# Patient Record
Sex: FEMALE | Race: ASIAN | NOT HISPANIC OR LATINO | URBAN - METROPOLITAN AREA
[De-identification: names, ages, dates, MRNs, and addresses within clinical notes are randomized per-mention and may not be internally consistent; named-entity substitution may affect disease eponyms.]

---

## 2017-07-03 ENCOUNTER — INPATIENT (INPATIENT)
Facility: HOSPITAL | Age: 62
LOS: 7 days | Discharge: HOME | DRG: 556 | End: 2017-07-11
Attending: INTERNAL MEDICINE | Admitting: INTERNAL MEDICINE
Payer: COMMERCIAL

## 2017-07-03 VITALS
DIASTOLIC BLOOD PRESSURE: 68 MMHG | OXYGEN SATURATION: 97 % | SYSTOLIC BLOOD PRESSURE: 136 MMHG | RESPIRATION RATE: 18 BRPM | TEMPERATURE: 99 F | HEART RATE: 83 BPM

## 2017-07-03 PROCEDURE — 99285 EMERGENCY DEPT VISIT HI MDM: CPT | Mod: 25

## 2017-07-03 NOTE — ED ADULT NURSE NOTE - OBJECTIVE STATEMENT
Patient presented to ED ambulatory but wheeled to ED, c/o right groin pain. Sons lifted patient up to help patient from home to the car, patient was unable to put weight on right foot. Patient stated on Saturday she started having severe right groin pain in the evening, early that day patient was sitting on the floor doing her bills. Patient denies injury. Pain did not improved, got worse after 2 days.

## 2017-07-04 DIAGNOSIS — I10 ESSENTIAL (PRIMARY) HYPERTENSION: ICD-10-CM

## 2017-07-04 DIAGNOSIS — E10.9 TYPE 1 DIABETES MELLITUS WITHOUT COMPLICATIONS: ICD-10-CM

## 2017-07-04 DIAGNOSIS — E03.9 HYPOTHYROIDISM, UNSPECIFIED: ICD-10-CM

## 2017-07-04 DIAGNOSIS — M25.551 PAIN IN RIGHT HIP: ICD-10-CM

## 2017-07-04 DIAGNOSIS — E78.00 PURE HYPERCHOLESTEROLEMIA, UNSPECIFIED: ICD-10-CM

## 2017-07-04 LAB
ALBUMIN SERPL ELPH-MCNC: 3.9 G/DL — SIGNIFICANT CHANGE UP (ref 3.3–5)
ALP SERPL-CCNC: 159 U/L — HIGH (ref 40–120)
ALT FLD-CCNC: 127 U/L RC — HIGH (ref 10–45)
ANION GAP SERPL CALC-SCNC: 16 MMOL/L — SIGNIFICANT CHANGE UP (ref 5–17)
APPEARANCE UR: CLEAR — SIGNIFICANT CHANGE UP
APTT BLD: 35.1 SEC — SIGNIFICANT CHANGE UP (ref 27.5–37.4)
AST SERPL-CCNC: 90 U/L — HIGH (ref 10–40)
BASOPHILS # BLD AUTO: 0 K/UL — SIGNIFICANT CHANGE UP (ref 0–0.2)
BASOPHILS NFR BLD AUTO: 0.2 % — SIGNIFICANT CHANGE UP (ref 0–2)
BILIRUB SERPL-MCNC: 0.5 MG/DL — SIGNIFICANT CHANGE UP (ref 0.2–1.2)
BILIRUB UR-MCNC: NEGATIVE — SIGNIFICANT CHANGE UP
BUN SERPL-MCNC: 18 MG/DL — SIGNIFICANT CHANGE UP (ref 7–23)
CALCIUM SERPL-MCNC: 9.7 MG/DL — SIGNIFICANT CHANGE UP (ref 8.4–10.5)
CHLORIDE SERPL-SCNC: 102 MMOL/L — SIGNIFICANT CHANGE UP (ref 96–108)
CO2 SERPL-SCNC: 22 MMOL/L — SIGNIFICANT CHANGE UP (ref 22–31)
COLOR SPEC: YELLOW — SIGNIFICANT CHANGE UP
CREAT SERPL-MCNC: 0.78 MG/DL — SIGNIFICANT CHANGE UP (ref 0.5–1.3)
CRP SERPL-MCNC: 12.3 MG/DL — HIGH (ref 0–0.4)
DIFF PNL FLD: NEGATIVE — SIGNIFICANT CHANGE UP
EOSINOPHIL # BLD AUTO: 0.1 K/UL — SIGNIFICANT CHANGE UP (ref 0–0.5)
EOSINOPHIL NFR BLD AUTO: 0.7 % — SIGNIFICANT CHANGE UP (ref 0–6)
GLUCOSE SERPL-MCNC: 78 MG/DL — SIGNIFICANT CHANGE UP (ref 70–99)
GLUCOSE UR QL: NEGATIVE — SIGNIFICANT CHANGE UP
HCT VFR BLD CALC: 32.1 % — LOW (ref 34.5–45)
HGB BLD-MCNC: 11.3 G/DL — LOW (ref 11.5–15.5)
INR BLD: 1.22 RATIO — HIGH (ref 0.88–1.16)
KETONES UR-MCNC: NEGATIVE — SIGNIFICANT CHANGE UP
LEUKOCYTE ESTERASE UR-ACNC: NEGATIVE — SIGNIFICANT CHANGE UP
LYMPHOCYTES # BLD AUTO: 13.6 % — SIGNIFICANT CHANGE UP (ref 13–44)
LYMPHOCYTES # BLD AUTO: 2.1 K/UL — SIGNIFICANT CHANGE UP (ref 1–3.3)
MCHC RBC-ENTMCNC: 28.4 PG — SIGNIFICANT CHANGE UP (ref 27–34)
MCHC RBC-ENTMCNC: 35.1 GM/DL — SIGNIFICANT CHANGE UP (ref 32–36)
MCV RBC AUTO: 81.1 FL — SIGNIFICANT CHANGE UP (ref 80–100)
MONOCYTES # BLD AUTO: 1 K/UL — HIGH (ref 0–0.9)
MONOCYTES NFR BLD AUTO: 6.3 % — SIGNIFICANT CHANGE UP (ref 2–14)
NEUTROPHILS # BLD AUTO: 12.2 K/UL — HIGH (ref 1.8–7.4)
NEUTROPHILS NFR BLD AUTO: 79.2 % — HIGH (ref 43–77)
NITRITE UR-MCNC: NEGATIVE — SIGNIFICANT CHANGE UP
PH UR: 6 — SIGNIFICANT CHANGE UP (ref 5–8)
PLATELET # BLD AUTO: 286 K/UL — SIGNIFICANT CHANGE UP (ref 150–400)
POTASSIUM SERPL-MCNC: 4.2 MMOL/L — SIGNIFICANT CHANGE UP (ref 3.5–5.3)
POTASSIUM SERPL-SCNC: 4.2 MMOL/L — SIGNIFICANT CHANGE UP (ref 3.5–5.3)
PROT SERPL-MCNC: 8.2 G/DL — SIGNIFICANT CHANGE UP (ref 6–8.3)
PROT UR-MCNC: 30 MG/DL
PROTHROM AB SERPL-ACNC: 13.2 SEC — HIGH (ref 9.8–12.7)
RBC # BLD: 3.96 M/UL — SIGNIFICANT CHANGE UP (ref 3.8–5.2)
RBC # FLD: 12.1 % — SIGNIFICANT CHANGE UP (ref 10.3–14.5)
SODIUM SERPL-SCNC: 140 MMOL/L — SIGNIFICANT CHANGE UP (ref 135–145)
SP GR SPEC: 1.02 — SIGNIFICANT CHANGE UP (ref 1.01–1.02)
UROBILINOGEN FLD QL: NEGATIVE — SIGNIFICANT CHANGE UP
WBC # BLD: 15.4 K/UL — HIGH (ref 3.8–10.5)
WBC # FLD AUTO: 15.4 K/UL — HIGH (ref 3.8–10.5)

## 2017-07-04 PROCEDURE — 72197 MRI PELVIS W/O & W/DYE: CPT | Mod: 26

## 2017-07-04 PROCEDURE — 73502 X-RAY EXAM HIP UNI 2-3 VIEWS: CPT | Mod: 26,RT

## 2017-07-04 PROCEDURE — 74176 CT ABD & PELVIS W/O CONTRAST: CPT | Mod: 26

## 2017-07-04 PROCEDURE — 76377 3D RENDER W/INTRP POSTPROCES: CPT | Mod: 26

## 2017-07-04 RX ORDER — VANCOMYCIN HCL 1 G
VIAL (EA) INTRAVENOUS
Qty: 0 | Refills: 0 | Status: DISCONTINUED | OUTPATIENT
Start: 2017-07-04 | End: 2017-07-04

## 2017-07-04 RX ORDER — IBUPROFEN 200 MG
400 TABLET ORAL EVERY 6 HOURS
Qty: 0 | Refills: 0 | Status: DISCONTINUED | OUTPATIENT
Start: 2017-07-04 | End: 2017-07-11

## 2017-07-04 RX ORDER — INSULIN LISPRO 100/ML
VIAL (ML) SUBCUTANEOUS AT BEDTIME
Qty: 0 | Refills: 0 | Status: DISCONTINUED | OUTPATIENT
Start: 2017-07-04 | End: 2017-07-11

## 2017-07-04 RX ORDER — GLUCAGON INJECTION, SOLUTION 0.5 MG/.1ML
1 INJECTION, SOLUTION SUBCUTANEOUS ONCE
Qty: 0 | Refills: 0 | Status: DISCONTINUED | OUTPATIENT
Start: 2017-07-04 | End: 2017-07-11

## 2017-07-04 RX ORDER — LISINOPRIL 2.5 MG/1
10 TABLET ORAL DAILY
Qty: 0 | Refills: 0 | Status: DISCONTINUED | OUTPATIENT
Start: 2017-07-04 | End: 2017-07-11

## 2017-07-04 RX ORDER — VANCOMYCIN HCL 1 G
1000 VIAL (EA) INTRAVENOUS ONCE
Qty: 0 | Refills: 0 | Status: COMPLETED | OUTPATIENT
Start: 2017-07-04 | End: 2017-07-04

## 2017-07-04 RX ORDER — RAMIPRIL 5 MG
0 CAPSULE ORAL
Qty: 0 | Refills: 0 | COMMUNITY

## 2017-07-04 RX ORDER — DEXTROSE 50 % IN WATER 50 %
12.5 SYRINGE (ML) INTRAVENOUS ONCE
Qty: 0 | Refills: 0 | Status: DISCONTINUED | OUTPATIENT
Start: 2017-07-04 | End: 2017-07-11

## 2017-07-04 RX ORDER — DEXTROSE 50 % IN WATER 50 %
1 SYRINGE (ML) INTRAVENOUS ONCE
Qty: 0 | Refills: 0 | Status: DISCONTINUED | OUTPATIENT
Start: 2017-07-04 | End: 2017-07-11

## 2017-07-04 RX ORDER — IBUPROFEN 200 MG
0 TABLET ORAL
Qty: 0 | Refills: 0 | COMMUNITY

## 2017-07-04 RX ORDER — ATORVASTATIN CALCIUM 80 MG/1
0 TABLET, FILM COATED ORAL
Qty: 0 | Refills: 0 | COMMUNITY

## 2017-07-04 RX ORDER — DEXTROSE 50 % IN WATER 50 %
25 SYRINGE (ML) INTRAVENOUS ONCE
Qty: 0 | Refills: 0 | Status: DISCONTINUED | OUTPATIENT
Start: 2017-07-04 | End: 2017-07-11

## 2017-07-04 RX ORDER — LEVOTHYROXINE SODIUM 125 MCG
0 TABLET ORAL
Qty: 0 | Refills: 0 | COMMUNITY

## 2017-07-04 RX ORDER — TRAMADOL HYDROCHLORIDE 50 MG/1
0 TABLET ORAL
Qty: 0 | Refills: 0 | COMMUNITY

## 2017-07-04 RX ORDER — INSULIN LISPRO 100/ML
VIAL (ML) SUBCUTANEOUS
Qty: 0 | Refills: 0 | Status: DISCONTINUED | OUTPATIENT
Start: 2017-07-04 | End: 2017-07-11

## 2017-07-04 RX ORDER — SODIUM CHLORIDE 9 MG/ML
1000 INJECTION, SOLUTION INTRAVENOUS ONCE
Qty: 0 | Refills: 0 | Status: COMPLETED | OUTPATIENT
Start: 2017-07-04 | End: 2017-07-04

## 2017-07-04 RX ORDER — MORPHINE SULFATE 50 MG/1
2 CAPSULE, EXTENDED RELEASE ORAL ONCE
Qty: 0 | Refills: 0 | Status: DISCONTINUED | OUTPATIENT
Start: 2017-07-04 | End: 2017-07-04

## 2017-07-04 RX ORDER — METHOCARBAMOL 500 MG/1
0 TABLET, FILM COATED ORAL
Qty: 0 | Refills: 0 | COMMUNITY

## 2017-07-04 RX ORDER — HEPARIN SODIUM 5000 [USP'U]/ML
5000 INJECTION INTRAVENOUS; SUBCUTANEOUS EVERY 12 HOURS
Qty: 0 | Refills: 0 | Status: DISCONTINUED | OUTPATIENT
Start: 2017-07-04 | End: 2017-07-11

## 2017-07-04 RX ORDER — INSULIN GLARGINE 100 [IU]/ML
36 INJECTION, SOLUTION SUBCUTANEOUS AT BEDTIME
Qty: 0 | Refills: 0 | Status: DISCONTINUED | OUTPATIENT
Start: 2017-07-04 | End: 2017-07-11

## 2017-07-04 RX ORDER — ACETAMINOPHEN 500 MG
1000 TABLET ORAL ONCE
Qty: 0 | Refills: 0 | Status: COMPLETED | OUTPATIENT
Start: 2017-07-04 | End: 2017-07-04

## 2017-07-04 RX ORDER — TRAMADOL HYDROCHLORIDE 50 MG/1
1 TABLET ORAL
Qty: 0 | Refills: 0 | COMMUNITY

## 2017-07-04 RX ORDER — MORPHINE SULFATE 50 MG/1
2 CAPSULE, EXTENDED RELEASE ORAL EVERY 6 HOURS
Qty: 0 | Refills: 0 | Status: DISCONTINUED | OUTPATIENT
Start: 2017-07-04 | End: 2017-07-06

## 2017-07-04 RX ORDER — KETOROLAC TROMETHAMINE 30 MG/ML
15 SYRINGE (ML) INJECTION ONCE
Qty: 0 | Refills: 0 | Status: DISCONTINUED | OUTPATIENT
Start: 2017-07-04 | End: 2017-07-04

## 2017-07-04 RX ORDER — METHOCARBAMOL 500 MG/1
1 TABLET, FILM COATED ORAL
Qty: 0 | Refills: 0 | COMMUNITY

## 2017-07-04 RX ORDER — LEVOTHYROXINE SODIUM 125 MCG
75 TABLET ORAL DAILY
Qty: 0 | Refills: 0 | Status: DISCONTINUED | OUTPATIENT
Start: 2017-07-04 | End: 2017-07-11

## 2017-07-04 RX ORDER — ATORVASTATIN CALCIUM 80 MG/1
10 TABLET, FILM COATED ORAL AT BEDTIME
Qty: 0 | Refills: 0 | Status: DISCONTINUED | OUTPATIENT
Start: 2017-07-04 | End: 2017-07-11

## 2017-07-04 RX ORDER — SODIUM CHLORIDE 9 MG/ML
1000 INJECTION, SOLUTION INTRAVENOUS
Qty: 0 | Refills: 0 | Status: DISCONTINUED | OUTPATIENT
Start: 2017-07-04 | End: 2017-07-11

## 2017-07-04 RX ORDER — ASPIRIN/CALCIUM CARB/MAGNESIUM 324 MG
81 TABLET ORAL DAILY
Qty: 0 | Refills: 0 | Status: DISCONTINUED | OUTPATIENT
Start: 2017-07-04 | End: 2017-07-11

## 2017-07-04 RX ADMIN — Medication 400 MILLIGRAM(S): at 22:15

## 2017-07-04 RX ADMIN — MORPHINE SULFATE 2 MILLIGRAM(S): 50 CAPSULE, EXTENDED RELEASE ORAL at 03:38

## 2017-07-04 RX ADMIN — ATORVASTATIN CALCIUM 10 MILLIGRAM(S): 80 TABLET, FILM COATED ORAL at 21:43

## 2017-07-04 RX ADMIN — MORPHINE SULFATE 2 MILLIGRAM(S): 50 CAPSULE, EXTENDED RELEASE ORAL at 11:38

## 2017-07-04 RX ADMIN — Medication 400 MILLIGRAM(S): at 02:37

## 2017-07-04 RX ADMIN — MORPHINE SULFATE 2 MILLIGRAM(S): 50 CAPSULE, EXTENDED RELEASE ORAL at 18:20

## 2017-07-04 RX ADMIN — INSULIN GLARGINE 36 UNIT(S): 100 INJECTION, SOLUTION SUBCUTANEOUS at 21:43

## 2017-07-04 RX ADMIN — Medication 400 MILLIGRAM(S): at 21:43

## 2017-07-04 RX ADMIN — MORPHINE SULFATE 2 MILLIGRAM(S): 50 CAPSULE, EXTENDED RELEASE ORAL at 04:39

## 2017-07-04 RX ADMIN — MORPHINE SULFATE 2 MILLIGRAM(S): 50 CAPSULE, EXTENDED RELEASE ORAL at 02:37

## 2017-07-04 RX ADMIN — Medication 15 MILLIGRAM(S): at 05:54

## 2017-07-04 RX ADMIN — MORPHINE SULFATE 2 MILLIGRAM(S): 50 CAPSULE, EXTENDED RELEASE ORAL at 18:01

## 2017-07-04 RX ADMIN — Medication 1000 MILLIGRAM(S): at 03:37

## 2017-07-04 RX ADMIN — MORPHINE SULFATE 2 MILLIGRAM(S): 50 CAPSULE, EXTENDED RELEASE ORAL at 05:50

## 2017-07-04 RX ADMIN — SODIUM CHLORIDE 4000 MILLILITER(S): 9 INJECTION, SOLUTION INTRAVENOUS at 08:35

## 2017-07-04 RX ADMIN — HEPARIN SODIUM 5000 UNIT(S): 5000 INJECTION INTRAVENOUS; SUBCUTANEOUS at 18:02

## 2017-07-04 RX ADMIN — Medication 250 MILLIGRAM(S): at 21:38

## 2017-07-04 NOTE — CHART NOTE - NSCHARTNOTEFT_GEN_A_CORE
Orthopedics    MRI pelvis done.  Moderate joint effusion seen.  Given effusion elevated ESR/CRP, elevated white blood cell count would consult IR for Hip aspiration to rule out septic joint.    Femi ROCHA 1941

## 2017-07-04 NOTE — ED PROVIDER NOTE - OBJECTIVE STATEMENT
Keren Arely, DO: 62F with hx of HTN, DM, HLD, Hypothyroidism here for R hip pain pain x 2 days. Pain started when patient was sitting on floor doing work. No trauma. Relieved by pressing on inguinal ligament, but now not relieving symptoms prompting visit. Pt has been on Augmentin x 10 days for reported salivary gland infection - + diarrhea now resolved 2/2 abx. +fevers/chills that proceeded hip pain.     PMD: Dr. Julio George    ROS: denies HA, weakness, dizziness, nausea/vomiting, chest pain, SOB, diaphoresis, abdominal pain, diarrhea, joint pain, neuro deficits, dysuria/hematuria, rash, back pain, bowel/bladder dysfunction.

## 2017-07-04 NOTE — H&P ADULT - HISTORY OF PRESENT ILLNESS
62F with hx of HTN, DM, HLD, Hypothyroidism here for R hip pain pain x 2 days. Pain started when patient was sitting on floor doing work. No trauma. Relieved by pressing on inguinal ligament, but now not relieving symptoms prompting visit. Pt has been on Augmentin x 10 days for reported salivary gland infection - + diarrhea now resolved 2/2 abx. +fevers/chills that proceeded hip pain.

## 2017-07-04 NOTE — ED PROVIDER NOTE - PROGRESS NOTE DETAILS
Keren Mcgee DO: Called pt's PMD. Attending MD Watson: CT and XR without fracture or obvious explanation for pain. +exquisite pain with PROM of right hip, in setting of fevers, cannot rule out hip osteo or septic hip. will consult ortho, patient will need MRI hip Keren Mcgee, DO: s/p POCUS with R hip effusion. Ortho consulted as cannot rule out septic hip. Will see patient. Keren Mcgee DO: Called pt's PMD. Pending call back. Keren Mcgee, DO: D/w Dr. George - pt treated for parotitis with Augmentin. No documented fevers in office but patient was c/o subjective fevers at that time. Can be admitted to unattached medicine as Dr. George does not have admitting preference. Attd:  Received sign out on patient pending orthopedics consultation.  Per orthopedics agrees with plan of MRI of hip, no plan for aspiration/operating room at this time, recommending admission to medicine, hold antibiotics pending MRI, possible IR aspiration of hip as inpatient if indicated, will continue to follow.  Will admit to medicine.  Zachariah Baez M.D.

## 2017-07-04 NOTE — CONSULT NOTE ADULT - SUBJECTIVE AND OBJECTIVE BOX
HPI:   This is a 62yFemale with  who presents today with chief complaint of R hip pain. Atraumatic, never had this pain before. Points to R groin as site of pain. Limited ability to ambulate. No constitutional symptoms. No contributory PMH.    Review of systems: Denies fever, chills, nausea, vomiting, recent infection, previous fractures.      T(C): 36.3 (07-04-17 @ 06:58), Max: 37.2 (07-04-17 @ 02:30)  HR: 80 (07-04-17 @ 06:58) (80 - 88)  BP: 112/56 (07-04-17 @ 06:58) (107/65 - 136/68)  RR: 18 (07-04-17 @ 06:58) (17 - 18)  SpO2: 99% (07-04-17 @ 06:58) (97% - 99%)  Wt(kg): --                          11.3   15.4  )-----------( 286      ( 04 Jul 2017 02:44 )             32.1     07-04    140  |  102  |  18  ----------------------------<  78  4.2   |  22  |  0.78    Ca    9.7      04 Jul 2017 02:44    TPro  8.2  /  Alb  3.9  /  TBili  0.5  /  DBili  x   /  AST  90<H>  /  ALT  127<H>  /  AlkPhos  159<H>  07-04    XR R hip: no acute findings  CT pelvis: no acute findings    EXAM:  Awake, Alert and in no acute distress  Pleasant and cooperative.  - RLE: TTP about hip and upper thigh; warmht, but no eythema; ROM at hip 0-45 degrees of flexion before pain, 0-15 IR/ER; SILT M/L/FDWS foot; TA/EHL/gs intact    A/P: This is a 62y Female who presents today with R hip pain    - ESR/CRP  - Pain control  - Urgent MRI of R hip  - Will follow   - D/w Dr Wells

## 2017-07-04 NOTE — ED PROVIDER NOTE - MEDICAL DECISION MAKING DETAILS
Keren Mcgee, DO: 62F with symptoms and exam concerning for hip fx. Hip on exam does not appear septic - will consider in differential for further eval if imaging negative for fracture, given fevers. No evidence of inguinal hernia on exam. Low suspicion for  pathology given pt is post-menopausal. Keren Mcgee, DO: 62F with symptoms and exam concerning for hip fx. Hip on exam does not appear septic - will consider vs. osteo in differential for further eval if imaging negative for fracture, given fevers. No evidence of inguinal hernia on exam. Low suspicion for  pathology given pt is post-menopausal.

## 2017-07-04 NOTE — PATIENT PROFILE ADULT. - VISION (WITH CORRECTIVE LENSES IF THE PATIENT USUALLY WEARS THEM):
marium distance and reading glssses/Partially impaired: cannot see medication labels or newsprint, but can see obstacles in path, and the surrounding layout; can count fingers at arm's length

## 2017-07-04 NOTE — ED PROVIDER NOTE - ATTENDING CONTRIBUTION TO CARE
Attending MD Watson:  I personally have seen and examined this patient.  Resident note reviewed and agree on plan of care and except where noted.  See HPI, PE, and MDM for details.    Attending MD Watson: A & O x 3, NAD, HEENT WNL and no facial asymmetry; lungs CTAB, heart with reg rhythm without murmur; abdomen soft NTND; extremities with no edema; +pain with logroll of right hip, +diffuse ttp right hip, no RLE deformity or foreshortening, neuro exam non focal with no motor or sensory deficits.       62F with atraumatic right hip pain and fevers, +exquisite ttp of right hip with PROM, ddx includes hip fx, hip osteomyelitis, septic hip, intra abd abscess, hip strain, plan for XR hip, labs, re-eval

## 2017-07-04 NOTE — H&P ADULT - PROBLEM SELECTOR PLAN 1
R/o septic arthritis. MRI hip performed R/o septic arthritis. MRI hip performed  Needs IR in AM to evaluate for needle aspiration R/o septic arthritis. MRI hip performed  Needs IR in AM to evaluate for needle aspiration  DW with ID, hold off antibiotics until aspiration of joint unless patient appears toxic

## 2017-07-04 NOTE — ED ADULT NURSE REASSESSMENT NOTE - NS ED NURSE REASSESS COMMENT FT1
Report received from REUBEN Prather. Pt. A+OX3, laying in stretcher comfortably. Pt. assisted with bedpan, voided without difficulty. Breathing unlabored on RA. Reports improvement in pain. Comfort and safety maintained. Pending dispo.

## 2017-07-04 NOTE — ED PROVIDER NOTE - PHYSICAL EXAMINATION
Keren Mcgee, DO:  Gen: Well appearing, NAD  Head: NCAT  HEENT: PERRL, MMM, normal conjunctiva, anicteric, neck supple  Lung: CTAB, no adventitious sounds  CV: RRR, no murmurs, rubs or gallops  Abd: soft, NTND, no rebound or guarding  MSK: No edema, no visible deformities. Pain with ROM of R hip with internal rotation. No pain with axial loading.   Neuro: No focal neurologic deficits. CN II-XII grossly intact. 5/5 strength and normal sensation in all extremities.   Skin: Warm and dry, no evidence of rash, no erythema of hip.   Psych: normal mood and affect  Heme: Femoral pulses intact b/l.

## 2017-07-05 LAB
ANION GAP SERPL CALC-SCNC: 11 MMOL/L — SIGNIFICANT CHANGE UP (ref 5–17)
BUN SERPL-MCNC: 12 MG/DL — SIGNIFICANT CHANGE UP (ref 7–23)
C3 SERPL-MCNC: 136 MG/DL — SIGNIFICANT CHANGE UP (ref 80–180)
C4 SERPL-MCNC: 32 MG/DL — SIGNIFICANT CHANGE UP (ref 10–45)
CALCIUM SERPL-MCNC: 8.8 MG/DL — SIGNIFICANT CHANGE UP (ref 8.4–10.5)
CHLORIDE SERPL-SCNC: 105 MMOL/L — SIGNIFICANT CHANGE UP (ref 96–108)
CK SERPL-CCNC: 63 U/L — SIGNIFICANT CHANGE UP (ref 25–170)
CO2 SERPL-SCNC: 22 MMOL/L — SIGNIFICANT CHANGE UP (ref 22–31)
CREAT SERPL-MCNC: 0.79 MG/DL — SIGNIFICANT CHANGE UP (ref 0.5–1.3)
GLUCOSE SERPL-MCNC: 102 MG/DL — HIGH (ref 70–99)
HBA1C BLD-MCNC: 9.7 % — HIGH (ref 4–5.6)
HCT VFR BLD CALC: 29.6 % — LOW (ref 34.5–45)
HGB BLD-MCNC: 9.9 G/DL — LOW (ref 11.5–15.5)
MCHC RBC-ENTMCNC: 26.1 PG — LOW (ref 27–34)
MCHC RBC-ENTMCNC: 33.4 GM/DL — SIGNIFICANT CHANGE UP (ref 32–36)
MCV RBC AUTO: 78.1 FL — LOW (ref 80–100)
PLATELET # BLD AUTO: 356 K/UL — SIGNIFICANT CHANGE UP (ref 150–400)
POTASSIUM SERPL-MCNC: 3.7 MMOL/L — SIGNIFICANT CHANGE UP (ref 3.5–5.3)
POTASSIUM SERPL-SCNC: 3.7 MMOL/L — SIGNIFICANT CHANGE UP (ref 3.5–5.3)
RAPID RVP RESULT: SIGNIFICANT CHANGE UP
RBC # BLD: 3.79 M/UL — LOW (ref 3.8–5.2)
RBC # FLD: 13.2 % — SIGNIFICANT CHANGE UP (ref 10.3–14.5)
SODIUM SERPL-SCNC: 138 MMOL/L — SIGNIFICANT CHANGE UP (ref 135–145)
TSH SERPL-MCNC: 2.84 UIU/ML — SIGNIFICANT CHANGE UP (ref 0.27–4.2)
URATE SERPL-MCNC: 4.5 MG/DL — SIGNIFICANT CHANGE UP (ref 2.5–7)
WBC # BLD: 12.56 K/UL — HIGH (ref 3.8–10.5)
WBC # FLD AUTO: 12.56 K/UL — HIGH (ref 3.8–10.5)

## 2017-07-05 PROCEDURE — 77002 NEEDLE LOCALIZATION BY XRAY: CPT | Mod: 26

## 2017-07-05 PROCEDURE — 99254 IP/OBS CNSLTJ NEW/EST MOD 60: CPT

## 2017-07-05 PROCEDURE — 20610 DRAIN/INJ JOINT/BURSA W/O US: CPT | Mod: RT

## 2017-07-05 RX ADMIN — MORPHINE SULFATE 2 MILLIGRAM(S): 50 CAPSULE, EXTENDED RELEASE ORAL at 20:44

## 2017-07-05 RX ADMIN — Medication 400 MILLIGRAM(S): at 22:13

## 2017-07-05 RX ADMIN — Medication 400 MILLIGRAM(S): at 15:03

## 2017-07-05 RX ADMIN — Medication 3: at 12:17

## 2017-07-05 RX ADMIN — INSULIN GLARGINE 36 UNIT(S): 100 INJECTION, SOLUTION SUBCUTANEOUS at 21:42

## 2017-07-05 RX ADMIN — MORPHINE SULFATE 2 MILLIGRAM(S): 50 CAPSULE, EXTENDED RELEASE ORAL at 20:29

## 2017-07-05 RX ADMIN — Medication 81 MILLIGRAM(S): at 12:15

## 2017-07-05 RX ADMIN — Medication 2: at 17:59

## 2017-07-05 RX ADMIN — HEPARIN SODIUM 5000 UNIT(S): 5000 INJECTION INTRAVENOUS; SUBCUTANEOUS at 17:59

## 2017-07-05 RX ADMIN — Medication 400 MILLIGRAM(S): at 08:00

## 2017-07-05 RX ADMIN — Medication 400 MILLIGRAM(S): at 15:24

## 2017-07-05 RX ADMIN — LISINOPRIL 10 MILLIGRAM(S): 2.5 TABLET ORAL at 06:23

## 2017-07-05 RX ADMIN — Medication 400 MILLIGRAM(S): at 21:43

## 2017-07-05 RX ADMIN — HEPARIN SODIUM 5000 UNIT(S): 5000 INJECTION INTRAVENOUS; SUBCUTANEOUS at 06:23

## 2017-07-05 RX ADMIN — Medication 400 MILLIGRAM(S): at 08:03

## 2017-07-05 RX ADMIN — ATORVASTATIN CALCIUM 10 MILLIGRAM(S): 80 TABLET, FILM COATED ORAL at 21:43

## 2017-07-05 RX ADMIN — Medication 75 MICROGRAM(S): at 06:23

## 2017-07-05 NOTE — CONSULT NOTE ADULT - PROBLEM SELECTOR RECOMMENDATION 9
Hip aspirate by IR  Ortho input noted  Check blood Cx  Start empiric vanco after hip aspirate done(to maximize yield)  Check ASO,TROY complements RVP and hepatitis panel, CPK  Tailor plan per results,course  Pain per primary  Ortho follow up

## 2017-07-05 NOTE — CONSULT NOTE ADULT - ASSESSMENT
62F with hx of HTN, DM, HLD, Hypothyroidism here for R hip pain pain x 2 days  No prior joint issues,no trauma  fevers  Elevated markers of inflammation  MRI with joint effusion ?muscular edema  Prior h/o ?URi v parotitis no s/s now  ?Septic arthritis, ?post infectious,?Viral ? Crystal disaeses,?myositis,?other non infectious etiology

## 2017-07-05 NOTE — PROGRESS NOTE ADULT - SUBJECTIVE AND OBJECTIVE BOX
63 y/o female with right hip joint effusion, elevated ESR, CRP and leukocytosis.  Concern for septic arthritis as seen on recent MRI.  Hx of HTN, DM, HLD, Hypothyroidism here for R hip pain pain x 2 days. Pain started when patient was sitting on floor doing work. No trauma. Relieved by pressing on inguinal ligament, but now not relieving symptoms prompting visit. Pt has been on Augmentin x 10 days for reported salivary gland infection - + diarrhea now resolved 2/2 abx. +fevers/chills that proceeded hip pain. (04 Jul 2017 18:50)    PAST MEDICAL & SURGICAL HISTORY:  Hypothyroid  High cholesterol  Diabetes  HTN (hypertension)      Social History:     FAMILY HISTORY:  No pertinent family history in first degree relatives      Allergies: No Known Allergies      Current Medications: morphine  - Injectable 2 milliGRAM(s) IV Push every 6 hours PRN  insulin glargine Injectable (LANTUS) 36 Unit(s) SubCutaneous at bedtime  insulin lispro (HumaLOG) corrective regimen sliding scale   SubCutaneous three times a day before meals  insulin lispro (HumaLOG) corrective regimen sliding scale   SubCutaneous at bedtime  dextrose 5%. 1000 milliLiter(s) IV Continuous <Continuous>  dextrose Gel 1 Dose(s) Oral once PRN  dextrose 50% Injectable 12.5 Gram(s) IV Push once  dextrose 50% Injectable 25 Gram(s) IV Push once  dextrose 50% Injectable 25 Gram(s) IV Push once  glucagon  Injectable 1 milliGRAM(s) IntraMuscular once PRN  heparin  Injectable 5000 Unit(s) SubCutaneous every 12 hours  levothyroxine 75 MICROGram(s) Oral daily  aspirin enteric coated 81 milliGRAM(s) Oral daily  ibuprofen  Tablet 400 milliGRAM(s) Oral every 6 hours PRN  lisinopril 10 milliGRAM(s) Oral daily  atorvastatin 10 milliGRAM(s) Oral at bedtime      Labs:                         9.9    12.56 )-----------( 356      ( 05 Jul 2017 08:42 )             29.6       07-05    138  |  105  |  12  ----------------------------<  102<H>  3.7   |  22  |  0.79    Ca    8.8      05 Jul 2017 08:52    TPro  8.2  /  Alb  3.9  /  TBili  0.5  /  DBili  x   /  AST  90<H>  /  ALT  127<H>  /  AlkPhos  159<H>  07-04      Assessment/Plan:   63 y/o female with right hip joint effusion and concern for septic arthritis.  Referred for right hip joint aspiration to r/o septic arthritis.      Procedure/ risks/ benefits/ goals/ alternatives were explained. All questions answered. Informed content obtained from patient. Consent placed in chart. 63 y/o female with right hip joint effusion, elevated ESR, CRP and leukocytosis.  Concern for septic arthritis as seen on recent MRI.  Hx of HTN, DM, HLD, Hypothyroidism here for R hip pain pain x 2 days. Pain started when patient was sitting on floor doing work. No trauma. Relieved by pressing on inguinal ligament, but now not relieving symptoms prompting visit. Pt has been on Augmentin x 10 days for reported salivary gland infection - + diarrhea now resolved 2/2 abx. +fevers/chills that proceeded hip pain. (04 Jul 2017 18:50)    PAST MEDICAL & SURGICAL HISTORY:  Hypothyroid  High cholesterol  Diabetes  HTN (hypertension)      Social History:     FAMILY HISTORY:  No pertinent family history in first degree relatives      Allergies: No Known Allergies      Current Medications: morphine  - Injectable 2 milliGRAM(s) IV Push every 6 hours PRN  insulin glargine Injectable (LANTUS) 36 Unit(s) SubCutaneous at bedtime  insulin lispro (HumaLOG) corrective regimen sliding scale   SubCutaneous three times a day before meals  insulin lispro (HumaLOG) corrective regimen sliding scale   SubCutaneous at bedtime  dextrose 5%. 1000 milliLiter(s) IV Continuous <Continuous>  dextrose Gel 1 Dose(s) Oral once PRN  dextrose 50% Injectable 12.5 Gram(s) IV Push once  dextrose 50% Injectable 25 Gram(s) IV Push once  dextrose 50% Injectable 25 Gram(s) IV Push once  glucagon  Injectable 1 milliGRAM(s) IntraMuscular once PRN  heparin  Injectable 5000 Unit(s) SubCutaneous every 12 hours  levothyroxine 75 MICROGram(s) Oral daily  aspirin enteric coated 81 milliGRAM(s) Oral daily  ibuprofen  Tablet 400 milliGRAM(s) Oral every 6 hours PRN  lisinopril 10 milliGRAM(s) Oral daily  atorvastatin 10 milliGRAM(s) Oral at bedtime      Labs:                         9.9    12.56 )-----------( 356      ( 05 Jul 2017 08:42 )             29.6       07-05    138  |  105  |  12  ----------------------------<  102<H>  3.7   |  22  |  0.79    Ca    8.8      05 Jul 2017 08:52    TPro  8.2  /  Alb  3.9  /  TBili  0.5  /  DBili  x   /  AST  90<H>  /  ALT  127<H>  /  AlkPhos  159<H>  07-04      Assessment/Plan:   63 y/o female with right hip joint effusion and concern for septic arthritis.  Referred for right hip joint aspiration to r/o septic arthritis.      Procedure/ risks/ benefits/ goals/ alternatives were explained. All questions answered. Informed content obtained from patient. Consent placed in chart.     AS above, will attempt right hip aspiration.  COnsent obtained and reinforced.

## 2017-07-05 NOTE — CONSULT NOTE ADULT - SUBJECTIVE AND OBJECTIVE BOX
Patient is a 62y old  Female who presents with a chief complaint of Pain in hip (04 Jul 2017 18:50)    HPI:  62F with hx of HTN, DM, HLD, Hypothyroidism here for R hip pain pain x 2 days. Pain started when patient was sitting on floor doing work. No trauma. Relieved by pressing on inguinal ligament, but now not relieving symptoms prompting visit. Pt has been on Augmentin x 10 days for reported salivary gland infection - + diarrhea now resolved 2/2 abx. +fevers/chills that proceeded hip pain. (04 Jul 2017 18:50)  Patient had stopped abx about a week ago  No travel  No tick exposure  No prior history of joint issues    Seen by ortho  MRI obtained s/o some fluid  For IR aspiration  says pain slightly better though has not walked    ID consulted      PAST MEDICAL & SURGICAL HISTORY:  Hypothyroid  High cholesterol  Diabetes  HTN (hypertension)      Social history: No    Smoking,   ETOH,  IVDU     Not sexually active X 7 years  From [facility] X 40 years  No recent tarvel.      FAMILY HISTORY:  No pertinent family history in first degree relatives  - Reviewed,Non contributory     REVIEW OF SYSTEMS  General:	Denies any malaise fatigue or chills. Fevers last few days though not measured    Skin:No rash  	  Ophthalmologic:Denies any visual complaints,discharge redness or photophobia  	  ENMT:No nasal discharge,headache,sinus congestion or throat pain.No dental complaints  says facial pain resolved    Respiratory and Thorax:No cough,sputum or chest pain.Denies shortness of breath  	  Cardiovascular:	No chest pain,palpitaions or dizziness    Gastrointestinal:	NO nausea,abdominal pain or diarrhea.    Genitourinary:	No dysuria,frequency. No flank pain    Musculoskeletal:	No other  joint swelling or pain.No weakness    Neurological:No confusion,diziness.No extremity weakness.No bladder or bowel incontinence	    Psychiatric:No delusions or hallucinations	    Hematology/Lymphatics:	No LN swelling.No gum bleeding     Endocrine:	No recent weight gain or loss.No abnormal heat/cold intolerance      Allergies    No Known Allergies    Intolerances        Antimicrobials:  Given vanco X 1 yesterday but then stopped          Vital Signs Last 24 Hrs  T(C): 37.1 (05 Jul 2017 04:20), Max: 37.9 (04 Jul 2017 18:06)  T(F): 98.7 (05 Jul 2017 04:20), Max: 100.3 (04 Jul 2017 18:06)  HR: 72 (05 Jul 2017 04:20) (72 - 91)  BP: 119/61 (05 Jul 2017 04:20) (109/64 - 141/68)  BP(mean): --  RR: 18 (05 Jul 2017 04:20) (17 - 19)  SpO2: 93% (05 Jul 2017 04:20) (92% - 98%)    PHYSICAL EXAM:Pleasant patient in no acute distress.      Constitutional:Comfortable.Awake and alert  No cachexia     Eyes:PERRL EOMI.NO discharge or conjunctival injection    ENMT:No sinus tenderness.No thrush.No pharyngeal exudate or erythema. dentures  Neck:Supple,No LN,no JVD      Respiratory:Good air entry bilaterally,CTA    Cardiovascular:S1 S2 wnl, No murmurs,rub or gallops    Gastrointestinal:Soft BS(+) no tenderness no masses ,No rebound or guarding    Genitourinary:No CVA tendereness         Extremities:No cyanosis,clubbing or edema.    Vascular:peripheral pulses felt    Neurological:AAO X 3,No grossly focal deficits    Skin:No rash     Lymph Nodes:No palpable LNs    Musculoskeletal:No joint swelling .Painful motion on Right hip to abduction,adduction     Psychiatric:Affect normal.          Labs:                            11.3   15.4  )-----------( 286      ( 04 Jul 2017 02:44 )             32.1         07-04    140  |  102  |  18  ----------------------------<  78  4.2   |  22  |  0.78    Ca    9.7      04 Jul 2017 02:44    TPro  8.2  /  Alb  3.9  /  TBili  0.5  /  DBili  x   /  AST  90<H>  /  ALT  127<H>  /  AlkPhos  159<H>  07-04        MICROBIOLOGY:    Blood Cx testing      < from: MRI Pelvis Bony Only w/wo Cont (07.04.17 @ 17:56) >    IMPRESSION:   1.  Moderate right hip joint effusion of uncertain etiology. There is no   capsular thickening or early synovial enhancement to suggest infection on   current MRI.  2.  Mild muscular edema in the gluteus minimus, gluteus medius, and   adductor muscles about the right hip of uncertain etiology.  3.  Mild tendinosis of the right gluteus medius and gluteus minimus   tendons.  4.  No MR evidence of osteomyelitis.      < end of copied text >      < from: CT Abdomen and Pelvis No Cont (07.04.17 @ 04:37) >  IMPRESSION:     The etiology of this patient's right groin pain is not elucidated on this   study.    Bibasilar interstitial edema and/or fibrosis associated with   bronchiectasis.                < end of copied text > Patient is a 62y old  Female who presents with a chief complaint of Pain in hip (04 Jul 2017 18:50)    HPI:  62F with hx of HTN, DM, HLD, Hypothyroidism here for R hip pain pain x 2 days. Pain started when patient was sitting on floor doing work. No trauma. Relieved by pressing on inguinal ligament, but now not relieving symptoms prompting visit. Pt has been on Augmentin x 10 days for reported salivary gland infection - + diarrhea now resolved 2/2 abx. +fevers/chills that proceeded hip pain. (04 Jul 2017 18:50)  Patient had stopped abx about a week ago  No travel  No tick exposure  No prior history of joint issues    Seen by ortho  MRI obtained s/o some fluid  For IR aspiration  says pain slightly better though has not walked    ID consulted      PAST MEDICAL & SURGICAL HISTORY:  Hypothyroid  High cholesterol  Diabetes  HTN (hypertension)      Social history: No    Smoking,   ETOH,  IVDU     Not sexually active X 7 years  From [facility] X 40 years  No recent tarvel.      FAMILY HISTORY:  No pertinent family history in first degree relatives  - Reviewed,Non contributory     REVIEW OF SYSTEMS  General:	Denies any malaise fatigue or chills. Fevers last few days though not measured    Skin:No rash  	  Ophthalmologic:Denies any visual complaints,discharge redness or photophobia  	  ENMT:No nasal discharge,headache,sinus congestion or throat pain.No dental complaints  says facial pain resolved    Respiratory and Thorax:No cough,sputum or chest pain.Denies shortness of breath  	  Cardiovascular:	No chest pain,palpitaions or dizziness    Gastrointestinal:	NO nausea,abdominal pain or diarrhea.    Genitourinary:	No dysuria,frequency. No flank pain    Musculoskeletal:	No other  joint swelling or pain.No weakness    Neurological:No confusion,diziness.No extremity weakness.No bladder or bowel incontinence	    Psychiatric:No delusions or hallucinations	    Hematology/Lymphatics:	No LN swelling.No gum bleeding     Endocrine:	No recent weight gain or loss.No abnormal heat/cold intolerance      Allergies    No Known Allergies    Intolerances        Antimicrobials:  Given vanco X 1 yesterday but then stopped          Vital Signs Last 24 Hrs  T(C): 37.1 (05 Jul 2017 04:20), Max: 37.9 (04 Jul 2017 18:06)  T(F): 98.7 (05 Jul 2017 04:20), Max: 100.3 (04 Jul 2017 18:06)  HR: 72 (05 Jul 2017 04:20) (72 - 91)  BP: 119/61 (05 Jul 2017 04:20) (109/64 - 141/68)  BP(mean): --  RR: 18 (05 Jul 2017 04:20) (17 - 19)  SpO2: 93% (05 Jul 2017 04:20) (92% - 98%)    PHYSICAL EXAM:Pleasant patient in no acute distress.      Constitutional:Comfortable.Awake and alert  No cachexia     Eyes:PERRL EOMI.NO discharge or conjunctival injection    ENMT:No sinus tenderness.No thrush.No pharyngeal exudate or erythema. dentures  Neck:Supple,No LN,no JVD      Respiratory:Good air entry bilaterally,CTA    Cardiovascular:S1 S2 wnl, No murmurs,rub or gallops    Gastrointestinal:Soft BS(+) no tenderness no masses ,No rebound or guarding    Genitourinary:No CVA tendereness         Extremities:No cyanosis,clubbing or edema.    Vascular:peripheral pulses felt    Neurological:AAO X 3,No grossly focal deficits    Skin:No rash     Lymph Nodes:No palpable LNs    Musculoskeletal:No joint swelling .Painful motion on Right hip to abduction,adduction     Psychiatric:Affect normal.          Labs:                            11.3   15.4  )-----------( 286      ( 04 Jul 2017 02:44 )             32.1         07-04    140  |  102  |  18  ----------------------------<  78  4.2   |  22  |  0.78    Ca    9.7      04 Jul 2017 02:44    TPro  8.2  /  Alb  3.9  /  TBili  0.5  /  DBili  x   /  AST  90<H>  /  ALT  127<H>  /  AlkPhos  159<H>  07-04    Sedimentation Rate, Erythrocyte (07.04.17 @ 02:44)    Sedimentation Rate, Erythrocyte: 106 mm/hr    C-Reactive Protein, Serum (07.04.17 @ 10:44)    C-Reactive Protein, Serum: 12.30 mg/dL            MICROBIOLOGY:    Blood Cx testing      < from: MRI Pelvis Bony Only w/wo Cont (07.04.17 @ 17:56) >    IMPRESSION:   1.  Moderate right hip joint effusion of uncertain etiology. There is no   capsular thickening or early synovial enhancement to suggest infection on   current MRI.  2.  Mild muscular edema in the gluteus minimus, gluteus medius, and   adductor muscles about the right hip of uncertain etiology.  3.  Mild tendinosis of the right gluteus medius and gluteus minimus   tendons.  4.  No MR evidence of osteomyelitis.      < end of copied text >      < from: CT Abdomen and Pelvis No Cont (07.04.17 @ 04:37) >  IMPRESSION:     The etiology of this patient's right groin pain is not elucidated on this   study.    Bibasilar interstitial edema and/or fibrosis associated with   bronchiectasis.                < end of copied text >

## 2017-07-06 LAB
ALBUMIN SERPL ELPH-MCNC: 3.3 G/DL — SIGNIFICANT CHANGE UP (ref 3.3–5)
ALP SERPL-CCNC: 317 U/L — HIGH (ref 40–120)
ALT FLD-CCNC: 232 U/L — HIGH (ref 10–45)
ANION GAP SERPL CALC-SCNC: 13 MMOL/L — SIGNIFICANT CHANGE UP (ref 5–17)
ASO AB SER QL: 28 IU/ML — SIGNIFICANT CHANGE UP (ref 0–408)
AST SERPL-CCNC: 115 U/L — HIGH (ref 10–40)
B PERT IGG+IGM PNL SER: ABNORMAL
BILIRUB DIRECT SERPL-MCNC: 0.1 MG/DL — SIGNIFICANT CHANGE UP (ref 0–0.2)
BILIRUB INDIRECT FLD-MCNC: 0.3 MG/DL — SIGNIFICANT CHANGE UP (ref 0.2–1)
BILIRUB SERPL-MCNC: 0.4 MG/DL — SIGNIFICANT CHANGE UP (ref 0.2–1.2)
BUN SERPL-MCNC: 14 MG/DL — SIGNIFICANT CHANGE UP (ref 7–23)
CALCIUM SERPL-MCNC: 8.5 MG/DL — SIGNIFICANT CHANGE UP (ref 8.4–10.5)
CHLORIDE SERPL-SCNC: 106 MMOL/L — SIGNIFICANT CHANGE UP (ref 96–108)
CO2 SERPL-SCNC: 23 MMOL/L — SIGNIFICANT CHANGE UP (ref 22–31)
COLOR FLD: SIGNIFICANT CHANGE UP
CREAT SERPL-MCNC: 0.96 MG/DL — SIGNIFICANT CHANGE UP (ref 0.5–1.3)
FLUID INTAKE SUBSTANCE CLASS: SIGNIFICANT CHANGE UP
FLUID SEGMENTED GRANULOCYTES: 97 % — SIGNIFICANT CHANGE UP
GLUCOSE SERPL-MCNC: 76 MG/DL — SIGNIFICANT CHANGE UP (ref 70–99)
GRAM STN FLD: SIGNIFICANT CHANGE UP
HCT VFR BLD CALC: 25.6 % — LOW (ref 34.5–45)
HGB BLD-MCNC: 8.4 G/DL — LOW (ref 11.5–15.5)
LYMPHOCYTES # FLD: 1 % — SIGNIFICANT CHANGE UP
MCHC RBC-ENTMCNC: 25.5 PG — LOW (ref 27–34)
MCHC RBC-ENTMCNC: 32.8 GM/DL — SIGNIFICANT CHANGE UP (ref 32–36)
MCV RBC AUTO: 77.8 FL — LOW (ref 80–100)
MONOS+MACROS # FLD: 2 % — SIGNIFICANT CHANGE UP
PLATELET # BLD AUTO: 406 K/UL — HIGH (ref 150–400)
POTASSIUM SERPL-MCNC: 3.7 MMOL/L — SIGNIFICANT CHANGE UP (ref 3.5–5.3)
POTASSIUM SERPL-SCNC: 3.7 MMOL/L — SIGNIFICANT CHANGE UP (ref 3.5–5.3)
PROT SERPL-MCNC: 8.2 G/DL — SIGNIFICANT CHANGE UP (ref 6–8.3)
RBC # BLD: 3.29 M/UL — LOW (ref 3.8–5.2)
RBC # FLD: 13 % — SIGNIFICANT CHANGE UP (ref 10.3–14.5)
RCV VOL RI: 1000 /UL — HIGH (ref 0–5)
SODIUM SERPL-SCNC: 142 MMOL/L — SIGNIFICANT CHANGE UP (ref 135–145)
SPECIMEN SOURCE: SIGNIFICANT CHANGE UP
TOTAL NUCLEATED CELL COUNT, BODY FLUID: 4759 /UL — HIGH (ref 0–5)
TUBE TYPE: SIGNIFICANT CHANGE UP
WBC # BLD: 11.25 K/UL — HIGH (ref 3.8–10.5)
WBC # FLD AUTO: 11.25 K/UL — HIGH (ref 3.8–10.5)

## 2017-07-06 PROCEDURE — 99232 SBSQ HOSP IP/OBS MODERATE 35: CPT

## 2017-07-06 RX ORDER — VANCOMYCIN HCL 1 G
VIAL (EA) INTRAVENOUS
Qty: 0 | Refills: 0 | Status: DISCONTINUED | OUTPATIENT
Start: 2017-07-06 | End: 2017-07-10

## 2017-07-06 RX ORDER — DEXTROSE 50 % IN WATER 50 %
25 SYRINGE (ML) INTRAVENOUS ONCE
Qty: 0 | Refills: 0 | Status: COMPLETED | OUTPATIENT
Start: 2017-07-06 | End: 2017-07-06

## 2017-07-06 RX ORDER — VANCOMYCIN HCL 1 G
1000 VIAL (EA) INTRAVENOUS ONCE
Qty: 0 | Refills: 0 | Status: COMPLETED | OUTPATIENT
Start: 2017-07-06 | End: 2017-07-06

## 2017-07-06 RX ORDER — VANCOMYCIN HCL 1 G
1000 VIAL (EA) INTRAVENOUS EVERY 12 HOURS
Qty: 0 | Refills: 0 | Status: DISCONTINUED | OUTPATIENT
Start: 2017-07-06 | End: 2017-07-10

## 2017-07-06 RX ADMIN — Medication 25 GRAM(S): at 07:29

## 2017-07-06 RX ADMIN — Medication 81 MILLIGRAM(S): at 11:39

## 2017-07-06 RX ADMIN — Medication 2: at 18:08

## 2017-07-06 RX ADMIN — Medication 250 MILLIGRAM(S): at 18:12

## 2017-07-06 RX ADMIN — Medication 250 MILLIGRAM(S): at 01:05

## 2017-07-06 RX ADMIN — Medication 75 MICROGRAM(S): at 05:24

## 2017-07-06 RX ADMIN — MORPHINE SULFATE 2 MILLIGRAM(S): 50 CAPSULE, EXTENDED RELEASE ORAL at 15:30

## 2017-07-06 RX ADMIN — LISINOPRIL 10 MILLIGRAM(S): 2.5 TABLET ORAL at 05:24

## 2017-07-06 RX ADMIN — HEPARIN SODIUM 5000 UNIT(S): 5000 INJECTION INTRAVENOUS; SUBCUTANEOUS at 05:24

## 2017-07-06 RX ADMIN — MORPHINE SULFATE 2 MILLIGRAM(S): 50 CAPSULE, EXTENDED RELEASE ORAL at 15:45

## 2017-07-06 RX ADMIN — HEPARIN SODIUM 5000 UNIT(S): 5000 INJECTION INTRAVENOUS; SUBCUTANEOUS at 18:09

## 2017-07-06 NOTE — PROGRESS NOTE ADULT - ASSESSMENT
62F with hx of HTN, DM, HLD, Hypothyroidism here for R hip pain pain x 2 days  No prior joint issues,no trauma  fevers  Elevated markers of inflammation  MRI with joint effusion ?muscular edema  Prior h/o ?URi v parotitis no s/s now  ?Septic arthritis, ?post infectious,?Viral ? Crystal disease,?myositis,?other non infectious etiology

## 2017-07-06 NOTE — PROVIDER CONTACT NOTE (OTHER) - ASSESSMENT
Pt became NPO @ 0530, Tomy PERRY contacted with no knowledge of NPO order & further plans for pt @ this time. States to endorse to oncoming RN & follow up with day team. Pt responsive in bed, states "I feel hungry."

## 2017-07-06 NOTE — PROGRESS NOTE ADULT - SUBJECTIVE AND OBJECTIVE BOX
4k nucleated cells in hip aspirate. Low suspicion of septic arthritis. Would await final cxs from aspirate.

## 2017-07-06 NOTE — PROGRESS NOTE ADULT - SUBJECTIVE AND OBJECTIVE BOX
Patient is a 62y old  Female who presents with a chief complaint of Pain in hip (04 Jul 2017 18:50)      SUBJECTIVE / OVERNIGHT EVENTS:  S/p right hip aspiration. Pain improving slowly. May need surgiacal exploration by orthopedics  Review of Systems:   CONSTITUTIONAL: No fever, weight loss, or fatigue  EYES: No eye pain, visual disturbances, or discharge  ENMT:  No difficulty hearing, tinnitus, vertigo; No sinus or throat pain  NECK: No pain or stiffness  BREASTS: No pain, masses, or nipple discharge  RESPIRATORY: No cough, wheezing, chills or hemoptysis; No shortness of breath  CARDIOVASCULAR: No chest pain, palpitations, dizziness, or leg swelling  GASTROINTESTINAL: No abdominal or epigastric pain. No nausea, vomiting, or hematemesis; No diarrhea or constipation. No melena or hematochezia.  GENITOURINARY: No dysuria, frequency, hematuria, or incontinence  NEUROLOGICAL: No headaches, memory loss, loss of strength, numbness, or tremors  SKIN: No itching, burning, rashes, or lesions   LYMPH NODES: No enlarged glands  ENDOCRINE: No heat or cold intolerance; No hair loss  MUSCULOSKELETAL: Right groin pain reported  PSYCHIATRIC: No depression, anxiety, mood swings, or difficulty sleeping  HEME/LYMPH: No easy bruising, or bleeding gums  ALLERY AND IMMUNOLOGIC: No hives or eczema    MEDICATIONS  (STANDING):  insulin glargine Injectable (LANTUS) 36 Unit(s) SubCutaneous at bedtime  insulin lispro (HumaLOG) corrective regimen sliding scale   SubCutaneous three times a day before meals  insulin lispro (HumaLOG) corrective regimen sliding scale   SubCutaneous at bedtime  dextrose 5%. 1000 milliLiter(s) (50 mL/Hr) IV Continuous <Continuous>  dextrose 50% Injectable 12.5 Gram(s) IV Push once  dextrose 50% Injectable 25 Gram(s) IV Push once  dextrose 50% Injectable 25 Gram(s) IV Push once  heparin  Injectable 5000 Unit(s) SubCutaneous every 12 hours  levothyroxine 75 MICROGram(s) Oral daily  aspirin enteric coated 81 milliGRAM(s) Oral daily  lisinopril 10 milliGRAM(s) Oral daily  atorvastatin 10 milliGRAM(s) Oral at bedtime  vancomycin  IVPB   IV Intermittent   vancomycin  IVPB 1000 milliGRAM(s) IV Intermittent every 12 hours    MEDICATIONS  (PRN):  morphine  - Injectable 2 milliGRAM(s) IV Push every 6 hours PRN Moderate Pain (4 - 6)  dextrose Gel 1 Dose(s) Oral once PRN Blood Glucose LESS THAN 70 milliGRAM(s)/deciliter  glucagon  Injectable 1 milliGRAM(s) IntraMuscular once PRN Glucose LESS THAN 70 milligrams/deciliter  ibuprofen  Tablet 400 milliGRAM(s) Oral every 6 hours PRN Moderate pain      PHYSICAL EXAM:  Vital Signs Last 24 Hrs  T(C): 37.3 (07-06-17 @ 20:40), Max: 37.3 (07-06-17 @ 20:40)  HR: 83 (07-06-17 @ 20:40) (77 - 86)  BP: 137/73 (07-06-17 @ 20:40) (111/71 - 137/73)  RR: 18 (07-06-17 @ 20:40) (18 - 18)  SpO2: 94% (07-06-17 @ 20:40) (94% - 98%)  I&O's Summary    05 Jul 2017 07:01  -  06 Jul 2017 07:00  --------------------------------------------------------  IN: 1840 mL / OUT: 2400 mL / NET: -560 mL    06 Jul 2017 07:01  -  06 Jul 2017 20:41  --------------------------------------------------------  IN: 350 mL / OUT: 500 mL / NET: -150 mL      GENERAL: NAD, well-developed  HEAD:  Atraumatic, Normocephalic  EYES: EOMI, PERRLA, conjunctiva and sclera clear  NECK: Supple, No JVD  CHEST/LUNG: Clear to auscultation bilaterally; No wheeze  HEART: Regular rate and rhythm; No murmurs, rubs, or gallops  ABDOMEN: Soft, Nontender, Nondistended; Bowel sounds present  EXTREMITIES:  Right hip reduced ROM 2+ Peripheral Pulses, No clubbing, cyanosis, or edema  PSYCH: AAOx3  NEUROLOGY: non-focal  SKIN: No rashes or lesions    LABS:  CAPILLARY BLOOD GLUCOSE  227 (06 Jul 2017 17:42)  230 (06 Jul 2017 07:45)  73 (06 Jul 2017 07:00)  69 (06 Jul 2017 06:59)  225 (05 Jul 2017 21:45)                              8.4    11.25 )-----------( 406      ( 06 Jul 2017 07:43 )             25.6     07-06    142  |  106  |  14  ----------------------------<  76  3.7   |  23  |  0.96    Ca    8.5      06 Jul 2017 07:25    TPro  8.2  /  Alb  3.3  /  TBili  0.4  /  DBili  0.1  /  AST  115<H>  /  ALT  232<H>  /  AlkPhos  317<H>  07-06      CARDIAC MARKERS ( 05 Jul 2017 09:45 )  x     / x     / 63 U/L / x     / x              RADIOLOGY & ADDITIONAL TESTS:    Imaging Personally Reviewed:    Consultant(s) Notes Reviewed:      Care Discussed with Consultants/Other Providers:

## 2017-07-06 NOTE — PROGRESS NOTE ADULT - SUBJECTIVE AND OBJECTIVE BOX
Interventional Radiology Procedure Note    Procedure: Right hip aspiration    Indication: Hip pain    Operators: Trip Trejo    Anesthesia (type): Lidocaine 2%    EBL: None    Findings/Follow up Plan of Care:  Right hip aspiration via 21 gauge needle using fluoroscopic guidance yielded approximately 8cc of cloudy yellow fluid. Specimen sent for C and S.    Specimens Removed: Synovial fluid    Complications: None    Condition/Disposition: Recovery on floors.    Please call Interventional Radiology x [phone] with any questions, concerns, or issues.

## 2017-07-06 NOTE — PROGRESS NOTE ADULT - SUBJECTIVE AND OBJECTIVE BOX
Patient is a 62y old  Female who presents with a chief complaint of Pain in hip (04 Jul 2017 18:50)    Being followed by ID for right hip pain r/o septic arthritis    Interval history:  Still leg pain  s/p aspirate  No acute events      ROS:  No cough,SOB,CP  No N/V/D./abd pain  No other complaints      Antimicrobials:      vancomycin  IVPB 1000 milliGRAM(s) IV Intermittent every 12 hours      Vital Signs Last 24 Hrs  T(C): 36.7 (07-06-17 @ 09:00), Max: 37.5 (07-05-17 @ 17:27)  T(F): 98.1 (07-06-17 @ 09:00), Max: 99.5 (07-05-17 @ 17:27)  HR: 78 (07-06-17 @ 09:00) (73 - 80)  BP: 131/69 (07-06-17 @ 09:00) (107/54 - 168/77)  BP(mean): --  RR: 18 (07-06-17 @ 09:00) (18 - 18)  SpO2: 95% (07-06-17 @ 09:00) (95% - 98%)    Physical Exam:    Constitutional well preserved,comfortable,pleasant    HEENT PERRLA EOMI,No pallor or icterus    No oral exudate or erythema    Neck supple no JVD or LN    Chest Good AE,CTA    CVS RRR S1 S2 WNl No murmur or rub or gallop    Abd soft BS normal No tenderness no masses    right leg painful ROM-no erythema or tenderness    IV site no erythema tenderness or discharge    Joints no swelling or LOM    CNS AAO X 3 no focal    Lab Data:                          8.4    11.25 )-----------( 406      ( 06 Jul 2017 07:43 )             25.6       07-06    142  |  106  |  14  ----------------------------<  76  3.7   |  23  |  0.96    Ca    8.5      06 Jul 2017 07:25    TPro  8.2  /  Alb  3.3  /  TBili  0.4  /  DBili  0.1  /  AST  115<H>  /  ALT  232<H>  /  AlkPhos  317<H>  07-06    Cell Count, Body Fluid (07.06.17 @ 05:23)    BF Lymphocytes: 1 %    Body Fluid Appearance: Cloudy    Fluid Segmented Granulocytes: 97 %    Fluid Type: Other    Monocyte/Macrophage Count - Body Fluid: 2 %    Tube Type: Sterile    Color - Body Fluid: Straw    Total Nucleated Cell Count, Body Fluid: 47o  59: Includes WBC and other nucleated cells if present. /uL    Total RBC Count: 1000 /uL    Rapid Respiratory Viral Panel (07.05.17 @ 12:19)    Rapid RVP Result: NotDetec:     Culture - Body Fluid with Gram Stain (07.06.17 @ 00:09)    Gram Stain:   Numerous polymorphonuclear leukocytes seen per low power field  No organisms seen    Specimen Source: .Body Fluid Synovial Fluid    Culture - Blood (07.04.17 @ 09:15)    Specimen Source: .Blood Blood    Culture Results:   No growth to date.    Culture - Blood (07.04.17 @ 09:15)    Specimen Source: .Blood Blood    Culture Results:   No growth to date.  Culture - Blood (07.04.17 @ 09:15)    Specimen Source: .Blood Blood    Culture Results:   No growth to date.    C4 Complement, Serum (07.05.17 @ 15:20)    C4 Complement, Serum: 32 mg/dL    C3 Complement, Serum (07.05.17 @ 15:20)    C3 Complement, Serum: 136 mg/dL

## 2017-07-07 LAB
ANA TITR SER: NEGATIVE — SIGNIFICANT CHANGE UP
ANION GAP SERPL CALC-SCNC: 17 MMOL/L — SIGNIFICANT CHANGE UP (ref 5–17)
BUN SERPL-MCNC: 12 MG/DL — SIGNIFICANT CHANGE UP (ref 7–23)
CALCIUM SERPL-MCNC: 8.5 MG/DL — SIGNIFICANT CHANGE UP (ref 8.4–10.5)
CHLORIDE SERPL-SCNC: 104 MMOL/L — SIGNIFICANT CHANGE UP (ref 96–108)
CO2 SERPL-SCNC: 22 MMOL/L — SIGNIFICANT CHANGE UP (ref 22–31)
CREAT SERPL-MCNC: 0.69 MG/DL — SIGNIFICANT CHANGE UP (ref 0.5–1.3)
GLUCOSE SERPL-MCNC: 88 MG/DL — SIGNIFICANT CHANGE UP (ref 70–99)
HCT VFR BLD CALC: 29.3 % — LOW (ref 34.5–45)
HGB BLD-MCNC: 9.7 G/DL — LOW (ref 11.5–15.5)
MCHC RBC-ENTMCNC: 25.6 PG — LOW (ref 27–34)
MCHC RBC-ENTMCNC: 33.1 GM/DL — SIGNIFICANT CHANGE UP (ref 32–36)
MCV RBC AUTO: 77.3 FL — LOW (ref 80–100)
PLATELET # BLD AUTO: 385 K/UL — SIGNIFICANT CHANGE UP (ref 150–400)
POTASSIUM SERPL-MCNC: 3.8 MMOL/L — SIGNIFICANT CHANGE UP (ref 3.5–5.3)
POTASSIUM SERPL-SCNC: 3.8 MMOL/L — SIGNIFICANT CHANGE UP (ref 3.5–5.3)
RBC # BLD: 3.79 M/UL — LOW (ref 3.8–5.2)
RBC # FLD: 13 % — SIGNIFICANT CHANGE UP (ref 10.3–14.5)
SODIUM SERPL-SCNC: 143 MMOL/L — SIGNIFICANT CHANGE UP (ref 135–145)
VANCOMYCIN TROUGH SERPL-MCNC: 9.3 UG/ML — LOW (ref 10–20)
WBC # BLD: 9.76 K/UL — SIGNIFICANT CHANGE UP (ref 3.8–10.5)
WBC # FLD AUTO: 9.76 K/UL — SIGNIFICANT CHANGE UP (ref 3.8–10.5)

## 2017-07-07 PROCEDURE — 99232 SBSQ HOSP IP/OBS MODERATE 35: CPT

## 2017-07-07 RX ADMIN — INSULIN GLARGINE 36 UNIT(S): 100 INJECTION, SOLUTION SUBCUTANEOUS at 01:19

## 2017-07-07 RX ADMIN — Medication 250 MILLIGRAM(S): at 18:17

## 2017-07-07 RX ADMIN — INSULIN GLARGINE 36 UNIT(S): 100 INJECTION, SOLUTION SUBCUTANEOUS at 22:51

## 2017-07-07 RX ADMIN — LISINOPRIL 10 MILLIGRAM(S): 2.5 TABLET ORAL at 06:11

## 2017-07-07 RX ADMIN — Medication 75 MICROGRAM(S): at 06:11

## 2017-07-07 RX ADMIN — HEPARIN SODIUM 5000 UNIT(S): 5000 INJECTION INTRAVENOUS; SUBCUTANEOUS at 18:17

## 2017-07-07 RX ADMIN — HEPARIN SODIUM 5000 UNIT(S): 5000 INJECTION INTRAVENOUS; SUBCUTANEOUS at 06:11

## 2017-07-07 RX ADMIN — Medication 2: at 18:17

## 2017-07-07 RX ADMIN — Medication 81 MILLIGRAM(S): at 12:11

## 2017-07-07 RX ADMIN — ATORVASTATIN CALCIUM 10 MILLIGRAM(S): 80 TABLET, FILM COATED ORAL at 01:19

## 2017-07-07 RX ADMIN — Medication 250 MILLIGRAM(S): at 06:11

## 2017-07-07 RX ADMIN — Medication 3: at 14:12

## 2017-07-07 RX ADMIN — Medication 1: at 22:51

## 2017-07-07 RX ADMIN — ATORVASTATIN CALCIUM 10 MILLIGRAM(S): 80 TABLET, FILM COATED ORAL at 22:52

## 2017-07-07 NOTE — PROGRESS NOTE ADULT - ASSESSMENT
62 year old female rule out Right septic Hip    -Gram stain and cell count are negative for septic hip. Will follow culture.  -weight bearing as tolerated  -physical therapy  -analgesia  -dvt ppx  -care per primary team

## 2017-07-07 NOTE — PROGRESS NOTE ADULT - SUBJECTIVE AND OBJECTIVE BOX
Patient seen and examined at bedside, no acute events overnight, pain controlled    Vital Signs Last 24 Hrs  T(C): 37.1 (07 Jul 2017 04:18), Max: 38.1 (07 Jul 2017 00:58)  T(F): 98.8 (07 Jul 2017 04:18), Max: 100.6 (07 Jul 2017 00:58)  HR: 80 (07 Jul 2017 04:18) (78 - 86)  BP: 122/68 (07 Jul 2017 04:18) (122/68 - 137/73)  BP(mean): --  RR: 18 (07 Jul 2017 04:18) (18 - 18)  SpO2: 97% (07 Jul 2017 04:18) (94% - 98%)                          8.4    11.25 )-----------( 406      ( 06 Jul 2017 07:43 )             25.6       Physical Exam:  General: Not in acute distress, resting comfortably  Right Lower Extremity: Skin intact. Sensation intact to light touch in distribution of L2-S1. Passive Range of motion: Hip flexion to 130, internal/external rotation to 30 without pain. Extensor Hallucis Longus, Flexor Hallucis Longus, Tibialis Anterior, and Gastrocnemius/Soleus complex intact. Dorsalis Pedis and Posterior tibial artery 2+. All compartments are soft and compressible. No tenderness to palpation of the calves bilaterally.

## 2017-07-07 NOTE — PROGRESS NOTE ADULT - SUBJECTIVE AND OBJECTIVE BOX
Patient is a 62y old  Female who presents with a chief complaint of Pain in hip (04 Jul 2017 18:50)      SUBJECTIVE / OVERNIGHT EVENTS:  Afebrile  Pain in right groin improved  Review of Systems:   CONSTITUTIONAL: No fever, weight loss, or fatigue  EYES: No eye pain, visual disturbances, or discharge  ENMT:  No difficulty hearing, tinnitus, vertigo; No sinus or throat pain  NECK: No pain or stiffness  BREASTS: No pain, masses, or nipple discharge  RESPIRATORY: No cough, wheezing, chills or hemoptysis; No shortness of breath  CARDIOVASCULAR: No chest pain, palpitations, dizziness, or leg swelling  GASTROINTESTINAL: No abdominal or epigastric pain. No nausea, vomiting, or hematemesis; No diarrhea or constipation. No melena or hematochezia.  GENITOURINARY: No dysuria, frequency, hematuria, or incontinence  NEUROLOGICAL: No headaches, memory loss, loss of strength, numbness, or tremors  SKIN: No itching, burning, rashes, or lesions   LYMPH NODES: No enlarged glands  ENDOCRINE: No heat or cold intolerance; No hair loss  MUSCULOSKELETAL: No joint pain or swelling; No muscle, back, or extremity pain  PSYCHIATRIC: No depression, anxiety, mood swings, or difficulty sleeping  HEME/LYMPH: No easy bruising, or bleeding gums  ALLERY AND IMMUNOLOGIC: No hives or eczema    MEDICATIONS  (STANDING):  insulin glargine Injectable (LANTUS) 36 Unit(s) SubCutaneous at bedtime  insulin lispro (HumaLOG) corrective regimen sliding scale   SubCutaneous three times a day before meals  insulin lispro (HumaLOG) corrective regimen sliding scale   SubCutaneous at bedtime  dextrose 5%. 1000 milliLiter(s) (50 mL/Hr) IV Continuous <Continuous>  dextrose 50% Injectable 12.5 Gram(s) IV Push once  dextrose 50% Injectable 25 Gram(s) IV Push once  dextrose 50% Injectable 25 Gram(s) IV Push once  heparin  Injectable 5000 Unit(s) SubCutaneous every 12 hours  levothyroxine 75 MICROGram(s) Oral daily  aspirin enteric coated 81 milliGRAM(s) Oral daily  lisinopril 10 milliGRAM(s) Oral daily  atorvastatin 10 milliGRAM(s) Oral at bedtime  vancomycin  IVPB   IV Intermittent   vancomycin  IVPB 1000 milliGRAM(s) IV Intermittent every 12 hours    MEDICATIONS  (PRN):  morphine  - Injectable 2 milliGRAM(s) IV Push every 6 hours PRN Moderate Pain (4 - 6)  dextrose Gel 1 Dose(s) Oral once PRN Blood Glucose LESS THAN 70 milliGRAM(s)/deciliter  glucagon  Injectable 1 milliGRAM(s) IntraMuscular once PRN Glucose LESS THAN 70 milligrams/deciliter  ibuprofen  Tablet 400 milliGRAM(s) Oral every 6 hours PRN Moderate pain      PHYSICAL EXAM:  Vital Signs Last 24 Hrs  T(C): 36.7 (07-07-17 @ 15:22), Max: 38.1 (07-07-17 @ 00:58)  HR: 79 (07-07-17 @ 15:22) (71 - 84)  BP: 144/74 (07-07-17 @ 15:22) (122/68 - 145/81)  RR: 18 (07-07-17 @ 15:22) (18 - 18)  SpO2: 97% (07-07-17 @ 15:22) (94% - 99%)  I&O's Summary    06 Jul 2017 07:01  -  07 Jul 2017 07:00  --------------------------------------------------------  IN: 350 mL / OUT: 1500 mL / NET: -1150 mL    07 Jul 2017 07:01  -  07 Jul 2017 19:14  --------------------------------------------------------  IN: 760 mL / OUT: 300 mL / NET: 460 mL      GENERAL: NAD, well-developed  HEAD:  Atraumatic, Normocephalic  EYES: EOMI, PERRLA, conjunctiva and sclera clear  NECK: Supple, No JVD  CHEST/LUNG: Clear to auscultation bilaterally; No wheeze  HEART: Regular rate and rhythm; No murmurs, rubs, or gallops  ABDOMEN: Soft, Nontender, Nondistended; Bowel sounds present  EXTREMITIES:  2+ Peripheral Pulses, No clubbing, cyanosis, or edema  PSYCH: AAOx3  NEUROLOGY: non-focal  SKIN: No rashes or lesions    LABS:  CAPILLARY BLOOD GLUCOSE  212 (07 Jul 2017 17:49)  272 (07 Jul 2017 14:10)  113 (07 Jul 2017 08:01)  199 (06 Jul 2017 22:20)                              9.7    9.76  )-----------( 385      ( 07 Jul 2017 07:53 )             29.3     07-07    143  |  104  |  12  ----------------------------<  88  3.8   |  22  |  0.69    Ca    8.5      07 Jul 2017 07:37    TPro  8.2  /  Alb  3.3  /  TBili  0.4  /  DBili  0.1  /  AST  115<H>  /  ALT  232<H>  /  AlkPhos  317<H>  07-06              RADIOLOGY & ADDITIONAL TESTS:    Imaging Personally Reviewed:    Consultant(s) Notes Reviewed:      Care Discussed with Consultants/Other Providers:

## 2017-07-07 NOTE — PROGRESS NOTE ADULT - SUBJECTIVE AND OBJECTIVE BOX
infectious diseases progress note:    Patient is a 62y old  Female who presents with a chief complaint of Pain in hip (04 Jul 2017 18:50)        Pain of right hip better  not ambulating yet    less pain used ms last night         ROS:  CONSTITUTIONAL:  Negative fever or chills, feels well, good appetite  EYES:  Negative  blurry vision or double vision  CARDIOVASCULAR:  Negative for chest pain or palpitations  RESPIRATORY:  Negative for cough, wheezing, or SOB   GASTROINTESTINAL:  Negative for nausea, vomiting, diarrhea, constipation, or abdominal pain  GENITOURINARY:  Negative frequency, urgency or dysuria  NEUROLOGIC:  No headache, confusion, dizziness, lightheadedness    Allergies    No Known Allergies    Intolerances        ANTIBIOTICS/RELEVANT:  antimicrobials  vancomycin  IVPB   IV Intermittent   vancomycin  IVPB 1000 milliGRAM(s) IV Intermittent every 12 hours    immunologic:    OTHER:  morphine  - Injectable 2 milliGRAM(s) IV Push every 6 hours PRN  insulin glargine Injectable (LANTUS) 36 Unit(s) SubCutaneous at bedtime  insulin lispro (HumaLOG) corrective regimen sliding scale   SubCutaneous three times a day before meals  insulin lispro (HumaLOG) corrective regimen sliding scale   SubCutaneous at bedtime  dextrose 5%. 1000 milliLiter(s) IV Continuous <Continuous>  dextrose Gel 1 Dose(s) Oral once PRN  dextrose 50% Injectable 12.5 Gram(s) IV Push once  dextrose 50% Injectable 25 Gram(s) IV Push once  dextrose 50% Injectable 25 Gram(s) IV Push once  glucagon  Injectable 1 milliGRAM(s) IntraMuscular once PRN  heparin  Injectable 5000 Unit(s) SubCutaneous every 12 hours  levothyroxine 75 MICROGram(s) Oral daily  aspirin enteric coated 81 milliGRAM(s) Oral daily  ibuprofen  Tablet 400 milliGRAM(s) Oral every 6 hours PRN  lisinopril 10 milliGRAM(s) Oral daily  atorvastatin 10 milliGRAM(s) Oral at bedtime      Objective:  Vital Signs Last 24 Hrs  T(C): 36.6 (07 Jul 2017 09:16), Max: 38.1 (07 Jul 2017 00:58)  T(F): 97.8 (07 Jul 2017 09:16), Max: 100.6 (07 Jul 2017 00:58)  HR: 76 (07 Jul 2017 09:16) (76 - 86)  BP: 137/80 (07 Jul 2017 09:16) (122/68 - 137/80)  BP(mean): --  RR: 18 (07 Jul 2017 09:16) (18 - 18)  SpO2: 99% (07 Jul 2017 09:16) (94% - 99%)    PHYSICAL EXAM:  Constitutional:Well-developed, well nourished--no acute distress  Eyes:JAMES, EOMI  Ear/Nose/Throat: no oral lesion, no sinus tenderness on percussion	  Neck:no JVD, no lymphadenopathy, supple  Respiratory: CTA lucinda  Cardiovascular: S1S2 RRR, no murmurs  Gastrointestinal:soft, (+) BS, no HSM  Extremities: negative pain with MANNY test good rom        LABS:                        9.7    9.76  )-----------( 385      ( 07 Jul 2017 07:53 )             29.3     07-07    143  |  104  |  12  ----------------------------<  88  3.8   |  22  |  0.69    Ca    8.5      07 Jul 2017 07:37    TPro  8.2  /  Alb  3.3  /  TBili  0.4  /  DBili  0.1  /  AST  115<H>  /  ALT  232<H>  /  AlkPhos  317<H>  07-06            MICROBIOLOGY:    RECENT CULTURES:        RESPIRATORY CULTURES:              RADIOLOGY & ADDITIONAL STUDIES:        Pager [phone]  After 5 pm/weekends or if no response :[phone]

## 2017-07-07 NOTE — PROGRESS NOTE ADULT - ASSESSMENT
62F with hx of HTN, DM, HLD, Hypothyroidism here for R hip pain pain x 2 days  No prior joint issues,no trauma  fevers  Elevated markers of inflammation  MRI with joint effusion ?muscular edema  cultures from aspir negative to date but better on ab and no diagnosis with ongoing fevers  would continue IV vanco. we may need to treat for three weeks empirically even with negative cultures  ID to see over weekend  check vanco t level .  Prior h/o ?URi v parotitis no s/s now  ?Septic arthritis, ?post infectious,?Viral ? Crystal disease,?myositis,?other non infectious etiology

## 2017-07-08 RX ADMIN — HEPARIN SODIUM 5000 UNIT(S): 5000 INJECTION INTRAVENOUS; SUBCUTANEOUS at 17:13

## 2017-07-08 RX ADMIN — HEPARIN SODIUM 5000 UNIT(S): 5000 INJECTION INTRAVENOUS; SUBCUTANEOUS at 06:42

## 2017-07-08 RX ADMIN — Medication 250 MILLIGRAM(S): at 18:05

## 2017-07-08 RX ADMIN — ATORVASTATIN CALCIUM 10 MILLIGRAM(S): 80 TABLET, FILM COATED ORAL at 22:35

## 2017-07-08 RX ADMIN — Medication 75 MICROGRAM(S): at 06:42

## 2017-07-08 RX ADMIN — Medication 1: at 10:20

## 2017-07-08 RX ADMIN — Medication 81 MILLIGRAM(S): at 12:09

## 2017-07-08 RX ADMIN — Medication 4: at 13:37

## 2017-07-08 RX ADMIN — INSULIN GLARGINE 36 UNIT(S): 100 INJECTION, SOLUTION SUBCUTANEOUS at 22:35

## 2017-07-08 RX ADMIN — Medication 1: at 22:36

## 2017-07-08 RX ADMIN — Medication 1: at 18:11

## 2017-07-08 RX ADMIN — Medication 250 MILLIGRAM(S): at 06:42

## 2017-07-08 RX ADMIN — LISINOPRIL 10 MILLIGRAM(S): 2.5 TABLET ORAL at 06:42

## 2017-07-08 NOTE — PHYSICAL THERAPY INITIAL EVALUATION ADULT - ADDITIONAL COMMENTS
62 year old female who PTA was living in a private house with approximatley. 16 steps to negotaite (5-6 to enter + hand rail, and 1 flight up to bedroom + hand rail). Pt states she could stay on first floor if necessary. PTA pt was indpenedent in all functional mobilty and all ADLS. no AD,. pt lives alone, however son is home from school right now.

## 2017-07-08 NOTE — PROGRESS NOTE ADULT - SUBJECTIVE AND OBJECTIVE BOX
Patient is a 62y old  Female who presents with a chief complaint of Pain in hip (04 Jul 2017 18:50)      SUBJECTIVE / OVERNIGHT EVENTS:  Afebtrile  Pain in groin improving  Review of Systems:   CONSTITUTIONAL: No fever, weight loss, or fatigue  EYES: No eye pain, visual disturbances, or discharge  ENMT:  No difficulty hearing, tinnitus, vertigo; No sinus or throat pain  NECK: No pain or stiffness  BREASTS: No pain, masses, or nipple discharge  RESPIRATORY: No cough, wheezing, chills or hemoptysis; No shortness of breath  CARDIOVASCULAR: No chest pain, palpitations, dizziness, or leg swelling  GASTROINTESTINAL: No abdominal or epigastric pain. No nausea, vomiting, or hematemesis; No diarrhea or constipation. No melena or hematochezia.  GENITOURINARY: No dysuria, frequency, hematuria, or incontinence  NEUROLOGICAL: No headaches, memory loss, loss of strength, numbness, or tremors  SKIN: No itching, burning, rashes, or lesions   LYMPH NODES: No enlarged glands  ENDOCRINE: No heat or cold intolerance; No hair loss  MUSCULOSKELETAL: No joint pain or swelling; No muscle, back, or extremity pain  PSYCHIATRIC: No depression, anxiety, mood swings, or difficulty sleeping  HEME/LYMPH: No easy bruising, or bleeding gums  ALLERY AND IMMUNOLOGIC: No hives or eczema    MEDICATIONS  (STANDING):  insulin glargine Injectable (LANTUS) 36 Unit(s) SubCutaneous at bedtime  insulin lispro (HumaLOG) corrective regimen sliding scale   SubCutaneous three times a day before meals  insulin lispro (HumaLOG) corrective regimen sliding scale   SubCutaneous at bedtime  dextrose 5%. 1000 milliLiter(s) (50 mL/Hr) IV Continuous <Continuous>  dextrose 50% Injectable 12.5 Gram(s) IV Push once  dextrose 50% Injectable 25 Gram(s) IV Push once  dextrose 50% Injectable 25 Gram(s) IV Push once  heparin  Injectable 5000 Unit(s) SubCutaneous every 12 hours  levothyroxine 75 MICROGram(s) Oral daily  aspirin enteric coated 81 milliGRAM(s) Oral daily  lisinopril 10 milliGRAM(s) Oral daily  atorvastatin 10 milliGRAM(s) Oral at bedtime  vancomycin  IVPB   IV Intermittent   vancomycin  IVPB 1000 milliGRAM(s) IV Intermittent every 12 hours    MEDICATIONS  (PRN):  morphine  - Injectable 2 milliGRAM(s) IV Push every 6 hours PRN Moderate Pain (4 - 6)  dextrose Gel 1 Dose(s) Oral once PRN Blood Glucose LESS THAN 70 milliGRAM(s)/deciliter  glucagon  Injectable 1 milliGRAM(s) IntraMuscular once PRN Glucose LESS THAN 70 milligrams/deciliter  ibuprofen  Tablet 400 milliGRAM(s) Oral every 6 hours PRN Moderate pain      PHYSICAL EXAM:  Vital Signs Last 24 Hrs  T(C): 36.7 (07-08-17 @ 17:12), Max: 36.9 (07-08-17 @ 05:24)  HR: 78 (07-08-17 @ 17:12) (75 - 84)  BP: 102/66 (07-08-17 @ 17:12) (102/66 - 131/68)  RR: 18 (07-08-17 @ 17:12) (18 - 18)  SpO2: 94% (07-08-17 @ 17:12) (94% - 98%)  I&O's Summary    07 Jul 2017 07:01  -  08 Jul 2017 07:00  --------------------------------------------------------  IN: 760 mL / OUT: 300 mL / NET: 460 mL    08 Jul 2017 07:01  -  08 Jul 2017 18:21  --------------------------------------------------------  IN: 600 mL / OUT: 300 mL / NET: 300 mL      GENERAL: NAD, well-developed  HEAD:  Atraumatic, Normocephalic  EYES: EOMI, PERRLA, conjunctiva and sclera clear  NECK: Supple, No JVD  CHEST/LUNG: Clear to auscultation bilaterally; No wheeze  HEART: Regular rate and rhythm; No murmurs, rubs, or gallops  ABDOMEN: Soft, Nontender, Nondistended; Bowel sounds present  EXTREMITIES:  2+ Peripheral Pulses, No clubbing, cyanosis, or edema  PSYCH: AAOx3  NEUROLOGY: non-focal  SKIN: No rashes or lesions    LABS:  CAPILLARY BLOOD GLUCOSE  183 (08 Jul 2017 17:12)  322 (08 Jul 2017 12:00)  162 (08 Jul 2017 07:30)  289 (07 Jul 2017 21:33)                              9.7    9.76  )-----------( 385      ( 07 Jul 2017 07:53 )             29.3     07-07    143  |  104  |  12  ----------------------------<  88  3.8   |  22  |  0.69    Ca    8.5      07 Jul 2017 07:37                RADIOLOGY & ADDITIONAL TESTS:    Imaging Personally Reviewed:    Consultant(s) Notes Reviewed:      Care Discussed with Consultants/Other Providers:

## 2017-07-08 NOTE — PHYSICAL THERAPY INITIAL EVALUATION ADULT - CRITERIA FOR SKILLED THERAPEUTIC INTERVENTIONS
predicted duration of therapy intervention/anticipated equipment needs at discharge/risk reduction/prevention/anticipated discharge recommendation/therapy frequency/rehab potential/impairments found/functional limitations in following categories

## 2017-07-09 DIAGNOSIS — M00.9 PYOGENIC ARTHRITIS, UNSPECIFIED: ICD-10-CM

## 2017-07-09 LAB
ANION GAP SERPL CALC-SCNC: 16 MMOL/L — SIGNIFICANT CHANGE UP (ref 5–17)
BASOPHILS # BLD AUTO: 0.02 K/UL — SIGNIFICANT CHANGE UP (ref 0–0.2)
BASOPHILS NFR BLD AUTO: 0.2 % — SIGNIFICANT CHANGE UP (ref 0–2)
BUN SERPL-MCNC: 17 MG/DL — SIGNIFICANT CHANGE UP (ref 7–23)
CALCIUM SERPL-MCNC: 9.9 MG/DL — SIGNIFICANT CHANGE UP (ref 8.4–10.5)
CHLORIDE SERPL-SCNC: 102 MMOL/L — SIGNIFICANT CHANGE UP (ref 96–108)
CO2 SERPL-SCNC: 21 MMOL/L — LOW (ref 22–31)
CREAT SERPL-MCNC: 0.83 MG/DL — SIGNIFICANT CHANGE UP (ref 0.5–1.3)
CULTURE RESULTS: SIGNIFICANT CHANGE UP
CULTURE RESULTS: SIGNIFICANT CHANGE UP
EOSINOPHIL # BLD AUTO: 0.47 K/UL — SIGNIFICANT CHANGE UP (ref 0–0.5)
EOSINOPHIL NFR BLD AUTO: 4.7 % — SIGNIFICANT CHANGE UP (ref 0–6)
GLUCOSE SERPL-MCNC: 96 MG/DL — SIGNIFICANT CHANGE UP (ref 70–99)
HCT VFR BLD CALC: 32.2 % — LOW (ref 34.5–45)
HGB BLD-MCNC: 10.6 G/DL — LOW (ref 11.5–15.5)
IMM GRANULOCYTES NFR BLD AUTO: 0.4 % — SIGNIFICANT CHANGE UP (ref 0–1.5)
LYMPHOCYTES # BLD AUTO: 2.8 K/UL — SIGNIFICANT CHANGE UP (ref 1–3.3)
LYMPHOCYTES # BLD AUTO: 28.3 % — SIGNIFICANT CHANGE UP (ref 13–44)
MCHC RBC-ENTMCNC: 25.7 PG — LOW (ref 27–34)
MCHC RBC-ENTMCNC: 32.9 GM/DL — SIGNIFICANT CHANGE UP (ref 32–36)
MCV RBC AUTO: 78.2 FL — LOW (ref 80–100)
MONOCYTES # BLD AUTO: 0.75 K/UL — SIGNIFICANT CHANGE UP (ref 0–0.9)
MONOCYTES NFR BLD AUTO: 7.6 % — SIGNIFICANT CHANGE UP (ref 2–14)
NEUTROPHILS # BLD AUTO: 5.83 K/UL — SIGNIFICANT CHANGE UP (ref 1.8–7.4)
NEUTROPHILS NFR BLD AUTO: 58.8 % — SIGNIFICANT CHANGE UP (ref 43–77)
PLATELET # BLD AUTO: 428 K/UL — HIGH (ref 150–400)
POTASSIUM SERPL-MCNC: 4.1 MMOL/L — SIGNIFICANT CHANGE UP (ref 3.5–5.3)
POTASSIUM SERPL-SCNC: 4.1 MMOL/L — SIGNIFICANT CHANGE UP (ref 3.5–5.3)
RBC # BLD: 4.12 M/UL — SIGNIFICANT CHANGE UP (ref 3.8–5.2)
RBC # FLD: 13.4 % — SIGNIFICANT CHANGE UP (ref 10.3–14.5)
SODIUM SERPL-SCNC: 139 MMOL/L — SIGNIFICANT CHANGE UP (ref 135–145)
SPECIMEN SOURCE: SIGNIFICANT CHANGE UP
SPECIMEN SOURCE: SIGNIFICANT CHANGE UP
VANCOMYCIN TROUGH SERPL-MCNC: 12.9 UG/ML — SIGNIFICANT CHANGE UP (ref 10–20)
WBC # BLD: 9.91 K/UL — SIGNIFICANT CHANGE UP (ref 3.8–10.5)
WBC # FLD AUTO: 9.91 K/UL — SIGNIFICANT CHANGE UP (ref 3.8–10.5)

## 2017-07-09 PROCEDURE — 99232 SBSQ HOSP IP/OBS MODERATE 35: CPT

## 2017-07-09 RX ADMIN — Medication 75 MICROGRAM(S): at 05:26

## 2017-07-09 RX ADMIN — Medication 2: at 17:47

## 2017-07-09 RX ADMIN — Medication 2: at 13:26

## 2017-07-09 RX ADMIN — HEPARIN SODIUM 5000 UNIT(S): 5000 INJECTION INTRAVENOUS; SUBCUTANEOUS at 05:26

## 2017-07-09 RX ADMIN — INSULIN GLARGINE 36 UNIT(S): 100 INJECTION, SOLUTION SUBCUTANEOUS at 21:08

## 2017-07-09 RX ADMIN — Medication 1: at 09:12

## 2017-07-09 RX ADMIN — Medication 250 MILLIGRAM(S): at 18:29

## 2017-07-09 RX ADMIN — ATORVASTATIN CALCIUM 10 MILLIGRAM(S): 80 TABLET, FILM COATED ORAL at 21:08

## 2017-07-09 RX ADMIN — Medication 2: at 21:07

## 2017-07-09 RX ADMIN — Medication 250 MILLIGRAM(S): at 07:55

## 2017-07-09 RX ADMIN — HEPARIN SODIUM 5000 UNIT(S): 5000 INJECTION INTRAVENOUS; SUBCUTANEOUS at 17:46

## 2017-07-09 RX ADMIN — LISINOPRIL 10 MILLIGRAM(S): 2.5 TABLET ORAL at 05:26

## 2017-07-09 RX ADMIN — Medication 81 MILLIGRAM(S): at 11:29

## 2017-07-09 NOTE — PROGRESS NOTE ADULT - SUBJECTIVE AND OBJECTIVE BOX
Patient is a 62y old  Female who presents with a chief complaint of Pain in hip (04 Jul 2017 18:50)      SUBJECTIVE / OVERNIGHT EVENTS:  No new symptoms  Review of Systems:   CONSTITUTIONAL: No fever, weight loss, or fatigue  EYES: No eye pain, visual disturbances, or discharge  ENMT:  No difficulty hearing, tinnitus, vertigo; No sinus or throat pain  NECK: No pain or stiffness  BREASTS: No pain, masses, or nipple discharge  RESPIRATORY: No cough, wheezing, chills or hemoptysis; No shortness of breath  CARDIOVASCULAR: No chest pain, palpitations, dizziness, or leg swelling  GASTROINTESTINAL: No abdominal or epigastric pain. No nausea, vomiting, or hematemesis; No diarrhea or constipation. No melena or hematochezia.  GENITOURINARY: No dysuria, frequency, hematuria, or incontinence  NEUROLOGICAL: No headaches, memory loss, loss of strength, numbness, or tremors  SKIN: No itching, burning, rashes, or lesions   LYMPH NODES: No enlarged glands  ENDOCRINE: No heat or cold intolerance; No hair loss  MUSCULOSKELETAL: No joint pain or swelling; No muscle, back, or extremity pain  PSYCHIATRIC: No depression, anxiety, mood swings, or difficulty sleeping  HEME/LYMPH: No easy bruising, or bleeding gums  ALLERY AND IMMUNOLOGIC: No hives or eczema    MEDICATIONS  (STANDING):  insulin glargine Injectable (LANTUS) 36 Unit(s) SubCutaneous at bedtime  insulin lispro (HumaLOG) corrective regimen sliding scale   SubCutaneous three times a day before meals  insulin lispro (HumaLOG) corrective regimen sliding scale   SubCutaneous at bedtime  dextrose 5%. 1000 milliLiter(s) (50 mL/Hr) IV Continuous <Continuous>  dextrose 50% Injectable 12.5 Gram(s) IV Push once  dextrose 50% Injectable 25 Gram(s) IV Push once  dextrose 50% Injectable 25 Gram(s) IV Push once  heparin  Injectable 5000 Unit(s) SubCutaneous every 12 hours  levothyroxine 75 MICROGram(s) Oral daily  aspirin enteric coated 81 milliGRAM(s) Oral daily  lisinopril 10 milliGRAM(s) Oral daily  atorvastatin 10 milliGRAM(s) Oral at bedtime  vancomycin  IVPB   IV Intermittent   vancomycin  IVPB 1000 milliGRAM(s) IV Intermittent every 12 hours    MEDICATIONS  (PRN):  morphine  - Injectable 2 milliGRAM(s) IV Push every 6 hours PRN Moderate Pain (4 - 6)  dextrose Gel 1 Dose(s) Oral once PRN Blood Glucose LESS THAN 70 milliGRAM(s)/deciliter  glucagon  Injectable 1 milliGRAM(s) IntraMuscular once PRN Glucose LESS THAN 70 milligrams/deciliter  ibuprofen  Tablet 400 milliGRAM(s) Oral every 6 hours PRN Moderate pain      PHYSICAL EXAM:  Vital Signs Last 24 Hrs  T(C): 36.8 (07-09-17 @ 08:00), Max: 37.3 (07-09-17 @ 00:13)  HR: 77 (07-09-17 @ 08:00) (71 - 80)  BP: 112/70 (07-09-17 @ 08:00) (102/66 - 124/74)  RR: 18 (07-09-17 @ 08:00) (18 - 18)  SpO2: 95% (07-09-17 @ 08:00) (94% - 98%)  I&O's Summary    08 Jul 2017 07:01  -  09 Jul 2017 07:00  --------------------------------------------------------  IN: 1500 mL / OUT: 300 mL / NET: 1200 mL    09 Jul 2017 07:01  -  09 Jul 2017 13:17  --------------------------------------------------------  IN: 720 mL / OUT: 500 mL / NET: 220 mL      GENERAL: NAD, well-developed  HEAD:  Atraumatic, Normocephalic  EYES: EOMI, PERRLA, conjunctiva and sclera clear  NECK: Supple, No JVD  CHEST/LUNG: Clear to auscultation bilaterally; No wheeze  HEART: Regular rate and rhythm; No murmurs, rubs, or gallops  ABDOMEN: Soft, Nontender, Nondistended; Bowel sounds present  EXTREMITIES:  2+ Peripheral Pulses, No clubbing, cyanosis, or edema  PSYCH: AAOx3  NEUROLOGY: non-focal  SKIN: No rashes or lesions    LABS:  CAPILLARY BLOOD GLUCOSE  216 (09 Jul 2017 11:45)  154 (09 Jul 2017 08:00)  298 (08 Jul 2017 22:05)  183 (08 Jul 2017 17:12)                              10.6   9.91  )-----------( 428      ( 09 Jul 2017 08:28 )             32.2     07-09    139  |  102  |  17  ----------------------------<  96  4.1   |  21<L>  |  0.83    Ca    9.9      09 Jul 2017 08:55                RADIOLOGY & ADDITIONAL TESTS:    Imaging Personally Reviewed:    Consultant(s) Notes Reviewed:      Care Discussed with Consultants/Other Providers:

## 2017-07-09 NOTE — PROGRESS NOTE ADULT - SUBJECTIVE AND OBJECTIVE BOX
infectious diseases progress note:    Patient is a 62y old  Female admitted with a chief complaint of Pain in hip (04 Jul 2017 18:50)        Pain of right hip better  not ambulating yet    less pain used ms last night         ROS:  CONSTITUTIONAL:  Negative fever or chills, feels well, good appetite  EYES:  Negative  blurry vision or double vision  CARDIOVASCULAR:  Negative for chest pain or palpitations  RESPIRATORY:  Negative for cough, wheezing, or SOB   GASTROINTESTINAL:  Negative for nausea, vomiting, diarrhea, constipation, or abdominal pain  GENITOURINARY:  Negative frequency, urgency or dysuria  NEUROLOGIC:  No headache, confusion, dizziness, lightheadedness    Allergies    No Known Allergies    Intolerances        ANTIBIOTICS/RELEVANT:  antimicrobials  vancomycin  IVPB   IV Intermittent   vancomycin  IVPB 1000 milliGRAM(s) IV Intermittent every 12 hours    immunologic:    OTHER:  morphine  - Injectable 2 milliGRAM(s) IV Push every 6 hours PRN  insulin glargine Injectable (LANTUS) 36 Unit(s) SubCutaneous at bedtime  insulin lispro (HumaLOG) corrective regimen sliding scale   SubCutaneous three times a day before meals  insulin lispro (HumaLOG) corrective regimen sliding scale   SubCutaneous at bedtime  dextrose 5%. 1000 milliLiter(s) IV Continuous <Continuous>  dextrose Gel 1 Dose(s) Oral once PRN  dextrose 50% Injectable 12.5 Gram(s) IV Push once  dextrose 50% Injectable 25 Gram(s) IV Push once  dextrose 50% Injectable 25 Gram(s) IV Push once  glucagon  Injectable 1 milliGRAM(s) IntraMuscular once PRN  heparin  Injectable 5000 Unit(s) SubCutaneous every 12 hours  levothyroxine 75 MICROGram(s) Oral daily  aspirin enteric coated 81 milliGRAM(s) Oral daily  ibuprofen  Tablet 400 milliGRAM(s) Oral every 6 hours PRN  lisinopril 10 milliGRAM(s) Oral daily  atorvastatin 10 milliGRAM(s) Oral at bedtime      Objective:  Vital Signs Last 24 Hrs  T(F): 98.8 (07-09-17 @ 04:46), Max: 99.2 (07-09-17 @ 00:13)  HR: 76 (07-09-17 @ 04:46)  BP: 111/68 (07-09-17 @ 04:46)  RR: 18 (07-09-17 @ 04:46)  SpO2: 96% (07-09-17 @ 04:46) (94% - 98%)  Wt(kg): --    PHYSICAL EXAM:  Constitutional:Well-developed, well nourished--no acute distress  Eyes:JAMES, EOMI  Ear/Nose/Throat: no oral lesion, no sinus tenderness on percussion	  Neck:no JVD, no lymphadenopathy, supple  Respiratory: CTA lucinda  Cardiovascular: S1S2 RRR, no murmurs  Gastrointestinal:soft, (+) BS, no HSM  Extremities: negative pain with MANNY test good rom        LABS:                   No labs since July 7        MICROBIOLOGY:    RECENT CULTURES:    Culture - Body Fluid with Gram Stain (07.06.17 @ 00:09)    Gram Stain:   Numerous polymorphonuclear leukocytes seen per low power field  No organisms seen    Specimen Source: .Body Fluid Synovial Fluid    Culture Results:   No growth to date.    Rapid Respiratory Viral Panel (07.05.17 @ 12:19)    Rapid RVP Result: NotDetec: The [employer] RVP Rapid uses polymerase chain reaction (PCR) and melt  curve analysis to screen for adenovirus; coronavirus HKU1, NL63, 229E,  OC43; human metapneumovirus (hMPV); human enterovirus/rhinovirus  (Entero/RV); influenza A; influenza A/H1;NotDetec: influenza A/H3; influenza  A/H1-2009; influenza B; parainfluenza viruses 1, 2, 3, 4; respiratory  syncytial virus; Bordetella pertussis; Mycoplasma pneumoniae; and  Chlamydophila pneumoniae.        RESPIRATORY CULTURES:              RADIOLOGY & ADDITIONAL STUDIES:  rogcher Note:   · Provider Specialty	Intervent Radiology	      · Subjective and Objective: 	  Interventional Radiology Procedure Note    Procedure: Right hip aspiration    Indication: Hip pain    Operators: Trip Trejo    Anesthesia (type): Lidocaine 2%    EBL: None    Findings/Follow up Plan of Care:  Right hip aspiration via 21 gauge needle using fluoroscopic guidance yielded approximately 8cc of cloudy yellow fluid. Specimen sent for C and S.    Specimens Removed: Synovial fluid    Complications: None    Condition/Disposition: Recovery on floors.    Please call Interventional Radiology x [phone] with any questions, concerns, or issues.      Electronic Signatures:  Zachariah Trejo)  (Signed 06-Jul-2017 09:24)  	Authored: Progress Note, Subjective and Objective        After 5 pm/weekends or if no response :[phone]

## 2017-07-09 NOTE — PROGRESS NOTE ADULT - ASSESSMENT
62F with hx of HTN, DM, HLD, Hypothyroidism here for R hip pain pain x 2 days  No prior joint issues,no trauma  fevers  Elevated markers of inflammation (ESR)  MRI with joint effusion ?muscular edema  cultures from aspir negative to date but better on ab and no diagnosis with ongoing fevers  would continue IV vanco. we may need to treat for three weeks empirically even with negative cultures   Vanco trough level therapeutic.  Prior h/o ?URI v parotitis no s/s now  ?Septic arthritis, ?post infectious,?Viral ? Crystal disease,?myositis,?other non infectious etiology,  Continue vancomycin  Fortunato Meza MD  pager [phone]  office [phone]

## 2017-07-10 LAB
ANION GAP SERPL CALC-SCNC: 14 MMOL/L — SIGNIFICANT CHANGE UP (ref 5–17)
BASOPHILS # BLD AUTO: 0.02 K/UL — SIGNIFICANT CHANGE UP (ref 0–0.2)
BASOPHILS NFR BLD AUTO: 0.2 % — SIGNIFICANT CHANGE UP (ref 0–2)
BUN SERPL-MCNC: 22 MG/DL — SIGNIFICANT CHANGE UP (ref 7–23)
CALCIUM SERPL-MCNC: 9.6 MG/DL — SIGNIFICANT CHANGE UP (ref 8.4–10.5)
CHLORIDE SERPL-SCNC: 99 MMOL/L — SIGNIFICANT CHANGE UP (ref 96–108)
CO2 SERPL-SCNC: 22 MMOL/L — SIGNIFICANT CHANGE UP (ref 22–31)
CREAT SERPL-MCNC: 0.98 MG/DL — SIGNIFICANT CHANGE UP (ref 0.5–1.3)
CRP SERPL-MCNC: 1.3 MG/DL — HIGH (ref 0–0.4)
CULTURE RESULTS: SIGNIFICANT CHANGE UP
EOSINOPHIL # BLD AUTO: 0.58 K/UL — HIGH (ref 0–0.5)
EOSINOPHIL NFR BLD AUTO: 6 % — SIGNIFICANT CHANGE UP (ref 0–6)
ERYTHROCYTE [SEDIMENTATION RATE] IN BLOOD: 92 MM/HR — HIGH (ref 0–20)
GLUCOSE SERPL-MCNC: 294 MG/DL — HIGH (ref 70–99)
HCT VFR BLD CALC: 30.7 % — LOW (ref 34.5–45)
HGB BLD-MCNC: 10.1 G/DL — LOW (ref 11.5–15.5)
IMM GRANULOCYTES NFR BLD AUTO: 0.6 % — SIGNIFICANT CHANGE UP (ref 0–1.5)
LYMPHOCYTES # BLD AUTO: 2.82 K/UL — SIGNIFICANT CHANGE UP (ref 1–3.3)
LYMPHOCYTES # BLD AUTO: 29.3 % — SIGNIFICANT CHANGE UP (ref 13–44)
MCHC RBC-ENTMCNC: 25.4 PG — LOW (ref 27–34)
MCHC RBC-ENTMCNC: 32.9 GM/DL — SIGNIFICANT CHANGE UP (ref 32–36)
MCV RBC AUTO: 77.1 FL — LOW (ref 80–100)
MONOCYTES # BLD AUTO: 0.72 K/UL — SIGNIFICANT CHANGE UP (ref 0–0.9)
MONOCYTES NFR BLD AUTO: 7.5 % — SIGNIFICANT CHANGE UP (ref 2–14)
NEUTROPHILS # BLD AUTO: 5.42 K/UL — SIGNIFICANT CHANGE UP (ref 1.8–7.4)
NEUTROPHILS NFR BLD AUTO: 56.4 % — SIGNIFICANT CHANGE UP (ref 43–77)
PLATELET # BLD AUTO: 404 K/UL — HIGH (ref 150–400)
POTASSIUM SERPL-MCNC: 4.4 MMOL/L — SIGNIFICANT CHANGE UP (ref 3.5–5.3)
POTASSIUM SERPL-SCNC: 4.4 MMOL/L — SIGNIFICANT CHANGE UP (ref 3.5–5.3)
RBC # BLD: 3.98 M/UL — SIGNIFICANT CHANGE UP (ref 3.8–5.2)
RBC # FLD: 13.2 % — SIGNIFICANT CHANGE UP (ref 10.3–14.5)
SODIUM SERPL-SCNC: 135 MMOL/L — SIGNIFICANT CHANGE UP (ref 135–145)
SPECIMEN SOURCE: SIGNIFICANT CHANGE UP
WBC # BLD: 9.62 K/UL — SIGNIFICANT CHANGE UP (ref 3.8–10.5)
WBC # FLD AUTO: 9.62 K/UL — SIGNIFICANT CHANGE UP (ref 3.8–10.5)

## 2017-07-10 PROCEDURE — 99232 SBSQ HOSP IP/OBS MODERATE 35: CPT

## 2017-07-10 RX ORDER — CEPHALEXIN 500 MG
500 CAPSULE ORAL
Qty: 0 | Refills: 0 | Status: DISCONTINUED | OUTPATIENT
Start: 2017-07-10 | End: 2017-07-11

## 2017-07-10 RX ADMIN — Medication 500 MILLIGRAM(S): at 18:02

## 2017-07-10 RX ADMIN — HEPARIN SODIUM 5000 UNIT(S): 5000 INJECTION INTRAVENOUS; SUBCUTANEOUS at 06:46

## 2017-07-10 RX ADMIN — Medication 400 MILLIGRAM(S): at 22:00

## 2017-07-10 RX ADMIN — Medication 400 MILLIGRAM(S): at 09:18

## 2017-07-10 RX ADMIN — Medication 100 MILLIGRAM(S): at 18:02

## 2017-07-10 RX ADMIN — INSULIN GLARGINE 36 UNIT(S): 100 INJECTION, SOLUTION SUBCUTANEOUS at 22:29

## 2017-07-10 RX ADMIN — Medication 81 MILLIGRAM(S): at 12:23

## 2017-07-10 RX ADMIN — ATORVASTATIN CALCIUM 10 MILLIGRAM(S): 80 TABLET, FILM COATED ORAL at 21:25

## 2017-07-10 RX ADMIN — Medication 500 MILLIGRAM(S): at 12:23

## 2017-07-10 RX ADMIN — Medication 400 MILLIGRAM(S): at 21:24

## 2017-07-10 RX ADMIN — Medication 4: at 08:29

## 2017-07-10 RX ADMIN — Medication 250 MILLIGRAM(S): at 06:46

## 2017-07-10 RX ADMIN — LISINOPRIL 10 MILLIGRAM(S): 2.5 TABLET ORAL at 06:46

## 2017-07-10 RX ADMIN — Medication 1: at 18:00

## 2017-07-10 RX ADMIN — Medication 5: at 12:25

## 2017-07-10 RX ADMIN — Medication 400 MILLIGRAM(S): at 10:00

## 2017-07-10 RX ADMIN — Medication 75 MICROGRAM(S): at 06:46

## 2017-07-10 RX ADMIN — HEPARIN SODIUM 5000 UNIT(S): 5000 INJECTION INTRAVENOUS; SUBCUTANEOUS at 18:02

## 2017-07-10 NOTE — DIETITIAN INITIAL EVALUATION ADULT. - ADHERENCE
Pt reports for the last couple of month she has been cutting back on her portion sizes of rice, potatoes and other carbs. Pt reports avoiding juice and sweets. Pt reports because she works at night she eats before going into work and then usually does not eat until she gets out of work, pt works an 8 hour shift. Typical intake:  bowl of cereal with milk when she gets out of work and then runs errands or goes to bed and then when she wakes up she has rice with vegetables and fish or meat and then before she goes into work she has mendy bread with protein source and vegetable. Pt reports taking Glipizide, Metformin, and Lantus. Pt reports she usually checks her BG in the morning and at home it is usually 125-140mg/dL and rarely up to 185mg/dL. pt report her Hba1c has improved as it was 11.5% and currently is 9.7% and pt expressed interest in continuing to bring it down and have her BG controlled.

## 2017-07-10 NOTE — DIETITIAN INITIAL EVALUATION ADULT. - NS AS NUTRI INTERV MEALS SNACK
Continue current diet. Monitor po intake, GI tolerance, weight and lab values. RD to remain available for further nutritional interventions as indicated.

## 2017-07-10 NOTE — PROGRESS NOTE ADULT - SUBJECTIVE AND OBJECTIVE BOX
Patient is a 62y old  Female who presents with a chief complaint of Pain in hip (04 Jul 2017 18:50)    Being followed by ID for hip pain    Interval history:  feels much better-was able to walk yesterday   No acute events      ROS:  No cough,SOB,CP  No N/V/D./abd pain  No other complaints      Antimicrobials:  Vanco DCed today  doxycycline hyclate Capsule 100 milliGRAM(s) Oral every 12 hours  cephalexin 500 milliGRAM(s) Oral four times a day      Vital Signs Last 24 Hrs  T(C): 36.8 (07-10-17 @ 14:32), Max: 37 (07-10-17 @ 04:54)  T(F): 98.2 (07-10-17 @ 14:32), Max: 98.6 (07-10-17 @ 04:54)  HR: 78 (07-10-17 @ 14:32) (76 - 81)  BP: 112/69 (07-10-17 @ 14:32) (101/60 - 126/78)  BP(mean): --  RR: 18 (07-10-17 @ 14:32) (18 - 18)  SpO2: 96% (07-10-17 @ 14:32) (95% - 100%)    Physical Exam:    Constitutional well preserved,comfortable,pleasant    HEENT PERRLA EOMI,No pallor or icterus    No oral exudate or erythema    Neck supple no JVD or LN    Chest Good AE,CTA    CVS RRR S1 S2 WNl No murmur or rub or gallop    Abd soft BS normal No tenderness no masses    Ext No cyanosis clubbing or edema    IV site no erythema tenderness or discharge    Joints no swelling or LOM,pain at hip decreased    CNS AAO X 3 no focal    Lab Data:                          10.1   9.62  )-----------( 404      ( 10 Jul 2017 08:21 )             30.7       07-10    135  |  99  |  22  ----------------------------<  294<H>  4.4   |  22  |  0.98    Ca    9.6      10 Jul 2017 08:40                  Vancomycin Level, Trough: 12.9 ug/mL (07-09-17 @ 06:54)    Culture - Body Fluid with Gram Stain (07.06.17 @ 00:09)    Gram Stain:   Numerous polymorphonuclear leukocytes seen per low power field  No organisms seen    Specimen Source: .Body Fluid Synovial Fluid    Culture Results:   No growth to date.              Culture - Blood (07.04.17 @ 09:15)    Specimen Source: .Blood Blood    Culture Results:   No growth at 5 days.      Culture - Blood (07.04.17 @ 09:15)    Specimen Source: .Blood Blood    Culture Results:   No growth at 5 days.

## 2017-07-10 NOTE — PROGRESS NOTE ADULT - ASSESSMENT
62F with hx of HTN, DM, HLD, Hypothyroidism here for R hip pain pain x 2 days  No prior joint issues,no trauma  fevers  Elevated markers of inflammation  MRI with joint effusion ?muscular edema  cultures from aspirate  negative to date but better on abx .  No alternate diagnosis  Options with risk/benefits including no abx,oral abx and IV antimicrobials discussed.  Will rec Check ESR CRP  If trending down de-escalation to oral abx(pt does not want IV opr PICC line) with follow up in 2-3 weeks  Side effects discussed.  other plan per primary    Will Follow.  Beeper [id number]/afterhours/No response-[id number]

## 2017-07-10 NOTE — DIETITIAN INITIAL EVALUATION ADULT. - ENERGY NEEDS
Ht: 60“, Wt: 134 lbs, BMI: 26.2 kg/m2, IBW: 100 lbs (+/-10%), %IBW: 134%  Pertinent Information: Pt presented with right hip pain. Per MD, pt with pyogenic arthritis of right hip, due to unspecified organism. On antibiotics, no need for surgical intervention, ID to plan for out patient antibiotic regimen and DC planning.  no edema or pressure injury

## 2017-07-10 NOTE — DIETITIAN INITIAL EVALUATION ADULT. - NS AS NUTRI INTERV ED CONTENT
Provided in-depth nutrition education for Type 2 Diabetes. Discussed keeping carbohydrate intake consistent, maintaining consistent meal patterns and not skipping meals, controlling portion sizes, consuming protein with carbohydrates, avoiding concentrated sweets and monitoring blood glucose. Also discussed limiting saturated fats and cholesterol, avoiding high sodium foods and choosing whole grains. Provided written materials.

## 2017-07-10 NOTE — DIETITIAN INITIAL EVALUATION ADULT. - OTHER INFO
Nutrition assessment for length of stay. Pt found to have Hba1c 9.7%, amenable to nutrition education with written materials. Pt reports good po intake. No GI distress at this time. Last bowel movement was today. No chewing or swallowing difficulties at this time. No known food allergies.

## 2017-07-11 VITALS
HEART RATE: 70 BPM | RESPIRATION RATE: 18 BRPM | OXYGEN SATURATION: 95 % | SYSTOLIC BLOOD PRESSURE: 110 MMHG | DIASTOLIC BLOOD PRESSURE: 60 MMHG | TEMPERATURE: 98 F

## 2017-07-11 LAB
ANION GAP SERPL CALC-SCNC: 15 MMOL/L — SIGNIFICANT CHANGE UP (ref 5–17)
BUN SERPL-MCNC: 21 MG/DL — SIGNIFICANT CHANGE UP (ref 7–23)
CALCIUM SERPL-MCNC: 9.8 MG/DL — SIGNIFICANT CHANGE UP (ref 8.4–10.5)
CHLORIDE SERPL-SCNC: 102 MMOL/L — SIGNIFICANT CHANGE UP (ref 96–108)
CO2 SERPL-SCNC: 22 MMOL/L — SIGNIFICANT CHANGE UP (ref 22–31)
CREAT SERPL-MCNC: 0.94 MG/DL — SIGNIFICANT CHANGE UP (ref 0.5–1.3)
GLUCOSE SERPL-MCNC: 171 MG/DL — HIGH (ref 70–99)
HCT VFR BLD CALC: 31.3 % — LOW (ref 34.5–45)
HGB BLD-MCNC: 10.1 G/DL — LOW (ref 11.5–15.5)
MCHC RBC-ENTMCNC: 25.2 PG — LOW (ref 27–34)
MCHC RBC-ENTMCNC: 32.3 GM/DL — SIGNIFICANT CHANGE UP (ref 32–36)
MCV RBC AUTO: 78.1 FL — LOW (ref 80–100)
PLATELET # BLD AUTO: 420 K/UL — HIGH (ref 150–400)
POTASSIUM SERPL-MCNC: 4.4 MMOL/L — SIGNIFICANT CHANGE UP (ref 3.5–5.3)
POTASSIUM SERPL-SCNC: 4.4 MMOL/L — SIGNIFICANT CHANGE UP (ref 3.5–5.3)
RBC # BLD: 4.01 M/UL — SIGNIFICANT CHANGE UP (ref 3.8–5.2)
RBC # FLD: 13.2 % — SIGNIFICANT CHANGE UP (ref 10.3–14.5)
SODIUM SERPL-SCNC: 139 MMOL/L — SIGNIFICANT CHANGE UP (ref 135–145)
WBC # BLD: 9.43 K/UL — SIGNIFICANT CHANGE UP (ref 3.8–10.5)
WBC # FLD AUTO: 9.43 K/UL — SIGNIFICANT CHANGE UP (ref 3.8–10.5)

## 2017-07-11 PROCEDURE — 87486 CHLMYD PNEUM DNA AMP PROBE: CPT

## 2017-07-11 PROCEDURE — 97161 PT EVAL LOW COMPLEX 20 MIN: CPT

## 2017-07-11 PROCEDURE — 87075 CULTR BACTERIA EXCEPT BLOOD: CPT

## 2017-07-11 PROCEDURE — 96375 TX/PRO/DX INJ NEW DRUG ADDON: CPT

## 2017-07-11 PROCEDURE — 86060 ANTISTREPTOLYSIN O TITER: CPT

## 2017-07-11 PROCEDURE — 80053 COMPREHEN METABOLIC PANEL: CPT

## 2017-07-11 PROCEDURE — 99285 EMERGENCY DEPT VISIT HI MDM: CPT | Mod: 25

## 2017-07-11 PROCEDURE — A9585: CPT

## 2017-07-11 PROCEDURE — 87798 DETECT AGENT NOS DNA AMP: CPT

## 2017-07-11 PROCEDURE — 73502 X-RAY EXAM HIP UNI 2-3 VIEWS: CPT

## 2017-07-11 PROCEDURE — 80202 ASSAY OF VANCOMYCIN: CPT

## 2017-07-11 PROCEDURE — 85027 COMPLETE CBC AUTOMATED: CPT

## 2017-07-11 PROCEDURE — 82550 ASSAY OF CK (CPK): CPT

## 2017-07-11 PROCEDURE — 87070 CULTURE OTHR SPECIMN AEROBIC: CPT

## 2017-07-11 PROCEDURE — 96374 THER/PROPH/DIAG INJ IV PUSH: CPT

## 2017-07-11 PROCEDURE — 20610 DRAIN/INJ JOINT/BURSA W/O US: CPT

## 2017-07-11 PROCEDURE — 84550 ASSAY OF BLOOD/URIC ACID: CPT

## 2017-07-11 PROCEDURE — 80076 HEPATIC FUNCTION PANEL: CPT

## 2017-07-11 PROCEDURE — 77002 NEEDLE LOCALIZATION BY XRAY: CPT

## 2017-07-11 PROCEDURE — 83036 HEMOGLOBIN GLYCOSYLATED A1C: CPT

## 2017-07-11 PROCEDURE — 87205 SMEAR GRAM STAIN: CPT

## 2017-07-11 PROCEDURE — 87633 RESP VIRUS 12-25 TARGETS: CPT

## 2017-07-11 PROCEDURE — 85610 PROTHROMBIN TIME: CPT

## 2017-07-11 PROCEDURE — 80048 BASIC METABOLIC PNL TOTAL CA: CPT

## 2017-07-11 PROCEDURE — 87040 BLOOD CULTURE FOR BACTERIA: CPT

## 2017-07-11 PROCEDURE — 86140 C-REACTIVE PROTEIN: CPT

## 2017-07-11 PROCEDURE — 87581 M.PNEUMON DNA AMP PROBE: CPT

## 2017-07-11 PROCEDURE — 74176 CT ABD & PELVIS W/O CONTRAST: CPT

## 2017-07-11 PROCEDURE — 76377 3D RENDER W/INTRP POSTPROCES: CPT

## 2017-07-11 PROCEDURE — 86038 ANTINUCLEAR ANTIBODIES: CPT

## 2017-07-11 PROCEDURE — 96376 TX/PRO/DX INJ SAME DRUG ADON: CPT

## 2017-07-11 PROCEDURE — 89051 BODY FLUID CELL COUNT: CPT

## 2017-07-11 PROCEDURE — 81001 URINALYSIS AUTO W/SCOPE: CPT

## 2017-07-11 PROCEDURE — 85652 RBC SED RATE AUTOMATED: CPT

## 2017-07-11 PROCEDURE — 72197 MRI PELVIS W/O & W/DYE: CPT

## 2017-07-11 PROCEDURE — 99232 SBSQ HOSP IP/OBS MODERATE 35: CPT

## 2017-07-11 PROCEDURE — 85730 THROMBOPLASTIN TIME PARTIAL: CPT

## 2017-07-11 PROCEDURE — 86160 COMPLEMENT ANTIGEN: CPT

## 2017-07-11 PROCEDURE — 97530 THERAPEUTIC ACTIVITIES: CPT

## 2017-07-11 PROCEDURE — 97116 GAIT TRAINING THERAPY: CPT

## 2017-07-11 PROCEDURE — 84443 ASSAY THYROID STIM HORMONE: CPT

## 2017-07-11 RX ORDER — IBUPROFEN 200 MG
1 TABLET ORAL
Qty: 0 | Refills: 0 | COMMUNITY
Start: 2017-07-11

## 2017-07-11 RX ORDER — CEPHALEXIN 500 MG
1 CAPSULE ORAL
Qty: 56 | Refills: 0 | OUTPATIENT
Start: 2017-07-11 | End: 2017-07-25

## 2017-07-11 RX ORDER — ASPIRIN/CALCIUM CARB/MAGNESIUM 324 MG
1 TABLET ORAL
Qty: 0 | Refills: 0 | COMMUNITY

## 2017-07-11 RX ORDER — ATORVASTATIN CALCIUM 80 MG/1
1 TABLET, FILM COATED ORAL
Qty: 0 | Refills: 0 | COMMUNITY

## 2017-07-11 RX ORDER — LEVOTHYROXINE SODIUM 125 MCG
1 TABLET ORAL
Qty: 0 | Refills: 0 | COMMUNITY

## 2017-07-11 RX ORDER — ASPIRIN/CALCIUM CARB/MAGNESIUM 324 MG
1 TABLET ORAL
Qty: 0 | Refills: 0 | COMMUNITY
Start: 2017-07-11

## 2017-07-11 RX ORDER — ATORVASTATIN CALCIUM 80 MG/1
1 TABLET, FILM COATED ORAL
Qty: 0 | Refills: 0 | COMMUNITY
Start: 2017-07-11

## 2017-07-11 RX ORDER — LEVOTHYROXINE SODIUM 125 MCG
1 TABLET ORAL
Qty: 0 | Refills: 0 | COMMUNITY
Start: 2017-07-11

## 2017-07-11 RX ORDER — IBUPROFEN 200 MG
1 TABLET ORAL
Qty: 0 | Refills: 0 | COMMUNITY

## 2017-07-11 RX ADMIN — Medication 2: at 12:33

## 2017-07-11 RX ADMIN — Medication 75 MICROGRAM(S): at 06:38

## 2017-07-11 RX ADMIN — Medication 500 MILLIGRAM(S): at 12:35

## 2017-07-11 RX ADMIN — Medication 500 MILLIGRAM(S): at 06:38

## 2017-07-11 RX ADMIN — Medication 1: at 09:35

## 2017-07-11 RX ADMIN — Medication 100 MILLIGRAM(S): at 06:38

## 2017-07-11 RX ADMIN — Medication 81 MILLIGRAM(S): at 12:34

## 2017-07-11 RX ADMIN — HEPARIN SODIUM 5000 UNIT(S): 5000 INJECTION INTRAVENOUS; SUBCUTANEOUS at 06:38

## 2017-07-11 NOTE — DISCHARGE NOTE ADULT - VISION (WITH CORRECTIVE LENSES IF THE PATIENT USUALLY WEARS THEM):
Partially impaired: cannot see medication labels or newsprint, but can see obstacles in path, and the surrounding layout; can count fingers at arm's length/weras distance and reading glssses wears distance and reading glssses/Partially impaired: cannot see medication labels or newsprint, but can see obstacles in path, and the surrounding layout; can count fingers at arm's length Partially impaired: cannot see medication labels or newsprint, but can see obstacles in path, and the surrounding layout; can count fingers at arm's length/wears distance and reading glasses

## 2017-07-11 NOTE — DISCHARGE NOTE ADULT - CARE PROVIDER_API CALL
Morgan Cartagena), Infectious Disease; Internal Medicine  [address] [place] [address]  Phone: [phone]  Fax: [phone] Morgan Cartagena), Infectious Disease; Internal Medicine  [address] [place] [address]  Phone: [phone]  Fax: [phone]    Tana Metz (RABIA), Internal Medicine  [address] [place] [address]  Phone: [phone]  Fax: [phone]

## 2017-07-11 NOTE — DISCHARGE NOTE ADULT - MEDICATION SUMMARY - MEDICATIONS TO TAKE
I will START or STAY ON the medications listed below when I get home from the hospital:    ibuprofen 400 mg oral tablet  -- 1 tab(s) by mouth every 6 hours, As needed, Moderate pain  -- Indication: For Pain of right hip    aspirin 81 mg oral delayed release tablet  -- 1 tab(s) by mouth once a day  -- Indication: For cardiac protection and stroke prevention    ramipril 2.5 mg oral capsule  -- 1 cap(s) by mouth once a day  -- Indication: For Blood pressure    Lantus 100 units/mL subcutaneous solution  -- 45 unit(s) subcutaneous once a day (at bedtime)  -- Indication: For Diabetes    atorvastatin 10 mg oral tablet  -- 1 tab(s) by mouth once a day (at bedtime)  -- Indication: For High cholesterol    doxycycline monohydrate 100 mg oral capsule  -- 1 cap(s) by mouth every 12 hours  -- Indication: For Pain of right hip    cephalexin 500 mg oral capsule  -- 1 cap(s) by mouth 4 times a day  -- Indication: For Pain of right hip    levothyroxine 75 mcg (0.075 mg) oral tablet  -- 1 tab(s) by mouth once a day  -- Indication: For Hypothyroid

## 2017-07-11 NOTE — DISCHARGE NOTE ADULT - INSTRUCTIONS
DASH/TLC ( low fat, low salt, consistent carbohydrates) low fat, low salt, no concentrated sweets diabetic diet F/U with MD orders

## 2017-07-11 NOTE — DISCHARGE NOTE ADULT - PATIENT PORTAL LINK FT
“You can access the Follow[employer] Patient Portal, offered by [facility], by registering with the following website: [email or web address]”

## 2017-07-11 NOTE — DISCHARGE NOTE ADULT - SECONDARY DIAGNOSIS.
Type 1 diabetes mellitus without complication Essential hypertension Hypothyroidism, unspecified type

## 2017-07-11 NOTE — PROGRESS NOTE ADULT - PROBLEM SELECTOR PROBLEM 1
Hip pain, acute, right
Hip pain, acute, right
Pyogenic arthritis of right hip, due to unspecified organism
Hip pain, acute, right
Pyogenic arthritis of right hip, due to unspecified organism
Hip pain, acute, right

## 2017-07-11 NOTE — DISCHARGE NOTE ADULT - CARE PROVIDERS DIRECT ADDRESSES
,[email or web address],[email or web address]

## 2017-07-11 NOTE — PROGRESS NOTE ADULT - SUBJECTIVE AND OBJECTIVE BOX
Patient is a 62y old  Female who presents with a chief complaint of Pain in hip (11 Jul 2017 08:05)    Being followed by ID for ?Hip infection v myositis    Interval history:  feels much better  Able to walk  No acute events      ROS:  No cough,SOB,CP  No N/V/D./abd pain  No other complaints      Antimicrobials:    doxycycline hyclate Capsule 100 milliGRAM(s) Oral every 12 hours  cephalexin 500 milliGRAM(s) Oral four times a day      Vital Signs Last 24 Hrs  T(C): 36.8 (07-11-17 @ 08:50), Max: 36.8 (07-10-17 @ 14:32)  T(F): 98.2 (07-11-17 @ 08:50), Max: 98.2 (07-10-17 @ 14:32)  HR: 70 (07-11-17 @ 08:50) (68 - 78)  BP: 110/60 (07-11-17 @ 08:50) (106/65 - 113/66)  BP(mean): --  RR: 18 (07-11-17 @ 08:50) (18 - 18)  SpO2: 95% (07-11-17 @ 08:50) (95% - 99%)    Physical Exam:    Constitutional well preserved,comfortable,pleasant    HEENT PERRLA EOMI,No pallor or icterus    No oral exudate or erythema    Neck supple no JVD or LN    Chest Good AE,CTA    CVS RRR S1 S2 WNl No murmur or rub or gallop    Abd soft BS normal No tenderness no masses    Ext No cyanosis clubbing or edema    IV site no erythema tenderness or discharge    Joints no swelling or LOM,Hip ROM much better    CNS AAO X 3 no focal    Lab Data:                          10.1   9.43  )-----------( 420      ( 11 Jul 2017 08:39 )             31.3       07-11    139  |  102  |  21  ----------------------------<  171<H>  4.4   |  22  |  0.94    Ca    9.8      11 Jul 2017 08:33    Sedimentation Rate, Erythrocyte (07.10.17 @ 14:28)    Sedimentation Rate, Erythrocyte: 92 mm/hr    Sedimentation Rate, Erythrocyte (07.04.17 @ 02:44)    Sedimentation Rate, Erythrocyte: 106 mm/hr    C-Reactive Protein, Serum (07.10.17 @ 18:26)    C-Reactive Protein, Serum: 1.30 mg/dL    C-Reactive Protein, Serum (07.04.17 @ 10:44)    C-Reactive Protein, Serum: 12.30 mg/dL                Culture - Body Fluid with Gram Stain (07.06.17 @ 00:09)    Gram Stain:   Numerous polymorphonuclear leukocytes seen per low power field  No organisms seen    Specimen Source: .Body Fluid Synovial Fluid    Culture Results:   No growth at 5 days

## 2017-07-11 NOTE — PROGRESS NOTE ADULT - SUBJECTIVE AND OBJECTIVE BOX
Patient is a 62y old  Female who presents with a chief complaint of Pain in hip (11 Jul 2017 08:05)      SUBJECTIVE / OVERNIGHT EVENTS:  Afebrile, no new pain in groin  Review of Systems:   CONSTITUTIONAL: No fever, weight loss, or fatigue  EYES: No eye pain, visual disturbances, or discharge  ENMT:  No difficulty hearing, tinnitus, vertigo; No sinus or throat pain  NECK: No pain or stiffness  BREASTS: No pain, masses, or nipple discharge  RESPIRATORY: No cough, wheezing, chills or hemoptysis; No shortness of breath  CARDIOVASCULAR: No chest pain, palpitations, dizziness, or leg swelling  GASTROINTESTINAL: No abdominal or epigastric pain. No nausea, vomiting, or hematemesis; No diarrhea or constipation. No melena or hematochezia.  GENITOURINARY: No dysuria, frequency, hematuria, or incontinence  NEUROLOGICAL: No headaches, memory loss, loss of strength, numbness, or tremors  SKIN: No itching, burning, rashes, or lesions   LYMPH NODES: No enlarged glands  ENDOCRINE: No heat or cold intolerance; No hair loss  MUSCULOSKELETAL: No joint pain or swelling; No muscle, back, or extremity pain  PSYCHIATRIC: No depression, anxiety, mood swings, or difficulty sleeping  HEME/LYMPH: No easy bruising, or bleeding gums  ALLERY AND IMMUNOLOGIC: No hives or eczema    MEDICATIONS  (STANDING):  insulin glargine Injectable (LANTUS) 36 Unit(s) SubCutaneous at bedtime  insulin lispro (HumaLOG) corrective regimen sliding scale   SubCutaneous three times a day before meals  insulin lispro (HumaLOG) corrective regimen sliding scale   SubCutaneous at bedtime  dextrose 5%. 1000 milliLiter(s) (50 mL/Hr) IV Continuous <Continuous>  dextrose 50% Injectable 12.5 Gram(s) IV Push once  dextrose 50% Injectable 25 Gram(s) IV Push once  dextrose 50% Injectable 25 Gram(s) IV Push once  heparin  Injectable 5000 Unit(s) SubCutaneous every 12 hours  levothyroxine 75 MICROGram(s) Oral daily  aspirin enteric coated 81 milliGRAM(s) Oral daily  lisinopril 10 milliGRAM(s) Oral daily  atorvastatin 10 milliGRAM(s) Oral at bedtime  doxycycline hyclate Capsule 100 milliGRAM(s) Oral every 12 hours  cephalexin 500 milliGRAM(s) Oral four times a day    MEDICATIONS  (PRN):  morphine  - Injectable 2 milliGRAM(s) IV Push every 6 hours PRN Moderate Pain (4 - 6)  dextrose Gel 1 Dose(s) Oral once PRN Blood Glucose LESS THAN 70 milliGRAM(s)/deciliter  glucagon  Injectable 1 milliGRAM(s) IntraMuscular once PRN Glucose LESS THAN 70 milligrams/deciliter  ibuprofen  Tablet 400 milliGRAM(s) Oral every 6 hours PRN Moderate pain      PHYSICAL EXAM:  Vital Signs Last 24 Hrs  T(C): 36.8 (07-11-17 @ 08:50), Max: 36.8 (07-10-17 @ 14:32)  HR: 70 (07-11-17 @ 08:50) (68 - 78)  BP: 110/60 (07-11-17 @ 08:50) (106/65 - 113/66)  RR: 18 (07-11-17 @ 08:50) (18 - 18)  SpO2: 95% (07-11-17 @ 08:50) (95% - 99%)  I&O's Summary    10 Jul 2017 07:01  -  11 Jul 2017 07:00  --------------------------------------------------------  IN: 960 mL / OUT: 650 mL / NET: 310 mL    11 Jul 2017 07:01  -  11 Jul 2017 11:36  --------------------------------------------------------  IN: 240 mL / OUT: 0 mL / NET: 240 mL      GENERAL: NAD, well-developed  HEAD:  Atraumatic, Normocephalic  EYES: EOMI, PERRLA, conjunctiva and sclera clear  NECK: Supple, No JVD  CHEST/LUNG: Clear to auscultation bilaterally; No wheeze  HEART: Regular rate and rhythm; No murmurs, rubs, or gallops  ABDOMEN: Soft, Nontender, Nondistended; Bowel sounds present  EXTREMITIES:  2+ Peripheral Pulses, No clubbing, cyanosis, or edema  PSYCH: AAOx3  NEUROLOGY: non-focal  SKIN: No rashes or lesions    LABS:  CAPILLARY BLOOD GLUCOSE  161 (11 Jul 2017 08:05)  226 (10 Jul 2017 21:08)  181 (10 Jul 2017 17:15)  360 (10 Jul 2017 12:19)                              10.1   9.43  )-----------( 420      ( 11 Jul 2017 08:39 )             31.3     07-11    139  |  102  |  21  ----------------------------<  171<H>  4.4   |  22  |  0.94    Ca    9.8      11 Jul 2017 08:33                RADIOLOGY & ADDITIONAL TESTS:    Imaging Personally Reviewed:    Consultant(s) Notes Reviewed:      Care Discussed with Consultants/Other Providers:

## 2017-07-11 NOTE — DISCHARGE NOTE ADULT - MEDICATION SUMMARY - MEDICATIONS TO STOP TAKING
I will STOP taking the medications listed below when I get home from the hospital:    glipiZIDE 10 mg oral tablet, extended release  -- 1 tab(s) by mouth 2 times a day    methocarbamol 500 mg oral tablet  -- 1 tab(s) by mouth 3 times a day, As Needed    traMADol 50 mg oral tablet  -- 1 tab(s) by mouth 4 times a day, As Needed

## 2017-07-11 NOTE — PROGRESS NOTE ADULT - PROBLEM SELECTOR PROBLEM 2
Type 1 diabetes mellitus without complication

## 2017-07-11 NOTE — DISCHARGE NOTE ADULT - ADDITIONAL INSTRUCTIONS
Follow up with Dr Cartagena (Infectious disease) in 3 weeks Follow up with Dr Cartagena (Infectious disease) in 3 weeks  Follow up with PMD - Dr. Metz in 1 week - call for appointment - The Office is closed now - please call as soon as you reach home to make an appointment within a week

## 2017-07-11 NOTE — DISCHARGE NOTE ADULT - PLAN OF CARE
Complete resolution Weight bearing as tolerated  Physical therapy as recommended  Complete antibiotics as ordered  Follow up with Dr. Metz in 1 week - call for appointment  Follow up with Dr. Cartagena - Infectious diseases in 3 weeks - call for appointment Follow up with PMD - Dr. Metz in 1 week - call for appointment - The Office is closed now - please call as soon as you reach home to make an appointment within a week  HgA1C this admission - 9.7  Make sure you get your HgA1c checked every three months.  If you take insulin, check your blood glucose before meals and at bedtime.  It's important not to skip any meals.  Keep a log of your blood glucose results and always take it with you to your doctor appointments.  Keep a list of your current medications including injectables and over the counter medications and bring this medication list with you to all your doctor appointments.  If you have not seen your ophthalmologist this year call for appointment.  Check your feet daily for redness, sores, or openings. Do not self treat. If no improvement in two days call your primary care physician for an appointment.  Low blood sugar (hypoglycemia) is a blood sugar below 70mg/dl. Check your blood sugar if you feel signs/symptoms of hypoglycemia. If your blood sugar is below 70 take 15 grams of carbohydrates (ex 4 oz of apple juice, 3-4 glucose tablets, or 4-6 oz of regular soda) wait 15 minutes and repeat blood sugar to make sure it comes up above 70.  If your blood sugar is above 70 and you are due for a meal, have a meal.  If you are not due for a meal have a snack.  This snack helps keeps your blood sugar at a safe range. Take medications for your blood pressure as recommended.  Eat a heart healthy diet that is low in saturated fats and salt, and includes whole grains, fruits, vegetables and lean protein   Exercise regularly (consult with your physician or cardiologist first); maintain a heart healthy weight.   If you smoke - quit (A resource to help you stop smoking is the [facility] – phone number 715-874-5622.). Continue to follow with your primary physician or cardiologist.   Seek medical help for dizziness, Lightheadedness, Blurry vision, Headache, Chest pain, Shortness of breath  Follow up with your medical doctor to establish long term blood pressure treatment goals. you do not make enough thyroid hormone  signs & symptoms of low levels of thyroid hormone - tired, getting cold easily, coarse or thin hair, constipation, shortness of breath, swelling, irregular periods  your doctor will do thyroid hormone blood tests at least once a year to monitor if medication dose is adequate  take your thyroid medicine as directed by your doctor & on empty stomach

## 2017-07-11 NOTE — DISCHARGE NOTE ADULT - CARE PLAN
Principal Discharge DX:	Pyogenic arthritis of right hip, due to unspecified organism  Goal:	Complete resolution  Instructions for follow-up, activity and diet:	Weight bearing as tolerated  Physical therapy as recommended  Complete antibiotics as ordered  Follow up with Dr. Metz in 1 week - call for appointment  Follow up with Dr. Cartagena - Infectious diseases in 3 weeks - call for appointment  Secondary Diagnosis:	Type 1 diabetes mellitus without complication  Instructions for follow-up, activity and diet:	Follow up with PMD - Dr. Metz in 1 week - call for appointment - The Office is closed now - please call as soon as you reach home to make an appointment within a week  HgA1C this admission - 9.7  Make sure you get your HgA1c checked every three months.  If you take insulin, check your blood glucose before meals and at bedtime.  It's important not to skip any meals.  Keep a log of your blood glucose results and always take it with you to your doctor appointments.  Keep a list of your current medications including injectables and over the counter medications and bring this medication list with you to all your doctor appointments.  If you have not seen your ophthalmologist this year call for appointment.  Check your feet daily for redness, sores, or openings. Do not self treat. If no improvement in two days call your primary care physician for an appointment.  Low blood sugar (hypoglycemia) is a blood sugar below 70mg/dl. Check your blood sugar if you feel signs/symptoms of hypoglycemia. If your blood sugar is below 70 take 15 grams of carbohydrates (ex 4 oz of apple juice, 3-4 glucose tablets, or 4-6 oz of regular soda) wait 15 minutes and repeat blood sugar to make sure it comes up above 70.  If your blood sugar is above 70 and you are due for a meal, have a meal.  If you are not due for a meal have a snack.  This snack helps keeps your blood sugar at a safe range.  Secondary Diagnosis:	Essential hypertension  Instructions for follow-up, activity and diet:	Take medications for your blood pressure as recommended.  Eat a heart healthy diet that is low in saturated fats and salt, and includes whole grains, fruits, vegetables and lean protein   Exercise regularly (consult with your physician or cardiologist first); maintain a heart healthy weight.   If you smoke - quit (A resource to help you stop smoking is the [facility] – phone number 020-499-0533.). Continue to follow with your primary physician or cardiologist.   Seek medical help for dizziness, Lightheadedness, Blurry vision, Headache, Chest pain, Shortness of breath  Follow up with your medical doctor to establish long term blood pressure treatment goals.  Secondary Diagnosis:	Hypothyroidism, unspecified type  Instructions for follow-up, activity and diet:	you do not make enough thyroid hormone  signs & symptoms of low levels of thyroid hormone - tired, getting cold easily, coarse or thin hair, constipation, shortness of breath, swelling, irregular periods  your doctor will do thyroid hormone blood tests at least once a year to monitor if medication dose is adequate  take your thyroid medicine as directed by your doctor & on empty stomach Principal Discharge DX:	Pyogenic arthritis of right hip, due to unspecified organism  Goal:	Complete resolution  Instructions for follow-up, activity and diet:	Weight bearing as tolerated  Physical therapy as recommended  Complete antibiotics as ordered  Follow up with Dr. Metz in 1 week - call for appointment  Follow up with Dr. Cartagena - Infectious diseases in 3 weeks - call for appointment  Secondary Diagnosis:	Type 1 diabetes mellitus without complication  Instructions for follow-up, activity and diet:	Follow up with PMD - Dr. Metz in 1 week - call for appointment - The Office is closed now - please call as soon as you reach home to make an appointment within a week  HgA1C this admission - 9.7  Make sure you get your HgA1c checked every three months.  If you take insulin, check your blood glucose before meals and at bedtime.  It's important not to skip any meals.  Keep a log of your blood glucose results and always take it with you to your doctor appointments.  Keep a list of your current medications including injectables and over the counter medications and bring this medication list with you to all your doctor appointments.  If you have not seen your ophthalmologist this year call for appointment.  Check your feet daily for redness, sores, or openings. Do not self treat. If no improvement in two days call your primary care physician for an appointment.  Low blood sugar (hypoglycemia) is a blood sugar below 70mg/dl. Check your blood sugar if you feel signs/symptoms of hypoglycemia. If your blood sugar is below 70 take 15 grams of carbohydrates (ex 4 oz of apple juice, 3-4 glucose tablets, or 4-6 oz of regular soda) wait 15 minutes and repeat blood sugar to make sure it comes up above 70.  If your blood sugar is above 70 and you are due for a meal, have a meal.  If you are not due for a meal have a snack.  This snack helps keeps your blood sugar at a safe range.  Secondary Diagnosis:	Essential hypertension  Instructions for follow-up, activity and diet:	Take medications for your blood pressure as recommended.  Eat a heart healthy diet that is low in saturated fats and salt, and includes whole grains, fruits, vegetables and lean protein   Exercise regularly (consult with your physician or cardiologist first); maintain a heart healthy weight.   If you smoke - quit (A resource to help you stop smoking is the [facility] – phone number 725-258-3196.). Continue to follow with your primary physician or cardiologist.   Seek medical help for dizziness, Lightheadedness, Blurry vision, Headache, Chest pain, Shortness of breath  Follow up with your medical doctor to establish long term blood pressure treatment goals.  Secondary Diagnosis:	Hypothyroidism, unspecified type  Instructions for follow-up, activity and diet:	you do not make enough thyroid hormone  signs & symptoms of low levels of thyroid hormone - tired, getting cold easily, coarse or thin hair, constipation, shortness of breath, swelling, irregular periods  your doctor will do thyroid hormone blood tests at least once a year to monitor if medication dose is adequate  take your thyroid medicine as directed by your doctor & on empty stomach

## 2017-07-11 NOTE — CHART NOTE - NSCHARTNOTEFT_GEN_A_CORE
Request from Dr. Metz to facilitate patient discharge. Medication reconciliation reviewed, revised, and resolved with Dr. Metz who had medically cleared patient for discharge with follow-up as advised. Please refer to discharge note for detailed hospital course. Patient is currently stable for discharge to Home with home PT at this time.    Contact # [phone]

## 2017-07-11 NOTE — PROGRESS NOTE ADULT - PROBLEM SELECTOR PLAN 1
On antibiotics, no need for surgical intervention  PO antibiotics recommended, Ceftin and doxycycline  DC planning
R/o septic arthritis.   ID FU noted. cont Vanco  Cultures still negative  Abx duration and type as per ID
R/o septic arthritis. FU aspiration findings  ID FU noted. cont Vanco  Cultures still negative
On antibiotics, no need for surgical intervention  ID to plan for out patient antibiotic regimen and DC planning
R/o septic arthritis. FU aspiration findings  ID FU noted
s/p aspirate  Only 4000+ cells  CPK normal  Cx testing  Blood Cx negative  Continue vanco for now   tailor plan per results,course

## 2017-07-11 NOTE — PROGRESS NOTE ADULT - ASSESSMENT
62F with hx of HTN, DM, HLD, Hypothyroidism here for R hip pain pain x 2 days  No prior joint issues,no trauma  fevers  Elevated markers of inflammation  MRI with joint effusion ?muscular edema  cultures from aspirate  negative to date but better on abx .  No alternate diagnosis  Options with risk/benefits including no abx,oral abx and IV antimicrobials discussed yesterday.    ESR lower,CRP markedly lower(ESR tends to lag behind CRP),given above and improvement think most reasonable course may be to continue oral  abx(pt does not want IV opr PICC line)   Side effects discussed.  other plan per primary    If DCed to follow in ID clinic 3-4 weeks  Case D/w med NP

## 2017-07-13 PROBLEM — E78.00 PURE HYPERCHOLESTEROLEMIA, UNSPECIFIED: Chronic | Status: ACTIVE | Noted: 2017-07-03

## 2017-07-13 PROBLEM — E03.9 HYPOTHYROIDISM, UNSPECIFIED: Chronic | Status: ACTIVE | Noted: 2017-07-03

## 2017-08-10 ENCOUNTER — FORM ENCOUNTER (OUTPATIENT)
Age: 62
End: 2017-08-10

## 2017-08-10 ENCOUNTER — APPOINTMENT (OUTPATIENT)
Dept: INFECTIOUS DISEASE | Facility: CLINIC | Age: 62
End: 2017-08-10
Payer: COMMERCIAL

## 2017-08-10 VITALS
WEIGHT: 134 LBS | OXYGEN SATURATION: 100 % | BODY MASS INDEX: 26.31 KG/M2 | HEART RATE: 72 BPM | TEMPERATURE: 97.8 F | DIASTOLIC BLOOD PRESSURE: 73 MMHG | HEIGHT: 60 IN | SYSTOLIC BLOOD PRESSURE: 120 MMHG

## 2017-08-10 DIAGNOSIS — R59.1 GENERALIZED ENLARGED LYMPH NODES: ICD-10-CM

## 2017-08-10 DIAGNOSIS — M25.551 PAIN IN RIGHT HIP: ICD-10-CM

## 2017-08-10 PROCEDURE — 99214 OFFICE O/P EST MOD 30 MIN: CPT

## 2017-08-11 ENCOUNTER — OUTPATIENT (OUTPATIENT)
Dept: OUTPATIENT SERVICES | Facility: HOSPITAL | Age: 62
LOS: 1 days | End: 2017-08-11
Payer: COMMERCIAL

## 2017-08-11 ENCOUNTER — APPOINTMENT (OUTPATIENT)
Dept: ULTRASOUND IMAGING | Facility: CLINIC | Age: 62
End: 2017-08-11
Payer: COMMERCIAL

## 2017-08-11 DIAGNOSIS — Z00.8 ENCOUNTER FOR OTHER GENERAL EXAMINATION: ICD-10-CM

## 2017-08-11 PROCEDURE — 76830 TRANSVAGINAL US NON-OB: CPT | Mod: 26

## 2017-08-11 PROCEDURE — 76830 TRANSVAGINAL US NON-OB: CPT

## 2017-08-14 LAB
ALBUMIN SERPL ELPH-MCNC: 3.9 G/DL
ALP BLD-CCNC: 95 U/L
ALT SERPL-CCNC: 34 U/L
ANION GAP SERPL CALC-SCNC: 14 MMOL/L
AST SERPL-CCNC: 23 U/L
BASOPHILS # BLD AUTO: 0.01 K/UL
BASOPHILS NFR BLD AUTO: 0.2 %
BILIRUB SERPL-MCNC: 0.3 MG/DL
BUN SERPL-MCNC: 15 MG/DL
CALCIUM SERPL-MCNC: 9 MG/DL
CHLORIDE SERPL-SCNC: 102 MMOL/L
CO2 SERPL-SCNC: 24 MMOL/L
CREAT SERPL-MCNC: 0.71 MG/DL
CRP SERPL-MCNC: 0.5 MG/DL
EOSINOPHIL # BLD AUTO: 0.28 K/UL
EOSINOPHIL NFR BLD AUTO: 4.3 %
ERYTHROCYTE [SEDIMENTATION RATE] IN BLOOD BY WESTERGREN METHOD: 37 MM/HR
GLUCOSE SERPL-MCNC: 241 MG/DL
HCT VFR BLD CALC: 33.6 %
HGB BLD-MCNC: 10.8 G/DL
IMM GRANULOCYTES NFR BLD AUTO: 0.2 %
LYMPHOCYTES # BLD AUTO: 2.25 K/UL
LYMPHOCYTES NFR BLD AUTO: 34.2 %
MAN DIFF?: NORMAL
MCHC RBC-ENTMCNC: 25.5 PG
MCHC RBC-ENTMCNC: 32.1 GM/DL
MCV RBC AUTO: 79.2 FL
MONOCYTES # BLD AUTO: 0.34 K/UL
MONOCYTES NFR BLD AUTO: 5.2 %
NEUTROPHILS # BLD AUTO: 3.68 K/UL
NEUTROPHILS NFR BLD AUTO: 55.9 %
PLATELET # BLD AUTO: 300 K/UL
POTASSIUM SERPL-SCNC: 4.4 MMOL/L
PROT SERPL-MCNC: 8 G/DL
RBC # BLD: 4.24 M/UL
RBC # FLD: 14.3 %
SODIUM SERPL-SCNC: 140 MMOL/L
WBC # FLD AUTO: 6.57 K/UL

## 2017-08-16 LAB
BACTERIA BLD CULT: NORMAL
BACTERIA BLD CULT: NORMAL

## 2017-08-28 ENCOUNTER — INPATIENT (INPATIENT)
Facility: HOSPITAL | Age: 62
LOS: 2 days | Discharge: HOME HEALTH | End: 2017-08-31
Attending: HOSPITALIST | Admitting: HOSPITALIST
Payer: COMMERCIAL

## 2017-08-28 VITALS
OXYGEN SATURATION: 100 % | SYSTOLIC BLOOD PRESSURE: 142 MMHG | TEMPERATURE: 98 F | DIASTOLIC BLOOD PRESSURE: 85 MMHG | RESPIRATION RATE: 20 BRPM | HEART RATE: 98 BPM

## 2017-08-28 DIAGNOSIS — M25.559 PAIN IN UNSPECIFIED HIP: ICD-10-CM

## 2017-08-28 LAB
ALBUMIN SERPL ELPH-MCNC: 4.1 G/DL — SIGNIFICANT CHANGE UP (ref 3.3–5)
ALP SERPL-CCNC: 121 U/L — HIGH (ref 40–120)
ALT FLD-CCNC: 76 U/L — HIGH (ref 4–33)
APPEARANCE UR: CLEAR — SIGNIFICANT CHANGE UP
AST SERPL-CCNC: 48 U/L — HIGH (ref 4–32)
BASE EXCESS BLDV CALC-SCNC: 0.7 MMOL/L — SIGNIFICANT CHANGE UP
BASOPHILS # BLD AUTO: 0.03 K/UL — SIGNIFICANT CHANGE UP (ref 0–0.2)
BASOPHILS NFR BLD AUTO: 0.2 % — SIGNIFICANT CHANGE UP (ref 0–2)
BILIRUB SERPL-MCNC: 0.4 MG/DL — SIGNIFICANT CHANGE UP (ref 0.2–1.2)
BILIRUB UR-MCNC: NEGATIVE — SIGNIFICANT CHANGE UP
BLOOD GAS VENOUS - CREATININE: 0.69 MG/DL — SIGNIFICANT CHANGE UP (ref 0.5–1.3)
BLOOD UR QL VISUAL: NEGATIVE — SIGNIFICANT CHANGE UP
BUN SERPL-MCNC: 15 MG/DL — SIGNIFICANT CHANGE UP (ref 7–23)
CALCIUM SERPL-MCNC: 9.4 MG/DL — SIGNIFICANT CHANGE UP (ref 8.4–10.5)
CHLORIDE BLDV-SCNC: 101 MMOL/L — SIGNIFICANT CHANGE UP (ref 96–108)
CHLORIDE SERPL-SCNC: 98 MMOL/L — SIGNIFICANT CHANGE UP (ref 98–107)
CO2 SERPL-SCNC: 21 MMOL/L — LOW (ref 22–31)
COLOR SPEC: YELLOW — SIGNIFICANT CHANGE UP
CREAT SERPL-MCNC: 0.81 MG/DL — SIGNIFICANT CHANGE UP (ref 0.5–1.3)
CRP SERPL-MCNC: 32.6 MG/L — SIGNIFICANT CHANGE UP
EOSINOPHIL # BLD AUTO: 0.13 K/UL — SIGNIFICANT CHANGE UP (ref 0–0.5)
EOSINOPHIL NFR BLD AUTO: 1 % — SIGNIFICANT CHANGE UP (ref 0–6)
ERYTHROCYTE [SEDIMENTATION RATE] IN BLOOD: 50 MM/HR — HIGH (ref 4–25)
GAS PNL BLDV: 134 MMOL/L — LOW (ref 136–146)
GLUCOSE BLDV-MCNC: 262 — HIGH (ref 70–99)
GLUCOSE SERPL-MCNC: 251 MG/DL — HIGH (ref 70–99)
GLUCOSE UR-MCNC: 500 — SIGNIFICANT CHANGE UP
HCO3 BLDV-SCNC: 24 MMOL/L — SIGNIFICANT CHANGE UP (ref 20–27)
HCT VFR BLD CALC: 36.3 % — SIGNIFICANT CHANGE UP (ref 34.5–45)
HCT VFR BLDV CALC: 37.4 % — SIGNIFICANT CHANGE UP (ref 34.5–45)
HGB BLD-MCNC: 11.8 G/DL — SIGNIFICANT CHANGE UP (ref 11.5–15.5)
HGB BLDV-MCNC: 12.2 G/DL — SIGNIFICANT CHANGE UP (ref 11.5–15.5)
HYALINE CASTS # UR AUTO: SIGNIFICANT CHANGE UP (ref 0–?)
IMM GRANULOCYTES # BLD AUTO: 0.05 # — SIGNIFICANT CHANGE UP
IMM GRANULOCYTES NFR BLD AUTO: 0.4 % — SIGNIFICANT CHANGE UP (ref 0–1.5)
KETONES UR-MCNC: NEGATIVE — SIGNIFICANT CHANGE UP
LACTATE BLDV-MCNC: 1.3 MMOL/L — SIGNIFICANT CHANGE UP (ref 0.5–2)
LEUKOCYTE ESTERASE UR-ACNC: NEGATIVE — SIGNIFICANT CHANGE UP
LYMPHOCYTES # BLD AUTO: 2.57 K/UL — SIGNIFICANT CHANGE UP (ref 1–3.3)
LYMPHOCYTES # BLD AUTO: 20.7 % — SIGNIFICANT CHANGE UP (ref 13–44)
MCHC RBC-ENTMCNC: 25.6 PG — LOW (ref 27–34)
MCHC RBC-ENTMCNC: 32.5 % — SIGNIFICANT CHANGE UP (ref 32–36)
MCV RBC AUTO: 78.7 FL — LOW (ref 80–100)
MONOCYTES # BLD AUTO: 0.79 K/UL — SIGNIFICANT CHANGE UP (ref 0–0.9)
MONOCYTES NFR BLD AUTO: 6.4 % — SIGNIFICANT CHANGE UP (ref 2–14)
MUCOUS THREADS # UR AUTO: SIGNIFICANT CHANGE UP
NEUTROPHILS # BLD AUTO: 8.84 K/UL — HIGH (ref 1.8–7.4)
NEUTROPHILS NFR BLD AUTO: 71.3 % — SIGNIFICANT CHANGE UP (ref 43–77)
NITRITE UR-MCNC: NEGATIVE — SIGNIFICANT CHANGE UP
NON-SQ EPI CELLS # UR AUTO: <1 — SIGNIFICANT CHANGE UP
NRBC # FLD: 0 — SIGNIFICANT CHANGE UP
PCO2 BLDV: 43 MMHG — SIGNIFICANT CHANGE UP (ref 41–51)
PH BLDV: 7.39 PH — SIGNIFICANT CHANGE UP (ref 7.32–7.43)
PH UR: 5.5 — SIGNIFICANT CHANGE UP (ref 4.6–8)
PLATELET # BLD AUTO: 278 K/UL — SIGNIFICANT CHANGE UP (ref 150–400)
PMV BLD: 9.8 FL — SIGNIFICANT CHANGE UP (ref 7–13)
PO2 BLDV: 27 MMHG — LOW (ref 35–40)
POTASSIUM BLDV-SCNC: 4.2 MMOL/L — SIGNIFICANT CHANGE UP (ref 3.4–4.5)
POTASSIUM SERPL-MCNC: 4.6 MMOL/L — SIGNIFICANT CHANGE UP (ref 3.5–5.3)
POTASSIUM SERPL-SCNC: 4.6 MMOL/L — SIGNIFICANT CHANGE UP (ref 3.5–5.3)
PROT SERPL-MCNC: 8.4 G/DL — HIGH (ref 6–8.3)
PROT UR-MCNC: 100 — HIGH
RBC # BLD: 4.61 M/UL — SIGNIFICANT CHANGE UP (ref 3.8–5.2)
RBC # FLD: 13.8 % — SIGNIFICANT CHANGE UP (ref 10.3–14.5)
RBC CASTS # UR COMP ASSIST: SIGNIFICANT CHANGE UP (ref 0–?)
SAO2 % BLDV: 47.3 % — LOW (ref 60–85)
SODIUM SERPL-SCNC: 133 MMOL/L — LOW (ref 135–145)
SP GR SPEC: 1.02 — SIGNIFICANT CHANGE UP (ref 1–1.03)
SQUAMOUS # UR AUTO: SIGNIFICANT CHANGE UP
UROBILINOGEN FLD QL: NORMAL E.U. — SIGNIFICANT CHANGE UP (ref 0.1–0.2)
WBC # BLD: 12.41 K/UL — HIGH (ref 3.8–10.5)
WBC # FLD AUTO: 12.41 K/UL — HIGH (ref 3.8–10.5)
WBC UR QL: SIGNIFICANT CHANGE UP (ref 0–?)

## 2017-08-28 PROCEDURE — 71020: CPT | Mod: 26

## 2017-08-28 PROCEDURE — 73503 X-RAY EXAM HIP UNI 4/> VIEWS: CPT | Mod: 26,LT

## 2017-08-28 PROCEDURE — 99223 1ST HOSP IP/OBS HIGH 75: CPT | Mod: GC

## 2017-08-28 RX ORDER — VANCOMYCIN HCL 1 G
1000 VIAL (EA) INTRAVENOUS ONCE
Qty: 0 | Refills: 0 | Status: DISCONTINUED | OUTPATIENT
Start: 2017-08-28 | End: 2017-08-28

## 2017-08-28 RX ORDER — MORPHINE SULFATE 50 MG/1
4 CAPSULE, EXTENDED RELEASE ORAL ONCE
Qty: 0 | Refills: 0 | Status: DISCONTINUED | OUTPATIENT
Start: 2017-08-28 | End: 2017-08-28

## 2017-08-28 RX ORDER — INSULIN LISPRO 100/ML
VIAL (ML) SUBCUTANEOUS
Qty: 0 | Refills: 0 | Status: DISCONTINUED | OUTPATIENT
Start: 2017-08-28 | End: 2017-08-31

## 2017-08-28 RX ORDER — DEXTROSE 50 % IN WATER 50 %
12.5 SYRINGE (ML) INTRAVENOUS ONCE
Qty: 0 | Refills: 0 | Status: DISCONTINUED | OUTPATIENT
Start: 2017-08-28 | End: 2017-08-31

## 2017-08-28 RX ORDER — INSULIN GLARGINE 100 [IU]/ML
30 INJECTION, SOLUTION SUBCUTANEOUS AT BEDTIME
Qty: 0 | Refills: 0 | Status: DISCONTINUED | OUTPATIENT
Start: 2017-08-28 | End: 2017-08-31

## 2017-08-28 RX ORDER — INSULIN LISPRO 100/ML
VIAL (ML) SUBCUTANEOUS AT BEDTIME
Qty: 0 | Refills: 0 | Status: DISCONTINUED | OUTPATIENT
Start: 2017-08-28 | End: 2017-08-31

## 2017-08-28 RX ORDER — GLUCAGON INJECTION, SOLUTION 0.5 MG/.1ML
1 INJECTION, SOLUTION SUBCUTANEOUS ONCE
Qty: 0 | Refills: 0 | Status: DISCONTINUED | OUTPATIENT
Start: 2017-08-28 | End: 2017-08-31

## 2017-08-28 RX ORDER — ACETAMINOPHEN 500 MG
975 TABLET ORAL ONCE
Qty: 0 | Refills: 0 | Status: COMPLETED | OUTPATIENT
Start: 2017-08-28 | End: 2017-08-28

## 2017-08-28 RX ORDER — CEFTRIAXONE 500 MG/1
1 INJECTION, POWDER, FOR SOLUTION INTRAMUSCULAR; INTRAVENOUS EVERY 24 HOURS
Qty: 0 | Refills: 0 | Status: DISCONTINUED | OUTPATIENT
Start: 2017-08-28 | End: 2017-08-28

## 2017-08-28 RX ORDER — SODIUM CHLORIDE 9 MG/ML
1000 INJECTION, SOLUTION INTRAVENOUS
Qty: 0 | Refills: 0 | Status: DISCONTINUED | OUTPATIENT
Start: 2017-08-28 | End: 2017-08-31

## 2017-08-28 RX ORDER — DEXTROSE 50 % IN WATER 50 %
1 SYRINGE (ML) INTRAVENOUS ONCE
Qty: 0 | Refills: 0 | Status: DISCONTINUED | OUTPATIENT
Start: 2017-08-28 | End: 2017-08-31

## 2017-08-28 RX ORDER — DEXTROSE 50 % IN WATER 50 %
25 SYRINGE (ML) INTRAVENOUS ONCE
Qty: 0 | Refills: 0 | Status: DISCONTINUED | OUTPATIENT
Start: 2017-08-28 | End: 2017-08-31

## 2017-08-28 RX ADMIN — MORPHINE SULFATE 4 MILLIGRAM(S): 50 CAPSULE, EXTENDED RELEASE ORAL at 18:12

## 2017-08-28 RX ADMIN — CEFTRIAXONE 100 GRAM(S): 500 INJECTION, POWDER, FOR SOLUTION INTRAMUSCULAR; INTRAVENOUS at 18:32

## 2017-08-28 RX ADMIN — Medication 975 MILLIGRAM(S): at 20:59

## 2017-08-28 RX ADMIN — MORPHINE SULFATE 4 MILLIGRAM(S): 50 CAPSULE, EXTENDED RELEASE ORAL at 19:33

## 2017-08-28 NOTE — ED PROVIDER NOTE - MUSCULOSKELETAL, MLM
Patient laying down in stretcher with external rotation of left hip. Reduced ROM left hip. Increased pain with internal rotation and flexion of left hip. 4/5 strength L hip 2/2 pain

## 2017-08-28 NOTE — ED PROVIDER NOTE - PROGRESS NOTE DETAILS
Change of shift: saw pt. HD stable, comfortable. 8/10 pain --> 4/10 after morphine. Ortho saw and believed lymphadenitis vs. septic joint. Hold abx at this time. Paged hospitalist  Cassandra Martini, PGY-1 EM Pt accepted by medicine, VILMA JO MD, paged TRAM Martini, PGY-1 EM

## 2017-08-28 NOTE — ED ADULT TRIAGE NOTE - CHIEF COMPLAINT QUOTE
pt sent in by PMD for r/o Left hip infection. pt was recently d/c'd from [facility] for treatment of Right hip pyogenic arthritis. c/o of pain and inability to walk.

## 2017-08-28 NOTE — CONSULT NOTE ADULT - SUBJECTIVE AND OBJECTIVE BOX
62F with one week of worsening L hip pain and pain with ambulation. Pt was admitted to [facility] with R hip pain in July and had a work up done to r/o a septic hip including a hip aspiration. Aspiration results were negative for infection, but patient had received antibiotics prior to the aspiration. She improved clinically and was sent home on 2 weeks of abx per ID recs. She says that her R hip pain improved and now does not bother her, but her left hip pain has been worsening. She denies injury. Denies recent illness. She is able to ambulate and walked today prior to coming to ER with a cane.    Of note, she reports that she was sent in the a hematologist/oncologist today. She was referred to him because of multiple painful lumps throughout her body believed to be inflamed lymph nodes.   Vital Signs Last 24 Hrs  T(C): 37.4 (28 Aug 2017 20:59), Max: 38.3 (28 Aug 2017 18:19)  T(F): 99.4 (28 Aug 2017 20:59), Max: 101 (28 Aug 2017 18:19)  HR: 91 (28 Aug 2017 20:59) (91 - 98)  BP: 127/72 (28 Aug 2017 20:59) (127/72 - 142/85)  BP(mean): --  RR: 17 (28 Aug 2017 20:59) (17 - 20)  SpO2: 100% (28 Aug 2017 20:59) (100% - 100%)    NAD, AOx3  LLE: skin intact. she is point tender over the left groin at a palpable lump. When palpating this, she reports that it causes the pain that she has been feeling. negative log roll. negative heel strike. minimal pain with full flexion to 90 and IR/ER of hip. She is able to ambulate with assistance.                           11.8   12.41 )-----------( 278      ( 28 Aug 2017 16:50 )             36.3   08-28    133<L>  |  98  |  15  ----------------------------<  251<H>  4.6   |  21<L>  |  0.81    Ca    9.4      28 Aug 2017 16:50    TPro  8.4<H>  /  Alb  4.1  /  TBili  0.4  /  DBili  x   /  AST  48<H>  /  ALT  76<H>  /  AlkPhos  121<H>  08-28    ESR 50/CRP 31    XR: negative for fx. no significant OA noted    A/P: 62F with L groin pain  -low suspicion for septic L hip given benign physical exam. However, given elevated inflammatory markers and fever with pain in L groin area, would get an MRI to rule out a large hip effusion.  -if hip effusion present, would consider IR aspiration to get a gram stain, culture and cell count  -would consider workup and examination for alternative source of her pain including lymphadenitis given the finding on exam of a point tender palpable lymph node and reports by the patient of multiple other tender areas throughout body.   -WBAT  -if pt remains stable and is not toxic, would recommend holding any antibiotics in case a joint aspiration is warranted. The yield of a culture prior to receiving antibiotics is much greater.   -discussed with Dr. Ron

## 2017-08-28 NOTE — ED PROCEDURE NOTE - PROCEDURE ADDITIONAL DETAILS
[id number], ultrasound, musculoskeletal, limited    Focused ED Ultrasound of  L hip eval for effusion.    Findings:  L hip joint scanned in saggital plane just lateral to femoral vessels.  Femoral head identified and capsule measured just distal to femoral head meeting the neck of femur.  L hip capsule measures 5mm.  (Upper limit of normal 7mm).  Comparison view of R hip also obtained, it measured 5mm also.    Impression::  No effusion seen (by def > 7mm), no assymetrically large effusion involving L hip.    Procedure note and images placed in chart

## 2017-08-28 NOTE — ED PROVIDER NOTE - MEDICAL DECISION MAKING DETAILS
62 year old female with pmh T2DM, htn, hld, recently tx'd for R septic hip presents with left hip pain. Possible septic joint, fracture, soft tissue injury, avascular necrosis, transient synovitis. Will xray and u/s hip to assess for joint effusion. ESR/CRP/CBC/CMP, Blood cultures

## 2017-08-28 NOTE — ED PROVIDER NOTE - CHIEF COMPLAINT
The patient is a 62y Female complaining of The patient is a 62y Female complaining of left hip pain.

## 2017-08-28 NOTE — ED PROVIDER NOTE - OBJECTIVE STATEMENT
62 year old female with pmh of T2DM, HTN, HLD recently treated for R Septic Hip presents with left hip pain. Patient recently finished course of doxycyline for R septic hip. Now presenting with 2-3 days of gradually worsening left hip pain that feels similar to her right hip. Patient is unable to walk and bear weight on the left hip. She feels feverish and chills but no temp at home. Also complaining of night sweats over the past few days. Denies trauma, cp, sob, abd pain, nausea 62 year old female with pmh of T2DM, HTN, HLD recently treated for R Septic Hip presents with left hip pain. Patient recently finished course of doxycyline for R septic hip. Now presenting with 2-3 days of gradually worsening left hip pain that feels similar to her right hip. Patient has pain in her left groin that radiates down her inner thigh. Patient is unable to walk and bear weight on the left hip. She feels feverish and chills but no temp at home. Also complaining of night sweats over the past few days. Denies trauma, cp, sob, abd pain, nausea, urinary symptoms. 62 year old female with pmh of T2DM, HTN, HLD recently treated for R Septic Hip presents with left hip pain. Patient recently finished course of doxycyline for R septic hip. Now presenting with 2-3 days of gradually worsening left hip pain that feels similar to her right hip. Patient has pain in her left groin that radiates down her inner thigh. 8/10 pain now, did not take analgesics at home. Patient is unable to walk and bear weight on the left side. She feels feverish and chills but no temp at home. Also complaining of night sweats over the past few days. Denies trauma, cp, sob, abd pain, nausea, urinary symptoms.

## 2017-08-28 NOTE — ED PROVIDER NOTE - ATTENDING CONTRIBUTION TO CARE
63 yo F Hx DM, HTN, HLD, Hypothyroidism recently admitted for R pyogenic hip arthritis. Pt competed abx course since dc' followed by ID Dr. Waters. Pt sent in by Dr. Bravo [phone] for eval of L hip pain, fevers, difficulty ambulating and abnormal labs - concern for similar process in L hip. On exam pt with to area of L ischial tuberosity, L posteromedial thigh. Distal NVI. No overlying skin changes, increased warmth or induration. Pain with internal rotation of L hip. Concern for infectious arthritis to L hip. Plan pain control, labs, imaging. Ortho cs. Admit. 63 yo F Hx DM, HTN, HLD, Hypothyroidism recently admitted for R pyogenic hip arthritis. Pt competed abx course since dc' followed by ID Dr. Waters. Pt sent in by Dr. Bravo [phone] for eval of L hip pain, fevers, difficulty ambulating and abnormal labs (leukocytosis, anemia) - concern for similar process in L hip. On exam pt with to area of L ischial tuberosity, L posteromedial thigh. Distal NVI. No overlying skin changes, increased warmth or induration. Pain with internal rotation of L hip. Concern for infectious arthritis to L hip. Plan pain control, labs, imaging. Ortho cs. Admit.

## 2017-08-28 NOTE — ED ADULT NURSE NOTE - CHIEF COMPLAINT
The patient is a 62y Female complaining of L hip pain x 3 days, worsened since last night, making her unable to walk today.  History of septic arthritis. The patient is a 62y Female complaining of L hip pain x 3 days, worsened since last night, making her unable to walk today.  History of septic arthritis, treated with doxycycline.

## 2017-08-28 NOTE — ED ADULT NURSE NOTE - CHIEF COMPLAINT QUOTE
pt sent in by PMD for r/o Left hip infection. pt was recently d/c'd from [facility] for treatment of Right hip pyogenic arthritis. c/o of pain and inability to walk.

## 2017-08-28 NOTE — ED ADULT NURSE NOTE - OBJECTIVE STATEMENT
Alert, awake, oriented x3.  Rectal  T 100.1F.  Seen by MD Martini. IV placed.  Labs sent.  Morphin given for the pain.  Admitted to Alert, awake, oriented x3.  Rectal  T 101.F.  Seen by MD Martini. IV placed.  Labs sent.  Morphin given for the pain.   Abx on hold as per MD Haro.  Admitted to medicine.

## 2017-08-28 NOTE — ED PROVIDER NOTE - NS ED ROS FT
Complains of left hip pain and reduced range of motion, chills and subjective fevers, night sweats  Denies cp, sob, trauma, fevers, rash/skin changes, urinary symptoms, vaginal discharge, masses

## 2017-08-29 DIAGNOSIS — K75.9 INFLAMMATORY LIVER DISEASE, UNSPECIFIED: ICD-10-CM

## 2017-08-29 DIAGNOSIS — I10 ESSENTIAL (PRIMARY) HYPERTENSION: ICD-10-CM

## 2017-08-29 DIAGNOSIS — R65.10 SYSTEMIC INFLAMMATORY RESPONSE SYNDROME (SIRS) OF NON-INFECTIOUS ORIGIN WITHOUT ACUTE ORGAN DYSFUNCTION: ICD-10-CM

## 2017-08-29 DIAGNOSIS — E03.9 HYPOTHYROIDISM, UNSPECIFIED: ICD-10-CM

## 2017-08-29 DIAGNOSIS — M12.9 ARTHROPATHY, UNSPECIFIED: ICD-10-CM

## 2017-08-29 DIAGNOSIS — E11.9 TYPE 2 DIABETES MELLITUS WITHOUT COMPLICATIONS: ICD-10-CM

## 2017-08-29 DIAGNOSIS — E78.5 HYPERLIPIDEMIA, UNSPECIFIED: ICD-10-CM

## 2017-08-29 DIAGNOSIS — M79.605 PAIN IN LEFT LEG: ICD-10-CM

## 2017-08-29 DIAGNOSIS — Z00.00 ENCOUNTER FOR GENERAL ADULT MEDICAL EXAMINATION WITHOUT ABNORMAL FINDINGS: ICD-10-CM

## 2017-08-29 LAB
BASOPHILS # BLD AUTO: 0.02 K/UL — SIGNIFICANT CHANGE UP (ref 0–0.2)
BASOPHILS NFR BLD AUTO: 0.2 % — SIGNIFICANT CHANGE UP (ref 0–2)
BUN SERPL-MCNC: 20 MG/DL — SIGNIFICANT CHANGE UP (ref 7–23)
CALCIUM SERPL-MCNC: 8.5 MG/DL — SIGNIFICANT CHANGE UP (ref 8.4–10.5)
CHLORIDE SERPL-SCNC: 98 MMOL/L — SIGNIFICANT CHANGE UP (ref 98–107)
CK MB BLD-MCNC: 1.27 NG/ML — SIGNIFICANT CHANGE UP (ref 1–4.7)
CK MB BLD-MCNC: SIGNIFICANT CHANGE UP (ref 0–2.5)
CK SERPL-CCNC: 40 U/L — SIGNIFICANT CHANGE UP (ref 25–170)
CO2 SERPL-SCNC: 23 MMOL/L — SIGNIFICANT CHANGE UP (ref 22–31)
CREAT SERPL-MCNC: 0.98 MG/DL — SIGNIFICANT CHANGE UP (ref 0.5–1.3)
EOSINOPHIL # BLD AUTO: 0.2 K/UL — SIGNIFICANT CHANGE UP (ref 0–0.5)
EOSINOPHIL NFR BLD AUTO: 2.3 % — SIGNIFICANT CHANGE UP (ref 0–6)
FERRITIN SERPL-MCNC: 247.1 NG/ML — HIGH (ref 15–150)
GLUCOSE SERPL-MCNC: 320 MG/DL — HIGH (ref 70–99)
HCT VFR BLD CALC: 32.9 % — LOW (ref 34.5–45)
HGB BLD-MCNC: 10.8 G/DL — LOW (ref 11.5–15.5)
HIV1 AG SER QL: SIGNIFICANT CHANGE UP
HIV1+2 AB SPEC QL: SIGNIFICANT CHANGE UP
IMM GRANULOCYTES # BLD AUTO: 0.03 # — SIGNIFICANT CHANGE UP
IMM GRANULOCYTES NFR BLD AUTO: 0.3 % — SIGNIFICANT CHANGE UP (ref 0–1.5)
IRON SATN MFR SERPL: 228 UG/DL — SIGNIFICANT CHANGE UP (ref 140–530)
IRON SATN MFR SERPL: 44 UG/DL — SIGNIFICANT CHANGE UP (ref 30–160)
LYMPHOCYTES # BLD AUTO: 2.91 K/UL — SIGNIFICANT CHANGE UP (ref 1–3.3)
LYMPHOCYTES # BLD AUTO: 33.7 % — SIGNIFICANT CHANGE UP (ref 13–44)
MCHC RBC-ENTMCNC: 26.1 PG — LOW (ref 27–34)
MCHC RBC-ENTMCNC: 32.8 % — SIGNIFICANT CHANGE UP (ref 32–36)
MCV RBC AUTO: 79.5 FL — LOW (ref 80–100)
MONOCYTES # BLD AUTO: 0.74 K/UL — SIGNIFICANT CHANGE UP (ref 0–0.9)
MONOCYTES NFR BLD AUTO: 8.6 % — SIGNIFICANT CHANGE UP (ref 2–14)
NEUTROPHILS # BLD AUTO: 4.74 K/UL — SIGNIFICANT CHANGE UP (ref 1.8–7.4)
NEUTROPHILS NFR BLD AUTO: 54.9 % — SIGNIFICANT CHANGE UP (ref 43–77)
NRBC # FLD: 0 — SIGNIFICANT CHANGE UP
PLATELET # BLD AUTO: 251 K/UL — SIGNIFICANT CHANGE UP (ref 150–400)
PMV BLD: 10.7 FL — SIGNIFICANT CHANGE UP (ref 7–13)
POTASSIUM SERPL-MCNC: 4.4 MMOL/L — SIGNIFICANT CHANGE UP (ref 3.5–5.3)
POTASSIUM SERPL-SCNC: 4.4 MMOL/L — SIGNIFICANT CHANGE UP (ref 3.5–5.3)
RBC # BLD: 4.14 M/UL — SIGNIFICANT CHANGE UP (ref 3.8–5.2)
RBC # FLD: 13.7 % — SIGNIFICANT CHANGE UP (ref 10.3–14.5)
SODIUM SERPL-SCNC: 135 MMOL/L — SIGNIFICANT CHANGE UP (ref 135–145)
SPECIMEN SOURCE: SIGNIFICANT CHANGE UP
SPECIMEN SOURCE: SIGNIFICANT CHANGE UP
UIBC SERPL-MCNC: 184 UG/DL — SIGNIFICANT CHANGE UP (ref 110–370)
WBC # BLD: 8.64 K/UL — SIGNIFICANT CHANGE UP (ref 3.8–10.5)
WBC # FLD AUTO: 8.64 K/UL — SIGNIFICANT CHANGE UP (ref 3.8–10.5)

## 2017-08-29 PROCEDURE — 72148 MRI LUMBAR SPINE W/O DYE: CPT | Mod: 26

## 2017-08-29 PROCEDURE — 99233 SBSQ HOSP IP/OBS HIGH 50: CPT | Mod: GC

## 2017-08-29 PROCEDURE — 73723 MRI JOINT LWR EXTR W/O&W/DYE: CPT | Mod: 26,LT

## 2017-08-29 PROCEDURE — 99254 IP/OBS CNSLTJ NEW/EST MOD 60: CPT | Mod: GC

## 2017-08-29 RX ORDER — ATORVASTATIN CALCIUM 80 MG/1
10 TABLET, FILM COATED ORAL AT BEDTIME
Qty: 0 | Refills: 0 | Status: DISCONTINUED | OUTPATIENT
Start: 2017-08-29 | End: 2017-08-31

## 2017-08-29 RX ORDER — ASPIRIN/CALCIUM CARB/MAGNESIUM 324 MG
81 TABLET ORAL DAILY
Qty: 0 | Refills: 0 | Status: DISCONTINUED | OUTPATIENT
Start: 2017-08-29 | End: 2017-08-30

## 2017-08-29 RX ORDER — INSULIN LISPRO 100/ML
10 VIAL (ML) SUBCUTANEOUS
Qty: 0 | Refills: 0 | Status: DISCONTINUED | OUTPATIENT
Start: 2017-08-29 | End: 2017-08-30

## 2017-08-29 RX ORDER — ENOXAPARIN SODIUM 100 MG/ML
40 INJECTION SUBCUTANEOUS EVERY 24 HOURS
Qty: 0 | Refills: 0 | Status: DISCONTINUED | OUTPATIENT
Start: 2017-08-29 | End: 2017-08-30

## 2017-08-29 RX ORDER — ACETAMINOPHEN 500 MG
650 TABLET ORAL ONCE
Qty: 0 | Refills: 0 | Status: COMPLETED | OUTPATIENT
Start: 2017-08-29 | End: 2017-08-29

## 2017-08-29 RX ORDER — LISINOPRIL 2.5 MG/1
10 TABLET ORAL DAILY
Qty: 0 | Refills: 0 | Status: DISCONTINUED | OUTPATIENT
Start: 2017-08-29 | End: 2017-08-31

## 2017-08-29 RX ORDER — LEVOTHYROXINE SODIUM 125 MCG
75 TABLET ORAL DAILY
Qty: 0 | Refills: 0 | Status: DISCONTINUED | OUTPATIENT
Start: 2017-08-29 | End: 2017-08-31

## 2017-08-29 RX ADMIN — Medication 1: at 18:29

## 2017-08-29 RX ADMIN — Medication 5: at 12:28

## 2017-08-29 RX ADMIN — INSULIN GLARGINE 30 UNIT(S): 100 INJECTION, SOLUTION SUBCUTANEOUS at 22:23

## 2017-08-29 RX ADMIN — Medication 75 MICROGRAM(S): at 06:24

## 2017-08-29 RX ADMIN — INSULIN GLARGINE 30 UNIT(S): 100 INJECTION, SOLUTION SUBCUTANEOUS at 00:50

## 2017-08-29 RX ADMIN — Medication 650 MILLIGRAM(S): at 23:13

## 2017-08-29 RX ADMIN — Medication 10 UNIT(S): at 18:29

## 2017-08-29 RX ADMIN — ATORVASTATIN CALCIUM 10 MILLIGRAM(S): 80 TABLET, FILM COATED ORAL at 22:30

## 2017-08-29 RX ADMIN — LISINOPRIL 10 MILLIGRAM(S): 2.5 TABLET ORAL at 06:25

## 2017-08-29 RX ADMIN — ENOXAPARIN SODIUM 40 MILLIGRAM(S): 100 INJECTION SUBCUTANEOUS at 06:24

## 2017-08-29 RX ADMIN — Medication 81 MILLIGRAM(S): at 11:33

## 2017-08-29 RX ADMIN — Medication 4: at 09:16

## 2017-08-29 RX ADMIN — Medication 10 UNIT(S): at 12:28

## 2017-08-29 NOTE — H&P ADULT - PROBLEM SELECTOR PLAN 3
- Pt febrile, tachycardic, leukocytotic  - Hemodynamically stable  - Recently completed doxycycline course treating for septic joint  - Will observe off Abx at this time, will f/u BCx

## 2017-08-29 NOTE — PROGRESS NOTE ADULT - PROBLEM SELECTOR PLAN 1
- Pt with severe L hip pain, and recent admission for right hip pain where joint aspiration was performed  - Also R ankle edema  - Treated for septic joint, however aspiration culture negative  - Possibly negative 2/2 preceding antibiotics, however multiple joint involvement unusual presentation for bacterial infection  - DDx also includes auto-immune, reactive arthritis 2/2 viral syndrome, toxic exposure, malignancy   - Will f/u MRI of L hip to r/o effusion, however will work up other causes of reactive arthropathy  - Given RUQ pain, transaminitis, will check hepatitis panel  - Will also check EBV, CMV, HIV  - In setting of known DMII, will check iron studies to assess for possibility of hematochromatosis  - Will f/u further ortho recs, ID c/s  - Will observe off antibiotics at this time as pt hemodynamically stable - Possibly negative 2/2 preceding antibiotics, however multiple joint involvement unusual presentation for bacterial infection  - Will f/u MRI of L hip to r/o effusion, however will work up other causes of reactive arthropathy  - f/u Hepatitis Panel as pt. had mild transaminitis  - f/u EBV, CMV, HIV studies  - Iron panel was WNL, TIBC and iron levels WNL, ferritin mildly elevated. Hemochromatosis less likely, but considered due to known DMII  - Patient currently afebrile, no leukocytosis, no signs of infection. Will monitor off abx. Had treatment for septic joint during previous admission in July, however cultures were negative. - Possibly negative 2/2 preceding antibiotics, however multiple joint involvement unusual presentation for bacterial infection  - Will f/u MRI of L hip to r/o effusion, however will work up other causes of reactive arthropathy  - f/u EBV, CMV, HIV studies  - Iron panel was WNL, TIBC and iron levels WNL, ferritin mildly elevated. Hemochromatosis less likely, but considered due to known DMII  - Patient currently afebrile, no leukocytosis, no signs of infection. Will monitor off abx. Had treatment for septic joint during previous admission in July, however cultures were negative.

## 2017-08-29 NOTE — PROGRESS NOTE ADULT - ASSESSMENT
Pt is a 62F  PMH DMII (on insulin at home A1C 9/.% 7/2017), HTN, HLD, hypothyroid p/w severe L hip and inner thigh pain extending to calf, with recent admission for similar right leg pain last month.

## 2017-08-29 NOTE — H&P ADULT - PROBLEM SELECTOR PLAN 1
- Pt with severe L hip pain, and recent admission for right hip pain where joint aspiration was performed  - Also R ankle edema  - Treated for septic joint, however aspiration culture negative  - Possibly negative 2/2 preceding antibiotics, however multiple joint involvement unusual presentation for bacterial infection  - DDx also includes auto-immune, reactive arthritis 2/2 viral syndrome, toxic exposure, malignancy   - Will f/u MRI of L hip to r/o effusion, however will work up other causes of reactive arthropathy  - Given RUQ pain, transaminitis, will check hepatitis panel  - Will also check EBV, CMV, HIV  - In setting of known DMII, will check iron studies to assess for possibility of hematochromatosis  - Will f/u further ortho recs, ID c/s  - Will observe off antibiotics at this time as pt hemodynamically stable

## 2017-08-29 NOTE — CONSULT NOTE ADULT - SUBJECTIVE AND OBJECTIVE BOX
MORIAH BRANDON  [id number]    HISTORY OF PRESENT ILLNESS:  Pt is a 62yoF hx of T2DM, HTN, HLD, p/w L hip pain x 2 weeks. Rheum consulted for evaluation.    Pt states that she has had L hip pain for 2 weeks now, acutely worsening over the last 2 days, limiting her ability to walk. Says that previously she had pain but was able to ambulate. Pt had similar episode last month when she was admitted from - for R hip pain found to have moderate R hip effusion w/ muscular edema s/p drainage and tx for septic arthritis ( R hip cultures negative). Pt has been following with ID after discharge.  Says that L hip pain is associated with fevers/chills. Denies rash/other joint pains. Has had LBP. Denies sick contacts/recent travel. Denies any recent insect bites besides some mosquito bites. No trauma  Pt was febrile in ED to 101 and had hip xray performed showing no acute pathology.  She has been evaluated by ID who has recommended monitoring off Abx at this time.     PAST MEDICAL & SURGICAL HISTORY:  Hypothyroid  High cholesterol  Diabetes  HTN (hypertension)  No significant past surgical history      Review of Systems:  Gen:  +fevers/chills  HEENT: No blurry vision  CVS: No chest pain/palpitations  Resp: No SOB/wheezing  GI: No N/V/C/D/abdominal pain  MSK: as per HPI +L hip pain, back pain  Skin: No new rashes  Neuro: No headaches    MEDICATIONS  (STANDING):  insulin glargine Injectable (LANTUS) 30 Unit(s) SubCutaneous at bedtime  insulin lispro (HumaLOG) corrective regimen sliding scale   SubCutaneous three times a day before meals  insulin lispro (HumaLOG) corrective regimen sliding scale   SubCutaneous at bedtime  dextrose 5%. 1000 milliLiter(s) (50 mL/Hr) IV Continuous <Continuous>  dextrose 50% Injectable 12.5 Gram(s) IV Push once  dextrose 50% Injectable 25 Gram(s) IV Push once  dextrose 50% Injectable 25 Gram(s) IV Push once  enoxaparin Injectable 40 milliGRAM(s) SubCutaneous every 24 hours  aspirin enteric coated 81 milliGRAM(s) Oral daily  lisinopril 10 milliGRAM(s) Oral daily  atorvastatin 10 milliGRAM(s) Oral at bedtime  levothyroxine 75 MICROGram(s) Oral daily  insulin lispro Injectable (HumaLOG) 10 Unit(s) SubCutaneous three times a day before meals    MEDICATIONS  (PRN):  dextrose Gel 1 Dose(s) Oral once PRN Blood Glucose LESS THAN 70 milliGRAM(s)/deciliter  glucagon  Injectable 1 milliGRAM(s) IntraMuscular once PRN Glucose LESS THAN 70 milligrams/deciliter      Allergies    No Known Allergies    Intolerances        PERTINENT MEDICATION HISTORY:    SOCIAL HISTORY: Pt originally from [facility], currently lives alone. No toxic habits. No recent travel or sick contacts  OCCUPATION: On Disability  TRAVEL HISTORY: No recent travel    FAMILY HISTORY:  Family history of coronary artery disease (Father)  Family history of diabetes mellitus (Father, Mother)      Vital Signs Last 24 Hrs  T(C): 36.8 (29 Aug 2017 12:22), Max: 38.3 (28 Aug 2017 18:19)  T(F): 98.3 (29 Aug 2017 12:22), Max: 101 (28 Aug 2017 18:19)  HR: 74 (29 Aug 2017 12:22) (73 - 98)  BP: 102/59 (29 Aug 2017 12:22) (102/59 - 142/85)  BP(mean): --  RR: 18 (29 Aug 2017 12:22) (16 - 20)  SpO2: 100% (29 Aug 2017 12:22) (98% - 100%)    Daily Height in cm: 152.4 (29 Aug 2017 02:41)    Daily     Physical Exam:  General: No apparent distress  HEENT: EOMI, MMM  CVS: +S1/S2, RRR, no murmurs/rubs/gallops  Resp: CTA b/l. No crackles/wheezing  GI: Soft, NT/ND +BS  MSK:  Shoulders: wnl  Elbows: wnl  Wrists: wnl  MCPs: wnl. Chronic pain on L hand first digit from previous injury   PIPs: wnl   DIPs: wnl   Hips: Pt with full ROM R hip. She has some pain on internal rotation of L hip on lateral aspect of L thigh. Also has some pain on palpation on medial aspect of L thigh but denies muscle tenderness  Knees: wnl   Ankle: wnl  Neuro: AAOx3  Skin: no visible rashes    LABS:                        10.8   8.64  )-----------( 251      ( 29 Aug 2017 05:45 )             32.9     08-29    135  |  98  |  20  ----------------------------<  320<H>  4.4   |  23  |  0.98    Ca    8.5      29 Aug 2017 05:45    TPro  8.4<H>  /  Alb  4.1  /  TBili  0.4  /  DBili  x   /  AST  48<H>  /  ALT  76<H>  /  AlkPhos  121<H>  08    Sedimentation Rate, Erythrocyte (17 @ 16:50)    Sedimentation Rate, Erythrocyte: 50 mm/hr    C-Reactive Protein, Serum (17 @ 16:50)    C-Reactive Protein, Serum: 32.6 mg/L          Urinalysis Basic - ( 28 Aug 2017 20:15 )    Color: YELLOW / Appearance: CLEAR / S.018 / pH: 5.5  Gluc: 500 / Ketone: NEGATIVE  / Bili: NEGATIVE / Urobili: NORMAL E.U.   Blood: NEGATIVE / Protein: 100 / Nitrite: NEGATIVE   Leuk Esterase: NEGATIVE / RBC: 0-2 / WBC 0-2   Sq Epi: OCC / Non Sq Epi: x / Bacteria: x        RADIOLOGY & ADDITIONAL STUDIES:  < from: MRI Pelvis Bony Only w/wo Cont (17 @ 17:56) >  IMPRESSION:   1.  Moderate right hip joint effusion of uncertain etiology. There is no   capsular thickening or early synovial enhancement to suggest infection on   current MRI.  2.  Mild muscular edema in the gluteus minimus, gluteus medius, and   adductor muscles about the right hip of uncertain etiology.  3.  Mild tendinosis of the right gluteus medius and gluteus minimus   tendons.  4.  No MR evidence of osteomyelitis.    < end of copied text >

## 2017-08-29 NOTE — H&P ADULT - HISTORY OF PRESENT ILLNESS
Pt is a 62F  PMH DMII (on insulin at home A1C 9/.% 7/2017), HTN, HLD p/w severe L hip pain. Pt was recently admitted from 7/4-7/11 for right hip pain, was found to have R joint effusion, which was tapped and was treated for a septic joint. Pt states that her symptoms initially improved but then over the past 2 weeks, has begun to have worsening L hip pain, similar to what she had previously experienced. Pain is so severe that pt is unable to ambulate, and has not resolved with NSAID/Tylenol Pt also has had fevers during this time. She has noticed some tender bumps in her left groin. Pt also reports intermittent R ankle swelling and tenderness. Denies other symptoms. Denies sick contacts, recent travel (last left [place] over 1.5 years ago to go to [place]). Pt has been following with ID s/p d/c as well as her gynecologist, who performed a pelvic U/S to r/o malignancy, which was reportedly normal. Pt states that in the past year, she had an infection of the skin on the right side of her face, which was treated with antibiotics without complication. 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 Pt reports stress 2/2 this event. Denies HI/SI. Pt continues to enjoy working in her garden, where she spends a significant amount of time.    ED Course  Vitals: Temp 101    HR  98         /85       RR 20    SPO2 100%  Pt received ceftriaxone 1mg IV, vancomycin 1g IV, had CXR, hip X-ray and ortho c/s placed

## 2017-08-29 NOTE — PROGRESS NOTE ADULT - ATTENDING COMMENTS
Patient seen and examined, chart/lab reviewed.   61 y/o female with h/o HTN, DM-2,  hypothyroid, p/w sharp LLE pain from hip to left thigh, difficulty ambulating due to pain. She had right hip pain last month, was treated for presumed septic joint but hip aspiration negative for growth.    Pt reports pains in her left leg, denies tick bite. She has intermittent fever.  PE: lungs clear, heart regular, abdomen soft; extrem: no leg edema, unable to life up left leg due to pain    Assessment/plan:  # LLE pain, h/o right hip joint effusion: r/o spinal stenosis vs. pelvic source of pain, pending MRI pelvis, check MRI lumbar spine, pain control, PT/PMR consult (pt can only go to acute rehab per her insurance), as per ID/ortho, low suspicion for septic joint, monitor off abx  F/u serologies. Rheum consult. Her inflammatory markers are elevated (ESR 50, previous 92, CRP 32.6).  # Transaminitis: r/o viral hepatitis (HBV neg), avoid nephrotoxins, check US  # Uncontrolled DM-2:  A1c 9.7% in July, c/w lantus 30 u hs, add humalog 10 u ac, monitor FS  # Anemia of chronic disease: ferritin 247, monitor CBC, no indication for transfusionA Patient seen and examined, chart/lab reviewed.   61 y/o female with h/o HTN, DM-2,  hypothyroid, p/w sharp LLE pain from hip to left thigh, difficulty ambulating due to pain. She had right hip pain last month, was treated for presumed septic joint but hip aspiration negative for growth.    Pt reports pains in her left leg, denies tick bite. She has intermittent fever.  PE: lungs clear, heart regular, abdomen soft; extrem: no leg edema, unable to life up left leg due to pain    Assessment/plan:  # LLE pain, h/o right hip joint effusion: r/o spinal stenosis vs. pelvic source of pain, pending MRI pelvis, check MRI lumbar spine, pain control, PT/PMR consult (pt can only go to acute rehab per her insurance), as per ID/ortho, low suspicion for septic joint, monitor off abx  F/u serologies. Rheum consult. Her inflammatory markers are elevated (ESR 50, previous 92, CRP 32.6).  # Transaminitis: r/o viral hepatitis (HBV neg), avoid nephrotoxins, recent CT showed normal liver/GB  # Uncontrolled DM-2:  A1c 9.7% in July, c/w lantus 30 u hs, add humalog 10 u ac, monitor FS  # Anemia of chronic disease: ferritin 247, monitor CBC, no indication for transfusion

## 2017-08-29 NOTE — H&P ADULT - NSHPREVIEWOFSYSTEMS_GEN_ALL_CORE
CONSTITUTIONAL: +fever, no weight loss, or fatigue  EYES: No eye pain, visual disturbances, or discharge  ENMT:  No difficulty hearing, tinnitus, vertigo; No sinus or throat pain  RESPIRATORY: No cough, wheezing, chills or hemoptysis; No shortness of breath  CARDIOVASCULAR: No chest pain, palpitations, dizziness, or leg swelling  GASTROINTESTINAL: No abdominal or epigastric pain. No nausea, vomiting, or hematemesis; No diarrhea or constipation. No melena or hematochezia.  GENITOURINARY: No dysuria, frequency, hematuria, or incontinence  NEUROLOGICAL: No headaches, loss of strength, numbness, or tremors  SKIN: No itching, burning, rashes, or lesions   LYMPH NODES: +inguinal lymphadenopathy  ENDOCRINE: No heat or cold intolerance; No polydipsia or polyuria  MUSCULOSKELETAL: +L hip pain. +R ankle swelling  PSYCHIATRIC: Denies depression, anxiety  HEME/LYMPH: No easy bruising, or bleeding gums  ALLERGY AND IMMUNOLOGIC: No hives or eczema

## 2017-08-29 NOTE — CONSULT NOTE ADULT - PROBLEM SELECTOR RECOMMENDATION 9
- patient admitted for L hip pain also involving L thigh, with no erythema appreciated  - multi joint involvement   - f/u MRI of left hip- if effusion present would consider aspirating joint for analysis of fluid  - would not recommend abx at this time as patient is no febrile, hemodynamically stable, and in case L hip aspiration is indicated

## 2017-08-29 NOTE — CONSULT NOTE ADULT - SUBJECTIVE AND OBJECTIVE BOX
HPI:  Pt is a 62F  PMH DMII (on insulin at home A1C 9/.% 2017), HTN, HLD p/w severe L hip pain. Pt was recently admitted from - for right hip pain, was found to have R joint effusion, which was tapped and was treated for a septic joint. Pt states that her symptoms initially improved but then over the past 2 weeks, has begun to have worsening L hip pain, similar to what she had previously experienced. Pain is so severe that pt is unable to ambulate, and has not resolved with NSAID/Tylenol Pt also has had fevers during this time. She has noticed some tender bumps in her left groin. Pt also reports intermittent R ankle swelling and tenderness. Denies other symptoms. Denies sick contacts, recent travel (last left [place] over 1.5 years ago to go to [place]). Pt has been following with ID s/p d/c as well as her gynecologist, who performed a pelvic U/S to r/o malignancy, which was reportedly normal. Pt states that in the past year, she had an infection of the skin on the right side of her face, which was treated with antibiotics without complication. 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 Pt reports stress 2/2 this event. Denies HI/SI. Pt continues to enjoy working in her garden, where she spends a significant amount of time.    ED Course  Vitals: Temp 101    HR  98         /85       RR 20    SPO2 100%  Pt received ceftriaxone 1mg IV, vancomycin 1g IV, had CXR, hip X-ray and ortho c/s placed (29 Aug 2017 02:13)      PAST MEDICAL & SURGICAL HISTORY:  Hypothyroid  High cholesterol  Diabetes  HTN (hypertension)  No significant past surgical history      Allergies  No Known Allergies        ANTIMICROBIALS:      OTHER MEDS: MEDICATIONS  (STANDING):  insulin glargine Injectable (LANTUS) 30 at bedtime  insulin lispro (HumaLOG) corrective regimen sliding scale  three times a day before meals  insulin lispro (HumaLOG) corrective regimen sliding scale  at bedtime  dextrose Gel 1 once PRN  dextrose 50% Injectable 12.5 once  dextrose 50% Injectable 25 once  dextrose 50% Injectable 25 once  glucagon  Injectable 1 once PRN  enoxaparin Injectable 40 every 24 hours  aspirin enteric coated 81 daily  lisinopril 10 daily  atorvastatin 10 at bedtime  levothyroxine 75 daily  insulin lispro Injectable (HumaLOG) 10 three times a day before meals      SOCIAL HISTORY:  [ ] etoh [ ] tobacco [ ] former smoker [ ] IVDU  Patient [place] no travel outside [place] since immigrating from [facility] in late .     FAMILY HISTORY:  Family history of coronary artery disease (Father)  Family history of diabetes mellitus (Father, Mother)      REVIEW OF SYSTEMS  [  ] ROS unobtainable because:    [x  ] All other systems negative except as noted below:	    Constitutional:  [x ] fever [ ] weight loss  Skin:  [ ] rash [ ] phlebitis	  Eyes: [ ] icterus [ ] inflammation	  ENMT: [ ] discharge [ ] thrush [ ] ulcers [ ] exudates  Respiratory: [ ] dyspnea [ ] hemoptysis [ ] cough [ ] sputum	  Cardiovascular:  [ ] chest pain [ ] palpitations [ ] edema	  Gastrointestinal:  [ ] nausea [ ] vomiting [ ] diarrhea [ ] constipation [x ] pain	  Genitourinary:  [ ] dysuria [ ] frequency [ ] hematuria [ ] discharge [ ] flank pain  Musculoskeletal:  [ ] myalgias [x ] arthralgias [ ] arthritis	  Neurological:  [ ] headache [ ] seizures	  Psychiatric:  [ ] anxiety [ ] depression	  Hematology/Lymphatics:  [x ] lymphadenopathy  Endocrine:  [ ] adrenal [ x] thyroid  Allergic/Immunologic:	 [ ] transplant [ ] seasonal    Vital Signs Last 24 Hrs  T(F): 98.4 (17 @ 05:34), Max: 101 (17 @ 18:19)    Vital Signs Last 24 Hrs  HR: 77 (17 @ 05:34) (73 - 98)  BP: 127/67 (17 @ 05:34) (103/55 - 142/85)  RR: 18 (17 @ 05:34)  SpO2: 99% (17 @ 05:34) (98% - 100%)  Wt(kg): --    PHYSICAL EXAM:  General: non-toxic  HEAD/EYES: anicteric, PERRL  ENT:  supple  Cardiovascular:   S1, S2  Respiratory:  clear bilaterally  GI:  soft, mild tenderness in LQ bilaterally  :  no CVA tenderness   Musculoskeletal:  range of motion intact for L hip, but patient is in noticeable pain when moving hip, no erythema appreciated  Neurologic:  grossly non-focal  Skin:  no rash  Lymph: no lymphadenopathy  Psychiatric:  appropriate affect                                  10.8   8.64  )-----------( 251      ( 29 Aug 2017 05:45 )             32.9           135  |  98  |  20  ----------------------------<  320<H>  4.4   |  23  |  0.98    Ca    8.5      29 Aug 2017 05:45    TPro  8.4<H>  /  Alb  4.1  /  TBili  0.4  /  DBili  x   /  AST  48<H>  /  ALT  76<H>  /  AlkPhos  121<H>        Urinalysis Basic - ( 28 Aug 2017 20:15 )    Color: YELLOW / Appearance: CLEAR / S.018 / pH: 5.5  Gluc: 500 / Ketone: NEGATIVE  / Bili: NEGATIVE / Urobili: NORMAL E.U.   Blood: NEGATIVE / Protein: 100 / Nitrite: NEGATIVE   Leuk Esterase: NEGATIVE / RBC: 0-2 / WBC 0-2   Sq Epi: OCC / Non Sq Epi: x / Bacteria: x        MICROBIOLOGY: HPI:  Pt is a 62F  PMH DMII (on insulin at home A1C 9/.% 2017), HTN, HLD p/w severe L hip pain. Pt was recently admitted from - for right hip pain, was found to have R joint effusion, which was tapped and was treated for a septic joint. Pt states that her symptoms initially improved but then over the past 2 weeks, has begun to have worsening L hip pain, similar to what she had previously experienced. Pain is so severe that pt is unable to ambulate, and has not resolved with NSAID/Tylenol Pt also has had fevers during this time. She has noticed some tender bumps in her left groin. Pt also reports intermittent R ankle swelling and tenderness. Denies other symptoms. Denies sick contacts, recent travel (last left [place] over 1.5 years ago to go to [place]). Pt has been following with ID s/p d/c as well as her gynecologist, who performed a pelvic U/S to r/o malignancy, which was reportedly normal. Pt states that in the past year, she had an infection of the skin on the right side of her face, which was treated with antibiotics without complication. 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 Pt reports stress 2/2 this event. Denies HI/SI. Pt continues to enjoy working in her garden, where she spends a significant amount of time.    ED Course  Vitals: Temp 101    HR  98         /85       RR 20    SPO2 100%  Pt received ceftriaxone 1mg IV, vancomycin 1g IV, had CXR, hip X-ray and ortho c/s placed (29 Aug 2017 02:13)      PAST MEDICAL & SURGICAL HISTORY:  Hypothyroid  High cholesterol  Diabetes  HTN (hypertension)  No significant past surgical history      Allergies  No Known Allergies        ANTIMICROBIALS:      OTHER MEDS: MEDICATIONS  (STANDING):  insulin glargine Injectable (LANTUS) 30 at bedtime  insulin lispro (HumaLOG) corrective regimen sliding scale  three times a day before meals  insulin lispro (HumaLOG) corrective regimen sliding scale  at bedtime  dextrose Gel 1 once PRN  dextrose 50% Injectable 12.5 once  dextrose 50% Injectable 25 once  dextrose 50% Injectable 25 once  glucagon  Injectable 1 once PRN  enoxaparin Injectable 40 every 24 hours  aspirin enteric coated 81 daily  lisinopril 10 daily  atorvastatin 10 at bedtime  levothyroxine 75 daily  insulin lispro Injectable (HumaLOG) 10 three times a day before meals      SOCIAL HISTORY:  [ ] etoh [ ] tobacco [ ] former smoker [ ] IVDU  Patient [place] no travel outside [place] since immigrating from [facility] in late .     FAMILY HISTORY:  Family history of coronary artery disease (Father)  Family history of diabetes mellitus (Father, Mother)      REVIEW OF SYSTEMS  [  ] ROS unobtainable because:    [x  ] All other systems negative except as noted below:	    Constitutional:  [x ] fever [ ] weight loss  Skin:  [ ] rash [ ] phlebitis	  Eyes: [ ] icterus [ ] inflammation	  ENMT: [ ] discharge [ ] thrush [ ] ulcers [ ] exudates  Respiratory: [ ] dyspnea [ ] hemoptysis [ ] cough [ ] sputum	  Cardiovascular:  [ ] chest pain [ ] palpitations [ ] edema	  Gastrointestinal:  [ ] nausea [ ] vomiting [ ] diarrhea [ ] constipation [x ] pain	  Genitourinary:  [ ] dysuria [ ] frequency [ ] hematuria [ ] discharge [ ] flank pain  Musculoskeletal:  [ ] myalgias [x ] arthralgias [ ] arthritis	  Neurological:  [ ] headache [ ] seizures	  Psychiatric:  [ ] anxiety [ ] depression	  Hematology/Lymphatics:  [x ] lymphadenopathy  Endocrine:  [ ] adrenal [ x] thyroid  Allergic/Immunologic:	 [ ] transplant [ ] seasonal    Vital Signs Last 24 Hrs  T(F): 98.4 (17 @ 05:34), Max: 101 (17 @ 18:19)    Vital Signs Last 24 Hrs  HR: 77 (17 @ 05:34) (73 - 98)  BP: 127/67 (17 @ 05:34) (103/55 - 142/85)  RR: 18 (17 @ 05:34)  SpO2: 99% (17 @ 05:34) (98% - 100%)  Wt(kg): --    PHYSICAL EXAM:  General: non-toxic  HEAD/EYES: anicteric, PERRL  ENT:  supple  Cardiovascular:   S1, S2  Respiratory:  clear bilaterally  GI:  soft, mild tenderness in LQ bilaterally  :  no CVA tenderness   Musculoskeletal:  range of motion intact for L hip, but patient is in noticeable pain when moving hip, no erythema appreciated  Neurologic:  grossly non-focal  Skin:  no rash  Lymph:  no lymphadenopathy   Psychiatric:  appropriate affect                                  10.8   8.64  )-----------( 251      ( 29 Aug 2017 05:45 )             32.9           135  |  98  |  20  ----------------------------<  320<H>  4.4   |  23  |  0.98    Ca    8.5      29 Aug 2017 05:45    TPro  8.4<H>  /  Alb  4.1  /  TBili  0.4  /  DBili  x   /  AST  48<H>  /  ALT  76<H>  /  AlkPhos  121<H>        Urinalysis Basic - ( 28 Aug 2017 20:15 )    Color: YELLOW / Appearance: CLEAR / S.018 / pH: 5.5  Gluc: 500 / Ketone: NEGATIVE  / Bili: NEGATIVE / Urobili: NORMAL E.U.   Blood: NEGATIVE / Protein: 100 / Nitrite: NEGATIVE   Leuk Esterase: NEGATIVE / RBC: 0-2 / WBC 0-2   Sq Epi: OCC / Non Sq Epi: x / Bacteria: x HPI:  62F  PMH DMII (on insulin at home A1C 9/.% 2017), HTN, HLD p/w severe L hip pain. Pt was recently admitted from - for right hip pain, was found to have R joint effusion, which was tapped and was treated for a culture negative septic joint (vancomycin was given 2 days prior to cultures).  She was discharged on 17 with a 2 week course of doxy/keflex (completed ).  Pt states that her symptoms initially improved but then over the past 2 weeks, has begun to have worsening L hip pain, similar to what she had previously experienced. Pain is so severe that pt is unable to ambulate, and has not resolved with NSAID/Tylenol.  Pt also has had fevers during this time (none documented this admission). She has noticed some tender bumps in her left groin. Pt also reports intermittent R ankle swelling and tenderness. Denies other symptoms. Denies sick contacts, recent travel (last left [place] over 1[place]5 years ago to go to [place]).  Saw Dr. Cartagena during the [facility] hospitalization and in follow up in the office.  F/u with gynecologist - pelvic sono was reportedly normal.  She also notes that 2017, she was treated for what sounds like R parotitis.  S/p Augmentin/22/17 x10 days. [also, augmentin 4/10 x10 days and  [id number]].  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 Pt reports stress 2/2 this event. Denies HI/SI. Pt continues to enjoy working in her garden, where she spends a significant amount of time.    ED Course  Vitals: Temp 101    HR  98         /85       RR 20    SPO2 100%  Pt received ceftriaxone 1mg IV, vancomycin 1g IV, had CXR, hip X-ray and ortho c/s placed (29 Aug 2017 02:13)    PAST MEDICAL & SURGICAL HISTORY:  Hypothyroid  High cholesterol  Diabetes  HTN (hypertension)  No significant past surgical history    Allergies  No Known Allergies    ANTIMICROBIALS:    vanc/ceftriaxone x1    OTHER MEDS: MEDICATIONS  (STANDING):  insulin glargine Injectable (LANTUS) 30 at bedtime  insulin lispro (HumaLOG) corrective regimen sliding scale  three times a day before meals  insulin lispro (HumaLOG) corrective regimen sliding scale  at bedtime  enoxaparin Injectable 40 every 24 hours  aspirin enteric coated 81 daily  lisinopril 10 daily  atorvastatin 10 at bedtime  levothyroxine 75 daily  insulin lispro Injectable (HumaLOG) 10 three times a day before meals    SOCIAL HISTORY:  no IVDU; born in [facility] (to  )    FAMILY HISTORY:  Family history of coronary artery disease (Father)  Family history of diabetes mellitus (Father, Mother)    REVIEW OF SYSTEMS  [  ] ROS unobtainable because:    [x  ] All other systems negative except as noted below:	    Constitutional:  [x ] fever [ ] weight loss  Skin:  [ ] rash [ ] phlebitis	  Eyes: [ ] icterus [ ] inflammation	  ENMT: [ ] discharge [ ] thrush [ ] ulcers [ ] exudates  Respiratory: [ ] dyspnea [ ] hemoptysis [ ] cough [ ] sputum	  Cardiovascular:  [ ] chest pain [ ] palpitations [ ] edema	  Gastrointestinal:  [ ] nausea [ ] vomiting [ ] diarrhea [ ] constipation [x ] pain	  Genitourinary:  [ ] dysuria [ ] frequency [ ] hematuria [ ] discharge [ ] flank pain  Musculoskeletal:  [ ] myalgias [x ] arthralgias [ ] arthritis	  Neurological:  [ ] headache [ ] seizures	  Psychiatric:  [ ] anxiety [ ] depression	  Hematology/Lymphatics:  [x ] lymphadenopathy  Endocrine:  [ ] adrenal [ x] thyroid  Allergic/Immunologic:	 [ ] transplant [ ] seasonal    Vital Signs Last 24 Hrs  T(F): 98.4 (17 @ 05:34), Max: 101 (17 @ 18:19)  HR: 77 (17 @ 05:34) (73 - 98)  BP: 127/67 (17 @ 05:34) (103/55 - 142/85)  RR: 18 (17 @ 05:34)  SpO2: 99% (17 @ 05:34) (98% - 100%)  Wt(kg): --    PHYSICAL EXAM:  General: non-toxic  HEAD/EYES: anicteric, PERRL  ENT:  supple  Cardiovascular:   S1, S2  Respiratory:  clear bilaterally  GI:  soft, mild tenderness in LQ bilaterally  :  no CVA tenderness   Musculoskeletal:  range of motion intact for L hip, but patient is in noticeable pain when moving hip, no erythema appreciated; all other joints with normal ROM  Neurologic:  grossly non-focal  Skin:  no rash  Lymph:  left inguinal palpableno lymphadenopathy   Psychiatric:  appropriate affect                        10.8   8.64  )-----------( 251      ( 29 Aug 2017 05:45 )             32.9     135  |  98  |  20  ----------------------------<  320<H>  4.4   |  23  |  0.98    Ca    8.5      29 Aug 2017 05:45    TPro  8.4<H>  /  Alb  4.1  /  TBili  0.4  /  DBili  x   /  AST  48<H>  /  ALT  76<H>  /  AlkPhos  121<H>      Urinalysis Basic - ( 28 Aug 2017 20:15 )  Color: YELLOW / Appearance: CLEAR / S.018 / pH: 5.5  Gluc: 500 / Ketone: NEGATIVE  / Bili: NEGATIVE / Urobili: NORMAL E.U.   Blood: NEGATIVE / Protein: 100 / Nitrite: NEGATIVE   Leuk Esterase: NEGATIVE / RBC: 0-2 / WBC 0-2   Sq Epi: OCC / Non Sq Epi: x / Bacteria: x    MICROBIOLOGY:  Culture - Body Fluid with Gram Stain (17 @ 00:09)    Gram Stain:   Numerous polymorphonuclear leukocytes seen per low power field  No organisms seen    Specimen Source: .Body Fluid Synovial Fluid    Culture Results:   No growth at 5 days    Culture Results:   No growth at 5 days. (17 @ 09:15)    Culture Results:   No growth at 5 days. (17 @ 09:15)

## 2017-08-29 NOTE — H&P ADULT - NSHPSOCIALHISTORY_GEN_ALL_CORE
Lives alone  Works as nursing assistant  Denies tobacco use, EtOH use, recreational drug use  Has not been sexually active for over 7 years

## 2017-08-29 NOTE — CONSULT NOTE ADULT - ASSESSMENT
62yoF hx of T2DM, HTN, HLD, recent admission for R hip pain s/p arthrocentesis and tx for septic arthritis p/w L hip pain x 2 weeks.    1) Monoarticular joint pain- Pt with mild leukocytosis and fever on admission to ED. Currently afebrile with no leukocytosis. She has decent L hip ROM. Low suspicion for septic arthritis at this time.   Pt has no other symptoms for systemic inflammatory process including RA/SLE  - f/u MRI Hips. She has had previous MRI in July showing hip effusion and edema in the gluteus minimus/medius and adductors which should be further followed up.  - will add CK level to evaluate for possible myositis  - f/u RF/CCP/TROY    2) Sepsis- initially with fevers and leukocytosis on admission, now resolved  - f/u blood cultures  - agree with ID in holding Abx at this time    *final recs pending d/w attending

## 2017-08-29 NOTE — CONSULT NOTE ADULT - PROBLEM SELECTOR RECOMMENDATION 2
- patient has reported history stiffness+ pain in multiple joints(bilateral hips, bilateral hands, and R ankle), with recent admission in 7/17 for similar symptoms in R hip  - On previous admission patient was treated for septic joint, despite negative cultures,   TROY ,C3,C4 were WNL  - patient has elevated ESR and CRP suggesting ongoing inflammation.   HIV ab(-), will follow up Hepatitis panel, CMW, EBV+,TROY,RF, and BC

## 2017-08-29 NOTE — H&P ADULT - NSHPPHYSICALEXAM_GEN_ALL_CORE
Vital Signs Last 24 Hrs  T(C): 36.7 (29 Aug 2017 01:45), Max: 38.3 (28 Aug 2017 18:19)  T(F): 98.1 (29 Aug 2017 01:45), Max: 101 (28 Aug 2017 18:19)  HR: 75 (29 Aug 2017 01:45) (75 - 98)  BP: 103/55 (29 Aug 2017 01:45) (103/55 - 142/85)  BP(mean): --  RR: 16 (29 Aug 2017 01:45) (16 - 20)  SpO2: 99% (29 Aug 2017 01:45) (99% - 100%)    PHYSICAL EXAM:  GENERAL: NAD, well-groomed, well-developed  HEAD:  Atraumatic, Normocephalic  EYES: EOMI, PERRLA, conjunctiva and sclera clear  ENMT: No tonsillar erythema, exudates, or enlargement; Moist mucous membranes, Good dentition. Superior dentures  NECK: Supple, No JVD  NERVOUS SYSTEM: AOX3, motor and sensation grossly intact in b/l UE and b/l LE  PSYCHIATRIC: Appropriate affect and mood  CHEST/LUNG: Clear to auscultation bilaterally; No rales, rhonchi, wheezing, or rubs  HEART: Regular rate and rhythm; No murmurs, rubs, or gallops. No LE edema  ABDOMEN: RUQ tenderness LLQ point tenderness, without palpable mass. BS+ no guarding, no rebound  MSK: Decreased ROM in R and L hip, without palpable abnormality. R ankle edema greater than L  EXTREMITIES:  2+ Peripheral Pulses, No clubbing, cyanosis  SKIN: No rashes or lesions

## 2017-08-29 NOTE — H&P ADULT - NSHPLABSRESULTS_GEN_ALL_CORE
LABS:                        11.8   12.41 )-----------( 278      ( 28 Aug 2017 16:50 )             36.3     28 Aug 2017 16:50    133    |  98     |  15     ----------------------------<  251    4.6     |  21     |  0.81     Ca    9.4        28 Aug 2017 16:50    TPro  8.4    /  Alb  4.1    /  TBili  0.4    /  DBili  x      /  AST  48     /  ALT  76     /  AlkPhos  121    28 Aug 2017 16:50      CAPILLARY BLOOD GLUCOSE  288 (28 Aug 2017 23:30)        BLOOD CULTURE    RADIOLOGY & ADDITIONAL TESTS:    Imaging Personally Reviewed:  [X] YES   hip X-ray without evidence of effusion  CXR without acute findings, LLL haziness likely 2/2 overlying heart border

## 2017-08-29 NOTE — PROGRESS NOTE ADULT - PROBLEM SELECTOR PLAN 4
- c/ENEDELIA [place] - Pt febrile, tachycardic, leukocytotic  - Hemodynamically stable  - Recently completed doxycycline course treating for septic joint  - Will observe off Abx at this time, will f/u BCx - Resolved. Had fevers and leukocytosis on admission.   - Hemodynamically stable  - f/u blood and urine Cx. UA negative.   - monitor off Abx

## 2017-08-29 NOTE — PROGRESS NOTE ADULT - PROBLEM SELECTOR PROBLEM 5
HTN (hypertension) Type 2 diabetes mellitus without complication, with long-term current use of insulin

## 2017-08-29 NOTE — PROGRESS NOTE ADULT - SUBJECTIVE AND OBJECTIVE BOX
Patient is a 62y old  Female who presents with a chief complaint of L hip pain (29 Aug 2017 02:13)     MEDICINE TEAM 3 ACCEPT NOTE    INTERVAL HPI/OVERNIGHT EVENTS: Pt. complaining of left medial leg pain extending through the knee into the mid-calf. No right leg pain, although she has persistent right ankle stiffness and tenderness since July, during her last admission. No subjective fevers this morning, but she presented with fevers. No nausea or vomiting. No known rashes or mouth sores. Only bug bites she recalls is mosquito bites.     MEDICATIONS  (STANDING):  insulin glargine Injectable (LANTUS) 30 Unit(s) SubCutaneous at bedtime  insulin lispro (HumaLOG) corrective regimen sliding scale   SubCutaneous three times a day before meals  insulin lispro (HumaLOG) corrective regimen sliding scale   SubCutaneous at bedtime  dextrose 5%. 1000 milliLiter(s) (50 mL/Hr) IV Continuous <Continuous>  dextrose 50% Injectable 12.5 Gram(s) IV Push once  dextrose 50% Injectable 25 Gram(s) IV Push once  dextrose 50% Injectable 25 Gram(s) IV Push once  enoxaparin Injectable 40 milliGRAM(s) SubCutaneous every 24 hours  aspirin enteric coated 81 milliGRAM(s) Oral daily  lisinopril 10 milliGRAM(s) Oral daily  atorvastatin 10 milliGRAM(s) Oral at bedtime  levothyroxine 75 MICROGram(s) Oral daily    MEDICATIONS  (PRN):  dextrose Gel 1 Dose(s) Oral once PRN Blood Glucose LESS THAN 70 milliGRAM(s)/deciliter  glucagon  Injectable 1 milliGRAM(s) IntraMuscular once PRN Glucose LESS THAN 70 milligrams/deciliter      Allergies    No Known Allergies    Intolerances        REVIEW OF SYSTEMS:  CONSTITUTIONAL: No fever or fatigue   EYES: No eye pain or visual disturbances  RESPIRATORY: No cough.  No shortness of breath  CARDIOVASCULAR: No chest pain, palpitations, dizziness, or leg swelling  GASTROINTESTINAL: No abdominal or epigastric pain. No nausea, vomiting, or hematemesis; No diarrhea or constipation. No melena or hematochezia.  NEUROLOGICAL: No numbness. Weakness in left leg.   SKIN: No itching, known rashes, lesions or mouth sores   ENDOCRINE: + hypothyroid  MUSCULOSKELETAL: Joint stiffness in bilateral wrists and fingers, and in right ankle. Joint pain in right ankle and left knee. Pain in left inner leg and calf.   PSYCHIATRIC: Denies depression, anxiety  HEME/LYMPH: No easy bruising, or bleeding gums  ALLERGY AND IMMUNOLOGIC: No hives or eczema    Vital Signs Last 24 Hrs  T(C): 36.9 (29 Aug 2017 05:34), Max: 38.3 (28 Aug 2017 18:19)  T(F): 98.4 (29 Aug 2017 05:34), Max: 101 (28 Aug 2017 18:19)  HR: 77 (29 Aug 2017 05:34) (73 - 98)  BP: 127/67 (29 Aug 2017 05:34) (103/55 - 142/85)  BP(mean): --  RR: 18 (29 Aug 2017 05:34) (16 - 20)  SpO2: 99% (29 Aug 2017 05:34) (98% - 100%)    PHYSICAL EXAM:  GENERAL: NAD, well-groomed, well-developed  HEAD:  Atraumatic, Normocephalic  EYES: EOMI, PERRLA, conjunctiva and sclera clear  ENMT: No tonsillar erythema, exudates, or enlargement; Moist mucous membranes,  NERVOUS SYSTEM: AOX3, motor 3/5 in left leg, 5/5 in right  PSYCHIATRIC: Appropriate affect and mood  CHEST/LUNG: Clear to auscultation bilaterally; No rales, rhonchi, wheezing, or rubs  HEART: Regular rate and rhythm; No murmurs, rubs, or gallops. No LE edema  ABDOMEN: Soft, Nontender, Nondistended; Bowel sounds present  EXTREMITIES:  2+ Peripheral Pulses, intact ROM of left leg, but patient wincing in pain  SKIN: No rashes or lesions    LABS:                        10.8   8.64  )-----------( 251      ( 29 Aug 2017 05:45 )             32.9     29 Aug 2017 05:45    135    |  98     |  20     ----------------------------<  320    4.4     |  23     |  0.98     Ca    8.5        29 Aug 2017 05:45    TPro  8.4    /  Alb  4.1    /  TBili  0.4    /  DBili  x      /  AST  48     /  ALT  76     /  AlkPhos  121    28 Aug 2017 16:50      CAPILLARY BLOOD GLUCOSE  288 (28 Aug 2017 23:30)        BLOOD CULTURE    RADIOLOGY & ADDITIONAL TESTS:    Imaging Personally Reviewed:  [ ] YES     Consultant(s) Notes Reviewed:      Care Discussed with Consultants/Other Providers:

## 2017-08-29 NOTE — PROGRESS NOTE ADULT - PROBLEM SELECTOR PLAN 3
- Pt febrile, tachycardic, leukocytotic  - Hemodynamically stable  - Recently completed doxycycline course treating for septic joint  - Will observe off Abx at this time, will f/u BCx - Pt has had persistent elevated hepatic enzymes, present in admission in 7/2017  - CTAP at that time unremarkable, however was w/o IV contrast, only revealing bibasilar bronchiectasis   - Tenderness to RUQ on palpation  - Will w/u for viral causes as discussed above f/u hepatitis panel as patient has mild transaminitis this admission and last admission in 7/2017

## 2017-08-29 NOTE — PROGRESS NOTE ADULT - PROBLEM SELECTOR PLAN 5
- c/w ace-i - c/ENEDELIA [place] - c/w Berto, HISS.  - FS in 300's this morning, will need to increase Lantus dose from 30 units.

## 2017-08-29 NOTE — PROGRESS NOTE ADULT - PROBLEM SELECTOR PROBLEM 4
Type 2 diabetes mellitus without complication, with long-term current use of insulin SIRS (systemic inflammatory response syndrome)

## 2017-08-29 NOTE — PROGRESS NOTE ADULT - PROBLEM SELECTOR PLAN 2
- Pt has had persistent elevated hepatic enzymes, present in admission in 7/2017  - CTAP at that time unremarkable, however was w/o IV contrast, only revealing bibasilar bronchiectasis   - Tenderness to RUQ on palpation  - Will w/u for viral causes as discussed above Ongoing for past couple of months, persistent Rt. ankle pain.  Due to joint stiffness (in wrists, fingers, and rt. ankle), and elevated ESR, CRP, suggestive of ongoing inflammation, will follow up rheumatologic markers (TROY)

## 2017-08-29 NOTE — CONSULT NOTE ADULT - ASSESSMENT
Pt is a 62F  PMH DMII (on insulin at home A1C 9/.% 7/2017), HTN, HLD, hypothyroid p/w severe L hip and inner thigh pain extending to calf, which has been present over past few weeks, but significantly worsened over past few days, with recent admission for similar R hip pain in 7/17. Assessment  Pt is a 62F  PMH DMII (on insulin at home A1C 9/.% 7/2017), HTN, HLD, hypothyroid p/w severe L hip and inner thigh pain extending to calf, which has been present over past few weeks, but significantly worsened over past few days, with recent admission for similar R hip pain in 7/17. Infectious cause, particularly septic joint, is less likely given polyarticular involvement.      Plan   - f/u MRI of left hip- if effusion present would consider aspirating joint for analysis of fluid  - would not recommend abx at this time as patient is not febrile, and in case L hip aspiration is indicated  - patient has elevated ESR and CRP suggesting ongoing inflammation.   - would consider rheumatology consult    - HIV ab(-), will follow up Hepatitis panel, CMW, EBV+, TROY, RF, and BC Pt is a 62F PMH DMII (on insulin at home A1C 9/.% 7/2017), HTN, HLD, hypothyroid p/w severe L hip and inner thigh pain extending to calf, which has been present over past few weeks, but significantly worsened over past few days, with recent admission for similar R hip pain in 7/17. Infectious cause, particularly septic joint, is less likely given polyarticular involvement.  Also, DDx: serum sickness from antibiotics? reactive arthritis? but no recent GI illness. r/o primary rheumatologic etiology?    Plan   - f/u MRI of left hip- if effusion present would consider aspirating joint for analysis of fluid  - would not recommend abx at this time as patient is not febrile, and in case L hip aspiration is indicated  - patient has elevated ESR and CRP suggesting ongoing inflammation.   - would consider rheumatology consult    - f/u hepatitis panel, CMV PCR, EBV+, TROY, RF, and BC Pt is a 62F PMH DMII (on insulin at home A1C 9/.% 7/2017), HTN, HLD, hypothyroid p/w severe L hip and inner thigh pain extending to calf, which has been present over past few weeks, but significantly worsened over past few days, with recent admission for similar R hip pain in 7/17. Polyarticular arthralgias versus arthritis seems unlikely to be Infectious in cause.  Also, DDx: serum sickness from antibiotics? reactive arthritis? but no recent GI illness. r/o primary rheumatologic etiology? Doubt RHD with normal ASO and no other manifestations    Plan   - f/u MRI of left hip- if effusion present would consider aspirating joint for analysis of fluid  - would not recommend abx at this time as patient is not febrile, and in case L hip aspiration is indicated  - patient has elevated ESR and CRP suggesting ongoing inflammation.   - would consider rheumatology consult    - f/u hepatitis panel, CMV PCR, EBV+, TROY, RF, and BC

## 2017-08-29 NOTE — H&P ADULT - PROBLEM SELECTOR PLAN 2
- Pt has had persistent elevated hepatic enzymes, present in admission in 7/2017  - CTAP at that time unremarkable, however was w/o IV contrast, only revealing bibasilar bronchiectasis   - Tenderness to RUQ on palpation  - Will w/u for viral causes as discussed above

## 2017-08-30 LAB
ALBUMIN SERPL ELPH-MCNC: 3.5 G/DL — SIGNIFICANT CHANGE UP (ref 3.3–5)
ALP SERPL-CCNC: 130 U/L — HIGH (ref 40–120)
ALT FLD-CCNC: 108 U/L — HIGH (ref 4–33)
ANA TITR SER: NEGATIVE — SIGNIFICANT CHANGE UP
AST SERPL-CCNC: 58 U/L — HIGH (ref 4–32)
BACTERIA UR CULT: SIGNIFICANT CHANGE UP
BASOPHILS # BLD AUTO: 0.02 K/UL — SIGNIFICANT CHANGE UP (ref 0–0.2)
BASOPHILS NFR BLD AUTO: 0.2 % — SIGNIFICANT CHANGE UP (ref 0–2)
BILIRUB SERPL-MCNC: 0.3 MG/DL — SIGNIFICANT CHANGE UP (ref 0.2–1.2)
BUN SERPL-MCNC: 25 MG/DL — HIGH (ref 7–23)
CALCIUM SERPL-MCNC: 9.5 MG/DL — SIGNIFICANT CHANGE UP (ref 8.4–10.5)
CHLORIDE SERPL-SCNC: 102 MMOL/L — SIGNIFICANT CHANGE UP (ref 98–107)
CK SERPL-CCNC: 41 U/L — SIGNIFICANT CHANGE UP (ref 25–170)
CMV IGG FLD QL: 9.7 U/ML — HIGH
CMV IGG SERPL-IMP: POSITIVE — SIGNIFICANT CHANGE UP
CO2 SERPL-SCNC: 23 MMOL/L — SIGNIFICANT CHANGE UP (ref 22–31)
CREAT SERPL-MCNC: 0.92 MG/DL — SIGNIFICANT CHANGE UP (ref 0.5–1.3)
EBV EA AB TITR SER IF: POSITIVE — SIGNIFICANT CHANGE UP
EBV EA IGG SER-ACNC: NEGATIVE — SIGNIFICANT CHANGE UP
EBV PATRN SPEC IB-IMP: SIGNIFICANT CHANGE UP
EBV VCA IGG AVIDITY SER QL IA: POSITIVE — SIGNIFICANT CHANGE UP
EBV VCA IGM TITR FLD: NEGATIVE — SIGNIFICANT CHANGE UP
EOSINOPHIL # BLD AUTO: 0.35 K/UL — SIGNIFICANT CHANGE UP (ref 0–0.5)
EOSINOPHIL NFR BLD AUTO: 3.8 % — SIGNIFICANT CHANGE UP (ref 0–6)
GLUCOSE SERPL-MCNC: 165 MG/DL — HIGH (ref 70–99)
HAV IGM SER-ACNC: NONREACTIVE — SIGNIFICANT CHANGE UP
HBV CORE IGM SER-ACNC: NONREACTIVE — SIGNIFICANT CHANGE UP
HBV SURFACE AG SER-ACNC: NONREACTIVE — SIGNIFICANT CHANGE UP
HCT VFR BLD CALC: 33.4 % — LOW (ref 34.5–45)
HCV AB S/CO SERPL IA: 0.11 S/CO — SIGNIFICANT CHANGE UP
HCV AB SERPL-IMP: SIGNIFICANT CHANGE UP
HGB BLD-MCNC: 10.9 G/DL — LOW (ref 11.5–15.5)
IMM GRANULOCYTES # BLD AUTO: 0.04 # — SIGNIFICANT CHANGE UP
IMM GRANULOCYTES NFR BLD AUTO: 0.4 % — SIGNIFICANT CHANGE UP (ref 0–1.5)
LYMPHOCYTES # BLD AUTO: 3.76 K/UL — HIGH (ref 1–3.3)
LYMPHOCYTES # BLD AUTO: 41.2 % — SIGNIFICANT CHANGE UP (ref 13–44)
MAGNESIUM SERPL-MCNC: 1.9 MG/DL — SIGNIFICANT CHANGE UP (ref 1.6–2.6)
MCHC RBC-ENTMCNC: 25.6 PG — LOW (ref 27–34)
MCHC RBC-ENTMCNC: 32.6 % — SIGNIFICANT CHANGE UP (ref 32–36)
MCV RBC AUTO: 78.6 FL — LOW (ref 80–100)
MONOCYTES # BLD AUTO: 0.67 K/UL — SIGNIFICANT CHANGE UP (ref 0–0.9)
MONOCYTES NFR BLD AUTO: 7.3 % — SIGNIFICANT CHANGE UP (ref 2–14)
NEUTROPHILS # BLD AUTO: 4.28 K/UL — SIGNIFICANT CHANGE UP (ref 1.8–7.4)
NEUTROPHILS NFR BLD AUTO: 47.1 % — SIGNIFICANT CHANGE UP (ref 43–77)
NRBC # FLD: 0 — SIGNIFICANT CHANGE UP
PHOSPHATE SERPL-MCNC: 4 MG/DL — SIGNIFICANT CHANGE UP (ref 2.5–4.5)
PLATELET # BLD AUTO: 275 K/UL — SIGNIFICANT CHANGE UP (ref 150–400)
PMV BLD: 10.4 FL — SIGNIFICANT CHANGE UP (ref 7–13)
POTASSIUM SERPL-MCNC: 4.2 MMOL/L — SIGNIFICANT CHANGE UP (ref 3.5–5.3)
POTASSIUM SERPL-SCNC: 4.2 MMOL/L — SIGNIFICANT CHANGE UP (ref 3.5–5.3)
PROT SERPL-MCNC: 7.7 G/DL — SIGNIFICANT CHANGE UP (ref 6–8.3)
RBC # BLD: 4.25 M/UL — SIGNIFICANT CHANGE UP (ref 3.8–5.2)
RBC # FLD: 13.5 % — SIGNIFICANT CHANGE UP (ref 10.3–14.5)
RHEUMATOID FACT SERPL-ACNC: 20.8 IU/ML — SIGNIFICANT CHANGE UP
SODIUM SERPL-SCNC: 139 MMOL/L — SIGNIFICANT CHANGE UP (ref 135–145)
SPECIMEN SOURCE: SIGNIFICANT CHANGE UP
TRANSFERRIN SERPL-MCNC: 187 MG/DL — LOW (ref 200–360)
WBC # BLD: 9.12 K/UL — SIGNIFICANT CHANGE UP (ref 3.8–10.5)
WBC # FLD AUTO: 9.12 K/UL — SIGNIFICANT CHANGE UP (ref 3.8–10.5)

## 2017-08-30 PROCEDURE — 99233 SBSQ HOSP IP/OBS HIGH 50: CPT | Mod: GC

## 2017-08-30 PROCEDURE — 99231 SBSQ HOSP IP/OBS SF/LOW 25: CPT | Mod: GC

## 2017-08-30 PROCEDURE — 99255 IP/OBS CONSLTJ NEW/EST HI 80: CPT | Mod: GC

## 2017-08-30 RX ORDER — FAMOTIDINE 10 MG/ML
20 INJECTION INTRAVENOUS DAILY
Qty: 0 | Refills: 0 | Status: DISCONTINUED | OUTPATIENT
Start: 2017-08-30 | End: 2017-08-31

## 2017-08-30 RX ORDER — ACETAMINOPHEN 500 MG
650 TABLET ORAL EVERY 6 HOURS
Qty: 0 | Refills: 0 | Status: DISCONTINUED | OUTPATIENT
Start: 2017-08-30 | End: 2017-08-31

## 2017-08-30 RX ORDER — INSULIN LISPRO 100/ML
13 VIAL (ML) SUBCUTANEOUS
Qty: 0 | Refills: 0 | Status: DISCONTINUED | OUTPATIENT
Start: 2017-08-30 | End: 2017-08-31

## 2017-08-30 RX ADMIN — Medication 650 MILLIGRAM(S): at 13:07

## 2017-08-30 RX ADMIN — Medication 20 MILLIGRAM(S): at 17:49

## 2017-08-30 RX ADMIN — LISINOPRIL 10 MILLIGRAM(S): 2.5 TABLET ORAL at 06:12

## 2017-08-30 RX ADMIN — Medication 650 MILLIGRAM(S): at 22:10

## 2017-08-30 RX ADMIN — Medication 75 MICROGRAM(S): at 06:12

## 2017-08-30 RX ADMIN — Medication 650 MILLIGRAM(S): at 00:05

## 2017-08-30 RX ADMIN — Medication 650 MILLIGRAM(S): at 21:32

## 2017-08-30 RX ADMIN — Medication 81 MILLIGRAM(S): at 12:08

## 2017-08-30 RX ADMIN — Medication 1: at 08:38

## 2017-08-30 RX ADMIN — ENOXAPARIN SODIUM 40 MILLIGRAM(S): 100 INJECTION SUBCUTANEOUS at 06:12

## 2017-08-30 RX ADMIN — FAMOTIDINE 20 MILLIGRAM(S): 10 INJECTION INTRAVENOUS at 18:50

## 2017-08-30 RX ADMIN — INSULIN GLARGINE 30 UNIT(S): 100 INJECTION, SOLUTION SUBCUTANEOUS at 21:33

## 2017-08-30 RX ADMIN — ATORVASTATIN CALCIUM 10 MILLIGRAM(S): 80 TABLET, FILM COATED ORAL at 21:33

## 2017-08-30 RX ADMIN — Medication 13 UNIT(S): at 12:07

## 2017-08-30 RX ADMIN — Medication 3: at 12:07

## 2017-08-30 RX ADMIN — Medication 650 MILLIGRAM(S): at 12:07

## 2017-08-30 RX ADMIN — Medication 13 UNIT(S): at 17:46

## 2017-08-30 NOTE — CONSULT NOTE ADULT - SUBJECTIVE AND OBJECTIVE BOX
Pt is a 62F  PMH DMII (on insulin at home A1C 9/.% 7/2017), HTN, HLD p/w severe L hip pain. Pt was recently admitted from 7/4-7/11 for right hip pain, was found to have R joint effusion, which was tapped and was treated for a septic joint. Pt states that her symptoms initially improved but then over the past 2 weeks, has begun to have worsening L hip pain, similar to what she had previously experienced. Pain is so severe that pt is unable to ambulate, and has not resolved with NSAID/Tylenol Pt also has had fevers during this time. She has noticed some tender bumps in her left groin. Pt also reports intermittent R ankle swelling and tenderness. Denies other symptoms. Denies sick contacts, recent travel (last left [place] over 1.5 years ago to go to [place]). Pt has been following with ID s/p d/c as well as her gynecologist, who performed a pelvic U/S to r/o malignancy, which was reportedly normal. Pt states that in the past year, she had an infection of the skin on the right side of her face, which was treated with antibiotics without complication. 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 Pt reports stress 2/2 this event. Denies HI/SI. Pt continues to enjoy working in her garden, where she spends a significant amount of time.    ED Course  Vitals: Temp 101    HR  98         /85       RR 20    SPO2 100%  Pt received ceftriaxone 1mg IV, vancomycin 1g IV, had CXR, hip X-ray and ortho c/s placed    Pt is better today but still pain and inability to bend the left hip. has been undergoing work up.        PAST MEDICAL & SURGICAL HISTORY:  Hypothyroid  High cholesterol  Diabetes  HTN (hypertension)  No significant past surgical history    Meds:  insulin glargine Injectable (LANTUS) 30 Unit(s) SubCutaneous at bedtime  insulin lispro (HumaLOG) corrective regimen sliding scale   SubCutaneous three times a day before meals  insulin lispro (HumaLOG) corrective regimen sliding scale   SubCutaneous at bedtime  dextrose 5%. 1000 milliLiter(s) IV Continuous <Continuous>  dextrose Gel 1 Dose(s) Oral once PRN  dextrose 50% Injectable 12.5 Gram(s) IV Push once  dextrose 50% Injectable 25 Gram(s) IV Push once  dextrose 50% Injectable 25 Gram(s) IV Push once  glucagon  Injectable 1 milliGRAM(s) IntraMuscular once PRN  lisinopril 10 milliGRAM(s) Oral daily  atorvastatin 10 milliGRAM(s) Oral at bedtime  levothyroxine 75 MICROGram(s) Oral daily  insulin lispro Injectable (HumaLOG) 13 Unit(s) SubCutaneous three times a day before meals  acetaminophen   Tablet. 650 milliGRAM(s) Oral every 6 hours PRN  famotidine    Tablet 20 milliGRAM(s) Oral daily  predniSONE   Tablet 20 milliGRAM(s) Oral daily      No Known Allergies      FAMILY HISTORY:  Family history of coronary artery disease (Father)  Family history of diabetes mellitus (Father, Mother)      Vital Signs Last 24 Hrs  T(C): 36.9 (30 Aug 2017 11:50), Max: 37.3 (29 Aug 2017 22:12)  T(F): 98.4 (30 Aug 2017 11:50), Max: 99.1 (29 Aug 2017 22:12)  HR: 76 (30 Aug 2017 11:50) (69 - 81)  BP: 104/66 (30 Aug 2017 11:50) (104/66 - 115/61)  BP(mean): --  RR: 18 (30 Aug 2017 11:50) (16 - 18)  SpO2: 100% (30 Aug 2017 11:50) (100% - 100%)                          10.9   9.12  )-----------( 275      ( 30 Aug 2017 06:30 )             33.4       08-30    139  |  102  |  25<H>  ----------------------------<  165<H>  4.2   |  23  |  0.92    Ca    9.5      30 Aug 2017 06:30  Phos  4.0     08-30  Mg     1.9     08-30    TPro  7.7  /  Alb  3.5  /  TBili  0.3  /  DBili  x   /  AST  58<H>  /  ALT  108<H>  /  AlkPhos  130<H>  08-30        MRI left hip: IMPRESSION:  No MR evidence for osteomyelitis or septic arthritis in either hip.    No discrete drainable abscess collections.    Mild distal right gluteal insertional tendinosis. Moderate proximal left   hamstring insertional tendinosis with partial thickness insertional   tearing and reactive peritendinous edema/inflammation. Mild bilateral   greater trochanteric bursitis.

## 2017-08-30 NOTE — PROGRESS NOTE ADULT - PROBLEM SELECTOR PLAN 2
Ongoing for past couple of months, persistent Rt. ankle pain.  Due to joint stiffness (in wrists, fingers, and rt. ankle), and elevated ESR, CRP, suggestive of ongoing inflammation, will follow up rheumatologic markers (TROY) Ongoing for past couple of months, persistent Rt. ankle pain.  Due to joint stiffness (in wrists, fingers, and rt. ankle), and elevated ESR, CRP, suggestive of ongoing inflammation, will follow up rheumatologic markers (TROY, RF)

## 2017-08-30 NOTE — PROGRESS NOTE ADULT - SUBJECTIVE AND OBJECTIVE BOX
Follow up: MRI of B hips and lumbar spine showed degenerative changes, tendinitis with no evidence of septic arthritis or osteomyelitis. UC was negative and BC have been negative so far.      Interval History/ROS: Patient still has pain in L hip which has improved sice yesterday. Denies SP, SOB, and abdominal pain.     Allergies  No Known Allergies        ANTIMICROBIALS:      OTHER MEDS:  MEDICATIONS  (STANDING):  insulin glargine Injectable (LANTUS) 30 at bedtime  insulin lispro (HumaLOG) corrective regimen sliding scale  three times a day before meals  insulin lispro (HumaLOG) corrective regimen sliding scale  at bedtime  dextrose Gel 1 once PRN  dextrose 50% Injectable 12.5 once  dextrose 50% Injectable 25 once  dextrose 50% Injectable 25 once  glucagon  Injectable 1 once PRN  enoxaparin Injectable 40 every 24 hours  aspirin enteric coated 81 daily  lisinopril 10 daily  atorvastatin 10 at bedtime  levothyroxine 75 daily  insulin lispro Injectable (HumaLOG) 13 three times a day before meals      Vital Signs Last 24 Hrs  T(C): 37.1 (30 Aug 2017 05:13), Max: 37.3 (29 Aug 2017 22:12)  T(F): 98.7 (30 Aug 2017 05:13), Max: 99.1 (29 Aug 2017 22:12)  HR: 69 (30 Aug 2017 05:13) (69 - 81)  BP: 109/65 (30 Aug 2017 05:13) (102/59 - 115/61)  BP(mean): --  RR: 16 (30 Aug 2017 05:13) (16 - 18)  SpO2: 100% (30 Aug 2017 05:13) (100% - 100%)    PHYSICAL EXAM:  General: non-toxic  HEAD/EYES: anicteric, PERRL  ENT:  supple  Cardiovascular:   RRR, S1, S2  Respiratory:  clear bilaterally  GI:  soft, non-tender   Musculoskeletal:  pain in L hip,   Neurologic:  grossly non-focal, pain in L hip, intact ROM, no erythema apprecaited  Skin:  no rash  Lymph: left inguinal palpable  lymphadenopathy  Psychiatric:  appropriate affect                                  10.9   9.12  )-----------( 275      ( 30 Aug 2017 06:30 )             33.4       08-30    139  |  102  |  25<H>  ----------------------------<  165<H>  4.2   |  23  |  0.92    Ca    9.5      30 Aug 2017 06:30  Phos  4.0       Mg     1.9         TPro  7.7  /  Alb  3.5  /  TBili  0.3  /  DBili  x   /  AST  58<H>  /  ALT  108<H>  /  AlkPhos  130<H>        Urinalysis Basic - ( 28 Aug 2017 20:15 )    Color: YELLOW / Appearance: CLEAR / S.018 / pH: 5.5  Gluc: 500 / Ketone: NEGATIVE  / Bili: NEGATIVE / Urobili: NORMAL E.U.   Blood: NEGATIVE / Protein: 100 / Nitrite: NEGATIVE   Leuk Esterase: NEGATIVE / RBC: 0-2 / WBC 0-2   Sq Epi: OCC / Non Sq Epi: x / Bacteria: x        MICROBIOLOGY:  v            RADIOLOGY: Follow up: MRI of B hips and lumbar spine showed degenerative changes, tendinitis with no evidence of septic arthritis or osteomyelitis. UC was negative and BC have been negative so far.      Interval History/ROS: Patient still has pain in L hip which has improved sice yesterday. Denies SP, SOB, and abdominal pain.     Allergies  No Known Allergies    ANTIMICROBIALS:  none    OTHER MEDS:  MEDICATIONS  (STANDING):  insulin glargine Injectable (LANTUS) 30 at bedtime  insulin lispro (HumaLOG) corrective regimen sliding scale  three times a day before meals  insulin lispro (HumaLOG) corrective regimen sliding scale  at bedtime  enoxaparin Injectable 40 every 24 hours  aspirin enteric coated 81 daily  lisinopril 10 daily  atorvastatin 10 at bedtime  levothyroxine 75 daily  insulin lispro Injectable (HumaLOG) 13 three times a day before meals    Vital Signs Last 24 Hrs  T(C): 37.1 (30 Aug 2017 05:13), Max: 37.3 (29 Aug 2017 22:12)  T(F): 98.7 (30 Aug 2017 05:13), Max: 99.1 (29 Aug 2017 22:12)  HR: 69 (30 Aug 2017 05:13) (69 - 81)  BP: 109/65 (30 Aug 2017 05:13) (102/59 - 115/61)  BP(mean): --  RR: 16 (30 Aug 2017 05:13) (16 - 18)  SpO2: 100% (30 Aug 2017 05:13) (100% - 100%)    PHYSICAL EXAM:  General: non-toxic  HEAD/EYES: anicteric, PERRL  ENT:  supple  Cardiovascular:   RRR, S1, S2  Respiratory:  clear bilaterally  GI:  soft, non-tender   Musculoskeletal:  pain in L hip,   Neurologic:  grossly non-focal, pain in L hip, intact ROM, no erythema apprecaited  Skin:  no rash  Lymph: left inguinal palpable  lymphadenopathy  Psychiatric:  appropriate affect                     10.9   9.12  )-----------( 275      ( 30 Aug 2017 06:30 )             33.4     139  |  102  |  25<H>  ----------------------------<  165<H>  4.2   |  23  |  0.92    Ca    9.5      30 Aug 2017 06:30  Phos  4.0     08-30  Mg     1.9     08-30    TPro  7.7  /  Alb  3.5  /  TBili  0.3  /  DBili  x   /  AST  58<H>  /  ALT  108<H>  /  AlkPhos  130<H>  08-30    Sedimentation Rate, Erythrocyte: 50 mm/hr (08.28.17 @ 16:50)    Anti-Nuclear Antibody in AM (07.06.17 @ 07:25)    Anti Nuclear Factor Titer: Negative    MICROBIOLOGY:  Culture - Blood:   NO ORGANISMS ISOLATED  NO ORGANISMS ISOLATED AT 24 HOURS (08.28.17 @ 23:27)    RADIOLOGY:  MRI Hip w/wo Cont, Left (08.29.17 @ 18:14)   IMPRESSION:  No MR evidence for osteomyelitis or septic arthritis in either hip.  No discrete drainable abscess collections.  Mild distal right gluteal insertional tendinosis. Moderate proximal left hamstring insertional tendinosis with partial thickness insertional tearing and reactive peritendinous edema/inflammation. Mild bilateral greater trochanteric bursitis.

## 2017-08-30 NOTE — PROGRESS NOTE ADULT - SUBJECTIVE AND OBJECTIVE BOX
MORIAH BRANDON  [id number]    INTERVAL HPI/OVERNIGHT EVENTS  No acute events overnight. Pt says she is still having some hip pain.  On review of MRI, no evidence of infection of hip effusion.   Pt again denies trauma or vigorous physical activity. Pt is currently also not sexually active after  passed away this year.     MEDICATIONS  (STANDING):  insulin glargine Injectable (LANTUS) 30 Unit(s) SubCutaneous at bedtime  insulin lispro (HumaLOG) corrective regimen sliding scale   SubCutaneous three times a day before meals  insulin lispro (HumaLOG) corrective regimen sliding scale   SubCutaneous at bedtime  dextrose 5%. 1000 milliLiter(s) (50 mL/Hr) IV Continuous <Continuous>  dextrose 50% Injectable 12.5 Gram(s) IV Push once  dextrose 50% Injectable 25 Gram(s) IV Push once  dextrose 50% Injectable 25 Gram(s) IV Push once  enoxaparin Injectable 40 milliGRAM(s) SubCutaneous every 24 hours  aspirin enteric coated 81 milliGRAM(s) Oral daily  lisinopril 10 milliGRAM(s) Oral daily  atorvastatin 10 milliGRAM(s) Oral at bedtime  levothyroxine 75 MICROGram(s) Oral daily  insulin lispro Injectable (HumaLOG) 13 Unit(s) SubCutaneous three times a day before meals    MEDICATIONS  (PRN):  dextrose Gel 1 Dose(s) Oral once PRN Blood Glucose LESS THAN 70 milliGRAM(s)/deciliter  glucagon  Injectable 1 milliGRAM(s) IntraMuscular once PRN Glucose LESS THAN 70 milligrams/deciliter  acetaminophen   Tablet. 650 milliGRAM(s) Oral every 6 hours PRN Moderate Pain (4 - 6)      Allergies    No Known Allergies    Intolerances        Review of Systems:   General: No fevers/chills, no fatigue  HEENT: No blurry vision  CVS: No CP/palpitations  Resp: No SOB/wheezing  GI: No N/V/C/D/abdominal pain  MSK: +L hip and leg pains  Skin: No new rashes  Neuro: No headaches      Vital Signs Last 24 Hrs  T(C): 36.9 (30 Aug 2017 11:50), Max: 37.3 (29 Aug 2017 22:12)  T(F): 98.4 (30 Aug 2017 11:50), Max: 99.1 (29 Aug 2017 22:12)  HR: 76 (30 Aug 2017 11:50) (69 - 81)  BP: 104/66 (30 Aug 2017 11:50) (104/66 - 115/61)  BP(mean): --  RR: 18 (30 Aug 2017 11:50) (16 - 18)  SpO2: 100% (30 Aug 2017 11:50) (100% - 100%)    Physical Exam:  General: NAD  HEENT: EOMI, MMM  Cardio: +S1/S2, RRR  Resp: CTA b/l  GI: +BS, soft, NT/ND  MSK: Pt has muscle tightness on lateral aspect of thighs b/l along with pain on palpation of lateral L thigh. Pain on abduction/adduction of hip occurs in lateral L thigh.   Neuro: AAOx3  Psych: wnl    LABS:                        10.9   9.12  )-----------( 275      ( 30 Aug 2017 06:30 )             33.4     08-30    139  |  102  |  25<H>  ----------------------------<  165<H>  4.2   |  23  |  0.92    Ca    9.5      30 Aug 2017 06:30  Phos  4.0     08-30  Mg     1.9     08-30    TPro  7.7  /  Alb  3.5  /  TBili  0.3  /  DBili  x   /  AST  58<H>  /  ALT  108<H>  /  AlkPhos  130<H>  08-30      Urinalysis Basic - ( 28 Aug 2017 20:15 )    Color: YELLOW / Appearance: CLEAR / S.018 / pH: 5.5  Gluc: 500 / Ketone: NEGATIVE  / Bili: NEGATIVE / Urobili: NORMAL E.U.   Blood: NEGATIVE / Protein: 100 / Nitrite: NEGATIVE   Leuk Esterase: NEGATIVE / RBC: 0-2 / WBC 0-2   Sq Epi: OCC / Non Sq Epi: x / Bacteria: x          RADIOLOGY & ADDITIONAL TESTS:    < from: MRI Hip w/wo Cont, Left (17 @ 18:14) >  IMPRESSION:  No MR evidence for osteomyelitis or septic arthritis in either hip.    No discrete drainable abscess collections.    Mild distal right gluteal insertional tendinosis. Moderate proximal left   hamstring insertional tendinosis with partial thickness insertional   tearing and reactive peritendinous edema/inflammation. Mild bilateral   greater trochanteric bursitis.    < end of copied text >

## 2017-08-30 NOTE — PROGRESS NOTE ADULT - SUBJECTIVE AND OBJECTIVE BOX
Orthopedic Attending note - Dr. Ron:  Patient seen.  Complaining of left hip pain.  MRI obtained.  Report as follows:  IMPRESSION:  No MR evidence for osteomyelitis or septic arthritis in either hip.    No discrete drainable abscess collections.    Mild distal right gluteal insertional tendinosis. Moderate proximal left  hamstring insertional tendinosis with partial thickness insertional tearing  and reactive peritendinous edema/inflammation. Mild bilateral greater  trochanteric bursitis.    Exam notable for mild tenderness over greater trochanters, left ischial tuberosity.    Agree with rheumatology recommendations of NSAIDs and physical therapy.  No orthopedic surgery intervention indicated.  Orthopedic followup as needed.

## 2017-08-30 NOTE — CONSULT NOTE ADULT - ASSESSMENT
62F with h/o recent infection, now with above symptoms, culture negative, has tendinosis on MRI, has mild anemia with high ferritin, will recommend:  - contin ue RX as per medicine, ID and ortho  - DVT prophylaxis  - will obtain the work up sent from the office a couple of days ago  - no intervention for anemia at this time as hgb level is acceptable.

## 2017-08-30 NOTE — PROGRESS NOTE ADULT - PROBLEM SELECTOR PLAN 1
- differential includes trochanteric bursitis and tendinosis as shown on MRI hip from  8/29, vs. autoimmune process.   - MRI showed no signs of septic joint or osteomyelitis.   - f/u EBV, CMV, HIV studies  - Iron panel was WNL, TIBC and iron levels WNL, ferritin mildly elevated. Hemochromatosis less likely, but considered due to known DMII

## 2017-08-30 NOTE — PROGRESS NOTE ADULT - PROBLEM SELECTOR PLAN 5
- c/w Berto HISS.  - Morning , moderately controlled, however daytime -377, will need to add pre-meal insulin. - Resolved. Had fevers and leukocytosis on admission.   - Hemodynamically stable  - blood cx NGTD (obtained 8/28/17).   - monitor off Abx

## 2017-08-30 NOTE — PROGRESS NOTE ADULT - SUBJECTIVE AND OBJECTIVE BOX
Overnight left hip pain moderately controlled. No CP, no SOB.     ICU Vital Signs Last 24 Hrs  T(C): 37.1 (30 Aug 2017 05:13), Max: 37.3 (29 Aug 2017 22:12)  T(F): 98.7 (30 Aug 2017 05:13), Max: 99.1 (29 Aug 2017 22:12)  HR: 69 (30 Aug 2017 05:13) (69 - 81)  BP: 109/65 (30 Aug 2017 05:13) (102/59 - 115/61)  BP(mean): --  ABP: --  ABP(mean): --  RR: 16 (30 Aug 2017 05:13) (16 - 18)  SpO2: 100% (30 Aug 2017 05:13) (100% - 100%)    Vitals: Afebrile, VSS  Gen: No acute distress  LLE:  Left Hip: skin intact, no erythema, no palpable fluid collection, tender to palpation between left iliac crest and greater trochanter   FROM passively with internal/external  which elicits pain anteriorly  Sensation intact lateral femoral cutaneous and posterior cutaneous   Motor intact TA/GCS/EHL/FHL   SILT DP/SP/Tib  DP, PT, AT pulses palpable  calf soft, non-tender    Radiology:    MRI 8/29: Degenerative changes lumbar spine    Labs:  No organisms isolated for 24 hours from 8/28 blood cultures    A/P: 62F admitted to r/u L septic hip    - PE still low suspicion for septic L hip. Patient has remained afebrile, MRI suggest degenerative changes , no effusion noted.  -Consider workup and examination for alternative source of her pain including lymphadenitis given the finding on exam of a point tender palpable lymph node and reports by the patient of multiple other tender areas throughout body.   -WBAT  - need to discuss with Dr. Ariadne Triana MD  Orthopedic Surgery, PGY-1   pager [phone]

## 2017-08-30 NOTE — PROGRESS NOTE ADULT - PROBLEM SELECTOR PLAN 4
- Resolved. Had fevers and leukocytosis on admission.   - Hemodynamically stable  - blood cx NGTD (obtained 8/28/17).   - monitor off Abx - c/w Berto HISS.  - Morning , moderately controlled, however daytime -377, will need to add pre-meal insulin.

## 2017-08-30 NOTE — PROGRESS NOTE ADULT - SUBJECTIVE AND OBJECTIVE BOX
Patient is a 62y old  Female who presents with a chief complaint of L hip pain (29 Aug 2017 02:13)       INTERVAL HPI/OVERNIGHT EVENTS: No acute events overnight. Pt. had MRI done, results pending. Pt. continues to have left hip and leg pain, unchanged from prior. Getting tylenol for pain.     MEDICATIONS  (STANDING):  insulin glargine Injectable (LANTUS) 30 Unit(s) SubCutaneous at bedtime  insulin lispro (HumaLOG) corrective regimen sliding scale   SubCutaneous three times a day before meals  insulin lispro (HumaLOG) corrective regimen sliding scale   SubCutaneous at bedtime  dextrose 5%. 1000 milliLiter(s) (50 mL/Hr) IV Continuous <Continuous>  dextrose 50% Injectable 12.5 Gram(s) IV Push once  dextrose 50% Injectable 25 Gram(s) IV Push once  dextrose 50% Injectable 25 Gram(s) IV Push once  enoxaparin Injectable 40 milliGRAM(s) SubCutaneous every 24 hours  aspirin enteric coated 81 milliGRAM(s) Oral daily  lisinopril 10 milliGRAM(s) Oral daily  atorvastatin 10 milliGRAM(s) Oral at bedtime  levothyroxine 75 MICROGram(s) Oral daily  insulin lispro Injectable (HumaLOG) 13 Unit(s) SubCutaneous three times a day before meals    MEDICATIONS  (PRN):  dextrose Gel 1 Dose(s) Oral once PRN Blood Glucose LESS THAN 70 milliGRAM(s)/deciliter  glucagon  Injectable 1 milliGRAM(s) IntraMuscular once PRN Glucose LESS THAN 70 milligrams/deciliter      Allergies    No Known Allergies    Intolerances        REVIEW OF SYSTEMS:  CONSTITUTIONAL: No fever  EYES: No eye pain, visual disturbances  ENMT:  No throat pain  RESPIRATORY: No shortness of breath  CARDIOVASCULAR: No chest pain or dizziness  GASTROINTESTINAL: No abdominal or epigastric pain. No nausea, vomiting, or hematemesis; No diarrhea or constipation. No melena or hematochezia.  GENITOURINARY: No dysuria, frequency, hematuria, or incontinence  NEUROLOGICAL: No headaches, loss of strength, numbness, or tremors  SKIN: No itching, burning, rashes, or lesions   LYMPH NODES: No enlarged glands  ENDOCRINE: No heat or cold intolerance; No polydipsia or polyuria  MUSCULOSKELETAL: No joint pain or swelling;   PSYCHIATRIC: Denies depression, anxiety  HEME/LYMPH: No easy bruising, or bleeding gums  ALLERGY AND IMMUNOLOGIC: No hives or eczema    Vital Signs Last 24 Hrs  T(C): 37.1 (30 Aug 2017 05:13), Max: 37.3 (29 Aug 2017 22:12)  T(F): 98.7 (30 Aug 2017 05:13), Max: 99.1 (29 Aug 2017 22:12)  HR: 69 (30 Aug 2017 05:13) (69 - 81)  BP: 109/65 (30 Aug 2017 05:13) (102/59 - 115/61)  BP(mean): --  RR: 16 (30 Aug 2017 05:13) (16 - 18)  SpO2: 100% (30 Aug 2017 05:13) (100% - 100%)    PHYSICAL EXAM:  GENERAL: NAD, well-groomed, well-developed  HEAD:  Atraumatic, Normocephalic  EYES: EOMI, PERRLA, conjunctiva and sclera clear  ENMT: No tonsillar erythema, exudates, or enlargement; Moist mucous membranes, Good dentition  NECK: Supple, No JVD  NERVOUS SYSTEM: AOX3, motor and sensation grossly intact in b/l UE and b/l LE  PSYCHIATRIC: Appropriate affect and mood  CHEST/LUNG: Clear to auscultation bilaterally; No rales, rhonchi, wheezing, or rubs  HEART: Regular rate and rhythm; No murmurs, rubs, or gallops. No LE edema  ABDOMEN: Soft, Nontender, Nondistended; Bowel sounds present  EXTREMITIES:  2+ Peripheral Pulses, No clubbing, cyanosis  SKIN: No rashes or lesions    LABS:                        10.9   9.12  )-----------( 275      ( 30 Aug 2017 06:30 )             33.4     30 Aug 2017 06:30    139    |  102    |  25     ----------------------------<  165    4.2     |  23     |  0.92     Ca    9.5        30 Aug 2017 06:30  Phos  4.0       30 Aug 2017 06:30  Mg     1.9       30 Aug 2017 06:30    TPro  7.7    /  Alb  3.5    /  TBili  0.3    /  DBili  x      /  AST  58     /  ALT  108    /  AlkPhos  130    30 Aug 2017 06:30      CAPILLARY BLOOD GLUCOSE  166 (30 Aug 2017 08:24)  192 (29 Aug 2017 22:12)  377 (29 Aug 2017 12:27)        BLOOD CULTURE    RADIOLOGY & ADDITIONAL TESTS:    Imaging Personally Reviewed:  [ ] YES     Consultant(s) Notes Reviewed:      Care Discussed with Consultants/Other Providers: Patient is a 62y old  Female who presents with a chief complaint of L hip pain (29 Aug 2017 02:13)       INTERVAL HPI/OVERNIGHT EVENTS: No acute events overnight. Pt. had MRI done, results pending. Pt. continues to have left hip and leg pain, unchanged from prior. Getting tylenol for pain.     MEDICATIONS  (STANDING):  insulin glargine Injectable (LANTUS) 30 Unit(s) SubCutaneous at bedtime  insulin lispro (HumaLOG) corrective regimen sliding scale   SubCutaneous three times a day before meals  insulin lispro (HumaLOG) corrective regimen sliding scale   SubCutaneous at bedtime  dextrose 5%. 1000 milliLiter(s) (50 mL/Hr) IV Continuous <Continuous>  dextrose 50% Injectable 12.5 Gram(s) IV Push once  dextrose 50% Injectable 25 Gram(s) IV Push once  dextrose 50% Injectable 25 Gram(s) IV Push once  enoxaparin Injectable 40 milliGRAM(s) SubCutaneous every 24 hours  aspirin enteric coated 81 milliGRAM(s) Oral daily  lisinopril 10 milliGRAM(s) Oral daily  atorvastatin 10 milliGRAM(s) Oral at bedtime  levothyroxine 75 MICROGram(s) Oral daily  insulin lispro Injectable (HumaLOG) 13 Unit(s) SubCutaneous three times a day before meals    MEDICATIONS  (PRN):  dextrose Gel 1 Dose(s) Oral once PRN Blood Glucose LESS THAN 70 milliGRAM(s)/deciliter  glucagon  Injectable 1 milliGRAM(s) IntraMuscular once PRN Glucose LESS THAN 70 milligrams/deciliter      Allergies    No Known Allergies    Intolerances        REVIEW OF SYSTEMS:  CONSTITUTIONAL: No fever  EYES: No eye pain, visual disturbances  ENMT:  No throat pain  RESPIRATORY: No shortness of breath  CARDIOVASCULAR: No chest pain or dizziness  GASTROINTESTINAL: No abdominal or epigastric pain. No nausea, vomiting, or hematemesis; No diarrhea or constipation. No melena or hematochezia.  GENITOURINARY: No dysuria, frequency, hematuria, or incontinence  NEUROLOGICAL: No headaches, loss of strength, numbness, or tremors  SKIN: No itching, burning, rashes, or lesions   LYMPH NODES: No enlarged glands  ENDOCRINE: No heat or cold intolerance; No polydipsia or polyuria  MUSCULOSKELETAL: No joint pain or swelling;   PSYCHIATRIC: Denies depression, anxiety  HEME/LYMPH: No easy bruising, or bleeding gums  ALLERGY AND IMMUNOLOGIC: No hives or eczema    Vital Signs Last 24 Hrs  T(C): 37.1 (30 Aug 2017 05:13), Max: 37.3 (29 Aug 2017 22:12)  T(F): 98.7 (30 Aug 2017 05:13), Max: 99.1 (29 Aug 2017 22:12)  HR: 69 (30 Aug 2017 05:13) (69 - 81)  BP: 109/65 (30 Aug 2017 05:13) (102/59 - 115/61)  BP(mean): --  RR: 16 (30 Aug 2017 05:13) (16 - 18)  SpO2: 100% (30 Aug 2017 05:13) (100% - 100%)    PHYSICAL EXAM:  GENERAL: NAD, well-groomed, well-developed  HEAD:  Atraumatic, Normocephalic  EYES: EOMI, PERRLA, conjunctiva and sclera clear  ENMT: No tonsillar erythema, exudates, or enlargement; Moist mucous membranes, Good dentition  NECK: Supple, No JVD  NERVOUS SYSTEM: AOX3, motor and sensation grossly intact in b/l UE and b/l LE  PSYCHIATRIC: Appropriate affect and mood  CHEST/LUNG: Clear to auscultation bilaterally; No rales, rhonchi, wheezing, or rubs  HEART: Regular rate and rhythm; No murmurs, rubs, or gallops. No LE edema  ABDOMEN: Soft, Nontender, Nondistended; Bowel sounds present  EXTREMITIES:  2+ Peripheral Pulses, No clubbing, cyanosis  SKIN: No rashes or lesions    LABS:                        10.9   9.12  )-----------( 275      ( 30 Aug 2017 06:30 )             33.4     30 Aug 2017 06:30    139    |  102    |  25     ----------------------------<  165    4.2     |  23     |  0.92     Ca    9.5        30 Aug 2017 06:30  Phos  4.0       30 Aug 2017 06:30  Mg     1.9       30 Aug 2017 06:30    TPro  7.7    /  Alb  3.5    /  TBili  0.3    /  DBili  x      /  AST  58     /  ALT  108    /  AlkPhos  130    30 Aug 2017 06:30      CAPILLARY BLOOD GLUCOSE  166 (30 Aug 2017 08:24)  192 (29 Aug 2017 22:12)  377 (29 Aug 2017 12:27)        BLOOD CULTURE    RADIOLOGY & ADDITIONAL TESTS:    MRI HIP 8/29/17:  IMPRESSION:  No MR evidence for osteomyelitis or septic arthritis in either hip.    No discrete drainable abscess collections.    Mild distal right gluteal insertional tendinosis. Moderate proximal left   hamstring insertional tendinosis with partial thickness insertional   tearing and reactive peritendinous edema/inflammation. Mild bilateral   greater trochanteric bursitis.    Imaging Personally Reviewed:  [ ] YES     Consultant(s) Notes Reviewed:      Care Discussed with Consultants/Other Providers: Patient is a 62y old  Female who presents with a chief complaint of L hip pain (29 Aug 2017 02:13)       INTERVAL HPI/OVERNIGHT EVENTS: No acute events overnight. Pt. had MRI done, results pending. Pt. continues to have left hip and leg pain, unchanged from prior. Getting tylenol for pain.     MEDICATIONS  (STANDING):  insulin glargine Injectable (LANTUS) 30 Unit(s) SubCutaneous at bedtime  insulin lispro (HumaLOG) corrective regimen sliding scale   SubCutaneous three times a day before meals  insulin lispro (HumaLOG) corrective regimen sliding scale   SubCutaneous at bedtime  dextrose 5%. 1000 milliLiter(s) (50 mL/Hr) IV Continuous <Continuous>  dextrose 50% Injectable 12.5 Gram(s) IV Push once  dextrose 50% Injectable 25 Gram(s) IV Push once  dextrose 50% Injectable 25 Gram(s) IV Push once  enoxaparin Injectable 40 milliGRAM(s) SubCutaneous every 24 hours  aspirin enteric coated 81 milliGRAM(s) Oral daily  lisinopril 10 milliGRAM(s) Oral daily  atorvastatin 10 milliGRAM(s) Oral at bedtime  levothyroxine 75 MICROGram(s) Oral daily  insulin lispro Injectable (HumaLOG) 13 Unit(s) SubCutaneous three times a day before meals    MEDICATIONS  (PRN):  dextrose Gel 1 Dose(s) Oral once PRN Blood Glucose LESS THAN 70 milliGRAM(s)/deciliter  glucagon  Injectable 1 milliGRAM(s) IntraMuscular once PRN Glucose LESS THAN 70 milligrams/deciliter      Allergies    No Known Allergies    Intolerances      REVIEW OF SYSTEMS:  CONSTITUTIONAL: No fever  EYES: No eye pain, visual disturbances  ENMT:  No throat pain  RESPIRATORY: No shortness of breath  CARDIOVASCULAR: No chest pain or dizziness  GASTROINTESTINAL: No N/V/D/C/ or abdominal pain  NEUROLOGICAL: No loss of strength   SKIN: No itching, rashes, or sores  MUSCULOSKELETAL: + joint pain and stiffness, particularly in left hip and knee, swelling/stiffness of rt. ankle     Vital Signs Last 24 Hrs  T(C): 37.1 (30 Aug 2017 05:13), Max: 37.3 (29 Aug 2017 22:12)  T(F): 98.7 (30 Aug 2017 05:13), Max: 99.1 (29 Aug 2017 22:12)  HR: 69 (30 Aug 2017 05:13) (69 - 81)  BP: 109/65 (30 Aug 2017 05:13) (102/59 - 115/61)  BP(mean): --  RR: 16 (30 Aug 2017 05:13) (16 - 18)  SpO2: 100% (30 Aug 2017 05:13) (100% - 100%)    PHYSICAL EXAM:  GENERAL: NAD  HEAD:  Atraumatic, Normocephalic  EYES: EOMI, PERRLA, conjunctiva and sclera clear  ENMT:  Moist mucous membranes  NERVOUS SYSTEM: AOX3, motor and sensation grossly intact in b/l UE and b/l LE  PSYCHIATRIC: Appropriate affect and mood  CHEST/LUNG: Clear to auscultation bilaterally;  HEART: Regular rate and rhythm; No murmurs, rubs, or gallops. No LE edema  ABDOMEN: Soft, Nontender, Nondistended; Bowel sounds present  EXTREMITIES:  2+ Peripheral Pulses  SKIN: No rashes or lesions    LABS:                        10.9   9.12  )-----------( 275      ( 30 Aug 2017 06:30 )             33.4     30 Aug 2017 06:30    139    |  102    |  25     ----------------------------<  165    4.2     |  23     |  0.92     Ca    9.5        30 Aug 2017 06:30  Phos  4.0       30 Aug 2017 06:30  Mg     1.9       30 Aug 2017 06:30    TPro  7.7    /  Alb  3.5    /  TBili  0.3    /  DBili  x      /  AST  58     /  ALT  108    /  AlkPhos  130    30 Aug 2017 06:30      CAPILLARY BLOOD GLUCOSE  166 (30 Aug 2017 08:24)  192 (29 Aug 2017 22:12)  377 (29 Aug 2017 12:27)        BLOOD CULTURE- NGTD ( 8/28/17)    RADIOLOGY & ADDITIONAL TESTS:    MRI HIP 8/29/17:  IMPRESSION:  No MR evidence for osteomyelitis or septic arthritis in either hip.    No discrete drainable abscess collections.    Mild distal right gluteal insertional tendinosis. Moderate proximal left   hamstring insertional tendinosis with partial thickness insertional   tearing and reactive peritendinous edema/inflammation. Mild bilateral   greater trochanteric bursitis.    Imaging Personally Reviewed:  [ ] YES     Consultant(s) Notes Reviewed:      Care Discussed with Consultants/Other Providers: Patient is a 62y old  Female who presents with a chief complaint of L hip pain (29 Aug 2017 02:13)       INTERVAL HPI/OVERNIGHT EVENTS: No acute events overnight. Pt. had MRI done, results pending. Pt. continues to have left hip and leg pain, unchanged from prior. Getting tylenol for pain.     MEDICATIONS  (STANDING):  insulin glargine Injectable (LANTUS) 30 Unit(s) SubCutaneous at bedtime  insulin lispro (HumaLOG) corrective regimen sliding scale   SubCutaneous three times a day before meals  insulin lispro (HumaLOG) corrective regimen sliding scale   SubCutaneous at bedtime  dextrose 5%. 1000 milliLiter(s) (50 mL/Hr) IV Continuous <Continuous>  dextrose 50% Injectable 12.5 Gram(s) IV Push once  dextrose 50% Injectable 25 Gram(s) IV Push once  dextrose 50% Injectable 25 Gram(s) IV Push once  enoxaparin Injectable 40 milliGRAM(s) SubCutaneous every 24 hours  aspirin enteric coated 81 milliGRAM(s) Oral daily  lisinopril 10 milliGRAM(s) Oral daily  atorvastatin 10 milliGRAM(s) Oral at bedtime  levothyroxine 75 MICROGram(s) Oral daily  insulin lispro Injectable (HumaLOG) 13 Unit(s) SubCutaneous three times a day before meals    MEDICATIONS  (PRN):  dextrose Gel 1 Dose(s) Oral once PRN Blood Glucose LESS THAN 70 milliGRAM(s)/deciliter  glucagon  Injectable 1 milliGRAM(s) IntraMuscular once PRN Glucose LESS THAN 70 milligrams/deciliter      Allergies    No Known Allergies    Intolerances      REVIEW OF SYSTEMS:  CONSTITUTIONAL: No fever  EYES: No eye pain, visual disturbances  ENMT:  No throat pain  RESPIRATORY: No shortness of breath  CARDIOVASCULAR: No chest pain or dizziness  GASTROINTESTINAL: No N/V/D/C/ or abdominal pain  NEUROLOGICAL: No loss of strength   SKIN: No itching, rashes, or sores  MUSCULOSKELETAL: + joint pain and stiffness, particularly in left hip and knee, swelling/stiffness of rt. ankle     Vital Signs Last 24 Hrs  T(C): 37.1 (30 Aug 2017 05:13), Max: 37.3 (29 Aug 2017 22:12)  T(F): 98.7 (30 Aug 2017 05:13), Max: 99.1 (29 Aug 2017 22:12)  HR: 69 (30 Aug 2017 05:13) (69 - 81)  BP: 109/65 (30 Aug 2017 05:13) (102/59 - 115/61)  BP(mean): --  RR: 16 (30 Aug 2017 05:13) (16 - 18)  SpO2: 100% (30 Aug 2017 05:13) (100% - 100%)    PHYSICAL EXAM:  GENERAL: NAD  HEAD:  Atraumatic, Normocephalic  EYES: EOMI, PERRLA, conjunctiva and sclera clear  ENMT:  Moist mucous membranes  NERVOUS SYSTEM: AOX3, motor and sensation grossly intact in b/l UE and b/l LE  PSYCHIATRIC: Appropriate affect and mood  CHEST/LUNG: Clear to auscultation bilaterally;  HEART: Regular rate and rhythm; No murmurs, rubs, or gallops. No LE edema  ABDOMEN: Soft, Nontender, Nondistended; Bowel sounds present  EXTREMITIES:  2+ Peripheral Pulses  SKIN: No rashes or lesions    LABS:                        10.9   9.12  )-----------( 275      ( 30 Aug 2017 06:30 )             33.4     30 Aug 2017 06:30    139    |  102    |  25     ----------------------------<  165    4.2     |  23     |  0.92     Ca    9.5        30 Aug 2017 06:30  Phos  4.0       30 Aug 2017 06:30  Mg     1.9       30 Aug 2017 06:30    TPro  7.7    /  Alb  3.5    /  TBili  0.3    /  DBili  x      /  AST  58     /  ALT  108    /  AlkPhos  130    30 Aug 2017 06:30      CAPILLARY BLOOD GLUCOSE  166 (30 Aug 2017 08:24)  192 (29 Aug 2017 22:12)  377 (29 Aug 2017 12:27)        BLOOD CULTURE- NGTD ( 8/28/17)    RADIOLOGY & ADDITIONAL TESTS:    MRI HIP 8/29/17:  IMPRESSION:  No MR evidence for osteomyelitis or septic arthritis in either hip.    No discrete drainable abscess collections.    Mild distal right gluteal insertional tendinosis. Moderate proximal left   hamstring insertional tendinosis with partial thickness insertional   tearing and reactive peritendinous edema/inflammation. Mild bilateral   greater trochanteric bursitis.    Imaging Personally Reviewed:  [X ] YES     Consultant(s) Notes Reviewed:  Orthopedic, 8/30    Care Discussed with Consultants/Other Providers: Patient is a 62y old  Female who presents with a chief complaint of L hip pain (29 Aug 2017 02:13)       INTERVAL HPI/OVERNIGHT EVENTS: No acute events overnight. Pt. had MRI done, results pending. Pt. continues to have left hip and leg pain, unchanged from prior. Getting tylenol for pain.     MEDICATIONS  (STANDING):  insulin glargine Injectable (LANTUS) 30 Unit(s) SubCutaneous at bedtime  insulin lispro (HumaLOG) corrective regimen sliding scale   SubCutaneous three times a day before meals  insulin lispro (HumaLOG) corrective regimen sliding scale   SubCutaneous at bedtime  dextrose 5%. 1000 milliLiter(s) (50 mL/Hr) IV Continuous <Continuous>  dextrose 50% Injectable 12.5 Gram(s) IV Push once  dextrose 50% Injectable 25 Gram(s) IV Push once  dextrose 50% Injectable 25 Gram(s) IV Push once  enoxaparin Injectable 40 milliGRAM(s) SubCutaneous every 24 hours  aspirin enteric coated 81 milliGRAM(s) Oral daily  lisinopril 10 milliGRAM(s) Oral daily  atorvastatin 10 milliGRAM(s) Oral at bedtime  levothyroxine 75 MICROGram(s) Oral daily  insulin lispro Injectable (HumaLOG) 13 Unit(s) SubCutaneous three times a day before meals    MEDICATIONS  (PRN):  dextrose Gel 1 Dose(s) Oral once PRN Blood Glucose LESS THAN 70 milliGRAM(s)/deciliter  glucagon  Injectable 1 milliGRAM(s) IntraMuscular once PRN Glucose LESS THAN 70 milligrams/deciliter      Allergies    No Known Allergies    Intolerances      REVIEW OF SYSTEMS:  CONSTITUTIONAL: No fever  EYES: No eye pain, visual disturbances  ENMT:  No throat pain  RESPIRATORY: No shortness of breath  CARDIOVASCULAR: No chest pain or dizziness  GASTROINTESTINAL: No N/V/D/C/ or abdominal pain  NEUROLOGICAL: No loss of strength   SKIN: No itching, rashes, or sores  MUSCULOSKELETAL: + joint pain and stiffness, particularly in left hip and knee, swelling/stiffness of rt. ankle     Vital Signs Last 24 Hrs  T(C): 37.1 (30 Aug 2017 05:13), Max: 37.3 (29 Aug 2017 22:12)  T(F): 98.7 (30 Aug 2017 05:13), Max: 99.1 (29 Aug 2017 22:12)  HR: 69 (30 Aug 2017 05:13) (69 - 81)  BP: 109/65 (30 Aug 2017 05:13) (102/59 - 115/61)  BP(mean): --  RR: 16 (30 Aug 2017 05:13) (16 - 18)  SpO2: 100% (30 Aug 2017 05:13) (100% - 100%)    PHYSICAL EXAM:  GENERAL: NAD  HEAD:  Atraumatic, Normocephalic  EYES: EOMI, PERRLA, conjunctiva and sclera clear  ENMT:  Moist mucous membranes  NERVOUS SYSTEM: AOX3, motor and sensation grossly intact in b/l UE and b/l LE  PSYCHIATRIC: Appropriate affect and mood  CHEST/LUNG: Clear to auscultation bilaterally;  HEART: Regular rate and rhythm; No murmurs, rubs, or gallops. No LE edema  ABDOMEN: Soft, Nontender, Nondistended; Bowel sounds present  EXTREMITIES:  2+ Peripheral Pulses  SKIN: No rashes or lesions    LABS:                        10.9   9.12  )-----------( 275      ( 30 Aug 2017 06:30 )             33.4     30 Aug 2017 06:30    139    |  102    |  25     ----------------------------<  165    4.2     |  23     |  0.92     Ca    9.5        30 Aug 2017 06:30  Phos  4.0       30 Aug 2017 06:30  Mg     1.9       30 Aug 2017 06:30    TPro  7.7    /  Alb  3.5    /  TBili  0.3    /  DBili  x      /  AST  58     /  ALT  108    /  AlkPhos  130    30 Aug 2017 06:30      CAPILLARY BLOOD GLUCOSE  166 (30 Aug 2017 08:24)  192 (29 Aug 2017 22:12)  377 (29 Aug 2017 12:27)        BLOOD CULTURE- NGTD ( 8/28/17)    RADIOLOGY & ADDITIONAL TESTS:  < from: MRI Lumbar Spine w/o Cont (08.29.17 @ 18:14) >  Loss of normal lumbar lordosis is seen. This could be due to positioning   or muscle spasm.    The vertebral body height alignment appear normal    Mild disc desiccation seen involving the L5-S1 level.    T12 L1: Normal.    L1-2: Normal.    L2-3: Bilateral hypertrophic facet joint changes are seen causing mild   narrowing of both neural foramen.    L3-4: Normal.    L4-5: Bilateral hypertrophic facet changes are seen with disc bulge.   Hypertrophic change is seen involving both ligament of flavum. Mild to   moderate narrowing of the spinal canal and left neural foramen. Mild   narrowing of the right neural foramen.    L5-S1: Disc bulge and bilateral hypertrophic facet joint changes are   seen.Mild narrowing of the spinal canal is seen. Mild narrowing of both   neural foramen is seen.     The conus ends at L2 and appears normal.    Impression: Degenerative changes as described above.    MRI HIP 8/29/17:  IMPRESSION:  No MR evidence for osteomyelitis or septic arthritis in either hip.    No discrete drainable abscess collections.    Mild distal right gluteal insertional tendinosis. Moderate proximal left   hamstring insertional tendinosis with partial thickness insertional   tearing and reactive peritendinous edema/inflammation. Mild bilateral   greater trochanteric bursitis.    Imaging Personally Reviewed:  [X ] YES     Consultant(s) Notes Reviewed:  Orthopedic, 8/30    Care Discussed with Consultants/Other Providers:

## 2017-08-30 NOTE — PHYSICAL THERAPY INITIAL EVALUATION ADULT - RANGE OF MOTION EXAMINATION, REHAB EVAL
bilateral upper extremity ROM was WFL (within functional limits)/bilateral lower extremity ROM was WFL (within functional limits)/L hip ROM elicits pain

## 2017-08-30 NOTE — PROGRESS NOTE ADULT - PROBLEM SELECTOR PROBLEM 4
SIRS (systemic inflammatory response syndrome) Type 2 diabetes mellitus without complication, with long-term current use of insulin

## 2017-08-30 NOTE — PROGRESS NOTE ADULT - ASSESSMENT
62yoF hx of T2DM, HTN, HLD, recent admission for R hip pain s/p arthrocentesis and tx for septic arthritis p/w L hip pain x 2 weeks with MRI Hip showing no evidence of septic arthritis/effusion but suggests extensive tendinosis.     1) Left hip pain- MRI hip shows R gluteal tendinosis w/ moderate prox L hamstring tendinosis and partial thickness insertional tearing and edema/inflammation w/ mild b/l trochanteric bursitis. Per discussion with ortho PA, this can be caused by overuse injury/falls/or can be idiopathic. Pt is denying any major trauma or falls or physical exertion. Possible idiopathic cause.   Pt with mild leukocytosis and fever on admission to ED but has been afebrile with no leukocytosis and MRI does not show signs of effusion or osteomyelitis.  CK levels are also wnl.   RF negative, CCP and TROY still pending  - Rec NSAIDs for mgmt of tendonitis  - Pt has good kidney function Consider Naproxen 500mg BID x 7-10 days with outpt f/u.  - Pt seen by PT- rec home w/ PT    2) Sepsis- resolved. initially with fevers and leukocytosis on admission, now resolved. Blood cultures negative  - agree with ID in holding Abx at this time 62yoF hx of T2DM, HTN, HLD, recent admission for R hip pain s/p arthrocentesis and tx for septic arthritis p/w L hip pain x 2 weeks with MRI Hip showing no evidence of septic arthritis/effusion but suggests extensive tendinosis.     1) Left hip pain- MRI hip shows R gluteal tendinosis w/ moderate prox L hamstring tendinosis and partial thickness insertional tearing and edema/inflammation w/ mild b/l trochanteric bursitis. Per discussion with ortho PA, this can be caused by overuse injury/falls/or can be idiopathic. Pt is denying any major trauma or falls or physical exertion. Possible idiopathic cause.   Pt with mild leukocytosis and fever on admission to ED but has been afebrile with no leukocytosis and MRI does not show signs of effusion or osteomyelitis.  CK levels are also wnl.   RF negative, CCP and TROY still pending  Films reviewed with radiology- pt may have developed tendonitis as response to compensation from R hip discomfort  - Rec Prednisone 20mg for antiinflammatory benefit  - Pt seen by PT- rec home w/ PT    2) Sepsis- resolved. initially with fevers and leukocytosis on admission, now resolved. Blood cultures negative  - agree with ID in holding Abx at this time

## 2017-08-30 NOTE — PROGRESS NOTE ADULT - ATTENDING COMMENTS
Patient seen and examined by me personally  Agree with assessment and plan as above     Patient and/or family is aware of our assessment and plans

## 2017-08-30 NOTE — PROGRESS NOTE ADULT - ATTENDING COMMENTS
Patient seen and examined, chart/lab reviewed.   61 y/o female with h/o HTN, DM-2,  hypothyroid, p/w sharp LLE pain from hip to left thigh, difficulty ambulating due to pain. She had right hip pain last month, was treated for presumed septic joint but hip aspiration negative for growth.    Pt reports pains in her left hip/thigh, no other specific complaints.   PE: lungs clear, heart regular, abdomen soft; extrem: no leg edema, unable to life up left leg due to pain    Assessment/plan:  # Severe Acute LLE pain: likely due to b/l greater trochanteric bursitis and mod prox left hamstring insertional tendinosis with partial thickness insertional tearing and reactive edema/inflammation as seen on MRI  Also mild spinal stenosis with disc bulges at L4-5, L5-S1 seen on MRI Lumbar spine  Seen by PT, suggest home with home PT  F/u ortho, if no acute surgical intervention, pt can be discharged with outpt f/u  PMR consult pending  No evidence of septic joint or osteomyelitis on MRI, monitor off abx  F/u serologies. RF elevated to 20.8, f/u rheum. TROY pending. CPK 41 normal.  Her inflammatory markers are elevated (ESR 50, previous 92, CRP 32.6).  # Transaminitis: hepatitis panel negative,  avoid nephrotoxins, recent CT showed normal liver/GB  # Uncontrolled DM-2:  A1c 9.7% in July, c/w lantus 30 u hs, increase humalog to 13 u ac, endocrine consult, monitor FS  # Anemia of chronic disease: ferritin 247, monitor CBC, no indication for transfusion

## 2017-08-30 NOTE — PROGRESS NOTE ADULT - ASSESSMENT
Assessment     Pt is a 62F PMH DMII (on insulin at home A1C 9/.% 7/2017), HTN, HLD, hypothyroid p/w severe L hip and inner thigh pain extending to calf, which has been present over past few weeks, but significantly worsened over past few days, with recent admission for similar R hip pain in 7/17. Polyarticular arthralgias versus arthritis seems unlikely to be Infectious in cause.  MRI of bilateral hips showed no evidence of septic arthritis or osteomyelitis.     Plan   Infection unlikely given clinical picture and MRI results  Plan to sign off  Reconsult if needed 62F PMH DMII (on insulin at home A1C 9/.% 7/2017), HTN, HLD, hypothyroid p/w severe L hip and inner thigh pain extending to calf, which has been present over past few weeks, but significantly worsened over past few days, with recent admission for similar R hip pain in 7/17. History and MRI findings do not favor infectious etiology.  Continue to observe off antibiotics.  Rheum/ortho following.    please call ID if change in status  [phone]  thank you!

## 2017-08-30 NOTE — PHYSICAL THERAPY INITIAL EVALUATION ADULT - PERTINENT HX OF CURRENT PROBLEM, REHAB EVAL
Pt is a 62F  PMH DMII (on insulin at home A1C 9/.% 7/2017), HTN, HLD p/w severe L hip pain. Pt is a 62F  PMH DMII (on insulin), HTN, HLD p/w  L hip pain.

## 2017-08-31 ENCOUNTER — TRANSCRIPTION ENCOUNTER (OUTPATIENT)
Age: 62
End: 2017-08-31

## 2017-08-31 VITALS
SYSTOLIC BLOOD PRESSURE: 107 MMHG | RESPIRATION RATE: 18 BRPM | OXYGEN SATURATION: 97 % | TEMPERATURE: 98 F | DIASTOLIC BLOOD PRESSURE: 60 MMHG | HEART RATE: 79 BPM

## 2017-08-31 DIAGNOSIS — R74.0 NONSPECIFIC ELEVATION OF LEVELS OF TRANSAMINASE AND LACTIC ACID DEHYDROGENASE [LDH]: ICD-10-CM

## 2017-08-31 DIAGNOSIS — A41.9 SEPSIS, UNSPECIFIED ORGANISM: ICD-10-CM

## 2017-08-31 LAB
ALBUMIN SERPL ELPH-MCNC: 3.5 G/DL — SIGNIFICANT CHANGE UP (ref 3.3–5)
ALP SERPL-CCNC: 136 U/L — HIGH (ref 40–120)
ALT FLD-CCNC: 100 U/L — HIGH (ref 4–33)
AST SERPL-CCNC: 47 U/L — HIGH (ref 4–32)
BASOPHILS # BLD AUTO: 0.01 K/UL — SIGNIFICANT CHANGE UP (ref 0–0.2)
BASOPHILS NFR BLD AUTO: 0.1 % — SIGNIFICANT CHANGE UP (ref 0–2)
BILIRUB SERPL-MCNC: 0.3 MG/DL — SIGNIFICANT CHANGE UP (ref 0.2–1.2)
BUN SERPL-MCNC: 23 MG/DL — SIGNIFICANT CHANGE UP (ref 7–23)
CALCIUM SERPL-MCNC: 9.4 MG/DL — SIGNIFICANT CHANGE UP (ref 8.4–10.5)
CHLORIDE SERPL-SCNC: 101 MMOL/L — SIGNIFICANT CHANGE UP (ref 98–107)
CMV IGM FLD-ACNC: <8 AU/ML — SIGNIFICANT CHANGE UP
CMV IGM SERPL QL: NEGATIVE — SIGNIFICANT CHANGE UP
CO2 SERPL-SCNC: 20 MMOL/L — LOW (ref 22–31)
CREAT SERPL-MCNC: 0.77 MG/DL — SIGNIFICANT CHANGE UP (ref 0.5–1.3)
EOSINOPHIL # BLD AUTO: 0.05 K/UL — SIGNIFICANT CHANGE UP (ref 0–0.5)
EOSINOPHIL NFR BLD AUTO: 0.5 % — SIGNIFICANT CHANGE UP (ref 0–6)
GLUCOSE SERPL-MCNC: 291 MG/DL — HIGH (ref 70–99)
HCT VFR BLD CALC: 32.6 % — LOW (ref 34.5–45)
HGB BLD-MCNC: 10.9 G/DL — LOW (ref 11.5–15.5)
IMM GRANULOCYTES # BLD AUTO: 0.04 # — SIGNIFICANT CHANGE UP
IMM GRANULOCYTES NFR BLD AUTO: 0.4 % — SIGNIFICANT CHANGE UP (ref 0–1.5)
LYMPHOCYTES # BLD AUTO: 2.2 K/UL — SIGNIFICANT CHANGE UP (ref 1–3.3)
LYMPHOCYTES # BLD AUTO: 21.6 % — SIGNIFICANT CHANGE UP (ref 13–44)
MAGNESIUM SERPL-MCNC: 1.8 MG/DL — SIGNIFICANT CHANGE UP (ref 1.6–2.6)
MCHC RBC-ENTMCNC: 26.3 PG — LOW (ref 27–34)
MCHC RBC-ENTMCNC: 33.4 % — SIGNIFICANT CHANGE UP (ref 32–36)
MCV RBC AUTO: 78.6 FL — LOW (ref 80–100)
MONOCYTES # BLD AUTO: 0.44 K/UL — SIGNIFICANT CHANGE UP (ref 0–0.9)
MONOCYTES NFR BLD AUTO: 4.3 % — SIGNIFICANT CHANGE UP (ref 2–14)
NEUTROPHILS # BLD AUTO: 7.45 K/UL — HIGH (ref 1.8–7.4)
NEUTROPHILS NFR BLD AUTO: 73.1 % — SIGNIFICANT CHANGE UP (ref 43–77)
NRBC # FLD: 0 — SIGNIFICANT CHANGE UP
PHOSPHATE SERPL-MCNC: 3.7 MG/DL — SIGNIFICANT CHANGE UP (ref 2.5–4.5)
PLATELET # BLD AUTO: 287 K/UL — SIGNIFICANT CHANGE UP (ref 150–400)
PMV BLD: 10.6 FL — SIGNIFICANT CHANGE UP (ref 7–13)
POTASSIUM SERPL-MCNC: 4.6 MMOL/L — SIGNIFICANT CHANGE UP (ref 3.5–5.3)
POTASSIUM SERPL-SCNC: 4.6 MMOL/L — SIGNIFICANT CHANGE UP (ref 3.5–5.3)
PROT SERPL-MCNC: 7.9 G/DL — SIGNIFICANT CHANGE UP (ref 6–8.3)
RBC # BLD: 4.15 M/UL — SIGNIFICANT CHANGE UP (ref 3.8–5.2)
RBC # FLD: 13.3 % — SIGNIFICANT CHANGE UP (ref 10.3–14.5)
SODIUM SERPL-SCNC: 138 MMOL/L — SIGNIFICANT CHANGE UP (ref 135–145)
WBC # BLD: 10.19 K/UL — SIGNIFICANT CHANGE UP (ref 3.8–10.5)
WBC # FLD AUTO: 10.19 K/UL — SIGNIFICANT CHANGE UP (ref 3.8–10.5)

## 2017-08-31 PROCEDURE — 99239 HOSP IP/OBS DSCHRG MGMT >30: CPT

## 2017-08-31 PROCEDURE — 99232 SBSQ HOSP IP/OBS MODERATE 35: CPT | Mod: GC

## 2017-08-31 RX ORDER — INSULIN GLARGINE 100 [IU]/ML
35 INJECTION, SOLUTION SUBCUTANEOUS
Qty: 0 | Refills: 0 | COMMUNITY

## 2017-08-31 RX ORDER — INSULIN GLARGINE 100 [IU]/ML
50 INJECTION, SOLUTION SUBCUTANEOUS
Qty: 0 | Refills: 0 | COMMUNITY
Start: 2017-08-31

## 2017-08-31 RX ORDER — INSULIN ASPART 100 [IU]/ML
15 INJECTION, SOLUTION SUBCUTANEOUS
Qty: 1 | Refills: 1 | OUTPATIENT
Start: 2017-08-31 | End: 2017-10-29

## 2017-08-31 RX ORDER — INSULIN GLARGINE 100 [IU]/ML
35 INJECTION, SOLUTION SUBCUTANEOUS AT BEDTIME
Qty: 0 | Refills: 0 | Status: DISCONTINUED | OUTPATIENT
Start: 2017-08-31 | End: 2017-08-31

## 2017-08-31 RX ORDER — INSULIN LISPRO 100/ML
15 VIAL (ML) SUBCUTANEOUS
Qty: 1 | Refills: 0 | OUTPATIENT
Start: 2017-08-31 | End: 2017-09-30

## 2017-08-31 RX ORDER — INSULIN LISPRO 100/ML
15 VIAL (ML) SUBCUTANEOUS
Qty: 0 | Refills: 0 | Status: DISCONTINUED | OUTPATIENT
Start: 2017-08-31 | End: 2017-08-31

## 2017-08-31 RX ORDER — INSULIN GLARGINE 100 [IU]/ML
45 INJECTION, SOLUTION SUBCUTANEOUS
Qty: 0 | Refills: 0 | COMMUNITY

## 2017-08-31 RX ORDER — INSULIN GLARGINE 100 [IU]/ML
35 INJECTION, SOLUTION SUBCUTANEOUS
Qty: 1 | Refills: 0 | OUTPATIENT
Start: 2017-08-31

## 2017-08-31 RX ADMIN — Medication 15 UNIT(S): at 12:38

## 2017-08-31 RX ADMIN — Medication 3: at 08:35

## 2017-08-31 RX ADMIN — Medication 15 UNIT(S): at 17:24

## 2017-08-31 RX ADMIN — Medication 3: at 12:37

## 2017-08-31 RX ADMIN — Medication 75 MICROGRAM(S): at 05:09

## 2017-08-31 RX ADMIN — Medication 2: at 17:23

## 2017-08-31 RX ADMIN — Medication 13 UNIT(S): at 08:36

## 2017-08-31 RX ADMIN — Medication 20 MILLIGRAM(S): at 05:09

## 2017-08-31 RX ADMIN — LISINOPRIL 10 MILLIGRAM(S): 2.5 TABLET ORAL at 05:09

## 2017-08-31 RX ADMIN — FAMOTIDINE 20 MILLIGRAM(S): 10 INJECTION INTRAVENOUS at 12:37

## 2017-08-31 NOTE — DISCHARGE NOTE ADULT - CARE PROVIDERS DIRECT ADDRESSES
,DirectAddress_Unknown ,DirectAddress_Unknown,[email or web address]

## 2017-08-31 NOTE — DISCHARGE NOTE ADULT - CARE PROVIDER_API CALL
Yoselin Martinez (DO), Internal Medicine  [address] [facility]  [address]  [place] [address]  Phone: [phone]  Fax: [phone] Yoselin Martinez (), Internal Medicine  [address] [facility] [address]  [place] [address]  Phone: [phone]  Fax: [phone]    Cecilia Valerio (MD), Internal Medicine; Rheumatology  [address] [place] [address] [address]  [place] [address]  Phone: [phone]  Fax: [phone]

## 2017-08-31 NOTE — PROGRESS NOTE ADULT - SUBJECTIVE AND OBJECTIVE BOX
Patient is a 62y old  Female who presents with a chief complaint of L hip pain (29 Aug 2017 02:13)       INTERVAL HPI/OVERNIGHT EVENTS: No acute events overnight. Patient started on prednisone 20 mg yesterday. Continues to complain of moderate lower back and left hip and leg pain. No fevers, chills, or other complaints.     MEDICATIONS  (STANDING):  insulin glargine Injectable (LANTUS) 30 Unit(s) SubCutaneous at bedtime  insulin lispro (HumaLOG) corrective regimen sliding scale   SubCutaneous three times a day before meals  insulin lispro (HumaLOG) corrective regimen sliding scale   SubCutaneous at bedtime  dextrose 5%. 1000 milliLiter(s) (50 mL/Hr) IV Continuous <Continuous>  dextrose 50% Injectable 12.5 Gram(s) IV Push once  dextrose 50% Injectable 25 Gram(s) IV Push once  dextrose 50% Injectable 25 Gram(s) IV Push once  lisinopril 10 milliGRAM(s) Oral daily  atorvastatin 10 milliGRAM(s) Oral at bedtime  levothyroxine 75 MICROGram(s) Oral daily  insulin lispro Injectable (HumaLOG) 13 Unit(s) SubCutaneous three times a day before meals  famotidine    Tablet 20 milliGRAM(s) Oral daily  predniSONE   Tablet 20 milliGRAM(s) Oral daily    MEDICATIONS  (PRN):  dextrose Gel 1 Dose(s) Oral once PRN Blood Glucose LESS THAN 70 milliGRAM(s)/deciliter  glucagon  Injectable 1 milliGRAM(s) IntraMuscular once PRN Glucose LESS THAN 70 milligrams/deciliter  acetaminophen   Tablet. 650 milliGRAM(s) Oral every 6 hours PRN Moderate Pain (4 - 6)      Allergies    No Known Allergies    Intolerances        REVIEW OF SYSTEMS:  CONSTITUTIONAL: No fever  RESPIRATORY: No cough, wheezing, No shortness of breath  CARDIOVASCULAR: No chest pain, palpitations, dizziness, or leg swelling  GASTROINTESTINAL: No nausea, vomiting, or abdominal pain   NEUROLOGICAL: No loss of strength   SKIN: No itching or rashes    MUSCULOSKELETAL: + left hip and right medial malleolus joint pain, no swelling;     Vital Signs Last 24 Hrs  T(C): 36.9 (31 Aug 2017 05:06), Max: 36.9 (30 Aug 2017 11:50)  T(F): 98.5 (31 Aug 2017 05:06), Max: 98.5 (31 Aug 2017 05:06)  HR: 80 (31 Aug 2017 05:06) (69 - 80)  BP: 100/57 (31 Aug 2017 05:06) (100/57 - 125/72)  BP(mean): --  RR: 16 (31 Aug 2017 05:06) (16 - 18)  SpO2: 100% (31 Aug 2017 05:06) (100% - 100%)    PHYSICAL EXAM:  GENERAL: NAD  HEAD:  Atraumatic, Normocephalic  EYES: EOMI, PERRLA, conjunctiva and sclera clear  ENMT:  Moist mucous membranes  NERVOUS SYSTEM: AOX3, motor and sensation grossly intact in b/l UE and b/l LE  PSYCHIATRIC: Appropriate affect and mood  CHEST/LUNG: Clear to auscultation bilaterally  HEART: Regular rate and rhythm; No murmurs, rubs, or gallops. No LE edema  ABDOMEN: Soft, Nontender, Nondistended; Bowel sounds present  EXTREMITIES:  2+ Peripheral Pulses  SKIN: No rashes or lesions    LABS:                        10.9   10.19 )-----------( 287      ( 31 Aug 2017 06:30 )             32.6       Ca    9.5        30 Aug 2017 06:30        CAPILLARY BLOOD GLUCOSE  267 (31 Aug 2017 08:22)  186 (30 Aug 2017 21:31)  130 (30 Aug 2017 17:30)  288 (30 Aug 2017 11:50)        BLOOD CULTURE    RADIOLOGY & ADDITIONAL TESTS:    Imaging Personally Reviewed:  [X ] YES     Consultant(s) Notes Reviewed:  Rheumatology, Heme Onc 8/30    Care Discussed with Consultants/Other Providers: Rheumatology

## 2017-08-31 NOTE — PROGRESS NOTE ADULT - PROBLEM SELECTOR PLAN 4
- c/w Lantus, Premeal insulin, and sliding scale  - Humalog changed to 13U premeal.  - morning FS in 280's, elevated, will need to add insulin.   - Persistent.  Unlikely hepatitis as Hepatitis panel was negative.

## 2017-08-31 NOTE — DISCHARGE NOTE ADULT - MEDICATION SUMMARY - MEDICATIONS TO STOP TAKING
I will STOP taking the medications listed below when I get home from the hospital:    ramipril 2.5 mg oral capsule  -- 1 cap(s) by mouth once a day    doxycycline monohydrate 100 mg oral capsule  -- 1 cap(s) by mouth every 12 hours    cephalexin 500 mg oral capsule  -- 1 cap(s) by mouth 4 times a day

## 2017-08-31 NOTE — PROGRESS NOTE ADULT - PROBLEM SELECTOR PLAN 9
DVT ppx - Lovenox  Diet - Consistent carb, DASH diet

## 2017-08-31 NOTE — DISCHARGE NOTE ADULT - HOME CARE AGENCY
[facility] [id number]. RN to visit tomorrow 9/21/17. RN to call you to arrange visit time. Physical therapy to follow [facility] [id number]. RN to visit tomorrow 9/1/17. RN to call you to arrange visit time. Physical therapy to follow

## 2017-08-31 NOTE — DISCHARGE NOTE ADULT - MEDICATION SUMMARY - MEDICATIONS TO CHANGE
I will SWITCH the dose or number of times a day I take the medications listed below when I get home from the hospital:    Lantus 100 units/mL subcutaneous solution  -- 45 unit(s) subcutaneous once a day (at bedtime)

## 2017-08-31 NOTE — PROGRESS NOTE ADULT - ATTENDING COMMENTS
Patient seen and examined, chart/lab reviewed.   61 y/o female with h/o HTN, DM-2,  hypothyroid, p/w sharp LLE pain from hip to left thigh, difficulty ambulating due to pain. She had right hip pain last month, was treated for presumed septic joint but hip aspiration negative for growth.    Pt reports pains in her left hip/thigh, no other specific complaints.   PE: lungs clear, heart regular, abdomen soft; extrem: no leg edema, unable to life up left leg due to pain    Assessment/plan:  # Severe Acute LLE pain likely due to left hamstring tendinitis with partial thickness tearing, and b/l greater trochanteric bursitis   Also mild spinal stenosis with disc bulges at L4-5, L5-S1 seen on MRI Lumbar spine  Seen by PT, d/c home today with home care/home PT, case d/w CM/SW  F/u ortho, if no acute surgical intervention, pt can be discharged with outpt f/u    No evidence of septic joint or osteomyelitis on MRI, monitor off abx  F/u serologies. RF mildly elevated to 20.8, TROY neg, CCP pending. CPK 41 normal.  Her inflammatory markers are elevated (ESR 50, previous 92, CRP 32.6). Doubt RA. Started on prednisone 20 mg qd as per rheum.  EBV/CMV prior infection.   F/u rheum as outpt  # Transaminitis: hepatitis panel negative,  avoid nephrotoxins, recent CT showed normal liver/GB  # Uncontrolled DM-2:  A1c 9.7% in July, increase lantus to 35 u hs and humalog 15 u ac, f/u endocrine clinic as outpt, monitor FS  # Anemia of chronic disease: ferritin 247, monitor CBC, no indication for transfusion Patient seen and examined, chart/lab reviewed.   63 y/o female with h/o HTN, DM-2,  hypothyroid, p/w sharp LLE pain from hip to left thigh, difficulty ambulating due to pain. She had right hip pain last month, was treated for presumed septic joint but hip aspiration negative for growth.    Pt feels better today, able to move her left leg.   PE: lungs clear, heart regular, abdomen soft; extrem: no leg edema, able to life up left leg     Assessment/plan:  # Severe Acute LLE pain likely due to left hamstring tendinitis with partial thickness tearing, and b/l greater trochanteric bursitis   Also mild spinal stenosis with disc bulges at L4-5, L5-S1 seen on MRI Lumbar spine  Seen by PT, d/c home today with home care/home PT, case d/w CM/SW  F/u ortho, if no acute surgical intervention, pt can be discharged with outpt f/u    No evidence of septic joint or osteomyelitis on MRI, monitor off abx  F/u serologies. RF mildly elevated to 20.8, TROY neg, CCP pending. CPK 41 normal.  Her inflammatory markers are elevated (ESR 50, previous 92, CRP 32.6). Doubt RA. Started on prednisone 20 mg qd as per rheum.  EBV/CMV prior infection.   F/u rheum as outpt  # Transaminitis: hepatitis panel negative,  avoid nephrotoxins, recent CT showed normal liver/GB  # Uncontrolled DM-2:  A1c 9.7% in July, increase lantus to 35 u hs and humalog 15 u ac, f/u endocrine clinic as outpt, monitor FS  # Anemia of chronic disease: ferritin 247, monitor CBC, no indication for transfusion

## 2017-08-31 NOTE — PROGRESS NOTE ADULT - ASSESSMENT
62yoF hx of T2DM, HTN, HLD, recent admission for R hip pain s/p arthrocentesis and tx for septic arthritis p/w L hip pain x 2 weeks with MRI Hip showing no evidence of septic arthritis/effusion but suggests extensive tendinosis.

## 2017-08-31 NOTE — PROGRESS NOTE ADULT - PROBLEM SELECTOR PLAN 3
f/u hepatitis panel as patient has mild transaminitis this admission and last admission in 7/2017 - c/w Lantus, Premeal insulin, and sliding scale  - Humalog changed to 13U premeal.  - morning FS in 280's, elevated, will need to add insulin.   - - c/w Lantus, Premeal insulin, and sliding scale  - Increased lantus to 35U, and premeal humalog 15U, due to persistent elevated morning FS.     - - c/w Lantus, Premeal insulin, and sliding scale  - Increased lantus to 35U, and premeal humalog 15U, due to persistent elevated morning FS.   - will d/c home oral metformin and glipizide on discharge  -

## 2017-08-31 NOTE — DISCHARGE NOTE ADULT - CARE PLAN
Principal Discharge DX:	Trochanteric bursitis  Goal:	Relieve left hip and leg pain  Instructions for follow-up, activity and diet:	You came to the hospital due to left hip and leg pain. Your blood cultures were negative. MRI of your hips showed no evidence of infection (no septic joint or osteomyelitis). MRI showed trochanteric bursitis (inflammation in both hips). It showed a partial thickness tendon tear where the hamstring muscle inserts into the hip bone.  Secondary Diagnosis:	Diabetes  Goal:	Control blood glucose  Instructions for follow-up, activity and diet:	You came to the hospital with a high HbA1c ( a marker of your 3-month blood glucose control). You will need better control of your blood glucose. You should stop taking your home medications of metformin and glipizide. You should only be taking Lantus 35 units at night, and Novolog (premeal insulin) 15U before each meal, 3 meals/day. Keep track of your blood glucose and follow up with your primary care doctor.  Secondary Diagnosis:	HTN (hypertension)  Secondary Diagnosis:	Hypothyroid Principal Discharge DX:	Trochanteric bursitis  Goal:	Relieve left hip and leg pain  Instructions for follow-up, activity and diet:	You came to the hospital due to left hip and leg pain. Your blood cultures were negative. MRI of your hips showed no evidence of infection (no septic joint or osteomyelitis). MRI showed trochanteric bursitis (inflammation in both hips). It showed a partial thickness tendon tear on the left side where the hamstring muscle inserts into the hip bone.  Secondary Diagnosis:	Diabetes  Goal:	Control blood glucose  Instructions for follow-up, activity and diet:	You came to the hospital with a high HbA1c of 9.7% in July ( a marker of your 3-month blood glucose control). You will need better control of your blood glucose. You should stop taking your home medications of metformin and glipizide. You should only be taking Lantus 35 units at night, and Novolog (premeal insulin) 15U before each meal, 3 meals/day. Keep track of your blood glucose and follow up with your primary care doctor.  Secondary Diagnosis:	HTN (hypertension)  Goal:	Manage blood pressure  Instructions for follow-up, activity and diet:	Continue lisinopril 10 mg daily.  Secondary Diagnosis:	Hypothyroid  Goal:	Maintenance treatment  Instructions for follow-up, activity and diet:	Continue to take your thyroid medication at home dose. Principal Discharge DX:	Tendinosis  Goal:	Relieve left hip and leg pain  Instructions for follow-up, activity and diet:	You came to the hospital due to left hip and leg pain. Your blood cultures were negative. MRI of your hips showed no evidence of infection (no septic joint or osteomyelitis). MRI showed trochanteric bursitis (inflammation in both hips). It showed a partial thickness tendon tear on the left side where the hamstring muscle inserts into the hip bone.    Continue to take prednisone 20 mg for 7 days, as directed by rheumatology. After that, take prednisone 15 mg for 7 days. Follow up with Rheumatology clinic in 2 weeks.  Secondary Diagnosis:	Diabetes  Goal:	Control blood glucose  Instructions for follow-up, activity and diet:	You came to the hospital with a high HbA1c of 9.7% in July ( a marker of your 3-month blood glucose control). You will need better control of your blood glucose. You should stop taking your home medications of metformin and glipizide. You should only be taking Lantus 35 units at night, and Novolog (premeal insulin) 15U before each meal, 3 meals/day. Keep track of your blood glucose and follow up with your primary care doctor.  Secondary Diagnosis:	HTN (hypertension)  Goal:	Manage blood pressure  Instructions for follow-up, activity and diet:	Continue lisinopril 10 mg daily.  Secondary Diagnosis:	Hypothyroid  Goal:	Maintenance treatment  Instructions for follow-up, activity and diet:	Continue to take your thyroid medication at home dose.

## 2017-08-31 NOTE — PROGRESS NOTE ADULT - SUBJECTIVE AND OBJECTIVE BOX
INTERVAL HPI/OVERNIGHT EVENTS: NAEON, patient afebrile and hemodynamically stable. Reports that her pain is improved this AM following two doses of prednisone. Patient reports she is likely to be discharged today.    MEDICATIONS  (STANDING):  insulin glargine Injectable (LANTUS) 30 Unit(s) SubCutaneous at bedtime  insulin lispro (HumaLOG) corrective regimen sliding scale   SubCutaneous three times a day before meals  insulin lispro (HumaLOG) corrective regimen sliding scale   SubCutaneous at bedtime  dextrose 5%. 1000 milliLiter(s) (50 mL/Hr) IV Continuous <Continuous>  dextrose 50% Injectable 12.5 Gram(s) IV Push once  dextrose 50% Injectable 25 Gram(s) IV Push once  dextrose 50% Injectable 25 Gram(s) IV Push once  lisinopril 10 milliGRAM(s) Oral daily  atorvastatin 10 milliGRAM(s) Oral at bedtime  levothyroxine 75 MICROGram(s) Oral daily  insulin lispro Injectable (HumaLOG) 13 Unit(s) SubCutaneous three times a day before meals  famotidine    Tablet 20 milliGRAM(s) Oral daily  predniSONE   Tablet 20 milliGRAM(s) Oral daily    MEDICATIONS  (PRN):  dextrose Gel 1 Dose(s) Oral once PRN Blood Glucose LESS THAN 70 milliGRAM(s)/deciliter  glucagon  Injectable 1 milliGRAM(s) IntraMuscular once PRN Glucose LESS THAN 70 milligrams/deciliter  acetaminophen   Tablet. 650 milliGRAM(s) Oral every 6 hours PRN Moderate Pain (4 - 6)      Allergies    No Known Allergies    Intolerances        REVIEW OF SYSTEMS  General: No fever/chills	  Skin: No rash  Ophthalmologic: No vision loss  ENMT: No pain/dryness	  Respiratory and Thorax: No cough  Cardiovascular:	No chestpain  Gastrointestinal:	 No N/V, diarrhea  Musculoskeletal: L lateral thigh and buttock pain, R medial thigh pain, R foot and ankle pain	  Neurological: No headache	  Psychiatric: No difficulty sleeping	    Vital Signs Last 24 Hrs  T(C): 36.9 (31 Aug 2017 05:06), Max: 36.9 (30 Aug 2017 11:50)  T(F): 98.5 (31 Aug 2017 05:06), Max: 98.5 (31 Aug 2017 05:06)  HR: 80 (31 Aug 2017 05:06) (69 - 80)  BP: 100/57 (31 Aug 2017 05:06) (100/57 - 125/72)  BP(mean): --  RR: 16 (31 Aug 2017 05:06) (16 - 18)  SpO2: 100% (31 Aug 2017 05:06) (100% - 100%)    PHYSICAL EXAM:  Constitutional: Well developed well nourished  Eyes: No discharge, erythema, icterus  ENMT: Moist mucous membranes  Respiratory: CTAB, no wheezing/rales  Cardiovascular: RRR, no m/r/g  Gastrointestinal: +BSx4, soft, non-tender  Neurological: A+Ox3, conversant, follows commands, moves all extremities although effort limited by pain in the lower extremity; sensation intact over distal extremity.  Skin: No rash; hyperpigmented linear area approximately 1 cm by 7 cm on shin consistent with healed wound  Musculoskeletal: Significant tightness of lateral thigh muscle/IT band bilaterally. Tender to palpation over lateral thigh, trochanter and anterior illiac crest on the left. Mildly tender over adductor compartment of medial thigh on right, mildly tender to palpation medial right ankle and sole of foot, no swelling or effusion appreciated.     LABS:                        10.9   10.19 )-----------( 287      ( 31 Aug 2017 06:30 )             32.6     08-31    138  |  101  |  23  ----------------------------<  291<H>  4.6   |  20<L>  |  0.77    Ca    9.4      31 Aug 2017 06:30  Phos  3.7     08-31  Mg     1.8     08-31    TPro  7.9  /  Alb  3.5  /  TBili  0.3  /  DBili  x   /  AST  47<H>  /  ALT  100<H>  /  AlkPhos  136<H>  08-31    Rheumatoid Factor Quant, Serum or Plasma: 20.8  Cytomegalovirus IgM Antibody, Serum (08.30.17 @ 10:23)    CMV IgM Antibody: <8.0 AU/mL    CMV IgM Interpretation: Negative:  Cytomegalovirus IgG Antibody, Serum (08.29.17 @ 12:00)    CMV IgG Antibody: 9.70 U/mL    CMV IgG Interpretation: Positive

## 2017-08-31 NOTE — DISCHARGE NOTE ADULT - MEDICATION SUMMARY - MEDICATIONS TO TAKE
I will START or STAY ON the medications listed below when I get home from the hospital:    predniSONE 20 mg oral tablet  -- 1 tab(s) by mouth once a day  -- Indication: For Tendinosis    predniSONE 5 mg oral tablet  -- 3 tab(s) by mouth once a day  -- It is very important that you take or use this exactly as directed.  Do not skip doses or discontinue unless directed by your doctor.  Obtain medical advice before taking any non-prescription drugs as some may affect the action of this medication.  Take with food or milk.    -- Indication: For Tendinosis    ibuprofen 400 mg oral tablet  -- 1 tab(s) by mouth every 6 hours, As needed, Moderate pain  -- Indication: For Left leg pain    aspirin 81 mg oral delayed release tablet  -- 1 tab(s) by mouth once a day  -- Indication: For Healthcare maintenance    ramipril 2.5 mg oral capsule  -- 1 cap(s) by mouth once a day  -- Indication: For HTN (hypertension)    Lantus 100 units/mL subcutaneous solution  -- 35 unit(s) subcutaneous once a day (at bedtime)  -- Indication: For Diabetes    NovoLOG FlexPen 100 units/mL subcutaneous solution  -- 15 unit(s) subcutaneous 3 times a day (before meals)  -- Do not drink alcoholic beverages when taking this medication.  Keep in refrigerator.  Do not freeze.  Obtain medical advice before taking any non-prescription drugs as some may affect the action of this medication.    -- Indication: For Diabetes    atorvastatin 10 mg oral tablet  -- 1 tab(s) by mouth once a day (at bedtime)  -- Indication: For HLD (hyperlipidemia)    levothyroxine 75 mcg (0.075 mg) oral tablet  -- 1 tab(s) by mouth once a day  -- Indication: For Hypothyroid

## 2017-08-31 NOTE — DISCHARGE NOTE ADULT - PLAN OF CARE
Relieve left hip and leg pain You came to the hospital due to left hip and leg pain. Your blood cultures were negative. MRI of your hips showed no evidence of infection (no septic joint or osteomyelitis). MRI showed trochanteric bursitis (inflammation in both hips). It showed a partial thickness tendon tear where the hamstring muscle inserts into the hip bone. Control blood glucose You came to the hospital with a high HbA1c ( a marker of your 3-month blood glucose control). You will need better control of your blood glucose. You should stop taking your home medications of metformin and glipizide. You should only be taking Lantus 35 units at night, and Novolog (premeal insulin) 15U before each meal, 3 meals/day. Keep track of your blood glucose and follow up with your primary care doctor. Continue lisinopril 10 mg daily. Manage blood pressure Maintenance treatment Continue to take your thyroid medication at home dose. You came to the hospital due to left hip and leg pain. Your blood cultures were negative. MRI of your hips showed no evidence of infection (no septic joint or osteomyelitis). MRI showed trochanteric bursitis (inflammation in both hips). It showed a partial thickness tendon tear on the left side where the hamstring muscle inserts into the hip bone. You came to the hospital with a high HbA1c of 9.7% in July ( a marker of your 3-month blood glucose control). You will need better control of your blood glucose. You should stop taking your home medications of metformin and glipizide. You should only be taking Lantus 35 units at night, and Novolog (premeal insulin) 15U before each meal, 3 meals/day. Keep track of your blood glucose and follow up with your primary care doctor. You came to the hospital due to left hip and leg pain. Your blood cultures were negative. MRI of your hips showed no evidence of infection (no septic joint or osteomyelitis). MRI showed trochanteric bursitis (inflammation in both hips). It showed a partial thickness tendon tear on the left side where the hamstring muscle inserts into the hip bone.    Continue to take prednisone 20 mg for 7 days, as directed by rheumatology. After that, take prednisone 15 mg for 7 days. Follow up with Rheumatology clinic in 2 weeks.

## 2017-08-31 NOTE — PROGRESS NOTE ADULT - PROBLEM SELECTOR PLAN 2
Ongoing for past couple of months, persistent Rt. ankle pain.  Due to joint stiffness (in wrists, fingers, and rt. ankle), and elevated ESR, CRP, suggestive of ongoing inflammation, with elevated RF levels.

## 2017-08-31 NOTE — PROGRESS NOTE ADULT - PROBLEM SELECTOR PLAN 5
- Resolved. Had fevers and leukocytosis on admission.   - Hemodynamically stable  - blood cx NGTD (obtained 8/28/17).   - monitor off Abx

## 2017-08-31 NOTE — DISCHARGE NOTE ADULT - HOSPITAL COURSE
Pt is a 62F  PMH DMII (on insulin at home A1C 9/.% 7/2017), HTN, HLD, hypothyroid p/w severe L hip and inner thigh pain extending to calf, with recent admission for similar right leg pain last month. Pt is a 62F  PMH DMII (on insulin at home A1C 9.7% 7/2017), HTN, HLD, hypothyroid p/w severe L hip and inner thigh pain extending to calf on 8/29, with recent admission for similar right leg pain at [facility] last month. Patient presented with fever of 101, however fever resolved while in hospital. Patient had no leukocytosis, and blood cultures negative. MRI of bilateral hips showed no evidence for septic joint or osteomyelitis. On MRI, there was mild distal right gluteal insertional tendinosis, moderate proximal left hamstring insertional tendinosis with partial thickness insertional tearing and reactive peritendinous edema/inflammation. Mild bilateral greater trochanteric bursitis was seen. Patient seen and evaluated by orthopedics and rheumatology. Per rheumatology reccomendations, she was started on prednisone 20 mg daily on 8/30 for management of her joint symptoms, as well as NSAIDS. Patient was advised to rest as she complained many of her symptoms were work related. During hospitalization her blood glucose was also uncontrolled, adjustments to insulin were made. On discharge, her insulin regimen changed to lantus 35U in evening, and premeal insulin 15U. Her home metformin and glipizide were discontinued. An appointment was made to follow up with Endocrinology clinic for her diabetes. Physical therapy assessed patient, stated pt. is stable to go home with home PT and rolling walker. Pt is a 62F  PMH DMII (on insulin at home A1C 9.7% 7/2017), HTN, HLD, hypothyroid p/w severe L hip and inner thigh pain extending to calf on 8/28, with recent admission for similar right leg pain at [facility] last month. Patient presented with fever of 101, however fever resolved while in hospital. Patient had leukocytosis on initial presentation, however WBC count was normal on all subsequent labs. Blood cultures remained negative. MRI of bilateral hips showed no evidence for septic joint or osteomyelitis. On MRI, there was mild distal right gluteal insertional tendinosis, moderate proximal left hamstring insertional tendinosis with partial thickness insertional tearing and reactive peritendinous edema/inflammation. Mild bilateral greater trochanteric bursitis was seen. Patient remained off antibiotics. Patient seen and evaluated by orthopedics and rheumatology. Per rheumatology recommendations, she was started on prednisone 20 mg daily on 8/30 for management of her joint symptoms, as well as NSAIDS. Patient was advised to rest as she complained many of her symptoms were work related. During hospitalization her blood glucose was also uncontrolled, adjustments to insulin were made. On discharge, her insulin regimen changed to lantus 35U in evening, and premeal insulin 15U. Her home metformin and glipizide were discontinued. An appointment was made to follow up with Endocrinology clinic for her diabetes. Physical therapy assessed patient, stated pt. is stable to go home with home PT and rolling walker.

## 2017-08-31 NOTE — DISCHARGE NOTE ADULT - ADDITIONAL INSTRUCTIONS
Follow up with Endocrinology on Tuesday, September 19th with Dr. Martinez at 9:45 am for your diabetes.  Follow up with Rheumatology. Follow up with Endocrinology on Tuesday, September 19th with Dr. Martinez at 9:45 am for your diabetes.  Follow up with Rheumatology in 2 weeks (number and address given below.)  Take prednisone 20 mg for 7 days, followed by 15 mg for 7 days.   Take NSAIDs (advil, motrin) for pain relief.   Make sure to get rest. Follow up with Endocrinology on Tuesday, September 19th with Dr. Martinez at 9:45 am for your diabetes.  Follow up with Rheumatology in 2 weeks (number and address given below, call to make appointment.)  Take prednisone 20 mg for 7 days, followed by 15 mg for 7 days.   Take NSAIDs (advil, motrin) for pain relief.   Make sure to get rest.

## 2017-08-31 NOTE — PROGRESS NOTE ADULT - PROBLEM SELECTOR PLAN 1
Films reviewed with radiology- pt may have developed tendonitis as response to compensation from R hip discomfort  - Remains tender with muscle spasm on exam but pain is decreasing, will need more time to assess response to steroids  - RF and TROY negative  - EBV, CMV serologies consistent with past exposure, no current infection Films reviewed with radiology- pt may have developed tendonitis as response to compensation from R hip discomfort  - Remains tender with muscle spasm on exam but pain is decreasing, will need more time to assess response to steroids  - RF very mildly elevated and TROY negative; CCP pending  - EBV, CMV serologies consistent with past exposure, no current infection Films reviewed with radiology- pt may have developed tendonitis as response to compensation from R hip discomfort  - Remains tender with muscle spasm on exam but pain is decreasing, will need more time to assess response to steroids  - Recommend discharge with Prednisone 20 for 7 days and tapering by 5 mg every seven days afterward until follow up with rheum clinic in 2-3 weeks.   - RF very mildly elevated and TROY negative; CCP pending  - EBV, CMV serologies consistent with past exposure, no current infection

## 2017-08-31 NOTE — PROGRESS NOTE ADULT - ATTENDING COMMENTS
pt seen and examined by me personally   agree with assessment and plan as above  f/uv with me in 2-3 weeks   pt has office information

## 2017-09-01 LAB — CCP AB SER-ACNC: >250 — SIGNIFICANT CHANGE UP

## 2017-09-01 RX ORDER — INSULIN LISPRO 100/ML
10 VIAL (ML) SUBCUTANEOUS
Qty: 0 | Refills: 0 | COMMUNITY
Start: 2017-09-01 | End: 2017-11-30

## 2017-09-01 RX ORDER — INSULIN LISPRO 100/ML
15 VIAL (ML) SUBCUTANEOUS
Qty: 6 | Refills: 0 | OUTPATIENT
Start: 2017-09-01 | End: 2017-11-30

## 2017-09-02 LAB
BACTERIA BLD CULT: SIGNIFICANT CHANGE UP
BACTERIA BLD CULT: SIGNIFICANT CHANGE UP

## 2017-09-18 ENCOUNTER — APPOINTMENT (OUTPATIENT)
Dept: RHEUMATOLOGY | Facility: CLINIC | Age: 62
End: 2017-09-18
Payer: COMMERCIAL

## 2017-09-18 ENCOUNTER — LABORATORY RESULT (OUTPATIENT)
Age: 62
End: 2017-09-18

## 2017-09-18 DIAGNOSIS — M25.552 PAIN IN LEFT HIP: ICD-10-CM

## 2017-09-18 DIAGNOSIS — M67.952 UNSPECIFIED DISORDER OF SYNOVIUM AND TENDON, LEFT THIGH: ICD-10-CM

## 2017-09-18 DIAGNOSIS — Z82.61 FAMILY HISTORY OF ARTHRITIS: ICD-10-CM

## 2017-09-18 DIAGNOSIS — M70.62 TROCHANTERIC BURSITIS, LEFT HIP: ICD-10-CM

## 2017-09-18 DIAGNOSIS — Z86.39 PERSONAL HISTORY OF OTHER ENDOCRINE, NUTRITIONAL AND METABOLIC DISEASE: ICD-10-CM

## 2017-09-18 PROCEDURE — 99215 OFFICE O/P EST HI 40 MIN: CPT

## 2017-09-18 RX ORDER — IBUPROFEN 200 MG/1
TABLET, COATED ORAL
Refills: 0 | Status: DISCONTINUED | COMMUNITY
End: 2017-09-18

## 2017-09-18 RX ORDER — CYCLOBENZAPRINE HYDROCHLORIDE 5 MG/1
5 TABLET, FILM COATED ORAL
Qty: 15 | Refills: 0 | Status: DISCONTINUED | COMMUNITY
Start: 2017-09-18 | End: 2017-09-18

## 2017-09-19 ENCOUNTER — APPOINTMENT (OUTPATIENT)
Dept: ENDOCRINOLOGY | Facility: CLINIC | Age: 62
End: 2017-09-19
Payer: COMMERCIAL

## 2017-09-19 VITALS
HEART RATE: 72 BPM | SYSTOLIC BLOOD PRESSURE: 100 MMHG | HEIGHT: 60 IN | DIASTOLIC BLOOD PRESSURE: 60 MMHG | OXYGEN SATURATION: 98 % | BODY MASS INDEX: 26.31 KG/M2 | WEIGHT: 134 LBS

## 2017-09-19 DIAGNOSIS — Z83.3 FAMILY HISTORY OF DIABETES MELLITUS: ICD-10-CM

## 2017-09-19 DIAGNOSIS — Z82.49 FAMILY HISTORY OF ISCHEMIC HEART DISEASE AND OTHER DISEASES OF THE CIRCULATORY SYSTEM: ICD-10-CM

## 2017-09-19 PROCEDURE — 36415 COLL VENOUS BLD VENIPUNCTURE: CPT

## 2017-09-19 PROCEDURE — 99204 OFFICE O/P NEW MOD 45 MIN: CPT | Mod: 25

## 2017-09-19 PROCEDURE — 82962 GLUCOSE BLOOD TEST: CPT

## 2017-09-19 PROCEDURE — 83036 HEMOGLOBIN GLYCOSYLATED A1C: CPT | Mod: QW

## 2017-09-20 PROBLEM — Z82.49 FAMILY HISTORY OF ACUTE MYOCARDIAL INFARCTION: Status: ACTIVE | Noted: 2017-09-20

## 2017-09-20 PROBLEM — Z82.49 FAMILY HISTORY OF PREMATURE CORONARY ARTERY DISEASE: Status: ACTIVE | Noted: 2017-09-20

## 2017-09-20 PROBLEM — Z83.3 FAMILY HISTORY OF DIABETES MELLITUS: Status: ACTIVE | Noted: 2017-09-20

## 2017-09-20 RX ORDER — MELOXICAM 7.5 MG/1
7.5 TABLET ORAL
Qty: 30 | Refills: 2 | Status: DISCONTINUED | COMMUNITY
Start: 2017-09-18 | End: 2017-09-20

## 2017-09-20 RX ORDER — METHOCARBAMOL 750 MG/1
750 TABLET, FILM COATED ORAL 3 TIMES DAILY
Qty: 90 | Refills: 0 | Status: DISCONTINUED | COMMUNITY
Start: 2017-09-18 | End: 2017-09-20

## 2017-09-20 RX ORDER — INSULIN LISPRO 100 [IU]/ML
INJECTION, SOLUTION SUBCUTANEOUS
Refills: 0 | Status: DISCONTINUED | COMMUNITY
End: 2017-09-20

## 2017-09-20 RX ORDER — INSULIN GLARGINE 100 [IU]/ML
INJECTION, SOLUTION SUBCUTANEOUS
Refills: 0 | Status: DISCONTINUED | COMMUNITY
End: 2017-09-20

## 2017-09-20 RX ORDER — ASPIRIN 325 MG/1
TABLET, FILM COATED ORAL
Refills: 0 | Status: DISCONTINUED | COMMUNITY
End: 2017-09-20

## 2017-09-20 RX ORDER — RAMIPRIL 2.5 MG/1
2.5 CAPSULE ORAL DAILY
Refills: 0 | Status: ACTIVE | COMMUNITY

## 2017-09-21 ENCOUNTER — RESULT REVIEW (OUTPATIENT)
Age: 62
End: 2017-09-21

## 2017-09-21 LAB
25(OH)D3 SERPL-MCNC: 27.2 NG/ML
ALBUMIN MFR SERPL ELPH: 52.4 %
ALBUMIN SERPL ELPH-MCNC: 4.1 G/DL
ALBUMIN SERPL-MCNC: 3.9 G/DL
ALBUMIN/GLOB SERPL: 1.1 RATIO
ALP BLD-CCNC: 95 U/L
ALPHA1 GLOB MFR SERPL ELPH: 3.8 %
ALPHA1 GLOB SERPL ELPH-MCNC: 0.3 G/DL
ALPHA2 GLOB MFR SERPL ELPH: 10.9 %
ALPHA2 GLOB SERPL ELPH-MCNC: 0.8 G/DL
ALT SERPL-CCNC: 37 U/L
ANA PAT FLD IF-IMP: ABNORMAL
ANA SER IF-ACNC: ABNORMAL
ANION GAP SERPL CALC-SCNC: 16 MMOL/L
APPEARANCE: CLEAR
APTT BLD: 35.5 SEC
AST SERPL-CCNC: 24 U/L
B-GLOBULIN MFR SERPL ELPH: 10.9 %
B-GLOBULIN SERPL ELPH-MCNC: 0.8 G/DL
B2 GLYCOPROT1 AB SER QL: NEGATIVE
B2 GLYCOPROT1 IGA SERPL IA-ACNC: <5 SAU
B2 GLYCOPROT1 IGG SER-ACNC: <5 SGU
B2 GLYCOPROT1 IGM SER-ACNC: <5 SMU
BASOPHILS # BLD AUTO: 0.01 K/UL
BASOPHILS NFR BLD AUTO: 0.1 %
BILIRUB SERPL-MCNC: 0.2 MG/DL
BILIRUBIN URINE: NEGATIVE
BLOOD URINE: NEGATIVE
BUN SERPL-MCNC: 20 MG/DL
CALCIUM SERPL-MCNC: 9.1 MG/DL
CARDIOLIPIN AB SER IA-ACNC: NEGATIVE
CARDIOLIPIN IGM SER-MCNC: 8.2 MPL
CARDIOLIPIN IGM SER-MCNC: <5 GPL
CCP AB SER IA-ACNC: >250 UNITS
CHLORIDE SERPL-SCNC: 103 MMOL/L
CHOLEST SERPL-MCNC: 203 MG/DL
CHOLEST/HDLC SERPL: 3.6 RATIO
CK SERPL-CCNC: 55 U/L
CO2 SERPL-SCNC: 23 MMOL/L
COLOR: YELLOW
CONFIRM: 29.2 SEC
CREAT SERPL-MCNC: 0.92 MG/DL
CREAT SPEC-SCNC: 124 MG/DL
CREAT SPEC-SCNC: 78 MG/DL
CREAT/PROT UR: 0.1 RATIO
CRP SERPL-MCNC: 0.3 MG/DL
DEPRECATED CARDIOLIPIN IGA SER: <5 APL
DEPRECATED KAPPA LC FREE/LAMBDA SER: 1.82 RATIO
DRVVT IMM 1:2 NP PPP: NORMAL
DRVVT SCREEN TO CONFIRM RATIO: 0.93 RATIO
DSDNA AB SER-ACNC: 13 IU/ML
ENA RNP AB SER IA-ACNC: <0.2 AL
ENA SM AB SER IA-ACNC: <0.2 AL
ENA SS-A AB SER IA-ACNC: <0.2 AL
ENA SS-B AB SER IA-ACNC: <0.2 AL
EOSINOPHIL # BLD AUTO: 0.34 K/UL
EOSINOPHIL NFR BLD AUTO: 4.8 %
ERYTHROCYTE [SEDIMENTATION RATE] IN BLOOD BY WESTERGREN METHOD: 24 MM/HR
FERRITIN SERPL-MCNC: 168 NG/ML
GAMMA GLOB FLD ELPH-MCNC: 1.6 G/DL
GAMMA GLOB MFR SERPL ELPH: 22 %
GLUCOSE BLDC GLUCOMTR-MCNC: 153
GLUCOSE QUALITATIVE U: 100 MG/DL
GLUCOSE SERPL-MCNC: 228 MG/DL
HBA1C MFR BLD HPLC: 8.7
HCT VFR BLD CALC: 32.9 %
HDLC SERPL-MCNC: 56 MG/DL
HGB BLD-MCNC: 10.5 G/DL
IGA 24H UR QL IFE: NORMAL
IGA SER QL IEP: 233 MG/DL
IGG SER QL IEP: 1650 MG/DL
IGM SER QL IEP: 121 MG/DL
IMM GRANULOCYTES NFR BLD AUTO: 0.3 %
INTERPRETATION SERPL IEP-IMP: NORMAL
KAPPA LC CSF-MCNC: 2.16 MG/DL
KAPPA LC SERPL-MCNC: 3.93 MG/DL
KETONES URINE: NEGATIVE
LDLC SERPL CALC-MCNC: 122 MG/DL
LEUKOCYTE ESTERASE URINE: NEGATIVE
LYMPHOCYTES # BLD AUTO: 2.52 K/UL
LYMPHOCYTES NFR BLD AUTO: 35.3 %
M PROTEIN SPEC IFE-MCNC: NORMAL
MAN DIFF?: NORMAL
MCHC RBC-ENTMCNC: 25.9 PG
MCHC RBC-ENTMCNC: 31.9 GM/DL
MCV RBC AUTO: 81.2 FL
MICROALBUMIN 24H UR DL<=1MG/L-MCNC: 6 MG/DL
MICROALBUMIN/CREAT 24H UR-RTO: 77 MG/G
MONOCYTES # BLD AUTO: 0.37 K/UL
MONOCYTES NFR BLD AUTO: 5.2 %
NEUTROPHILS # BLD AUTO: 3.88 K/UL
NEUTROPHILS NFR BLD AUTO: 54.3 %
NITRITE URINE: NEGATIVE
PH URINE: 5
PLATELET # BLD AUTO: 261 K/UL
POTASSIUM SERPL-SCNC: 4.4 MMOL/L
PROT SERPL-MCNC: 7.4 G/DL
PROT UR-MCNC: 18 MG/DL
PROTEIN URINE: NEGATIVE MG/DL
RBC # BLD: 4.05 M/UL
RBC # FLD: 14.6 %
RF+CCP IGG SER-IMP: ABNORMAL
RHEUMATOID FACT SER QL: 15 IU/ML
SCREEN DRVVT: 30.4 SEC
SODIUM SERPL-SCNC: 142 MMOL/L
SPECIFIC GRAVITY URINE: 1.02
T4 FREE SERPL-MCNC: 1.4 NG/DL
TRIGL SERPL-MCNC: 125 MG/DL
TSH SERPL-ACNC: 1.38 UIU/ML
UROBILINOGEN URINE: NORMAL MG/DL
WBC # FLD AUTO: 7.14 K/UL

## 2017-09-22 LAB
ADJUSTED MITOGEN: >10 IU/ML
ADJUSTED TB AG: 0 IU/ML
M TB IFN-G BLD-IMP: NEGATIVE
QUANTIFERON GOLD NIL: 0.19 IU/ML

## 2017-09-25 ENCOUNTER — RX RENEWAL (OUTPATIENT)
Age: 62
End: 2017-09-25

## 2017-09-27 ENCOUNTER — FORM ENCOUNTER (OUTPATIENT)
Age: 62
End: 2017-09-27

## 2017-09-28 ENCOUNTER — APPOINTMENT (OUTPATIENT)
Dept: ULTRASOUND IMAGING | Facility: IMAGING CENTER | Age: 62
End: 2017-09-28

## 2017-09-28 ENCOUNTER — OUTPATIENT (OUTPATIENT)
Dept: OUTPATIENT SERVICES | Facility: HOSPITAL | Age: 62
LOS: 1 days | End: 2017-09-28
Payer: COMMERCIAL

## 2017-09-28 ENCOUNTER — APPOINTMENT (OUTPATIENT)
Dept: ULTRASOUND IMAGING | Facility: CLINIC | Age: 62
End: 2017-09-28
Payer: COMMERCIAL

## 2017-09-28 DIAGNOSIS — M25.552 PAIN IN LEFT HIP: ICD-10-CM

## 2017-09-28 PROCEDURE — 20611 DRAIN/INJ JOINT/BURSA W/US: CPT | Mod: LT

## 2017-09-28 PROCEDURE — 20611 DRAIN/INJ JOINT/BURSA W/US: CPT

## 2017-10-17 ENCOUNTER — APPOINTMENT (OUTPATIENT)
Dept: RHEUMATOLOGY | Facility: CLINIC | Age: 62
End: 2017-10-17
Payer: COMMERCIAL

## 2017-10-17 VITALS
BODY MASS INDEX: 26.9 KG/M2 | HEIGHT: 60 IN | WEIGHT: 137 LBS | SYSTOLIC BLOOD PRESSURE: 113 MMHG | HEART RATE: 67 BPM | DIASTOLIC BLOOD PRESSURE: 71 MMHG | OXYGEN SATURATION: 99 % | TEMPERATURE: 97.7 F

## 2017-10-17 PROCEDURE — 99215 OFFICE O/P EST HI 40 MIN: CPT | Mod: GC

## 2017-10-25 ENCOUNTER — APPOINTMENT (OUTPATIENT)
Dept: ENDOCRINOLOGY | Facility: CLINIC | Age: 62
End: 2017-10-25

## 2017-10-25 ENCOUNTER — APPOINTMENT (OUTPATIENT)
Dept: CHRONIC DISEASE MANAGEMENT | Facility: CLINIC | Age: 62
End: 2017-10-25

## 2017-11-02 ENCOUNTER — RESULT REVIEW (OUTPATIENT)
Age: 62
End: 2017-11-02

## 2017-11-02 RX ORDER — INSULIN LISPRO 100 [IU]/ML
100 INJECTION, SOLUTION INTRAVENOUS; SUBCUTANEOUS
Qty: 3 | Refills: 0 | Status: ACTIVE | COMMUNITY
Start: 2017-09-20 | End: 1900-01-01

## 2017-11-02 RX ORDER — PEN NEEDLE, DIABETIC 29 G X1/2"
31G X 5 MM NEEDLE, DISPOSABLE MISCELLANEOUS
Qty: 6 | Refills: 0 | Status: ACTIVE | COMMUNITY
Start: 2017-11-02 | End: 1900-01-01

## 2017-11-02 RX ORDER — BLOOD SUGAR DIAGNOSTIC
STRIP MISCELLANEOUS
Qty: 6 | Refills: 0 | Status: ACTIVE | COMMUNITY
Start: 2017-11-02 | End: 1900-01-01

## 2017-11-06 ENCOUNTER — OTHER (OUTPATIENT)
Age: 62
End: 2017-11-06

## 2017-11-10 LAB
ALBUMIN SERPL ELPH-MCNC: 4.1 G/DL
ALP BLD-CCNC: 98 U/L
ALT SERPL-CCNC: 50 U/L
ANION GAP SERPL CALC-SCNC: 17 MMOL/L
AST SERPL-CCNC: 35 U/L
BASOPHILS # BLD AUTO: 0.01 K/UL
BASOPHILS NFR BLD AUTO: 0.1 %
BILIRUB SERPL-MCNC: 0.3 MG/DL
BUN SERPL-MCNC: 15 MG/DL
CALCIUM SERPL-MCNC: 9.3 MG/DL
CHLORIDE SERPL-SCNC: 104 MMOL/L
CO2 SERPL-SCNC: 22 MMOL/L
CREAT SERPL-MCNC: 0.83 MG/DL
EOSINOPHIL # BLD AUTO: 0.25 K/UL
EOSINOPHIL NFR BLD AUTO: 2.9 %
GLUCOSE SERPL-MCNC: 159 MG/DL
HCT VFR BLD CALC: 37.2 %
HGB BLD-MCNC: 12.3 G/DL
IMM GRANULOCYTES NFR BLD AUTO: 0.2 %
LYMPHOCYTES # BLD AUTO: 2.29 K/UL
LYMPHOCYTES NFR BLD AUTO: 26.8 %
MAN DIFF?: NORMAL
MCHC RBC-ENTMCNC: 27.1 PG
MCHC RBC-ENTMCNC: 33.1 GM/DL
MCV RBC AUTO: 81.9 FL
MONOCYTES # BLD AUTO: 0.64 K/UL
MONOCYTES NFR BLD AUTO: 7.5 %
NEUTROPHILS # BLD AUTO: 5.32 K/UL
NEUTROPHILS NFR BLD AUTO: 62.5 %
PLATELET # BLD AUTO: 284 K/UL
POTASSIUM SERPL-SCNC: 4.7 MMOL/L
PROT SERPL-MCNC: 7.8 G/DL
RBC # BLD: 4.54 M/UL
RBC # FLD: 14.1 %
SODIUM SERPL-SCNC: 143 MMOL/L
WBC # FLD AUTO: 8.53 K/UL

## 2017-11-21 LAB
ALBUMIN SERPL ELPH-MCNC: 4.1 G/DL
ALP BLD-CCNC: 102 U/L
ALT SERPL-CCNC: 46 U/L
ANION GAP SERPL CALC-SCNC: 12 MMOL/L
AST SERPL-CCNC: 31 U/L
BASOPHILS # BLD AUTO: 0.01 K/UL
BASOPHILS NFR BLD AUTO: 0.1 %
BILIRUB SERPL-MCNC: 0.4 MG/DL
BUN SERPL-MCNC: 14 MG/DL
CALCIUM SERPL-MCNC: 9.6 MG/DL
CHLORIDE SERPL-SCNC: 102 MMOL/L
CO2 SERPL-SCNC: 27 MMOL/L
CREAT SERPL-MCNC: 0.85 MG/DL
CRP SERPL-MCNC: 0.3 MG/DL
EOSINOPHIL # BLD AUTO: 0.21 K/UL
EOSINOPHIL NFR BLD AUTO: 3 %
ERYTHROCYTE [SEDIMENTATION RATE] IN BLOOD BY WESTERGREN METHOD: 37 MM/HR
GLUCOSE SERPL-MCNC: 132 MG/DL
HCT VFR BLD CALC: 36 %
HGB BLD-MCNC: 11.9 G/DL
IMM GRANULOCYTES NFR BLD AUTO: 0.1 %
LYMPHOCYTES # BLD AUTO: 2.67 K/UL
LYMPHOCYTES NFR BLD AUTO: 38.1 %
MAN DIFF?: NORMAL
MCHC RBC-ENTMCNC: 27 PG
MCHC RBC-ENTMCNC: 33.1 GM/DL
MCV RBC AUTO: 81.8 FL
MONOCYTES # BLD AUTO: 0.35 K/UL
MONOCYTES NFR BLD AUTO: 5 %
NEUTROPHILS # BLD AUTO: 3.76 K/UL
NEUTROPHILS NFR BLD AUTO: 53.7 %
PLATELET # BLD AUTO: 273 K/UL
POTASSIUM SERPL-SCNC: 4.8 MMOL/L
PROT SERPL-MCNC: 7.6 G/DL
RBC # BLD: 4.4 M/UL
RBC # FLD: 13.9 %
SODIUM SERPL-SCNC: 141 MMOL/L
WBC # FLD AUTO: 7.01 K/UL

## 2017-11-28 ENCOUNTER — LABORATORY RESULT (OUTPATIENT)
Age: 62
End: 2017-11-28

## 2017-11-28 ENCOUNTER — APPOINTMENT (OUTPATIENT)
Dept: RHEUMATOLOGY | Facility: CLINIC | Age: 62
End: 2017-11-28
Payer: COMMERCIAL

## 2017-11-28 VITALS
HEIGHT: 60 IN | WEIGHT: 137 LBS | TEMPERATURE: 97.8 F | BODY MASS INDEX: 26.9 KG/M2 | DIASTOLIC BLOOD PRESSURE: 70 MMHG | RESPIRATION RATE: 16 BRPM | OXYGEN SATURATION: 98 % | HEART RATE: 64 BPM | SYSTOLIC BLOOD PRESSURE: 140 MMHG

## 2017-11-28 PROCEDURE — 99215 OFFICE O/P EST HI 40 MIN: CPT

## 2017-11-28 RX ORDER — SIMVASTATIN 10 MG/1
10 TABLET, FILM COATED ORAL
Qty: 30 | Refills: 3 | Status: DISCONTINUED | COMMUNITY
End: 2017-11-28

## 2017-12-04 LAB
ALBUMIN SERPL ELPH-MCNC: 4.1 G/DL
ALP BLD-CCNC: 99 U/L
ALT SERPL-CCNC: 50 U/L
ANION GAP SERPL CALC-SCNC: 13 MMOL/L
APPEARANCE: CLEAR
AST SERPL-CCNC: 25 U/L
BASOPHILS # BLD AUTO: 0.01 K/UL
BASOPHILS NFR BLD AUTO: 0.1 %
BILIRUB SERPL-MCNC: 0.4 MG/DL
BILIRUBIN URINE: NEGATIVE
BLOOD URINE: NEGATIVE
BUN SERPL-MCNC: 19 MG/DL
CALCIUM SERPL-MCNC: 8.9 MG/DL
CHLORIDE SERPL-SCNC: 101 MMOL/L
CO2 SERPL-SCNC: 25 MMOL/L
COLOR: YELLOW
CREAT SERPL-MCNC: 0.7 MG/DL
CREAT SPEC-SCNC: 103 MG/DL
CREAT/PROT UR: 0.4 RATIO
CRP SERPL-MCNC: 0.6 MG/DL
EOSINOPHIL # BLD AUTO: 0.22 K/UL
EOSINOPHIL NFR BLD AUTO: 3.1
ERYTHROCYTE [SEDIMENTATION RATE] IN BLOOD BY WESTERGREN METHOD: 36 MM/HR
GLUCOSE QUALITATIVE U: 100 MG/DL
GLUCOSE SERPL-MCNC: 222 MG/DL
HCT VFR BLD CALC: 36 %
HGB BLD-MCNC: 11.8 G/DL
IMM GRANULOCYTES NFR BLD AUTO: 0.1 %
KETONES URINE: NEGATIVE
LEUKOCYTE ESTERASE URINE: NEGATIVE
LYMPHOCYTES # BLD AUTO: 2.54 K/UL
LYMPHOCYTES NFR BLD AUTO: 35.5 %
MAN DIFF?: NORMAL
MCHC RBC-ENTMCNC: 27.2 PG
MCHC RBC-ENTMCNC: 32.8 GM/DL
MCV RBC AUTO: 82.9 FL
MONOCYTES # BLD AUTO: 0.33 K/UL
MONOCYTES NFR BLD AUTO: 4.6 %
NEUTROPHILS # BLD AUTO: 4.04 K/UL
NEUTROPHILS NFR BLD AUTO: 56.6 %
NITRITE URINE: NEGATIVE
PH URINE: 5
PLATELET # BLD AUTO: 280 K/UL
POTASSIUM SERPL-SCNC: 4.9 MMOL/L
PROT SERPL-MCNC: 7.6 G/DL
PROT UR-MCNC: 41 MG/DL
PROTEIN URINE: 30 MG/DL
RBC # BLD: 4.34 M/UL
RBC # FLD: 13.9 %
SODIUM SERPL-SCNC: 139 MMOL/L
SPECIFIC GRAVITY URINE: 1.02
UROBILINOGEN URINE: NEGATIVE MG/DL
WBC # FLD AUTO: 7.15 K/UL

## 2017-12-12 ENCOUNTER — OUTPATIENT (OUTPATIENT)
Dept: OUTPATIENT SERVICES | Facility: HOSPITAL | Age: 62
LOS: 1 days | End: 2017-12-12
Payer: COMMERCIAL

## 2017-12-12 ENCOUNTER — APPOINTMENT (OUTPATIENT)
Dept: MAMMOGRAPHY | Facility: IMAGING CENTER | Age: 62
End: 2017-12-12
Payer: COMMERCIAL

## 2017-12-12 ENCOUNTER — APPOINTMENT (OUTPATIENT)
Dept: RHEUMATOLOGY | Facility: CLINIC | Age: 62
End: 2017-12-12

## 2017-12-12 DIAGNOSIS — Z01.419 ENCOUNTER FOR GYNECOLOGICAL EXAMINATION (GENERAL) (ROUTINE) WITHOUT ABNORMAL FINDINGS: ICD-10-CM

## 2017-12-12 PROCEDURE — 77067 SCR MAMMO BI INCL CAD: CPT

## 2017-12-12 PROCEDURE — G0202: CPT | Mod: 26

## 2017-12-12 PROCEDURE — 77063 BREAST TOMOSYNTHESIS BI: CPT | Mod: 26

## 2017-12-12 PROCEDURE — 77063 BREAST TOMOSYNTHESIS BI: CPT

## 2017-12-18 ENCOUNTER — OUTPATIENT (OUTPATIENT)
Dept: OUTPATIENT SERVICES | Facility: HOSPITAL | Age: 62
LOS: 1 days | End: 2017-12-18
Payer: COMMERCIAL

## 2017-12-18 ENCOUNTER — APPOINTMENT (OUTPATIENT)
Dept: ULTRASOUND IMAGING | Facility: IMAGING CENTER | Age: 62
End: 2017-12-18
Payer: COMMERCIAL

## 2017-12-18 ENCOUNTER — APPOINTMENT (OUTPATIENT)
Dept: MAMMOGRAPHY | Facility: IMAGING CENTER | Age: 62
End: 2017-12-18
Payer: COMMERCIAL

## 2017-12-18 ENCOUNTER — APPOINTMENT (OUTPATIENT)
Dept: ENDOCRINOLOGY | Facility: CLINIC | Age: 62
End: 2017-12-18

## 2017-12-18 DIAGNOSIS — Z00.8 ENCOUNTER FOR OTHER GENERAL EXAMINATION: ICD-10-CM

## 2017-12-18 PROCEDURE — G0206: CPT | Mod: 26,RT

## 2017-12-18 PROCEDURE — 77061 BREAST TOMOSYNTHESIS UNI: CPT | Mod: 26

## 2017-12-18 PROCEDURE — G0279: CPT | Mod: 26

## 2017-12-18 PROCEDURE — 77065 DX MAMMO INCL CAD UNI: CPT

## 2017-12-18 PROCEDURE — G0279: CPT

## 2017-12-20 ENCOUNTER — CHART COPY (OUTPATIENT)
Age: 62
End: 2017-12-20

## 2017-12-21 ENCOUNTER — MEDICATION RENEWAL (OUTPATIENT)
Age: 62
End: 2017-12-21

## 2017-12-28 ENCOUNTER — RESULT REVIEW (OUTPATIENT)
Age: 62
End: 2017-12-28

## 2017-12-28 RX ORDER — SIMVASTATIN 40 MG/1
40 TABLET, FILM COATED ORAL DAILY
Qty: 90 | Refills: 0 | Status: ACTIVE | COMMUNITY
Start: 2017-09-21 | End: 1900-01-01

## 2018-01-08 DIAGNOSIS — M06.9 RHEUMATOID ARTHRITIS, UNSPECIFIED: ICD-10-CM

## 2018-01-16 LAB
ALBUMIN SERPL ELPH-MCNC: 3.7 G/DL
ALP BLD-CCNC: 97 U/L
ALT SERPL-CCNC: 32 U/L
ANION GAP SERPL CALC-SCNC: 15 MMOL/L
APPEARANCE: CLEAR
AST SERPL-CCNC: 26 U/L
BACTERIA: NEGATIVE
BASOPHILS # BLD AUTO: 0.02 K/UL
BASOPHILS NFR BLD AUTO: 0.2 %
BILIRUB SERPL-MCNC: 0.4 MG/DL
BILIRUBIN URINE: NEGATIVE
BLOOD URINE: NEGATIVE
BUN SERPL-MCNC: 17 MG/DL
CALCIUM SERPL-MCNC: 9.1 MG/DL
CHLORIDE SERPL-SCNC: 102 MMOL/L
CO2 SERPL-SCNC: 24 MMOL/L
COLOR: ABNORMAL
CREAT SERPL-MCNC: 0.89 MG/DL
CREAT SPEC-SCNC: 236 MG/DL
CREAT/PROT UR: 0.3 RATIO
CRP SERPL-MCNC: 0.6 MG/DL
EOSINOPHIL # BLD AUTO: 0.33 K/UL
EOSINOPHIL NFR BLD AUTO: 4 %
ERYTHROCYTE [SEDIMENTATION RATE] IN BLOOD BY WESTERGREN METHOD: 30 MM/HR
GLUCOSE QUALITATIVE U: NEGATIVE MG/DL
GLUCOSE SERPL-MCNC: 151 MG/DL
HCT VFR BLD CALC: 38.2 %
HGB BLD-MCNC: 12.6 G/DL
HYALINE CASTS: 2 /LPF
IMM GRANULOCYTES NFR BLD AUTO: 0.1 %
KETONES URINE: NEGATIVE
LEUKOCYTE ESTERASE URINE: NEGATIVE
LYMPHOCYTES # BLD AUTO: 2.92 K/UL
LYMPHOCYTES NFR BLD AUTO: 35.4 %
MAN DIFF?: NORMAL
MCHC RBC-ENTMCNC: 27.3 PG
MCHC RBC-ENTMCNC: 33 GM/DL
MCV RBC AUTO: 82.7 FL
MICROSCOPIC-UA: NORMAL
MONOCYTES # BLD AUTO: 0.58 K/UL
MONOCYTES NFR BLD AUTO: 7 %
NEUTROPHILS # BLD AUTO: 4.38 K/UL
NEUTROPHILS NFR BLD AUTO: 53.3 %
NITRITE URINE: NEGATIVE
PH URINE: 5
PLATELET # BLD AUTO: 284 K/UL
POTASSIUM SERPL-SCNC: 4.6 MMOL/L
PROT SERPL-MCNC: 7.2 G/DL
PROT UR-MCNC: 74 MG/DL
PROTEIN URINE: 100 MG/DL
RBC # BLD: 4.62 M/UL
RBC # FLD: 13.4 %
RED BLOOD CELLS URINE: 2 /HPF
SODIUM SERPL-SCNC: 141 MMOL/L
SPECIFIC GRAVITY URINE: 1.03
SQUAMOUS EPITHELIAL CELLS: 2 /HPF
UROBILINOGEN URINE: NEGATIVE MG/DL
WBC # FLD AUTO: 8.24 K/UL
WHITE BLOOD CELLS URINE: 2 /HPF

## 2018-01-18 ENCOUNTER — APPOINTMENT (OUTPATIENT)
Dept: RHEUMATOLOGY | Facility: CLINIC | Age: 63
End: 2018-01-18
Payer: COMMERCIAL

## 2018-01-18 VITALS
BODY MASS INDEX: 27.09 KG/M2 | HEIGHT: 60 IN | HEART RATE: 78 BPM | TEMPERATURE: 98 F | SYSTOLIC BLOOD PRESSURE: 124 MMHG | WEIGHT: 138 LBS | OXYGEN SATURATION: 98 % | DIASTOLIC BLOOD PRESSURE: 72 MMHG

## 2018-01-18 PROCEDURE — 99214 OFFICE O/P EST MOD 30 MIN: CPT

## 2018-01-18 RX ORDER — METHOTREXATE 2.5 MG/1
2.5 TABLET ORAL
Qty: 36 | Refills: 0 | Status: DISCONTINUED | COMMUNITY
Start: 2017-10-17 | End: 2018-01-18

## 2018-01-18 RX ORDER — FOLIC ACID 1 MG/1
1 TABLET ORAL DAILY
Qty: 90 | Refills: 3 | Status: DISCONTINUED | COMMUNITY
Start: 2017-10-17 | End: 2018-01-18

## 2018-02-02 LAB
ALBUMIN SERPL ELPH-MCNC: 4 G/DL
ALP BLD-CCNC: 102 U/L
ALT SERPL-CCNC: 29 U/L
ANION GAP SERPL CALC-SCNC: 17 MMOL/L
AST SERPL-CCNC: 21 U/L
BASOPHILS # BLD AUTO: 0.03 K/UL
BASOPHILS NFR BLD AUTO: 0.4 %
BILIRUB SERPL-MCNC: 0.4 MG/DL
BUN SERPL-MCNC: 16 MG/DL
CALCIUM SERPL-MCNC: 9.3 MG/DL
CHLORIDE SERPL-SCNC: 105 MMOL/L
CK SERPL-CCNC: 76 U/L
CO2 SERPL-SCNC: 19 MMOL/L
CREAT SERPL-MCNC: 0.88 MG/DL
CRP SERPL-MCNC: 0.4 MG/DL
EOSINOPHIL # BLD AUTO: 0.23 K/UL
EOSINOPHIL NFR BLD AUTO: 2.9 %
ERYTHROCYTE [SEDIMENTATION RATE] IN BLOOD BY WESTERGREN METHOD: 31 MM/HR
GLUCOSE SERPL-MCNC: 204 MG/DL
HCT VFR BLD CALC: 38.5 %
HGB BLD-MCNC: 12.7 G/DL
IMM GRANULOCYTES NFR BLD AUTO: 0.1 %
LYMPHOCYTES # BLD AUTO: 2.99 K/UL
LYMPHOCYTES NFR BLD AUTO: 37.9 %
MAN DIFF?: NORMAL
MCHC RBC-ENTMCNC: 27.3 PG
MCHC RBC-ENTMCNC: 33 GM/DL
MCV RBC AUTO: 82.6 FL
MONOCYTES # BLD AUTO: 0.54 K/UL
MONOCYTES NFR BLD AUTO: 6.8 %
NEUTROPHILS # BLD AUTO: 4.09 K/UL
NEUTROPHILS NFR BLD AUTO: 51.9 %
PLATELET # BLD AUTO: 264 K/UL
POTASSIUM SERPL-SCNC: 4.1 MMOL/L
PROT SERPL-MCNC: 7.4 G/DL
RBC # BLD: 4.66 M/UL
RBC # FLD: 13.1 %
SODIUM SERPL-SCNC: 141 MMOL/L
WBC # FLD AUTO: 7.89 K/UL

## 2018-03-06 ENCOUNTER — APPOINTMENT (OUTPATIENT)
Dept: RHEUMATOLOGY | Facility: CLINIC | Age: 63
End: 2018-03-06
Payer: COMMERCIAL

## 2018-03-06 ENCOUNTER — LABORATORY RESULT (OUTPATIENT)
Age: 63
End: 2018-03-06

## 2018-03-06 VITALS
SYSTOLIC BLOOD PRESSURE: 130 MMHG | DIASTOLIC BLOOD PRESSURE: 78 MMHG | RESPIRATION RATE: 16 BRPM | WEIGHT: 135 LBS | HEART RATE: 80 BPM | OXYGEN SATURATION: 98 % | TEMPERATURE: 98 F | HEIGHT: 60 IN | BODY MASS INDEX: 26.5 KG/M2

## 2018-03-06 PROCEDURE — 99214 OFFICE O/P EST MOD 30 MIN: CPT

## 2018-03-06 RX ORDER — PREDNISONE 5 MG/1
5 TABLET ORAL
Qty: 90 | Refills: 1 | Status: DISCONTINUED | COMMUNITY
Start: 2017-11-28 | End: 2018-03-06

## 2018-03-07 LAB
25(OH)D3 SERPL-MCNC: 29.1 NG/ML
ALBUMIN SERPL ELPH-MCNC: 4.1 G/DL
ALP BLD-CCNC: 115 U/L
ALT SERPL-CCNC: 62 U/L
ANION GAP SERPL CALC-SCNC: 12 MMOL/L
APPEARANCE: CLEAR
AST SERPL-CCNC: 44 U/L
BASOPHILS # BLD AUTO: 0.02 K/UL
BASOPHILS NFR BLD AUTO: 0.3 %
BILIRUB SERPL-MCNC: 0.4 MG/DL
BILIRUBIN URINE: NEGATIVE
BLOOD URINE: NEGATIVE
BUN SERPL-MCNC: 12 MG/DL
CALCIUM SERPL-MCNC: 9.7 MG/DL
CHLORIDE SERPL-SCNC: 101 MMOL/L
CK SERPL-CCNC: 269 U/L
CO2 SERPL-SCNC: 25 MMOL/L
COLOR: YELLOW
CREAT SERPL-MCNC: 0.69 MG/DL
CREAT SPEC-SCNC: 135 MG/DL
CREAT/PROT UR: 0.4 RATIO
CRP SERPL-MCNC: 0.5 MG/DL
EOSINOPHIL # BLD AUTO: 0.31 K/UL
EOSINOPHIL NFR BLD AUTO: 4.3 %
ERYTHROCYTE [SEDIMENTATION RATE] IN BLOOD BY WESTERGREN METHOD: 24 MM/HR
GLUCOSE QUALITATIVE U: 250 MG/DL
GLUCOSE SERPL-MCNC: 167 MG/DL
HCT VFR BLD CALC: 38.6 %
HGB BLD-MCNC: 12.6 G/DL
IMM GRANULOCYTES NFR BLD AUTO: 0.1 %
KETONES URINE: NEGATIVE
LEUKOCYTE ESTERASE URINE: NEGATIVE
LYMPHOCYTES # BLD AUTO: 2.42 K/UL
LYMPHOCYTES NFR BLD AUTO: 33.8 %
MAN DIFF?: NORMAL
MCHC RBC-ENTMCNC: 27.2 PG
MCHC RBC-ENTMCNC: 32.6 GM/DL
MCV RBC AUTO: 83.2 FL
MONOCYTES # BLD AUTO: 0.52 K/UL
MONOCYTES NFR BLD AUTO: 7.3 %
NEUTROPHILS # BLD AUTO: 3.88 K/UL
NEUTROPHILS NFR BLD AUTO: 54.2 %
NITRITE URINE: NEGATIVE
PH URINE: 5
PLATELET # BLD AUTO: 288 K/UL
POTASSIUM SERPL-SCNC: 4.1 MMOL/L
PROT SERPL-MCNC: 7.5 G/DL
PROT UR-MCNC: 56 MG/DL
PROTEIN URINE: 30 MG/DL
RBC # BLD: 4.64 M/UL
RBC # FLD: 13.8 %
SODIUM SERPL-SCNC: 138 MMOL/L
SPECIFIC GRAVITY URINE: 1.02
UROBILINOGEN URINE: NEGATIVE MG/DL
WBC # FLD AUTO: 7.16 K/UL

## 2018-03-16 LAB — HBA1C MFR BLD HPLC: 10.4 %

## 2018-03-19 ENCOUNTER — OTHER (OUTPATIENT)
Age: 63
End: 2018-03-19

## 2018-03-22 ENCOUNTER — OUTPATIENT (OUTPATIENT)
Dept: OUTPATIENT SERVICES | Facility: HOSPITAL | Age: 63
LOS: 1 days | End: 2018-03-22
Payer: COMMERCIAL

## 2018-03-22 VITALS
OXYGEN SATURATION: 98 % | TEMPERATURE: 98 F | HEIGHT: 59 IN | RESPIRATION RATE: 16 BRPM | SYSTOLIC BLOOD PRESSURE: 134 MMHG | HEART RATE: 87 BPM | WEIGHT: 136.03 LBS | DIASTOLIC BLOOD PRESSURE: 78 MMHG

## 2018-03-22 DIAGNOSIS — G56.00 CARPAL TUNNEL SYNDROME, UNSPECIFIED UPPER LIMB: ICD-10-CM

## 2018-03-22 DIAGNOSIS — Z98.890 OTHER SPECIFIED POSTPROCEDURAL STATES: Chronic | ICD-10-CM

## 2018-03-22 DIAGNOSIS — E11.9 TYPE 2 DIABETES MELLITUS WITHOUT COMPLICATIONS: ICD-10-CM

## 2018-03-22 DIAGNOSIS — G56.02 CARPAL TUNNEL SYNDROME, LEFT UPPER LIMB: ICD-10-CM

## 2018-03-22 DIAGNOSIS — Z01.818 ENCOUNTER FOR OTHER PREPROCEDURAL EXAMINATION: ICD-10-CM

## 2018-03-22 DIAGNOSIS — Z90.49 ACQUIRED ABSENCE OF OTHER SPECIFIED PARTS OF DIGESTIVE TRACT: Chronic | ICD-10-CM

## 2018-03-22 LAB
ALBUMIN SERPL ELPH-MCNC: 4 G/DL
ALP BLD-CCNC: 98 U/L
ALT SERPL-CCNC: 36 U/L
AST SERPL-CCNC: 28 U/L
BILIRUB DIRECT SERPL-MCNC: 0.1 MG/DL
BILIRUB INDIRECT SERPL-MCNC: 0.4 MG/DL
BILIRUB SERPL-MCNC: 0.4 MG/DL
CK SERPL-CCNC: 83 U/L
PROT SERPL-MCNC: 6.9 G/DL

## 2018-03-22 PROCEDURE — G0463: CPT

## 2018-03-22 RX ORDER — LIDOCAINE HCL 20 MG/ML
0.2 VIAL (ML) INJECTION ONCE
Qty: 0 | Refills: 0 | Status: DISCONTINUED | OUTPATIENT
Start: 2018-03-27 | End: 2018-04-11

## 2018-03-22 RX ORDER — SODIUM CHLORIDE 9 MG/ML
3 INJECTION INTRAMUSCULAR; INTRAVENOUS; SUBCUTANEOUS EVERY 8 HOURS
Qty: 0 | Refills: 0 | Status: DISCONTINUED | OUTPATIENT
Start: 2018-03-27 | End: 2018-04-11

## 2018-03-22 RX ORDER — ACETAMINOPHEN 500 MG
1000 TABLET ORAL ONCE
Qty: 0 | Refills: 0 | Status: COMPLETED | OUTPATIENT
Start: 2018-03-27 | End: 2018-03-27

## 2018-03-22 RX ORDER — RAMIPRIL 5 MG
1 CAPSULE ORAL
Qty: 0 | Refills: 0 | COMMUNITY

## 2018-03-22 NOTE — H&P PST ADULT - PROBLEM SELECTOR PLAN 2
Continue monitoring. Fingerstick glucose preop day of surgery.  PLAN:  (3-6-18)* HgbA1c is 10.4). Result faxed to PMD Dr. Gordon: requested he review all recent labs and fax addendum Medical clearance note to [facility].  Called surgeons office: closed. Will inform surgeon of above on 3-23-18. Continue monitoring. Fingerstick glucose preop day of surgery.  PLAN:  (3-6-18)* HgbA1c is 10.4). Result faxed to PMD Dr. Gordon: requested he review all recent labs and fax addendum Medical clearance note to [facility].  (3-26-18): As per Mignon at Dr. Edwards:  surgeon aware of above. o.K. to proceed with surgery as per Dr. Edwards.  Called surgeons office: closed. Will inform surgeon of above on 3-23-18.

## 2018-03-22 NOTE — H&P PST ADULT - PMH
Carpal tunnel syndrome  Left  Gallstones  ' 87  High cholesterol    Hypothyroid    Rheumatoid arthritis  dx: 9/2017  Type 2 diabetes mellitus  dx;  age 38 Carpal tunnel syndrome  Left  DVT (deep venous thrombosis)  ' 87   post-op cholycystectomy.  Gallstones  ' 87  High cholesterol    Hypothyroid    Rheumatoid arthritis  dx: 9/2017  Type 2 diabetes mellitus  dx;  age 38

## 2018-03-22 NOTE — H&P PST ADULT - PSH
Normal spontaneous vaginal delivery  ' 83 and '86  Status post cholecystectomy  ' 87  Status post colonoscopy  ' 2009    Negative

## 2018-03-22 NOTE — H&P PST ADULT - HISTORY OF PRESENT ILLNESS
Pt is a 62F  PMH DMII (on insulin at home A1C 9/.% 7/2017), HTN, HLD p/w severe L hip pain. Pt was recently admitted from 7/4-7/11 for right hip pain, was found to have R joint effusion, which was tapped and was treated for a septic joint. Pt states that her symptoms initially improved but then over the past 2 weeks, has begun to have worsening L hip pain, similar to what she had previously experienced. Pain is so severe that pt is unable to ambulate, and has not resolved with NSAID/Tylenol Pt also has had fevers during this time. She has noticed some tender bumps in her left groin. Pt also reports intermittent R ankle swelling and tenderness. Denies other symptoms. Denies sick contacts, recent travel (last left [place] over 1.5 years ago to go to [place]). Pt has been following with ID s/p d/c as well as her gynecologist, who performed a pelvic U/S to r/o malignancy, which was reportedly normal. Pt states that in the past year, she had an infection of the skin on the right side of her face, which was treated with antibiotics without complication. 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 Pt reports stress 2/2 this event. Denies HI/SI. Pt continues to enjoy working in her garden, where she spends a significant amount of time.    ED Course  Vitals: Temp 101    HR  98         /85       RR 20    SPO2 100%  Pt received ceftriaxone 1mg IV, vancomycin 1g IV, had CXR, hip X-ray and ortho c/s placed This is a 64 y/o female with PMH:  Rheumatoid Arthritis, Type 2 Diabetes (* 3-6-18) Hgb A1c is 10.4), HLD.   Current dx: Left Carpal Tunnel Syndrome, "worsenig". Scheduled: Left carpal Tunnel Release.

## 2018-03-26 ENCOUNTER — TRANSCRIPTION ENCOUNTER (OUTPATIENT)
Age: 63
End: 2018-03-26

## 2018-03-27 ENCOUNTER — RESULT REVIEW (OUTPATIENT)
Age: 63
End: 2018-03-27

## 2018-03-27 ENCOUNTER — OUTPATIENT (OUTPATIENT)
Dept: OUTPATIENT SERVICES | Facility: HOSPITAL | Age: 63
LOS: 1 days | End: 2018-03-27
Payer: COMMERCIAL

## 2018-03-27 VITALS
HEART RATE: 75 BPM | TEMPERATURE: 98 F | HEIGHT: 59 IN | RESPIRATION RATE: 16 BRPM | WEIGHT: 136.03 LBS | OXYGEN SATURATION: 99 % | DIASTOLIC BLOOD PRESSURE: 95 MMHG | SYSTOLIC BLOOD PRESSURE: 150 MMHG

## 2018-03-27 VITALS
OXYGEN SATURATION: 100 % | HEART RATE: 80 BPM | SYSTOLIC BLOOD PRESSURE: 123 MMHG | DIASTOLIC BLOOD PRESSURE: 68 MMHG | RESPIRATION RATE: 16 BRPM

## 2018-03-27 DIAGNOSIS — Z01.818 ENCOUNTER FOR OTHER PREPROCEDURAL EXAMINATION: ICD-10-CM

## 2018-03-27 DIAGNOSIS — Z90.49 ACQUIRED ABSENCE OF OTHER SPECIFIED PARTS OF DIGESTIVE TRACT: Chronic | ICD-10-CM

## 2018-03-27 DIAGNOSIS — Z98.890 OTHER SPECIFIED POSTPROCEDURAL STATES: Chronic | ICD-10-CM

## 2018-03-27 DIAGNOSIS — G56.02 CARPAL TUNNEL SYNDROME, LEFT UPPER LIMB: ICD-10-CM

## 2018-03-27 PROCEDURE — 88304 TISSUE EXAM BY PATHOLOGIST: CPT

## 2018-03-27 PROCEDURE — 64727 INTERNAL NEUROLYSIS: CPT | Mod: LT

## 2018-03-27 PROCEDURE — 88304 TISSUE EXAM BY PATHOLOGIST: CPT | Mod: 26

## 2018-03-27 PROCEDURE — 64721 CARPAL TUNNEL SURGERY: CPT | Mod: LT

## 2018-03-27 PROCEDURE — 82962 GLUCOSE BLOOD TEST: CPT

## 2018-03-27 RX ORDER — SIMVASTATIN 20 MG/1
1 TABLET, FILM COATED ORAL
Qty: 0 | Refills: 0 | COMMUNITY

## 2018-03-27 RX ORDER — LEFLUNOMIDE 10 MG/1
1 TABLET ORAL
Qty: 0 | Refills: 0 | COMMUNITY

## 2018-03-27 RX ORDER — CELECOXIB 200 MG/1
200 CAPSULE ORAL ONCE
Qty: 0 | Refills: 0 | Status: COMPLETED | OUTPATIENT
Start: 2018-03-27 | End: 2018-03-27

## 2018-03-27 RX ORDER — RAMIPRIL 5 MG
1 CAPSULE ORAL
Qty: 0 | Refills: 0 | COMMUNITY

## 2018-03-27 RX ORDER — FAMOTIDINE 10 MG/ML
0 INJECTION INTRAVENOUS
Qty: 0 | Refills: 0 | COMMUNITY

## 2018-03-27 RX ADMIN — CELECOXIB 200 MILLIGRAM(S): 200 CAPSULE ORAL at 08:48

## 2018-03-27 RX ADMIN — Medication 1000 MILLIGRAM(S): at 08:48

## 2018-03-27 NOTE — ASU DISCHARGE PLAN (ADULT/PEDIATRIC). - NOTIFY
Numbness, tingling/Inability to Tolerate Liquids or Foods/Fever greater than 101/Unable to Urinate/Increased Irritability or Sluggishness/Excessive Diarrhea/Bleeding that does not stop/Pain not relieved by Medications/Swelling that continues/Persistent Nausea and Vomiting/Numbness, color, or temperature change to extremity

## 2018-03-27 NOTE — ASU PATIENT PROFILE, ADULT - PMH
Carpal tunnel syndrome  Left  DVT (deep venous thrombosis)  ' 87   post-op cholycystectomy.  Gallstones  ' 87  High cholesterol    Hypothyroid    Rheumatoid arthritis  dx: 9/2017  Type 2 diabetes mellitus  dx;  age 38

## 2018-04-07 LAB — SURGICAL PATHOLOGY STUDY: SIGNIFICANT CHANGE UP

## 2018-04-12 ENCOUNTER — FORM ENCOUNTER (OUTPATIENT)
Age: 63
End: 2018-04-12

## 2018-04-13 ENCOUNTER — APPOINTMENT (OUTPATIENT)
Dept: ULTRASOUND IMAGING | Facility: CLINIC | Age: 63
End: 2018-04-13
Payer: COMMERCIAL

## 2018-04-13 ENCOUNTER — OUTPATIENT (OUTPATIENT)
Dept: OUTPATIENT SERVICES | Facility: HOSPITAL | Age: 63
LOS: 1 days | End: 2018-04-13
Payer: COMMERCIAL

## 2018-04-13 DIAGNOSIS — Z00.8 ENCOUNTER FOR OTHER GENERAL EXAMINATION: ICD-10-CM

## 2018-04-13 DIAGNOSIS — Z90.49 ACQUIRED ABSENCE OF OTHER SPECIFIED PARTS OF DIGESTIVE TRACT: Chronic | ICD-10-CM

## 2018-04-13 DIAGNOSIS — Z98.890 OTHER SPECIFIED POSTPROCEDURAL STATES: Chronic | ICD-10-CM

## 2018-04-13 PROCEDURE — 20611 DRAIN/INJ JOINT/BURSA W/US: CPT

## 2018-04-13 PROCEDURE — 20611 DRAIN/INJ JOINT/BURSA W/US: CPT | Mod: LT

## 2018-05-15 ENCOUNTER — APPOINTMENT (OUTPATIENT)
Dept: RHEUMATOLOGY | Facility: CLINIC | Age: 63
End: 2018-05-15
Payer: COMMERCIAL

## 2018-05-15 ENCOUNTER — LABORATORY RESULT (OUTPATIENT)
Age: 63
End: 2018-05-15

## 2018-05-15 VITALS
BODY MASS INDEX: 25.91 KG/M2 | WEIGHT: 132 LBS | SYSTOLIC BLOOD PRESSURE: 140 MMHG | HEIGHT: 60 IN | RESPIRATION RATE: 16 BRPM | OXYGEN SATURATION: 98 % | TEMPERATURE: 98 F | HEART RATE: 78 BPM | DIASTOLIC BLOOD PRESSURE: 84 MMHG

## 2018-05-15 DIAGNOSIS — Z13.820 ENCOUNTER FOR SCREENING FOR OSTEOPOROSIS: ICD-10-CM

## 2018-05-15 DIAGNOSIS — Z78.9 OTHER SPECIFIED HEALTH STATUS: ICD-10-CM

## 2018-05-15 PROCEDURE — 99214 OFFICE O/P EST MOD 30 MIN: CPT

## 2018-05-17 LAB
25(OH)D3 SERPL-MCNC: 28.4 NG/ML
ALBUMIN SERPL ELPH-MCNC: 4 G/DL
ALP BLD-CCNC: 100 U/L
ALT SERPL-CCNC: 32 U/L
ANION GAP SERPL CALC-SCNC: 16 MMOL/L
APPEARANCE: CLEAR
AST SERPL-CCNC: 23 U/L
BASOPHILS # BLD AUTO: 0.01 K/UL
BASOPHILS NFR BLD AUTO: 0.1 %
BILIRUB SERPL-MCNC: 0.4 MG/DL
BILIRUBIN URINE: NEGATIVE
BLOOD URINE: NEGATIVE
BUN SERPL-MCNC: 18 MG/DL
CALCIUM SERPL-MCNC: 9.7 MG/DL
CHLORIDE SERPL-SCNC: 102 MMOL/L
CK SERPL-CCNC: 114 U/L
CO2 SERPL-SCNC: 22 MMOL/L
COLOR: YELLOW
CREAT SERPL-MCNC: 0.89 MG/DL
CREAT SPEC-SCNC: 76 MG/DL
CREAT/PROT UR: 0.6 RATIO
CRP SERPL-MCNC: 0.4 MG/DL
EOSINOPHIL # BLD AUTO: 0.42 K/UL
EOSINOPHIL NFR BLD AUTO: 5.4 %
ERYTHROCYTE [SEDIMENTATION RATE] IN BLOOD BY WESTERGREN METHOD: 27 MM/HR
GLUCOSE QUALITATIVE U: 1000 MG/DL
GLUCOSE SERPL-MCNC: 277 MG/DL
HBA1C MFR BLD HPLC: 9.1 %
HCT VFR BLD CALC: 38.8 %
HGB BLD-MCNC: 12.9 G/DL
IMM GRANULOCYTES NFR BLD AUTO: 0.1 %
KETONES URINE: NEGATIVE
LEUKOCYTE ESTERASE URINE: NEGATIVE
LYMPHOCYTES # BLD AUTO: 2.54 K/UL
LYMPHOCYTES NFR BLD AUTO: 32.7 %
MAN DIFF?: NORMAL
MCHC RBC-ENTMCNC: 26.4 PG
MCHC RBC-ENTMCNC: 33.2 GM/DL
MCV RBC AUTO: 79.5 FL
MONOCYTES # BLD AUTO: 0.54 K/UL
MONOCYTES NFR BLD AUTO: 6.9 %
NEUTROPHILS # BLD AUTO: 4.25 K/UL
NEUTROPHILS NFR BLD AUTO: 54.8 %
NITRITE URINE: NEGATIVE
PH URINE: 5.5
PLATELET # BLD AUTO: 299 K/UL
POTASSIUM SERPL-SCNC: 4.7 MMOL/L
PROT SERPL-MCNC: 7.2 G/DL
PROT UR-MCNC: 44 MG/DL
PROTEIN URINE: 30 MG/DL
RBC # BLD: 4.88 M/UL
RBC # FLD: 14.1 %
SODIUM SERPL-SCNC: 140 MMOL/L
SPECIFIC GRAVITY URINE: 1.02
TSH SERPL-ACNC: 1.32 UIU/ML
UROBILINOGEN URINE: NEGATIVE MG/DL
WBC # FLD AUTO: 7.77 K/UL

## 2018-08-21 ENCOUNTER — APPOINTMENT (OUTPATIENT)
Dept: RHEUMATOLOGY | Facility: CLINIC | Age: 63
End: 2018-08-21
Payer: COMMERCIAL

## 2018-08-21 VITALS
WEIGHT: 132 LBS | SYSTOLIC BLOOD PRESSURE: 155 MMHG | HEIGHT: 60 IN | HEART RATE: 75 BPM | DIASTOLIC BLOOD PRESSURE: 86 MMHG | RESPIRATION RATE: 16 BRPM | BODY MASS INDEX: 25.91 KG/M2 | OXYGEN SATURATION: 89 %

## 2018-08-21 DIAGNOSIS — M54.5 LOW BACK PAIN: ICD-10-CM

## 2018-08-21 DIAGNOSIS — G89.29 LOW BACK PAIN: ICD-10-CM

## 2018-08-21 PROBLEM — E11.9 TYPE 2 DIABETES MELLITUS WITHOUT COMPLICATIONS: Chronic | Status: ACTIVE | Noted: 2018-03-22

## 2018-08-21 PROBLEM — I82.409 ACUTE EMBOLISM AND THROMBOSIS OF UNSPECIFIED DEEP VEINS OF UNSPECIFIED LOWER EXTREMITY: Chronic | Status: ACTIVE | Noted: 2018-03-22

## 2018-08-21 PROBLEM — G56.00 CARPAL TUNNEL SYNDROME, UNSPECIFIED UPPER LIMB: Chronic | Status: ACTIVE | Noted: 2018-03-22

## 2018-08-21 PROBLEM — K80.20 CALCULUS OF GALLBLADDER WITHOUT CHOLECYSTITIS WITHOUT OBSTRUCTION: Chronic | Status: ACTIVE | Noted: 2018-03-22

## 2018-08-21 PROBLEM — M06.9 RHEUMATOID ARTHRITIS, UNSPECIFIED: Chronic | Status: ACTIVE | Noted: 2018-03-22

## 2018-08-21 PROCEDURE — 99214 OFFICE O/P EST MOD 30 MIN: CPT

## 2018-08-23 LAB
ALBUMIN SERPL ELPH-MCNC: 3.9 G/DL
ALP BLD-CCNC: 113 U/L
ALT SERPL-CCNC: 31 U/L
ANION GAP SERPL CALC-SCNC: 11 MMOL/L
AST SERPL-CCNC: 24 U/L
BASOPHILS # BLD AUTO: 0.02 K/UL
BASOPHILS NFR BLD AUTO: 0.2 %
BILIRUB SERPL-MCNC: 0.3 MG/DL
BUN SERPL-MCNC: 12 MG/DL
CALCIUM SERPL-MCNC: 9 MG/DL
CHLORIDE SERPL-SCNC: 104 MMOL/L
CHOLEST SERPL-MCNC: 149 MG/DL
CHOLEST/HDLC SERPL: 3.2 RATIO
CK SERPL-CCNC: 76 U/L
CO2 SERPL-SCNC: 26 MMOL/L
CREAT SERPL-MCNC: 0.64 MG/DL
CRP SERPL-MCNC: 1.07 MG/DL
EOSINOPHIL # BLD AUTO: 1.13 K/UL
EOSINOPHIL NFR BLD AUTO: 13.6 %
ERYTHROCYTE [SEDIMENTATION RATE] IN BLOOD BY WESTERGREN METHOD: 20 MM/HR
GLUCOSE SERPL-MCNC: 130 MG/DL
HBA1C MFR BLD HPLC: 10.1 %
HCT VFR BLD CALC: 37.5 %
HDLC SERPL-MCNC: 47 MG/DL
HGB BLD-MCNC: 11.9 G/DL
IMM GRANULOCYTES NFR BLD AUTO: 0.2 %
LDLC SERPL CALC-MCNC: 83 MG/DL
LYMPHOCYTES # BLD AUTO: 2.07 K/UL
LYMPHOCYTES NFR BLD AUTO: 24.9 %
MAN DIFF?: NORMAL
MCHC RBC-ENTMCNC: 25.6 PG
MCHC RBC-ENTMCNC: 31.7 GM/DL
MCV RBC AUTO: 80.8 FL
MONOCYTES # BLD AUTO: 0.62 K/UL
MONOCYTES NFR BLD AUTO: 7.5 %
NEUTROPHILS # BLD AUTO: 4.44 K/UL
NEUTROPHILS NFR BLD AUTO: 53.6 %
PLATELET # BLD AUTO: 293 K/UL
POTASSIUM SERPL-SCNC: 4.2 MMOL/L
PROT SERPL-MCNC: 7 G/DL
RBC # BLD: 4.64 M/UL
RBC # FLD: 13.8 %
SODIUM SERPL-SCNC: 141 MMOL/L
TRIGL SERPL-MCNC: 97 MG/DL
TSH SERPL-ACNC: 1.69 UIU/ML
WBC # FLD AUTO: 8.3 K/UL

## 2018-08-30 ENCOUNTER — FORM ENCOUNTER (OUTPATIENT)
Age: 63
End: 2018-08-30

## 2018-08-31 ENCOUNTER — APPOINTMENT (OUTPATIENT)
Dept: RADIOLOGY | Facility: IMAGING CENTER | Age: 63
End: 2018-08-31
Payer: COMMERCIAL

## 2018-08-31 ENCOUNTER — OUTPATIENT (OUTPATIENT)
Dept: OUTPATIENT SERVICES | Facility: HOSPITAL | Age: 63
LOS: 1 days | End: 2018-08-31
Payer: COMMERCIAL

## 2018-08-31 DIAGNOSIS — Z13.820 ENCOUNTER FOR SCREENING FOR OSTEOPOROSIS: ICD-10-CM

## 2018-08-31 DIAGNOSIS — Z90.49 ACQUIRED ABSENCE OF OTHER SPECIFIED PARTS OF DIGESTIVE TRACT: Chronic | ICD-10-CM

## 2018-08-31 DIAGNOSIS — Z98.890 OTHER SPECIFIED POSTPROCEDURAL STATES: Chronic | ICD-10-CM

## 2018-08-31 PROCEDURE — 77080 DXA BONE DENSITY AXIAL: CPT

## 2018-08-31 PROCEDURE — 77080 DXA BONE DENSITY AXIAL: CPT | Mod: 26

## 2018-10-16 ENCOUNTER — APPOINTMENT (OUTPATIENT)
Dept: RHEUMATOLOGY | Facility: CLINIC | Age: 63
End: 2018-10-16
Payer: COMMERCIAL

## 2018-10-16 VITALS
WEIGHT: 131 LBS | OXYGEN SATURATION: 98 % | BODY MASS INDEX: 24.11 KG/M2 | HEIGHT: 62 IN | HEART RATE: 77 BPM | TEMPERATURE: 97.7 F | DIASTOLIC BLOOD PRESSURE: 77 MMHG | SYSTOLIC BLOOD PRESSURE: 123 MMHG

## 2018-10-16 PROCEDURE — G0008: CPT

## 2018-10-16 PROCEDURE — 90686 IIV4 VACC NO PRSV 0.5 ML IM: CPT

## 2018-10-16 PROCEDURE — 99215 OFFICE O/P EST HI 40 MIN: CPT | Mod: 25

## 2018-10-16 RX ORDER — LEFLUNOMIDE 20 MG/1
20 TABLET, FILM COATED ORAL DAILY
Qty: 90 | Refills: 1 | Status: DISCONTINUED | COMMUNITY
Start: 2018-01-18 | End: 2018-10-16

## 2018-10-18 ENCOUNTER — MEDICATION RENEWAL (OUTPATIENT)
Age: 63
End: 2018-10-18

## 2018-10-18 LAB
25(OH)D3 SERPL-MCNC: 37.1 NG/ML
ALBUMIN SERPL ELPH-MCNC: 4.2 G/DL
ALP BLD-CCNC: 123 U/L
ALT SERPL-CCNC: 27 U/L
ANION GAP SERPL CALC-SCNC: 14 MMOL/L
APPEARANCE: CLEAR
AST SERPL-CCNC: 19 U/L
BASOPHILS # BLD AUTO: 0.01 K/UL
BASOPHILS NFR BLD AUTO: 0.1 %
BILIRUB SERPL-MCNC: 0.3 MG/DL
BILIRUBIN URINE: NEGATIVE
BLOOD URINE: NEGATIVE
BUN SERPL-MCNC: 12 MG/DL
CALCIUM SERPL-MCNC: 9.2 MG/DL
CHLORIDE SERPL-SCNC: 102 MMOL/L
CO2 SERPL-SCNC: 25 MMOL/L
COLOR: YELLOW
CREAT SERPL-MCNC: 0.72 MG/DL
CREAT SPEC-SCNC: 44 MG/DL
CREAT/PROT UR: 0.3 RATIO
CRP SERPL-MCNC: 1.8 MG/DL
EOSINOPHIL # BLD AUTO: 0.35 K/UL
EOSINOPHIL NFR BLD AUTO: 3.8 %
ERYTHROCYTE [SEDIMENTATION RATE] IN BLOOD BY WESTERGREN METHOD: 56 MM/HR
FERRITIN SERPL-MCNC: 332 NG/ML
GLUCOSE QUALITATIVE U: NEGATIVE MG/DL
GLUCOSE SERPL-MCNC: 75 MG/DL
HCT VFR BLD CALC: 37.7 %
HGB BLD-MCNC: 12 G/DL
IMM GRANULOCYTES NFR BLD AUTO: 0.2 %
IRON SATN MFR SERPL: 13 %
IRON SERPL-MCNC: 35 UG/DL
KETONES URINE: NEGATIVE
LEUKOCYTE ESTERASE URINE: NEGATIVE
LYMPHOCYTES # BLD AUTO: 2.86 K/UL
LYMPHOCYTES NFR BLD AUTO: 31.2 %
MAN DIFF?: NORMAL
MCHC RBC-ENTMCNC: 25.9 PG
MCHC RBC-ENTMCNC: 31.8 GM/DL
MCV RBC AUTO: 81.4 FL
MONOCYTES # BLD AUTO: 0.56 K/UL
MONOCYTES NFR BLD AUTO: 6.1 %
NEUTROPHILS # BLD AUTO: 5.36 K/UL
NEUTROPHILS NFR BLD AUTO: 58.6 %
NITRITE URINE: NEGATIVE
PH URINE: 5.5
PLATELET # BLD AUTO: 386 K/UL
POTASSIUM SERPL-SCNC: 3.9 MMOL/L
PROT SERPL-MCNC: 7.5 G/DL
PROT UR-MCNC: 14 MG/DL
PROTEIN URINE: NEGATIVE MG/DL
RBC # BLD: 4.63 M/UL
RBC # FLD: 13.9 %
SODIUM SERPL-SCNC: 141 MMOL/L
SPECIFIC GRAVITY URINE: 1.01
TIBC SERPL-MCNC: 269 UG/DL
UIBC SERPL-MCNC: 234 UG/DL
UROBILINOGEN URINE: NEGATIVE MG/DL
WBC # FLD AUTO: 9.16 K/UL

## 2018-11-01 ENCOUNTER — FORM ENCOUNTER (OUTPATIENT)
Age: 63
End: 2018-11-01

## 2018-11-02 ENCOUNTER — OUTPATIENT (OUTPATIENT)
Dept: OUTPATIENT SERVICES | Facility: HOSPITAL | Age: 63
LOS: 1 days | End: 2018-11-02
Payer: COMMERCIAL

## 2018-11-02 ENCOUNTER — APPOINTMENT (OUTPATIENT)
Dept: ULTRASOUND IMAGING | Facility: CLINIC | Age: 63
End: 2018-11-02
Payer: COMMERCIAL

## 2018-11-02 DIAGNOSIS — Z98.890 OTHER SPECIFIED POSTPROCEDURAL STATES: Chronic | ICD-10-CM

## 2018-11-02 DIAGNOSIS — Z90.49 ACQUIRED ABSENCE OF OTHER SPECIFIED PARTS OF DIGESTIVE TRACT: Chronic | ICD-10-CM

## 2018-11-02 DIAGNOSIS — M06.9 RHEUMATOID ARTHRITIS, UNSPECIFIED: ICD-10-CM

## 2018-11-02 PROCEDURE — 20606 DRAIN/INJ JOINT/BURSA W/US: CPT

## 2018-11-02 PROCEDURE — 76881 US COMPL JOINT R-T W/IMG: CPT

## 2018-11-02 PROCEDURE — 20606 DRAIN/INJ JOINT/BURSA W/US: CPT | Mod: RT

## 2018-11-13 LAB
ALBUMIN SERPL ELPH-MCNC: 4.1 G/DL
ALP BLD-CCNC: 116 U/L
ALT SERPL-CCNC: 29 U/L
ANION GAP SERPL CALC-SCNC: 12 MMOL/L
AST SERPL-CCNC: 24 U/L
BASOPHILS # BLD AUTO: 0.02 K/UL
BASOPHILS NFR BLD AUTO: 0.2 %
BILIRUB SERPL-MCNC: 0.2 MG/DL
BUN SERPL-MCNC: 15 MG/DL
CALCIUM SERPL-MCNC: 9.4 MG/DL
CHLORIDE SERPL-SCNC: 107 MMOL/L
CO2 SERPL-SCNC: 25 MMOL/L
CREAT SERPL-MCNC: 0.68 MG/DL
EOSINOPHIL # BLD AUTO: 0.14 K/UL
EOSINOPHIL NFR BLD AUTO: 1.5 %
GLUCOSE SERPL-MCNC: 106 MG/DL
HCT VFR BLD CALC: 36.4 %
HGB BLD-MCNC: 11.7 G/DL
IMM GRANULOCYTES NFR BLD AUTO: 0.3 %
LYMPHOCYTES # BLD AUTO: 2.5 K/UL
LYMPHOCYTES NFR BLD AUTO: 26.6 %
MAN DIFF?: NORMAL
MCHC RBC-ENTMCNC: 26.2 PG
MCHC RBC-ENTMCNC: 32.1 GM/DL
MCV RBC AUTO: 81.4 FL
MONOCYTES # BLD AUTO: 0.4 K/UL
MONOCYTES NFR BLD AUTO: 4.3 %
NEUTROPHILS # BLD AUTO: 6.32 K/UL
NEUTROPHILS NFR BLD AUTO: 67.1 %
PLATELET # BLD AUTO: 352 K/UL
POTASSIUM SERPL-SCNC: 4.5 MMOL/L
PROT SERPL-MCNC: 7.3 G/DL
RBC # BLD: 4.47 M/UL
RBC # FLD: 14.1 %
SODIUM SERPL-SCNC: 144 MMOL/L
WBC # FLD AUTO: 9.41 K/UL

## 2018-11-27 ENCOUNTER — APPOINTMENT (OUTPATIENT)
Dept: RHEUMATOLOGY | Facility: CLINIC | Age: 63
End: 2018-11-27

## 2018-12-04 ENCOUNTER — APPOINTMENT (OUTPATIENT)
Dept: RHEUMATOLOGY | Facility: CLINIC | Age: 63
End: 2018-12-04
Payer: COMMERCIAL

## 2018-12-04 VITALS
DIASTOLIC BLOOD PRESSURE: 72 MMHG | BODY MASS INDEX: 23.92 KG/M2 | OXYGEN SATURATION: 98 % | WEIGHT: 130 LBS | RESPIRATION RATE: 16 BRPM | SYSTOLIC BLOOD PRESSURE: 120 MMHG | HEART RATE: 70 BPM | TEMPERATURE: 97.8 F | HEIGHT: 62 IN

## 2018-12-04 PROCEDURE — 99213 OFFICE O/P EST LOW 20 MIN: CPT

## 2018-12-06 LAB
ALBUMIN SERPL ELPH-MCNC: 4.4 G/DL
ALP BLD-CCNC: 107 U/L
ALT SERPL-CCNC: 24 U/L
ANION GAP SERPL CALC-SCNC: 15 MMOL/L
AST SERPL-CCNC: 20 U/L
BASOPHILS # BLD AUTO: 0.02 K/UL
BASOPHILS NFR BLD AUTO: 0.2 %
BILIRUB SERPL-MCNC: 0.2 MG/DL
BUN SERPL-MCNC: 18 MG/DL
CALCIUM SERPL-MCNC: 9.4 MG/DL
CHLORIDE SERPL-SCNC: 103 MMOL/L
CO2 SERPL-SCNC: 23 MMOL/L
CREAT SERPL-MCNC: 0.65 MG/DL
CRP SERPL-MCNC: 0.23 MG/DL
EOSINOPHIL # BLD AUTO: 0.17 K/UL
EOSINOPHIL NFR BLD AUTO: 1.8 %
ERYTHROCYTE [SEDIMENTATION RATE] IN BLOOD BY WESTERGREN METHOD: 16 MM/HR
GLUCOSE SERPL-MCNC: 110 MG/DL
HAV IGM SER QL: NONREACTIVE
HBV CORE IGM SER QL: NONREACTIVE
HBV SURFACE AG SER QL: NONREACTIVE
HCT VFR BLD CALC: 38.6 %
HCV AB SER QL: NONREACTIVE
HCV S/CO RATIO: 0.09 S/CO
HGB BLD-MCNC: 12.8 G/DL
IMM GRANULOCYTES NFR BLD AUTO: 0.4 %
LYMPHOCYTES # BLD AUTO: 2.98 K/UL
LYMPHOCYTES NFR BLD AUTO: 31.7 %
M TB IFN-G BLD-IMP: NEGATIVE
MAN DIFF?: NORMAL
MCHC RBC-ENTMCNC: 26.9 PG
MCHC RBC-ENTMCNC: 33.2 GM/DL
MCV RBC AUTO: 81.1 FL
MONOCYTES # BLD AUTO: 0.48 K/UL
MONOCYTES NFR BLD AUTO: 5.1 %
NEUTROPHILS # BLD AUTO: 5.7 K/UL
NEUTROPHILS NFR BLD AUTO: 60.8 %
PLATELET # BLD AUTO: 280 K/UL
POTASSIUM SERPL-SCNC: 4.6 MMOL/L
PROT SERPL-MCNC: 7.7 G/DL
QUANTIFERON TB PLUS MITOGEN MINUS NIL: 4.7 IU/ML
QUANTIFERON TB PLUS NIL: 0.06 IU/ML
QUANTIFERON TB PLUS TB1 MINUS NIL: 0.03 IU/ML
QUANTIFERON TB PLUS TB2 MINUS NIL: 0.02 IU/ML
RBC # BLD: 4.76 M/UL
RBC # FLD: 14.3 %
SODIUM SERPL-SCNC: 141 MMOL/L
WBC # FLD AUTO: 9.39 K/UL

## 2018-12-18 ENCOUNTER — OTHER (OUTPATIENT)
Age: 63
End: 2018-12-18

## 2018-12-21 ENCOUNTER — MEDICATION RENEWAL (OUTPATIENT)
Age: 63
End: 2018-12-21

## 2019-02-07 NOTE — CONSULT NOTE ADULT - GASTROINTESTINAL DETAILS
nontender/no distention/soft Subsequent Stages Histo Method Verbiage: Using a similar technique to that described above, a thin layer of tissue was removed from all areas where tumor was visible on the previous stage.  The tissue was again oriented, mapped, dyed, and processed as above.

## 2019-02-19 ENCOUNTER — APPOINTMENT (OUTPATIENT)
Dept: RHEUMATOLOGY | Facility: CLINIC | Age: 64
End: 2019-02-19
Payer: COMMERCIAL

## 2019-02-19 ENCOUNTER — LABORATORY RESULT (OUTPATIENT)
Age: 64
End: 2019-02-19

## 2019-02-19 VITALS
WEIGHT: 133 LBS | DIASTOLIC BLOOD PRESSURE: 80 MMHG | SYSTOLIC BLOOD PRESSURE: 150 MMHG | HEART RATE: 74 BPM | RESPIRATION RATE: 16 BRPM | TEMPERATURE: 97.9 F | HEIGHT: 62 IN | OXYGEN SATURATION: 99 % | BODY MASS INDEX: 24.48 KG/M2

## 2019-02-19 DIAGNOSIS — E03.9 HYPOTHYROIDISM, UNSPECIFIED: ICD-10-CM

## 2019-02-19 PROCEDURE — 99214 OFFICE O/P EST MOD 30 MIN: CPT

## 2019-02-19 RX ORDER — LEVOTHYROXINE SODIUM 0.07 MG/1
75 TABLET ORAL DAILY
Qty: 90 | Refills: 0 | Status: ACTIVE | COMMUNITY
Start: 2017-09-20 | End: 1900-01-01

## 2019-02-22 ENCOUNTER — RX RENEWAL (OUTPATIENT)
Age: 64
End: 2019-02-22

## 2019-02-22 LAB
25(OH)D3 SERPL-MCNC: 27.6 NG/ML
ALBUMIN SERPL ELPH-MCNC: 4.1 G/DL
ALP BLD-CCNC: 95 U/L
ALT SERPL-CCNC: 27 U/L
ANION GAP SERPL CALC-SCNC: 12 MMOL/L
APPEARANCE: CLEAR
AST SERPL-CCNC: 23 U/L
BASOPHILS # BLD AUTO: 0.03 K/UL
BASOPHILS NFR BLD AUTO: 0.3 %
BILIRUB SERPL-MCNC: 0.2 MG/DL
BILIRUBIN URINE: NEGATIVE
BLOOD URINE: NEGATIVE
BUN SERPL-MCNC: 18 MG/DL
CALCIUM SERPL-MCNC: 10 MG/DL
CHLORIDE SERPL-SCNC: 102 MMOL/L
CHOLEST SERPL-MCNC: 256 MG/DL
CHOLEST/HDLC SERPL: 3.7 RATIO
CK SERPL-CCNC: 242 U/L
CO2 SERPL-SCNC: 26 MMOL/L
COLOR: YELLOW
CREAT SERPL-MCNC: 0.72 MG/DL
CREAT SPEC-SCNC: 69 MG/DL
CREAT/PROT UR: 0.8 RATIO
EOSINOPHIL # BLD AUTO: 0.2 K/UL
EOSINOPHIL NFR BLD AUTO: 2.1 %
GLUCOSE QUALITATIVE U: NEGATIVE
GLUCOSE SERPL-MCNC: 55 MG/DL
HBA1C MFR BLD HPLC: 10.5 %
HCT VFR BLD CALC: 39.8 %
HDLC SERPL-MCNC: 70 MG/DL
HGB BLD-MCNC: 12.4 G/DL
IMM GRANULOCYTES NFR BLD AUTO: 0.5 %
KETONES URINE: NEGATIVE
LDLC SERPL CALC-MCNC: 170 MG/DL
LEUKOCYTE ESTERASE URINE: NEGATIVE
LYMPHOCYTES # BLD AUTO: 4.07 K/UL
LYMPHOCYTES NFR BLD AUTO: 42.4 %
MAN DIFF?: NORMAL
MCHC RBC-ENTMCNC: 26.4 PG
MCHC RBC-ENTMCNC: 31.2 GM/DL
MCV RBC AUTO: 84.9 FL
MONOCYTES # BLD AUTO: 0.59 K/UL
MONOCYTES NFR BLD AUTO: 6.2 %
NEUTROPHILS # BLD AUTO: 4.65 K/UL
NEUTROPHILS NFR BLD AUTO: 48.5 %
NITRITE URINE: NEGATIVE
PH URINE: 6
PLATELET # BLD AUTO: 294 K/UL
POTASSIUM SERPL-SCNC: 4.6 MMOL/L
PROT SERPL-MCNC: 7.6 G/DL
PROT UR-MCNC: 54 MG/DL
PROTEIN URINE: ABNORMAL
RBC # BLD: 4.69 M/UL
RBC # FLD: 14.4 %
SODIUM SERPL-SCNC: 140 MMOL/L
SPECIFIC GRAVITY URINE: 1.02
TRIGL SERPL-MCNC: 82 MG/DL
UROBILINOGEN URINE: NORMAL
WBC # FLD AUTO: 9.59 K/UL

## 2019-03-18 ENCOUNTER — APPOINTMENT (OUTPATIENT)
Dept: RHEUMATOLOGY | Facility: CLINIC | Age: 64
End: 2019-03-18
Payer: COMMERCIAL

## 2019-03-18 VITALS
WEIGHT: 136 LBS | DIASTOLIC BLOOD PRESSURE: 79 MMHG | OXYGEN SATURATION: 98 % | BODY MASS INDEX: 26.7 KG/M2 | HEIGHT: 60 IN | SYSTOLIC BLOOD PRESSURE: 136 MMHG | TEMPERATURE: 98.9 F | HEART RATE: 77 BPM

## 2019-03-18 PROCEDURE — 99214 OFFICE O/P EST MOD 30 MIN: CPT

## 2019-03-18 NOTE — DATA REVIEWED
[FreeTextEntry1] : R ankle US (11/2018): No joint effusions or synovial hyperemia to suggest synovitis. No tendon tear. Soft tissues are unremarkable. After written informed consent was obtained from the patient, the patient's \par right ankle was prepped and draped using sterile technique. Using ultrasound guidance, after the administration of local anesthesia (1% lidocaine), a needle was inserted into right ankle with injection of 3 cc of bupivacaine and 40 mg Kenalog. Confirmation of correct needle placement was performed by ultrasound guidance. The patient tolerated the procedure well without post procedure complication. IMPRESSION: Successful right ankle joint steroid injection. \par \par BMD (08/2018): Femoral neck: 0.2, normal. Total hip: 0.0, normal. Spine: 0.2, normal. IMPRESSION: Normal. \par FRACTURE RISK: The risk of fracture is average. TREATMENT RECOMMENDATIONS: Based on NOF treatment guidelines medical therapy is not recommended at this time. All treatment decisions require clinical judgment and consideration of individual patient factors, including patient preferences, comorbidities, previous drug use, risk factors not captured in the FRAX model (e.g. frailty, falls, Vitamin D deficiency, increased bone turnover, interval significant decline in bone density) and possible under or over estimation of fracture risk by FRAX. In addition the NOF Guide recommends that FDA-approved medical therapies be considered in postmenopausal woman and men age >= 50 years with a: * hip or vertebral (clinical or morphometric) fracture. * T-score of <=-2.5 at the spine or hip. * 10 years fracture probability by FRAX of >= 3.0% for hip fracture or >= 20% for major osteoporotic fracture. FOLLOW-UP: A repeat examination is recommended in 3 to 5 years. \par \par L hip US (04/2018):  Informed consent was obtained and time out performed. The left hip was prepped and draped per standard sterile protocol. Lidocaine 1% and bicarbonate was used for local anesthesia. Under ultrasound guidance, a needle was advanced into the hip joint space. Next, Bupivacaine 0.5% 5 cc \par and Kenalog 40 mg was instilled into the hip joint. Ultrasound imaging demonstrates mild productive changes at the hip joint. No large joint effusion is seen. There is mild blunting of the visualized labrum. No additional abnormalities were identified. The patient tolerated the procedure well. No complications were noted. Written and verbal discharge instructions were provided and the patient's questions answered. \par IMPRESSION: 1. Mild arthrosis of the left hip. 2. Successful left hip ultrasound-guided steroid injection. \par

## 2019-03-18 NOTE — HISTORY OF PRESENT ILLNESS
[___ Month(s) Ago] : [unfilled] month(s) ago [Currently Experiencing] : currently [Fatigue] : fatigue [Arthralgias] : arthralgias [Joint Swelling] : joint swelling [Decreased ROM] : decreased range of motion [Anorexia] : no anorexia [Weight Loss] : no weight loss [Malaise] : no malaise [Fever] : no fever [Chills] : no chills [Malar Facial Rash] : no malar facial rash [Depression] : no depression [Skin Nodules] : no skin nodules [Skin Lesions] : no lesions [Oral Ulcers] : no oral ulcers [Dry Mouth] : no dry mouth [Cough] : no cough [Dysphagia] : no dysphagia [Dysphonia] : no dysphonia [Shortness of Breath] : no shortness of breath [Chest Pain] : no chest pain [Joint Warmth] : no joint warmth [Joint Deformity] : no joint deformity [Morning Stiffness] : no morning stiffness [Falls] : no falls [Difficulty Walking] : no difficulty walking [Difficulty Standing] : no difficulty standing [Dyspnea] : no dyspnea [Muscle Weakness] : no muscle weakness [Myalgias] : no myalgias [Muscle Spasms] : no muscle spasms [Visual Changes] : no visual changes [Muscle Cramping] : no muscle cramping [Eye Pain] : no eye pain [Eye Redness] : no eye redness [Dry Eyes] : no dry eyes [FreeTextEntry1] : C/o pain and swelling in the 5th MCP of the L hand. Also has pain in the L hip that appears to respond to Gabapentin but recurs 2 hours after taking her medication. Points to the trochanteric and ischial bursa as well as the hip joint for pain. last US was done in April 2018 which did not show any joint inflammation but patient did note improvement in symptoms with the steroid injection. \par \par Has had recent changes to her medications - Ramipril replaced with ARB? Also resumed Metformin and increased Lantus dose. Does not recall all her medications today.

## 2019-03-18 NOTE — ASSESSMENT
[FreeTextEntry1] : 64 year old Filipino female with long standing diabetes (poorly controlled) and dyslipidemia who was  hospitalized was severe hip pain with evidence of inflammation in the R hip synovial fluid analysis and MRI evidence of tendinopathy/bursitis. Eventually noted to have a high titer CCP consistent with RA. \par \par 1. RA: seropositive, non-erosive with predominantly large to medium joint involvement.Mild improvement in joint symptoms although still has arthralgias, morning stiffness and R hip pain. Will repeat labs today. Will continue Xeljanz XR 11 mg daily.  Will get MRI of the L hip and Knee to evaluate for any inflammatory changes. If present and  normal LFTs, will consider adding low dose MTX or switching to anti-TNF. Monitor off prednisone. Likely has a combination of OA + RA + FBM contributing to her joint symptoms.  Trial of Diclofenac 1% gel to be applied up to 4 times daily as needed to the L hip and buttock areas. \par \par 2. Proteinuria: Normal renal function. Mild proteinuria likely related to DM nephropathy. Monitor for now. \par \par 3. Carpal tunnel syndrome: s/p surgery in the L hand with improvement in symptoms. \par \par 4. Low back and LLE pain: continue current dose Gabapentin. \par \par 5. Bone health: Ca+D as needed. BMD normal in Aug 2018. No BPP needed at this time.  \par \par Follow up in 6 weeks

## 2019-03-18 NOTE — PHYSICAL EXAM
[General Appearance - Alert] : alert [General Appearance - In No Acute Distress] : in no acute distress [Sclera] : the sclera and conjunctiva were normal [PERRL With Normal Accommodation] : pupils were equal in size, round, and reactive to light [Extraocular Movements] : extraocular movements were intact [Outer Ear] : the ears and nose were normal in appearance [Oropharynx] : the oropharynx was normal [Neck Appearance] : the appearance of the neck was normal [Auscultation Breath Sounds / Voice Sounds] : lungs were clear to auscultation bilaterally [Heart Rate And Rhythm] : heart rate was normal and rhythm regular [Heart Sounds] : normal S1 and S2 [Heart Sounds Gallop] : no gallops [Murmurs] : no murmurs [Heart Sounds Pericardial Friction Rub] : no pericardial rub [Edema] : there was no peripheral edema [Abdomen Soft] : soft [Abdomen Tenderness] : non-tender [Cervical Lymph Nodes Enlarged Posterior Bilaterally] : posterior cervical [Cervical Lymph Nodes Enlarged Anterior Bilaterally] : anterior cervical [Supraclavicular Lymph Nodes Enlarged Bilaterally] : supraclavicular [Axillary Lymph Nodes Enlarged Bilaterally] : axillary [No CVA Tenderness] : no ~M costovertebral angle tenderness [No Spinal Tenderness] : no spinal tenderness [Skin Color & Pigmentation] : normal skin color and pigmentation [Skin Turgor] : normal skin turgor [] : no rash [Deep Tendon Reflexes (DTR)] : deep tendon reflexes were 2+ and symmetric [Sensation] : the sensory exam was normal to light touch and pinprick [No Focal Deficits] : no focal deficits [Oriented To Time, Place, And Person] : oriented to person, place, and time [Impaired Insight] : insight and judgment were intact [Affect] : the affect was normal [FreeTextEntry1] : TTP over L buttock along the L thigh as well as the L trochanteric and ischia lbursae and the lateral aspect of the L knee without any appreciable swelling. No fullness in the L popliteal fossa. Healed scar at incision site on the volar aspect of the wrist. TTP of the 4th and 5th MCPs on the L hand, R wrist, L knee and L hip. No synovitis

## 2019-03-22 LAB
ALBUMIN SERPL ELPH-MCNC: 4.1 G/DL
ALP BLD-CCNC: 92 U/L
ALT SERPL-CCNC: 26 U/L
ANION GAP SERPL CALC-SCNC: 11 MMOL/L
AST SERPL-CCNC: 21 U/L
BASOPHILS # BLD AUTO: 0.03 K/UL
BASOPHILS NFR BLD AUTO: 0.3 %
BILIRUB SERPL-MCNC: 0.2 MG/DL
BUN SERPL-MCNC: 15 MG/DL
CALCIUM SERPL-MCNC: 9.5 MG/DL
CHLORIDE SERPL-SCNC: 105 MMOL/L
CHOLEST SERPL-MCNC: 229 MG/DL
CHOLEST/HDLC SERPL: 3.5 RATIO
CK SERPL-CCNC: 179 U/L
CO2 SERPL-SCNC: 26 MMOL/L
CREAT SERPL-MCNC: 0.69 MG/DL
CRP SERPL-MCNC: 0.36 MG/DL
EOSINOPHIL # BLD AUTO: 0.2 K/UL
EOSINOPHIL NFR BLD AUTO: 2.1 %
ERYTHROCYTE [SEDIMENTATION RATE] IN BLOOD BY WESTERGREN METHOD: 48 MM/HR
GLUCOSE SERPL-MCNC: 112 MG/DL
HCT VFR BLD CALC: 38.5 %
HDLC SERPL-MCNC: 65 MG/DL
HGB BLD-MCNC: 12.4 G/DL
IMM GRANULOCYTES NFR BLD AUTO: 0.4 %
LDLC SERPL CALC-MCNC: 142 MG/DL
LYMPHOCYTES # BLD AUTO: 2.63 K/UL
LYMPHOCYTES NFR BLD AUTO: 27.5 %
MAN DIFF?: NORMAL
MCHC RBC-ENTMCNC: 26.8 PG
MCHC RBC-ENTMCNC: 32.2 GM/DL
MCV RBC AUTO: 83.3 FL
MONOCYTES # BLD AUTO: 0.51 K/UL
MONOCYTES NFR BLD AUTO: 5.3 %
NEUTROPHILS # BLD AUTO: 6.14 K/UL
NEUTROPHILS NFR BLD AUTO: 64.4 %
PLATELET # BLD AUTO: 263 K/UL
POTASSIUM SERPL-SCNC: 4.3 MMOL/L
PROT SERPL-MCNC: 7.3 G/DL
RBC # BLD: 4.62 M/UL
RBC # FLD: 13.9 %
SODIUM SERPL-SCNC: 142 MMOL/L
TRIGL SERPL-MCNC: 111 MG/DL
WBC # FLD AUTO: 9.55 K/UL

## 2019-04-07 ENCOUNTER — APPOINTMENT (OUTPATIENT)
Dept: MRI IMAGING | Facility: CLINIC | Age: 64
End: 2019-04-07

## 2019-04-11 ENCOUNTER — FORM ENCOUNTER (OUTPATIENT)
Age: 64
End: 2019-04-11

## 2019-04-12 ENCOUNTER — APPOINTMENT (OUTPATIENT)
Age: 64
End: 2019-04-12
Payer: COMMERCIAL

## 2019-04-12 ENCOUNTER — OUTPATIENT (OUTPATIENT)
Dept: OUTPATIENT SERVICES | Facility: HOSPITAL | Age: 64
LOS: 1 days | End: 2019-04-12
Payer: COMMERCIAL

## 2019-04-12 DIAGNOSIS — M06.9 RHEUMATOID ARTHRITIS, UNSPECIFIED: ICD-10-CM

## 2019-04-12 DIAGNOSIS — Z98.890 OTHER SPECIFIED POSTPROCEDURAL STATES: Chronic | ICD-10-CM

## 2019-04-12 DIAGNOSIS — Z90.49 ACQUIRED ABSENCE OF OTHER SPECIFIED PARTS OF DIGESTIVE TRACT: Chronic | ICD-10-CM

## 2019-04-12 PROCEDURE — 73721 MRI JNT OF LWR EXTRE W/O DYE: CPT | Mod: 26,LT

## 2019-04-12 PROCEDURE — 73721 MRI JNT OF LWR EXTRE W/O DYE: CPT

## 2019-04-12 PROCEDURE — 73721 MRI JNT OF LWR EXTRE W/O DYE: CPT | Mod: 26,LT,76

## 2019-04-29 ENCOUNTER — APPOINTMENT (OUTPATIENT)
Dept: RHEUMATOLOGY | Facility: CLINIC | Age: 64
End: 2019-04-29
Payer: COMMERCIAL

## 2019-04-29 VITALS
HEIGHT: 60 IN | DIASTOLIC BLOOD PRESSURE: 81 MMHG | BODY MASS INDEX: 26.9 KG/M2 | SYSTOLIC BLOOD PRESSURE: 131 MMHG | TEMPERATURE: 97.9 F | HEART RATE: 68 BPM | OXYGEN SATURATION: 99 % | WEIGHT: 137 LBS

## 2019-04-29 LAB
ALBUMIN SERPL ELPH-MCNC: 4 G/DL
ALP BLD-CCNC: 101 U/L
ALT SERPL-CCNC: 19 U/L
ANION GAP SERPL CALC-SCNC: 14 MMOL/L
APPEARANCE: CLEAR
AST SERPL-CCNC: 18 U/L
BASOPHILS # BLD AUTO: 0.02 K/UL
BASOPHILS NFR BLD AUTO: 0.2 %
BILIRUB SERPL-MCNC: 0.2 MG/DL
BILIRUBIN URINE: NEGATIVE
BLOOD URINE: NEGATIVE
BUN SERPL-MCNC: 15 MG/DL
CALCIUM SERPL-MCNC: 9.8 MG/DL
CHLORIDE SERPL-SCNC: 103 MMOL/L
CHOLEST SERPL-MCNC: 220 MG/DL
CHOLEST/HDLC SERPL: 4.2 RATIO
CK SERPL-CCNC: 127 U/L
CO2 SERPL-SCNC: 22 MMOL/L
COLOR: NORMAL
CREAT SERPL-MCNC: 0.73 MG/DL
CREAT SPEC-SCNC: 46 MG/DL
CREAT/PROT UR: 0.4 RATIO
CRP SERPL-MCNC: 1.02 MG/DL
EOSINOPHIL # BLD AUTO: 0.21 K/UL
EOSINOPHIL NFR BLD AUTO: 2.6 %
ERYTHROCYTE [SEDIMENTATION RATE] IN BLOOD BY WESTERGREN METHOD: 63 MM/HR
ESTIMATED AVERAGE GLUCOSE: 212 MG/DL
GLUCOSE QUALITATIVE U: NEGATIVE
GLUCOSE SERPL-MCNC: 85 MG/DL
HBA1C MFR BLD HPLC: 9 %
HCT VFR BLD CALC: 37 %
HDLC SERPL-MCNC: 52 MG/DL
HGB BLD-MCNC: 12 G/DL
IMM GRANULOCYTES NFR BLD AUTO: 0.2 %
KETONES URINE: NEGATIVE
LDLC SERPL CALC-MCNC: 140 MG/DL
LEUKOCYTE ESTERASE URINE: NEGATIVE
LYMPHOCYTES # BLD AUTO: 3.25 K/UL
LYMPHOCYTES NFR BLD AUTO: 40.2 %
MAN DIFF?: NORMAL
MCHC RBC-ENTMCNC: 27 PG
MCHC RBC-ENTMCNC: 32.4 GM/DL
MCV RBC AUTO: 83.3 FL
MONOCYTES # BLD AUTO: 0.34 K/UL
MONOCYTES NFR BLD AUTO: 4.2 %
NEUTROPHILS # BLD AUTO: 4.25 K/UL
NEUTROPHILS NFR BLD AUTO: 52.6 %
NITRITE URINE: NEGATIVE
PH URINE: 6
PLATELET # BLD AUTO: 278 K/UL
POTASSIUM SERPL-SCNC: 4.4 MMOL/L
PROT SERPL-MCNC: 7.3 G/DL
PROT UR-MCNC: 17 MG/DL
PROTEIN URINE: NORMAL
RBC # BLD: 4.44 M/UL
RBC # FLD: 13.2 %
SODIUM SERPL-SCNC: 139 MMOL/L
SPECIFIC GRAVITY URINE: 1.01
TRIGL SERPL-MCNC: 140 MG/DL
UROBILINOGEN URINE: NORMAL
WBC # FLD AUTO: 8.09 K/UL

## 2019-04-29 PROCEDURE — 99213 OFFICE O/P EST LOW 20 MIN: CPT

## 2019-04-29 RX ORDER — GABAPENTIN 300 MG/1
300 CAPSULE ORAL
Qty: 360 | Refills: 1 | Status: ACTIVE | COMMUNITY
Start: 2018-08-21 | End: 1900-01-01

## 2019-04-29 RX ORDER — TOFACITINIB 11 MG/1
11 TABLET, FILM COATED, EXTENDED RELEASE ORAL
Qty: 90 | Refills: 2 | Status: ACTIVE | COMMUNITY
Start: 2018-10-16 | End: 1900-01-01

## 2019-05-03 LAB — 25(OH)D3 SERPL-MCNC: 25.2 NG/ML

## 2019-05-28 ENCOUNTER — RX RENEWAL (OUTPATIENT)
Age: 64
End: 2019-05-28

## 2019-05-28 RX ORDER — DICLOFENAC SODIUM 10 MG/G
1 GEL TOPICAL
Qty: 300 | Refills: 1 | Status: ACTIVE | COMMUNITY
Start: 2019-03-18 | End: 1900-01-01

## 2019-07-30 ENCOUNTER — APPOINTMENT (OUTPATIENT)
Dept: RHEUMATOLOGY | Facility: CLINIC | Age: 64
End: 2019-07-30
Payer: COMMERCIAL

## 2019-07-30 ENCOUNTER — LABORATORY RESULT (OUTPATIENT)
Age: 64
End: 2019-07-30

## 2019-07-30 VITALS
DIASTOLIC BLOOD PRESSURE: 76 MMHG | HEART RATE: 81 BPM | HEIGHT: 60 IN | OXYGEN SATURATION: 99 % | BODY MASS INDEX: 25.72 KG/M2 | SYSTOLIC BLOOD PRESSURE: 128 MMHG | TEMPERATURE: 98.1 F | WEIGHT: 131 LBS

## 2019-07-30 PROCEDURE — 99214 OFFICE O/P EST MOD 30 MIN: CPT

## 2019-08-01 LAB
25(OH)D3 SERPL-MCNC: 32.2 NG/ML
ALBUMIN MFR SERPL ELPH: 47.3 %
ALBUMIN SERPL ELPH-MCNC: 3.9 G/DL
ALBUMIN SERPL-MCNC: 3.5 G/DL
ALBUMIN/GLOB SERPL: 0.9 RATIO
ALP BLD-CCNC: 121 U/L
ALPHA1 GLOB MFR SERPL ELPH: 4.9 %
ALPHA1 GLOB SERPL ELPH-MCNC: 0.4 G/DL
ALPHA2 GLOB MFR SERPL ELPH: 13.3 %
ALPHA2 GLOB SERPL ELPH-MCNC: 1 G/DL
ALT SERPL-CCNC: 22 U/L
ANION GAP SERPL CALC-SCNC: 12 MMOL/L
AST SERPL-CCNC: 17 U/L
B-GLOBULIN MFR SERPL ELPH: 11.4 %
B-GLOBULIN SERPL ELPH-MCNC: 0.9 G/DL
BASOPHILS # BLD AUTO: 0 K/UL
BASOPHILS NFR BLD AUTO: 0 %
BILIRUB SERPL-MCNC: 0.2 MG/DL
BUN SERPL-MCNC: 20 MG/DL
CALCIUM SERPL-MCNC: 8.9 MG/DL
CHLORIDE SERPL-SCNC: 105 MMOL/L
CK SERPL-CCNC: 85 U/L
CO2 SERPL-SCNC: 24 MMOL/L
CREAT SERPL-MCNC: 0.83 MG/DL
CRP SERPL-MCNC: 2.9 MG/DL
DEPRECATED KAPPA LC FREE/LAMBDA SER: 1.57 RATIO
EOSINOPHIL # BLD AUTO: 0.07 K/UL
EOSINOPHIL NFR BLD AUTO: 1.7 %
ERYTHROCYTE [SEDIMENTATION RATE] IN BLOOD BY WESTERGREN METHOD: 106 MM/HR
FERRITIN SERPL-MCNC: 461 NG/ML
GAMMA GLOB FLD ELPH-MCNC: 1.7 G/DL
GAMMA GLOB MFR SERPL ELPH: 23.1 %
GLUCOSE SERPL-MCNC: 168 MG/DL
HCT VFR BLD CALC: 29.6 %
HGB BLD-MCNC: 9.7 G/DL
IGA SER QL IEP: 247 MG/DL
IGG SER QL IEP: 1802 MG/DL
IGM SER QL IEP: 149 MG/DL
INTERPRETATION SERPL IEP-IMP: NORMAL
IRON SATN MFR SERPL: 49 %
IRON SERPL-MCNC: 109 UG/DL
KAPPA LC CSF-MCNC: 3.31 MG/DL
KAPPA LC SERPL-MCNC: 5.19 MG/DL
LYMPHOCYTES # BLD AUTO: 2.46 K/UL
LYMPHOCYTES NFR BLD AUTO: 56.5 %
M PROTEIN SPEC IFE-MCNC: NORMAL
MAN DIFF?: YES
MCHC RBC-ENTMCNC: 28.4 PG
MCHC RBC-ENTMCNC: 32.8 GM/DL
MCV RBC AUTO: 86.8 FL
MONOCYTES # BLD AUTO: 0.07 K/UL
MONOCYTES NFR BLD AUTO: 1.7 %
NEUTROPHILS # BLD AUTO: 1.56 K/UL
NEUTROPHILS NFR BLD AUTO: 32.2 %
PLATELET # BLD AUTO: 209 K/UL
POTASSIUM SERPL-SCNC: 4.7 MMOL/L
PROT SERPL-MCNC: 7.5 G/DL
RBC # BLD: 3.41 M/UL
RBC # BLD: 3.41 M/UL
RBC # FLD: 17.2 %
RETICS # AUTO: 2.9 %
RETICS AGGREG/RBC NFR: 98.9 K/UL
SODIUM SERPL-SCNC: 140 MMOL/L
TIBC SERPL-MCNC: 224 UG/DL
TSH SERPL-ACNC: 3.01 UIU/ML
UIBC SERPL-MCNC: 115 UG/DL
WBC # FLD AUTO: 4.36 K/UL

## 2019-08-02 ENCOUNTER — RESULT REVIEW (OUTPATIENT)
Age: 64
End: 2019-08-02

## 2019-08-02 ENCOUNTER — OUTPATIENT (OUTPATIENT)
Dept: OUTPATIENT SERVICES | Facility: HOSPITAL | Age: 64
LOS: 1 days | Discharge: HOME | End: 2019-08-02

## 2019-08-02 ENCOUNTER — APPOINTMENT (OUTPATIENT)
Dept: HEMATOLOGY ONCOLOGY | Facility: CLINIC | Age: 64
End: 2019-08-02
Payer: COMMERCIAL

## 2019-08-02 ENCOUNTER — OUTPATIENT (OUTPATIENT)
Dept: OUTPATIENT SERVICES | Facility: HOSPITAL | Age: 64
LOS: 1 days | End: 2019-08-02
Payer: COMMERCIAL

## 2019-08-02 VITALS
BODY MASS INDEX: 24.92 KG/M2 | HEART RATE: 73 BPM | OXYGEN SATURATION: 100 % | HEIGHT: 61 IN | DIASTOLIC BLOOD PRESSURE: 76 MMHG | RESPIRATION RATE: 16 BRPM | SYSTOLIC BLOOD PRESSURE: 133 MMHG | TEMPERATURE: 98.1 F | WEIGHT: 131.99 LBS

## 2019-08-02 DIAGNOSIS — Z78.9 OTHER SPECIFIED HEALTH STATUS: ICD-10-CM

## 2019-08-02 DIAGNOSIS — D64.9 ANEMIA, UNSPECIFIED: ICD-10-CM

## 2019-08-02 DIAGNOSIS — D64.89 OTHER SPECIFIED ANEMIAS: ICD-10-CM

## 2019-08-02 DIAGNOSIS — Z98.890 OTHER SPECIFIED POSTPROCEDURAL STATES: Chronic | ICD-10-CM

## 2019-08-02 DIAGNOSIS — D70.9 NEUTROPENIA, UNSPECIFIED: ICD-10-CM

## 2019-08-02 DIAGNOSIS — Z90.49 ACQUIRED ABSENCE OF OTHER SPECIFIED PARTS OF DIGESTIVE TRACT: Chronic | ICD-10-CM

## 2019-08-02 LAB
ANION GAP SERPL CALC-SCNC: 13 MMOL/L — SIGNIFICANT CHANGE UP (ref 5–17)
APTT BLD: 37.1 SEC — HIGH (ref 27.5–36.3)
BASOPHILS # BLD AUTO: 0 K/UL — SIGNIFICANT CHANGE UP (ref 0–0.2)
BASOPHILS NFR BLD AUTO: 1 % — SIGNIFICANT CHANGE UP (ref 0–2)
BLASTS # FLD: 3 % — HIGH (ref 0–0)
BUN SERPL-MCNC: 14 MG/DL — SIGNIFICANT CHANGE UP (ref 7–23)
CALCIUM SERPL-MCNC: 9 MG/DL — SIGNIFICANT CHANGE UP (ref 8.4–10.5)
CHLORIDE SERPL-SCNC: 104 MMOL/L — SIGNIFICANT CHANGE UP (ref 96–108)
CO2 SERPL-SCNC: 23 MMOL/L — SIGNIFICANT CHANGE UP (ref 22–31)
CREAT SERPL-MCNC: 0.64 MG/DL — SIGNIFICANT CHANGE UP (ref 0.5–1.3)
D DIMER BLD IA.RAPID-MCNC: 1168 NG/ML DDU — HIGH
EOSINOPHIL # BLD AUTO: 0.1 K/UL — SIGNIFICANT CHANGE UP (ref 0–0.5)
EOSINOPHIL NFR BLD AUTO: 2 % — SIGNIFICANT CHANGE UP (ref 0–6)
FIBRINOGEN PPP-MCNC: 692 MG/DL — HIGH (ref 320–500)
GLUCOSE SERPL-MCNC: 186 MG/DL — HIGH (ref 70–99)
HCT VFR BLD CALC: 28 % — LOW (ref 34.5–45)
HGB BLD-MCNC: 9.8 G/DL — LOW (ref 11.5–15.5)
INR BLD: 1.12 RATIO — SIGNIFICANT CHANGE UP (ref 0.88–1.16)
LDH SERPL L TO P-CCNC: 255 U/L — HIGH (ref 50–242)
LYMPHOCYTES # BLD AUTO: 2.5 K/UL — SIGNIFICANT CHANGE UP (ref 1–3.3)
LYMPHOCYTES # BLD AUTO: 62 % — HIGH (ref 13–44)
MAGNESIUM SERPL-MCNC: 1.8 MG/DL — SIGNIFICANT CHANGE UP (ref 1.6–2.6)
MCHC RBC-ENTMCNC: 30 PG — SIGNIFICANT CHANGE UP (ref 27–34)
MCHC RBC-ENTMCNC: 35 G/DL — SIGNIFICANT CHANGE UP (ref 32–36)
MCV RBC AUTO: 85.8 FL — SIGNIFICANT CHANGE UP (ref 80–100)
MONOCYTES # BLD AUTO: 0.2 K/UL — SIGNIFICANT CHANGE UP (ref 0–0.9)
MONOCYTES NFR BLD AUTO: 6 % — SIGNIFICANT CHANGE UP (ref 2–14)
NEUTROPHILS # BLD AUTO: 0.9 K/UL — LOW (ref 1.8–7.4)
NEUTROPHILS NFR BLD AUTO: 26 % — LOW (ref 43–77)
PHOSPHATE SERPL-MCNC: 3.3 MG/DL — SIGNIFICANT CHANGE UP (ref 2.5–4.5)
PLAT MORPH BLD: NORMAL — SIGNIFICANT CHANGE UP
PLATELET # BLD AUTO: 213 K/UL — SIGNIFICANT CHANGE UP (ref 150–400)
POTASSIUM SERPL-MCNC: 4.2 MMOL/L — SIGNIFICANT CHANGE UP (ref 3.5–5.3)
POTASSIUM SERPL-SCNC: 4.2 MMOL/L — SIGNIFICANT CHANGE UP (ref 3.5–5.3)
PROTHROM AB SERPL-ACNC: 12.9 SEC — SIGNIFICANT CHANGE UP (ref 10–13.1)
RBC # BLD: 3.27 M/UL — LOW (ref 3.8–5.2)
RBC # FLD: 15.9 % — HIGH (ref 10.3–14.5)
RBC BLD AUTO: NORMAL — SIGNIFICANT CHANGE UP
SODIUM SERPL-SCNC: 140 MMOL/L — SIGNIFICANT CHANGE UP (ref 135–145)
URATE SERPL-MCNC: 4.2 MG/DL — SIGNIFICANT CHANGE UP (ref 2.5–7)
WBC # BLD: 3.7 K/UL — LOW (ref 3.8–10.5)
WBC # FLD AUTO: 3.7 K/UL — LOW (ref 3.8–10.5)

## 2019-08-02 PROCEDURE — 88313 SPECIAL STAINS GROUP 2: CPT | Mod: 26

## 2019-08-02 PROCEDURE — 88305 TISSUE EXAM BY PATHOLOGIST: CPT | Mod: 26

## 2019-08-02 PROCEDURE — 88189 FLOWCYTOMETRY/READ 16 & >: CPT

## 2019-08-02 PROCEDURE — 88291 CYTO/MOLECULAR REPORT: CPT

## 2019-08-02 PROCEDURE — 38222 DX BONE MARROW BX & ASPIR: CPT | Mod: RT

## 2019-08-02 PROCEDURE — 85097 BONE MARROW INTERPRETATION: CPT

## 2019-08-02 PROCEDURE — 99205 OFFICE O/P NEW HI 60 MIN: CPT | Mod: 25

## 2019-08-02 RX ORDER — LOSARTAN POTASSIUM 100 MG/1
100 TABLET, FILM COATED ORAL
Qty: 90 | Refills: 1 | Status: ACTIVE | COMMUNITY
Start: 2019-08-02

## 2019-08-02 RX ORDER — INSULIN GLARGINE 100 [IU]/ML
100 INJECTION, SOLUTION SUBCUTANEOUS
Refills: 0 | Status: ACTIVE | COMMUNITY
Start: 2019-08-02

## 2019-08-02 RX ORDER — INSULIN GLARGINE 100 [IU]/ML
100 INJECTION, SOLUTION SUBCUTANEOUS
Qty: 3 | Refills: 0 | Status: DISCONTINUED | COMMUNITY
Start: 2017-09-20 | End: 2019-08-02

## 2019-08-02 RX ORDER — LEVOFLOXACIN 500 MG/1
500 TABLET, FILM COATED ORAL
Qty: 30 | Refills: 0 | Status: ACTIVE | COMMUNITY
Start: 2019-08-02 | End: 1900-01-01

## 2019-08-02 NOTE — PHYSICAL EXAM
[Ambulatory and capable of all self care but unable to carry out any work activities] : Status 2- Ambulatory and capable of all self care but unable to carry out any work activities. Up and about more than 50% of waking hours [Ulcers] : no ulcers [Mucositis] : no mucositis [Normal] : bilateral breasts without nipple retraction, skin dimpling or palpable masses; the bilateral axillae are without adenopathy [de-identified] : few crackles at bases b/l [de-identified] : fibrocystic breasts, no masses [de-identified] : ?L inguinal 1.5cm LN

## 2019-08-02 NOTE — REVIEW OF SYSTEMS
[Fever] : fever [Chills] : chills [Fatigue] : fatigue [Palpitations] : palpitations [Cough] : cough [SOB on Exertion] : shortness of breath during exertion [Dysuria] : dysuria [Joint Pain] : joint pain [Joint Stiffness] : joint stiffness [Difficulty Walking] : difficulty walking [Insomnia] : insomnia [Depression] : depression [Negative] : Heme/Lymph [Night Sweats] : no night sweats [Recent Change In Weight] : ~T no recent weight change [Vision Problems] : no vision problems [Dysphagia] : no dysphagia [Nosebleeds] : no nosebleeds [Hoarseness] : no hoarseness [Odynophagia] : no odynophagia [Mucosal Pain] : no mucosal pain [Chest Pain] : no chest pain [Leg Claudication] : no intermittent leg claudication [Lower Ext Edema] : no lower extremity edema [Shortness Of Breath] : no shortness of breath [Wheezing] : no wheezing [Abdominal Pain] : no abdominal pain [Vomiting] : no vomiting [Constipation] : no constipation [Diarrhea] : no diarrhea [Muscle Pain] : no muscle pain [Muscle Weakness] : no muscle weakness [Skin Rash] : no skin rash [Skin Wound] : no skin wound [Confused] : no confusion [Dizziness] : no dizziness [Fainting] : no fainting [Suicidal] : not suicidal [Easy Bruising] : no tendency for easy bruising [Swollen Glands] : no swollen glands [FreeTextEntry2] : subjective fever x 4 mo and chills x 1 yr. Poor appetite [FreeTextEntry3] : wears glasses [FreeTextEntry5] : palpitations for exertion [FreeTextEntry6] : for the past 3-4 months has had SOB and fatigue [FreeTextEntry7] : colonoscopy done at age 51 -normal. No black stools, no bleeding [FreeTextEntry8] : dysuria -treated with UTI starting on 7/28 [FreeTextEntry9] : L hip pain and lower back pain, L knee -for 3 yrs, dx'ed with RA [de-identified] : depressed after her 's death in 2016 [de-identified] : uses cane for walking [FreeTextEntry1] : seasonal allergies

## 2019-08-02 NOTE — CONSULT LETTER
[Dear  ___] : Dear  [unfilled], [Consult Letter:] : I had the pleasure of evaluating your patient, [unfilled]. [Please see my note below.] : Please see my note below. [Sincerely,] : Sincerely, [Consult Closing:] : Thank you very much for allowing me to participate in the care of this patient.  If you have any questions, please do not hesitate to contact me. [DrHope  ___] : Dr. BROWN

## 2019-08-02 NOTE — ASSESSMENT
[FreeTextEntry1] : 65 yo F with hx RA, here for evaluation of new anemia and peripheral immature cells (few blasts)\par \par -I discussed with patient and family (over phone conference call) the findings of the blood work and work up necessary. Given peripheral smear with blasts, most likely diagnoses are MDS (Myelodysplastic Syndrome) vs. AML (Acute Myeloid Leukemia) -patient needs BM bx for diagnosis. Pt agrees to BM bx today -also d/w pt phase III Pfeizer AML study with chemotherapy induction + Glasdegib vs. placebo, and pt signed consent for it in order to send samples -will determine eligibility once all information is obtained from the marrow. Will also send [facility] studies today\par \par -blood work today: CMP, CPK, LDH, uric acid, Mg, Phos. Will send coags -Pt, aPTT, fibrinogen, D-Dimer\par \par -will send baseline labs for Hep B, C and HIV today\par \par -BM bx done today -f/u results, spoke to pathology to review flow cytometry results STAT\par \par -given pt is neutropenic today ANC<1000, will continue give Levaquin 500mg po daily and instructed pt to go to ED if temp>=100.4\par \par -follow up in 1 week and will make plan depending on pt's diagnosis

## 2019-08-02 NOTE — RESULTS/DATA
[FreeTextEntry1] : Today's CBC (ON 8/2/19) wbc 3.7  ALC 2500 AMoC 200  Ba 0 Hb 9.8 plt 213\par \par The peripheral smear was reviewed. It showed 1 blast, no promyelocytes, a few dysplastic neutrophils. Red cells -few reticulocytes. Platelets normal in number, few large plt noted\par \par The previous medical records were reviewed\par On 7/30/19 wbc 4.4 Hb 9.7 plt 209\par On 4/29/19 wbc 8 Hb 12 plt 278\par On 3/18/19 wbc 9.6 Hb 12.4 plt 263

## 2019-08-02 NOTE — HISTORY OF PRESENT ILLNESS
[de-identified] : Ms. Emery was referred by her rheumatologist Dr Croft after she had a visit on 7/30/19 which showed a drop in her hemoglobin (Hb 12 in April 2019, 9.8g/dl on 7/30/19) and immature cells (physician was called by hematopathologist for finding blasts in peripheral smear). The patient reports she has been feeling more tired and has had RENTERIA for the past 2-3 months. She denied bleeding/bruising. She has hx rheumatoid arthritis dx'ed in 2016 (L hip pain) and has also had chronic depression after her 's death in 2016. \par She has had 'fever and chills' for months -subjective. She is finishing a course of Ciprofloxacin for UTI -started on 7/28/19, finishing on 8/3/19.

## 2019-08-02 NOTE — PROCEDURE
[Bone Marrow Biopsy] : bone marrow biopsy [Bone Marrow Aspiration] : bone marrow aspiration  [Patient] : the patient [Verbal Consent Obtained] : verbal consent was obtained prior to the procedure [Patient identification verified] : patient identification verified [Procedure verified and consent obtained] : procedure verified and consent obtained [Prone] : prone [Superior iliac spine was identified] : the superior iliac spine was identified. [Aspirate] : aspirate [Lidocaine was injected and into the periosteum overlying the site.] : Lidocaine was injected and into the periosteum overlying the site. [Cytogenetics] : cytogenetics [FISH] : FISH [Other ___] : [unfilled] [Biopsy] : biopsy [Flow Cytometry] : flow cytometry [] : The patient was instructed to remove the bandage the following AM. The patient may bathe. Acetaminophen may be taken for discomfort, as per package directions.If there are any other problems, the patient was instructed to call the office. The patient verbalized understanding, and is aware of the office contact numbers. [Correct positioning] : correct positioning [Right] : site: right [Laterality verified and correct site marked] : laterality verified and correct site marked [The right posterior iliac crest was prepped with betadine and draped, using sterile technique.] : The right posterior iliac crest was prepped with betadine and draped, using sterile technique. [FreeTextEntry2] : Slides not made -no spicules; hard to aspirate, hemodilute sample possible [FreeTextEntry1] : anemia and neutropenia

## 2019-08-05 ENCOUNTER — APPOINTMENT (OUTPATIENT)
Dept: HEMATOLOGY ONCOLOGY | Facility: CLINIC | Age: 64
End: 2019-08-05
Payer: COMMERCIAL

## 2019-08-05 ENCOUNTER — RESULT REVIEW (OUTPATIENT)
Age: 64
End: 2019-08-05

## 2019-08-05 VITALS
RESPIRATION RATE: 16 BRPM | BODY MASS INDEX: 24.75 KG/M2 | OXYGEN SATURATION: 99 % | WEIGHT: 131 LBS | SYSTOLIC BLOOD PRESSURE: 151 MMHG | TEMPERATURE: 98.9 F | DIASTOLIC BLOOD PRESSURE: 76 MMHG | HEART RATE: 93 BPM

## 2019-08-05 DIAGNOSIS — C92.00 ACUTE MYELOBLASTIC LEUKEMIA, NOT HAVING ACHIEVED REMISSION: ICD-10-CM

## 2019-08-05 LAB
BASOPHILS # BLD AUTO: 0.1 K/UL — SIGNIFICANT CHANGE UP (ref 0–0.2)
BASOPHILS NFR BLD AUTO: 2.6 % — HIGH (ref 0–2)
CHROM ANALY INTERPHASE BLD FISH-IMP: SIGNIFICANT CHANGE UP
CHROM ANALY INTERPHASE BLD FISH-IMP: SIGNIFICANT CHANGE UP
EOSINOPHIL # BLD AUTO: 0 K/UL — SIGNIFICANT CHANGE UP (ref 0–0.5)
EOSINOPHIL NFR BLD AUTO: 0.9 % — SIGNIFICANT CHANGE UP (ref 0–6)
HCT VFR BLD CALC: 26.9 % — LOW (ref 34.5–45)
HEMATOPATHOLOGY REPORT: SIGNIFICANT CHANGE UP
HGB BLD-MCNC: 9.1 G/DL — LOW (ref 11.5–15.5)
HLX FLT3 FINAL REPORT: SIGNIFICANT CHANGE UP
LYMPHOCYTES # BLD AUTO: 1.8 K/UL — SIGNIFICANT CHANGE UP (ref 1–3.3)
LYMPHOCYTES # BLD AUTO: 50.7 % — HIGH (ref 13–44)
MCHC RBC-ENTMCNC: 29.1 PG — SIGNIFICANT CHANGE UP (ref 27–34)
MCHC RBC-ENTMCNC: 33.7 G/DL — SIGNIFICANT CHANGE UP (ref 32–36)
MCV RBC AUTO: 86.5 FL — SIGNIFICANT CHANGE UP (ref 80–100)
MONOCYTES # BLD AUTO: 0.2 K/UL — SIGNIFICANT CHANGE UP (ref 0–0.9)
MONOCYTES NFR BLD AUTO: 5.9 % — SIGNIFICANT CHANGE UP (ref 2–14)
NEUTROPHILS # BLD AUTO: 1.4 K/UL — LOW (ref 1.8–7.4)
NEUTROPHILS NFR BLD AUTO: 39.8 % — LOW (ref 43–77)
PLATELET # BLD AUTO: 188 K/UL — SIGNIFICANT CHANGE UP (ref 150–400)
RBC # BLD: 3.11 M/UL — LOW (ref 3.8–5.2)
RBC # FLD: 15.5 % — HIGH (ref 10.3–14.5)
TM INTERPRETATION: SIGNIFICANT CHANGE UP
WBC # BLD: 3.5 K/UL — LOW (ref 3.8–10.5)
WBC # FLD AUTO: 3.5 K/UL — LOW (ref 3.8–10.5)

## 2019-08-05 PROCEDURE — 87205 SMEAR GRAM STAIN: CPT

## 2019-08-05 PROCEDURE — 88271 CYTOGENETICS DNA PROBE: CPT

## 2019-08-05 PROCEDURE — 88305 TISSUE EXAM BY PATHOLOGIST: CPT

## 2019-08-05 PROCEDURE — 99215 OFFICE O/P EST HI 40 MIN: CPT

## 2019-08-05 PROCEDURE — 85097 BONE MARROW INTERPRETATION: CPT

## 2019-08-05 PROCEDURE — 88280 CHROMOSOME KARYOTYPE STUDY: CPT

## 2019-08-05 PROCEDURE — 88319 ENZYME HISTOCHEMISTRY: CPT

## 2019-08-05 PROCEDURE — 88237 TISSUE CULTURE BONE MARROW: CPT

## 2019-08-05 PROCEDURE — 88264 CHROMOSOME ANALYSIS 20-25: CPT

## 2019-08-05 PROCEDURE — 88313 SPECIAL STAINS GROUP 2: CPT

## 2019-08-05 PROCEDURE — 88185 FLOWCYTOMETRY/TC ADD-ON: CPT

## 2019-08-05 PROCEDURE — 81245 FLT3 GENE: CPT

## 2019-08-05 PROCEDURE — 88184 FLOWCYTOMETRY/ TC 1 MARKER: CPT

## 2019-08-05 PROCEDURE — 88275 CYTOGENETICS 100-300: CPT

## 2019-08-05 NOTE — REVIEW OF SYSTEMS
[Fever] : fever [Chills] : chills [Fatigue] : fatigue [Palpitations] : palpitations [Cough] : cough [SOB on Exertion] : shortness of breath during exertion [Dysuria] : dysuria [Joint Pain] : joint pain [Joint Stiffness] : joint stiffness [Difficulty Walking] : difficulty walking [Insomnia] : insomnia [Depression] : depression [Negative] : Heme/Lymph [Night Sweats] : no night sweats [Recent Change In Weight] : ~T no recent weight change [Vision Problems] : no vision problems [Dysphagia] : no dysphagia [Nosebleeds] : no nosebleeds [Hoarseness] : no hoarseness [Odynophagia] : no odynophagia [Chest Pain] : no chest pain [Mucosal Pain] : no mucosal pain [Lower Ext Edema] : no lower extremity edema [Leg Claudication] : no intermittent leg claudication [Shortness Of Breath] : no shortness of breath [Wheezing] : no wheezing [Vomiting] : no vomiting [Abdominal Pain] : no abdominal pain [Constipation] : no constipation [Diarrhea] : no diarrhea [Muscle Pain] : no muscle pain [Muscle Weakness] : no muscle weakness [Skin Rash] : no skin rash [Skin Wound] : no skin wound [Confused] : no confusion [Dizziness] : no dizziness [Fainting] : no fainting [Suicidal] : not suicidal [Easy Bruising] : no tendency for easy bruising [Swollen Glands] : no swollen glands [FreeTextEntry2] : subjective fever x 4 mo and chills x 1 yr. Poor appetite [FreeTextEntry3] : wears glasses [FreeTextEntry6] : for the past 3-4 months has had SOB and fatigue [FreeTextEntry5] : palpitations for exertion [FreeTextEntry8] : dysuria -treated with UTI starting on 7/28 [FreeTextEntry7] : colonoscopy done at age 51 -normal. No black stools, no bleeding [FreeTextEntry9] : L hip pain and lower back pain, L knee -for 3 yrs, dx'ed with RA [de-identified] : uses cane for walking [de-identified] : depressed after her 's death in 2016 [FreeTextEntry1] : seasonal allergies

## 2019-08-05 NOTE — RESULTS/DATA
[FreeTextEntry1] : Today's CBC (On 8/5/19) wbc 3.5 ANC 1400 Hb 9.1 plt 188\par \par ON 8/2/19) wbc 3.7  ALC 2500 AMoC 200  Ba 0 Hb 9.8 plt 213\par On 7/30/19 wbc 4.4 Hb 9.7 plt 209\par On 4/29/19 wbc 8 Hb 12 plt 278\par On 3/18/19 wbc 9.6 Hb 12.4 plt 263

## 2019-08-05 NOTE — CONSULT LETTER
[Dear  ___] : Dear  [unfilled], [Consult Closing:] : Thank you very much for allowing me to participate in the care of this patient.  If you have any questions, please do not hesitate to contact me. [Please see my note below.] : Please see my note below. [Sincerely,] : Sincerely, [DrHope  ___] : Dr. BROWN [Courtesy Letter:] : I had the pleasure of seeing your patient, [unfilled], in my office today.

## 2019-08-05 NOTE — HISTORY OF PRESENT ILLNESS
[de-identified] : The patient is here with 6 of her family members to discuss results of her recent bone marrow biopsy done on 8/2/19.  [de-identified] : Ms. Emery was referred by her rheumatologist Dr Croft after she had a visit on 7/30/19 which showed a drop in her hemoglobin (Hb 12 in April 2019, 9.8g/dl on 7/30/19) and immature cells (physician was called by hematopathologist for finding blasts in peripheral smear). The patient reports she has been feeling more tired and has had RENTERIA for the past 2-3 months. She denied bleeding/bruising. She has hx rheumatoid arthritis dx'ed in 2016 (L hip pain) and has also had chronic depression after her 's death in 2016. \par She has had 'fever and chills' for months -subjective. She is finishing a course of Ciprofloxacin for UTI -started on 7/28/19, finishing on 8/3/19.

## 2019-08-05 NOTE — ASSESSMENT
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 Pt gave consent for Pfeizer trial 7+3+ Glasdegib/placebo\par \par -discussed with pt and family the AML diagnosis and discussed treatment plan, explained again the Pfeizer AML study with chemotherapy induction + Glasdegib vs. placebo. Pt will be directly admitted to [facility] for induction chemotherapy -will need central line and MUGA prior to starting chemo.  [facility] studies sent on 8/2/19\par \par -blood work done on 8/2/19 as outpatient: CMP, CPK, LDH, uric acid, Mg, Phos. Will send coags -Pt, aPTT, fibrinogen, D-Dimer,  Hep B, C and HIV \par \par -cont Levaquin 500mg po daily for prophylaxis, will add Posaconazole as inpt for prophylaxis when ANC<1000\par \par -follow up after hospital discharge

## 2019-08-06 ENCOUNTER — INPATIENT (INPATIENT)
Facility: HOSPITAL | Age: 64
LOS: 22 days | DRG: 837 | End: 2019-08-29
Attending: INTERNAL MEDICINE
Payer: COMMERCIAL

## 2019-08-06 VITALS
SYSTOLIC BLOOD PRESSURE: 154 MMHG | TEMPERATURE: 97 F | WEIGHT: 132.28 LBS | HEIGHT: 60.63 IN | OXYGEN SATURATION: 98 % | HEART RATE: 90 BPM | DIASTOLIC BLOOD PRESSURE: 78 MMHG | RESPIRATION RATE: 18 BRPM

## 2019-08-06 DIAGNOSIS — B99.9 UNSPECIFIED INFECTIOUS DISEASE: ICD-10-CM

## 2019-08-06 DIAGNOSIS — M06.9 RHEUMATOID ARTHRITIS, UNSPECIFIED: ICD-10-CM

## 2019-08-06 DIAGNOSIS — E11.9 TYPE 2 DIABETES MELLITUS WITHOUT COMPLICATIONS: ICD-10-CM

## 2019-08-06 DIAGNOSIS — C92.00 ACUTE MYELOBLASTIC LEUKEMIA, NOT HAVING ACHIEVED REMISSION: ICD-10-CM

## 2019-08-06 DIAGNOSIS — E03.9 HYPOTHYROIDISM, UNSPECIFIED: ICD-10-CM

## 2019-08-06 DIAGNOSIS — Z90.49 ACQUIRED ABSENCE OF OTHER SPECIFIED PARTS OF DIGESTIVE TRACT: Chronic | ICD-10-CM

## 2019-08-06 DIAGNOSIS — E78.00 PURE HYPERCHOLESTEROLEMIA, UNSPECIFIED: ICD-10-CM

## 2019-08-06 DIAGNOSIS — Z29.9 ENCOUNTER FOR PROPHYLACTIC MEASURES, UNSPECIFIED: ICD-10-CM

## 2019-08-06 DIAGNOSIS — Z98.890 OTHER SPECIFIED POSTPROCEDURAL STATES: Chronic | ICD-10-CM

## 2019-08-06 DIAGNOSIS — I10 ESSENTIAL (PRIMARY) HYPERTENSION: ICD-10-CM

## 2019-08-06 LAB
ALBUMIN SERPL ELPH-MCNC: 3.3 G/DL — SIGNIFICANT CHANGE UP (ref 3.3–5)
ALBUMIN SERPL ELPH-MCNC: 3.7 G/DL
ALP BLD-CCNC: 93 U/L
ALP SERPL-CCNC: 86 U/L — SIGNIFICANT CHANGE UP (ref 40–120)
ALT FLD-CCNC: 13 U/L — SIGNIFICANT CHANGE UP (ref 10–45)
ALT SERPL-CCNC: 16 U/L
ANION GAP SERPL CALC-SCNC: 11 MMOL/L
ANION GAP SERPL CALC-SCNC: 12 MMOL/L — SIGNIFICANT CHANGE UP (ref 5–17)
APPEARANCE UR: CLEAR — SIGNIFICANT CHANGE UP
APTT BLD: 35.5 SEC — SIGNIFICANT CHANGE UP (ref 27.5–36.3)
AST SERPL-CCNC: 13 U/L — SIGNIFICANT CHANGE UP (ref 10–40)
AST SERPL-CCNC: 17 U/L
BASOPHILS # BLD AUTO: 0 K/UL — SIGNIFICANT CHANGE UP (ref 0–0.2)
BASOPHILS NFR BLD AUTO: 0 % — SIGNIFICANT CHANGE UP (ref 0–2)
BILIRUB SERPL-MCNC: 0.2 MG/DL
BILIRUB SERPL-MCNC: 0.2 MG/DL — SIGNIFICANT CHANGE UP (ref 0.2–1.2)
BILIRUB UR-MCNC: NEGATIVE — SIGNIFICANT CHANGE UP
BLD GP AB SCN SERPL QL: NEGATIVE — SIGNIFICANT CHANGE UP
BUN SERPL-MCNC: 11 MG/DL — SIGNIFICANT CHANGE UP (ref 7–23)
BUN SERPL-MCNC: 14 MG/DL
CALCIUM SERPL-MCNC: 8.5 MG/DL — SIGNIFICANT CHANGE UP (ref 8.4–10.5)
CALCIUM SERPL-MCNC: 8.8 MG/DL
CHLORIDE SERPL-SCNC: 102 MMOL/L — SIGNIFICANT CHANGE UP (ref 96–108)
CHLORIDE SERPL-SCNC: 104 MMOL/L
CK SERPL-CCNC: 85 U/L — SIGNIFICANT CHANGE UP (ref 25–170)
CK SERPL-CCNC: 92 U/L
CO2 SERPL-SCNC: 23 MMOL/L — SIGNIFICANT CHANGE UP (ref 22–31)
CO2 SERPL-SCNC: 24 MMOL/L
COLOR SPEC: SIGNIFICANT CHANGE UP
CREAT SERPL-MCNC: 0.63 MG/DL — SIGNIFICANT CHANGE UP (ref 0.5–1.3)
CREAT SERPL-MCNC: 0.88 MG/DL
DIFF PNL FLD: NEGATIVE — SIGNIFICANT CHANGE UP
EOSINOPHIL # BLD AUTO: 0 K/UL — SIGNIFICANT CHANGE UP (ref 0–0.5)
EOSINOPHIL NFR BLD AUTO: 1.2 % — SIGNIFICANT CHANGE UP (ref 0–6)
GLUCOSE BLDC GLUCOMTR-MCNC: 133 MG/DL — HIGH (ref 70–99)
GLUCOSE BLDC GLUCOMTR-MCNC: 158 MG/DL — HIGH (ref 70–99)
GLUCOSE BLDC GLUCOMTR-MCNC: 246 MG/DL — HIGH (ref 70–99)
GLUCOSE SERPL-MCNC: 246 MG/DL
GLUCOSE SERPL-MCNC: 259 MG/DL — HIGH (ref 70–99)
GLUCOSE UR QL: ABNORMAL
HAV IGM SER-ACNC: SIGNIFICANT CHANGE UP
HBV CORE AB SER-ACNC: SIGNIFICANT CHANGE UP
HBV CORE IGG+IGM SER QL: NONREACTIVE
HBV CORE IGM SER-ACNC: SIGNIFICANT CHANGE UP
HBV SURFACE AB SER QL: REACTIVE
HBV SURFACE AB SER-ACNC: REACTIVE
HBV SURFACE AG SER QL: NONREACTIVE
HBV SURFACE AG SER-ACNC: SIGNIFICANT CHANGE UP
HCT VFR BLD CALC: 24.2 % — LOW (ref 34.5–45)
HCV AB S/CO SERPL IA: 0.09 S/CO — SIGNIFICANT CHANGE UP (ref 0–0.99)
HCV AB SER QL: NONREACTIVE
HCV AB SERPL-IMP: SIGNIFICANT CHANGE UP
HCV S/CO RATIO: 0.09 S/CO
HGB BLD-MCNC: 8.1 G/DL — LOW (ref 11.5–15.5)
HIV 1+2 AB+HIV1 P24 AG SERPL QL IA: SIGNIFICANT CHANGE UP
HIV1+2 AB SPEC QL IA.RAPID: NONREACTIVE
INR BLD: 1.23 RATIO — HIGH (ref 0.88–1.16)
KETONES UR-MCNC: NEGATIVE — SIGNIFICANT CHANGE UP
LEUKOCYTE ESTERASE UR-ACNC: NEGATIVE — SIGNIFICANT CHANGE UP
LYMPHOCYTES # BLD AUTO: 1.2 K/UL — SIGNIFICANT CHANGE UP (ref 1–3.3)
LYMPHOCYTES # BLD AUTO: 44.1 % — HIGH (ref 13–44)
MAGNESIUM SERPL-MCNC: 1.6 MG/DL — SIGNIFICANT CHANGE UP (ref 1.6–2.6)
MCHC RBC-ENTMCNC: 28.8 PG — SIGNIFICANT CHANGE UP (ref 27–34)
MCHC RBC-ENTMCNC: 33.4 GM/DL — SIGNIFICANT CHANGE UP (ref 32–36)
MCV RBC AUTO: 86.2 FL — SIGNIFICANT CHANGE UP (ref 80–100)
MONOCYTES # BLD AUTO: 0.3 K/UL — SIGNIFICANT CHANGE UP (ref 0–0.9)
MONOCYTES NFR BLD AUTO: 11.2 % — SIGNIFICANT CHANGE UP (ref 2–14)
NEUTROPHILS # BLD AUTO: 1.2 K/UL — LOW (ref 1.8–7.4)
NEUTROPHILS NFR BLD AUTO: 43.6 % — SIGNIFICANT CHANGE UP (ref 43–77)
NITRITE UR-MCNC: NEGATIVE — SIGNIFICANT CHANGE UP
PH UR: 6 — SIGNIFICANT CHANGE UP (ref 5–8)
PHOSPHATE SERPL-MCNC: 3.1 MG/DL — SIGNIFICANT CHANGE UP (ref 2.5–4.5)
PLATELET # BLD AUTO: 153 K/UL — SIGNIFICANT CHANGE UP (ref 150–400)
POTASSIUM SERPL-MCNC: 3.8 MMOL/L — SIGNIFICANT CHANGE UP (ref 3.5–5.3)
POTASSIUM SERPL-SCNC: 3.8 MMOL/L — SIGNIFICANT CHANGE UP (ref 3.5–5.3)
POTASSIUM SERPL-SCNC: 4.2 MMOL/L
PROT SERPL-MCNC: 6.7 G/DL — SIGNIFICANT CHANGE UP (ref 6–8.3)
PROT SERPL-MCNC: 7.1 G/DL
PROT UR-MCNC: SIGNIFICANT CHANGE UP
PROTHROM AB SERPL-ACNC: 14.2 SEC — HIGH (ref 10–12.9)
RBC # BLD: 2.81 M/UL — LOW (ref 3.8–5.2)
RBC # FLD: 15.6 % — HIGH (ref 10.3–14.5)
RH IG SCN BLD-IMP: POSITIVE — SIGNIFICANT CHANGE UP
SODIUM SERPL-SCNC: 137 MMOL/L — SIGNIFICANT CHANGE UP (ref 135–145)
SODIUM SERPL-SCNC: 139 MMOL/L
SP GR SPEC: 1.01 — SIGNIFICANT CHANGE UP (ref 1.01–1.02)
URATE SERPL-MCNC: 4 MG/DL — SIGNIFICANT CHANGE UP (ref 2.5–7)
UROBILINOGEN FLD QL: SIGNIFICANT CHANGE UP
WBC # BLD: 2.8 K/UL — LOW (ref 3.8–10.5)
WBC # FLD AUTO: 2.8 K/UL — LOW (ref 3.8–10.5)

## 2019-08-06 PROCEDURE — 93010 ELECTROCARDIOGRAM REPORT: CPT

## 2019-08-06 PROCEDURE — 78472 GATED HEART PLANAR SINGLE: CPT | Mod: 26

## 2019-08-06 PROCEDURE — 77001 FLUOROGUIDE FOR VEIN DEVICE: CPT | Mod: 26

## 2019-08-06 PROCEDURE — 36556 INSERT NON-TUNNEL CV CATH: CPT

## 2019-08-06 PROCEDURE — 99223 1ST HOSP IP/OBS HIGH 75: CPT

## 2019-08-06 PROCEDURE — 76937 US GUIDE VASCULAR ACCESS: CPT | Mod: 26

## 2019-08-06 RX ORDER — GABAPENTIN 400 MG/1
600 CAPSULE ORAL
Refills: 0 | Status: DISCONTINUED | OUTPATIENT
Start: 2019-08-06 | End: 2019-08-27

## 2019-08-06 RX ORDER — DEXTROSE 50 % IN WATER 50 %
25 SYRINGE (ML) INTRAVENOUS ONCE
Refills: 0 | Status: DISCONTINUED | OUTPATIENT
Start: 2019-08-06 | End: 2019-08-29

## 2019-08-06 RX ORDER — DEXTROSE 50 % IN WATER 50 %
12.5 SYRINGE (ML) INTRAVENOUS ONCE
Refills: 0 | Status: DISCONTINUED | OUTPATIENT
Start: 2019-08-06 | End: 2019-08-29

## 2019-08-06 RX ORDER — INSULIN LISPRO 100/ML
VIAL (ML) SUBCUTANEOUS AT BEDTIME
Refills: 0 | Status: DISCONTINUED | OUTPATIENT
Start: 2019-08-06 | End: 2019-08-27

## 2019-08-06 RX ORDER — INSULIN GLARGINE 100 [IU]/ML
20 INJECTION, SOLUTION SUBCUTANEOUS AT BEDTIME
Refills: 0 | Status: DISCONTINUED | OUTPATIENT
Start: 2019-08-06 | End: 2019-08-07

## 2019-08-06 RX ORDER — SALIVA SUBSTITUTE COMB NO.11 351 MG
5 POWDER IN PACKET (EA) MUCOUS MEMBRANE THREE TIMES A DAY
Refills: 0 | Status: DISCONTINUED | OUTPATIENT
Start: 2019-08-06 | End: 2019-08-27

## 2019-08-06 RX ORDER — INSULIN LISPRO 100/ML
10 VIAL (ML) SUBCUTANEOUS
Qty: 0 | Refills: 0 | DISCHARGE

## 2019-08-06 RX ORDER — LOSARTAN POTASSIUM 100 MG/1
100 TABLET, FILM COATED ORAL DAILY
Refills: 0 | Status: DISCONTINUED | OUTPATIENT
Start: 2019-08-06 | End: 2019-08-27

## 2019-08-06 RX ORDER — ACETAMINOPHEN 500 MG
650 TABLET ORAL EVERY 6 HOURS
Refills: 0 | Status: DISCONTINUED | OUTPATIENT
Start: 2019-08-06 | End: 2019-08-27

## 2019-08-06 RX ORDER — LOSARTAN POTASSIUM 100 MG/1
1 TABLET, FILM COATED ORAL
Qty: 0 | Refills: 0 | DISCHARGE

## 2019-08-06 RX ORDER — ENOXAPARIN SODIUM 100 MG/ML
40 INJECTION SUBCUTANEOUS DAILY
Refills: 0 | Status: DISCONTINUED | OUTPATIENT
Start: 2019-08-06 | End: 2019-08-13

## 2019-08-06 RX ORDER — SODIUM CHLORIDE 9 MG/ML
1000 INJECTION INTRAMUSCULAR; INTRAVENOUS; SUBCUTANEOUS
Refills: 0 | Status: DISCONTINUED | OUTPATIENT
Start: 2019-08-06 | End: 2019-08-12

## 2019-08-06 RX ORDER — GLUCAGON INJECTION, SOLUTION 0.5 MG/.1ML
1 INJECTION, SOLUTION SUBCUTANEOUS ONCE
Refills: 0 | Status: DISCONTINUED | OUTPATIENT
Start: 2019-08-06 | End: 2019-08-29

## 2019-08-06 RX ORDER — DICLOFENAC SODIUM 30 MG/G
0 GEL TOPICAL
Qty: 0 | Refills: 0 | DISCHARGE

## 2019-08-06 RX ORDER — SIMVASTATIN 20 MG/1
1 TABLET, FILM COATED ORAL
Qty: 0 | Refills: 0 | DISCHARGE

## 2019-08-06 RX ORDER — LEVOTHYROXINE SODIUM 125 MCG
1 TABLET ORAL
Qty: 0 | Refills: 0 | DISCHARGE

## 2019-08-06 RX ORDER — LEVOTHYROXINE SODIUM 125 MCG
75 TABLET ORAL DAILY
Refills: 0 | Status: DISCONTINUED | OUTPATIENT
Start: 2019-08-06 | End: 2019-08-28

## 2019-08-06 RX ORDER — SODIUM CHLORIDE 9 MG/ML
1000 INJECTION, SOLUTION INTRAVENOUS
Refills: 0 | Status: DISCONTINUED | OUTPATIENT
Start: 2019-08-06 | End: 2019-08-29

## 2019-08-06 RX ORDER — ALLOPURINOL 300 MG
300 TABLET ORAL DAILY
Refills: 0 | Status: DISCONTINUED | OUTPATIENT
Start: 2019-08-06 | End: 2019-08-09

## 2019-08-06 RX ORDER — DEXTROSE 50 % IN WATER 50 %
15 SYRINGE (ML) INTRAVENOUS ONCE
Refills: 0 | Status: DISCONTINUED | OUTPATIENT
Start: 2019-08-06 | End: 2019-08-29

## 2019-08-06 RX ORDER — CHLORHEXIDINE GLUCONATE 213 G/1000ML
1 SOLUTION TOPICAL
Refills: 0 | Status: DISCONTINUED | OUTPATIENT
Start: 2019-08-06 | End: 2019-08-29

## 2019-08-06 RX ORDER — INSULIN GLARGINE 100 [IU]/ML
60 INJECTION, SOLUTION SUBCUTANEOUS
Qty: 0 | Refills: 0 | DISCHARGE

## 2019-08-06 RX ORDER — SALIVA SUBSTITUTE COMB NO.11 351 MG
5 POWDER IN PACKET (EA) MUCOUS MEMBRANE THREE TIMES A DAY
Refills: 0 | Status: DISCONTINUED | OUTPATIENT
Start: 2019-08-06 | End: 2019-08-06

## 2019-08-06 RX ORDER — INSULIN LISPRO 100/ML
VIAL (ML) SUBCUTANEOUS
Refills: 0 | Status: DISCONTINUED | OUTPATIENT
Start: 2019-08-06 | End: 2019-08-07

## 2019-08-06 RX ORDER — CIPROFLOXACIN LACTATE 400MG/40ML
1 VIAL (ML) INTRAVENOUS
Qty: 0 | Refills: 0 | DISCHARGE

## 2019-08-06 RX ORDER — GABAPENTIN 400 MG/1
2 CAPSULE ORAL
Qty: 0 | Refills: 0 | DISCHARGE

## 2019-08-06 RX ADMIN — LOSARTAN POTASSIUM 100 MILLIGRAM(S): 100 TABLET, FILM COATED ORAL at 12:11

## 2019-08-06 RX ADMIN — Medication 300 MILLIGRAM(S): at 12:10

## 2019-08-06 RX ADMIN — Medication 650 MILLIGRAM(S): at 12:40

## 2019-08-06 RX ADMIN — SODIUM CHLORIDE 100 MILLILITER(S): 9 INJECTION INTRAMUSCULAR; INTRAVENOUS; SUBCUTANEOUS at 15:16

## 2019-08-06 RX ADMIN — Medication 5 MILLILITER(S): at 15:15

## 2019-08-06 RX ADMIN — Medication 2: at 18:16

## 2019-08-06 RX ADMIN — INSULIN GLARGINE 20 UNIT(S): 100 INJECTION, SOLUTION SUBCUTANEOUS at 21:42

## 2019-08-06 RX ADMIN — ENOXAPARIN SODIUM 40 MILLIGRAM(S): 100 INJECTION SUBCUTANEOUS at 18:15

## 2019-08-06 RX ADMIN — Medication 4: at 12:16

## 2019-08-06 RX ADMIN — GABAPENTIN 600 MILLIGRAM(S): 400 CAPSULE ORAL at 18:16

## 2019-08-06 RX ADMIN — Medication 650 MILLIGRAM(S): at 12:10

## 2019-08-06 RX ADMIN — Medication 5 MILLILITER(S): at 21:24

## 2019-08-06 NOTE — H&P ADULT - NSICDXPASTSURGICALHX_GEN_ALL_CORE_FT
PAST SURGICAL HISTORY:  Normal spontaneous vaginal delivery ' 83 and '86    Status post cholecystectomy ' 87    Status post colonoscopy ' 2009    Negative

## 2019-08-06 NOTE — PROGRESS NOTE ADULT - SUBJECTIVE AND OBJECTIVE BOX
Interventional Radiology     Pre- Procedure   Patient and/or family/surrogate educated about central line-associated blood stream infection prevention practices  Central Line insertion checklist utilized    Catheter Type: (  ) Dialysis  (  ) Introducer  (x   ) Central venous catheter   (  ) peripherally inserted central catheter (PICC)      Number of Lumens: (  ) single   (  ) Double   (  x ) Triple  (  ) Antimicrobial catheter    TIME OUT performed prior to this procedure with verbal confirmation of correct patient identity, correct site, side and tyree (if applicable), agreement of the procedure, correct patient position, availability of necessary equipment.  The consent form (if applicable) is complete and accurate.  Safety precautions based on patient history or medication use has been addressed   RN at bedside    Procedure  The patient was placed in the ( x )Supine position, (   ) Trendelenburg postiton.  The patient's placement site was prepped with Chlorhexidine solution then drapped using maximum sterile barrier protection.  The area was injected with 5cc of 1% Lidocaine.  Using the  ( x ) Seldinger technique    (  ) Modified Seldinger technique   ( x ) Ultrasound, the catheter was introduced.  Strict adherence to outlined aseptic technique, including hand washing prior to donning sterile barriers (gloves and gown), utilizing a mask and cap, plus draping the patient with a sterile drape was observed.  Uponcompletion of the line placement, the insertion site was covered with sterile dressing.    All materials used for catheter insertion, including the intact guide wire, were accounted for at the end of the procedure      Emergency placement (x  ) No    (   ) Yes   (   x) New Site    (   )  Guide Wire change      Attempted site and actual site (  x ) Right  (   )  Left               Internal Jugular vein        Post Procedure (Catheter Placement Verification)  Correct placement confirmed by pressure transducer or ultrasonography or venous saturation  The tip of the catheter is confirmed to be in the SVC by radiography which revealed no pneumothorax and line may be accessed

## 2019-08-06 NOTE — PATIENT PROFILE ADULT - NSPROGENBLOODRESTRICT_GEN_A_NUR
none

## 2019-08-06 NOTE — H&P ADULT - PROBLEM SELECTOR PLAN 2
Consistent carbohydrate diet.  Monitor sugars. Will start on Lantus 20  Sliding scale and adjust insulin accordingly.

## 2019-08-06 NOTE — PROGRESS NOTE ADULT - SUBJECTIVE AND OBJECTIVE BOX
Interventional Radiology Pre-Procedure Note    This is a 64y Female with PMH of DM,  RA , recently diagnosed with AML   who is admitted for induction chemotherapy. IR requested for TLC placement.     PAST MEDICAL & SURGICAL HISTORY:  DVT (deep venous thrombosis): &#x27; 87   post-op cholecystectomy  Gallstones: &#x27; 87  Rheumatoid arthritis: dx: 9/2017  Type 2 diabetes mellitus: dx;  age 38  Carpal tunnel syndrome: Left  Hypothyroid  High cholesterol  Status post colonoscopy: &#x27; 2009    Negative  Normal spontaneous vaginal delivery: &#x27; 83 and &#x27;86  Status post cholecystectomy: &#x27; 87     Vital Signs Last 24 Hrs  T(C): 36.2 (06 Aug 2019 08:50), Max: 36.2 (06 Aug 2019 08:50)  T(F): 97.1 (06 Aug 2019 08:50), Max: 97.1 (06 Aug 2019 08:50)  HR: 90 (06 Aug 2019 08:50) (90 - 90)  BP: 154/78 (06 Aug 2019 08:50) (154/78 - 154/78)  RR: 18 (06 Aug 2019 08:50) (18 - 18)  SpO2: 98% (06 Aug 2019 08:50) (98% - 98%)    Allergies: No Known Allergies    Physical Exam: Gen: NAD, A&Ox3    Labs:                         9.1    3.5   )-----------( 188      ( 05 Aug 2019 14:53 )             26.9       Prothrombin Time and INR, Plasma (08.02.19 @ 13:20)    Prothrombin Time, Plasma: 12.9 sec    INR: 1.12:         Plan is for placement of TLC. The procedure/ risks/ benefits/ alternatives were discussed with the pt and Informed consent obtained. All questions and concerns have been addressed at this time.

## 2019-08-06 NOTE — H&P ADULT - ASSESSMENT
63 yo F with history of DM,  RA , diagnosed in 2016, chronic depression now with newly diagnosed AML                                                                                                                                              admitted for induction chemotherapy on the [facility] study with dauno/cytarabine and glasdegib/placebo.

## 2019-08-06 NOTE — H&P ADULT - HISTORY OF PRESENT ILLNESS
65 yo F with history of DM,  RA , diagnosed in 2016, chronic depression now with newly diagnosed AML                                                                                                                                              admitted for induction chemotherapy on the [employer] Bright study with dauno/cytarabine and glasdegib/placebo.  Patient was seen by her rheumatologist Dr. Croft and was referred to Dr Bravo for a drop in hgb and immature                                                                                                                       cells (physician was called by a hematopathologist for finding blasts in peripheral smear). Patient had been                                                                                                                               feeling more tired and more RENTERIA for the last 2-3 months.   Recently completed a course of ciprofloxacin for UTI (7/28-8/3)                                                                                                                                                                                                                                 BM asp.bx performed on 8/5 and patient signed consent for Pfizer/Bright                                                                                                                                                                                                         study if meets eligibility.

## 2019-08-06 NOTE — H&P ADULT - PROBLEM SELECTOR PLAN 1
Admit [facility]  Patient enrolled on [facility] study  Chemotherapy with cytarabine 100mg/m2 CIV daily x 7 days  Daunorubicin  60mg /m2 IV daily x 3 days  investigational drug: glasdegib/placebo 100mg PO daily  Antiemetics  IV hydration  Strict I/O  Monitor for tumor lysis labs  Allopurinol daily  Mouth care  Follow CBC and transfuse prn  Follow electrolytes and replete prn Admit 7 [facility]  Patient enrolled on [facility] study  Chemotherapy with cytarabine 100mg/m2 CIV daily x 7 days  Daunorubicin  60mg /m2 IV daily x 3 days  investigational drug: glasdegib/placebo 100mg PO daily  Antiemetics  IV hydration  Strict I/O  Monitor for tumor lysis labs  Allopurinol daily  Mouth care  Follow CBC and transfuse prn  Follow electrolytes and replete prn  TLCL placement  HLA typing Admit 7 [facility]  Patient enrolled on [facility] study  Chemotherapy with cytarabine 100mg/m2 CIV daily x 7 days  Daunorubicin  60mg /m2 IV daily x 3 days  investigational drug: glasdegib/placebo 100mg PO daily  Antiemetics  IV hydration  Strict I/O  Monitor for tumor lysis labs  Allopurinol daily  Mouth care  Follow CBC and transfuse prn  Follow electrolytes and replete prn  TLCL placement  HLA typing  Patient has experienced some depression. Offered psych intervention, but patient refused at this time. Will continue to discuss with patient.

## 2019-08-06 NOTE — H&P ADULT - PROBLEM SELECTOR PLAN 8
Patient is not neutropenic  Continue ppx levaquin  Start posaconazole when ANC <1000  If spikes, panculture

## 2019-08-06 NOTE — H&P ADULT - NSHPLABSRESULTS_GEN_ALL_CORE
LABS:                           8.1    2.8   )-----------( 153      ( 06 Aug 2019 12:25 )             24.2         Mean Cell Volume : 86.2 fl  Mean Cell Hemoglobin : 28.8 pg  Mean Cell Hemoglobin Concentration : 33.4 gm/dL  Auto Neutrophil # : 1.2 K/uL  Auto Lymphocyte # : 1.2 K/uL  Auto Monocyte # : 0.3 K/uL  Auto Eosinophil # : 0.0 K/uL  Auto Basophil # : 0.0 K/uL  Auto Neutrophil % : 43.6 %  Auto Lymphocyte % : 44.1 %  Auto Monocyte % : 11.2 %  Auto Eosinophil % : 1.2 %  Auto Basophil % : 0.0 %      08-06    137  |  102  |  11  ----------------------------<  259<H>  3.8   |  23  |  0.63    Ca    8.5      06 Aug 2019 12:25    TPro  6.7  /  Alb  3.3  /  TBili  0.2  /  DBili  x   /  AST  13  /  ALT  13  /  AlkPhos  86  08-06          PT/INR - ( 06 Aug 2019 12:25 )   PT: 14.2 sec;   INR: 1.23 ratio         PTT - ( 06 Aug 2019 12:25 )  PTT:35.5 sec

## 2019-08-06 NOTE — H&P ADULT - NSICDXFAMILYHX_GEN_ALL_CORE_FT
FAMILY HISTORY:  Father  Still living? Unknown  Family history of coronary artery disease, Age at diagnosis: Age Unknown  Family history of diabetes mellitus, Age at diagnosis: Age Unknown    Mother  Still living? Unknown  Family history of diabetes mellitus, Age at diagnosis: Age Unknown

## 2019-08-06 NOTE — ADVANCED PRACTICE NURSE CONSULT - ASSESSMENT
Patient has been admitted to [address] [place] [address] to begin the Induction phase of Glasdegib/ Placebo Trial. The patient states that she is feeling well- accompanied by her Sister Mitul- the patient is able to ambulate without assistance. Central line was placed today and the patient also had a MUGA Scan performed. Required lab tests have been ordered- CBC, CMP, CPK ,UA ,Mg, and Phosphorus.   Further screening testing to be done tomorrow: Triplicate EKG

## 2019-08-06 NOTE — H&P ADULT - NSICDXPASTMEDICALHX_GEN_ALL_CORE_FT
PAST MEDICAL HISTORY:  Carpal tunnel syndrome Left    DVT (deep venous thrombosis) ' 87   post-op cholycystectomy.    Gallstones ' 87    High cholesterol     Hypothyroid     Rheumatoid arthritis dx: 9/2017    Type 2 diabetes mellitus dx;  age 38

## 2019-08-07 DIAGNOSIS — I10 ESSENTIAL (PRIMARY) HYPERTENSION: ICD-10-CM

## 2019-08-07 DIAGNOSIS — E03.9 HYPOTHYROIDISM, UNSPECIFIED: ICD-10-CM

## 2019-08-07 DIAGNOSIS — E11.65 TYPE 2 DIABETES MELLITUS WITH HYPERGLYCEMIA: ICD-10-CM

## 2019-08-07 LAB
ALBUMIN SERPL ELPH-MCNC: 3 G/DL — LOW (ref 3.3–5)
ALP SERPL-CCNC: 83 U/L — SIGNIFICANT CHANGE UP (ref 40–120)
ALT FLD-CCNC: 14 U/L — SIGNIFICANT CHANGE UP (ref 10–45)
ANION GAP SERPL CALC-SCNC: 12 MMOL/L — SIGNIFICANT CHANGE UP (ref 5–17)
AST SERPL-CCNC: 19 U/L — SIGNIFICANT CHANGE UP (ref 10–40)
BASOPHILS # BLD AUTO: 0 K/UL — SIGNIFICANT CHANGE UP (ref 0–0.2)
BASOPHILS NFR BLD AUTO: 0.4 % — SIGNIFICANT CHANGE UP (ref 0–2)
BILIRUB SERPL-MCNC: 0.2 MG/DL — SIGNIFICANT CHANGE UP (ref 0.2–1.2)
BUN SERPL-MCNC: 12 MG/DL — SIGNIFICANT CHANGE UP (ref 7–23)
CALCIUM SERPL-MCNC: 8 MG/DL — LOW (ref 8.4–10.5)
CHLORIDE SERPL-SCNC: 108 MMOL/L — SIGNIFICANT CHANGE UP (ref 96–108)
CK SERPL-CCNC: 60 U/L — SIGNIFICANT CHANGE UP (ref 25–170)
CO2 SERPL-SCNC: 24 MMOL/L — SIGNIFICANT CHANGE UP (ref 22–31)
CREAT SERPL-MCNC: 0.94 MG/DL — SIGNIFICANT CHANGE UP (ref 0.5–1.3)
EOSINOPHIL # BLD AUTO: 0 K/UL — SIGNIFICANT CHANGE UP (ref 0–0.5)
EOSINOPHIL NFR BLD AUTO: 1.4 % — SIGNIFICANT CHANGE UP (ref 0–6)
GLUCOSE BLDC GLUCOMTR-MCNC: 134 MG/DL — HIGH (ref 70–99)
GLUCOSE BLDC GLUCOMTR-MCNC: 220 MG/DL — HIGH (ref 70–99)
GLUCOSE BLDC GLUCOMTR-MCNC: 279 MG/DL — HIGH (ref 70–99)
GLUCOSE BLDC GLUCOMTR-MCNC: 80 MG/DL — SIGNIFICANT CHANGE UP (ref 70–99)
GLUCOSE SERPL-MCNC: 57 MG/DL — LOW (ref 70–99)
HBA1C BLD-MCNC: 10.6 % — HIGH (ref 4–5.6)
HCT VFR BLD CALC: 23.3 % — LOW (ref 34.5–45)
HCV AB S/CO SERPL IA: 0.08 S/CO — SIGNIFICANT CHANGE UP (ref 0–0.99)
HCV AB SERPL-IMP: SIGNIFICANT CHANGE UP
HGB BLD-MCNC: 8.1 G/DL — LOW (ref 11.5–15.5)
LDH SERPL L TO P-CCNC: 219 U/L — SIGNIFICANT CHANGE UP (ref 50–242)
LYMPHOCYTES # BLD AUTO: 1.8 K/UL — SIGNIFICANT CHANGE UP (ref 1–3.3)
LYMPHOCYTES # BLD AUTO: 56.2 % — HIGH (ref 13–44)
MAGNESIUM SERPL-MCNC: 1.5 MG/DL — LOW (ref 1.6–2.6)
MCHC RBC-ENTMCNC: 30.2 PG — SIGNIFICANT CHANGE UP (ref 27–34)
MCHC RBC-ENTMCNC: 34.8 GM/DL — SIGNIFICANT CHANGE UP (ref 32–36)
MCV RBC AUTO: 86.7 FL — SIGNIFICANT CHANGE UP (ref 80–100)
MONOCYTES # BLD AUTO: 0.2 K/UL — SIGNIFICANT CHANGE UP (ref 0–0.9)
MONOCYTES NFR BLD AUTO: 7.8 % — SIGNIFICANT CHANGE UP (ref 2–14)
NEUTROPHILS # BLD AUTO: 1.1 K/UL — LOW (ref 1.8–7.4)
NEUTROPHILS NFR BLD AUTO: 34.2 % — LOW (ref 43–77)
PHOSPHATE SERPL-MCNC: 3.5 MG/DL — SIGNIFICANT CHANGE UP (ref 2.5–4.5)
PLAT MORPH BLD: NORMAL — SIGNIFICANT CHANGE UP
PLATELET # BLD AUTO: 154 K/UL — SIGNIFICANT CHANGE UP (ref 150–400)
POTASSIUM SERPL-MCNC: 3.7 MMOL/L — SIGNIFICANT CHANGE UP (ref 3.5–5.3)
POTASSIUM SERPL-SCNC: 3.7 MMOL/L — SIGNIFICANT CHANGE UP (ref 3.5–5.3)
PROT SERPL-MCNC: 6.4 G/DL — SIGNIFICANT CHANGE UP (ref 6–8.3)
RBC # BLD: 2.69 M/UL — LOW (ref 3.8–5.2)
RBC # FLD: 15.7 % — HIGH (ref 10.3–14.5)
RBC BLD AUTO: SIGNIFICANT CHANGE UP
SODIUM SERPL-SCNC: 144 MMOL/L — SIGNIFICANT CHANGE UP (ref 135–145)
URATE SERPL-MCNC: 3.7 MG/DL — SIGNIFICANT CHANGE UP (ref 2.5–7)
WBC # BLD: 3.1 K/UL — LOW (ref 3.8–10.5)
WBC # FLD AUTO: 3.1 K/UL — LOW (ref 3.8–10.5)

## 2019-08-07 PROCEDURE — 93010 ELECTROCARDIOGRAM REPORT: CPT

## 2019-08-07 PROCEDURE — 93010 ELECTROCARDIOGRAM REPORT: CPT | Mod: 77

## 2019-08-07 PROCEDURE — 99233 SBSQ HOSP IP/OBS HIGH 50: CPT | Mod: GC

## 2019-08-07 PROCEDURE — 71045 X-RAY EXAM CHEST 1 VIEW: CPT | Mod: 26

## 2019-08-07 PROCEDURE — 99223 1ST HOSP IP/OBS HIGH 75: CPT

## 2019-08-07 RX ORDER — CYTARABINE 100 MG
158 VIAL (EA) INJECTION DAILY
Refills: 0 | Status: COMPLETED | OUTPATIENT
Start: 2019-08-07 | End: 2019-08-13

## 2019-08-07 RX ORDER — ONDANSETRON 8 MG/1
8 TABLET, FILM COATED ORAL EVERY 8 HOURS
Refills: 0 | Status: COMPLETED | OUTPATIENT
Start: 2019-08-07 | End: 2019-08-15

## 2019-08-07 RX ORDER — LANOLIN ALCOHOL/MO/W.PET/CERES
3 CREAM (GRAM) TOPICAL ONCE
Refills: 0 | Status: COMPLETED | OUTPATIENT
Start: 2019-08-07 | End: 2019-08-07

## 2019-08-07 RX ORDER — INSULIN LISPRO 100/ML
VIAL (ML) SUBCUTANEOUS
Refills: 0 | Status: DISCONTINUED | OUTPATIENT
Start: 2019-08-07 | End: 2019-08-24

## 2019-08-07 RX ORDER — INSULIN LISPRO 100/ML
4 VIAL (ML) SUBCUTANEOUS
Refills: 0 | Status: DISCONTINUED | OUTPATIENT
Start: 2019-08-08 | End: 2019-08-09

## 2019-08-07 RX ORDER — INSULIN GLARGINE 100 [IU]/ML
16 INJECTION, SOLUTION SUBCUTANEOUS AT BEDTIME
Refills: 0 | Status: DISCONTINUED | OUTPATIENT
Start: 2019-08-07 | End: 2019-08-07

## 2019-08-07 RX ORDER — LANOLIN ALCOHOL/MO/W.PET/CERES
3 CREAM (GRAM) TOPICAL ONCE
Refills: 0 | Status: COMPLETED | OUTPATIENT
Start: 2019-08-07 | End: 2019-08-26

## 2019-08-07 RX ORDER — METOCLOPRAMIDE HCL 10 MG
10 TABLET ORAL EVERY 6 HOURS
Refills: 0 | Status: DISCONTINUED | OUTPATIENT
Start: 2019-08-07 | End: 2019-08-27

## 2019-08-07 RX ORDER — FOSAPREPITANT DIMEGLUMINE 150 MG/5ML
150 INJECTION, POWDER, LYOPHILIZED, FOR SOLUTION INTRAVENOUS ONCE
Refills: 0 | Status: COMPLETED | OUTPATIENT
Start: 2019-08-07 | End: 2019-08-07

## 2019-08-07 RX ORDER — INSULIN GLARGINE 100 [IU]/ML
12 INJECTION, SOLUTION SUBCUTANEOUS AT BEDTIME
Refills: 0 | Status: DISCONTINUED | OUTPATIENT
Start: 2019-08-07 | End: 2019-08-09

## 2019-08-07 RX ORDER — FUROSEMIDE 40 MG
20 TABLET ORAL ONCE
Refills: 0 | Status: COMPLETED | OUTPATIENT
Start: 2019-08-07 | End: 2019-08-07

## 2019-08-07 RX ORDER — MAGNESIUM SULFATE 500 MG/ML
2 VIAL (ML) INJECTION ONCE
Refills: 0 | Status: COMPLETED | OUTPATIENT
Start: 2019-08-07 | End: 2019-08-07

## 2019-08-07 RX ORDER — DAUNORUBICIN HYDROCHLORIDE 5 MG/ML
95 INJECTION INTRAVENOUS EVERY 24 HOURS
Refills: 0 | Status: COMPLETED | OUTPATIENT
Start: 2019-08-07 | End: 2019-08-09

## 2019-08-07 RX ADMIN — CHLORHEXIDINE GLUCONATE 1 APPLICATION(S): 213 SOLUTION TOPICAL at 05:46

## 2019-08-07 RX ADMIN — LOSARTAN POTASSIUM 100 MILLIGRAM(S): 100 TABLET, FILM COATED ORAL at 05:41

## 2019-08-07 RX ADMIN — Medication 5 MILLILITER(S): at 05:42

## 2019-08-07 RX ADMIN — Medication 5 MILLILITER(S): at 22:18

## 2019-08-07 RX ADMIN — Medication 20.9 MILLIGRAM(S): at 14:59

## 2019-08-07 RX ADMIN — GABAPENTIN 600 MILLIGRAM(S): 400 CAPSULE ORAL at 17:11

## 2019-08-07 RX ADMIN — ENOXAPARIN SODIUM 40 MILLIGRAM(S): 100 INJECTION SUBCUTANEOUS at 11:30

## 2019-08-07 RX ADMIN — Medication 3 MILLIGRAM(S): at 01:08

## 2019-08-07 RX ADMIN — DAUNORUBICIN HYDROCHLORIDE 174 MILLIGRAM(S): 5 INJECTION INTRAVENOUS at 14:39

## 2019-08-07 RX ADMIN — SODIUM CHLORIDE 100 MILLILITER(S): 9 INJECTION INTRAMUSCULAR; INTRAVENOUS; SUBCUTANEOUS at 17:11

## 2019-08-07 RX ADMIN — FOSAPREPITANT DIMEGLUMINE 300 MILLIGRAM(S): 150 INJECTION, POWDER, LYOPHILIZED, FOR SOLUTION INTRAVENOUS at 13:02

## 2019-08-07 RX ADMIN — Medication 75 MICROGRAM(S): at 05:41

## 2019-08-07 RX ADMIN — INSULIN GLARGINE 12 UNIT(S): 100 INJECTION, SOLUTION SUBCUTANEOUS at 22:18

## 2019-08-07 RX ADMIN — Medication 300 MILLIGRAM(S): at 11:30

## 2019-08-07 RX ADMIN — ONDANSETRON 8 MILLIGRAM(S): 8 TABLET, FILM COATED ORAL at 13:02

## 2019-08-07 RX ADMIN — Medication 5 MILLILITER(S): at 13:55

## 2019-08-07 RX ADMIN — Medication 2: at 17:11

## 2019-08-07 RX ADMIN — GABAPENTIN 600 MILLIGRAM(S): 400 CAPSULE ORAL at 05:41

## 2019-08-07 RX ADMIN — Medication 2: at 22:17

## 2019-08-07 RX ADMIN — Medication 20 MILLIGRAM(S): at 11:31

## 2019-08-07 RX ADMIN — SODIUM CHLORIDE 100 MILLILITER(S): 9 INJECTION INTRAMUSCULAR; INTRAVENOUS; SUBCUTANEOUS at 22:18

## 2019-08-07 RX ADMIN — ONDANSETRON 8 MILLIGRAM(S): 8 TABLET, FILM COATED ORAL at 22:17

## 2019-08-07 RX ADMIN — Medication 50 GRAM(S): at 08:40

## 2019-08-07 NOTE — ADVANCED PRACTICE NURSE CONSULT - ASSESSMENT
Pt A/Ox4, vital signs stable, afebrile. Pt denies pain/discomfort, N/V. Chemotherapy teaching reinforced to pt at bedside. Right TL IJ placed 8/6/19, Dsg intact and dry, no swelling and redness noted, easily flushed with positive blood return. Consent signed and filed. Lab results reviewed by Dr. Goldberg upon signing order. Chemotherapy verified by two RNs. At 1439, Daunorubincin 650 mg/m2=95 mg IVP given over 10 mins by injection to the lower port of IV line. At 1459, cytarabine 100 mg/l0=309 mg IV infusion through IJ over 24 hours by alaris pump. Pt left comfortable on bed. Primary RN awared. Pt A/Ox4, vital signs stable, afebrile. Pt denies pain/discomfort, N/V. Chemotherapy teaching reinforced to pt at bedside. Right TL IJ placed 8/6/19, Dsg intact and dry, no swelling and redness noted, easily flushed with positive blood return. Consent signed and filed. Lab results reviewed by Dr. Goldberg upon signing order. Chemotherapy verified by two RNs. At 1439, Daunorubincin 650 mg/m2=95 mg IVP given over 10 mins by injection with free flowing NS to the lower port of IV line with positive blood return during infusion. At 1459, cytarabine 100 mg/v5=861 mg IV infusion through IJ over 24 hours by alaris pump. Pt left comfortable on bed. Primary RN awared.

## 2019-08-07 NOTE — PROGRESS NOTE ADULT - SUBJECTIVE AND OBJECTIVE BOX
Diagnosis:    Protocol/Chemo Regimen:    Day:     Pt endorsed:    Review of Systems:     Pain scale:     Diet:     Allergies    No Known Allergies    Intolerances        ANTIMICROBIALS  levoFLOXacin  Tablet 500 milliGRAM(s) Oral every 24 hours      HEME/ONC MEDICATIONS  enoxaparin Injectable 40 milliGRAM(s) SubCutaneous daily      STANDING MEDICATIONS  allopurinol 300 milliGRAM(s) Oral daily  Biotene Dry Mouth Oral Rinse 5 milliLiter(s) Swish and Spit three times a day  chlorhexidine 2% Cloths 1 Application(s) Topical <User Schedule>  dextrose 5%. 1000 milliLiter(s) IV Continuous <Continuous>  dextrose 50% Injectable 12.5 Gram(s) IV Push once  dextrose 50% Injectable 25 Gram(s) IV Push once  dextrose 50% Injectable 25 Gram(s) IV Push once  gabapentin 600 milliGRAM(s) Oral two times a day  insulin glargine Injectable (LANTUS) 20 Unit(s) SubCutaneous at bedtime  insulin lispro (HumaLOG) corrective regimen sliding scale   SubCutaneous three times a day before meals  insulin lispro (HumaLOG) corrective regimen sliding scale   SubCutaneous at bedtime  levothyroxine 75 MICROGram(s) Oral daily  losartan 100 milliGRAM(s) Oral daily  sodium chloride 0.9%. 1000 milliLiter(s) IV Continuous <Continuous>      PRN MEDICATIONS  acetaminophen   Tablet .. 650 milliGRAM(s) Oral every 6 hours PRN  dextrose 40% Gel 15 Gram(s) Oral once PRN  glucagon  Injectable 1 milliGRAM(s) IntraMuscular once PRN        Vital Signs Last 24 Hrs  T(C): 37.1 (07 Aug 2019 05:32), Max: 37.3 (07 Aug 2019 01:09)  T(F): 98.7 (07 Aug 2019 05:32), Max: 99.2 (07 Aug 2019 01:09)  HR: 83 (07 Aug 2019 05:32) (78 - 90)  BP: 118/69 (07 Aug 2019 05:32) (104/60 - 154/78)  BP(mean): --  RR: 18 (07 Aug 2019 05:32) (18 - 18)  SpO2: 94% (07 Aug 2019 05:32) (93% - 98%)    PHYSICAL EXAM  General: NAD  HEENT: PERRLA, EOMI, clear oropharynx  Neck: supple  CV: (+) S1/S2 RRR  Lungs: clear to auscultation, no wheezes or rales  Abdomen: soft, non-tender, non-distended (+) BS  Ext: no edema  Skin: no rashes   Neuro: alert and oriented X 3, no focal deficits  Central Line:     RECENT CULTURES:        LABS:                        8.1    2.8   )-----------( 153      ( 06 Aug 2019 12:25 )             24.2         Mean Cell Volume : 86.2 fl  Mean Cell Hemoglobin : 28.8 pg  Mean Cell Hemoglobin Concentration : 33.4 gm/dL  Auto Neutrophil # : 1.2 K/uL  Auto Lymphocyte # : 1.2 K/uL  Auto Monocyte # : 0.3 K/uL  Auto Eosinophil # : 0.0 K/uL  Auto Basophil # : 0.0 K/uL  Auto Neutrophil % : 43.6 %  Auto Lymphocyte % : 44.1 %  Auto Monocyte % : 11.2 %  Auto Eosinophil % : 1.2 %  Auto Basophil % : 0.0 %      08-06    137  |  102  |  11  ----------------------------<  259<H>  3.8   |  23  |  0.63    Ca    8.5      06 Aug 2019 12:25  Phos  3.1     08-06  Mg     1.6     08-06    TPro  6.7  /  Alb  3.3  /  TBili  0.2  /  DBili  x   /  AST  13  /  ALT  13  /  AlkPhos  86  08-06      Mg 1.6  Phos 3.1      PT/INR - ( 06 Aug 2019 12:25 )   PT: 14.2 sec;   INR: 1.23 ratio         PTT - ( 06 Aug 2019 12:25 )  PTT:35.5 sec    LDH --  Uric Acid 4.0        RADIOLOGY & ADDITIONAL STUDIES: Diagnosis: AML FLT 3 (-)    Protocol/Chemo Regimen: Enrolled on [employer] BRIGHT trial receiving Induction chemotherapy with Dauno/Cytarabine and daily Glastegib Vs Placebo    Day: 1    Pt endorsed: no complaints    Review of Systems: Patient denies headache, dizziness, visual changes, chest pain, palpitations, SOB, abdominal pain, nausea, vomiting, diarrhea or dysuria.    Pain scale: denies    Diet: Regular    Allergies: No Known Allergies        ANTIMICROBIALS  levoFLOXacin  Tablet 500 milliGRAM(s) Oral every 24 hours      HEME/ONC MEDICATIONS  enoxaparin Injectable 40 milliGRAM(s) SubCutaneous daily      STANDING MEDICATIONS  allopurinol 300 milliGRAM(s) Oral daily  Biotene Dry Mouth Oral Rinse 5 milliLiter(s) Swish and Spit three times a day  chlorhexidine 2% Cloths 1 Application(s) Topical <User Schedule>  dextrose 5%. 1000 milliLiter(s) IV Continuous <Continuous>  dextrose 50% Injectable 12.5 Gram(s) IV Push once  dextrose 50% Injectable 25 Gram(s) IV Push once  dextrose 50% Injectable 25 Gram(s) IV Push once  gabapentin 600 milliGRAM(s) Oral two times a day  insulin glargine Injectable (LANTUS) 20 Unit(s) SubCutaneous at bedtime  insulin lispro (HumaLOG) corrective regimen sliding scale   SubCutaneous three times a day before meals  insulin lispro (HumaLOG) corrective regimen sliding scale   SubCutaneous at bedtime  levothyroxine 75 MICROGram(s) Oral daily  losartan 100 milliGRAM(s) Oral daily  sodium chloride 0.9%. 1000 milliLiter(s) IV Continuous <Continuous>      PRN MEDICATIONS  acetaminophen   Tablet .. 650 milliGRAM(s) Oral every 6 hours PRN  dextrose 40% Gel 15 Gram(s) Oral once PRN  glucagon  Injectable 1 milliGRAM(s) IntraMuscular once PRN      Vital Signs Last 24 Hrs  T(C): 37.1 (07 Aug 2019 05:32), Max: 37.3 (07 Aug 2019 01:09)  T(F): 98.7 (07 Aug 2019 05:32), Max: 99.2 (07 Aug 2019 01:09)  HR: 83 (07 Aug 2019 05:32) (78 - 90)  BP: 118/69 (07 Aug 2019 05:32) (104/60 - 154/78)  BP(mean): --  RR: 18 (07 Aug 2019 05:32) (18 - 18)  SpO2: 94% (07 Aug 2019 05:32) (93% - 98%)      PHYSICAL EXAM  General: NAD  HEENT: PERRLA, EOMI, clear oropharynx  Neck: supple  CV: (+) S1/S2 RRR  Lungs: clear to auscultation, no wheezes or rales  Abdomen: soft, non-tender, non-distended (+) BS  Ext: no edema  Skin: no rashes   Neuro: alert and oriented X 3, no focal deficits  Central Line: C/D/I        LABS:                        8.1    3.1   )-----------( 154      ( 07 Aug 2019 07:10 )             23.3         Mean Cell Volume : 86.7 fl  Mean Cell Hemoglobin : 30.2 pg  Mean Cell Hemoglobin Concentration : 34.8 gm/dL  Auto Neutrophil # : 1.1 K/uL  Auto Lymphocyte # : 1.8 K/uL  Auto Monocyte # : 0.2 K/uL  Auto Eosinophil # : 0.0 K/uL  Auto Basophil # : 0.0 K/uL  Auto Neutrophil % : 34.2 %  Auto Lymphocyte % : 56.2 %  Auto Monocyte % : 7.8 %  Auto Eosinophil % : 1.4 %  Auto Basophil % : 0.4 %      08-06    137  |  102  |  11  ----------------------------<  259<H>  3.8   |  23  |  0.63    Ca    8.5      06 Aug 2019 12:25  Phos  3.5     08-07  Mg     1.5     08-07    TPro  6.7  /  Alb  3.3  /  TBili  0.2  /  DBili  x   /  AST  13  /  ALT  13  /  AlkPhos  86  08-06      Mg 1.5  Phos 3.5      PT/INR - ( 06 Aug 2019 12:25 )   PT: 14.2 sec;   INR: 1.23 ratio         PTT - ( 06 Aug 2019 12:25 )  PTT:35.5 sec      Uric Acid 3.7

## 2019-08-07 NOTE — PROGRESS NOTE ADULT - ASSESSMENT
63 yo F with history of DM,  RA , diagnosed in 2016, chronic depression now with newly diagnosed AML                                                                                                                                              admitted for induction chemotherapy on the [facility] study with dauno/cytarabine and glasdegib/placebo. This is a 63 yo F with PMHx of T2DM, RA, Depression and now newly diagnosed AML enrolled on [employer] BRIGHT trial admitted for Induction chemotherapy with Dauno/Cytarabine and daily Glastegib Vs Placebo.

## 2019-08-07 NOTE — PROGRESS NOTE ADULT - PROBLEM SELECTOR PLAN 7
Lovenox for VTE ppx  D/C Lovenox when platelets <50 Simvastatin discontinued during hospitalization due to potential for liver toxicity

## 2019-08-07 NOTE — DISCHARGE NOTE PROVIDER - HOSPITAL COURSE
65 yo F with history of DM,  RA , diagnosed in 2016, chronic depression now with newly diagnosed AML                                                                                                                                              admitted for induction chemotherapy on the [employer] Bright study with dauno/cytarabine and glasdegib/placebo.    Patient was seen by her rheumatologist Dr. Croft and was referred to Dr Bravo for a drop in hgb and immature                                                                                                                       cells (physician was called by a hematopathologist for finding blasts in peripheral smear). Patient had been                                                                                                                               feeling more tired and more RENTERIA for the last 2-3 months.     Recently completed a course of ciprofloxacin for UTI (7/28-8/3)                                                                                                                                                                                                                                 BM asp.bx performed on 8/5 and patient signed consent for Pfizer/Bright                                                                                                                                                                                                         study if meets eligibility. 65 yo F with history of DM,  RA , diagnosed in 2016, chronic depression now with newly diagnosed AML  admitted for induction chemotherapy on the [employer] Bright study with dauno/cytarabine and glasdegib/placebo. Patient was seen by her rheumatologist Dr. Croft and was referred to Dr Bravo for a drop in hgb and immature  cells (physician was called by a hematopathologist for finding blasts in peripheral smear). Patient had been feeling more tired and more RENTERIA for the last 2-3 months.     Recently completed a course of ciprofloxacin for UTI (7/28-8/3). BM asp and bx was performed on 8/5 and patient signed consent for Pfizer/Bright study if meets eligibility.                                                                                                                                                                   The patient was admitted and TLCL was placed. Chemotherapy with Dauno/Cytarabine with daily Glasdegib Vs Placebo was started on 8/7. HgA1C was 10.6 and Endocrinology was consulted. 65 yo F with history of DM,  RA , diagnosed in 2016, chronic depression now with newly diagnosed AML  admitted for induction chemotherapy on the [employer] Bright study with dauno/cytarabine and glasdegib/placebo. Patient was seen by her rheumatologist Dr. Croft and was referred to Dr Bravo for a drop in hgb and immature  cells (physician was called by a hematopathologist for finding blasts in peripheral smear). Patient had been feeling more tired and more RENTERIA for the last 2-3 months.     Recently completed a course of ciprofloxacin for UTI (7/28-8/3). BM asp and bx was performed on 8/5 and patient signed consent for Pfizer/Bright study if meets eligibility.                                                                                                                                                                   The patient was admitted and TLCL was placed. Chemotherapy per [employer] BRIGHT study began on 8/7 including Dauno/Cytarabine with daily Glasdegib Vs Placebo. HgA1C was 10.6 and Endocrinology was consulted. 65 yo F with history of DM,  RA , diagnosed in 2016, chronic depression now with newly diagnosed AML  admitted for induction chemotherapy on the [employer] Bright study with dauno/cytarabine and glasdegib/placebo. Patient was seen by her rheumatologist Dr. Croft and was referred to Dr Bravo for a drop in hgb and immature  cells (physician was called by a hematopathologist for finding blasts in peripheral smear). Patient had been feeling more tired and more RENTERIA for the last 2-3 months.     Recently completed a course of ciprofloxacin for UTI (7/28-8/3). BM asp and bx was performed on 8/5 and patient signed consent for Pfizer/Bright study if meets eligibility.                                                                                                                                                                   The patient was admitted and TLCL was placed. Chemotherapy per [employer] BRIGHT study began on 8/7 including Dauno/Cytarabine with daily Glasdegib Vs Placebo. HgA1C was 10.6 and Endocrinology was consulted. Patients ANC was 1000 and Posaconazole was started. PT was consulted for mild deconditioning. 65 yo F with history of DM,  RA , diagnosed in 2016, chronic depression now with newly diagnosed AML  admitted for induction chemotherapy on the [employer] Bright study with dauno/cytarabine and glasdegib/placebo. Patient was seen by her rheumatologist Dr. Croft and was referred to Dr Bravo for a drop in hgb and immature  cells (physician was called by a hematopathologist for finding blasts in peripheral smear). Patient had been feeling more tired and more RENTERIA for the last 2-3 months.     Recently completed a course of ciprofloxacin for UTI (7/28-8/3). BM asp and bx was performed on 8/5 and patient signed consent for Pfizer/Bright study if meets eligibility.                                                                                                                                                                   The patient was admitted and TLCL was placed. Chemotherapy per Pf[employer] BRIGHT study began on 8/7 including Dauno/Cytarabine with daily Glasdegib Vs Placebo. HgA1C was 10.6 and Endocrinology was consulted. Patients ANC was 1000 and Posaconazole was started. PT was consulted for mild deconditioning and deemed there was no skilled PT needs at that time. 64     65 yo F with history of DM,  RA , diagnosed in 2016, chronic depression now with newly diagnosed AML  admitted for induction chemotherapy on the [employer] study with dauno/cytarabine and glasdegib/placebo. Patient was seen by her rheumatologist Dr. Croft and was referred to Dr Bravo for a drop in hgb and immature  cells (physician was called by a hematopathologist for finding blasts in peripheral smear). Patient had been feeling more tired and more RENTERIA for the last 2-3 months.     Recently completed a course of ciprofloxacin for UTI (7/28-8/3). BM asp and bx was performed on 8/5 and patient signed consent for Pfizer/Bright study if meets eligibility.                 Pt's Pre-rx Foundation results: NPM1, IDH2, DNMT3A mutations.                                                                                                                                               The patient was admitted and TLCL was placed. Chemotherapy per Pf[employer] BRIGHT study began on 8/7 including Dauno/Cytarabine with daily Glasdegib Vs Placebo. HgA1C was 10.6 and Endocrinology was consulted. Patients ANC was 1000 and Posaconazole was started. PT was consulted for mild deconditioning and deemed there was no skilled PT needs at that time.        Pt  became neutropenic, levaquin 500 mg po qd added.  She developed neutropenic fever, Cefepime added. Cefepime was changed to aztreonam 8/17 due to a drug rash. Pruritus resolving on ATC atarax, topical triamcinolone.  Rash almost resolved.     Vancomycin was added  on 8/19  for gram positive antibacterial coverage   Vanco trough  was monitored  and within therapeutic range.       Pt had frequent watery diarrhea,  C. diff toxin (-) on 8/20; Immodium PRN added     I > O, bibasilar rales and lE edema, pt being diuresed.      Pt has mild Alkp/ALT elevation, stable,  abd ultrasound  on 8/21________________________ 64     65 yo F with history of DM,  RA , diagnosed in 2016, chronic depression now with newly diagnosed AML  admitted for induction chemotherapy on the [employer] study with dauno/cytarabine and glasdegib/placebo. Patient was seen by her rheumatologist Dr. Croft and was referred to Dr Bravo for a drop in hgb and immature  cells (physician was called by a hematopathologist for finding blasts in peripheral smear). Patient had been feeling more tired and more RENTERIA for the last 2-3 months.     Recently completed a course of ciprofloxacin for UTI (7/28-8/3). BM asp and bx was performed on 8/5 and patient signed consent for Pfizer/Bright study if meets eligibility.                 Pt's Pre-rx Foundation results: NPM1, IDH2, DNMT3A mutations.                                                                                                                                               The patient was admitted and TLCL was placed. Chemotherapy per [employer] study began on 8/7 including Dauno/Cytarabine with daily Glasdegib Vs Placebo. HgA1C was 10.6 and Endocrinology was consulted. Patients ANC was 1000 and Posaconazole was started. PT was consulted for mild deconditioning and deemed there was no skilled PT needs at that time.        Pt  became neutropenic, levaquin 500 mg po qd added.  She developed neutropenic fever, Cefepime added. Cefepime was changed to aztreonam 8/17 due to a drug rash. Pruritus resolving on ATC atarax, topical triamcinolone.  Rash almost resolved.     Vancomycin was added  on 8/19  for gram positive antibacterial coverage   Vanco trough  was monitored  and within therapeutic range.       Pt had frequent watery diarrhea,  C. diff toxin (-) on 8/20; Immodium PRN added     I > O, bibasilar rales and lE edema, pt being diuresed.      Pt has mild Alkp/ALT elevation, stable,  abd ultrasound  on 8/22 unremarkable     8/20 Day 14 BM bx chemotherapeutic 64     65 yo F with history of DM,  RA , diagnosed in 2016, chronic depression now with newly diagnosed AML  admitted for induction chemotherapy on the [employer] study with dauno/cytarabine and glasdegib/placebo. Patient was seen by her rheumatologist Dr. Croft and was referred to Dr Bravo for a drop in hgb and immature  cells (physician was called by a hematopathologist for finding blasts in peripheral smear). Patient had been feeling more tired and more RENTERIA for the last 2-3 months.     Recently completed a course of ciprofloxacin for UTI (7/28-8/3). BM asp and bx was performed on 8/5 and patient signed consent for Pfizer/Bright study if meets eligibility.                 Pt's Pre-rx Foundation results: NPM1, IDH2, DNMT3A mutations.                                                                                                                                               The patient was admitted and TLCL was placed. Chemotherapy per [employer] BRIGHT study began on 8/7 including Dauno/Cytarabine with daily Glasdegib Vs Placebo. HgA1C was 10.6 and Endocrinology was consulted. Patients ANC was 1000 and Posaconazole was started. PT was consulted for mild deconditioning and deemed there was no skilled PT needs at that time.        Pt  became neutropenic, levaquin 500 mg po qd added.  She developed neutropenic fever, Cefepime added. Cefepime was changed to aztreonam 8/17 due to a drug rash. Pruritus resolving on ATC atarax, topical triamcinolone.  Rash almost resolved.    Vancomycin was added  on 8/19  for gram positive antibacterial coverage.       Pt had frequent watery diarrhea,  C. diff toxin (-) on 8/20; Imodium PRN added.     I > O, bibasilar rales and lE edema, pt being diuresed.     Pt has mild Alkp/ALT elevation, stable,  abd ultrasound  on 8/22 unremarkable     Day 14 BM biopsy  on 8/20  was chemotherapeutic. 63 yo F with history of DM, RA, diagnosed in 2016, chronic depression now with newly diagnosed AML. Patient was seen by her rheumatologist Dr. Croft and was referred to Dr Bravo for a drop in hgb and immature  cells (physician was called by a hematopathologist for finding blasts in peripheral smear). Patient had been feeling more tired and more RENTERIA for the last 2-3 months. Recently completed a course of ciprofloxacin for UTI (7/28-8/3). BM asp and bx was performed on 8/5. Patient's Foundation results revealed NPM1, IDH2, DNMT3A mutations.         Patient received induction chemotherapy per [employer] study began on 8/7 including Dauno/Cytarabine with daily Glasdegib Vs Placebo. HgA1C was found to be 10.6 and Endocrinology was consulted. A CT chest was performed on 8/9 for shortness of breath and crackles revealing pulmonary fibrosis and traction brochiectasis without honeycombing. Prophylactic antibiotics were started when patient was neutropenic. She then developed neutropenic fevers and Levaquin was changed to Cefepime. Patient developed a pruritic rash attributed to Cefepime which was then changed to Aztreonam. Vancomycin was added on 8/19.         On 8/20, patient had a day 14 bone marrow biopsy which was chemotherapeutic. Patient continued to have persistently high fevers and Infectious disease was consulted on 8/25. A CT chest/abdomen/pelvis was performed on 8/25 revealing an acute right pulmonary embolism, pulmonary fibrosis, and pyelonephritis. Urine cultures were negative.

## 2019-08-07 NOTE — PROGRESS NOTE ADULT - PROBLEM SELECTOR PLAN 3
Continue gabapentin Follow up HgA1C  FS AC & HS with HISS  Continue Lantus 20mg QHS  Consistent carbohydrate diet HgA1C 10.6  FS AC & HS with HISS  Continue Lantus 20mg QHS  Consistent carbohydrate diet  Follow up Endocrine consult

## 2019-08-07 NOTE — PROGRESS NOTE ADULT - PROBLEM SELECTOR PLAN 1
Admit 7 hannah  Patient enrolled on [facility] study  Chemotherapy with cytarabine 100mg/m2 CIV daily x 7 days  Daunorubicin  60mg /m2 IV daily x 3 days  investigational drug: glasdegib/placebo 100mg PO daily  Antiemetics  IV hydration  Strict I/O  Monitor for tumor lysis labs  Allopurinol daily  Mouth care  Follow CBC and transfuse prn  Follow electrolytes and replete prn  TLCL placement  HLA typing  Patient has experienced some depression. Offered psych intervention, but patient refused at this time. Will continue to discuss with patient. Enrolled on [employer] BRIGHT trial receiving Induction chemotherapy with Dauno/Cytarabine and daily Glastegib Vs Placebo  Monitor CBC/Lytes and transfuse/replete PRN  Strict Is and Os/ Daily weights/ Mouth Care  Allopurinol 300mg PO daily  IVF  Antiemetics Enrolled on Pf[employer] BRIGHT trial receiving Induction chemotherapy with Dauno/Cytarabine and daily Glastegib Vs Placebo  Monitor CBC/Lytes and transfuse/replete PRN  Strict Is and Os/ Daily weights/ Mouth Care  Allopurinol 300mg PO daily  IVF  Antiemetics  ECOG 1

## 2019-08-07 NOTE — PROGRESS NOTE ADULT - ATTENDING COMMENTS
63 yo F with hx RA, here for follow up, had new anemia and peripheral immature cells (few blasts)  BM bx done 8/2/19 --c/w AML, FLT-3 neg, FISH for PML-KRYSTAL and BCR-ABL negative. Pt gave consent for [employer] trial 7+3+ Glasdegib/placebo.  Patient here for her induction chemotherapy on trial.   Today will be day 1.  Daunorubicin 60 mg/m2 day 1-3, cytarabine 100 mg/m2 day 1-7, glasdegib/placebo 100mg daily PO.   Supportive care, anti-emetics, IVFs, allopurinol for TLS ppx.   Monitor CBC, Transfuse PRN.   Lantus QHS, following fingersticks closely, endocrine consult. Very poorly controlled diabetes.   ECOG performance status 1.

## 2019-08-07 NOTE — ADVANCED PRACTICE NURSE CONSULT - ASSESSMENT
Induction Day 1          Patient examined by Dr.B Goldberg and team - Research Nurse Hoda Conteh explained to the patient that she was deemed qualified for the Pfizer [id number] Glasdegib/Placebo study to begin the study and treatment today. Patient reminded of the possible risks and side effects, randomization status.  Emergency study contact information given to the patient. Patient made aware of the drug diary that will be expected of her as well as information regarding the regimen calendar he will receive upon discharge. All patients questions addressed- patient verbalized understanding and agrees to continue with treatment plan. Study medication picked up by and transported to the hospital by Research Coordinator Tyrone CESPEDES.  The patient has Central Line in-place. The patient stated that she is feeling well had no complaints. ECOG status- performed by MD .Patients current Wt: 60 kg - BSA: 1.58.  CBC,CMP,,LDH Mg,and Uric Acid all performed within the last 24 hrs Called lab and CPK has been added.     Site pad questionnaire completed by the patient without assistance. Patient informed that she will be receiving 3 sets of EKGs today and patient has agreed to supply a saliva sample- specimen take [id number] am prior to initial dosing.   Pre-dose triplicate EKG performed at 0917, 0919 and 0921 -QTcF WNL of protocol standard.   Pre-dose PK samples drawn after @ 1350 Approval to treat the patient based on EKG reviewed by Dr. Goldberg.  Study medication [id number] /Placebo administered and documented by the floor nurse post @ 1354 discussion regarding medication administration time and precautions discussed with the Floor Nurse. Bottle of medication and log placed in locked cabinet.  Subsequent EKGs and PKs Performed at 1 hour and 4 hour post dosin hr: 1456, 1458, 1501 - PKs 1502 - Patient tolerated the medication well- patient had no complaints.  4hr: 16:27,16:29,16:31   -PKs: 16:4 Induction Day 1          Patient examined by Dr.B Goldberg and team - Research Nurse Hoda Conteh explained to the patient that she was deemed qualified for the Pfizer [id number] Glasdegib/Placebo study to begin the study and treatment today. Patient reminded of the possible risks and side effects, randomization status.  Emergency study contact information given to the patient. Patient made aware of the drug diary that will be expected of her as well as information regarding the regimen calendar he will receive upon discharge. All patients questions addressed- patient verbalized understanding and agrees to continue with treatment plan. Study medication picked up by and transported to the hospital by Research Coordinator Tyrone CESPEDES.  The patient has Central Line in-place. The patient stated that she is feeling well had no complaints. ECOG status- performed by MD .Patients current Wt: 60 kg - BSA: 1.58.  CBC,CMP,,LDH Mg,and Uric Acid all performed within the last 24 hrs Called lab and CPK has been added.     Site pad questionnaire completed by the patient without assistance. Patient informed that she will be receiving 3 sets of EKGs today and patient has agreed to supply a saliva sample- specimen take [id number] am prior to initial dosing.   Pre-dose triplicate EKG performed at 0917, 0919 and 0921 -QTcF WNL of protocol standard.   Pre-dose PK samples drawn after @ 1350 Approval to treat the patient based on EKG reviewed by Dr. Goldberg.  Study medication [id number] /Placebo administered and documented by the floor nurse post @ 1354 discussion regarding medication administration time and precautions discussed with the Floor Nurse. Bottle of medication and log placed in locked cabinet.  Subsequent EKGs and PKs Performed at 1 hour and 4 hour post dosin hr: 1456, 1458, 1501 - PKs 1502 - Patient tolerated the medication well- patient had no complaints.  4hr: 1800,1802,1804   -PKs: 1807

## 2019-08-07 NOTE — DISCHARGE NOTE PROVIDER - CARE PROVIDER_API CALL
Bre Bravo)  Hematology; Internal Medicine; Medical Oncology  [facility] MedicineHematology Oncology, [address] [place] [address]  Phone: [phone]  Fax: [phone]  Follow Up Time:

## 2019-08-07 NOTE — CONSULT NOTE ADULT - SUBJECTIVE AND OBJECTIVE BOX
HPI:  63 y/o F w/ hx of Type 2 DM since age 38 complicated by neuropathy and retinopathy. Had laser tx 7/25/19 still with blurry vision. At home on Lantus 60 units qhs but does not take if glucose <150. Does become hypoglycemic usually fasting when she does take it. Also on Humalog 10 units daily with lunch and only takes if glucose >150. Does not routinely take at breakfast or dinner. Injects in abdomen, rotates sites. Checks glucose 2x/day with values generally 100's-200's. Hypoglycemia usually fasting if takes Lantus with symptoms. +polyuria and polydipsia. +dry mouth. No nausea or vomiting. +occasional SOB. Tries to monitor carbs. No soda or juice. Parents with Type 2 DM.     Hx of hypothyroidism x 8 years on levothyroxine 75 mcg daily in the morning without missed doses. No hx of neck surgery or radiation. +fatigue. No other symptoms of hyper or hypothyroidism.     Also hx of RA , diagnosed in 2016, chronic depression now with newly diagnosed AML                                                                                                                                           admitted for induction chemotherapy on the Xtone study with dauno/cytarabine and glasdegib/placebo.  Patient was seen by her rheumatologist Dr. Croft and was referred to Dr Bravo for a drop in hgb and immature                                                                                                                       cells (physician was called by a hematopathologist for finding blasts in peripheral smear). Patient had been                                                                                                                               feeling more tired and more RENTERIA for the last 2-3 months.   Recently completed a course of ciprofloxacin for UTI (7/28-8/3)                                                                                                                                                                                                                                 BM asp.bx performed on 8/5      PAST MEDICAL & SURGICAL HISTORY:  DVT (deep venous thrombosis): &#x27; 87   post-op cholycystectomy.  Gallstones: &#x27; 87  Rheumatoid arthritis: dx: 9/2017  Type 2 diabetes mellitus: dx;  age 38  Carpal tunnel syndrome: Left  Hypothyroid  High cholesterol  Status post colonoscopy: &#x27; 2009    Negative  Normal spontaneous vaginal delivery: &#x27; 83 and &#x27;86  Status post cholecystectomy: &#x27; 87      FAMILY HISTORY:  Family history of coronary artery disease (Father)  Family history of diabetes mellitus (Father, Mother)      Social History: No tobacco or alcohol use    Outpatient Medications:  Lantus 60 units qhs  Humalog 10 units  Levothyroxine 75 mcg  Losartan 100 mg daily    MEDICATIONS  (STANDING):  allopurinol 300 milliGRAM(s) Oral daily  Biotene Dry Mouth Oral Rinse 5 milliLiter(s) Swish and Spit three times a day  chlorhexidine 2% Cloths 1 Application(s) Topical <User Schedule>  cytarabine IVPB (eMAR) 158 milliGRAM(s) IV Intermittent daily  DAUNOrubicin Injectable (eMAR) 95 milliGRAM(s) IV Push Chemo every 24 hours  dextrose 5%. 1000 milliLiter(s) (50 mL/Hr) IV Continuous <Continuous>  dextrose 50% Injectable 12.5 Gram(s) IV Push once  dextrose 50% Injectable 25 Gram(s) IV Push once  dextrose 50% Injectable 25 Gram(s) IV Push once  enoxaparin Injectable 40 milliGRAM(s) SubCutaneous daily  gabapentin 600 milliGRAM(s) Oral two times a day  insulin glargine Injectable (LANTUS) 12 Unit(s) SubCutaneous at bedtime  insulin lispro (HumaLOG) corrective regimen sliding scale   SubCutaneous three times a day before meals  insulin lispro (HumaLOG) corrective regimen sliding scale   SubCutaneous at bedtime  investigational chemo - general 1 Tablet(s) Oral every 24 hours  levoFLOXacin  Tablet 500 milliGRAM(s) Oral every 24 hours  levothyroxine 75 MICROGram(s) Oral daily  losartan 100 milliGRAM(s) Oral daily  ondansetron Injectable 8 milliGRAM(s) IV Push every 8 hours  sodium chloride 0.9%. 1000 milliLiter(s) (100 mL/Hr) IV Continuous <Continuous>    MEDICATIONS  (PRN):  acetaminophen   Tablet .. 650 milliGRAM(s) Oral every 6 hours PRN Temp greater or equal to 38C (100.4F), Mild Pain (1 - 3)  dextrose 40% Gel 15 Gram(s) Oral once PRN Blood Glucose LESS THAN 70 milliGRAM(s)/deciliter  glucagon  Injectable 1 milliGRAM(s) IntraMuscular once PRN Glucose LESS THAN 70 milligrams/deciliter  metoclopramide Injectable 10 milliGRAM(s) IV Push every 6 hours PRN nausea/vommiting      Allergies    No Known Allergies    Intolerances      Review of Systems:  Constitutional: +fatigue, No fever, No change in weight  Eyes: +retinopathy  Neuro: No headache, +neuropathy  HEENT: No throat pain  Cardiovascular: No chest pain  Respiratory: +occasional SOB  GI: No nausea or vomiting  : + polyuria  Skin: no rash  Psych: no depression  Endocrine: + polydipsia, No heat or cold intolerance, rest as noted in HPI  Hem/lymph: no swelling    All other review of systems negative      PHYSICAL EXAM:  VITALS: T(C): 36.2 (08-07-19 @ 13:25)  T(F): 97.2 (08-07-19 @ 13:25), Max: 99.2 (08-07-19 @ 01:09)  HR: 89 (08-07-19 @ 13:25) (83 - 89)  BP: 127/71 (08-07-19 @ 13:25) (105/64 - 145/77)  RR:  (18 - 18)  SpO2:  (93% - 98%)  Wt(kg): --  GENERAL: NAD at this time  EYES: No proptosis, EOMI  HEENT:  Atraumatic, Normocephalic,   THYROID: Normal size, no palpable nodules  RESPIRATORY: Clear to auscultation bilaterally, full excursion, non-labored  CARDIOVASCULAR: Regular rhythm; No murmurs; no peripheral edema  GI: Soft, nontender, non distended, normal bowel sounds  SKIN: Dry, intact +right sided port  MUSCULOSKELETAL: normal strength  NEURO: follows commands  PSYCH: Alert and oriented x 3, normal affect, normal mood  CUSHING'S SIGNS: no striae      POCT Blood Glucose.: 134 mg/dL (08-07-19 @ 13:37)  POCT Blood Glucose.: 80 mg/dL (08-07-19 @ 08:38)  POCT Blood Glucose.: 133 mg/dL (08-06-19 @ 21:17)  POCT Blood Glucose.: 158 mg/dL (08-06-19 @ 18:12)  POCT Blood Glucose.: 246 mg/dL (08-06-19 @ 12:14)                              8.1    3.1   )-----------( 154      ( 07 Aug 2019 07:10 )             23.3       08-07    144  |  108  |  12  ----------------------------<  57<L>  3.7   |  24  |  0.94    EGFR if : 75  EGFR if non : 65    Ca    8.0<L>      08-07  Mg     1.5     08-07  Phos  3.5     08-07    TPro  6.4  /  Alb  3.0<L>  /  TBili  0.2  /  DBili  x   /  AST  19  /  ALT  14  /  AlkPhos  83  08-07    Thyroid Function Tests:      Hemoglobin A1C, Whole Blood: 10.6 % <H> [4.0 - 5.6] (08-07-19 @ 08:36)        Radiology:

## 2019-08-07 NOTE — PROGRESS NOTE ADULT - PROBLEM SELECTOR PLAN 2
Consistent carbohydrate diet.  Monitor sugars. Will start on Lantus 20  Sliding scale and adjust insulin accordingly. The patient is not neutropenic, afebrile  If febrile Pan Cx and CXR  Continue Levaquin and start Posaconazole when ANC <1000

## 2019-08-07 NOTE — CONSULT NOTE ADULT - ASSESSMENT
63 y/o F w/ uncontrolled Type 2 DM w/ hyper and hypoglycemia A1c 10.6%, HTN, HLD, Hypothyroidism admitted for chemo for newly diagnosed AML (high risk patient with high level decision-making).

## 2019-08-07 NOTE — PROGRESS NOTE ADULT - PROBLEM SELECTOR PLAN 8
Patient is not neutropenic  Continue ppx levaquin  Start posaconazole when ANC <1000  If spikes, panculture Lovenox 40mg SQ daily  D/C when PLT <50K        Contact Information [phone]

## 2019-08-07 NOTE — DISCHARGE NOTE PROVIDER - NSDCFUSCHEDAPPT_GEN_ALL_CORE_FT
MORIAH BRANDON ; 11/05/2019 ; NPP Med Rheum [address] [facility] MORIAH BRANDON ; 11/05/2019 ; NPP Med Rheum [address] [facility] MORIAH BRANDON ; 11/05/2019 ; NPP Med Rheum [address] [facility] MORIAH BRANDON ; 11/05/2019 ; NPP Med Rheum [address] [facility] MORIAH BRANDON ; 11/05/2019 ; NPP Med Rheum [address] [facility] MORIAH BRANDON ; 11/05/2019 ; NPP Med Rheum [address] [facility] MORIAH BRANDON ; 11/05/2019 ; NPP Med Rheum [address] [facility] MORIAH BRANDON ; 11/05/2019 ; NPP Med Rheum [address] [facility] MORIAH BRANDON ; 11/05/2019 ; NPP Med Rheum [address] [facility] MORIAH BRANDON ; 11/05/2019 ; NPP Med Rheum [address] [facility]

## 2019-08-08 LAB
ALBUMIN SERPL ELPH-MCNC: 3.2 G/DL — LOW (ref 3.3–5)
ALP SERPL-CCNC: 95 U/L — SIGNIFICANT CHANGE UP (ref 40–120)
ALT FLD-CCNC: 15 U/L — SIGNIFICANT CHANGE UP (ref 10–45)
ANION GAP SERPL CALC-SCNC: 13 MMOL/L — SIGNIFICANT CHANGE UP (ref 5–17)
ANISOCYTOSIS BLD QL: SLIGHT — SIGNIFICANT CHANGE UP
APTT BLD: 35.7 SEC — SIGNIFICANT CHANGE UP (ref 27.5–36.3)
AST SERPL-CCNC: 21 U/L — SIGNIFICANT CHANGE UP (ref 10–40)
BASOPHILS # BLD AUTO: 0 K/UL — SIGNIFICANT CHANGE UP (ref 0–0.2)
BASOPHILS NFR BLD AUTO: 0.4 % — SIGNIFICANT CHANGE UP (ref 0–2)
BILIRUB SERPL-MCNC: 0.3 MG/DL — SIGNIFICANT CHANGE UP (ref 0.2–1.2)
BUN SERPL-MCNC: 12 MG/DL — SIGNIFICANT CHANGE UP (ref 7–23)
CALCIUM SERPL-MCNC: 8.3 MG/DL — LOW (ref 8.4–10.5)
CHLORIDE SERPL-SCNC: 104 MMOL/L — SIGNIFICANT CHANGE UP (ref 96–108)
CO2 SERPL-SCNC: 23 MMOL/L — SIGNIFICANT CHANGE UP (ref 22–31)
CREAT SERPL-MCNC: 0.94 MG/DL — SIGNIFICANT CHANGE UP (ref 0.5–1.3)
DACRYOCYTES BLD QL SMEAR: SLIGHT — SIGNIFICANT CHANGE UP
ELLIPTOCYTES BLD QL SMEAR: SLIGHT — SIGNIFICANT CHANGE UP
EOSINOPHIL # BLD AUTO: 0 K/UL — SIGNIFICANT CHANGE UP (ref 0–0.5)
EOSINOPHIL NFR BLD AUTO: 1.5 % — SIGNIFICANT CHANGE UP (ref 0–6)
GLUCOSE BLDC GLUCOMTR-MCNC: 150 MG/DL — HIGH (ref 70–99)
GLUCOSE BLDC GLUCOMTR-MCNC: 184 MG/DL — HIGH (ref 70–99)
GLUCOSE BLDC GLUCOMTR-MCNC: 269 MG/DL — HIGH (ref 70–99)
GLUCOSE BLDC GLUCOMTR-MCNC: 274 MG/DL — HIGH (ref 70–99)
GLUCOSE SERPL-MCNC: 167 MG/DL — HIGH (ref 70–99)
HCT VFR BLD CALC: 24.2 % — LOW (ref 34.5–45)
HGB BLD-MCNC: 8.1 G/DL — LOW (ref 11.5–15.5)
INR BLD: 1.21 RATIO — HIGH (ref 0.88–1.16)
LDH SERPL L TO P-CCNC: 242 U/L — SIGNIFICANT CHANGE UP (ref 50–242)
LYMPHOCYTES # BLD AUTO: 1.1 K/UL — SIGNIFICANT CHANGE UP (ref 1–3.3)
LYMPHOCYTES # BLD AUTO: 47.3 % — HIGH (ref 13–44)
MAGNESIUM SERPL-MCNC: 1.7 MG/DL — SIGNIFICANT CHANGE UP (ref 1.6–2.6)
MCHC RBC-ENTMCNC: 28.8 PG — SIGNIFICANT CHANGE UP (ref 27–34)
MCHC RBC-ENTMCNC: 33.7 GM/DL — SIGNIFICANT CHANGE UP (ref 32–36)
MCV RBC AUTO: 85.6 FL — SIGNIFICANT CHANGE UP (ref 80–100)
MICROCYTES BLD QL: SIGNIFICANT CHANGE UP
MONOCYTES # BLD AUTO: 0.2 K/UL — SIGNIFICANT CHANGE UP (ref 0–0.9)
MONOCYTES NFR BLD AUTO: 8.7 % — SIGNIFICANT CHANGE UP (ref 2–14)
NEUTROPHILS # BLD AUTO: 1 K/UL — LOW (ref 1.8–7.4)
NEUTROPHILS NFR BLD AUTO: 42.2 % — LOW (ref 43–77)
PHOSPHATE SERPL-MCNC: 3.5 MG/DL — SIGNIFICANT CHANGE UP (ref 2.5–4.5)
PLAT MORPH BLD: NORMAL — SIGNIFICANT CHANGE UP
PLATELET # BLD AUTO: 160 K/UL — SIGNIFICANT CHANGE UP (ref 150–400)
POIKILOCYTOSIS BLD QL AUTO: SLIGHT — SIGNIFICANT CHANGE UP
POLYCHROMASIA BLD QL SMEAR: SLIGHT — SIGNIFICANT CHANGE UP
POTASSIUM SERPL-MCNC: 3.8 MMOL/L — SIGNIFICANT CHANGE UP (ref 3.5–5.3)
POTASSIUM SERPL-SCNC: 3.8 MMOL/L — SIGNIFICANT CHANGE UP (ref 3.5–5.3)
PROT SERPL-MCNC: 6.9 G/DL — SIGNIFICANT CHANGE UP (ref 6–8.3)
PROTHROM AB SERPL-ACNC: 14 SEC — HIGH (ref 10–12.9)
RBC # BLD: 2.83 M/UL — LOW (ref 3.8–5.2)
RBC # FLD: 15.7 % — HIGH (ref 10.3–14.5)
RBC BLD AUTO: ABNORMAL
SODIUM SERPL-SCNC: 140 MMOL/L — SIGNIFICANT CHANGE UP (ref 135–145)
URATE SERPL-MCNC: 3.4 MG/DL — SIGNIFICANT CHANGE UP (ref 2.5–7)
WBC # BLD: 2.3 K/UL — LOW (ref 3.8–10.5)
WBC # FLD AUTO: 2.3 K/UL — LOW (ref 3.8–10.5)

## 2019-08-08 PROCEDURE — 93010 ELECTROCARDIOGRAM REPORT: CPT | Mod: 76

## 2019-08-08 PROCEDURE — 99232 SBSQ HOSP IP/OBS MODERATE 35: CPT | Mod: GC

## 2019-08-08 RX ORDER — FUROSEMIDE 40 MG
20 TABLET ORAL ONCE
Refills: 0 | Status: COMPLETED | OUTPATIENT
Start: 2019-08-08 | End: 2019-08-08

## 2019-08-08 RX ORDER — POSACONAZOLE 100 MG/1
300 TABLET, DELAYED RELEASE ORAL DAILY
Refills: 0 | Status: DISCONTINUED | OUTPATIENT
Start: 2019-08-08 | End: 2019-08-28

## 2019-08-08 RX ADMIN — Medication 4 UNIT(S): at 18:21

## 2019-08-08 RX ADMIN — ONDANSETRON 8 MILLIGRAM(S): 8 TABLET, FILM COATED ORAL at 06:08

## 2019-08-08 RX ADMIN — POSACONAZOLE 300 MILLIGRAM(S): 100 TABLET, DELAYED RELEASE ORAL at 12:02

## 2019-08-08 RX ADMIN — Medication 4 UNIT(S): at 14:13

## 2019-08-08 RX ADMIN — ONDANSETRON 8 MILLIGRAM(S): 8 TABLET, FILM COATED ORAL at 13:20

## 2019-08-08 RX ADMIN — Medication 300 MILLIGRAM(S): at 12:02

## 2019-08-08 RX ADMIN — Medication 3: at 18:21

## 2019-08-08 RX ADMIN — GABAPENTIN 600 MILLIGRAM(S): 400 CAPSULE ORAL at 18:20

## 2019-08-08 RX ADMIN — ENOXAPARIN SODIUM 40 MILLIGRAM(S): 100 INJECTION SUBCUTANEOUS at 12:01

## 2019-08-08 RX ADMIN — INSULIN GLARGINE 12 UNIT(S): 100 INJECTION, SOLUTION SUBCUTANEOUS at 22:14

## 2019-08-08 RX ADMIN — Medication 1: at 14:13

## 2019-08-08 RX ADMIN — Medication 5 MILLILITER(S): at 13:20

## 2019-08-08 RX ADMIN — Medication 20.9 MILLIGRAM(S): at 15:55

## 2019-08-08 RX ADMIN — Medication 2: at 22:15

## 2019-08-08 RX ADMIN — Medication 650 MILLIGRAM(S): at 22:12

## 2019-08-08 RX ADMIN — LOSARTAN POTASSIUM 100 MILLIGRAM(S): 100 TABLET, FILM COATED ORAL at 06:08

## 2019-08-08 RX ADMIN — CHLORHEXIDINE GLUCONATE 1 APPLICATION(S): 213 SOLUTION TOPICAL at 06:09

## 2019-08-08 RX ADMIN — Medication 4 UNIT(S): at 09:28

## 2019-08-08 RX ADMIN — Medication 75 MICROGRAM(S): at 06:08

## 2019-08-08 RX ADMIN — SODIUM CHLORIDE 100 MILLILITER(S): 9 INJECTION INTRAMUSCULAR; INTRAVENOUS; SUBCUTANEOUS at 18:21

## 2019-08-08 RX ADMIN — ONDANSETRON 8 MILLIGRAM(S): 8 TABLET, FILM COATED ORAL at 21:24

## 2019-08-08 RX ADMIN — Medication 5 MILLILITER(S): at 22:14

## 2019-08-08 RX ADMIN — Medication 650 MILLIGRAM(S): at 21:21

## 2019-08-08 RX ADMIN — DAUNORUBICIN HYDROCHLORIDE 174 MILLIGRAM(S): 5 INJECTION INTRAVENOUS at 15:38

## 2019-08-08 RX ADMIN — GABAPENTIN 600 MILLIGRAM(S): 400 CAPSULE ORAL at 06:08

## 2019-08-08 RX ADMIN — Medication 5 MILLILITER(S): at 06:08

## 2019-08-08 RX ADMIN — Medication 20 MILLIGRAM(S): at 14:15

## 2019-08-08 NOTE — PROGRESS NOTE ADULT - PROBLEM SELECTOR PLAN 3
HgA1C 10.6  FS AC & HS with HISS  Continue Lantus 20mg QHS  Consistent carbohydrate diet  Follow up Endocrine consult HgA1C 10.6  FS AC & HS with HISS  Continue Lantus 20mg QHS  Consistent carbohydrate diet  Endocrine following

## 2019-08-08 NOTE — PROGRESS NOTE ADULT - PROBLEM SELECTOR PLAN 2
The patient is not neutropenic, afebrile  If febrile Pan Cx and CXR  Continue Levaquin and start Posaconazole when ANC <1000 The patient is not neutropenic, afebrile  If febrile Pan Cx and CXR  Continue Levaquin and Posaconazole The patient is not neutropenic, afebrile  If febrile Pan Cx and CXR  Continue Levaquin and Posaconazole (Monitor QTc BIW M/Th with Posaconazole and Study drug)

## 2019-08-08 NOTE — ADVANCED PRACTICE NURSE CONSULT - ASSESSMENT
Pt A/Ox4, vital signs stable, afebrile. Pt denies pain/discomfort, N/V. Chemotherapy teaching reinforced to pt at bedside. Right TL IJ placed 8/6/19, Dsg intact and dry, no swelling and redness noted, easily flushed with positive blood return. Consent signed and filed. Lab results reviewed by Dr. Goldberg during rounds and said OK to treat pt. Chemotherapy verified by two RNs. At 1538, Daunorubincin 650 mg/m2=95 mg IVP given over 15 mins side armed with NS to  the lower blue port of Right TLJ with positive blood return during infusion, pt tolerated well. At 1555 , cytarabine 100 mg/b0=820 mg IV infusion through IJ over 24 hours by alaris pump. Pt left comfortable on bed. Primary RN aware. Pt A/Ox4, vital signs stable, afebrile. Pt denies pain/discomfort, N/V. Chemotherapy teaching reinforced to pt at bedside. Right TL IJ placed 8/6/19, Dsg intact and dry, no swelling and redness noted, easily flushed with positive blood return. Consent signed and filed. Lab results reviewed by Dr. Goldberg during rounds and said OK to treat pt. Chemotherapy verified by two RNs. At 1538, Daunorubincin 650 mg/m2=95 mg IVP given over 15 mins side armed with free flow NS to  the lower blue port of Right TLJ with positive blood return during infusion, pt tolerated well. At 1555 , cytarabine 100 mg/y2=602 mg IV infusion through IJ over 24 hours by alaris pump. Pt left comfortable on bed. Primary RN aware.

## 2019-08-08 NOTE — PROGRESS NOTE ADULT - PROBLEM SELECTOR PLAN 1
Enrolled on Pfizer BRIGHT trial receiving Induction chemotherapy with Dauno/Cytarabine and daily Glastegib Vs Placebo  Monitor CBC/Lytes and transfuse/replete PRN  Strict Is and Os/ Daily weights/ Mouth Care  Allopurinol 300mg PO daily  IVF  Antiemetics  ECOG 1 Enrolled on Pfizer BRIGHT trial receiving Induction chemotherapy with Dauno/Cytarabine and daily Glastegib Vs Placebo  8/20 Day 14 BM bx due  Monitor CBC/Lytes and transfuse/replete PRN  Strict Is and Os/ Daily weights/ Mouth Care  Allopurinol 300mg PO daily  IVF  Antiemetics  ECOG 1 Enrolled on Pfizer BRIGHT trial receiving Induction chemotherapy with Dauno/Cytarabine and daily Glastegib Vs Placebo  8/20 Day 14 BM bx due  Monitor CBC/Lytes and transfuse/replete PRN  Strict Is and Os/ Daily weights/ Mouth Care  Lasix 20mg IV x 1  Allopurinol 300mg PO daily  IVF  Antiemetics  ECOG 1

## 2019-08-08 NOTE — ADVANCED PRACTICE NURSE CONSULT - ASSESSMENT
Induction Cycle 1  Day 2    Patient states that she is feeling well today did have complaint of fatigue, chills overnight, the mild SOB that has been chronic over the last month has improved. Patient reminded of the importance of remaining active as possible- Physical Therapy has been ordered for the patient to assist with compliance. Induction Cycle 1  Day 2    Patient states that she is feeling well today did have complaint of fatigue, chills overnight, the mild SOB that has been chronic over the last month has improved. Patient reminded of the importance of remaining active as possible- Physical Therapy has been ordered for the patient to assist with compliance.   Patients denies N/V/D or abdominal pain- patient states appetite is good and she has been able to eat without issue. Patient also denies any abnormal bruising or bleeding.  Patient states that she has no chest pains. Patient has been able to swallow the Glasdegib/Placebo without issue or incident - no adverse reaction reported.

## 2019-08-08 NOTE — PROGRESS NOTE ADULT - SUBJECTIVE AND OBJECTIVE BOX
Diagnosis: AML FLT 3 (-)    Protocol/Chemo Regimen: Enrolled on [employer] BRIGHT trial receiving Induction chemotherapy with Dauno/Cytarabine and daily Glastegib Vs Placebo    Day: 2    Pt endorsed: no complaints    Review of Systems: Patient denies headache, dizziness, visual changes, chest pain, palpitations, SOB, abdominal pain, nausea, vomiting, diarrhea or dysuria.    Pain scale: denies    Diet: Regular    Allergies: No Known Allergies      ANTIMICROBIALS  levoFLOXacin  Tablet 500 milliGRAM(s) Oral every 24 hours      HEME/ONC MEDICATIONS  cytarabine IVPB (eMAR) 158 milliGRAM(s) IV Intermittent daily  DAUNOrubicin Injectable (eMAR) 95 milliGRAM(s) IV Push Chemo every 24 hours  enoxaparin Injectable 40 milliGRAM(s) SubCutaneous daily      STANDING MEDICATIONS  allopurinol 300 milliGRAM(s) Oral daily  Biotene Dry Mouth Oral Rinse 5 milliLiter(s) Swish and Spit three times a day  chlorhexidine 2% Cloths 1 Application(s) Topical <User Schedule>  dextrose 5%. 1000 milliLiter(s) IV Continuous <Continuous>  dextrose 50% Injectable 12.5 Gram(s) IV Push once  dextrose 50% Injectable 25 Gram(s) IV Push once  dextrose 50% Injectable 25 Gram(s) IV Push once  gabapentin 600 milliGRAM(s) Oral two times a day  insulin glargine Injectable (LANTUS) 12 Unit(s) SubCutaneous at bedtime  insulin lispro (HumaLOG) corrective regimen sliding scale   SubCutaneous three times a day before meals  insulin lispro (HumaLOG) corrective regimen sliding scale   SubCutaneous at bedtime  insulin lispro Injectable (HumaLOG) 4 Unit(s) SubCutaneous three times a day before meals  investigational chemo - general 1 Tablet(s) Oral every 24 hours  levothyroxine 75 MICROGram(s) Oral daily  losartan 100 milliGRAM(s) Oral daily  ondansetron Injectable 8 milliGRAM(s) IV Push every 8 hours  sodium chloride 0.9%. 1000 milliLiter(s) IV Continuous <Continuous>      PRN MEDICATIONS  acetaminophen   Tablet .. 650 milliGRAM(s) Oral every 6 hours PRN  dextrose 40% Gel 15 Gram(s) Oral once PRN  glucagon  Injectable 1 milliGRAM(s) IntraMuscular once PRN  melatonin 3 milliGRAM(s) Oral once PRN  metoclopramide Injectable 10 milliGRAM(s) IV Push every 6 hours PRN      Vital Signs Last 24 Hrs  T(C): 37.1 (08 Aug 2019 05:37), Max: 37.6 (07 Aug 2019 21:53)  T(F): 98.7 (08 Aug 2019 05:37), Max: 99.6 (07 Aug 2019 21:53)  HR: 85 (08 Aug 2019 05:37) (84 - 92)  BP: 115/65 (08 Aug 2019 05:37) (111/62 - 145/77)  BP(mean): --  RR: 18 (08 Aug 2019 05:37) (16 - 18)  SpO2: 93% (08 Aug 2019 05:37) (92% - 98%)      PHYSICAL EXAM  General: NAD  HEENT: PERRLA, EOMI, clear oropharynx  Neck: supple  CV: (+) S1/S2 RRR  Lungs: clear to auscultation, no wheezes or rales  Abdomen: soft, non-tender, non-distended (+) BS  Ext: no edema  Skin: no rashes   Neuro: alert and oriented X 3, no focal deficits  Central Line: C/D/I        LABS: Diagnosis: AML FLT 3 (-)    Protocol/Chemo Regimen: Enrolled on [employer] BRIGHT trial receiving Induction chemotherapy with Dauno/Cytarabine and daily Glastegib Vs Placebo    Day: 2    Pt endorsed: no complaints    Review of Systems: Patient denies headache, dizziness, visual changes, chest pain, palpitations, SOB, abdominal pain, nausea, vomiting, diarrhea or dysuria.    Pain scale: denies    Diet: Regular    Allergies: No Known Allergies      ANTIMICROBIALS  levoFLOXacin  Tablet 500 milliGRAM(s) Oral every 24 hours      HEME/ONC MEDICATIONS  cytarabine IVPB (eMAR) 158 milliGRAM(s) IV Intermittent daily  DAUNOrubicin Injectable (eMAR) 95 milliGRAM(s) IV Push Chemo every 24 hours  enoxaparin Injectable 40 milliGRAM(s) SubCutaneous daily      STANDING MEDICATIONS  allopurinol 300 milliGRAM(s) Oral daily  Biotene Dry Mouth Oral Rinse 5 milliLiter(s) Swish and Spit three times a day  chlorhexidine 2% Cloths 1 Application(s) Topical <User Schedule>  dextrose 5%. 1000 milliLiter(s) IV Continuous <Continuous>  dextrose 50% Injectable 12.5 Gram(s) IV Push once  dextrose 50% Injectable 25 Gram(s) IV Push once  dextrose 50% Injectable 25 Gram(s) IV Push once  gabapentin 600 milliGRAM(s) Oral two times a day  insulin glargine Injectable (LANTUS) 12 Unit(s) SubCutaneous at bedtime  insulin lispro (HumaLOG) corrective regimen sliding scale   SubCutaneous three times a day before meals  insulin lispro (HumaLOG) corrective regimen sliding scale   SubCutaneous at bedtime  insulin lispro Injectable (HumaLOG) 4 Unit(s) SubCutaneous three times a day before meals  investigational chemo - general 1 Tablet(s) Oral every 24 hours  levothyroxine 75 MICROGram(s) Oral daily  losartan 100 milliGRAM(s) Oral daily  ondansetron Injectable 8 milliGRAM(s) IV Push every 8 hours  sodium chloride 0.9%. 1000 milliLiter(s) IV Continuous <Continuous>      PRN MEDICATIONS  acetaminophen   Tablet .. 650 milliGRAM(s) Oral every 6 hours PRN  dextrose 40% Gel 15 Gram(s) Oral once PRN  glucagon  Injectable 1 milliGRAM(s) IntraMuscular once PRN  melatonin 3 milliGRAM(s) Oral once PRN  metoclopramide Injectable 10 milliGRAM(s) IV Push every 6 hours PRN      Vital Signs Last 24 Hrs  T(C): 37.1 (08 Aug 2019 05:37), Max: 37.6 (07 Aug 2019 21:53)  T(F): 98.7 (08 Aug 2019 05:37), Max: 99.6 (07 Aug 2019 21:53)  HR: 85 (08 Aug 2019 05:37) (84 - 92)  BP: 115/65 (08 Aug 2019 05:37) (111/62 - 145/77)  BP(mean): --  RR: 18 (08 Aug 2019 05:37) (16 - 18)  SpO2: 93% (08 Aug 2019 05:37) (92% - 98%)      PHYSICAL EXAM  General: NAD  HEENT: PERRLA, EOMI, clear oropharynx  Neck: supple  CV: (+) S1/S2 RRR  Lungs: clear to auscultation, no wheezes or rales  Abdomen: soft, non-tender, non-distended (+) BS  Ext: no edema  Skin: no rashes   Neuro: alert and oriented X 3, no focal deficits  Central Line: C/D/I        LABS:                                 8.1    2.3   )-----------( 160      ( 08 Aug 2019 07:43 )             24.2         Mean Cell Volume : 85.6 fl  Mean Cell Hemoglobin : 28.8 pg  Mean Cell Hemoglobin Concentration : 33.7 gm/dL  Auto Neutrophil # : 1.0 K/uL  Auto Lymphocyte # : 1.1 K/uL  Auto Monocyte # : 0.2 K/uL  Auto Eosinophil # : 0.0 K/uL  Auto Basophil # : 0.0 K/uL  Auto Neutrophil % : 42.2 %  Auto Lymphocyte % : 47.3 %  Auto Monocyte % : 8.7 %  Auto Eosinophil % : 1.5 %  Auto Basophil % : 0.4 %      08-08    140  |  104  |  12  ----------------------------<  167<H>  3.8   |  23  |  0.94    Ca    8.3<L>      08 Aug 2019 07:11  Phos  3.5     08-08  Mg     1.7     08-08    TPro  6.9  /  Alb  3.2<L>  /  TBili  0.3  /  DBili  x   /  AST  21  /  ALT  15  /  AlkPhos  95  08-08      Mg 1.7  Phos 3.5      PT/INR - ( 08 Aug 2019 07:26 )   PT: 14.0 sec;   INR: 1.21 ratio         PTT - ( 08 Aug 2019 07:26 )  PTT:35.7 sec      Uric Acid 3.4 Diagnosis: AML FLT 3 (-)    Protocol/Chemo Regimen: Enrolled on [employer] BRIGHT trial receiving Induction chemotherapy with Dauno/Cytarabine and daily Glastegib Vs Placebo    Day: 2    Pt endorsed: fatigue, chills overnight, mild SOB    Review of Systems: Patient denies headache, dizziness, visual changes, chest pain, palpitations, abdominal pain, nausea, vomiting, diarrhea or dysuria.    Pain scale: denies    Diet: Regular    Allergies: No Known Allergies      ANTIMICROBIALS  levoFLOXacin  Tablet 500 milliGRAM(s) Oral every 24 hours      HEME/ONC MEDICATIONS  cytarabine IVPB (eMAR) 158 milliGRAM(s) IV Intermittent daily  DAUNOrubicin Injectable (eMAR) 95 milliGRAM(s) IV Push Chemo every 24 hours  enoxaparin Injectable 40 milliGRAM(s) SubCutaneous daily      STANDING MEDICATIONS  allopurinol 300 milliGRAM(s) Oral daily  Biotene Dry Mouth Oral Rinse 5 milliLiter(s) Swish and Spit three times a day  chlorhexidine 2% Cloths 1 Application(s) Topical <User Schedule>  dextrose 5%. 1000 milliLiter(s) IV Continuous <Continuous>  dextrose 50% Injectable 12.5 Gram(s) IV Push once  dextrose 50% Injectable 25 Gram(s) IV Push once  dextrose 50% Injectable 25 Gram(s) IV Push once  gabapentin 600 milliGRAM(s) Oral two times a day  insulin glargine Injectable (LANTUS) 12 Unit(s) SubCutaneous at bedtime  insulin lispro (HumaLOG) corrective regimen sliding scale   SubCutaneous three times a day before meals  insulin lispro (HumaLOG) corrective regimen sliding scale   SubCutaneous at bedtime  insulin lispro Injectable (HumaLOG) 4 Unit(s) SubCutaneous three times a day before meals  investigational chemo - general 1 Tablet(s) Oral every 24 hours  levothyroxine 75 MICROGram(s) Oral daily  losartan 100 milliGRAM(s) Oral daily  ondansetron Injectable 8 milliGRAM(s) IV Push every 8 hours  sodium chloride 0.9%. 1000 milliLiter(s) IV Continuous <Continuous>      PRN MEDICATIONS  acetaminophen   Tablet .. 650 milliGRAM(s) Oral every 6 hours PRN  dextrose 40% Gel 15 Gram(s) Oral once PRN  glucagon  Injectable 1 milliGRAM(s) IntraMuscular once PRN  melatonin 3 milliGRAM(s) Oral once PRN  metoclopramide Injectable 10 milliGRAM(s) IV Push every 6 hours PRN      Vital Signs Last 24 Hrs  T(C): 37.1 (08 Aug 2019 05:37), Max: 37.6 (07 Aug 2019 21:53)  T(F): 98.7 (08 Aug 2019 05:37), Max: 99.6 (07 Aug 2019 21:53)  HR: 85 (08 Aug 2019 05:37) (84 - 92)  BP: 115/65 (08 Aug 2019 05:37) (111/62 - 145/77)  BP(mean): --  RR: 18 (08 Aug 2019 05:37) (16 - 18)  SpO2: 93% (08 Aug 2019 05:37) (92% - 98%)      PHYSICAL EXAM  General: NAD  HEENT: PERRLA, EOMI, clear oropharynx  Neck: supple  CV: (+) S1/S2 RRR  Lungs: clear to auscultation, no wheezes or rales  Abdomen: soft, non-tender, non-distended (+) BS  Ext: no edema  Skin: no rashes   Neuro: alert and oriented X 3, no focal deficits  Central Line: C/D/I        LABS:                                 8.1    2.3   )-----------( 160      ( 08 Aug 2019 07:43 )             24.2         Mean Cell Volume : 85.6 fl  Mean Cell Hemoglobin : 28.8 pg  Mean Cell Hemoglobin Concentration : 33.7 gm/dL  Auto Neutrophil # : 1.0 K/uL  Auto Lymphocyte # : 1.1 K/uL  Auto Monocyte # : 0.2 K/uL  Auto Eosinophil # : 0.0 K/uL  Auto Basophil # : 0.0 K/uL  Auto Neutrophil % : 42.2 %  Auto Lymphocyte % : 47.3 %  Auto Monocyte % : 8.7 %  Auto Eosinophil % : 1.5 %  Auto Basophil % : 0.4 %      08-08    140  |  104  |  12  ----------------------------<  167<H>  3.8   |  23  |  0.94    Ca    8.3<L>      08 Aug 2019 07:11  Phos  3.5     08-08  Mg     1.7     08-08    TPro  6.9  /  Alb  3.2<L>  /  TBili  0.3  /  DBili  x   /  AST  21  /  ALT  15  /  AlkPhos  95  08-08      Mg 1.7  Phos 3.5      PT/INR - ( 08 Aug 2019 07:26 )   PT: 14.0 sec;   INR: 1.21 ratio         PTT - ( 08 Aug 2019 07:26 )  PTT:35.7 sec      Uric Acid 3.4

## 2019-08-08 NOTE — PHYSICAL THERAPY INITIAL EVALUATION ADULT - PERTINENT HX OF CURRENT PROBLEM, REHAB EVAL
Pt is a 63 yo F with PMHx of T2DM, RA, Depression and now newly diagnosed AML enrolled on Pfizer [facility] trial admitted for Induction chemotherapy with Dauno/Cytarabine and daily Glastegib Vs Placebo.

## 2019-08-08 NOTE — PROGRESS NOTE ADULT - ASSESSMENT
This is a 65 yo F with PMHx of T2DM, RA, Depression and now newly diagnosed AML enrolled on [employer] BRIGHT trial admitted for Induction chemotherapy with Dauno/Cytarabine and daily Glastegib Vs Placebo.

## 2019-08-08 NOTE — PROGRESS NOTE ADULT - ATTENDING COMMENTS
65 yo F with hx RA, here for follow up, had new anemia and peripheral immature cells (few blasts)  BM bx done 8/2/19 --c/w AML, FLT-3 neg, FISH for PML-KRYSTAL and BCR-ABL negative. Pt gave consent for Pfeizer trial 7+3+ Glasdegib/placebo.  Patient here for her induction chemotherapy on trial.   Today will be day 1.  Daunorubicin 60 mg/m2 day 1-3, cytarabine 100 mg/m2 day 1-7, glasdegib/placebo 100mg daily PO.   Supportive care, anti-emetics, IVFs, allopurinol for TLS ppx.   Monitor CBC, Transfuse PRN.   Lantus QHS, following fingersticks closely, endocrine consult. Very poorly controlled diabetes.   ECOG performance status 1. 63 yo F with hx RA, here for follow up, had new anemia and peripheral immature cells (few blasts)  BM bx done 8/2/19 --c/w AML, FLT-3 neg, FISH for PML-KRYSTAL and BCR-ABL negative. Pt gave consent for Pfeizer trial 7+3+ Glasdegib/placebo.  Patient here for her induction chemotherapy on trial.   Today will be day 2.  Daunorubicin 60 mg/m2 day 1-3, cytarabine 100 mg/m2 day 1-7, glasdegib/placebo 100mg daily PO.   Supportive care, anti-emetics, IVFs, allopurinol for TLS ppx.   Dyspnea yesterday with evidence of pulmonary edema. S/p IV lasix with improvement.   Ambulatory today.   Monitor CBC, Transfuse PRN.   Lantus QHS, following fingersticks closely, endocrine consult appreciated. Very poorly controlled diabetes.   ECOG performance status 1.

## 2019-08-08 NOTE — PHYSICAL THERAPY INITIAL EVALUATION ADULT - ADDITIONAL COMMENTS
As per pt, pt lives in a private house alone w/ 5 steps to enter and was independent w/ ADLs and mobility w/ straight cane PTA.

## 2019-08-09 LAB
ALBUMIN SERPL ELPH-MCNC: 3 G/DL — LOW (ref 3.3–5)
ALP SERPL-CCNC: 92 U/L — SIGNIFICANT CHANGE UP (ref 40–120)
ALT FLD-CCNC: 21 U/L — SIGNIFICANT CHANGE UP (ref 10–45)
ANION GAP SERPL CALC-SCNC: 15 MMOL/L — SIGNIFICANT CHANGE UP (ref 5–17)
AST SERPL-CCNC: 22 U/L — SIGNIFICANT CHANGE UP (ref 10–40)
BASOPHILS # BLD AUTO: 0 K/UL — SIGNIFICANT CHANGE UP (ref 0–0.2)
BASOPHILS NFR BLD AUTO: 0.4 % — SIGNIFICANT CHANGE UP (ref 0–2)
BILIRUB SERPL-MCNC: 0.3 MG/DL — SIGNIFICANT CHANGE UP (ref 0.2–1.2)
BLD GP AB SCN SERPL QL: NEGATIVE — SIGNIFICANT CHANGE UP
BUN SERPL-MCNC: 16 MG/DL — SIGNIFICANT CHANGE UP (ref 7–23)
CALCIUM SERPL-MCNC: 8.5 MG/DL — SIGNIFICANT CHANGE UP (ref 8.4–10.5)
CHLORIDE SERPL-SCNC: 104 MMOL/L — SIGNIFICANT CHANGE UP (ref 96–108)
CO2 SERPL-SCNC: 20 MMOL/L — LOW (ref 22–31)
CREAT SERPL-MCNC: 0.85 MG/DL — SIGNIFICANT CHANGE UP (ref 0.5–1.3)
EOSINOPHIL # BLD AUTO: 0 K/UL — SIGNIFICANT CHANGE UP (ref 0–0.5)
EOSINOPHIL NFR BLD AUTO: 1.6 % — SIGNIFICANT CHANGE UP (ref 0–6)
GLUCOSE BLDC GLUCOMTR-MCNC: 212 MG/DL — HIGH (ref 70–99)
GLUCOSE BLDC GLUCOMTR-MCNC: 248 MG/DL — HIGH (ref 70–99)
GLUCOSE BLDC GLUCOMTR-MCNC: 271 MG/DL — HIGH (ref 70–99)
GLUCOSE BLDC GLUCOMTR-MCNC: 296 MG/DL — HIGH (ref 70–99)
GLUCOSE SERPL-MCNC: 264 MG/DL — HIGH (ref 70–99)
HCT VFR BLD CALC: 21 % — CRITICAL LOW (ref 34.5–45)
HGB BLD-MCNC: 7.6 G/DL — LOW (ref 11.5–15.5)
LDH SERPL L TO P-CCNC: 237 U/L — SIGNIFICANT CHANGE UP (ref 50–242)
LYMPHOCYTES # BLD AUTO: 0.6 K/UL — LOW (ref 1–3.3)
LYMPHOCYTES # BLD AUTO: 39.7 % — SIGNIFICANT CHANGE UP (ref 13–44)
MAGNESIUM SERPL-MCNC: 1.6 MG/DL — SIGNIFICANT CHANGE UP (ref 1.6–2.6)
MCHC RBC-ENTMCNC: 31.2 PG — SIGNIFICANT CHANGE UP (ref 27–34)
MCHC RBC-ENTMCNC: 36.3 GM/DL — HIGH (ref 32–36)
MCV RBC AUTO: 85.9 FL — SIGNIFICANT CHANGE UP (ref 80–100)
MONOCYTES # BLD AUTO: 0.1 K/UL — SIGNIFICANT CHANGE UP (ref 0–0.9)
MONOCYTES NFR BLD AUTO: 7.5 % — SIGNIFICANT CHANGE UP (ref 2–14)
NEUTROPHILS # BLD AUTO: 0.8 K/UL — LOW (ref 1.8–7.4)
NEUTROPHILS NFR BLD AUTO: 50.7 % — SIGNIFICANT CHANGE UP (ref 43–77)
PHOSPHATE SERPL-MCNC: 3.8 MG/DL — SIGNIFICANT CHANGE UP (ref 2.5–4.5)
PLAT MORPH BLD: NORMAL — SIGNIFICANT CHANGE UP
PLATELET # BLD AUTO: 119 K/UL — LOW (ref 150–400)
POTASSIUM SERPL-MCNC: 3.7 MMOL/L — SIGNIFICANT CHANGE UP (ref 3.5–5.3)
POTASSIUM SERPL-SCNC: 3.7 MMOL/L — SIGNIFICANT CHANGE UP (ref 3.5–5.3)
PROT SERPL-MCNC: 6.5 G/DL — SIGNIFICANT CHANGE UP (ref 6–8.3)
RBC # BLD: 2.45 M/UL — LOW (ref 3.8–5.2)
RBC # FLD: 15.3 % — HIGH (ref 10.3–14.5)
RBC BLD AUTO: SIGNIFICANT CHANGE UP
RH IG SCN BLD-IMP: POSITIVE — SIGNIFICANT CHANGE UP
SODIUM SERPL-SCNC: 139 MMOL/L — SIGNIFICANT CHANGE UP (ref 135–145)
URATE SERPL-MCNC: 3.5 MG/DL — SIGNIFICANT CHANGE UP (ref 2.5–7)
WBC # BLD: 1.6 K/UL — LOW (ref 3.8–10.5)
WBC # FLD AUTO: 1.6 K/UL — LOW (ref 3.8–10.5)

## 2019-08-09 PROCEDURE — 99232 SBSQ HOSP IP/OBS MODERATE 35: CPT | Mod: GC

## 2019-08-09 PROCEDURE — 71250 CT THORAX DX C-: CPT | Mod: 26

## 2019-08-09 RX ORDER — INSULIN GLARGINE 100 [IU]/ML
20 INJECTION, SOLUTION SUBCUTANEOUS AT BEDTIME
Refills: 0 | Status: DISCONTINUED | OUTPATIENT
Start: 2019-08-09 | End: 2019-08-10

## 2019-08-09 RX ORDER — DOCUSATE SODIUM 100 MG
100 CAPSULE ORAL THREE TIMES A DAY
Refills: 0 | Status: DISCONTINUED | OUTPATIENT
Start: 2019-08-09 | End: 2019-08-11

## 2019-08-09 RX ORDER — CEFEPIME 1 G/1
1000 INJECTION, POWDER, FOR SOLUTION INTRAMUSCULAR; INTRAVENOUS EVERY 8 HOURS
Refills: 0 | Status: DISCONTINUED | OUTPATIENT
Start: 2019-08-10 | End: 2019-08-10

## 2019-08-09 RX ORDER — ALLOPURINOL 300 MG
300 TABLET ORAL DAILY
Refills: 0 | Status: DISCONTINUED | OUTPATIENT
Start: 2019-08-09 | End: 2019-08-14

## 2019-08-09 RX ORDER — SENNA PLUS 8.6 MG/1
2 TABLET ORAL ONCE
Refills: 0 | Status: COMPLETED | OUTPATIENT
Start: 2019-08-09 | End: 2019-08-09

## 2019-08-09 RX ORDER — CEFEPIME 1 G/1
INJECTION, POWDER, FOR SOLUTION INTRAMUSCULAR; INTRAVENOUS
Refills: 0 | Status: DISCONTINUED | OUTPATIENT
Start: 2019-08-09 | End: 2019-08-10

## 2019-08-09 RX ORDER — POLYETHYLENE GLYCOL 3350 17 G/17G
17 POWDER, FOR SOLUTION ORAL
Refills: 0 | Status: DISCONTINUED | OUTPATIENT
Start: 2019-08-09 | End: 2019-08-27

## 2019-08-09 RX ORDER — POTASSIUM CHLORIDE 20 MEQ
20 PACKET (EA) ORAL
Refills: 0 | Status: COMPLETED | OUTPATIENT
Start: 2019-08-09 | End: 2019-08-09

## 2019-08-09 RX ORDER — CEFEPIME 1 G/1
1000 INJECTION, POWDER, FOR SOLUTION INTRAMUSCULAR; INTRAVENOUS ONCE
Refills: 0 | Status: COMPLETED | OUTPATIENT
Start: 2019-08-09 | End: 2019-08-09

## 2019-08-09 RX ORDER — MAGNESIUM SULFATE 500 MG/ML
1 VIAL (ML) INJECTION ONCE
Refills: 0 | Status: COMPLETED | OUTPATIENT
Start: 2019-08-09 | End: 2019-08-09

## 2019-08-09 RX ORDER — INSULIN LISPRO 100/ML
7 VIAL (ML) SUBCUTANEOUS
Refills: 0 | Status: DISCONTINUED | OUTPATIENT
Start: 2019-08-09 | End: 2019-08-10

## 2019-08-09 RX ORDER — FUROSEMIDE 40 MG
20 TABLET ORAL ONCE
Refills: 0 | Status: COMPLETED | OUTPATIENT
Start: 2019-08-09 | End: 2019-08-09

## 2019-08-09 RX ADMIN — SODIUM CHLORIDE 100 MILLILITER(S): 9 INJECTION INTRAMUSCULAR; INTRAVENOUS; SUBCUTANEOUS at 18:09

## 2019-08-09 RX ADMIN — Medication 5 MILLILITER(S): at 05:25

## 2019-08-09 RX ADMIN — GABAPENTIN 600 MILLIGRAM(S): 400 CAPSULE ORAL at 18:09

## 2019-08-09 RX ADMIN — Medication 75 MICROGRAM(S): at 05:25

## 2019-08-09 RX ADMIN — Medication 5 MILLILITER(S): at 21:38

## 2019-08-09 RX ADMIN — ONDANSETRON 8 MILLIGRAM(S): 8 TABLET, FILM COATED ORAL at 14:41

## 2019-08-09 RX ADMIN — ONDANSETRON 8 MILLIGRAM(S): 8 TABLET, FILM COATED ORAL at 05:25

## 2019-08-09 RX ADMIN — GABAPENTIN 600 MILLIGRAM(S): 400 CAPSULE ORAL at 05:25

## 2019-08-09 RX ADMIN — Medication 4 UNIT(S): at 09:07

## 2019-08-09 RX ADMIN — Medication 50 MILLIEQUIVALENT(S): at 14:41

## 2019-08-09 RX ADMIN — Medication 5 MILLILITER(S): at 15:28

## 2019-08-09 RX ADMIN — Medication 3: at 12:38

## 2019-08-09 RX ADMIN — Medication 650 MILLIGRAM(S): at 21:45

## 2019-08-09 RX ADMIN — Medication 100 GRAM(S): at 09:45

## 2019-08-09 RX ADMIN — Medication 2: at 09:07

## 2019-08-09 RX ADMIN — Medication 300 MILLIGRAM(S): at 11:53

## 2019-08-09 RX ADMIN — SENNA PLUS 2 TABLET(S): 8.6 TABLET ORAL at 09:06

## 2019-08-09 RX ADMIN — POLYETHYLENE GLYCOL 3350 17 GRAM(S): 17 POWDER, FOR SOLUTION ORAL at 10:36

## 2019-08-09 RX ADMIN — ENOXAPARIN SODIUM 40 MILLIGRAM(S): 100 INJECTION SUBCUTANEOUS at 11:50

## 2019-08-09 RX ADMIN — Medication 3: at 18:08

## 2019-08-09 RX ADMIN — Medication 20 MILLIGRAM(S): at 09:45

## 2019-08-09 RX ADMIN — CHLORHEXIDINE GLUCONATE 1 APPLICATION(S): 213 SOLUTION TOPICAL at 05:26

## 2019-08-09 RX ADMIN — POSACONAZOLE 300 MILLIGRAM(S): 100 TABLET, DELAYED RELEASE ORAL at 11:50

## 2019-08-09 RX ADMIN — Medication 7 UNIT(S): at 18:08

## 2019-08-09 RX ADMIN — Medication 50 MILLIEQUIVALENT(S): at 11:51

## 2019-08-09 RX ADMIN — CEFEPIME 100 MILLIGRAM(S): 1 INJECTION, POWDER, FOR SOLUTION INTRAMUSCULAR; INTRAVENOUS at 21:38

## 2019-08-09 RX ADMIN — INSULIN GLARGINE 20 UNIT(S): 100 INJECTION, SOLUTION SUBCUTANEOUS at 21:39

## 2019-08-09 RX ADMIN — Medication 20.9 MILLIGRAM(S): at 15:28

## 2019-08-09 RX ADMIN — DAUNORUBICIN HYDROCHLORIDE 174 MILLIGRAM(S): 5 INJECTION INTRAVENOUS at 15:05

## 2019-08-09 RX ADMIN — ONDANSETRON 8 MILLIGRAM(S): 8 TABLET, FILM COATED ORAL at 21:43

## 2019-08-09 RX ADMIN — Medication 650 MILLIGRAM(S): at 21:02

## 2019-08-09 RX ADMIN — Medication 4 UNIT(S): at 12:38

## 2019-08-09 RX ADMIN — LOSARTAN POTASSIUM 100 MILLIGRAM(S): 100 TABLET, FILM COATED ORAL at 05:25

## 2019-08-09 NOTE — PROGRESS NOTE ADULT - ATTENDING COMMENTS
65 yo F with hx RA, here for follow up, had new anemia and peripheral immature cells (few blasts)  BM bx done 8/2/19 --c/w AML, FLT-3 neg, FISH for PML-KRYSTAL and BCR-ABL negative. Pt gave consent for Pfeizer trial 7+3+ Glasdegib/placebo.  Patient here for her induction chemotherapy on trial.   Today will be day 2.  Daunorubicin 60 mg/m2 day 1-3, cytarabine 100 mg/m2 day 1-7, glasdegib/placebo 100mg daily PO.   Supportive care, anti-emetics, IVFs, allopurinol for TLS ppx.   Dyspnea yesterday with evidence of pulmonary edema. S/p IV lasix with improvement.   Ambulatory today.   Monitor CBC, Transfuse PRN.   Lantus QHS, following fingersticks closely, endocrine consult appreciated. Very poorly controlled diabetes.   ECOG performance status 1. 63 yo F with hx RA, here for follow up, had new anemia and peripheral immature cells (few blasts)  BM bx done 8/2/19 --c/w AML, FLT-3 neg, FISH for PML-KRYSTAL and BCR-ABL negative. Pt gave consent for Pfeizer trial 7+3+ Glasdegib/placebo.  Patient here for her induction chemotherapy on trial.   Today will be day 3.  Daunorubicin 60 mg/m2 day 1-3, cytarabine 100 mg/m2 day 1-7, glasdegib/placebo 100mg daily PO.   Supportive care, anti-emetics, IVFs, allopurinol for TLS ppx.   Dyspnea on day 1 s/p lasix with improvement of symptoms, but still slightly dyspneic.   Interstitial Lung Disease seen on CXR, will obtain non-contrast CT Chest today.    Ambulatory today.   Monitor CBC, Transfuse PRN.   Lantus QHS, following fingersticks closely, endocrine consult appreciated. Very poorly controlled diabetes.   ECOG performance status 1.

## 2019-08-09 NOTE — ADVANCED PRACTICE NURSE CONSULT - ASSESSMENT
Pt A/Ox4, vital signs stable, afebrile. Pt denies pain/discomfort, N/V. Chemotherapy teaching reinforced to pt at bedside. Right TL IJ placed 8/6/19, Dsg intact and dry, no swelling and redness noted, easily flushed with positive blood return. Consent signed and filed. Lab results reviewed by Dr. Goldberg during rounds and said OK to treat pt. Chemotherapy verified by two RNs. At 1505, Daunorubincin 650 mg/m2=95 mg IVP given over 15 mins side armed with NS to  the lower blue port of Right TLJ with positive blood return during infusion, pt tolerated well. At 1528 , cytarabine 100 mg/d4=668 mg IV infusion through IJ over 24 hours by alaris pump. Pt left comfortable on bed. Primary RN aware. Pt A/Ox4, vital signs stable, afebrile. Pt denies pain/discomfort, N/V. Chemotherapy teaching reinforced to pt at bedside. Right TL IJ placed 8/6/19, Dsg intact and dry, no swelling and redness noted, easily flushed with positive blood return. Consent signed and filed. Lab results reviewed by Dr. Goldberg during rounds and said OK to treat pt. Chemotherapy verified by two RNs. At 1505, Daunorubincin 650 mg/m2=95 mg IVP given over 15 mins side armed with freee flowing NS to  the lower blue port of Right TLJ with positive blood return during infusion, pt tolerated well. At 1528 , cytarabine 100 mg/m4=589 mg IV infusion through IJ over 24 hours by alaris pump. Pt left comfortable on bed. Primary RN aware.

## 2019-08-09 NOTE — PROGRESS NOTE ADULT - PROBLEM SELECTOR PLAN 3
HgA1C 10.6  FS AC & HS with HISS  Continue Lantus 20mg QHS  Consistent carbohydrate diet  Endocrine following HgA1C 10.6  FS AC & HS with HISS  Continue Lantus and Humalog per endocrine   Consistent carbohydrate diet

## 2019-08-09 NOTE — PROGRESS NOTE ADULT - SUBJECTIVE AND OBJECTIVE BOX
Diagnosis: AML FLT 3 (-)    Protocol/Chemo Regimen: Enrolled on [employer] BRIGHT trial receiving Induction chemotherapy with Dauno/Cytarabine and daily Glastegib Vs Placebo    Day: 3    Pt endorsed: fatigue, chills overnight, mild SOB    Review of Systems: Patient denies headache, dizziness, visual changes, chest pain, palpitations, abdominal pain, nausea, vomiting, diarrhea or dysuria.    Pain scale: denies    Diet: Regular    Allergies: No Known Allergies          ANTIMICROBIALS  levoFLOXacin  Tablet 500 milliGRAM(s) Oral every 24 hours  posaconazole DR Tablet 300 milliGRAM(s) Oral daily      HEME/ONC MEDICATIONS  cytarabine IVPB (eMAR) 158 milliGRAM(s) IV Intermittent daily  DAUNOrubicin Injectable (eMAR) 95 milliGRAM(s) IV Push Chemo every 24 hours  enoxaparin Injectable 40 milliGRAM(s) SubCutaneous daily      STANDING MEDICATIONS  allopurinol 300 milliGRAM(s) Oral daily  Biotene Dry Mouth Oral Rinse 5 milliLiter(s) Swish and Spit three times a day  chlorhexidine 2% Cloths 1 Application(s) Topical <User Schedule>  dextrose 5%. 1000 milliLiter(s) IV Continuous <Continuous>  dextrose 50% Injectable 12.5 Gram(s) IV Push once  dextrose 50% Injectable 25 Gram(s) IV Push once  dextrose 50% Injectable 25 Gram(s) IV Push once  docusate sodium 100 milliGRAM(s) Oral three times a day  gabapentin 600 milliGRAM(s) Oral two times a day  insulin glargine Injectable (LANTUS) 12 Unit(s) SubCutaneous at bedtime  insulin lispro (HumaLOG) corrective regimen sliding scale   SubCutaneous three times a day before meals  insulin lispro (HumaLOG) corrective regimen sliding scale   SubCutaneous at bedtime  insulin lispro Injectable (HumaLOG) 4 Unit(s) SubCutaneous three times a day before meals  investigational chemo - general 1 Tablet(s) Oral every 24 hours  levothyroxine 75 MICROGram(s) Oral daily  losartan 100 milliGRAM(s) Oral daily  ondansetron Injectable 8 milliGRAM(s) IV Push every 8 hours  senna 2 Tablet(s) Oral once  sodium chloride 0.9%. 1000 milliLiter(s) IV Continuous <Continuous>      PRN MEDICATIONS  acetaminophen   Tablet .. 650 milliGRAM(s) Oral every 6 hours PRN  dextrose 40% Gel 15 Gram(s) Oral once PRN  glucagon  Injectable 1 milliGRAM(s) IntraMuscular once PRN  melatonin 3 milliGRAM(s) Oral once PRN  metoclopramide Injectable 10 milliGRAM(s) IV Push every 6 hours PRN        Vital Signs Last 24 Hrs  T(C): 37 (09 Aug 2019 05:20), Max: 37.7 (08 Aug 2019 22:00)  T(F): 98.6 (09 Aug 2019 05:20), Max: 99.8 (08 Aug 2019 22:00)  HR: 92 (09 Aug 2019 05:20) (86 - 99)  BP: 131/71 (09 Aug 2019 05:20) (113/60 - 135/79)  BP(mean): --  RR: 18 (09 Aug 2019 05:20) (18 - 18)  SpO2: 94% (09 Aug 2019 05:20) (94% - 99%)      PHYSICAL EXAM  General: NAD  HEENT: PERRLA, EOMI, clear oropharynx  Neck: supple  CV: (+) S1/S2 RRR  Lungs: clear to auscultation, no wheezes or rales  Abdomen: soft, non-tender, non-distended (+) BS  Ext: no edema  Skin: no rashes   Neuro: alert and oriented X 3, no focal deficits  Central Line: C/D/I        LABS:                        7.6    1.6   )-----------( 119      ( 09 Aug 2019 07:03 )             x            Mean Cell Volume : 85.9 fl  Mean Cell Hemoglobin : 31.2 pg  Mean Cell Hemoglobin Concentration : 36.3 gm/dL  Auto Neutrophil # : x  Auto Lymphocyte # : x  Auto Monocyte # : x  Auto Eosinophil # : x  Auto Basophil # : x  Auto Neutrophil % : x  Auto Lymphocyte % : x  Auto Monocyte % : x  Auto Eosinophil % : x  Auto Basophil % : x      08-08    140  |  104  |  12  ----------------------------<  167<H>  3.8   |  23  |  0.94    Ca    8.3<L>      08 Aug 2019 07:11  Phos  3.5     08-08  Mg     1.7     08-08    TPro  6.9  /  Alb  3.2<L>  /  TBili  0.3  /  DBili  x   /  AST  21  /  ALT  15  /  AlkPhos  95  08-08          PT/INR - ( 08 Aug 2019 07:26 )   PT: 14.0 sec;   INR: 1.21 ratio         PTT - ( 08 Aug 2019 07:26 )  PTT:35.7 sec        RECENT CULTURES:      RADIOLOGY & ADDITIONAL STUDIES: Diagnosis: AML FLT 3 (-)    Protocol/Chemo Regimen: Enrolled on [employer] BRIGHT trial receiving Induction chemotherapy with Dauno/Cytarabine and daily Glastegib Vs Placebo    Day: 3    Pt endorsed: little RENTERIA, talking, walking; BM biopsy site pain yesterday, decreased with Tylenol and better today    Review of Systems: Patient denies headache, dizziness, visual changes, chest pain, palpitations, abdominal pain, nausea, vomiting, diarrhea or dysuria.    Pain scale: 9/10 BM bx site, better today     Diet: Regular, consistent carbo     Allergies: No Known Allergies      ANTIMICROBIALS  levoFLOXacin  Tablet 500 milliGRAM(s) Oral every 24 hours  posaconazole DR Tablet 300 milliGRAM(s) Oral daily      HEME/ONC MEDICATIONS  cytarabine IVPB (eMAR) 158 milliGRAM(s) IV Intermittent daily  DAUNOrubicin Injectable (eMAR) 95 milliGRAM(s) IV Push Chemo every 24 hours  enoxaparin Injectable 40 milliGRAM(s) SubCutaneous daily      STANDING MEDICATIONS  allopurinol 300 milliGRAM(s) Oral daily  Biotene Dry Mouth Oral Rinse 5 milliLiter(s) Swish and Spit three times a day  chlorhexidine 2% Cloths 1 Application(s) Topical <User Schedule>  dextrose 5%. 1000 milliLiter(s) IV Continuous <Continuous>  dextrose 50% Injectable 12.5 Gram(s) IV Push once  dextrose 50% Injectable 25 Gram(s) IV Push once  dextrose 50% Injectable 25 Gram(s) IV Push once  docusate sodium 100 milliGRAM(s) Oral three times a day  gabapentin 600 milliGRAM(s) Oral two times a day  insulin glargine Injectable (LANTUS) 12 Unit(s) SubCutaneous at bedtime  insulin lispro (HumaLOG) corrective regimen sliding scale   SubCutaneous three times a day before meals  insulin lispro (HumaLOG) corrective regimen sliding scale   SubCutaneous at bedtime  insulin lispro Injectable (HumaLOG) 4 Unit(s) SubCutaneous three times a day before meals  investigational chemo - general 1 Tablet(s) Oral every 24 hours  levothyroxine 75 MICROGram(s) Oral daily  losartan 100 milliGRAM(s) Oral daily  ondansetron Injectable 8 milliGRAM(s) IV Push every 8 hours  senna 2 Tablet(s) Oral once  sodium chloride 0.9%. 1000 milliLiter(s) IV Continuous <Continuous>      PRN MEDICATIONS  acetaminophen   Tablet .. 650 milliGRAM(s) Oral every 6 hours PRN  dextrose 40% Gel 15 Gram(s) Oral once PRN  glucagon  Injectable 1 milliGRAM(s) IntraMuscular once PRN  melatonin 3 milliGRAM(s) Oral once PRN  metoclopramide Injectable 10 milliGRAM(s) IV Push every 6 hours PRN      Vital Signs Last 24 Hrs  T(C): 37 (09 Aug 2019 05:20), Max: 37.7 (08 Aug 2019 22:00)  T(F): 98.6 (09 Aug 2019 05:20), Max: 99.8 (08 Aug 2019 22:00)  HR: 92 (09 Aug 2019 05:20) (86 - 99)  BP: 131/71 (09 Aug 2019 05:20) (113/60 - 135/79)  BP(mean): --  RR: 18 (09 Aug 2019 05:20) (18 - 18)  SpO2: 94% (09 Aug 2019 05:20) (94% - 99%)      PHYSICAL EXAM  General: NAD  HEENT: PERRLA, EOMI, clear oropharynx  Neck: supple  CV: (+) S1/S2 RRR  Lungs: crackles on bases R>L  Abdomen: soft, non-tender, non-distended (+) BS  Ext: no edema  Skin: no rashes   Neuro: alert and oriented X 3, no focal deficits  Central Line: C/D/I      LABS:                        7.6    1.6   )-----------( 119      ( 09 Aug 2019 07:03 )             21.0         Mean Cell Volume : 85.9 fl  Mean Cell Hemoglobin : 31.2 pg  Mean Cell Hemoglobin Concentration : 36.3 gm/dL  Auto Neutrophil # : 0.8 K/uL  Auto Lymphocyte # : 0.6 K/uL  Auto Monocyte # : 0.1 K/uL  Auto Eosinophil # : 0.0 K/uL  Auto Basophil # : 0.0 K/uL  Auto Neutrophil % : 50.7 %  Auto Lymphocyte % : 39.7 %  Auto Monocyte % : 7.5 %  Auto Eosinophil % : 1.6 %  Auto Basophil % : 0.4 %      08-09    139  |  104  |  16  ----------------------------<  264<H>  3.7   |  20<L>  |  0.85    Ca    8.5      09 Aug 2019 07:03  Phos  3.8     08-09  Mg     1.6     08-09    TPro  6.5  /  Alb  3.0<L>  /  TBili  0.3  /  DBili  x   /  AST  22  /  ALT  21  /  AlkPhos  92  08-09    PT/INR - ( 08 Aug 2019 07:26 )   PT: 14.0 sec;   INR: 1.21 ratio         PTT - ( 08 Aug 2019 07:26 )  PTT:35.7 sec      Uric Acid 3.5    , 274, 269, 184      RADIOLOGY & ADDITIONAL STUDIES:    < from: Xray Chest 1 View- PORTABLE-Urgent (08.07.19 @ 10:44) >  Interstitial lung disease. Hazy opacities are slightly increased. Diagnosis: AML FLT 3 (-)    Protocol/Chemo Regimen: Enrolled on [employer] BRIGHT trial receiving Induction chemotherapy with Dauno/Cytarabine and daily Glastegib Vs Placebo    Day: 3    Pt endorsed: little RENTERIA, talking, walking; BM biopsy site pain yesterday, decreased with Tylenol and better today    Review of Systems: Patient denies headache, dizziness, visual changes, chest pain, palpitations, abdominal pain, nausea, vomiting, diarrhea or dysuria.    Pain scale: 9/10 BM bx site, better today     Diet: Regular, consistent carbo     Allergies: No Known Allergies      ANTIMICROBIALS  levoFLOXacin  Tablet 500 milliGRAM(s) Oral every 24 hours  posaconazole DR Tablet 300 milliGRAM(s) Oral daily      HEME/ONC MEDICATIONS  cytarabine IVPB (eMAR) 158 milliGRAM(s) IV Intermittent daily  DAUNOrubicin Injectable (eMAR) 95 milliGRAM(s) IV Push Chemo every 24 hours  enoxaparin Injectable 40 milliGRAM(s) SubCutaneous daily      STANDING MEDICATIONS  allopurinol 300 milliGRAM(s) Oral daily  Biotene Dry Mouth Oral Rinse 5 milliLiter(s) Swish and Spit three times a day  chlorhexidine 2% Cloths 1 Application(s) Topical <User Schedule>  dextrose 5%. 1000 milliLiter(s) IV Continuous <Continuous>  dextrose 50% Injectable 12.5 Gram(s) IV Push once  dextrose 50% Injectable 25 Gram(s) IV Push once  dextrose 50% Injectable 25 Gram(s) IV Push once  docusate sodium 100 milliGRAM(s) Oral three times a day  gabapentin 600 milliGRAM(s) Oral two times a day  insulin glargine Injectable (LANTUS) 12 Unit(s) SubCutaneous at bedtime  insulin lispro (HumaLOG) corrective regimen sliding scale   SubCutaneous three times a day before meals  insulin lispro (HumaLOG) corrective regimen sliding scale   SubCutaneous at bedtime  insulin lispro Injectable (HumaLOG) 4 Unit(s) SubCutaneous three times a day before meals  investigational chemo - general 1 Tablet(s) Oral every 24 hours  levothyroxine 75 MICROGram(s) Oral daily  losartan 100 milliGRAM(s) Oral daily  ondansetron Injectable 8 milliGRAM(s) IV Push every 8 hours  senna 2 Tablet(s) Oral once  sodium chloride 0.9%. 1000 milliLiter(s) IV Continuous <Continuous>      PRN MEDICATIONS  acetaminophen   Tablet .. 650 milliGRAM(s) Oral every 6 hours PRN  dextrose 40% Gel 15 Gram(s) Oral once PRN  glucagon  Injectable 1 milliGRAM(s) IntraMuscular once PRN  melatonin 3 milliGRAM(s) Oral once PRN  metoclopramide Injectable 10 milliGRAM(s) IV Push every 6 hours PRN      Vital Signs Last 24 Hrs  T(C): 37 (09 Aug 2019 05:20), Max: 37.7 (08 Aug 2019 22:00)  T(F): 98.6 (09 Aug 2019 05:20), Max: 99.8 (08 Aug 2019 22:00)  HR: 92 (09 Aug 2019 05:20) (86 - 99)  BP: 131/71 (09 Aug 2019 05:20) (113/60 - 135/79)  BP(mean): --  RR: 18 (09 Aug 2019 05:20) (18 - 18)  SpO2: 94% (09 Aug 2019 05:20) (94% - 99%)      PHYSICAL EXAM  General: NAD  HEENT: PERRLA, EOMI, clear oropharynx  Neck: supple  CV: (+) S1/S2 RRR  Lungs: crackles on bases R>L  Abdomen: soft, non-tender, non-distended (+) BS  Ext: no edema  Skin: no rashes   Neuro: alert and oriented X 3, no focal deficits  Central Line: C/D/I      LABS:                        7.6    1.6   )-----------( 119      ( 09 Aug 2019 07:03 )             21.0         Mean Cell Volume : 85.9 fl  Mean Cell Hemoglobin : 31.2 pg  Mean Cell Hemoglobin Concentration : 36.3 gm/dL  Auto Neutrophil # : 0.8 K/uL  Auto Lymphocyte # : 0.6 K/uL  Auto Monocyte # : 0.1 K/uL  Auto Eosinophil # : 0.0 K/uL  Auto Basophil # : 0.0 K/uL  Auto Neutrophil % : 50.7 %  Auto Lymphocyte % : 39.7 %  Auto Monocyte % : 7.5 %  Auto Eosinophil % : 1.6 %  Auto Basophil % : 0.4 %      08-09    139  |  104  |  16  ----------------------------<  264<H>  3.7   |  20<L>  |  0.85    Ca    8.5      09 Aug 2019 07:03  Phos  3.8     08-09  Mg     1.6     08-09    TPro  6.5  /  Alb  3.0<L>  /  TBili  0.3  /  DBili  x   /  AST  22  /  ALT  21  /  AlkPhos  92  08-09    PT/INR - ( 08 Aug 2019 07:26 )   PT: 14.0 sec;   INR: 1.21 ratio         PTT - ( 08 Aug 2019 07:26 )  PTT:35.7 sec      Uric Acid 3.5    , 274, 269, 184    < from: 12 Lead ECG (08.08.19 @ 10:47) >  QTC Calculation(Bezet) 452 ms  Diagnosis Line NORMAL SINUS RHYTHM  NONSPECIFIC T WAVE ABNORMALITY  ABNORMAL ECG      RADIOLOGY & ADDITIONAL STUDIES:    < from: Xray Chest 1 View- PORTABLE-Urgent (08.07.19 @ 10:44) >  Interstitial lung disease. Hazy opacities are slightly increased.

## 2019-08-09 NOTE — PROGRESS NOTE ADULT - PROBLEM SELECTOR PLAN 1
Enrolled on [employer] BRIGHT trial receiving Induction chemotherapy with Dauno/Cytarabine and daily Glastegib Vs Placebo  8/20 Day 14 BM bx due  Monitor CBC/Lytes and transfuse/replete PRN  Strict Is and Os/ Daily weights/ Mouth Care  Lasix 20mg IV x 1  Allopurinol 300mg PO daily  IVF  Antiemetics  ECOG 1 Enrolled on [employer] BRIGHT trial receiving Induction chemotherapy with Dauno/Cytarabine and daily Glastegib Vs Placebo  8/20 Day 14 BM bx due  Monitor CBC/Lytes and transfuse/replete PRN  Strict Is and Os/ Daily weights/ Mouth Care  Weight gain, CXR with  increased ILD, RENTERIA, crackles on exam  Lasix 20mg IV x 1 on 8/8, 8/9  Allopurinol 300mg PO daily  IVF  Antiemetics  ECOG 1 Enrolled on [employer] BRIGHT trial receiving Induction chemotherapy with Dauno/Cytarabine and daily Glastegib Vs Placebo  8/20 Day 14 BM bx due  Monitor CBC/Lytes and transfuse/replete PRN  Strict Is and Os/ Daily weights/ Mouth Care  Weight gain, CXR with  increased ILD, RENTERIA, crackles on exam  Lasix 20mg IV x 1 on 8/8, 8/9  CT chest wo contrast to assess ILD and possible pulmonary edema   Allopurinol 300mg PO daily  IVF  Antiemetics  ECOG 1

## 2019-08-09 NOTE — ADVANCED PRACTICE NURSE CONSULT - ASSESSMENT
Induction Cycle 1  Day 3    Patient states that she is feeling well today - has no complaints. Patient alert and oriented x3 able to ambulate without assistance. . Patient denies SOB, Chest pains or palpitations- patient states that she has not experience any nausea, vomiting, diarrhea or abdominal pain-appetite is good able to eat without issue. Denies any abnormal bruising or bleeding. Patient reminded of the importance of remaining active as possible- Physical Therapy has been ordered for the patient to assist with compliance.    Patient has been able to swallow the Glasdegib/Placebo without issue or incident - no adverse reaction reported.

## 2019-08-09 NOTE — PROGRESS NOTE ADULT - ASSESSMENT
This is a 65 yo F with PMHx of T2DM, RA, Depression and now newly diagnosed AML enrolled on [employer] BRIGHT trial admitted for Induction chemotherapy with Dauno/Cytarabine and daily Glastegib Vs Placebo. This is a 65 yo F with PMHx of T2DM, RA, Depression and now newly diagnosed AML enrolled on [employer] BRIGHT trial admitted for Induction chemotherapy with Dauno/Cytarabine and daily Glastegib Vs Placebo. Hospital course complicated by increased ILD on CXR on 8/8, RENTERIA, and weight gain, Lasix given. Pt has pancytopenia from chemo and or disease.

## 2019-08-09 NOTE — PROGRESS NOTE ADULT - PROBLEM SELECTOR PLAN 2
The patient is not neutropenic, afebrile  If febrile Pan Cx and CXR  Continue Levaquin and Posaconazole (Monitor QTc BIW M/Th with Posaconazole and Study drug) The patient is not neutropenic, afebrile  If febrile, pancx   Continue Levaquin and Posaconazole (Monitor QTc BIW M/Th with Posaconazole and Study drug)

## 2019-08-10 LAB
ALBUMIN SERPL ELPH-MCNC: 3.1 G/DL — LOW (ref 3.3–5)
ALP SERPL-CCNC: 108 U/L — SIGNIFICANT CHANGE UP (ref 40–120)
ALT FLD-CCNC: 27 U/L — SIGNIFICANT CHANGE UP (ref 10–45)
ANION GAP SERPL CALC-SCNC: 11 MMOL/L — SIGNIFICANT CHANGE UP (ref 5–17)
APPEARANCE UR: CLEAR — SIGNIFICANT CHANGE UP
AST SERPL-CCNC: 29 U/L — SIGNIFICANT CHANGE UP (ref 10–40)
BASOPHILS # BLD AUTO: 0 K/UL — SIGNIFICANT CHANGE UP (ref 0–0.2)
BASOPHILS NFR BLD AUTO: 0 % — SIGNIFICANT CHANGE UP (ref 0–2)
BILIRUB SERPL-MCNC: 0.3 MG/DL — SIGNIFICANT CHANGE UP (ref 0.2–1.2)
BILIRUB UR-MCNC: NEGATIVE — SIGNIFICANT CHANGE UP
BUN SERPL-MCNC: 12 MG/DL — SIGNIFICANT CHANGE UP (ref 7–23)
CALCIUM SERPL-MCNC: 8.2 MG/DL — LOW (ref 8.4–10.5)
CHLORIDE SERPL-SCNC: 103 MMOL/L — SIGNIFICANT CHANGE UP (ref 96–108)
CO2 SERPL-SCNC: 22 MMOL/L — SIGNIFICANT CHANGE UP (ref 22–31)
COLOR SPEC: ABNORMAL
CREAT SERPL-MCNC: 0.74 MG/DL — SIGNIFICANT CHANGE UP (ref 0.5–1.3)
DIFF PNL FLD: NEGATIVE — SIGNIFICANT CHANGE UP
EOSINOPHIL # BLD AUTO: 0 K/UL — SIGNIFICANT CHANGE UP (ref 0–0.5)
EOSINOPHIL NFR BLD AUTO: 2.4 % — SIGNIFICANT CHANGE UP (ref 0–6)
GLUCOSE BLDC GLUCOMTR-MCNC: 174 MG/DL — HIGH (ref 70–99)
GLUCOSE BLDC GLUCOMTR-MCNC: 178 MG/DL — HIGH (ref 70–99)
GLUCOSE BLDC GLUCOMTR-MCNC: 224 MG/DL — HIGH (ref 70–99)
GLUCOSE BLDC GLUCOMTR-MCNC: 252 MG/DL — HIGH (ref 70–99)
GLUCOSE SERPL-MCNC: 235 MG/DL — HIGH (ref 70–99)
GLUCOSE UR QL: ABNORMAL
HCT VFR BLD CALC: 21.2 % — LOW (ref 34.5–45)
HGB BLD-MCNC: 7.3 G/DL — LOW (ref 11.5–15.5)
KETONES UR-MCNC: NEGATIVE — SIGNIFICANT CHANGE UP
LDH SERPL L TO P-CCNC: 242 U/L — SIGNIFICANT CHANGE UP (ref 50–242)
LEUKOCYTE ESTERASE UR-ACNC: NEGATIVE — SIGNIFICANT CHANGE UP
LYMPHOCYTES # BLD AUTO: 0.4 K/UL — LOW (ref 1–3.3)
LYMPHOCYTES # BLD AUTO: 32.3 % — SIGNIFICANT CHANGE UP (ref 13–44)
MAGNESIUM SERPL-MCNC: 1.6 MG/DL — SIGNIFICANT CHANGE UP (ref 1.6–2.6)
MCHC RBC-ENTMCNC: 29.5 PG — SIGNIFICANT CHANGE UP (ref 27–34)
MCHC RBC-ENTMCNC: 34.2 GM/DL — SIGNIFICANT CHANGE UP (ref 32–36)
MCV RBC AUTO: 86.1 FL — SIGNIFICANT CHANGE UP (ref 80–100)
MONOCYTES # BLD AUTO: 0.2 K/UL — SIGNIFICANT CHANGE UP (ref 0–0.9)
MONOCYTES NFR BLD AUTO: 11.7 % — SIGNIFICANT CHANGE UP (ref 2–14)
NEUTROPHILS # BLD AUTO: 0.7 K/UL — LOW (ref 1.8–7.4)
NEUTROPHILS NFR BLD AUTO: 53.6 % — SIGNIFICANT CHANGE UP (ref 43–77)
NITRITE UR-MCNC: NEGATIVE — SIGNIFICANT CHANGE UP
PH UR: 6 — SIGNIFICANT CHANGE UP (ref 5–8)
PHOSPHATE SERPL-MCNC: 3.4 MG/DL — SIGNIFICANT CHANGE UP (ref 2.5–4.5)
PLAT MORPH BLD: NORMAL — SIGNIFICANT CHANGE UP
PLATELET # BLD AUTO: 102 K/UL — LOW (ref 150–400)
POTASSIUM SERPL-MCNC: 3.7 MMOL/L — SIGNIFICANT CHANGE UP (ref 3.5–5.3)
POTASSIUM SERPL-SCNC: 3.7 MMOL/L — SIGNIFICANT CHANGE UP (ref 3.5–5.3)
PROT SERPL-MCNC: 6.4 G/DL — SIGNIFICANT CHANGE UP (ref 6–8.3)
PROT UR-MCNC: SIGNIFICANT CHANGE UP
RBC # BLD: 2.47 M/UL — LOW (ref 3.8–5.2)
RBC # FLD: 15.3 % — HIGH (ref 10.3–14.5)
RBC BLD AUTO: SIGNIFICANT CHANGE UP
SODIUM SERPL-SCNC: 136 MMOL/L — SIGNIFICANT CHANGE UP (ref 135–145)
SP GR SPEC: 1.01 — SIGNIFICANT CHANGE UP (ref 1.01–1.02)
URATE SERPL-MCNC: 3 MG/DL — SIGNIFICANT CHANGE UP (ref 2.5–7)
UROBILINOGEN FLD QL: SIGNIFICANT CHANGE UP
WBC # BLD: 1.3 K/UL — LOW (ref 3.8–10.5)
WBC # FLD AUTO: 1.3 K/UL — LOW (ref 3.8–10.5)

## 2019-08-10 PROCEDURE — 99232 SBSQ HOSP IP/OBS MODERATE 35: CPT

## 2019-08-10 RX ORDER — CEFEPIME 1 G/1
1000 INJECTION, POWDER, FOR SOLUTION INTRAMUSCULAR; INTRAVENOUS ONCE
Refills: 0 | Status: COMPLETED | OUTPATIENT
Start: 2019-08-10 | End: 2019-08-10

## 2019-08-10 RX ORDER — INSULIN LISPRO 100/ML
8 VIAL (ML) SUBCUTANEOUS
Refills: 0 | Status: DISCONTINUED | OUTPATIENT
Start: 2019-08-10 | End: 2019-08-12

## 2019-08-10 RX ORDER — FUROSEMIDE 40 MG
40 TABLET ORAL ONCE
Refills: 0 | Status: COMPLETED | OUTPATIENT
Start: 2019-08-10 | End: 2019-08-10

## 2019-08-10 RX ORDER — INSULIN GLARGINE 100 [IU]/ML
24 INJECTION, SOLUTION SUBCUTANEOUS AT BEDTIME
Refills: 0 | Status: DISCONTINUED | OUTPATIENT
Start: 2019-08-10 | End: 2019-08-15

## 2019-08-10 RX ORDER — CEFEPIME 1 G/1
2000 INJECTION, POWDER, FOR SOLUTION INTRAMUSCULAR; INTRAVENOUS EVERY 8 HOURS
Refills: 0 | Status: DISCONTINUED | OUTPATIENT
Start: 2019-08-10 | End: 2019-08-17

## 2019-08-10 RX ORDER — INSULIN LISPRO 100/ML
8 VIAL (ML) SUBCUTANEOUS ONCE
Refills: 0 | Status: COMPLETED | OUTPATIENT
Start: 2019-08-10 | End: 2019-08-10

## 2019-08-10 RX ORDER — POTASSIUM CHLORIDE 20 MEQ
20 PACKET (EA) ORAL ONCE
Refills: 0 | Status: COMPLETED | OUTPATIENT
Start: 2019-08-10 | End: 2019-08-10

## 2019-08-10 RX ADMIN — Medication 8 UNIT(S): at 10:17

## 2019-08-10 RX ADMIN — ONDANSETRON 8 MILLIGRAM(S): 8 TABLET, FILM COATED ORAL at 21:35

## 2019-08-10 RX ADMIN — Medication 40 MILLIGRAM(S): at 13:21

## 2019-08-10 RX ADMIN — LOSARTAN POTASSIUM 100 MILLIGRAM(S): 100 TABLET, FILM COATED ORAL at 05:31

## 2019-08-10 RX ADMIN — Medication 8 UNIT(S): at 13:21

## 2019-08-10 RX ADMIN — GABAPENTIN 600 MILLIGRAM(S): 400 CAPSULE ORAL at 17:19

## 2019-08-10 RX ADMIN — Medication 650 MILLIGRAM(S): at 21:38

## 2019-08-10 RX ADMIN — Medication 2: at 10:12

## 2019-08-10 RX ADMIN — Medication 5 MILLILITER(S): at 05:28

## 2019-08-10 RX ADMIN — Medication 20.9 MILLIGRAM(S): at 14:59

## 2019-08-10 RX ADMIN — Medication 650 MILLIGRAM(S): at 13:28

## 2019-08-10 RX ADMIN — CEFEPIME 100 MILLIGRAM(S): 1 INJECTION, POWDER, FOR SOLUTION INTRAMUSCULAR; INTRAVENOUS at 21:35

## 2019-08-10 RX ADMIN — Medication 5 MILLILITER(S): at 21:35

## 2019-08-10 RX ADMIN — Medication 300 MILLIGRAM(S): at 11:20

## 2019-08-10 RX ADMIN — CEFEPIME 100 MILLIGRAM(S): 1 INJECTION, POWDER, FOR SOLUTION INTRAMUSCULAR; INTRAVENOUS at 05:28

## 2019-08-10 RX ADMIN — ENOXAPARIN SODIUM 40 MILLIGRAM(S): 100 INJECTION SUBCUTANEOUS at 11:20

## 2019-08-10 RX ADMIN — Medication 650 MILLIGRAM(S): at 22:18

## 2019-08-10 RX ADMIN — POSACONAZOLE 300 MILLIGRAM(S): 100 TABLET, DELAYED RELEASE ORAL at 11:20

## 2019-08-10 RX ADMIN — CHLORHEXIDINE GLUCONATE 1 APPLICATION(S): 213 SOLUTION TOPICAL at 07:36

## 2019-08-10 RX ADMIN — Medication 2: at 21:58

## 2019-08-10 RX ADMIN — Medication 75 MICROGRAM(S): at 05:31

## 2019-08-10 RX ADMIN — Medication 5 MILLILITER(S): at 13:23

## 2019-08-10 RX ADMIN — SODIUM CHLORIDE 100 MILLILITER(S): 9 INJECTION INTRAMUSCULAR; INTRAVENOUS; SUBCUTANEOUS at 05:32

## 2019-08-10 RX ADMIN — GABAPENTIN 600 MILLIGRAM(S): 400 CAPSULE ORAL at 05:31

## 2019-08-10 RX ADMIN — ONDANSETRON 8 MILLIGRAM(S): 8 TABLET, FILM COATED ORAL at 05:31

## 2019-08-10 RX ADMIN — Medication 8 UNIT(S): at 18:33

## 2019-08-10 RX ADMIN — Medication 50 MILLIEQUIVALENT(S): at 19:47

## 2019-08-10 RX ADMIN — CEFEPIME 100 MILLIGRAM(S): 1 INJECTION, POWDER, FOR SOLUTION INTRAMUSCULAR; INTRAVENOUS at 14:15

## 2019-08-10 RX ADMIN — CEFEPIME 100 MILLIGRAM(S): 1 INJECTION, POWDER, FOR SOLUTION INTRAMUSCULAR; INTRAVENOUS at 15:39

## 2019-08-10 RX ADMIN — ONDANSETRON 8 MILLIGRAM(S): 8 TABLET, FILM COATED ORAL at 13:24

## 2019-08-10 RX ADMIN — Medication 1: at 18:34

## 2019-08-10 RX ADMIN — INSULIN GLARGINE 24 UNIT(S): 100 INJECTION, SOLUTION SUBCUTANEOUS at 21:57

## 2019-08-10 RX ADMIN — Medication 1: at 13:22

## 2019-08-10 NOTE — PROGRESS NOTE ADULT - SUBJECTIVE AND OBJECTIVE BOX
Diagnosis: AML FLT 3 (-)    Protocol/Chemo Regimen: Enrolled on [employer] BRIGHT trial receiving Induction chemotherapy with Dauno/Cytarabine and daily Glastegib Vs Placebo    Day: 4    Pt endorsed: little RENTERIA, talking, walking; BM biopsy site pain yesterday, decreased with Tylenol and better today    Review of Systems: Patient denies headache, dizziness, visual changes, chest pain, palpitations, abdominal pain, nausea, vomiting, diarrhea or dysuria.    Pain scale: 9/10 BM bx site, better today     Diet: Regular, consistent carbo     Allergies: No Known Allergies            ANTIMICROBIALS  cefepime   IVPB      cefepime   IVPB 1000 milliGRAM(s) IV Intermittent every 8 hours  levoFLOXacin  Tablet 500 milliGRAM(s) Oral every 24 hours  posaconazole DR Tablet 300 milliGRAM(s) Oral daily      HEME/ONC MEDICATIONS  cytarabine IVPB (eMAR) 158 milliGRAM(s) IV Intermittent daily  enoxaparin Injectable 40 milliGRAM(s) SubCutaneous daily      STANDING MEDICATIONS  allopurinol 300 milliGRAM(s) Oral daily  Biotene Dry Mouth Oral Rinse 5 milliLiter(s) Swish and Spit three times a day  chlorhexidine 2% Cloths 1 Application(s) Topical <User Schedule>  dextrose 5%. 1000 milliLiter(s) IV Continuous <Continuous>  dextrose 50% Injectable 12.5 Gram(s) IV Push once  dextrose 50% Injectable 25 Gram(s) IV Push once  dextrose 50% Injectable 25 Gram(s) IV Push once  docusate sodium 100 milliGRAM(s) Oral three times a day  gabapentin 600 milliGRAM(s) Oral two times a day  insulin glargine Injectable (LANTUS) 20 Unit(s) SubCutaneous at bedtime  insulin lispro (HumaLOG) corrective regimen sliding scale   SubCutaneous three times a day before meals  insulin lispro (HumaLOG) corrective regimen sliding scale   SubCutaneous at bedtime  insulin lispro Injectable (HumaLOG) 7 Unit(s) SubCutaneous three times a day before meals  investigational chemo - general 1 Tablet(s) Oral every 24 hours  levothyroxine 75 MICROGram(s) Oral daily  losartan 100 milliGRAM(s) Oral daily  ondansetron Injectable 8 milliGRAM(s) IV Push every 8 hours  sodium chloride 0.9%. 1000 milliLiter(s) IV Continuous <Continuous>      PRN MEDICATIONS  acetaminophen   Tablet .. 650 milliGRAM(s) Oral every 6 hours PRN  dextrose 40% Gel 15 Gram(s) Oral once PRN  glucagon  Injectable 1 milliGRAM(s) IntraMuscular once PRN  melatonin 3 milliGRAM(s) Oral once PRN  metoclopramide Injectable 10 milliGRAM(s) IV Push every 6 hours PRN  polyethylene glycol 3350 17 Gram(s) Oral two times a day PRN    PHYSICAL EXAM  General: NAD  HEENT: PERRLA, EOMI, clear oropharynx  Neck: supple  CV: (+) S1/S2 RRR  Lungs: crackles on bases R>L  Abdomen: soft, non-tender, non-distended (+) BS  Ext: no edema  Skin: no rashes   Neuro: alert and oriented X 3, no focal deficits  Central Line: C/D/I      Vital Signs Last 24 Hrs  T(C): 36.7 (10 Aug 2019 05:00), Max: 38.2 (09 Aug 2019 22:23)  T(F): 98 (10 Aug 2019 05:00), Max: 100.7 (09 Aug 2019 22:23)  HR: 101 (10 Aug 2019 05:00) (93 - 114)  BP: 121/79 (10 Aug 2019 05:00) (116/66 - 144/66)  BP(mean): --  RR: 18 (10 Aug 2019 05:00) (18 - 20)  SpO2: 94% (10 Aug 2019 05:00) (94% - 99%)          LABS:                        7.3    1.3   )-----------( 102      ( 10 Aug 2019 07:15 )             21.2         Mean Cell Volume : 86.1 fl  Mean Cell Hemoglobin : 29.5 pg  Mean Cell Hemoglobin Concentration : 34.2 gm/dL  Auto Neutrophil # : 0.7 K/uL  Auto Lymphocyte # : 0.4 K/uL  Auto Monocyte # : 0.2 K/uL  Auto Eosinophil # : 0.0 K/uL  Auto Basophil # : 0.0 K/uL  Auto Neutrophil % : 53.6 %  Auto Lymphocyte % : 32.3 %  Auto Monocyte % : 11.7 %  Auto Eosinophil % : 2.4 %  Auto Basophil % : 0.0 %      08-10    136  |  103  |  12  ----------------------------<  235<H>  3.7   |  22  |  0.74    Ca    8.2<L>      10 Aug 2019 07:15  Phos  3.4     08-10  Mg     1.6     08-10    TPro  6.4  /  Alb  3.1<L>  /  TBili  0.3  /  DBili  x   /  AST  29  /  ALT  27  /  AlkPhos  108  08-10      Mg 1.6  Phos 3.4            Uric Acid 3.0        RECENT CULTURES:    < from: 12 Lead ECG (08.08.19 @ 10:47) >  QTC Calculation(Bezet) 452 ms  Diagnosis Line NORMAL SINUS RHYTHM  NONSPECIFIC T WAVE ABNORMALITY  ABNORMAL ECG      RADIOLOGY & ADDITIONAL STUDIES:    < from: Xray Chest 1 View- PORTABLE-Urgent (08.07.19 @ 10:44) >  Interstitial lung disease. Hazy opacities are slightly increased. Diagnosis: AML FLT 3 (-)    Protocol/Chemo Regimen: Enrolled on [employer] BRIGHT trial receiving Induction chemotherapy with Dauno/Cytarabine and daily Glastegib Vs Placebo    Day: 4    Pt endorsed: intermittent HA x 2 days; productive with white  and yellow sputum mixed x few weeks; RENTERIA, stable    Review of Systems: Patient denies headache, dizziness, visual changes, chest pain, palpitations, abdominal pain, nausea, vomiting, diarrhea or dysuria.    Pain scale: 5/10, head    Diet: Regular, consistent carbo     Allergies: No Known Allergies      ANTIMICROBIALS  cefepime   IVPB      cefepime   IVPB 1000 milliGRAM(s) IV Intermittent every 8 hours  levoFLOXacin  Tablet 500 milliGRAM(s) Oral every 24 hours  posaconazole DR Tablet 300 milliGRAM(s) Oral daily      HEME/ONC MEDICATIONS  cytarabine IVPB (eMAR) 158 milliGRAM(s) IV Intermittent daily  enoxaparin Injectable 40 milliGRAM(s) SubCutaneous daily      STANDING MEDICATIONS  allopurinol 300 milliGRAM(s) Oral daily  Biotene Dry Mouth Oral Rinse 5 milliLiter(s) Swish and Spit three times a day  chlorhexidine 2% Cloths 1 Application(s) Topical <User Schedule>  dextrose 5%. 1000 milliLiter(s) IV Continuous <Continuous>  dextrose 50% Injectable 12.5 Gram(s) IV Push once  dextrose 50% Injectable 25 Gram(s) IV Push once  dextrose 50% Injectable 25 Gram(s) IV Push once  docusate sodium 100 milliGRAM(s) Oral three times a day  gabapentin 600 milliGRAM(s) Oral two times a day  insulin glargine Injectable (LANTUS) 20 Unit(s) SubCutaneous at bedtime  insulin lispro (HumaLOG) corrective regimen sliding scale   SubCutaneous three times a day before meals  insulin lispro (HumaLOG) corrective regimen sliding scale   SubCutaneous at bedtime  insulin lispro Injectable (HumaLOG) 7 Unit(s) SubCutaneous three times a day before meals  investigational chemo - general 1 Tablet(s) Oral every 24 hours  levothyroxine 75 MICROGram(s) Oral daily  losartan 100 milliGRAM(s) Oral daily  ondansetron Injectable 8 milliGRAM(s) IV Push every 8 hours  sodium chloride 0.9%. 1000 milliLiter(s) IV Continuous <Continuous>      PRN MEDICATIONS  acetaminophen   Tablet .. 650 milliGRAM(s) Oral every 6 hours PRN  dextrose 40% Gel 15 Gram(s) Oral once PRN  glucagon  Injectable 1 milliGRAM(s) IntraMuscular once PRN  melatonin 3 milliGRAM(s) Oral once PRN  metoclopramide Injectable 10 milliGRAM(s) IV Push every 6 hours PRN  polyethylene glycol 3350 17 Gram(s) Oral two times a day PRN      Vital Signs Last 24 Hrs  T(C): 36.7 (10 Aug 2019 05:00), Max: 38.2 (09 Aug 2019 22:23)  T(F): 98 (10 Aug 2019 05:00), Max: 100.7 (09 Aug 2019 22:23)  HR: 101 (10 Aug 2019 05:00) (93 - 114)  BP: 121/79 (10 Aug 2019 05:00) (116/66 - 144/66)  BP(mean): --  RR: 18 (10 Aug 2019 05:00) (18 - 20)  SpO2: 94% (10 Aug 2019 05:00) (94% - 99%)    PHYSICAL EXAM  General: NAD  HEENT: PERRLA, EOMI, clear oropharynx  Neck: supple  CV: (+) S1/S2 RRR  Lungs: BIBASAL crackles on bases R>L  Abdomen: soft, non-tender, non-distended (+) BS  Ext: no edema  Skin: no rashes   Neuro: alert and oriented X 3, no focal deficits  Central Line: C/D/I      LABS:             7.3    1.3   )-----------( 102      ( 10 Aug 2019 07:15 )             21.2         Mean Cell Volume : 86.1 fl  Mean Cell Hemoglobin : 29.5 pg  Mean Cell Hemoglobin Concentration : 34.2 gm/dL  Auto Neutrophil # : 0.7 K/uL  Auto Lymphocyte # : 0.4 K/uL  Auto Monocyte # : 0.2 K/uL  Auto Eosinophil # : 0.0 K/uL  Auto Basophil # : 0.0 K/uL  Auto Neutrophil % : 53.6 %  Auto Lymphocyte % : 32.3 %  Auto Monocyte % : 11.7 %  Auto Eosinophil % : 2.4 %  Auto Basophil % : 0.0 %      08-10    136  |  103  |  12  ----------------------------<  235<H>  3.7   |  22  |  0.74    Ca    8.2<L>      10 Aug 2019 07:15  Phos  3.4     08-10  Mg     1.6     08-10    TPro  6.4  /  Alb  3.1<L>  /  TBili  0.3  /  DBili  x   /  AST  29  /  ALT  27  /  AlkPhos  108  08-10          Uric Acid 3.0      RECENT CULTURES:    < from: 12 Lead ECG (08.08.19 @ 10:47) >  QTC Calculation(Bezet) 452 ms  Diagnosis Line NORMAL SINUS RHYTHM  NONSPECIFIC T WAVE ABNORMALITY  ABNORMAL ECG      RADIOLOGY & ADDITIONAL STUDIES:    < from: CT Chest No Cont (08.09.19 @ 14:24) >  Pulmonary fibrosis and traction bronchiectasis without honeycombing.   Overall these findings appear progressed compared to CT abdomen 7/4/2017.  Associated groundglass opacity may be related to active interstitial lung   disease, fluid overload, less likely infection.    < from: Xray Chest 1 View- PORTABLE-Urgent (08.07.19 @ 10:44) >  Interstitial lung disease. Hazy opacities are slightly increased.

## 2019-08-10 NOTE — PROGRESS NOTE ADULT - PROBLEM SELECTOR PLAN 2
The patient is not neutropenic, afebrile  If febrile, pancx   Continue Levaquin and Posaconazole (Monitor QTc BIW M/Th with Posaconazole and Study drug) The patient is not neutropenic, febrile  s/p  pancx  on 8/9 8/9 Levaquin switched to Cefepime   8/9 CT chest Pulmonary fibrosis and traction bronchiectasis without honeycombing.   Overall these findings appear progressed compared to CT abdomen 7/4/2017.  Associated groundglass opacity may be related to active interstitial lung   disease, fluid overload, less likely infection.  Continue  Posaconazole (Monitor QTc BIW M/Th with Posaconazole and Study drug)

## 2019-08-10 NOTE — PROGRESS NOTE ADULT - ASSESSMENT
This is a 65 yo F with PMHx of T2DM, RA, Depression and now newly diagnosed AML enrolled on [employer] BRIGHT trial admitted for Induction chemotherapy with Dauno/Cytarabine and daily Glastegib Vs Placebo. Hospital course complicated by increased ILD on CXR on 8/8, RENTERIA, and weight gain, Lasix given. Pt has pancytopenia from chemo and or disease.

## 2019-08-10 NOTE — ADVANCED PRACTICE NURSE CONSULT - ASSESSMENT
Pt A/Ox4, vital signs stable, afebrile. Pt denies pain/discomfort, N/V. Chemotherapy teaching reinforced to pt at bedside. Right TL IJ placed 8/6/19, Dsg intact and dry, no swelling and redness noted, easily flushed with positive blood return. Consent signed and filed. Lab results reviewed by Dr. Goldberg during rounds and said OK to treat pt. Chemotherapy verified by two RNs. At 1459,  cytarabine 100 mg/d0=564 mg IV infusion through IJ over 24 hours by alaris pump. Pt left comfortable on bed. Primary RN aware. Pt A/Ox4, denies pain/discomfort, N/V. Chemotherapy teaching reinforced to pt at bedside. Right TL IJ, Dsg intact and dry, site no swelling and redness noted, easily flushed with positive blood return. Consent signed and filed. Lab results reviewed by Dr. Goldberg during rounds and said OK to treat pt. Chemotherapy verified by two RNs. At 1459,  cytarabine 100 mg/k3=851 mg IV infusion through IJ over 24 hours @ 22.9 ml/hr by alaris pump. Pt left comfortable on bed. Primary RN aware. Pt A/Ox4, denies pain/discomfort, N/V. Chemotherapy teaching reinforced to pt at bedside. Right TL IJ, Dsg intact and dry, site no swelling and redness noted, easily flushed with positive blood return. Consent signed and filed. Lab results reviewed by Dr. Aguilera during rounds and said OK to treat pt. Chemotherapy verified by two RNs. At 1459,  cytarabine 100 mg/k2=285 mg IV infusion through IJ over 24 hours @ 22.9 ml/hr by alaris pump. Pt left comfortable on bed. Primary RN aware.

## 2019-08-10 NOTE — PROGRESS NOTE ADULT - ATTENDING COMMENTS
65 yo F with hx RA, here for follow up, had new anemia and peripheral immature cells (few blasts)  BM bx done 8/2/19 --c/w AML, FLT-3 neg, FISH for PML-KRYSTAL and BCR-ABL negative. Pt gave consent for Pfeizer trial 7+3+ Glasdegib/placebo.  Patient here for her induction chemotherapy on trial.   Today will be day 3.  Daunorubicin 60 mg/m2 day 1-3, cytarabine 100 mg/m2 day 1-7, glasdegib/placebo 100mg daily PO.   Supportive care, anti-emetics, IVFs, allopurinol for TLS ppx.   Dyspnea on day 1 s/p lasix with improvement of symptoms, but still slightly dyspneic.   Interstitial Lung Disease seen on CXR, will obtain non-contrast CT Chest today.    Ambulatory today.   Monitor CBC, Transfuse PRN.   Lantus QHS, following fingersticks closely, endocrine consult appreciated. Very poorly controlled diabetes.   ECOG performance status 1. 65 yo F with hx RA, here for follow up, had new anemia and peripheral immature cells (few blasts)  BM bx done 8/2/19 --c/w AML, FLT-3 neg, FISH for PML-KRYSTAL and BCR-ABL negative. Pt gave consent for Pfeizer trial 7+3+ Glasdegib/placebo.  Patient here for her induction chemotherapy on trial.   Today will be day 4.  Daunorubicin 60 mg/m2 day 1-3, cytarabine 100 mg/m2 day 1-7, glasdegib/placebo 100mg daily PO.   Supportive care, anti-emetics, IVFs, allopurinol for TLS ppx.   Dyspnea on day 1 s/p lasix with improvement of symptoms, but still slightly dyspneic.   Interstitial Lung Disease seen on CXR, will obtain non-contrast CT Chest.    Neutropenic fever- cultures sent, started on Cefepime(d/c Levaquin); on Posaconazole  Monitor CBC, receives transfusions PRN.   Lantus QHS, following fingersticks closely, endocrine consult appreciated. Very poorly controlled diabetes.   ECOG performance status 1.

## 2019-08-10 NOTE — PROGRESS NOTE ADULT - PROBLEM SELECTOR PLAN 1
Enrolled on [employer] BRIGHT trial receiving Induction chemotherapy with Dauno/Cytarabine and daily Glastegib Vs Placebo  8/20 Day 14 BM bx due  Monitor CBC/Lytes and transfuse/replete PRN  Strict Is and Os/ Daily weights/ Mouth Care  Weight gain, CXR with  increased ILD, RENTERIA, crackles on exam  Lasix 20mg IV x 1 on 8/8, 8/9  CT chest wo contrast to assess ILD and possible pulmonary edema   Allopurinol 300mg PO daily  IVF  Antiemetics  ECOG 1 Enrolled on Pf[employer] BRIGHT trial receiving Induction chemotherapy with Dauno/Cytarabine and daily Glastegib Vs Placebo  8/20 Day 14 BM bx due  Monitor CBC/Lytes and transfuse/replete PRN  KCL 20 Meq IVPB x 1 for hypokalemia  Strict Is and Os/ Daily weights/ Mouth Care  Weight gain, CXR with  increased ILD, RENTERIA, crackles on exam  Lasix 40mg IV x 1 today and pRN   CT chest wo contrast to assess ILD and possible pulmonary edema   Allopurinol 300mg PO daily  IVF  Antiemetics  ECOG 1

## 2019-08-10 NOTE — CHART NOTE - NSCHARTNOTEFT_GEN_A_CORE
CHIEF COMPLAINT: T max 100.7 with heart rate 114    SUBJECTIVE / OVERNIGHT EVENTS: Notified that patient with temp of 100.5 and went up to 100.7. Patient states that she began having chills around 6pm. c/o that she has been having sob on exertion. Denies chest pain, lightheadedness, palpitation, n/v/d    Vital Signs Last 24 Hrs  T(C): 36.7 (10 Aug 2019 05:00), Max: 38.2 (09 Aug 2019 22:23)  T(F): 98 (10 Aug 2019 05:00), Max: 100.7 (09 Aug 2019 22:23)  HR: 101 (10 Aug 2019 05:00) (93 - 114)  BP: 121/79 (10 Aug 2019 05:00) (116/66 - 144/66)  BP(mean): --  RR: 18 (10 Aug 2019 05:00) (18 - 20)  SpO2: 94% (10 Aug 2019 05:00) (94% - 99%)    REVIEW OF SYSTEMS:  Constitutional: No fever, fatigue or weight loss.  Skin: No rash.  Eyes: No recent vision problems or eye pain.  ENT: No congestion, ear pain, or sore throat.  Endocrine: No thyroid problems.  Cardiovascular: No chest pain or palpation.  Respiratory: cough, shortness of breath, No congestion, or wheezing.  Gastrointestinal: No abdominal pain, nausea, vomiting, or diarrhea.  Genitourinary: No dysuria.  Musculoskeletal: Right leg pain after biopsy  Neurologic: No headache.        CAPILLARY BLOOD GLUCOSE      POCT Blood Glucose.: 212 mg/dL (09 Aug 2019 21:10)  POCT Blood Glucose.: 271 mg/dL (09 Aug 2019 18:07)  POCT Blood Glucose.: 296 mg/dL (09 Aug 2019 12:33)  POCT Blood Glucose.: 248 mg/dL (09 Aug 2019 09:05)    I&O's Summary    08 Aug 2019 07:01  -  09 Aug 2019 07:00  --------------------------------------------------------  IN: 4350 mL / OUT: 2750 mL / NET: 1600 mL    09 Aug 2019 07:01  -  10 Aug 2019 06:05  --------------------------------------------------------  IN: 3103 mL / OUT: 2950 mL / NET: 153 mL        LABS:                        7.6    1.6   )-----------( 119      ( 09 Aug 2019 07:03 )             21.0         139  |  104  |  16  ----------------------------<  264<H>  3.7   |  20<L>  |  0.85    Ca    8.5      09 Aug 2019 07:03  Phos  3.8       Mg     1.6         TPro  6.5  /  Alb  3.0<L>  /  TBili  0.3  /  DBili  x   /  AST  22  /  ALT  21  /  AlkPhos  92  08    PT/INR - ( 08 Aug 2019 07:26 )   PT: 14.0 sec;   INR: 1.21 ratio         PTT - ( 08 Aug 2019 07:26 )  PTT:35.7 sec      Urinalysis Basic - ( 09 Aug 2019 23:01 )    Color: Light Orange / Appearance: Clear / S.011 / pH: x  Gluc: x / Ketone: Negative  / Bili: Negative / Urobili: <2 mg/dL   Blood: x / Protein: Trace / Nitrite: Negative   Leuk Esterase: Negative / RBC: 1 /HPF / WBC 1 /HPF   Sq Epi: x / Non Sq Epi: 0 /HPF / Bacteria: Negative        RADIOLOGY & ADDITIONAL TESTS:  < from: CT Chest No Cont (19 @ 14:24) >    IMPRESSION:     Pulmonary fibrosis and traction bronchiectasis without honeycombing.   Overall these findings appear progressed compared to CT abdomen 2017.    Associated groundglass opacity may be related to active interstitial lung   disease, fluid overload, less likely infection.    < end of copied text >      MEDICATIONS  (STANDING):  allopurinol 300 milliGRAM(s) Oral daily  Biotene Dry Mouth Oral Rinse 5 milliLiter(s) Swish and Spit three times a day  cefepime   IVPB      cefepime   IVPB 1000 milliGRAM(s) IV Intermittent every 8 hours  chlorhexidine 2% Cloths 1 Application(s) Topical <User Schedule>  cytarabine IVPB (eMAR) 158 milliGRAM(s) IV Intermittent daily  dextrose 5%. 1000 milliLiter(s) (50 mL/Hr) IV Continuous <Continuous>  dextrose 50% Injectable 12.5 Gram(s) IV Push once  dextrose 50% Injectable 25 Gram(s) IV Push once  dextrose 50% Injectable 25 Gram(s) IV Push once  docusate sodium 100 milliGRAM(s) Oral three times a day  enoxaparin Injectable 40 milliGRAM(s) SubCutaneous daily  gabapentin 600 milliGRAM(s) Oral two times a day  insulin glargine Injectable (LANTUS) 20 Unit(s) SubCutaneous at bedtime  insulin lispro (HumaLOG) corrective regimen sliding scale   SubCutaneous three times a day before meals  insulin lispro (HumaLOG) corrective regimen sliding scale   SubCutaneous at bedtime  insulin lispro Injectable (HumaLOG) 7 Unit(s) SubCutaneous three times a day before meals  investigational chemo - general 1 Tablet(s) Oral every 24 hours  levoFLOXacin  Tablet 500 milliGRAM(s) Oral every 24 hours  levothyroxine 75 MICROGram(s) Oral daily  losartan 100 milliGRAM(s) Oral daily  ondansetron Injectable 8 milliGRAM(s) IV Push every 8 hours  posaconazole DR Tablet 300 milliGRAM(s) Oral daily  sodium chloride 0.9%. 1000 milliLiter(s) (100 mL/Hr) IV Continuous <Continuous>    MEDICATIONS  (PRN):  acetaminophen   Tablet .. 650 milliGRAM(s) Oral every 6 hours PRN Temp greater or equal to 38C (100.4F), Mild Pain (1 - 3)  dextrose 40% Gel 15 Gram(s) Oral once PRN Blood Glucose LESS THAN 70 milliGRAM(s)/deciliter  glucagon  Injectable 1 milliGRAM(s) IntraMuscular once PRN Glucose LESS THAN 70 milligrams/deciliter  melatonin 3 milliGRAM(s) Oral once PRN Insomnia  metoclopramide Injectable 10 milliGRAM(s) IV Push every 6 hours PRN nausea/vommiting  polyethylene glycol 3350 17 Gram(s) Oral two times a day PRN Constipation      PHYSICAL EXAM:  GENERAL: NAD, well-developed  NEUROLOGY/PSYCHIATRIC: non-focal  CARDIOVASCULAR: Regular rate and rhythm; No murmurs, rubs, or gallops  RESPIRATORY: scattered rales no accessory muscle   GASTROINTESTINAL: Soft, Nontender, Nondistended; Bowel sounds present  EXTREMITIES:  2+ Peripheral Pulses, No clubbing, cyanosis, or edema  SKIN: No rashes or lesions  GENITOURINARY: Non distended bladder    DVT (deep venous thrombosis)  Gallstones  Rheumatoid arthritis  Type 2 diabetes mellitus  Carpal tunnel syndrome  Hypothyroid  High cholesterol  Diabetes  HTN (hypertension)  HTN (hypertension)  AML (acute myelogenous leukemia)  Status post colonoscopy  Normal spontaneous vaginal delivery  Status post cholecystectomy  No significant past surgical history        ASSESSMENT/PLAN:   65 yo F with history of DM,  RA , diagnosed in 2016, chronic depression now with newly diagnosed AML admitted for induction chemotherapy on the [employer] [place] study with dauno/cytarabine and glasdegib/placebo.  Now with fever.    Fever  blood cx  Urinalysis acetaminophen  cooling measures  Cefepime added to regimen    Will endorse to primary day team, attending to follow    Radha Garcia [id number]          Care Discussed with Consultants/Other Providers:

## 2019-08-10 NOTE — PROGRESS NOTE ADULT - PROBLEM SELECTOR PLAN 3
HgA1C 10.6  FS AC & HS with HISS  Continue Lantus and Humalog per endocrine   Consistent carbohydrate diet

## 2019-08-11 LAB
ALBUMIN SERPL ELPH-MCNC: 3.1 G/DL — LOW (ref 3.3–5)
ALP SERPL-CCNC: 136 U/L — HIGH (ref 40–120)
ALT FLD-CCNC: 46 U/L — HIGH (ref 10–45)
ANION GAP SERPL CALC-SCNC: 10 MMOL/L — SIGNIFICANT CHANGE UP (ref 5–17)
APPEARANCE UR: CLEAR — SIGNIFICANT CHANGE UP
AST SERPL-CCNC: 50 U/L — HIGH (ref 10–40)
BACTERIA # UR AUTO: NEGATIVE — SIGNIFICANT CHANGE UP
BASOPHILS # BLD AUTO: 0 K/UL — SIGNIFICANT CHANGE UP (ref 0–0.2)
BASOPHILS NFR BLD AUTO: 0 % — SIGNIFICANT CHANGE UP (ref 0–2)
BILIRUB SERPL-MCNC: 0.4 MG/DL — SIGNIFICANT CHANGE UP (ref 0.2–1.2)
BILIRUB UR-MCNC: NEGATIVE — SIGNIFICANT CHANGE UP
BUN SERPL-MCNC: 11 MG/DL — SIGNIFICANT CHANGE UP (ref 7–23)
CALCIUM SERPL-MCNC: 8.4 MG/DL — SIGNIFICANT CHANGE UP (ref 8.4–10.5)
CHLORIDE SERPL-SCNC: 102 MMOL/L — SIGNIFICANT CHANGE UP (ref 96–108)
CO2 SERPL-SCNC: 23 MMOL/L — SIGNIFICANT CHANGE UP (ref 22–31)
COLOR SPEC: SIGNIFICANT CHANGE UP
CREAT SERPL-MCNC: 0.73 MG/DL — SIGNIFICANT CHANGE UP (ref 0.5–1.3)
CULTURE RESULTS: NO GROWTH — SIGNIFICANT CHANGE UP
DIFF PNL FLD: NEGATIVE — SIGNIFICANT CHANGE UP
EOSINOPHIL # BLD AUTO: 0 K/UL — SIGNIFICANT CHANGE UP (ref 0–0.5)
EOSINOPHIL NFR BLD AUTO: 3.5 % — SIGNIFICANT CHANGE UP (ref 0–6)
EPI CELLS # UR: 1 /HPF — SIGNIFICANT CHANGE UP
GLUCOSE BLDC GLUCOMTR-MCNC: 168 MG/DL — HIGH (ref 70–99)
GLUCOSE BLDC GLUCOMTR-MCNC: 184 MG/DL — HIGH (ref 70–99)
GLUCOSE BLDC GLUCOMTR-MCNC: 195 MG/DL — HIGH (ref 70–99)
GLUCOSE BLDC GLUCOMTR-MCNC: 261 MG/DL — HIGH (ref 70–99)
GLUCOSE SERPL-MCNC: 227 MG/DL — HIGH (ref 70–99)
GLUCOSE UR QL: NEGATIVE — SIGNIFICANT CHANGE UP
GRAM STN FLD: SIGNIFICANT CHANGE UP
HCT VFR BLD CALC: 19.9 % — CRITICAL LOW (ref 34.5–45)
HGB BLD-MCNC: 6.7 G/DL — CRITICAL LOW (ref 11.5–15.5)
HYALINE CASTS # UR AUTO: 1 /LPF — SIGNIFICANT CHANGE UP (ref 0–2)
KETONES UR-MCNC: NEGATIVE — SIGNIFICANT CHANGE UP
LDH SERPL L TO P-CCNC: 269 U/L — HIGH (ref 50–242)
LEUKOCYTE ESTERASE UR-ACNC: NEGATIVE — SIGNIFICANT CHANGE UP
LYMPHOCYTES # BLD AUTO: 0.3 K/UL — LOW (ref 1–3.3)
LYMPHOCYTES # BLD AUTO: 35.3 % — SIGNIFICANT CHANGE UP (ref 13–44)
MAGNESIUM SERPL-MCNC: 1.5 MG/DL — LOW (ref 1.6–2.6)
MCHC RBC-ENTMCNC: 28.8 PG — SIGNIFICANT CHANGE UP (ref 27–34)
MCHC RBC-ENTMCNC: 33.7 GM/DL — SIGNIFICANT CHANGE UP (ref 32–36)
MCV RBC AUTO: 85.7 FL — SIGNIFICANT CHANGE UP (ref 80–100)
MONOCYTES # BLD AUTO: 0.1 K/UL — SIGNIFICANT CHANGE UP (ref 0–0.9)
MONOCYTES NFR BLD AUTO: 8.1 % — SIGNIFICANT CHANGE UP (ref 2–14)
NEUTROPHILS # BLD AUTO: 0.5 K/UL — LOW (ref 1.8–7.4)
NEUTROPHILS NFR BLD AUTO: 53.1 % — SIGNIFICANT CHANGE UP (ref 43–77)
NITRITE UR-MCNC: NEGATIVE — SIGNIFICANT CHANGE UP
PH UR: 5 — SIGNIFICANT CHANGE UP (ref 5–8)
PHOSPHATE SERPL-MCNC: 2.8 MG/DL — SIGNIFICANT CHANGE UP (ref 2.5–4.5)
PLAT MORPH BLD: NORMAL — SIGNIFICANT CHANGE UP
PLATELET # BLD AUTO: 86 K/UL — LOW (ref 150–400)
POTASSIUM SERPL-MCNC: 3.6 MMOL/L — SIGNIFICANT CHANGE UP (ref 3.5–5.3)
POTASSIUM SERPL-SCNC: 3.6 MMOL/L — SIGNIFICANT CHANGE UP (ref 3.5–5.3)
PROT SERPL-MCNC: 6.8 G/DL — SIGNIFICANT CHANGE UP (ref 6–8.3)
PROT UR-MCNC: ABNORMAL
RBC # BLD: 2.32 M/UL — LOW (ref 3.8–5.2)
RBC # FLD: 15.3 % — HIGH (ref 10.3–14.5)
RBC BLD AUTO: SIGNIFICANT CHANGE UP
RBC CASTS # UR COMP ASSIST: 1 /HPF — SIGNIFICANT CHANGE UP (ref 0–4)
SODIUM SERPL-SCNC: 135 MMOL/L — SIGNIFICANT CHANGE UP (ref 135–145)
SP GR SPEC: 1.01 — SIGNIFICANT CHANGE UP (ref 1.01–1.02)
SPECIMEN SOURCE: SIGNIFICANT CHANGE UP
SPECIMEN SOURCE: SIGNIFICANT CHANGE UP
URATE SERPL-MCNC: 2.1 MG/DL — LOW (ref 2.5–7)
UROBILINOGEN FLD QL: NEGATIVE — SIGNIFICANT CHANGE UP
WBC # BLD: 0.9 K/UL — CRITICAL LOW (ref 3.8–10.5)
WBC # FLD AUTO: 0.9 K/UL — CRITICAL LOW (ref 3.8–10.5)
WBC UR QL: 1 /HPF — SIGNIFICANT CHANGE UP (ref 0–5)

## 2019-08-11 PROCEDURE — 99232 SBSQ HOSP IP/OBS MODERATE 35: CPT

## 2019-08-11 RX ORDER — IPRATROPIUM/ALBUTEROL SULFATE 18-103MCG
3 AEROSOL WITH ADAPTER (GRAM) INHALATION EVERY 6 HOURS
Refills: 0 | Status: DISCONTINUED | OUTPATIENT
Start: 2019-08-11 | End: 2019-08-27

## 2019-08-11 RX ORDER — TIOTROPIUM BROMIDE 18 UG/1
1 CAPSULE ORAL; RESPIRATORY (INHALATION) DAILY
Refills: 0 | Status: DISCONTINUED | OUTPATIENT
Start: 2019-08-11 | End: 2019-08-29

## 2019-08-11 RX ORDER — POTASSIUM CHLORIDE 20 MEQ
20 PACKET (EA) ORAL ONCE
Refills: 0 | Status: COMPLETED | OUTPATIENT
Start: 2019-08-11 | End: 2019-08-11

## 2019-08-11 RX ORDER — SODIUM CHLORIDE 9 MG/ML
4 INJECTION INTRAMUSCULAR; INTRAVENOUS; SUBCUTANEOUS
Refills: 0 | Status: DISCONTINUED | OUTPATIENT
Start: 2019-08-11 | End: 2019-08-27

## 2019-08-11 RX ORDER — ALBUTEROL 90 UG/1
1 AEROSOL, METERED ORAL EVERY 6 HOURS
Refills: 0 | Status: DISCONTINUED | OUTPATIENT
Start: 2019-08-11 | End: 2019-08-27

## 2019-08-11 RX ORDER — SENNA PLUS 8.6 MG/1
2 TABLET ORAL
Refills: 0 | Status: DISCONTINUED | OUTPATIENT
Start: 2019-08-11 | End: 2019-08-13

## 2019-08-11 RX ORDER — FUROSEMIDE 40 MG
40 TABLET ORAL ONCE
Refills: 0 | Status: COMPLETED | OUTPATIENT
Start: 2019-08-11 | End: 2019-08-11

## 2019-08-11 RX ORDER — MAGNESIUM SULFATE 500 MG/ML
2 VIAL (ML) INJECTION ONCE
Refills: 0 | Status: COMPLETED | OUTPATIENT
Start: 2019-08-11 | End: 2019-08-11

## 2019-08-11 RX ORDER — SENNA PLUS 8.6 MG/1
2 TABLET ORAL ONCE
Refills: 0 | Status: COMPLETED | OUTPATIENT
Start: 2019-08-11 | End: 2019-08-11

## 2019-08-11 RX ORDER — SODIUM CHLORIDE 9 MG/ML
4 INJECTION INTRAMUSCULAR; INTRAVENOUS; SUBCUTANEOUS ONCE
Refills: 0 | Status: COMPLETED | OUTPATIENT
Start: 2019-08-11 | End: 2019-08-11

## 2019-08-11 RX ADMIN — Medication 5 MILLILITER(S): at 05:23

## 2019-08-11 RX ADMIN — GABAPENTIN 600 MILLIGRAM(S): 400 CAPSULE ORAL at 05:24

## 2019-08-11 RX ADMIN — Medication 1: at 13:12

## 2019-08-11 RX ADMIN — INSULIN GLARGINE 24 UNIT(S): 100 INJECTION, SOLUTION SUBCUTANEOUS at 22:05

## 2019-08-11 RX ADMIN — CEFEPIME 100 MILLIGRAM(S): 1 INJECTION, POWDER, FOR SOLUTION INTRAMUSCULAR; INTRAVENOUS at 05:24

## 2019-08-11 RX ADMIN — Medication 300 MILLIGRAM(S): at 12:28

## 2019-08-11 RX ADMIN — Medication 50 GRAM(S): at 09:28

## 2019-08-11 RX ADMIN — Medication 50 MILLIEQUIVALENT(S): at 13:13

## 2019-08-11 RX ADMIN — POSACONAZOLE 300 MILLIGRAM(S): 100 TABLET, DELAYED RELEASE ORAL at 12:28

## 2019-08-11 RX ADMIN — ONDANSETRON 8 MILLIGRAM(S): 8 TABLET, FILM COATED ORAL at 14:59

## 2019-08-11 RX ADMIN — Medication 3: at 19:11

## 2019-08-11 RX ADMIN — Medication 8 UNIT(S): at 19:12

## 2019-08-11 RX ADMIN — Medication 3 MILLILITER(S): at 23:24

## 2019-08-11 RX ADMIN — SENNA PLUS 2 TABLET(S): 8.6 TABLET ORAL at 12:28

## 2019-08-11 RX ADMIN — Medication 5 MILLILITER(S): at 22:00

## 2019-08-11 RX ADMIN — Medication 5 MILLILITER(S): at 15:00

## 2019-08-11 RX ADMIN — Medication 3 MILLILITER(S): at 17:39

## 2019-08-11 RX ADMIN — ONDANSETRON 8 MILLIGRAM(S): 8 TABLET, FILM COATED ORAL at 22:02

## 2019-08-11 RX ADMIN — Medication 1: at 10:17

## 2019-08-11 RX ADMIN — GABAPENTIN 600 MILLIGRAM(S): 400 CAPSULE ORAL at 17:41

## 2019-08-11 RX ADMIN — Medication 650 MILLIGRAM(S): at 05:23

## 2019-08-11 RX ADMIN — CEFEPIME 100 MILLIGRAM(S): 1 INJECTION, POWDER, FOR SOLUTION INTRAMUSCULAR; INTRAVENOUS at 22:01

## 2019-08-11 RX ADMIN — CHLORHEXIDINE GLUCONATE 1 APPLICATION(S): 213 SOLUTION TOPICAL at 09:29

## 2019-08-11 RX ADMIN — Medication 650 MILLIGRAM(S): at 15:52

## 2019-08-11 RX ADMIN — Medication 40 MILLIGRAM(S): at 15:00

## 2019-08-11 RX ADMIN — Medication 1200 MILLIGRAM(S): at 14:59

## 2019-08-11 RX ADMIN — SODIUM CHLORIDE 100 MILLILITER(S): 9 INJECTION INTRAMUSCULAR; INTRAVENOUS; SUBCUTANEOUS at 15:48

## 2019-08-11 RX ADMIN — Medication 8 UNIT(S): at 13:12

## 2019-08-11 RX ADMIN — Medication 20.9 MILLIGRAM(S): at 15:23

## 2019-08-11 RX ADMIN — Medication 75 MICROGRAM(S): at 05:24

## 2019-08-11 RX ADMIN — Medication 650 MILLIGRAM(S): at 06:02

## 2019-08-11 RX ADMIN — LOSARTAN POTASSIUM 100 MILLIGRAM(S): 100 TABLET, FILM COATED ORAL at 05:24

## 2019-08-11 RX ADMIN — SODIUM CHLORIDE 4 MILLILITER(S): 9 INJECTION INTRAMUSCULAR; INTRAVENOUS; SUBCUTANEOUS at 17:39

## 2019-08-11 RX ADMIN — ENOXAPARIN SODIUM 40 MILLIGRAM(S): 100 INJECTION SUBCUTANEOUS at 12:28

## 2019-08-11 RX ADMIN — ONDANSETRON 8 MILLIGRAM(S): 8 TABLET, FILM COATED ORAL at 05:24

## 2019-08-11 RX ADMIN — SODIUM CHLORIDE 100 MILLILITER(S): 9 INJECTION INTRAMUSCULAR; INTRAVENOUS; SUBCUTANEOUS at 05:25

## 2019-08-11 RX ADMIN — Medication 650 MILLIGRAM(S): at 15:50

## 2019-08-11 RX ADMIN — CEFEPIME 100 MILLIGRAM(S): 1 INJECTION, POWDER, FOR SOLUTION INTRAMUSCULAR; INTRAVENOUS at 14:59

## 2019-08-11 RX ADMIN — Medication 8 UNIT(S): at 10:18

## 2019-08-11 NOTE — PROGRESS NOTE ADULT - ASSESSMENT
This is a 63 yo F with PMHx of T2DM, RA, Depression and now newly diagnosed AML enrolled on Pf[employer] BRIGHT trial admitted for Induction chemotherapy with Dauno/Cytarabine and daily Glastegib Vs Placebo. Hospital course complicated by increased ILD on CXR on 8/8, RENTERIA, and weight gain, Lasix given. Pt has pancytopenia from chemo and or disease. This is a 65 yo F with PMHx of T2DM, RA, Depression and now newly diagnosed AML enrolled on Pf[employer] BRIGHT trial admitted for Induction chemotherapy with Dauno/Cytarabine and daily Glastegib Vs Placebo. Hospital course complicated by increased ILD on CXR on 8/8, RENTERIA, and weight gain, Lasix given; neutropenic fever. Pt has pancytopenia from chemo and or disease.

## 2019-08-11 NOTE — PROGRESS NOTE ADULT - PROBLEM SELECTOR PLAN 1
Enrolled on [employer] BRIGHT trial receiving Induction chemotherapy with Dauno/Cytarabine and daily Glastegib Vs Placebo  8/20 Day 14 BM bx due  Monitor CBC/Lytes and transfuse/replete PRN  KCL 20 Meq IVPB x 1 for hypokalemia  Strict Is and Os/ Daily weights/ Mouth Care  Weight gain, CXR with  increased ILD, RENTERIA, crackles on exam  Lasix 40mg IV x 1 today and pRN   CT chest wo contrast to assess ILD and possible pulmonary edema   Allopurinol 300mg PO daily  IVF  Antiemetics  ECOG 1 Enrolled on Pf[employer] BRIGHT trial receiving Induction chemotherapy with Dauno/Cytarabine and daily Glastegib Vs Placebo  8/20 Day 14 BM bx due  Monitor CBC/Lytes and transfuse/replete PRN  KCL 20 Meq IVPB x 1 for hypokalemia  Strict Is and Os/ Daily weights/ Mouth Care  Weight gain, CXR with  increased ILD, RENTERIA, crackles on exam  Lasix 40mg IV x 1 today and PRN  CT chest wo contrast to assess ILD and possible pulmonary edema   Allopurinol 300mg PO daily  IVF  Antiemetics  ECOG 1

## 2019-08-11 NOTE — PROGRESS NOTE ADULT - SUBJECTIVE AND OBJECTIVE BOX
Diagnosis: AML FLT 3 (-)    Protocol/Chemo Regimen: Enrolled on Pfizer BRIGHT trial receiving Induction chemotherapy with Dauno/Cytarabine and daily Glastegib Vs Placebo    Day: 5    Pt endorsed: intermittent HA x 2 days; productive with white  and yellow sputum mixed x few weeks; RENTERIA, stable    Review of Systems: Patient denies headache, dizziness, visual changes, chest pain, palpitations, abdominal pain, nausea, vomiting, diarrhea or dysuria.    Pain scale: 5/10, head    Diet: Regular, consistent carbo     Allergies: No Known Allergies          ANTIMICROBIALS  cefepime   IVPB 2000 milliGRAM(s) IV Intermittent every 8 hours  posaconazole DR Tablet 300 milliGRAM(s) Oral daily      HEME/ONC MEDICATIONS  cytarabine IVPB (eMAR) 158 milliGRAM(s) IV Intermittent daily  enoxaparin Injectable 40 milliGRAM(s) SubCutaneous daily      STANDING MEDICATIONS  allopurinol 300 milliGRAM(s) Oral daily  Biotene Dry Mouth Oral Rinse 5 milliLiter(s) Swish and Spit three times a day  chlorhexidine 2% Cloths 1 Application(s) Topical <User Schedule>  dextrose 5%. 1000 milliLiter(s) IV Continuous <Continuous>  dextrose 50% Injectable 12.5 Gram(s) IV Push once  dextrose 50% Injectable 25 Gram(s) IV Push once  dextrose 50% Injectable 25 Gram(s) IV Push once  docusate sodium 100 milliGRAM(s) Oral three times a day  gabapentin 600 milliGRAM(s) Oral two times a day  insulin glargine Injectable (LANTUS) 24 Unit(s) SubCutaneous at bedtime  insulin lispro (HumaLOG) corrective regimen sliding scale   SubCutaneous three times a day before meals  insulin lispro (HumaLOG) corrective regimen sliding scale   SubCutaneous at bedtime  insulin lispro Injectable (HumaLOG) 8 Unit(s) SubCutaneous three times a day before meals  investigational chemo - general 1 Tablet(s) Oral every 24 hours  levothyroxine 75 MICROGram(s) Oral daily  losartan 100 milliGRAM(s) Oral daily  ondansetron Injectable 8 milliGRAM(s) IV Push every 8 hours  sodium chloride 0.9%. 1000 milliLiter(s) IV Continuous <Continuous>      PRN MEDICATIONS  acetaminophen   Tablet .. 650 milliGRAM(s) Oral every 6 hours PRN  dextrose 40% Gel 15 Gram(s) Oral once PRN  glucagon  Injectable 1 milliGRAM(s) IntraMuscular once PRN  guaiFENesin   Syrup  (Sugar-Free) 200 milliGRAM(s) Oral every 6 hours PRN  melatonin 3 milliGRAM(s) Oral once PRN  metoclopramide Injectable 10 milliGRAM(s) IV Push every 6 hours PRN  polyethylene glycol 3350 17 Gram(s) Oral two times a day PRN        Vital Signs Last 24 Hrs  T(C): 37.7 (11 Aug 2019 06:02), Max: 38.6 (11 Aug 2019 05:08)  T(F): 99.9 (11 Aug 2019 06:02), Max: 101.4 (11 Aug 2019 05:08)  HR: 110 (11 Aug 2019 05:08) (90 - 110)  BP: 145/71 (11 Aug 2019 05:08) (114/65 - 145/71)  BP(mean): --  RR: 20 (11 Aug 2019 05:08) (16 - 20)  SpO2: 95% (11 Aug 2019 05:08) (93% - 96%)      PHYSICAL EXAM  General: NAD  HEENT: PERRLA, EOMI, clear oropharynx  Neck: supple  CV: (+) S1/S2 RRR  Lungs: BIBASAL crackles on bases R>L  Abdomen: soft, non-tender, non-distended (+) BS  Ext: no edema  Skin: no rashes   Neuro: alert and oriented X 3, no focal deficits  Central Line: C/D/I        LABS:                        7.3    1.3   )-----------( 102      ( 10 Aug 2019 07:15 )             21.2         Mean Cell Volume : 86.1 fl  Mean Cell Hemoglobin : 29.5 pg  Mean Cell Hemoglobin Concentration : 34.2 gm/dL  Auto Neutrophil # : 0.7 K/uL  Auto Lymphocyte # : 0.4 K/uL  Auto Monocyte # : 0.2 K/uL  Auto Eosinophil # : 0.0 K/uL  Auto Basophil # : 0.0 K/uL  Auto Neutrophil % : 53.6 %  Auto Lymphocyte % : 32.3 %  Auto Monocyte % : 11.7 %  Auto Eosinophil % : 2.4 %  Auto Basophil % : 0.0 %      08-10    136  |  103  |  12  ----------------------------<  235<H>  3.7   |  22  |  0.74    Ca    8.2<L>      10 Aug 2019 07:15  Phos  3.4     08-10  Mg     1.6     08-10    TPro  6.4  /  Alb  3.1<L>  /  TBili  0.3  /  DBili  x   /  AST  29  /  ALT  27  /  AlkPhos  108  08-10                  RECENT CULTURES:  08-10 @ 17:52  .Sputum  --  --  --  --  --  08-09 @ 22:59  .Blood  --  --  --    No growth to date.  --      RADIOLOGY & ADDITIONAL STUDIES:    < from: CT Chest No Cont (08.09.19 @ 14:24) >  Pulmonary fibrosis and traction bronchiectasis without honeycombing.   Overall these findings appear progressed compared to CT abdomen 7/4/2017.  Associated groundglass opacity may be related to active interstitial lung   disease, fluid overload, less likely infection.    < from: Xray Chest 1 View- PORTABLE-Urgent (08.07.19 @ 10:44) >  Interstitial lung disease. Hazy opacities are slightly increased. Diagnosis: AML FLT 3 (-)    Protocol/Chemo Regimen: Enrolled on Pfizer BRIGHT trial receiving Induction chemotherapy with Dauno/Cytarabine and daily Glastegib Vs Placebo    Day: 5    Pt endorsed: HA last night, resolved; productive with white  and yellow sputum mixed x few weeks, worse today; SOB at rest sometimes/RENTERIA, little more; no BM x 2 days    Review of Systems: Patient denies  dizziness, visual changes, chest pain, palpitations, abdominal pain, nausea, vomiting, diarrhea or dysuria.    Pain scale: none    Diet: Regular, consistent carbo     Allergies: No Known Allergies          ANTIMICROBIALS  cefepime   IVPB 2000 milliGRAM(s) IV Intermittent every 8 hours  posaconazole DR Tablet 300 milliGRAM(s) Oral daily      HEME/ONC MEDICATIONS  cytarabine IVPB (eMAR) 158 milliGRAM(s) IV Intermittent daily  enoxaparin Injectable 40 milliGRAM(s) SubCutaneous daily      STANDING MEDICATIONS  allopurinol 300 milliGRAM(s) Oral daily  Biotene Dry Mouth Oral Rinse 5 milliLiter(s) Swish and Spit three times a day  chlorhexidine 2% Cloths 1 Application(s) Topical <User Schedule>  dextrose 5%. 1000 milliLiter(s) IV Continuous <Continuous>  dextrose 50% Injectable 12.5 Gram(s) IV Push once  dextrose 50% Injectable 25 Gram(s) IV Push once  dextrose 50% Injectable 25 Gram(s) IV Push once  docusate sodium 100 milliGRAM(s) Oral three times a day  gabapentin 600 milliGRAM(s) Oral two times a day  insulin glargine Injectable (LANTUS) 24 Unit(s) SubCutaneous at bedtime  insulin lispro (HumaLOG) corrective regimen sliding scale   SubCutaneous three times a day before meals  insulin lispro (HumaLOG) corrective regimen sliding scale   SubCutaneous at bedtime  insulin lispro Injectable (HumaLOG) 8 Unit(s) SubCutaneous three times a day before meals  investigational chemo - general 1 Tablet(s) Oral every 24 hours  levothyroxine 75 MICROGram(s) Oral daily  losartan 100 milliGRAM(s) Oral daily  ondansetron Injectable 8 milliGRAM(s) IV Push every 8 hours  sodium chloride 0.9%. 1000 milliLiter(s) IV Continuous <Continuous>      PRN MEDICATIONS  acetaminophen   Tablet .. 650 milliGRAM(s) Oral every 6 hours PRN  dextrose 40% Gel 15 Gram(s) Oral once PRN  glucagon  Injectable 1 milliGRAM(s) IntraMuscular once PRN  guaiFENesin   Syrup  (Sugar-Free) 200 milliGRAM(s) Oral every 6 hours PRN  melatonin 3 milliGRAM(s) Oral once PRN  metoclopramide Injectable 10 milliGRAM(s) IV Push every 6 hours PRN  polyethylene glycol 3350 17 Gram(s) Oral two times a day PRN        Vital Signs Last 24 Hrs  T(C): 37.7 (11 Aug 2019 06:02), Max: 38.6 (11 Aug 2019 05:08)  T(F): 99.9 (11 Aug 2019 06:02), Max: 101.4 (11 Aug 2019 05:08)  HR: 110 (11 Aug 2019 05:08) (90 - 110)  BP: 145/71 (11 Aug 2019 05:08) (114/65 - 145/71)  BP(mean): --  RR: 20 (11 Aug 2019 05:08) (16 - 20)  SpO2: 95% (11 Aug 2019 05:08) (93% - 96%)      PHYSICAL EXAM  General: NAD  HEENT: PERRLA, EOMI, clear oropharynx  Neck: supple  CV: (+) S1/S2 RRR  Lungs: bibasilar rales at  bases  Abdomen: soft, non-tender, non-distended (+) BS  Ext: no edema  Skin: no rashes   Neuro: alert and oriented X 3, no focal deficits  Central Line: C/D/I    LABS:                        6.7    0.9   )-----------( 86       ( 11 Aug 2019 07:18 )             19.9         Mean Cell Volume : 85.7 fl  Mean Cell Hemoglobin : 28.8 pg  Mean Cell Hemoglobin Concentration : 33.7 gm/dL  Auto Neutrophil # : 0.5 K/uL  Auto Lymphocyte # : 0.3 K/uL  Auto Monocyte # : 0.1 K/uL  Auto Eosinophil # : 0.0 K/uL  Auto Basophil # : 0.0 K/uL  Auto Neutrophil % : 53.1 %  Auto Lymphocyte % : 35.3 %  Auto Monocyte % : 8.1 %  Auto Eosinophil % : 3.5 %  Auto Basophil % : 0.0 %      08-11    135  |  102  |  11  ----------------------------<  227<H>  3.6   |  23  |  0.73    Ca    8.4      11 Aug 2019 07:18  Phos  2.8     08-11  Mg     1.5     08-11    TPro  6.8  /  Alb  3.1<L>  /  TBili  0.4  /  DBili  x   /  AST  50<H>  /  ALT  46<H>  /  AlkPhos  136<H>  08-11        Uric Acid 2.1        RECENT CULTURES:  08-10 @ 17:52  .Sputum  --  --  --  --  --  08-09 @ 22:59  .Blood  --  --  --    No growth to date.  --      RADIOLOGY & ADDITIONAL STUDIES:    < from: CT Chest No Cont (08.09.19 @ 14:24) >  Pulmonary fibrosis and traction bronchiectasis without honeycombing.   Overall these findings appear progressed compared to CT abdomen 7/4/2017.  Associated groundglass opacity may be related to active interstitial lung   disease, fluid overload, less likely infection.    < from: Xray Chest 1 View- PORTABLE-Urgent (08.07.19 @ 10:44) >  Interstitial lung disease. Hazy opacities are slightly increased. Diagnosis: AML FLT 3 (-)    Protocol/Chemo Regimen: Enrolled on Pfizer BRIGHT trial receiving Induction chemotherapy with Dauno/Cytarabine and daily Glastegib Vs Placebo    Day: 5    Pt endorsed: HA last night, resolved; productive cough with white  and yellow sputum mixed x few weeks, worse today; SOB at rest sometimes/RENTERIA, little more; no BM x 2 days    Review of Systems: Patient denies  dizziness, visual changes, chest pain, palpitations, abdominal pain, nausea, vomiting, diarrhea or dysuria.    Pain scale: none    Diet: Regular, consistent carbo     Allergies: No Known Allergies          ANTIMICROBIALS  cefepime   IVPB 2000 milliGRAM(s) IV Intermittent every 8 hours  posaconazole DR Tablet 300 milliGRAM(s) Oral daily      HEME/ONC MEDICATIONS  cytarabine IVPB (eMAR) 158 milliGRAM(s) IV Intermittent daily  enoxaparin Injectable 40 milliGRAM(s) SubCutaneous daily      STANDING MEDICATIONS  allopurinol 300 milliGRAM(s) Oral daily  Biotene Dry Mouth Oral Rinse 5 milliLiter(s) Swish and Spit three times a day  chlorhexidine 2% Cloths 1 Application(s) Topical <User Schedule>  dextrose 5%. 1000 milliLiter(s) IV Continuous <Continuous>  dextrose 50% Injectable 12.5 Gram(s) IV Push once  dextrose 50% Injectable 25 Gram(s) IV Push once  dextrose 50% Injectable 25 Gram(s) IV Push once  docusate sodium 100 milliGRAM(s) Oral three times a day  gabapentin 600 milliGRAM(s) Oral two times a day  insulin glargine Injectable (LANTUS) 24 Unit(s) SubCutaneous at bedtime  insulin lispro (HumaLOG) corrective regimen sliding scale   SubCutaneous three times a day before meals  insulin lispro (HumaLOG) corrective regimen sliding scale   SubCutaneous at bedtime  insulin lispro Injectable (HumaLOG) 8 Unit(s) SubCutaneous three times a day before meals  investigational chemo - general 1 Tablet(s) Oral every 24 hours  levothyroxine 75 MICROGram(s) Oral daily  losartan 100 milliGRAM(s) Oral daily  ondansetron Injectable 8 milliGRAM(s) IV Push every 8 hours  sodium chloride 0.9%. 1000 milliLiter(s) IV Continuous <Continuous>      PRN MEDICATIONS  acetaminophen   Tablet .. 650 milliGRAM(s) Oral every 6 hours PRN  dextrose 40% Gel 15 Gram(s) Oral once PRN  glucagon  Injectable 1 milliGRAM(s) IntraMuscular once PRN  guaiFENesin   Syrup  (Sugar-Free) 200 milliGRAM(s) Oral every 6 hours PRN  melatonin 3 milliGRAM(s) Oral once PRN  metoclopramide Injectable 10 milliGRAM(s) IV Push every 6 hours PRN  polyethylene glycol 3350 17 Gram(s) Oral two times a day PRN        Vital Signs Last 24 Hrs  T(C): 37.7 (11 Aug 2019 06:02), Max: 38.6 (11 Aug 2019 05:08)  T(F): 99.9 (11 Aug 2019 06:02), Max: 101.4 (11 Aug 2019 05:08)  HR: 110 (11 Aug 2019 05:08) (90 - 110)  BP: 145/71 (11 Aug 2019 05:08) (114/65 - 145/71)  BP(mean): --  RR: 20 (11 Aug 2019 05:08) (16 - 20)  SpO2: 95% (11 Aug 2019 05:08) (93% - 96%)      PHYSICAL EXAM  General: NAD  HEENT: Clear, no erythema, ulcers  CV: normal S1/S2; RRR  Lungs: bibasilar rales  Abdomen: soft, non-tender, non-distended (+) BS  Ext: no edema  Skin: no rash  Neuro: alert and oriented X 3  Central Line: C/D/I    LABS:                        6.7    0.9   )-----------( 86       ( 11 Aug 2019 07:18 )             19.9         Mean Cell Volume : 85.7 fl  Mean Cell Hemoglobin : 28.8 pg  Mean Cell Hemoglobin Concentration : 33.7 gm/dL  Auto Neutrophil # : 0.5 K/uL  Auto Lymphocyte # : 0.3 K/uL  Auto Monocyte # : 0.1 K/uL  Auto Eosinophil # : 0.0 K/uL  Auto Basophil # : 0.0 K/uL  Auto Neutrophil % : 53.1 %  Auto Lymphocyte % : 35.3 %  Auto Monocyte % : 8.1 %  Auto Eosinophil % : 3.5 %  Auto Basophil % : 0.0 %      08-11    135  |  102  |  11  ----------------------------<  227<H>  3.6   |  23  |  0.73    Ca    8.4      11 Aug 2019 07:18  Phos  2.8     08-11  Mg     1.5     08-11    TPro  6.8  /  Alb  3.1<L>  /  TBili  0.4  /  DBili  x   /  AST  50<H>  /  ALT  46<H>  /  AlkPhos  136<H>  08-11        Uric Acid 2.1        RECENT CULTURES:  08-10 @ 17:52  .Sputum  --  --  --  --  --  08-09 @ 22:59  .Blood  --  --  --    No growth to date.  --      RADIOLOGY & ADDITIONAL STUDIES:    < from: CT Chest No Cont (08.09.19 @ 14:24) >  Pulmonary fibrosis and traction bronchiectasis without honeycombing.   Overall these findings appear progressed compared to CT abdomen 7/4/2017.  Associated groundglass opacity may be related to active interstitial lung   disease, fluid overload, less likely infection.    < from: Xray Chest 1 View- PORTABLE-Urgent (08.07.19 @ 10:44) >  Interstitial lung disease. Hazy opacities are slightly increased.

## 2019-08-11 NOTE — PROGRESS NOTE ADULT - ATTENDING COMMENTS
63 yo F with hx RA, here for follow up, had new anemia and peripheral immature cells (few blasts)  BM bx done 8/2/19 --c/w AML, FLT-3 neg, FISH for PML-KRYSTAL and BCR-ABL negative. Pt gave consent for Pfeizer trial 7+3+ Glasdegib/placebo.  Patient here for her induction chemotherapy on trial.   Today will be day 4.  Daunorubicin 60 mg/m2 day 1-3, cytarabine 100 mg/m2 day 1-7, glasdegib/placebo 100mg daily PO.   Supportive care, anti-emetics, IVFs, allopurinol for TLS ppx.   Dyspnea on day 1 s/p lasix with improvement of symptoms, but still slightly dyspneic.   Interstitial Lung Disease seen on CXR, will obtain non-contrast CT Chest.    Neutropenic fever- cultures sent, started on Cefepime(d/c Levaquin); on Posaconazole  Monitor CBC, receives transfusions PRN.   Lantus QHS, following fingersticks closely, endocrine consult appreciated. Very poorly controlled diabetes.   ECOG performance status 1. 65 yo F with hx RA, here for follow up, had new anemia and peripheral immature cells (few blasts)  BM bx done 8/2/19 --c/w AML, FLT-3 neg, FISH for PML-KRYSTAL and BCR-ABL negative. Pt gave consent for Pfizer trial 7+3+ Glasdegib/placebo.  Patient here for her induction chemotherapy on trial.   Today will be day 5.  Daunorubicin 60 mg/m2 day 1-3, cytarabine 100 mg/m2 day 1-7, glasdegib/placebo 100mg daily PO.   Supportive care, anti-emetics, IVFs, allopurinol for TLS ppx.   Dyspnea on day 1 s/p lasix with improvement of symptoms, but still slightly dyspneic.   Supplement lytes  Transaminitis- monitor LFT's  Interstitial Lung Disease seen on CXR, will obtain non-contrast CT Chest.    Neutropenic fever- cultures sent, started on Cefepime(d/c Levaquin); on Posaconazole  Monitor CBC, receives transfusions PRN.   Lantus QHS, following fingersticks closely, endocrine consult appreciated. Very poorly controlled diabetes.   ECOG performance status 1.

## 2019-08-11 NOTE — ADVANCED PRACTICE NURSE CONSULT - ASSESSMENT
Pt A/Ox4, denies pain/discomfort, N/V. Chemotherapy teaching reinforced to pt at bedside. Right TL IJ, Dsg intact and dry, site no swelling and redness noted, easily flushed with positive blood return. Consent signed and filed. Lab results reviewed by Dr. Aguilera during rounds and said OK to treat pt. Chemotherapy verified by two RNs. At 1503,  cytarabine 100 mg/x2=630 mg IV infusion through IJ over 24 hours @ 22.9 ml/hr by alaris pump. Pt left comfortable on bed. Primary RN aware.

## 2019-08-11 NOTE — CHART NOTE - NSCHARTNOTEFT_GEN_A_CORE
CHIEF COMPLAINT: fever and cough    SUBJECTIVE / OVERNIGHT EVENTS: Notified patient had a fever of 101.4 this am. Patient complained of cough. Denies chest pain, sob, palpitations, HA, lightheadedness     Vital Signs Last 24 Hrs  T(C): 37.7 (11 Aug 2019 06:02), Max: 38.6 (11 Aug 2019 05:08)  T(F): 99.9 (11 Aug 2019 06:02), Max: 101.4 (11 Aug 2019 05:08)  HR: 110 (11 Aug 2019 05:08) (90 - 110)  BP: 145/71 (11 Aug 2019 05:08) (114/65 - 145/71)  BP(mean): --  RR: 20 (11 Aug 2019 05:08) (16 - 20)  SpO2: 95% (11 Aug 2019 05:08) (93% - 96%)    REVIEW OF SYSTEMS:  Constitutional: No fever, fatigue or weight loss.  Skin: No rash.  Eyes: No recent vision problems or eye pain.  ENT: No congestion, ear pain, or sore throat.  Endocrine: No thyroid problems.  Cardiovascular: No chest pain or palpation.  Respiratory: cough, No shortness of breath, congestion, or wheezing.  Gastrointestinal: No abdominal pain, nausea, vomiting, or diarrhea.  Genitourinary: No dysuria.  Musculoskeletal: No joint swelling.  Neurologic: No headache.        CAPILLARY BLOOD GLUCOSE      POCT Blood Glucose.: 252 mg/dL (10 Aug 2019 21:35)  POCT Blood Glucose.: 178 mg/dL (10 Aug 2019 18:31)  POCT Blood Glucose.: 174 mg/dL (10 Aug 2019 13:20)  POCT Blood Glucose.: 224 mg/dL (10 Aug 2019 10:02)    I&O's Summary    10 Aug 2019 07:01  -  11 Aug 2019 07:00  --------------------------------------------------------  IN: 3946 mL / OUT: 300 mL / NET: 3646 mL        LABS:                        7.3    1.3   )-----------( 102      ( 10 Aug 2019 07:15 )             21.2     -    135  |  102  |  11  ----------------------------<  227<H>  3.6   |  23  |  0.73    Ca    8.4      11 Aug 2019 07:18  Phos  2.8       Mg     1.5         TPro  6.8  /  Alb  3.1<L>  /  TBili  0.4  /  DBili  x   /  AST  50<H>  /  ALT  46<H>  /  AlkPhos  136<H>        Culture - Sputum . (08.10.19 @ 17:52)    Gram Stain:   Few polymorphonuclear leukocytes per low power field  Rare Squamous epithelial cells per low power field  No organisms seen per oil power field    Specimen Source: .Sputum      Urinalysis Basic - ( 09 Aug 2019 23:01 )    Color: Light Orange / Appearance: Clear / S.011 / pH: x  Gluc: x / Ketone: Negative  / Bili: Negative / Urobili: <2 mg/dL   Blood: x / Protein: Trace / Nitrite: Negative   Leuk Esterase: Negative / RBC: 1 /HPF / WBC 1 /HPF   Sq Epi: x / Non Sq Epi: 0 /HPF / Bacteria: Negative        RADIOLOGY & ADDITIONAL TESTS:  < from: CT Chest No Cont (19 @ 14:24) >    IMPRESSION:     Pulmonary fibrosis and traction bronchiectasis without honeycombing.   Overall these findings appear progressed compared to CT abdomen 2017.    Associated groundglass opacity may be related to active interstitial lung   disease, fluid overload, less likely infection.    < end of copied text >      MEDICATIONS  (STANDING):  allopurinol 300 milliGRAM(s) Oral daily  Biotene Dry Mouth Oral Rinse 5 milliLiter(s) Swish and Spit three times a day  cefepime   IVPB 2000 milliGRAM(s) IV Intermittent every 8 hours  chlorhexidine 2% Cloths 1 Application(s) Topical <User Schedule>  cytarabine IVPB (eMAR) 158 milliGRAM(s) IV Intermittent daily  dextrose 5%. 1000 milliLiter(s) (50 mL/Hr) IV Continuous <Continuous>  dextrose 50% Injectable 12.5 Gram(s) IV Push once  dextrose 50% Injectable 25 Gram(s) IV Push once  dextrose 50% Injectable 25 Gram(s) IV Push once  docusate sodium 100 milliGRAM(s) Oral three times a day  enoxaparin Injectable 40 milliGRAM(s) SubCutaneous daily  gabapentin 600 milliGRAM(s) Oral two times a day  insulin glargine Injectable (LANTUS) 24 Unit(s) SubCutaneous at bedtime  insulin lispro (HumaLOG) corrective regimen sliding scale   SubCutaneous three times a day before meals  insulin lispro (HumaLOG) corrective regimen sliding scale   SubCutaneous at bedtime  insulin lispro Injectable (HumaLOG) 8 Unit(s) SubCutaneous three times a day before meals  investigational chemo - general 1 Tablet(s) Oral every 24 hours  levothyroxine 75 MICROGram(s) Oral daily  losartan 100 milliGRAM(s) Oral daily  magnesium sulfate  IVPB 2 Gram(s) IV Intermittent once  ondansetron Injectable 8 milliGRAM(s) IV Push every 8 hours  posaconazole DR Tablet 300 milliGRAM(s) Oral daily  sodium chloride 0.9%. 1000 milliLiter(s) (100 mL/Hr) IV Continuous <Continuous>    MEDICATIONS  (PRN):  acetaminophen   Tablet .. 650 milliGRAM(s) Oral every 6 hours PRN Temp greater or equal to 38C (100.4F), Mild Pain (1 - 3)  dextrose 40% Gel 15 Gram(s) Oral once PRN Blood Glucose LESS THAN 70 milliGRAM(s)/deciliter  glucagon  Injectable 1 milliGRAM(s) IntraMuscular once PRN Glucose LESS THAN 70 milligrams/deciliter  guaiFENesin   Syrup  (Sugar-Free) 200 milliGRAM(s) Oral every 6 hours PRN Cough  melatonin 3 milliGRAM(s) Oral once PRN Insomnia  metoclopramide Injectable 10 milliGRAM(s) IV Push every 6 hours PRN nausea/vommiting  polyethylene glycol 3350 17 Gram(s) Oral two times a day PRN Constipation      PHYSICAL EXAM:  GENERAL: NAD, well-developed  NEUROLOGY/PSYCHIATRIC: non-focal  CARDIOVASCULAR: Regular rate and rhythm; No murmurs, rubs, or gallops  RESPIRATORY: crackles bases. no accessory muscle use  GASTROINTESTINAL: Soft, Nontender, Nondistended; Bowel sounds present  EXTREMITIES:  2+ Peripheral Pulses, No clubbing, cyanosis, or edema  SKIN: No rashes or lesions  GENITOURINARY: Non distended bladder    PMH  DVT (deep venous thrombosis)  Gallstones  Rheumatoid arthritis  Type 2 diabetes mellitus  Carpal tunnel syndrome  Hypothyroid  High cholesterol  Diabetes  HTN (hypertension)  HTN (hypertension)  AML (acute myelogenous leukemia)  Status post colonoscopy  Normal spontaneous vaginal delivery  Status post cholecystectomy  No significant past surgical history        ASSESSMENT/PLAN:   65 yo F with history of DM,  RA , diagnosed in 2016, chronic depression now with newly diagnosed AML admitted for induction chemotherapy on the Pfizer [place] study with dauno/cytarabine and glasdegib/placebo.  Now with fever.    Fever  blood cx prelim from   Tylenol  cooling measures  c/w antimicrobials     Endorsed to primary day team, attending to follow    Radha Sagastume  [facility] [id number]

## 2019-08-11 NOTE — PROGRESS NOTE ADULT - PROBLEM SELECTOR PLAN 2
The patient is not neutropenic, febrile  s/p  pancx  on 8/9 8/9 Levaquin switched to Cefepime   8/9 CT chest Pulmonary fibrosis and traction bronchiectasis without honeycombing.   Overall these findings appear progressed compared to CT abdomen 7/4/2017.  Associated groundglass opacity may be related to active interstitial lung   disease, fluid overload, less likely infection.  Continue  Posaconazole (Monitor QTc BIW M/Th with Posaconazole and Study drug) The patient is neutropenic, febrile  Pancx for next fever  s/p  pancx  on 8/9 8/9 Levaquin switched to Cefepime   8/9 CT chest Pulmonary fibrosis and traction bronchiectasis without honeycombing.   Overall these findings appear progressed compared to CT abdomen 7/4/2017.  Associated groundglass opacity may be related to active interstitial lung   disease, fluid overload, less likely infection.  Continue  Posaconazole (Monitor QTc BIW M/Th with Posaconazole and Study drug)  Consider Pulmonary consult in am   Added Mucinex, hyper sal, Duoneb for thick sputum

## 2019-08-12 DIAGNOSIS — R74.0 NONSPECIFIC ELEVATION OF LEVELS OF TRANSAMINASE AND LACTIC ACID DEHYDROGENASE [LDH]: ICD-10-CM

## 2019-08-12 LAB
ALBUMIN SERPL ELPH-MCNC: 3 G/DL — LOW (ref 3.3–5)
ALP SERPL-CCNC: 139 U/L — HIGH (ref 40–120)
ALT FLD-CCNC: 54 U/L — HIGH (ref 10–45)
ANION GAP SERPL CALC-SCNC: 10 MMOL/L — SIGNIFICANT CHANGE UP (ref 5–17)
APTT BLD: 32.5 SEC — SIGNIFICANT CHANGE UP (ref 27.5–36.3)
AST SERPL-CCNC: 53 U/L — HIGH (ref 10–40)
BASOPHILS # BLD AUTO: 0 K/UL — SIGNIFICANT CHANGE UP (ref 0–0.2)
BASOPHILS NFR BLD AUTO: 0 % — SIGNIFICANT CHANGE UP (ref 0–2)
BILIRUB SERPL-MCNC: 0.4 MG/DL — SIGNIFICANT CHANGE UP (ref 0.2–1.2)
BLD GP AB SCN SERPL QL: NEGATIVE — SIGNIFICANT CHANGE UP
BUN SERPL-MCNC: 11 MG/DL — SIGNIFICANT CHANGE UP (ref 7–23)
CALCIUM SERPL-MCNC: 8 MG/DL — LOW (ref 8.4–10.5)
CHLORIDE SERPL-SCNC: 100 MMOL/L — SIGNIFICANT CHANGE UP (ref 96–108)
CO2 SERPL-SCNC: 23 MMOL/L — SIGNIFICANT CHANGE UP (ref 22–31)
CREAT SERPL-MCNC: 0.78 MG/DL — SIGNIFICANT CHANGE UP (ref 0.5–1.3)
CULTURE RESULTS: NO GROWTH — SIGNIFICANT CHANGE UP
EOSINOPHIL # BLD AUTO: 0 K/UL — SIGNIFICANT CHANGE UP (ref 0–0.5)
EOSINOPHIL NFR BLD AUTO: 1.6 % — SIGNIFICANT CHANGE UP (ref 0–6)
GLUCOSE BLDC GLUCOMTR-MCNC: 130 MG/DL — HIGH (ref 70–99)
GLUCOSE BLDC GLUCOMTR-MCNC: 221 MG/DL — HIGH (ref 70–99)
GLUCOSE BLDC GLUCOMTR-MCNC: 269 MG/DL — HIGH (ref 70–99)
GLUCOSE BLDC GLUCOMTR-MCNC: 305 MG/DL — HIGH (ref 70–99)
GLUCOSE SERPL-MCNC: 155 MG/DL — HIGH (ref 70–99)
GRAM STN FLD: SIGNIFICANT CHANGE UP
HCT VFR BLD CALC: 21.2 % — LOW (ref 34.5–45)
HGB BLD-MCNC: 7.2 G/DL — LOW (ref 11.5–15.5)
INR BLD: 1.11 RATIO — SIGNIFICANT CHANGE UP (ref 0.88–1.16)
LDH SERPL L TO P-CCNC: 300 U/L — HIGH (ref 50–242)
LYMPHOCYTES # BLD AUTO: 0.4 K/UL — LOW (ref 1–3.3)
LYMPHOCYTES # BLD AUTO: 69.7 % — HIGH (ref 13–44)
MAGNESIUM SERPL-MCNC: 1.7 MG/DL — SIGNIFICANT CHANGE UP (ref 1.6–2.6)
MCHC RBC-ENTMCNC: 29.7 PG — SIGNIFICANT CHANGE UP (ref 27–34)
MCHC RBC-ENTMCNC: 34.2 GM/DL — SIGNIFICANT CHANGE UP (ref 32–36)
MCV RBC AUTO: 86.7 FL — SIGNIFICANT CHANGE UP (ref 80–100)
MONOCYTES # BLD AUTO: 0.1 K/UL — SIGNIFICANT CHANGE UP (ref 0–0.9)
MONOCYTES NFR BLD AUTO: 13 % — SIGNIFICANT CHANGE UP (ref 2–14)
NEUTROPHILS # BLD AUTO: 0.1 K/UL — LOW (ref 1.8–7.4)
NEUTROPHILS NFR BLD AUTO: 15.7 % — LOW (ref 43–77)
PHOSPHATE SERPL-MCNC: 2.5 MG/DL — SIGNIFICANT CHANGE UP (ref 2.5–4.5)
PLAT MORPH BLD: NORMAL — SIGNIFICANT CHANGE UP
PLATELET # BLD AUTO: 68 K/UL — LOW (ref 150–400)
POTASSIUM SERPL-MCNC: 3.3 MMOL/L — LOW (ref 3.5–5.3)
POTASSIUM SERPL-SCNC: 3.3 MMOL/L — LOW (ref 3.5–5.3)
PROT SERPL-MCNC: 6.7 G/DL — SIGNIFICANT CHANGE UP (ref 6–8.3)
PROTHROM AB SERPL-ACNC: 12.7 SEC — SIGNIFICANT CHANGE UP (ref 10–12.9)
RBC # BLD: 2.44 M/UL — LOW (ref 3.8–5.2)
RBC # FLD: 14.8 % — HIGH (ref 10.3–14.5)
RBC BLD AUTO: SIGNIFICANT CHANGE UP
RH IG SCN BLD-IMP: POSITIVE — SIGNIFICANT CHANGE UP
SODIUM SERPL-SCNC: 133 MMOL/L — LOW (ref 135–145)
SPECIMEN SOURCE: SIGNIFICANT CHANGE UP
SPECIMEN SOURCE: SIGNIFICANT CHANGE UP
URATE SERPL-MCNC: 1.8 MG/DL — LOW (ref 2.5–7)
WBC # BLD: 0.6 K/UL — CRITICAL LOW (ref 3.8–10.5)
WBC # FLD AUTO: 0.6 K/UL — CRITICAL LOW (ref 3.8–10.5)

## 2019-08-12 PROCEDURE — 99232 SBSQ HOSP IP/OBS MODERATE 35: CPT

## 2019-08-12 PROCEDURE — 93010 ELECTROCARDIOGRAM REPORT: CPT

## 2019-08-12 RX ORDER — POTASSIUM CHLORIDE 20 MEQ
20 PACKET (EA) ORAL
Refills: 0 | Status: COMPLETED | OUTPATIENT
Start: 2019-08-12 | End: 2019-08-12

## 2019-08-12 RX ORDER — OXYCODONE HYDROCHLORIDE 5 MG/1
5 TABLET ORAL EVERY 4 HOURS
Refills: 0 | Status: DISCONTINUED | OUTPATIENT
Start: 2019-08-12 | End: 2019-08-16

## 2019-08-12 RX ORDER — ACETAMINOPHEN 500 MG
650 TABLET ORAL ONCE
Refills: 0 | Status: COMPLETED | OUTPATIENT
Start: 2019-08-12 | End: 2019-08-12

## 2019-08-12 RX ORDER — SODIUM CHLORIDE 9 MG/ML
1000 INJECTION INTRAMUSCULAR; INTRAVENOUS; SUBCUTANEOUS
Refills: 0 | Status: DISCONTINUED | OUTPATIENT
Start: 2019-08-12 | End: 2019-08-28

## 2019-08-12 RX ORDER — FUROSEMIDE 40 MG
40 TABLET ORAL ONCE
Refills: 0 | Status: COMPLETED | OUTPATIENT
Start: 2019-08-12 | End: 2019-08-12

## 2019-08-12 RX ORDER — INSULIN LISPRO 100/ML
12 VIAL (ML) SUBCUTANEOUS
Refills: 0 | Status: DISCONTINUED | OUTPATIENT
Start: 2019-08-12 | End: 2019-08-14

## 2019-08-12 RX ADMIN — Medication 650 MILLIGRAM(S): at 05:31

## 2019-08-12 RX ADMIN — Medication 2: at 13:00

## 2019-08-12 RX ADMIN — GABAPENTIN 600 MILLIGRAM(S): 400 CAPSULE ORAL at 17:08

## 2019-08-12 RX ADMIN — Medication 3: at 17:13

## 2019-08-12 RX ADMIN — Medication 3 MILLILITER(S): at 23:08

## 2019-08-12 RX ADMIN — Medication 3 MILLILITER(S): at 17:08

## 2019-08-12 RX ADMIN — Medication 75 MICROGRAM(S): at 05:30

## 2019-08-12 RX ADMIN — SODIUM CHLORIDE 100 MILLILITER(S): 9 INJECTION INTRAMUSCULAR; INTRAVENOUS; SUBCUTANEOUS at 05:35

## 2019-08-12 RX ADMIN — POSACONAZOLE 300 MILLIGRAM(S): 100 TABLET, DELAYED RELEASE ORAL at 12:32

## 2019-08-12 RX ADMIN — ONDANSETRON 8 MILLIGRAM(S): 8 TABLET, FILM COATED ORAL at 21:27

## 2019-08-12 RX ADMIN — Medication 3 MILLILITER(S): at 12:32

## 2019-08-12 RX ADMIN — CEFEPIME 100 MILLIGRAM(S): 1 INJECTION, POWDER, FOR SOLUTION INTRAMUSCULAR; INTRAVENOUS at 14:17

## 2019-08-12 RX ADMIN — Medication 300 MILLIGRAM(S): at 11:00

## 2019-08-12 RX ADMIN — Medication 50 MILLIEQUIVALENT(S): at 16:39

## 2019-08-12 RX ADMIN — CEFEPIME 100 MILLIGRAM(S): 1 INJECTION, POWDER, FOR SOLUTION INTRAMUSCULAR; INTRAVENOUS at 05:28

## 2019-08-12 RX ADMIN — Medication 650 MILLIGRAM(S): at 00:40

## 2019-08-12 RX ADMIN — Medication 5 MILLILITER(S): at 14:22

## 2019-08-12 RX ADMIN — LOSARTAN POTASSIUM 100 MILLIGRAM(S): 100 TABLET, FILM COATED ORAL at 05:30

## 2019-08-12 RX ADMIN — Medication 5 MILLILITER(S): at 21:26

## 2019-08-12 RX ADMIN — Medication 50 MILLIEQUIVALENT(S): at 09:28

## 2019-08-12 RX ADMIN — GABAPENTIN 600 MILLIGRAM(S): 400 CAPSULE ORAL at 05:35

## 2019-08-12 RX ADMIN — Medication 4: at 21:27

## 2019-08-12 RX ADMIN — ONDANSETRON 8 MILLIGRAM(S): 8 TABLET, FILM COATED ORAL at 14:20

## 2019-08-12 RX ADMIN — Medication 8 UNIT(S): at 17:13

## 2019-08-12 RX ADMIN — INSULIN GLARGINE 24 UNIT(S): 100 INJECTION, SOLUTION SUBCUTANEOUS at 21:26

## 2019-08-12 RX ADMIN — Medication 20.9 MILLIGRAM(S): at 15:39

## 2019-08-12 RX ADMIN — ONDANSETRON 8 MILLIGRAM(S): 8 TABLET, FILM COATED ORAL at 05:31

## 2019-08-12 RX ADMIN — Medication 650 MILLIGRAM(S): at 14:45

## 2019-08-12 RX ADMIN — CEFEPIME 100 MILLIGRAM(S): 1 INJECTION, POWDER, FOR SOLUTION INTRAMUSCULAR; INTRAVENOUS at 21:26

## 2019-08-12 RX ADMIN — SODIUM CHLORIDE 4 MILLILITER(S): 9 INJECTION INTRAMUSCULAR; INTRAVENOUS; SUBCUTANEOUS at 05:30

## 2019-08-12 RX ADMIN — Medication 650 MILLIGRAM(S): at 14:18

## 2019-08-12 RX ADMIN — OXYCODONE HYDROCHLORIDE 5 MILLIGRAM(S): 5 TABLET ORAL at 21:41

## 2019-08-12 RX ADMIN — Medication 650 MILLIGRAM(S): at 17:07

## 2019-08-12 RX ADMIN — Medication 50 MILLIEQUIVALENT(S): at 12:32

## 2019-08-12 RX ADMIN — OXYCODONE HYDROCHLORIDE 5 MILLIGRAM(S): 5 TABLET ORAL at 22:30

## 2019-08-12 RX ADMIN — Medication 8 UNIT(S): at 09:25

## 2019-08-12 RX ADMIN — Medication 5 MILLILITER(S): at 05:29

## 2019-08-12 RX ADMIN — ENOXAPARIN SODIUM 40 MILLIGRAM(S): 100 INJECTION SUBCUTANEOUS at 11:00

## 2019-08-12 RX ADMIN — Medication 1200 MILLIGRAM(S): at 05:30

## 2019-08-12 RX ADMIN — Medication 650 MILLIGRAM(S): at 02:00

## 2019-08-12 RX ADMIN — CHLORHEXIDINE GLUCONATE 1 APPLICATION(S): 213 SOLUTION TOPICAL at 06:36

## 2019-08-12 RX ADMIN — Medication 3 MILLILITER(S): at 05:28

## 2019-08-12 RX ADMIN — Medication 8 UNIT(S): at 13:00

## 2019-08-12 RX ADMIN — Medication 40 MILLIGRAM(S): at 10:59

## 2019-08-12 RX ADMIN — SODIUM CHLORIDE 4 MILLILITER(S): 9 INJECTION INTRAMUSCULAR; INTRAVENOUS; SUBCUTANEOUS at 17:08

## 2019-08-12 RX ADMIN — SODIUM CHLORIDE 50 MILLILITER(S): 9 INJECTION INTRAMUSCULAR; INTRAVENOUS; SUBCUTANEOUS at 12:35

## 2019-08-12 NOTE — PROGRESS NOTE ADULT - PROBLEM SELECTOR PLAN 1
Enrolled on [employer] BRIGHT trial receiving Induction chemotherapy with Dauno/Cytarabine and daily Glastegib Vs Placebo  8/20 Day 14 BM bx due  Monitor CBC/Lytes and transfuse/replete PRN  KCL 20 Meq IVPB x 1 for hypokalemia  Strict Is and Os/ Daily weights/ Mouth Care  Weight gain, CXR with  increased ILD, RENTERIA, crackles on exam  Lasix 40mg IV x 1 today and PRN  CT chest wo contrast to assess ILD and possible pulmonary edema   Allopurinol 300mg PO daily  IVF  Antiemetics  ECOG 1 Enrolled on [employer] BRIGHT trial receiving Induction chemotherapy with Dauno/Cytarabine and daily Glastegib Vs Placebo  8/20 Day 14 BM bx due  Monitor CBC/Lytes and transfuse/replete PRN  Hypokalemia: KCL 20 mEq IVPB x 3  Strict Is and Os/ Daily weights/ Mouth Care  Allopurinol 300mg PO daily  IVF  Antiemetics  ECOG 1 Enrolled on [employer] BRIGHT trial receiving Induction chemotherapy with Dauno/Cytarabine and daily Glastegib Vs Placebo  8/20 Day 14 BM bx due  Monitor CBC/Lytes and transfuse/replete PRN  Hypokalemia: KCL 20 mEq IV x 3  Strict Is and Os/ Daily weights/ Mouth Care  Lasix 40mg IV x 1  Allopurinol 300mg PO daily  IVF  Antiemetics  ECOG 1

## 2019-08-12 NOTE — PROGRESS NOTE ADULT - PROBLEM SELECTOR PLAN 2
The patient is neutropenic, febrile  Pancx for next fever  s/p  pancx  on 8/9 8/9 Levaquin switched to Cefepime   8/9 CT chest Pulmonary fibrosis and traction bronchiectasis without honeycombing.   Overall these findings appear progressed compared to CT abdomen 7/4/2017.  Associated groundglass opacity may be related to active interstitial lung   disease, fluid overload, less likely infection.  Continue  Posaconazole (Monitor QTc BIW M/Th with Posaconazole and Study drug)  Consider Pulmonary consult in am   Added Mucinex, hyper sal, Duoneb for thick sputum The patient is neutropenic, febrile  If febrile Pan Cx and CXR  Continue Cefepime and Posaconazole (Monitor QTc BIW M/Th with Posaconazole and Study drug)  8/9 CT chest: Pulmonary fibrosis and traction bronchiectasis without honeycombing.   Overall these findings appear progressed compared to CT abdomen 7/4/2017  Associated ground-glass opacity may be related to active interstitial lung   disease, fluid overload, less likely infection The patient is neutropenic, febrile  If febrile Pan Cx and CXR  Continue Cefepime and Posaconazole (Monitor QTc BIW M/Th with Posaconazole and Study drug)  8/9 CT chest: Pulmonary fibrosis and traction bronchiectasis without honeycombing.   Overall these findings appear progressed compared to CT abdomen 7/4/2017  Associated ground-glass opacity may be related to active interstitial lung disease, fluid overload, less likely infection

## 2019-08-12 NOTE — PROGRESS NOTE ADULT - SUBJECTIVE AND OBJECTIVE BOX
Diagnosis:    Protocol/Chemo Regimen:    Day:     Pt endorsed:    Review of Systems:     Pain scale:     Diet:     Allergies: No Known Allergies        ANTIMICROBIALS  cefepime   IVPB 2000 milliGRAM(s) IV Intermittent every 8 hours  posaconazole DR Tablet 300 milliGRAM(s) Oral daily      HEME/ONC MEDICATIONS  cytarabine IVPB (eMAR) 158 milliGRAM(s) IV Intermittent daily  enoxaparin Injectable 40 milliGRAM(s) SubCutaneous daily      STANDING MEDICATIONS  ALBUTerol    90 MICROgram(s) HFA Inhaler 1 Puff(s) Inhalation every 6 hours  ALBUTerol/ipratropium for Nebulization 3 milliLiter(s) Nebulizer every 6 hours  allopurinol 300 milliGRAM(s) Oral daily  Biotene Dry Mouth Oral Rinse 5 milliLiter(s) Swish and Spit three times a day  chlorhexidine 2% Cloths 1 Application(s) Topical <User Schedule>  dextrose 5%. 1000 milliLiter(s) IV Continuous <Continuous>  dextrose 50% Injectable 12.5 Gram(s) IV Push once  dextrose 50% Injectable 25 Gram(s) IV Push once  dextrose 50% Injectable 25 Gram(s) IV Push once  gabapentin 600 milliGRAM(s) Oral two times a day  insulin glargine Injectable (LANTUS) 24 Unit(s) SubCutaneous at bedtime  insulin lispro (HumaLOG) corrective regimen sliding scale   SubCutaneous three times a day before meals  insulin lispro (HumaLOG) corrective regimen sliding scale   SubCutaneous at bedtime  insulin lispro Injectable (HumaLOG) 8 Unit(s) SubCutaneous three times a day before meals  investigational chemo - general 1 Tablet(s) Oral every 24 hours  levothyroxine 75 MICROGram(s) Oral daily  losartan 100 milliGRAM(s) Oral daily  ondansetron Injectable 8 milliGRAM(s) IV Push every 8 hours  senna 2 Tablet(s) Oral two times a day  sodium chloride 0.9%. 1000 milliLiter(s) IV Continuous <Continuous>  sodium chloride 3%  Inhalation 4 milliLiter(s) Inhalation two times a day  tiotropium 18 MICROgram(s) Capsule 1 Capsule(s) Inhalation daily      PRN MEDICATIONS  acetaminophen   Tablet .. 650 milliGRAM(s) Oral every 6 hours PRN  dextrose 40% Gel 15 Gram(s) Oral once PRN  glucagon  Injectable 1 milliGRAM(s) IntraMuscular once PRN  melatonin 3 milliGRAM(s) Oral once PRN  metoclopramide Injectable 10 milliGRAM(s) IV Push every 6 hours PRN  polyethylene glycol 3350 17 Gram(s) Oral two times a day PRN        Vital Signs Last 24 Hrs  T(C): 38.3 (12 Aug 2019 05:25), Max: 38.9 (11 Aug 2019 15:49)  T(F): 101 (12 Aug 2019 05:25), Max: 102 (11 Aug 2019 15:49)  HR: 91 (12 Aug 2019 05:59) (91 - 108)  BP: 126/68 (12 Aug 2019 05:59) (119/66 - 146/74)  BP(mean): --  RR: 18 (12 Aug 2019 05:59) (18 - 20)  SpO2: 99% (12 Aug 2019 05:59) (93% - 100%)        PHYSICAL EXAM  General: NAD  HEENT: PERRLA, EOMI, clear oropharynx  Neck: supple  CV: (+) S1/S2 RRR  Lungs: clear to auscultation, no wheezes or rales  Abdomen: soft, non-tender, non-distended (+) BS  Ext: no edema  Skin: no rashes   Neuro: alert and oriented X 3, no focal deficits  Central Line:             LABS:                 RECENT CULTURES:    Culture - Sputum . (08.12.19 @ 01:34)    Gram Stain:   Rare polymorphonuclear leukocytes per low power field  Few Squamous epithelial cells per low power field  Moderate Gram positive cocci in pairs seen per oil power field  Moderate Gram Variable Rods seen per oil power field    Specimen Source: .Sputum    Culture - Urine (08.10.19 @ 17:19)    Specimen Source: .Urine    Culture Results:   No growth      Culture - Blood (08.09.19 @ 22:59)    Specimen Source: .Blood    Culture Results:   No growth to date.        RADIOLOGY & ADDITIONAL STUDIES: Diagnosis: AML FLT 3 (-)    Protocol/Chemo Regimen: Enrolled on [employer] BRIGHT trial receiving Induction chemotherapy with Dauno/Cytarabine and daily Glastegib Vs Placebo    Day: 6    Pt endorsed: fatigue, fever overnight    Review of Systems: Patient denies headache, dizziness, visual changes, chest pain, palpitations, abdominal pain, nausea, vomiting, diarrhea or dysuria.    Pain scale: denies    Diet: Consistent Carbohydrate    Allergies: No Known Allergies      ANTIMICROBIALS  cefepime   IVPB 2000 milliGRAM(s) IV Intermittent every 8 hours  posaconazole DR Tablet 300 milliGRAM(s) Oral daily      HEME/ONC MEDICATIONS  cytarabine IVPB (eMAR) 158 milliGRAM(s) IV Intermittent daily  enoxaparin Injectable 40 milliGRAM(s) SubCutaneous daily      STANDING MEDICATIONS  ALBUTerol    90 MICROgram(s) HFA Inhaler 1 Puff(s) Inhalation every 6 hours  ALBUTerol/ipratropium for Nebulization 3 milliLiter(s) Nebulizer every 6 hours  allopurinol 300 milliGRAM(s) Oral daily  Biotene Dry Mouth Oral Rinse 5 milliLiter(s) Swish and Spit three times a day  chlorhexidine 2% Cloths 1 Application(s) Topical <User Schedule>  dextrose 5%. 1000 milliLiter(s) IV Continuous <Continuous>  dextrose 50% Injectable 12.5 Gram(s) IV Push once  dextrose 50% Injectable 25 Gram(s) IV Push once  dextrose 50% Injectable 25 Gram(s) IV Push once  gabapentin 600 milliGRAM(s) Oral two times a day  insulin glargine Injectable (LANTUS) 24 Unit(s) SubCutaneous at bedtime  insulin lispro (HumaLOG) corrective regimen sliding scale   SubCutaneous three times a day before meals  insulin lispro (HumaLOG) corrective regimen sliding scale   SubCutaneous at bedtime  insulin lispro Injectable (HumaLOG) 8 Unit(s) SubCutaneous three times a day before meals  investigational chemo - general 1 Tablet(s) Oral every 24 hours  levothyroxine 75 MICROGram(s) Oral daily  losartan 100 milliGRAM(s) Oral daily  ondansetron Injectable 8 milliGRAM(s) IV Push every 8 hours  senna 2 Tablet(s) Oral two times a day  sodium chloride 0.9%. 1000 milliLiter(s) IV Continuous <Continuous>  sodium chloride 3%  Inhalation 4 milliLiter(s) Inhalation two times a day  tiotropium 18 MICROgram(s) Capsule 1 Capsule(s) Inhalation daily      PRN MEDICATIONS  acetaminophen   Tablet .. 650 milliGRAM(s) Oral every 6 hours PRN  dextrose 40% Gel 15 Gram(s) Oral once PRN  glucagon  Injectable 1 milliGRAM(s) IntraMuscular once PRN  melatonin 3 milliGRAM(s) Oral once PRN  metoclopramide Injectable 10 milliGRAM(s) IV Push every 6 hours PRN  polyethylene glycol 3350 17 Gram(s) Oral two times a day PRN        Vital Signs Last 24 Hrs  T(C): 38.3 (12 Aug 2019 05:25), Max: 38.9 (11 Aug 2019 15:49)  T(F): 101 (12 Aug 2019 05:25), Max: 102 (11 Aug 2019 15:49)  HR: 91 (12 Aug 2019 05:59) (91 - 108)  BP: 126/68 (12 Aug 2019 05:59) (119/66 - 146/74)  BP(mean): --  RR: 18 (12 Aug 2019 05:59) (18 - 20)  SpO2: 99% (12 Aug 2019 05:59) (93% - 100%)        PHYSICAL EXAM  General: NAD  HEENT: PERRLA, EOMI, clear oropharynx  Neck: supple  CV: (+) S1/S2 RRR  Lungs: clear to auscultation, no wheezes or rales  Abdomen: soft, non-tender, non-distended (+) BS  Ext: no edema  Skin: no rashes   Neuro: alert and oriented X 3, no focal deficits  Central Line: C/D/I        LABS:                                 7.2    0.6   )-----------( 68       ( 12 Aug 2019 07:16 )             21.2         Mean Cell Volume : 86.7 fl  Mean Cell Hemoglobin : 29.7 pg  Mean Cell Hemoglobin Concentration : 34.2 gm/dL  Auto Neutrophil # : 0.1 K/uL  Auto Lymphocyte # : 0.4 K/uL  Auto Monocyte # : 0.1 K/uL  Auto Eosinophil # : 0.0 K/uL  Auto Basophil # : 0.0 K/uL  Auto Neutrophil % : 15.7 %  Auto Lymphocyte % : 69.7 %  Auto Monocyte % : 13.0 %  Auto Eosinophil % : 1.6 %  Auto Basophil % : 0.0 %      08-12    133<L>  |  100  |  11  ----------------------------<  155<H>  3.3<L>   |  23  |  0.78    Ca    8.0<L>      12 Aug 2019 07:16  Phos  2.5     08-12  Mg     1.7     08-12    TPro  6.7  /  Alb  3.0<L>  /  TBili  0.4  /  DBili  x   /  AST  53<H>  /  ALT  54<H>  /  AlkPhos  139<H>  08-12      Mg 1.7  Phos 2.5      PT/INR - ( 12 Aug 2019 07:16 )   PT: 12.7 sec;   INR: 1.11 ratio         PTT - ( 12 Aug 2019 07:16 )  PTT:32.5 sec      Uric Acid 1.8        RECENT CULTURES:    Culture - Sputum . (08.12.19 @ 01:34)    Gram Stain:   Rare polymorphonuclear leukocytes per low power field  Few Squamous epithelial cells per low power field  Moderate Gram positive cocci in pairs seen per oil power field  Moderate Gram Variable Rods seen per oil power field    Specimen Source: .Sputum    Culture - Urine (08.10.19 @ 17:19)    Specimen Source: .Urine    Culture Results:   No growth      Culture - Blood (08.09.19 @ 22:59)    Specimen Source: .Blood    Culture Results:   No growth to date.        RADIOLOGY & ADDITIONAL STUDIES:    EXAM:  CT CHEST                        PROCEDURE DATE:  08/09/2019   IMPRESSION: Pulmonary fibrosis and traction bronchiectasis without honeycombing.   Overall these findings appear progressed compared to CT abdomen 7/4/2017.  Associated groundglass opacity may be related to active interstitial lung   disease, fluid overload, less likely infection. Diagnosis: AML FLT 3 (-)    Protocol/Chemo Regimen: Enrolled on [employer] BRIGHT trial receiving Induction chemotherapy with Dauno/Cytarabine and daily Glastegib Vs Placebo    Day: 6    Pt endorsed: fatigue, fever overnight    Review of Systems: Patient denies headache, dizziness, visual changes, chest pain, palpitations, abdominal pain, nausea, vomiting, diarrhea or dysuria.    Pain scale: denies    Diet: Consistent Carbohydrate    Allergies: No Known Allergies      ANTIMICROBIALS  cefepime   IVPB 2000 milliGRAM(s) IV Intermittent every 8 hours  posaconazole DR Tablet 300 milliGRAM(s) Oral daily      HEME/ONC MEDICATIONS  cytarabine IVPB (eMAR) 158 milliGRAM(s) IV Intermittent daily  enoxaparin Injectable 40 milliGRAM(s) SubCutaneous daily      STANDING MEDICATIONS  ALBUTerol    90 MICROgram(s) HFA Inhaler 1 Puff(s) Inhalation every 6 hours  ALBUTerol/ipratropium for Nebulization 3 milliLiter(s) Nebulizer every 6 hours  allopurinol 300 milliGRAM(s) Oral daily  Biotene Dry Mouth Oral Rinse 5 milliLiter(s) Swish and Spit three times a day  chlorhexidine 2% Cloths 1 Application(s) Topical <User Schedule>  dextrose 5%. 1000 milliLiter(s) IV Continuous <Continuous>  dextrose 50% Injectable 12.5 Gram(s) IV Push once  dextrose 50% Injectable 25 Gram(s) IV Push once  dextrose 50% Injectable 25 Gram(s) IV Push once  gabapentin 600 milliGRAM(s) Oral two times a day  insulin glargine Injectable (LANTUS) 24 Unit(s) SubCutaneous at bedtime  insulin lispro (HumaLOG) corrective regimen sliding scale   SubCutaneous three times a day before meals  insulin lispro (HumaLOG) corrective regimen sliding scale   SubCutaneous at bedtime  insulin lispro Injectable (HumaLOG) 8 Unit(s) SubCutaneous three times a day before meals  investigational chemo - general 1 Tablet(s) Oral every 24 hours  levothyroxine 75 MICROGram(s) Oral daily  losartan 100 milliGRAM(s) Oral daily  ondansetron Injectable 8 milliGRAM(s) IV Push every 8 hours  senna 2 Tablet(s) Oral two times a day  sodium chloride 0.9%. 1000 milliLiter(s) IV Continuous <Continuous>  sodium chloride 3%  Inhalation 4 milliLiter(s) Inhalation two times a day  tiotropium 18 MICROgram(s) Capsule 1 Capsule(s) Inhalation daily      PRN MEDICATIONS  acetaminophen   Tablet .. 650 milliGRAM(s) Oral every 6 hours PRN  dextrose 40% Gel 15 Gram(s) Oral once PRN  glucagon  Injectable 1 milliGRAM(s) IntraMuscular once PRN  melatonin 3 milliGRAM(s) Oral once PRN  metoclopramide Injectable 10 milliGRAM(s) IV Push every 6 hours PRN  polyethylene glycol 3350 17 Gram(s) Oral two times a day PRN        Vital Signs Last 24 Hrs  T(C): 38.3 (12 Aug 2019 05:25), Max: 38.9 (11 Aug 2019 15:49)  T(F): 101 (12 Aug 2019 05:25), Max: 102 (11 Aug 2019 15:49)  HR: 91 (12 Aug 2019 05:59) (91 - 108)  BP: 126/68 (12 Aug 2019 05:59) (119/66 - 146/74)  BP(mean): --  RR: 18 (12 Aug 2019 05:59) (18 - 20)  SpO2: 99% (12 Aug 2019 05:59) (93% - 100%)        PHYSICAL EXAM  General: NAD  HEENT: PERRLA, EOMI, clear oropharynx  Neck: supple  CV: (+) S1/S2 RRR  Lungs: (+) FBCR B/L  Abdomen: soft, non-tender, non-distended (+) BS  Ext: no edema  Skin: no rashes   Neuro: alert and oriented X 3, no focal deficits  Central Line: C/D/I        LABS:                                 7.2    0.6   )-----------( 68       ( 12 Aug 2019 07:16 )             21.2         Mean Cell Volume : 86.7 fl  Mean Cell Hemoglobin : 29.7 pg  Mean Cell Hemoglobin Concentration : 34.2 gm/dL  Auto Neutrophil # : 0.1 K/uL  Auto Lymphocyte # : 0.4 K/uL  Auto Monocyte # : 0.1 K/uL  Auto Eosinophil # : 0.0 K/uL  Auto Basophil # : 0.0 K/uL  Auto Neutrophil % : 15.7 %  Auto Lymphocyte % : 69.7 %  Auto Monocyte % : 13.0 %  Auto Eosinophil % : 1.6 %  Auto Basophil % : 0.0 %      08-12    133<L>  |  100  |  11  ----------------------------<  155<H>  3.3<L>   |  23  |  0.78    Ca    8.0<L>      12 Aug 2019 07:16  Phos  2.5     08-12  Mg     1.7     08-12    TPro  6.7  /  Alb  3.0<L>  /  TBili  0.4  /  DBili  x   /  AST  53<H>  /  ALT  54<H>  /  AlkPhos  139<H>  08-12      Mg 1.7  Phos 2.5      PT/INR - ( 12 Aug 2019 07:16 )   PT: 12.7 sec;   INR: 1.11 ratio         PTT - ( 12 Aug 2019 07:16 )  PTT:32.5 sec      Uric Acid 1.8        RECENT CULTURES:    Culture - Sputum . (08.12.19 @ 01:34)    Gram Stain:   Rare polymorphonuclear leukocytes per low power field  Few Squamous epithelial cells per low power field  Moderate Gram positive cocci in pairs seen per oil power field  Moderate Gram Variable Rods seen per oil power field    Specimen Source: .Sputum    Culture - Urine (08.10.19 @ 17:19)    Specimen Source: .Urine    Culture Results:   No growth      Culture - Blood (08.09.19 @ 22:59)    Specimen Source: .Blood    Culture Results:   No growth to date.        RADIOLOGY & ADDITIONAL STUDIES:    EXAM:  CT CHEST                        PROCEDURE DATE:  08/09/2019   IMPRESSION: Pulmonary fibrosis and traction bronchiectasis without honeycombing.   Overall these findings appear progressed compared to CT abdomen 7/4/2017.  Associated groundglass opacity may be related to active interstitial lung   disease, fluid overload, less likely infection.

## 2019-08-12 NOTE — PROGRESS NOTE ADULT - PROBLEM SELECTOR PLAN 1
-Continue Lantus 24 units sq qhs  -Increase Humalog from 8 to 12 units sq tid-ac  -Moderate scale tid-ac/qhs  DISPO: basal/bolus, doses to be determined.

## 2019-08-12 NOTE — PROGRESS NOTE ADULT - PROBLEM SELECTOR PLAN 3
HgA1C 10.6  FS AC & HS with HISS  Continue Lantus and Humalog per endocrine   Consistent carbohydrate diet Grade 1 likely due to chemotherapy  Monitor daily CMP  Avoid further hepatotoxic medications

## 2019-08-12 NOTE — PROGRESS NOTE ADULT - ATTENDING COMMENTS
65 yo F with hx RA, here for follow up, had new anemia and peripheral immature cells (few blasts)  BM bx done 8/2/19 --c/w AML, FLT-3 neg, FISH for PML-KRYSTAL and BCR-ABL negative. Pt gave consent for Pfizer trial 7+3+ Glasdegib/placebo.  Patient here for her induction chemotherapy on trial.   Today will be day 5.  Daunorubicin 60 mg/m2 day 1-3, cytarabine 100 mg/m2 day 1-7, glasdegib/placebo 100mg daily PO.   Supportive care, anti-emetics, IVFs, allopurinol for TLS ppx.   Dyspnea on day 1 s/p lasix with improvement of symptoms, but still slightly dyspneic.   Supplement lytes  Transaminitis- monitor LFT's  Interstitial Lung Disease seen on CXR, will obtain non-contrast CT Chest.    Neutropenic fever- cultures sent, started on Cefepime(d/c Levaquin); on Posaconazole  Monitor CBC, receives transfusions PRN.   Lantus QHS, following fingersticks closely, endocrine consult appreciated. Very poorly controlled diabetes.   ECOG performance status 1. 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 Pt gave consent for Pfizer trial 7+3+ Glasdegib/placebo.  Patient here for her induction chemotherapy on trial.  Day 6.  Fever, chills, no localizing sxs.  Tmax 101.6. No N/V/D, rash.     Supportive care, anti-emetics, IVFs, allopurinol for TLS ppx.   less dyspnea but I > O, interstitial edema on CT chest  Transaminitis- monitor LFT's  Cont. Cefepime(d/c Levaquin); on Posaconazole  Monitor CBC, receives transfusions PRN.   Lantus QHS, following fingersticks closely, endocrine consult appreciated. Very poorly controlled diabetes.   ECOG performance status 2.

## 2019-08-12 NOTE — PROGRESS NOTE ADULT - PROBLEM SELECTOR PLAN 8
Lovenox 40mg SQ daily  D/C when PLT <50K        Contact Information [phone] Simvastatin discontinued during hospitalization due to potential for liver toxicity

## 2019-08-12 NOTE — PROGRESS NOTE ADULT - ASSESSMENT
This is a 65 yo F with PMHx of T2DM, RA, Depression and now newly diagnosed AML enrolled on [employer] BRIGHT trial admitted for Induction chemotherapy with Dauno/Cytarabine and daily Glastegib Vs Placebo. Hospital course complicated by increased ILD on CXR on 8/8, RENTERIA, and weight gain, Lasix given; neutropenic fever. Pt has pancytopenia from chemo and or disease. This is a 63 yo F with PMHx of T2DM, RA, Depression and now newly diagnosed AML enrolled on Pfizer [facility] trial admitted for Induction chemotherapy with Dauno/Cytarabine and daily Glastegib Vs Placebo. The patients hospital course has been complicated by transaminitis and RENTERIA due to iterstitial lung disease and volume overload. The patient has pancytopenia from chemo and/or disease.

## 2019-08-12 NOTE — CHART NOTE - NSCHARTNOTEFT_GEN_A_CORE
Medicine PA Note    MEDICINE NP    Notified by RN patient with temperature . Seen and examined patient at bedside. Patient is alert, NAD. Denies HA, CP, SOB, cough, N/V, or abd pain.    VITAL SIGNS:  T(C): 38.3 (08-12-19 @ 00:35), Max: 38.9 (08-11-19 @ 15:49)  HR: 95 (08-12-19 @ 00:35) (92 - 110)  BP: 136/81 (08-12-19 @ 00:35) (119/66 - 146/74)  RR: 18 (08-12-19 @ 00:35) (18 - 20)  SpO2: 100% (08-12-19 @ 00:35) (93% - 100%)  Wt(kg): --      LABORATORY:                          6.7    0.9   )-----------( 86       ( 11 Aug 2019 07:18 )             19.9       08-11    135  |  102  |  11  ----------------------------<  227<H>  3.6   |  23  |  0.73    Ca    8.4      11 Aug 2019 07:18  Phos  2.8     08-11  Mg     1.5     08-11    TPro  6.8  /  Alb  3.1<L>  /  TBili  0.4  /  DBili  x   /  AST  50<H>  /  ALT  46<H>  /  AlkPhos  136<H>  08-11          MICROBIOLOGY:     MEDICATIONS  (STANDING):  ALBUTerol    90 MICROgram(s) HFA Inhaler 1 Puff(s) Inhalation every 6 hours  ALBUTerol/ipratropium for Nebulization 3 milliLiter(s) Nebulizer every 6 hours  allopurinol 300 milliGRAM(s) Oral daily  Biotene Dry Mouth Oral Rinse 5 milliLiter(s) Swish and Spit three times a day  cefepime   IVPB 2000 milliGRAM(s) IV Intermittent every 8 hours  chlorhexidine 2% Cloths 1 Application(s) Topical <User Schedule>  cytarabine IVPB (eMAR) 158 milliGRAM(s) IV Intermittent daily  dextrose 5%. 1000 milliLiter(s) (50 mL/Hr) IV Continuous <Continuous>  dextrose 50% Injectable 12.5 Gram(s) IV Push once  dextrose 50% Injectable 25 Gram(s) IV Push once  dextrose 50% Injectable 25 Gram(s) IV Push once  enoxaparin Injectable 40 milliGRAM(s) SubCutaneous daily  gabapentin 600 milliGRAM(s) Oral two times a day  guaiFENesin ER 1200 milliGRAM(s) Oral every 12 hours  insulin glargine Injectable (LANTUS) 24 Unit(s) SubCutaneous at bedtime  insulin lispro (HumaLOG) corrective regimen sliding scale   SubCutaneous three times a day before meals  insulin lispro (HumaLOG) corrective regimen sliding scale   SubCutaneous at bedtime  insulin lispro Injectable (HumaLOG) 8 Unit(s) SubCutaneous three times a day before meals  investigational chemo - general 1 Tablet(s) Oral every 24 hours  levothyroxine 75 MICROGram(s) Oral daily  losartan 100 milliGRAM(s) Oral daily  ondansetron Injectable 8 milliGRAM(s) IV Push every 8 hours  posaconazole DR Tablet 300 milliGRAM(s) Oral daily  senna 2 Tablet(s) Oral two times a day  sodium chloride 0.9%. 1000 milliLiter(s) (100 mL/Hr) IV Continuous <Continuous>  sodium chloride 3%  Inhalation 4 milliLiter(s) Inhalation two times a day  tiotropium 18 MICROgram(s) Capsule 1 Capsule(s) Inhalation daily        RADIOLOGY:          PHYSICAL EXAM:    Constitutional: AOx3. NAD.    Respiratory: clear lungs bilaterally. No wheezing, rhonchi, or crackles.    Cardiovascular: S1 S2. No murmurs.    Gastrointestinal: BS X4 active. soft. nontender.    Extremities/Vascular: +2 pulses bilaterally. No BLE edema.      ASSESSMENT/PLAN:   HPI:  65 yo F with history of DM,  RA , diagnosed in 2016, chronic depression now with newly diagnosed AML                                                                                                                                              admitted for induction chemotherapy on the Pf[employer] Bright study with dauno/cytarabine and glasdegib/placebo.  Patient was seen by her rheumatologist Dr. Croft and was referred to Dr Bravo for a drop in hgb and immature                                                                                                                       cells (physician was called by a hematopathologist for finding blasts in peripheral smear). Patient had been                                                                                                                               feeling more tired and more RENTERIA for the last 2-3 months.   Recently completed a course of ciprofloxacin for UTI (7/28-8/3)                                                                                                                                                                                                                                 BM asp.bx performed on 8/5 and patient signed consent for Pfizer/Bright                                                                                                                                                                                                         study if meets eligibility. (06 Aug 2019 09:17)        1) Neutropenic Fever  -tylenol and cooling measures prn for pyrexia  -BC x2, UA/UC  -CXR  -c/w   -F/U primary team in AM Medicine PA Note    MEDICINE PA    Notified by RN patient with temperature 38.3C. Seen and examined patient at bedside. Denies HA, CP, SOB, cough, N/V/D, and abd pain.    VITAL SIGNS:  T(C): 38.3 (08-12-19 @ 00:35), Max: 38.9 (08-11-19 @ 15:49)  HR: 95 (08-12-19 @ 00:35) (92 - 110)  BP: 136/81 (08-12-19 @ 00:35) (119/66 - 146/74)  RR: 18 (08-12-19 @ 00:35) (18 - 20)  SpO2: 100% (08-12-19 @ 00:35) (93% - 100%)  Wt(kg): --    LABORATORY:                        6.7    0.9   )-----------( 86       ( 11 Aug 2019 07:18 )             19.9       08-11    135  |  102  |  11  ----------------------------<  227<H>  3.6   |  23  |  0.73    Ca    8.4      11 Aug 2019 07:18  Phos  2.8     08-11  Mg     1.5     08-11    TPro  6.8  /  Alb  3.1<L>  /  TBili  0.4  /  DBili  x   /  AST  50<H>  /  ALT  46<H>  /  AlkPhos  136<H>  08-11    MICROBIOLOGY:  Culture - Sputum . (08.10.19 @ 17:52)    Gram Stain:   Few polymorphonuclear leukocytes per low power field  Rare Squamous epithelial cells per low power field  No organisms seen per oil power field    Specimen Source: .Sputum    Culture Results:   Normal Respiratory Maryana present    Culture - Urine (08.10.19 @ 17:19)    Specimen Source: .Urine    Culture Results:   No growth    Culture - Blood (08.09.19 @ 22:59)    Specimen Source: .Blood    Culture Results:   No growth to date.    RADIOLOGY:  < from: CT Chest No Cont (08.09.19 @ 14:24) >    Pulmonary fibrosis and traction bronchiectasis without honeycombing.   Overall these findings appear progressed compared to CT abdomen 7/4/2017.    Associated groundglass opacity may be related to active interstitial lung   disease, fluid overload, less likely infection.    MEDICATIONS  (STANDING):  ALBUTerol    90 MICROgram(s) HFA Inhaler 1 Puff(s) Inhalation every 6 hours  ALBUTerol/ipratropium for Nebulization 3 milliLiter(s) Nebulizer every 6 hours  allopurinol 300 milliGRAM(s) Oral daily  Biotene Dry Mouth Oral Rinse 5 milliLiter(s) Swish and Spit three times a day  cefepime   IVPB 2000 milliGRAM(s) IV Intermittent every 8 hours  chlorhexidine 2% Cloths 1 Application(s) Topical <User Schedule>  cytarabine IVPB (eMAR) 158 milliGRAM(s) IV Intermittent daily  dextrose 5%. 1000 milliLiter(s) (50 mL/Hr) IV Continuous <Continuous>  dextrose 50% Injectable 12.5 Gram(s) IV Push once  dextrose 50% Injectable 25 Gram(s) IV Push once  dextrose 50% Injectable 25 Gram(s) IV Push once  enoxaparin Injectable 40 milliGRAM(s) SubCutaneous daily  gabapentin 600 milliGRAM(s) Oral two times a day  guaiFENesin ER 1200 milliGRAM(s) Oral every 12 hours  insulin glargine Injectable (LANTUS) 24 Unit(s) SubCutaneous at bedtime  insulin lispro (HumaLOG) corrective regimen sliding scale   SubCutaneous three times a day before meals  insulin lispro (HumaLOG) corrective regimen sliding scale   SubCutaneous at bedtime  insulin lispro Injectable (HumaLOG) 8 Unit(s) SubCutaneous three times a day before meals  investigational chemo - general 1 Tablet(s) Oral every 24 hours  levothyroxine 75 MICROGram(s) Oral daily  losartan 100 milliGRAM(s) Oral daily  ondansetron Injectable 8 milliGRAM(s) IV Push every 8 hours  posaconazole DR Tablet 300 milliGRAM(s) Oral daily  senna 2 Tablet(s) Oral two times a day  sodium chloride 0.9%. 1000 milliLiter(s) (100 mL/Hr) IV Continuous <Continuous>  sodium chloride 3%  Inhalation 4 milliLiter(s) Inhalation two times a day  tiotropium 18 MICROgram(s) Capsule 1 Capsule(s) Inhalation daily    PHYSICAL EXAM:    Constitutional: AOx4. NAD.  Respiratory: clear lungs bilaterally. No wheezing, rhonchi, or crackles.  Cardiovascular: S1 S2. No murmurs.  Gastrointestinal: BS X4 active. soft. nontender.  Extremities/Vascular: +2 pulses bilaterally. No BLE edema.    ASSESSMENT/PLAN:   HPI:  63 yo F with history of DM, RA, diagnosed in 2016, chronic depression now with newly diagnosed AML admitted for induction chemotherapy on the [employer] [place] study with dauno/cytarabine and glasdegib/placebo, now presents with fever 38.3C.   Impression: Neutropenic Fever  -Tylenol prn for pyrexia  -Urine/Blood/Sputum cx see above  -CT Chest (8/09) results as noted above  -c/w cefepime  -c/w IV hydration  -Will continue to closely monitor patient/vitals  -Will endorse to primary team in AM

## 2019-08-12 NOTE — PROGRESS NOTE ADULT - PROBLEM SELECTOR PLAN 2
-continue Synthroid 75mcg po qdaily    Sherine Benson MD  Pager: [phone]  Nights and Weekends: [phone]

## 2019-08-12 NOTE — PROGRESS NOTE ADULT - PROBLEM SELECTOR PLAN 4
Continue Losartan 100mg PO daily  Monitor VS HgA1C 10.6  FS AC & HS with HISS  Continue Lantus and Humalog per endocrine   Consistent carbohydrate diet

## 2019-08-12 NOTE — ADVANCED PRACTICE NURSE CONSULT - ASSESSMENT
Pt A/Ox4, denies pain/discomfort, N/V. Chemotherapy teaching reinforced to pt at bedside. Right TL IJ, Dsg intact and dry, site no swelling and redness noted, easily flushed with positive blood return. Consent signed and filed. Lab results reviewed by Dr. Latham during rounds and said OK to treat pt. Chemotherapy verified by two RNs. At 1539,  Cytarabine 100 mg/z5=784 mg IV infusion through IJ over 24 hours @ 22.9 ml/hr by alaris pump. Pt left comfortable on bed. Primary RN aware.

## 2019-08-13 LAB
ALBUMIN SERPL ELPH-MCNC: 3 G/DL — LOW (ref 3.3–5)
ALP SERPL-CCNC: 138 U/L — HIGH (ref 40–120)
ALT FLD-CCNC: 56 U/L — HIGH (ref 10–45)
ANION GAP SERPL CALC-SCNC: 10 MMOL/L — SIGNIFICANT CHANGE UP (ref 5–17)
AST SERPL-CCNC: 54 U/L — HIGH (ref 10–40)
BILIRUB SERPL-MCNC: 0.3 MG/DL — SIGNIFICANT CHANGE UP (ref 0.2–1.2)
BUN SERPL-MCNC: 10 MG/DL — SIGNIFICANT CHANGE UP (ref 7–23)
CALCIUM SERPL-MCNC: 7.6 MG/DL — LOW (ref 8.4–10.5)
CHLORIDE SERPL-SCNC: 102 MMOL/L — SIGNIFICANT CHANGE UP (ref 96–108)
CK SERPL-CCNC: 67 U/L — SIGNIFICANT CHANGE UP (ref 25–170)
CO2 SERPL-SCNC: 23 MMOL/L — SIGNIFICANT CHANGE UP (ref 22–31)
CREAT SERPL-MCNC: 0.74 MG/DL — SIGNIFICANT CHANGE UP (ref 0.5–1.3)
CULTURE RESULTS: SIGNIFICANT CHANGE UP
CULTURE RESULTS: SIGNIFICANT CHANGE UP
GLUCOSE BLDC GLUCOMTR-MCNC: 130 MG/DL — HIGH (ref 70–99)
GLUCOSE BLDC GLUCOMTR-MCNC: 148 MG/DL — HIGH (ref 70–99)
GLUCOSE BLDC GLUCOMTR-MCNC: 160 MG/DL — HIGH (ref 70–99)
GLUCOSE BLDC GLUCOMTR-MCNC: 180 MG/DL — HIGH (ref 70–99)
GLUCOSE SERPL-MCNC: 165 MG/DL — HIGH (ref 70–99)
HCT VFR BLD CALC: 19.1 % — CRITICAL LOW (ref 34.5–45)
HGB BLD-MCNC: 6.6 G/DL — CRITICAL LOW (ref 11.5–15.5)
LDH SERPL L TO P-CCNC: 281 U/L — HIGH (ref 50–242)
MAGNESIUM SERPL-MCNC: 1.5 MG/DL — LOW (ref 1.6–2.6)
MCHC RBC-ENTMCNC: 29.8 PG — SIGNIFICANT CHANGE UP (ref 27–34)
MCHC RBC-ENTMCNC: 34.4 GM/DL — SIGNIFICANT CHANGE UP (ref 32–36)
MCV RBC AUTO: 86.6 FL — SIGNIFICANT CHANGE UP (ref 80–100)
PHOSPHATE SERPL-MCNC: 2.3 MG/DL — LOW (ref 2.5–4.5)
PLATELET # BLD AUTO: 49 K/UL — LOW (ref 150–400)
POTASSIUM SERPL-MCNC: 3.7 MMOL/L — SIGNIFICANT CHANGE UP (ref 3.5–5.3)
POTASSIUM SERPL-SCNC: 3.7 MMOL/L — SIGNIFICANT CHANGE UP (ref 3.5–5.3)
PROT SERPL-MCNC: 6.2 G/DL — SIGNIFICANT CHANGE UP (ref 6–8.3)
RBC # BLD: 2.21 M/UL — LOW (ref 3.8–5.2)
RBC # FLD: 14.5 % — SIGNIFICANT CHANGE UP (ref 10.3–14.5)
SODIUM SERPL-SCNC: 135 MMOL/L — SIGNIFICANT CHANGE UP (ref 135–145)
SPECIMEN SOURCE: SIGNIFICANT CHANGE UP
SPECIMEN SOURCE: SIGNIFICANT CHANGE UP
URATE SERPL-MCNC: 1.4 MG/DL — LOW (ref 2.5–7)
WBC # BLD: 0.4 K/UL — CRITICAL LOW (ref 3.8–10.5)
WBC # FLD AUTO: 0.4 K/UL — CRITICAL LOW (ref 3.8–10.5)

## 2019-08-13 PROCEDURE — 99232 SBSQ HOSP IP/OBS MODERATE 35: CPT

## 2019-08-13 RX ORDER — HYDROXYZINE HCL 10 MG
25 TABLET ORAL ONCE
Refills: 0 | Status: COMPLETED | OUTPATIENT
Start: 2019-08-13 | End: 2019-08-13

## 2019-08-13 RX ORDER — ACETAMINOPHEN 500 MG
1000 TABLET ORAL ONCE
Refills: 0 | Status: COMPLETED | OUTPATIENT
Start: 2019-08-13 | End: 2019-08-13

## 2019-08-13 RX ORDER — POTASSIUM PHOSPHATE, MONOBASIC POTASSIUM PHOSPHATE, DIBASIC 236; 224 MG/ML; MG/ML
15 INJECTION, SOLUTION INTRAVENOUS ONCE
Refills: 0 | Status: COMPLETED | OUTPATIENT
Start: 2019-08-13 | End: 2019-08-13

## 2019-08-13 RX ORDER — DIPHENHYDRAMINE HCL 50 MG
25 CAPSULE ORAL ONCE
Refills: 0 | Status: COMPLETED | OUTPATIENT
Start: 2019-08-13 | End: 2019-08-13

## 2019-08-13 RX ORDER — MAGNESIUM SULFATE 500 MG/ML
2 VIAL (ML) INJECTION ONCE
Refills: 0 | Status: COMPLETED | OUTPATIENT
Start: 2019-08-13 | End: 2019-08-13

## 2019-08-13 RX ORDER — FUROSEMIDE 40 MG
40 TABLET ORAL ONCE
Refills: 0 | Status: COMPLETED | OUTPATIENT
Start: 2019-08-13 | End: 2019-08-13

## 2019-08-13 RX ADMIN — Medication 1: at 13:02

## 2019-08-13 RX ADMIN — CEFEPIME 100 MILLIGRAM(S): 1 INJECTION, POWDER, FOR SOLUTION INTRAMUSCULAR; INTRAVENOUS at 22:28

## 2019-08-13 RX ADMIN — Medication 3 MILLILITER(S): at 17:30

## 2019-08-13 RX ADMIN — LOSARTAN POTASSIUM 100 MILLIGRAM(S): 100 TABLET, FILM COATED ORAL at 05:25

## 2019-08-13 RX ADMIN — Medication 25 MILLIGRAM(S): at 04:44

## 2019-08-13 RX ADMIN — POSACONAZOLE 300 MILLIGRAM(S): 100 TABLET, DELAYED RELEASE ORAL at 11:07

## 2019-08-13 RX ADMIN — SODIUM CHLORIDE 4 MILLILITER(S): 9 INJECTION INTRAMUSCULAR; INTRAVENOUS; SUBCUTANEOUS at 17:29

## 2019-08-13 RX ADMIN — Medication 20.9 MILLIGRAM(S): at 15:21

## 2019-08-13 RX ADMIN — CHLORHEXIDINE GLUCONATE 1 APPLICATION(S): 213 SOLUTION TOPICAL at 06:25

## 2019-08-13 RX ADMIN — Medication 1: at 18:49

## 2019-08-13 RX ADMIN — Medication 12 UNIT(S): at 09:00

## 2019-08-13 RX ADMIN — Medication 600 MILLIGRAM(S): at 03:11

## 2019-08-13 RX ADMIN — Medication 5 MILLILITER(S): at 05:23

## 2019-08-13 RX ADMIN — INSULIN GLARGINE 24 UNIT(S): 100 INJECTION, SOLUTION SUBCUTANEOUS at 22:28

## 2019-08-13 RX ADMIN — Medication 25 MILLIGRAM(S): at 11:33

## 2019-08-13 RX ADMIN — Medication 650 MILLIGRAM(S): at 09:05

## 2019-08-13 RX ADMIN — SODIUM CHLORIDE 4 MILLILITER(S): 9 INJECTION INTRAMUSCULAR; INTRAVENOUS; SUBCUTANEOUS at 05:24

## 2019-08-13 RX ADMIN — SENNA PLUS 2 TABLET(S): 8.6 TABLET ORAL at 05:26

## 2019-08-13 RX ADMIN — Medication 40 MILLIGRAM(S): at 15:23

## 2019-08-13 RX ADMIN — Medication 3 MILLILITER(S): at 05:25

## 2019-08-13 RX ADMIN — GABAPENTIN 600 MILLIGRAM(S): 400 CAPSULE ORAL at 17:29

## 2019-08-13 RX ADMIN — Medication 75 MICROGRAM(S): at 05:25

## 2019-08-13 RX ADMIN — Medication 50 GRAM(S): at 07:56

## 2019-08-13 RX ADMIN — ONDANSETRON 8 MILLIGRAM(S): 8 TABLET, FILM COATED ORAL at 05:24

## 2019-08-13 RX ADMIN — POTASSIUM PHOSPHATE, MONOBASIC POTASSIUM PHOSPHATE, DIBASIC 62.5 MILLIMOLE(S): 236; 224 INJECTION, SOLUTION INTRAVENOUS at 09:00

## 2019-08-13 RX ADMIN — Medication 12 UNIT(S): at 18:49

## 2019-08-13 RX ADMIN — Medication 5 MILLILITER(S): at 22:29

## 2019-08-13 RX ADMIN — GABAPENTIN 600 MILLIGRAM(S): 400 CAPSULE ORAL at 05:25

## 2019-08-13 RX ADMIN — Medication 3 MILLILITER(S): at 11:07

## 2019-08-13 RX ADMIN — Medication 300 MILLIGRAM(S): at 11:07

## 2019-08-13 RX ADMIN — Medication 25 MILLIGRAM(S): at 21:00

## 2019-08-13 RX ADMIN — Medication 650 MILLIGRAM(S): at 01:30

## 2019-08-13 RX ADMIN — Medication 400 MILLIGRAM(S): at 17:43

## 2019-08-13 RX ADMIN — Medication 12 UNIT(S): at 13:02

## 2019-08-13 RX ADMIN — Medication 5 MILLILITER(S): at 13:02

## 2019-08-13 RX ADMIN — CEFEPIME 100 MILLIGRAM(S): 1 INJECTION, POWDER, FOR SOLUTION INTRAMUSCULAR; INTRAVENOUS at 13:01

## 2019-08-13 RX ADMIN — ONDANSETRON 8 MILLIGRAM(S): 8 TABLET, FILM COATED ORAL at 13:03

## 2019-08-13 RX ADMIN — CEFEPIME 100 MILLIGRAM(S): 1 INJECTION, POWDER, FOR SOLUTION INTRAMUSCULAR; INTRAVENOUS at 05:23

## 2019-08-13 RX ADMIN — Medication 650 MILLIGRAM(S): at 00:33

## 2019-08-13 RX ADMIN — ONDANSETRON 8 MILLIGRAM(S): 8 TABLET, FILM COATED ORAL at 22:28

## 2019-08-13 NOTE — DIETITIAN INITIAL EVALUATION ADULT. - OTHER INFO
Diet PTA: Pt reports appetite had been slightly decreased for ~1 month PTA, pt estimates consuming 75% baseline intake. Pt did not wish to go into further detail regarding diet history at this time, pt preferred to rest.     Pt with type 2 DM under poor control given A1C 8/7: 10.6%, pt deferred full interview at this time.     Weight: Pt reports  lbs which is consistent with current admission weight 132.2 lbs (8/6) weight elevated in house 138.2 lbs (8/13).     Pt with no food allergies, no micronutrient supplements. No N+V, last BM noted 8/12. No difficulty chewing/swallowing.    Diet education: pt deferred full diet education at this time, accepted written materials, will provide detailed education when pt receptive

## 2019-08-13 NOTE — ADVANCED PRACTICE NURSE CONSULT - ASSESSMENT
Labs today WBC 0.4 HGB 6.6 HCT 19.1 PLTS 49 CR. 0.74 TBILI. 0.3 Pt found in bed, alert and oriented x4, v/s stable afebrile, n/c offered.  Right chest wall/ IJ triple lumen in place. Site without redness or swelling. Lumen previously accessed with + blood return flushes well with NS. Pt premedicated with  Zofran 8 mg IVSS prior to chemotherapy. Chemotherapy verified by 2 RNs prior to administration. At 1522 treated with cytarabine 100mg/m2= 158mg civi over 24 hours. Pt remains in bed with n/c offered. Primary RN aware of treatment plan.

## 2019-08-13 NOTE — CHART NOTE - NSCHARTNOTEFT_GEN_A_CORE
Medicine PA Note    Notified by RN patient with temperature 38.3. Seen and examined patient at bedside. Patient admits to productive cough x1 month but denies chest pain, SOB, wheezing, HA, N/V, abdominal pain, and any changes in urinary habits.     VITAL SIGNS:  T(C): 38.3 (08-13-19 @ 00:22), Max: 38.8 (08-12-19 @ 16:10)  HR: 99 (08-13-19 @ 00:22) (79 - 106)  BP: 136/71 (08-13-19 @ 00:22) (120/66 - 152/69)  RR: 18 (08-13-19 @ 00:22) (18 - 20)  SpO2: 98% (08-13-19 @ 00:22) (95% - 99%)  Wt(kg): --    LABORATORY:                        7.2    0.6   )-----------( 68       ( 12 Aug 2019 07:16 )             21.2       08-12    133<L>  |  100  |  11  ----------------------------<  155<H>  3.3<L>   |  23  |  0.78    Ca    8.0<L>      12 Aug 2019 07:16  Phos  2.5     08-12  Mg     1.7     08-12    TPro  6.7  /  Alb  3.0<L>  /  TBili  0.4  /  DBili  x   /  AST  53<H>  /  ALT  54<H>  /  AlkPhos  139<H>  08-12    MICROBIOLOGY:   Culture - Sputum . (08.12.19 @ 01:34)    Gram Stain:   Rare polymorphonuclear leukocytes per low power field  Few Squamous epithelial cells per low power field  Moderate Gram positive cocci in pairs seen per oil power field  Moderate Gram Variable Rods seen per oil power field    Specimen Source: .Sputum    Culture Results:   Normal Respiratory Maryana present    Culture - Urine (08.12.19 @ 01:10)    Specimen Source: .Urine    Culture Results:   No growth    RADIOLOGY:  < from: CT Chest No Cont (08.09.19 @ 14:24) >    Pulmonary fibrosis and traction bronchiectasis without honeycombing.   Overall these findings appear progressed compared to CT abdomen 7/4/2017.    Associated groundglass opacity may be related to active interstitial lung   disease, fluid overload, less likely infection.    < end of copied text >    < from: Xray Chest 1 View- PORTABLE-Urgent (08.07.19 @ 10:44) >    Interstitial lung disease. Hazy opacities are slightly increased.    < end of copied text >    PHYSICAL EXAM:  Constitutional: AOx3. NAD.  Respiratory: clear lungs bilaterally. No wheezing, rhonchi, or crackles.  Cardiovascular: S1 S2. No murmurs.  Gastrointestinal: BS X4 active. soft. nontender.  Extremities/Vascular: +2 pulses bilaterally. No BLE edema.    ASSESSMENT/PLAN:   HPI:  65 yo F with history of DM, RA, chronic depression now with newly diagnosed AML admitted for induction chemotherapy on the Pf[employer] [place] study with dauno/cytarabine and glasdegib/placebo, now presents with fever 38.3C.   Impression: Neutropenic Fever  -Tylenol prn for pyrexia  -Mucinex x1  -Urine/Sputum (8/12) cx see above  -Blood cx (8/11) pending results  -CT Chest (8/09) results as noted above  -c/w cefepime  -c/w IV hydration  -Will continue to closely monitor patient/vitals  -Will endorse to primary team in AM

## 2019-08-13 NOTE — DIETITIAN INITIAL EVALUATION ADULT. - PERTINENT MEDS FT
humalog pre-meal insulin, humalog corrective scale insulin, lantus, lasix, miralax, reglan, biotene, synthroid

## 2019-08-13 NOTE — PROGRESS NOTE ADULT - SUBJECTIVE AND OBJECTIVE BOX
Chief Complaint/Follow-up on: T2DM    Subjective: Patient says she is not eating but the NP notes that she has been and is not a great historian.   Patient feels normal now, had been hot before due to fever.    MEDICATIONS  (STANDING):  ALBUTerol    90 MICROgram(s) HFA Inhaler 1 Puff(s) Inhalation every 6 hours  ALBUTerol/ipratropium for Nebulization 3 milliLiter(s) Nebulizer every 6 hours  allopurinol 300 milliGRAM(s) Oral daily  Biotene Dry Mouth Oral Rinse 5 milliLiter(s) Swish and Spit three times a day  cefepime   IVPB 2000 milliGRAM(s) IV Intermittent every 8 hours  chlorhexidine 2% Cloths 1 Application(s) Topical <User Schedule>  dextrose 5%. 1000 milliLiter(s) (50 mL/Hr) IV Continuous <Continuous>  dextrose 50% Injectable 12.5 Gram(s) IV Push once  dextrose 50% Injectable 25 Gram(s) IV Push once  dextrose 50% Injectable 25 Gram(s) IV Push once  gabapentin 600 milliGRAM(s) Oral two times a day  insulin glargine Injectable (LANTUS) 24 Unit(s) SubCutaneous at bedtime  insulin lispro (HumaLOG) corrective regimen sliding scale   SubCutaneous three times a day before meals  insulin lispro (HumaLOG) corrective regimen sliding scale   SubCutaneous at bedtime  insulin lispro Injectable (HumaLOG) 12 Unit(s) SubCutaneous three times a day before meals  investigational chemo - general 1 Tablet(s) Oral every 24 hours  levothyroxine 75 MICROGram(s) Oral daily  losartan 100 milliGRAM(s) Oral daily  ondansetron Injectable 8 milliGRAM(s) IV Push every 8 hours  posaconazole DR Tablet 300 milliGRAM(s) Oral daily  sodium chloride 0.9%. 1000 milliLiter(s) (50 mL/Hr) IV Continuous <Continuous>  sodium chloride 3%  Inhalation 4 milliLiter(s) Inhalation two times a day  tiotropium 18 MICROgram(s) Capsule 1 Capsule(s) Inhalation daily    MEDICATIONS  (PRN):  acetaminophen   Tablet .. 650 milliGRAM(s) Oral every 6 hours PRN Temp greater or equal to 38C (100.4F), Mild Pain (1 - 3)  dextrose 40% Gel 15 Gram(s) Oral once PRN Blood Glucose LESS THAN 70 milliGRAM(s)/deciliter  glucagon  Injectable 1 milliGRAM(s) IntraMuscular once PRN Glucose LESS THAN 70 milligrams/deciliter  melatonin 3 milliGRAM(s) Oral once PRN Insomnia  metoclopramide Injectable 10 milliGRAM(s) IV Push every 6 hours PRN nausea/vommiting  oxyCODONE    IR 5 milliGRAM(s) Oral every 4 hours PRN Moderate Pain (4 - 6)  polyethylene glycol 3350 17 Gram(s) Oral two times a day PRN Constipation      PHYSICAL EXAM:  VITALS: T(C): 39.3 (08-13-19 @ 17:13)  T(F): 102.8 (08-13-19 @ 17:13), Max: 102.8 (08-13-19 @ 17:13)  HR: 105 (08-13-19 @ 17:13) (89 - 105)  BP: 146/70 (08-13-19 @ 17:13) (101/56 - 146/70)  RR:  (18 - 20)  SpO2:  (93% - 98%)  Wt(kg): --  GENERAL: NAD, well-groomed, well-developed  EYES: No proptosis, no injection  RESPIRATORY: Clear to auscultation bilaterally; No rales, rhonchi, wheezing, or rubs  CARDIOVASCULAR: Regular rate and rhythm; No murmurs  GI: Soft, nontender, non distended, normal bowel sounds      POCT Blood Glucose.: 180 mg/dL (08-13-19 @ 18:48)  POCT Blood Glucose.: 160 mg/dL (08-13-19 @ 12:59)  POCT Blood Glucose.: 148 mg/dL (08-13-19 @ 08:49)  POCT Blood Glucose.: 305 mg/dL (08-12-19 @ 21:05)  POCT Blood Glucose.: 269 mg/dL (08-12-19 @ 17:11)  POCT Blood Glucose.: 221 mg/dL (08-12-19 @ 12:57)  POCT Blood Glucose.: 130 mg/dL (08-12-19 @ 09:19)  POCT Blood Glucose.: 184 mg/dL (08-11-19 @ 22:02)  POCT Blood Glucose.: 261 mg/dL (08-11-19 @ 19:05)  POCT Blood Glucose.: 195 mg/dL (08-11-19 @ 13:10)  POCT Blood Glucose.: 168 mg/dL (08-11-19 @ 10:00)  POCT Blood Glucose.: 252 mg/dL (08-10-19 @ 21:35)    08-13    135  |  102  |  10  ----------------------------<  165<H>  3.7   |  23  |  0.74    EGFR if : 100  EGFR if non : 86    Ca    7.6<L>      08-13  Mg     1.5     08-13  Phos  2.3     08-13    TPro  6.2  /  Alb  3.0<L>  /  TBili  0.3  /  DBili  x   /  AST  54<H>  /  ALT  56<H>  /  AlkPhos  138<H>  08-13          Hemoglobin A1C, Whole Blood: 10.6 % <H> [4.0 - 5.6] (08-07-19 @ 08:36)

## 2019-08-13 NOTE — PROGRESS NOTE ADULT - PROBLEM SELECTOR PLAN 2
The patient is neutropenic, febrile  If febrile Pan Cx and CXR  Continue Cefepime and Posaconazole (Monitor QTc BIW M/Th with Posaconazole and Study drug)  8/9 CT chest: Pulmonary fibrosis and traction bronchiectasis without honeycombing.   Overall these findings appear progressed compared to CT abdomen 7/4/2017  Associated ground-glass opacity may be related to active interstitial lung disease, fluid overload, less likely infection

## 2019-08-13 NOTE — DIETITIAN INITIAL EVALUATION ADULT. - ENERGY NEEDS
Pt with PMH including type 2 DM, hyperlipidemia now with newly diagnosed AML receiving induction chemotherapy with [employer] study.    Ht:5'1" , Wt: 132.2 lbs, BMI: 25.3 kg/m2, IBW: 105 lbs +/- 10%, %IBW: 126%  No edema, Skin intact

## 2019-08-13 NOTE — DIETITIAN INITIAL EVALUATION ADULT. - ADD RECOMMEND
1. Provide nutrition education when pt receptive-accepted written materials for now, 2. Continue to encourage po intake and obtain/honor food preferences as able, 3. Monitor PO intake, diet tolerance, weight trends, labs, and skin integrity

## 2019-08-13 NOTE — PROGRESS NOTE ADULT - ASSESSMENT
65 y/o F w/ uncontrolled Type 2 DM w/ hyper and hypoglycemia A1c 10.6%, HTN, HLD, Hypothyroidism admitted for chemo for newly diagnosed AML.

## 2019-08-13 NOTE — PROGRESS NOTE ADULT - PROBLEM SELECTOR PLAN 1
Enrolled on Pf[employer] BRIGHT trial receiving Induction chemotherapy with Dauno/Cytarabine and daily Glastegib Vs Placebo  8/20 Day 14 BM bx due  Monitor CBC/Lytes and transfuse/replete PRN  Hypophosphatemia: K Phos 15 mmol x 1  Hypomag: MgSO4 2gm IV x 1  Strict Is and Os/ Daily weights/ Mouth Care  Lasix 40mg IV x 1  Allopurinol 300mg PO daily  IVF  Antiemetics  ECOG 1 Enrolled on [employer] BRIGHT trial receiving Induction chemotherapy with Dauno/Cytarabine and daily Glastegib Vs Placebo  8/20 Day 14 BM bx due  Monitor CBC/Lytes and transfuse/replete PRN  Anemia: 1 unit PRBCs  Hypophosphatemia: K Phos 15 mmol x 1  Hypomag: MgSO4 2gm IV x 1  Strict Is and Os/ Daily weights/ Mouth Care  Lasix 40mg IV x 1  Allopurinol 300mg PO daily  IVF  Antiemetics  ECOG 1

## 2019-08-13 NOTE — PROGRESS NOTE ADULT - PROBLEM SELECTOR PLAN 2
-continue Synthroid 75mcg po qdaily    Sherine Benson MD  Pager: [phone]  Nights and Weekends: [phone]

## 2019-08-13 NOTE — ADVANCED PRACTICE NURSE CONSULT - ASSESSMENT
Induction Day 1      Patient has been admitted to [address] [place] [address] to begin the Induction phase of Glasdegib/ Placebo Trial.  Today is the completion of chemotherapy. Pt was seen early in the morning prior to the attending rounds' and completed EPRO as protocol required. All the required lab tests have been ordered- CBC, CMP, CPK ,UA ,Mg, and Phosphorus drawn and result on charted on flow sheet. During rounds by Dr Latham patient was examine and during interview verbalized concern of diarrhea, which Dr Navas graded as a grade 1 which does not required medication at this time according to the [facility] chart. Induction Day 7    Patient has been admitted to [address] [place] [address] to begin the Induction phase of Glasdegib/ Placebo Trial.  Today is the completion of chemotherapy. Pt was seen early in the morning prior to the attending rounds' and completed EPRO as protocol required. All the required lab tests have been ordered- CBC, CMP, CPK ,UA ,Mg, and Phosphorus drawn and result on charted on flow sheet. During rounds by Dr Latham patient was examine and during interview verbalized concern of diarrhea, which Dr Navas graded as a grade 1 which does not required medication at this time according to the [facility] chart. Patient having neutropenia fever which is grade 3 with temp 101.4 at 08:55 and WBC 0.4, receiving Tylenol PO by primary nurse. Patient is anemic Hgb 6.6, HCT 19.1 and receiving transfusion at this time. Left pt in bed sleeping.

## 2019-08-13 NOTE — PROGRESS NOTE ADULT - ASSESSMENT
This is a 65 yo F with PMHx of T2DM, RA, Depression and now newly diagnosed AML enrolled on [facility] trial admitted for Induction chemotherapy with Dauno/Cytarabine and daily Glastegib Vs Placebo. The patients hospital course has been complicated by transaminitis and RENTERIA due to iterstitial lung disease and volume overload. The patient has pancytopenia from chemo and/or disease.

## 2019-08-13 NOTE — DIETITIAN INITIAL EVALUATION ADULT. - PROBLEM SELECTOR PLAN 1
Admit 7 hannah  Patient enrolled on [facility] study  Chemotherapy with cytarabine 100mg/m2 CIV daily x 7 days  Daunorubicin  60mg /m2 IV daily x 3 days  investigational drug: glasdegib/placebo 100mg PO daily  Antiemetics  IV hydration  Strict I/O  Monitor for tumor lysis labs  Allopurinol daily  Mouth care  Follow CBC and transfuse prn  Follow electrolytes and replete prn  TLCL placement  HLA typing  Patient has experienced some depression. Offered psych intervention, but patient refused at this time. Will continue to discuss with patient.

## 2019-08-13 NOTE — PROGRESS NOTE ADULT - ATTENDING COMMENTS
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 Pt gave consent for Pfizer trial 7+3+ Glasdegib/placebo.  Patient here for her induction chemotherapy on trial.  Day 6.  Fever, chills, no localizing sxs.  Tmax 101.6. No N/V/D, rash.     Supportive care, anti-emetics, IVFs, allopurinol for TLS ppx.   less dyspnea but I > O, interstitial edema on CT chest  Transaminitis- monitor LFT's  Cont. Cefepime(d/c Levaquin); on Posaconazole  Monitor CBC, receives transfusions PRN.   Lantus QHS, following fingersticks closely, endocrine consult appreciated. Very poorly controlled diabetes.   ECOG performance status 2. 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 Pt on Pfizer trial 7+3+ Glasdegib/placebo.  Patient here for her induction chemotherapy on trial.  Day 7.  Fever, chills, no localizing sxs.  Tmax 101.9, No N/V/D, rash.  All cults (-)  Diarrhea x 1, mild abd discomfort when coughs.     Supportive care, anti-emetics, IVFs, allopurinol for TLS ppx.   less dyspnea but I > O, interstitial edema on CT chest.  Weight down.  Transaminitis- monitor LFT's - stable  Cont. Cefepime (d/c Levaquin); and Posaconazole  Monitor CBC, receives transfusions PRN.   Lantus QHS, following fingersticks closely, endocrine consult appreciated. Very poorly controlled diabetes.   ECOG performance status 2.

## 2019-08-13 NOTE — PROGRESS NOTE ADULT - PROBLEM SELECTOR PLAN 1
-Continue Lantus 24 units sq qhs and Humalog from 8 to 12 units sq tid-ac  -Moderate scale tid-ac/qhs  DISPO: basal/bolus, doses to be determined.

## 2019-08-13 NOTE — PROGRESS NOTE ADULT - PROBLEM SELECTOR PLAN 9
Lovenox 40mg SQ daily  D/C when PLT <50K        Contact Information [phone] No pharmacologic ppx 2/2 thrombocytopenia        Contact Information [phone]

## 2019-08-13 NOTE — PROGRESS NOTE ADULT - SUBJECTIVE AND OBJECTIVE BOX
Diagnosis: AML FLT 3 (-)    Protocol/Chemo Regimen: Enrolled on Pfizer BRIGHT trial receiving Induction chemotherapy with Dauno/Cytarabine and daily Glastegib Vs Placebo    Day: 7    Pt endorsed: fatigue, fever overnight, itching of lower extremities     Review of Systems: Patient denies headache, dizziness, visual changes, chest pain, palpitations, abdominal pain, nausea, vomiting, diarrhea or dysuria.    Pain scale: denies    Diet: Consistent Carbohydrate    Allergies: No Known Allergies      ANTIMICROBIALS  cefepime   IVPB 2000 milliGRAM(s) IV Intermittent every 8 hours  posaconazole DR Tablet 300 milliGRAM(s) Oral daily      HEME/ONC MEDICATIONS  cytarabine IVPB (eMAR) 158 milliGRAM(s) IV Intermittent daily  enoxaparin Injectable 40 milliGRAM(s) SubCutaneous daily      STANDING MEDICATIONS  ALBUTerol    90 MICROgram(s) HFA Inhaler 1 Puff(s) Inhalation every 6 hours  ALBUTerol/ipratropium for Nebulization 3 milliLiter(s) Nebulizer every 6 hours  allopurinol 300 milliGRAM(s) Oral daily  Biotene Dry Mouth Oral Rinse 5 milliLiter(s) Swish and Spit three times a day  chlorhexidine 2% Cloths 1 Application(s) Topical <User Schedule>  dextrose 5%. 1000 milliLiter(s) IV Continuous <Continuous>  dextrose 50% Injectable 12.5 Gram(s) IV Push once  dextrose 50% Injectable 25 Gram(s) IV Push once  dextrose 50% Injectable 25 Gram(s) IV Push once  gabapentin 600 milliGRAM(s) Oral two times a day  insulin glargine Injectable (LANTUS) 24 Unit(s) SubCutaneous at bedtime  insulin lispro (HumaLOG) corrective regimen sliding scale   SubCutaneous three times a day before meals  insulin lispro (HumaLOG) corrective regimen sliding scale   SubCutaneous at bedtime  insulin lispro Injectable (HumaLOG) 8 Unit(s) SubCutaneous three times a day before meals  investigational chemo - general 1 Tablet(s) Oral every 24 hours  levothyroxine 75 MICROGram(s) Oral daily  losartan 100 milliGRAM(s) Oral daily  ondansetron Injectable 8 milliGRAM(s) IV Push every 8 hours  senna 2 Tablet(s) Oral two times a day  sodium chloride 0.9%. 1000 milliLiter(s) IV Continuous <Continuous>  sodium chloride 3%  Inhalation 4 milliLiter(s) Inhalation two times a day  tiotropium 18 MICROgram(s) Capsule 1 Capsule(s) Inhalation daily      PRN MEDICATIONS  acetaminophen   Tablet .. 650 milliGRAM(s) Oral every 6 hours PRN  dextrose 40% Gel 15 Gram(s) Oral once PRN  glucagon  Injectable 1 milliGRAM(s) IntraMuscular once PRN  melatonin 3 milliGRAM(s) Oral once PRN  metoclopramide Injectable 10 milliGRAM(s) IV Push every 6 hours PRN  polyethylene glycol 3350 17 Gram(s) Oral two times a day PRN      Vital Signs Last 24 Hrs  T(C): 37.3 (13 Aug 2019 05:00), Max: 38.8 (12 Aug 2019 16:10)  T(F): 99.2 (13 Aug 2019 05:00), Max: 101.9 (12 Aug 2019 16:10)  HR: 94 (13 Aug 2019 05:00) (79 - 106)  BP: 101/56 (13 Aug 2019 05:00) (101/56 - 152/69)  BP(mean): --  RR: 18 (13 Aug 2019 05:00) (18 - 20)  SpO2: 95% (13 Aug 2019 05:00) (95% - 98%)      PHYSICAL EXAM  General: NAD  HEENT: PERRLA, EOMI, clear oropharynx  Neck: supple  CV: (+) S1/S2 RRR  Lungs: (+) FBCR B/L  Abdomen: soft, non-tender, non-distended (+) BS  Ext: no edema  Skin: no rashes, dry skin  Neuro: alert and oriented X 3, no focal deficits  Central Line: C/D/I        LABS:                                            x      0.4   )-----------( 49       ( 13 Aug 2019 06:41 )             x            Mean Cell Volume : 86.6 fl  Mean Cell Hemoglobin : 29.8 pg  Mean Cell Hemoglobin Concentration : 34.4 gm/dL  Auto Neutrophil # : x  Auto Lymphocyte # : x  Auto Monocyte # : x  Auto Eosinophil # : x  Auto Basophil # : x  Auto Neutrophil % : x  Auto Lymphocyte % : x  Auto Monocyte % : x  Auto Eosinophil % : x  Auto Basophil % : x      08-13    135  |  102  |  10  ----------------------------<  165<H>  3.7   |  23  |  0.74    Ca    7.6<L>      13 Aug 2019 06:41  Phos  2.3     08-13  Mg     1.5     08-13    TPro  6.2  /  Alb  3.0<L>  /  TBili  0.3  /  DBili  x   /  AST  54<H>  /  ALT  56<H>  /  AlkPhos  138<H>  08-13      Mg 1.5  Phos 2.3      PT/INR - ( 12 Aug 2019 07:16 )   PT: 12.7 sec;   INR: 1.11 ratio         PTT - ( 12 Aug 2019 07:16 )  PTT:32.5 sec      Uric Acid 1.4          RECENT CULTURES:    Culture - Sputum . (08.12.19 @ 01:34)    Gram Stain:   Rare polymorphonuclear leukocytes per low power field  Few Squamous epithelial cells per low power field  Moderate Gram positive cocci in pairs seen per oil power field  Moderate Gram Variable Rods seen per oil power field    Specimen Source: .Sputum    Culture - Urine (08.10.19 @ 17:19)    Specimen Source: .Urine    Culture Results:   No growth      Culture - Blood (08.09.19 @ 22:59)    Specimen Source: .Blood    Culture Results:   No growth to date.        RADIOLOGY & ADDITIONAL STUDIES:    EXAM:  CT CHEST                        PROCEDURE DATE:  08/09/2019   IMPRESSION: Pulmonary fibrosis and traction bronchiectasis without honeycombing.   Overall these findings appear progressed compared to CT abdomen 7/4/2017.  Associated groundglass opacity may be related to active interstitial lung   disease, fluid overload, less likely infection. Diagnosis: AML FLT 3 (-)    Protocol/Chemo Regimen: Enrolled on Pfizer BRIGHT trial receiving Induction chemotherapy with Dauno/Cytarabine and daily Glastegib Vs Placebo    Day: 7    Pt endorsed: fatigue, fever overnight, itching of lower extremities     Review of Systems: Patient denies headache, dizziness, visual changes, chest pain, palpitations, abdominal pain, nausea, vomiting, diarrhea or dysuria.    Pain scale: denies    Diet: Consistent Carbohydrate    Allergies: No Known Allergies      ANTIMICROBIALS  cefepime   IVPB 2000 milliGRAM(s) IV Intermittent every 8 hours  posaconazole DR Tablet 300 milliGRAM(s) Oral daily      HEME/ONC MEDICATIONS  cytarabine IVPB (eMAR) 158 milliGRAM(s) IV Intermittent daily  enoxaparin Injectable 40 milliGRAM(s) SubCutaneous daily      STANDING MEDICATIONS  ALBUTerol    90 MICROgram(s) HFA Inhaler 1 Puff(s) Inhalation every 6 hours  ALBUTerol/ipratropium for Nebulization 3 milliLiter(s) Nebulizer every 6 hours  allopurinol 300 milliGRAM(s) Oral daily  Biotene Dry Mouth Oral Rinse 5 milliLiter(s) Swish and Spit three times a day  chlorhexidine 2% Cloths 1 Application(s) Topical <User Schedule>  dextrose 5%. 1000 milliLiter(s) IV Continuous <Continuous>  dextrose 50% Injectable 12.5 Gram(s) IV Push once  dextrose 50% Injectable 25 Gram(s) IV Push once  dextrose 50% Injectable 25 Gram(s) IV Push once  gabapentin 600 milliGRAM(s) Oral two times a day  insulin glargine Injectable (LANTUS) 24 Unit(s) SubCutaneous at bedtime  insulin lispro (HumaLOG) corrective regimen sliding scale   SubCutaneous three times a day before meals  insulin lispro (HumaLOG) corrective regimen sliding scale   SubCutaneous at bedtime  insulin lispro Injectable (HumaLOG) 8 Unit(s) SubCutaneous three times a day before meals  investigational chemo - general 1 Tablet(s) Oral every 24 hours  levothyroxine 75 MICROGram(s) Oral daily  losartan 100 milliGRAM(s) Oral daily  ondansetron Injectable 8 milliGRAM(s) IV Push every 8 hours  senna 2 Tablet(s) Oral two times a day  sodium chloride 0.9%. 1000 milliLiter(s) IV Continuous <Continuous>  sodium chloride 3%  Inhalation 4 milliLiter(s) Inhalation two times a day  tiotropium 18 MICROgram(s) Capsule 1 Capsule(s) Inhalation daily      PRN MEDICATIONS  acetaminophen   Tablet .. 650 milliGRAM(s) Oral every 6 hours PRN  dextrose 40% Gel 15 Gram(s) Oral once PRN  glucagon  Injectable 1 milliGRAM(s) IntraMuscular once PRN  melatonin 3 milliGRAM(s) Oral once PRN  metoclopramide Injectable 10 milliGRAM(s) IV Push every 6 hours PRN  polyethylene glycol 3350 17 Gram(s) Oral two times a day PRN      Vital Signs Last 24 Hrs  T(C): 37.3 (13 Aug 2019 05:00), Max: 38.8 (12 Aug 2019 16:10)  T(F): 99.2 (13 Aug 2019 05:00), Max: 101.9 (12 Aug 2019 16:10)  HR: 94 (13 Aug 2019 05:00) (79 - 106)  BP: 101/56 (13 Aug 2019 05:00) (101/56 - 152/69)  BP(mean): --  RR: 18 (13 Aug 2019 05:00) (18 - 20)  SpO2: 95% (13 Aug 2019 05:00) (95% - 98%)      PHYSICAL EXAM  General: NAD  HEENT: PERRLA, EOMI, clear oropharynx  Neck: supple  CV: (+) S1/S2 RRR  Lungs: (+) FBCR B/L  Abdomen: soft, non-tender, non-distended (+) BS  Ext: no edema  Skin: no rashes, dry skin  Neuro: alert and oriented X 3, no focal deficits  Central Line: C/D/I        LABS:                                            6.6     0.4   )-----------( 49       ( 13 Aug 2019 06:41 )             19.1            Mean Cell Volume : 86.6 fl  Mean Cell Hemoglobin : 29.8 pg  Mean Cell Hemoglobin Concentration : 34.4 gm/dL  Auto Neutrophil # : x  Auto Lymphocyte # : x  Auto Monocyte # : x  Auto Eosinophil # : x  Auto Basophil # : x  Auto Neutrophil % : x  Auto Lymphocyte % : x  Auto Monocyte % : x  Auto Eosinophil % : x  Auto Basophil % : x      08-13    135  |  102  |  10  ----------------------------<  165<H>  3.7   |  23  |  0.74    Ca    7.6<L>      13 Aug 2019 06:41  Phos  2.3     08-13  Mg     1.5     08-13    TPro  6.2  /  Alb  3.0<L>  /  TBili  0.3  /  DBili  x   /  AST  54<H>  /  ALT  56<H>  /  AlkPhos  138<H>  08-13      Mg 1.5  Phos 2.3      PT/INR - ( 12 Aug 2019 07:16 )   PT: 12.7 sec;   INR: 1.11 ratio         PTT - ( 12 Aug 2019 07:16 )  PTT:32.5 sec      Uric Acid 1.4          RECENT CULTURES:    Culture - Sputum . (08.12.19 @ 01:34)    Gram Stain:   Rare polymorphonuclear leukocytes per low power field  Few Squamous epithelial cells per low power field  Moderate Gram positive cocci in pairs seen per oil power field  Moderate Gram Variable Rods seen per oil power field    Specimen Source: .Sputum    Culture - Urine (08.10.19 @ 17:19)    Specimen Source: .Urine    Culture Results:   No growth      Culture - Blood (08.09.19 @ 22:59)    Specimen Source: .Blood    Culture Results:   No growth to date.        RADIOLOGY & ADDITIONAL STUDIES:    EXAM:  CT CHEST                        PROCEDURE DATE:  08/09/2019   IMPRESSION: Pulmonary fibrosis and traction bronchiectasis without honeycombing.   Overall these findings appear progressed compared to CT abdomen 7/4/2017.  Associated groundglass opacity may be related to active interstitial lung   disease, fluid overload, less likely infection. Diagnosis: AML FLT 3 (-)    Protocol/Chemo Regimen: Enrolled on Pfizer BRIGHT trial receiving Induction chemotherapy with Dauno/Cytarabine and daily Glastegib Vs Placebo    Day: 7    Pt endorsed: fatigue, fever overnight, itching of lower extremities     Review of Systems: Patient denies headache, dizziness, visual changes, chest pain, palpitations, abdominal pain, nausea, vomiting, diarrhea or dysuria.    Pain scale: denies    Diet: Consistent Carbohydrate    Allergies: No Known Allergies      ANTIMICROBIALS  cefepime   IVPB 2000 milliGRAM(s) IV Intermittent every 8 hours  posaconazole DR Tablet 300 milliGRAM(s) Oral daily      HEME/ONC MEDICATIONS  cytarabine IVPB (eMAR) 158 milliGRAM(s) IV Intermittent daily      STANDING MEDICATIONS  ALBUTerol    90 MICROgram(s) HFA Inhaler 1 Puff(s) Inhalation every 6 hours  ALBUTerol/ipratropium for Nebulization 3 milliLiter(s) Nebulizer every 6 hours  allopurinol 300 milliGRAM(s) Oral daily  Biotene Dry Mouth Oral Rinse 5 milliLiter(s) Swish and Spit three times a day  chlorhexidine 2% Cloths 1 Application(s) Topical <User Schedule>  dextrose 5%. 1000 milliLiter(s) IV Continuous <Continuous>  dextrose 50% Injectable 12.5 Gram(s) IV Push once  dextrose 50% Injectable 25 Gram(s) IV Push once  dextrose 50% Injectable 25 Gram(s) IV Push once  gabapentin 600 milliGRAM(s) Oral two times a day  insulin glargine Injectable (LANTUS) 24 Unit(s) SubCutaneous at bedtime  insulin lispro (HumaLOG) corrective regimen sliding scale   SubCutaneous three times a day before meals  insulin lispro (HumaLOG) corrective regimen sliding scale   SubCutaneous at bedtime  insulin lispro Injectable (HumaLOG) 8 Unit(s) SubCutaneous three times a day before meals  investigational chemo - general 1 Tablet(s) Oral every 24 hours  levothyroxine 75 MICROGram(s) Oral daily  losartan 100 milliGRAM(s) Oral daily  ondansetron Injectable 8 milliGRAM(s) IV Push every 8 hours  senna 2 Tablet(s) Oral two times a day  sodium chloride 0.9%. 1000 milliLiter(s) IV Continuous <Continuous>  sodium chloride 3%  Inhalation 4 milliLiter(s) Inhalation two times a day  tiotropium 18 MICROgram(s) Capsule 1 Capsule(s) Inhalation daily      PRN MEDICATIONS  acetaminophen   Tablet .. 650 milliGRAM(s) Oral every 6 hours PRN  dextrose 40% Gel 15 Gram(s) Oral once PRN  glucagon  Injectable 1 milliGRAM(s) IntraMuscular once PRN  melatonin 3 milliGRAM(s) Oral once PRN  metoclopramide Injectable 10 milliGRAM(s) IV Push every 6 hours PRN  polyethylene glycol 3350 17 Gram(s) Oral two times a day PRN      Vital Signs Last 24 Hrs  T(C): 37.3 (13 Aug 2019 05:00), Max: 38.8 (12 Aug 2019 16:10)  T(F): 99.2 (13 Aug 2019 05:00), Max: 101.9 (12 Aug 2019 16:10)  HR: 94 (13 Aug 2019 05:00) (79 - 106)  BP: 101/56 (13 Aug 2019 05:00) (101/56 - 152/69)  BP(mean): --  RR: 18 (13 Aug 2019 05:00) (18 - 20)  SpO2: 95% (13 Aug 2019 05:00) (95% - 98%)      PHYSICAL EXAM  General: NAD  HEENT: PERRLA, EOMI, clear oropharynx  Neck: supple  CV: (+) S1/S2 RRR  Lungs: (+) FBCR B/L  Abdomen: soft, non-tender, non-distended (+) BS  Ext: no edema  Skin: no rashes, dry skin  Neuro: alert and oriented X 3, no focal deficits  Central Line: C/D/I        LABS:                                            6.6     0.4   )-----------( 49       ( 13 Aug 2019 06:41 )             19.1            Mean Cell Volume : 86.6 fl  Mean Cell Hemoglobin : 29.8 pg  Mean Cell Hemoglobin Concentration : 34.4 gm/dL  Auto Neutrophil # : x  Auto Lymphocyte # : x  Auto Monocyte # : x  Auto Eosinophil # : x  Auto Basophil # : x  Auto Neutrophil % : x  Auto Lymphocyte % : x  Auto Monocyte % : x  Auto Eosinophil % : x  Auto Basophil % : x      08-13    135  |  102  |  10  ----------------------------<  165<H>  3.7   |  23  |  0.74    Ca    7.6<L>      13 Aug 2019 06:41  Phos  2.3     08-13  Mg     1.5     08-13    TPro  6.2  /  Alb  3.0<L>  /  TBili  0.3  /  DBili  x   /  AST  54<H>  /  ALT  56<H>  /  AlkPhos  138<H>  08-13      Mg 1.5  Phos 2.3      PT/INR - ( 12 Aug 2019 07:16 )   PT: 12.7 sec;   INR: 1.11 ratio         PTT - ( 12 Aug 2019 07:16 )  PTT:32.5 sec      Uric Acid 1.4          RECENT CULTURES:    Culture - Sputum . (08.12.19 @ 01:34)    Gram Stain:   Rare polymorphonuclear leukocytes per low power field  Few Squamous epithelial cells per low power field  Moderate Gram positive cocci in pairs seen per oil power field  Moderate Gram Variable Rods seen per oil power field    Specimen Source: .Sputum    Culture - Urine (08.10.19 @ 17:19)    Specimen Source: .Urine    Culture Results:   No growth      Culture - Blood (08.09.19 @ 22:59)    Specimen Source: .Blood    Culture Results:   No growth to date.        RADIOLOGY & ADDITIONAL STUDIES:    EXAM:  CT CHEST                        PROCEDURE DATE:  08/09/2019   IMPRESSION: Pulmonary fibrosis and traction bronchiectasis without honeycombing.   Overall these findings appear progressed compared to CT abdomen 7/4/2017.  Associated groundglass opacity may be related to active interstitial lung   disease, fluid overload, less likely infection. Diagnosis: AML FLT 3 (-)    Protocol/Chemo Regimen: Enrolled on Pfizer BRIGHT trial receiving Induction chemotherapy with Dauno/Cytarabine and daily Glastegib Vs Placebo    Day: 7    Pt endorsed: fatigue, fever overnight, itching of lower extremities and diarrhea x 1 (taking Senna)    Review of Systems: Patient denies headache, dizziness, visual changes, chest pain, palpitations, abdominal pain, nausea, vomiting or dysuria.    Pain scale: denies    Diet: Consistent Carbohydrate    Allergies: No Known Allergies      ANTIMICROBIALS  cefepime   IVPB 2000 milliGRAM(s) IV Intermittent every 8 hours  posaconazole DR Tablet 300 milliGRAM(s) Oral daily      HEME/ONC MEDICATIONS  cytarabine IVPB (eMAR) 158 milliGRAM(s) IV Intermittent daily      STANDING MEDICATIONS  ALBUTerol    90 MICROgram(s) HFA Inhaler 1 Puff(s) Inhalation every 6 hours  ALBUTerol/ipratropium for Nebulization 3 milliLiter(s) Nebulizer every 6 hours  allopurinol 300 milliGRAM(s) Oral daily  Biotene Dry Mouth Oral Rinse 5 milliLiter(s) Swish and Spit three times a day  chlorhexidine 2% Cloths 1 Application(s) Topical <User Schedule>  dextrose 5%. 1000 milliLiter(s) IV Continuous <Continuous>  dextrose 50% Injectable 12.5 Gram(s) IV Push once  dextrose 50% Injectable 25 Gram(s) IV Push once  dextrose 50% Injectable 25 Gram(s) IV Push once  gabapentin 600 milliGRAM(s) Oral two times a day  insulin glargine Injectable (LANTUS) 24 Unit(s) SubCutaneous at bedtime  insulin lispro (HumaLOG) corrective regimen sliding scale   SubCutaneous three times a day before meals  insulin lispro (HumaLOG) corrective regimen sliding scale   SubCutaneous at bedtime  insulin lispro Injectable (HumaLOG) 8 Unit(s) SubCutaneous three times a day before meals  investigational chemo - general 1 Tablet(s) Oral every 24 hours  levothyroxine 75 MICROGram(s) Oral daily  losartan 100 milliGRAM(s) Oral daily  ondansetron Injectable 8 milliGRAM(s) IV Push every 8 hours  senna 2 Tablet(s) Oral two times a day  sodium chloride 0.9%. 1000 milliLiter(s) IV Continuous <Continuous>  sodium chloride 3%  Inhalation 4 milliLiter(s) Inhalation two times a day  tiotropium 18 MICROgram(s) Capsule 1 Capsule(s) Inhalation daily      PRN MEDICATIONS  acetaminophen   Tablet .. 650 milliGRAM(s) Oral every 6 hours PRN  dextrose 40% Gel 15 Gram(s) Oral once PRN  glucagon  Injectable 1 milliGRAM(s) IntraMuscular once PRN  melatonin 3 milliGRAM(s) Oral once PRN  metoclopramide Injectable 10 milliGRAM(s) IV Push every 6 hours PRN  polyethylene glycol 3350 17 Gram(s) Oral two times a day PRN      Vital Signs Last 24 Hrs  T(C): 37.3 (13 Aug 2019 05:00), Max: 38.8 (12 Aug 2019 16:10)  T(F): 99.2 (13 Aug 2019 05:00), Max: 101.9 (12 Aug 2019 16:10)  HR: 94 (13 Aug 2019 05:00) (79 - 106)  BP: 101/56 (13 Aug 2019 05:00) (101/56 - 152/69)  BP(mean): --  RR: 18 (13 Aug 2019 05:00) (18 - 20)  SpO2: 95% (13 Aug 2019 05:00) (95% - 98%)      PHYSICAL EXAM  General: NAD  HEENT: PERRLA, EOMI, clear oropharynx  Neck: supple  CV: (+) S1/S2 RRR  Lungs: (+) FBCR B/L  Abdomen: soft, non-tender, non-distended (+) BS  Ext: no edema  Skin: no rashes, dry skin  Neuro: alert and oriented X 3, no focal deficits  Central Line: C/D/I        LABS:                                            6.6     0.4   )-----------( 49       ( 13 Aug 2019 06:41 )             19.1            Mean Cell Volume : 86.6 fl  Mean Cell Hemoglobin : 29.8 pg  Mean Cell Hemoglobin Concentration : 34.4 gm/dL  Auto Neutrophil # : x  Auto Lymphocyte # : x  Auto Monocyte # : x  Auto Eosinophil # : x  Auto Basophil # : x  Auto Neutrophil % : x  Auto Lymphocyte % : x  Auto Monocyte % : x  Auto Eosinophil % : x  Auto Basophil % : x      08-13    135  |  102  |  10  ----------------------------<  165<H>  3.7   |  23  |  0.74    Ca    7.6<L>      13 Aug 2019 06:41  Phos  2.3     08-13  Mg     1.5     08-13    TPro  6.2  /  Alb  3.0<L>  /  TBili  0.3  /  DBili  x   /  AST  54<H>  /  ALT  56<H>  /  AlkPhos  138<H>  08-13      Mg 1.5  Phos 2.3      PT/INR - ( 12 Aug 2019 07:16 )   PT: 12.7 sec;   INR: 1.11 ratio         PTT - ( 12 Aug 2019 07:16 )  PTT:32.5 sec      Uric Acid 1.4          RECENT CULTURES:    Culture - Sputum . (08.12.19 @ 01:34)    Gram Stain:   Rare polymorphonuclear leukocytes per low power field  Few Squamous epithelial cells per low power field  Moderate Gram positive cocci in pairs seen per oil power field  Moderate Gram Variable Rods seen per oil power field    Specimen Source: .Sputum    Culture - Urine (08.10.19 @ 17:19)    Specimen Source: .Urine    Culture Results:   No growth      Culture - Blood (08.09.19 @ 22:59)    Specimen Source: .Blood    Culture Results:   No growth to date.        RADIOLOGY & ADDITIONAL STUDIES:    EXAM:  CT CHEST                        PROCEDURE DATE:  08/09/2019   IMPRESSION: Pulmonary fibrosis and traction bronchiectasis without honeycombing.   Overall these findings appear progressed compared to CT abdomen 7/4/2017.  Associated groundglass opacity may be related to active interstitial lung   disease, fluid overload, less likely infection.

## 2019-08-14 LAB
ALBUMIN SERPL ELPH-MCNC: 2.8 G/DL — LOW (ref 3.3–5)
ALP SERPL-CCNC: 168 U/L — HIGH (ref 40–120)
ALT FLD-CCNC: 64 U/L — HIGH (ref 10–45)
ANION GAP SERPL CALC-SCNC: 12 MMOL/L — SIGNIFICANT CHANGE UP (ref 5–17)
APPEARANCE UR: CLEAR — SIGNIFICANT CHANGE UP
AST SERPL-CCNC: 56 U/L — HIGH (ref 10–40)
BILIRUB SERPL-MCNC: 0.4 MG/DL — SIGNIFICANT CHANGE UP (ref 0.2–1.2)
BILIRUB UR-MCNC: NEGATIVE — SIGNIFICANT CHANGE UP
BUN SERPL-MCNC: 13 MG/DL — SIGNIFICANT CHANGE UP (ref 7–23)
CALCIUM SERPL-MCNC: 7.7 MG/DL — LOW (ref 8.4–10.5)
CHLORIDE SERPL-SCNC: 100 MMOL/L — SIGNIFICANT CHANGE UP (ref 96–108)
CO2 SERPL-SCNC: 21 MMOL/L — LOW (ref 22–31)
COLOR SPEC: SIGNIFICANT CHANGE UP
CREAT SERPL-MCNC: 0.83 MG/DL — SIGNIFICANT CHANGE UP (ref 0.5–1.3)
CULTURE RESULTS: NO GROWTH — SIGNIFICANT CHANGE UP
DIFF PNL FLD: NEGATIVE — SIGNIFICANT CHANGE UP
GLUCOSE BLDC GLUCOMTR-MCNC: 105 MG/DL — HIGH (ref 70–99)
GLUCOSE BLDC GLUCOMTR-MCNC: 106 MG/DL — HIGH (ref 70–99)
GLUCOSE BLDC GLUCOMTR-MCNC: 257 MG/DL — HIGH (ref 70–99)
GLUCOSE BLDC GLUCOMTR-MCNC: 87 MG/DL — SIGNIFICANT CHANGE UP (ref 70–99)
GLUCOSE SERPL-MCNC: 243 MG/DL — HIGH (ref 70–99)
GLUCOSE UR QL: NEGATIVE — SIGNIFICANT CHANGE UP
HCT VFR BLD CALC: 24.2 % — LOW (ref 34.5–45)
HGB BLD-MCNC: 8.2 G/DL — LOW (ref 11.5–15.5)
KETONES UR-MCNC: NEGATIVE — SIGNIFICANT CHANGE UP
LDH SERPL L TO P-CCNC: 313 U/L — HIGH (ref 50–242)
LEUKOCYTE ESTERASE UR-ACNC: NEGATIVE — SIGNIFICANT CHANGE UP
MAGNESIUM SERPL-MCNC: 1.8 MG/DL — SIGNIFICANT CHANGE UP (ref 1.6–2.6)
MCHC RBC-ENTMCNC: 29 PG — SIGNIFICANT CHANGE UP (ref 27–34)
MCHC RBC-ENTMCNC: 33.9 GM/DL — SIGNIFICANT CHANGE UP (ref 32–36)
MCV RBC AUTO: 85.8 FL — SIGNIFICANT CHANGE UP (ref 80–100)
NITRITE UR-MCNC: NEGATIVE — SIGNIFICANT CHANGE UP
PH UR: 6 — SIGNIFICANT CHANGE UP (ref 5–8)
PHOSPHATE SERPL-MCNC: 2.8 MG/DL — SIGNIFICANT CHANGE UP (ref 2.5–4.5)
PLATELET # BLD AUTO: 36 K/UL — LOW (ref 150–400)
POTASSIUM SERPL-MCNC: 3.5 MMOL/L — SIGNIFICANT CHANGE UP (ref 3.5–5.3)
POTASSIUM SERPL-SCNC: 3.5 MMOL/L — SIGNIFICANT CHANGE UP (ref 3.5–5.3)
PROT SERPL-MCNC: 6.5 G/DL — SIGNIFICANT CHANGE UP (ref 6–8.3)
PROT UR-MCNC: ABNORMAL
RBC # BLD: 2.82 M/UL — LOW (ref 3.8–5.2)
RBC # FLD: 14.2 % — SIGNIFICANT CHANGE UP (ref 10.3–14.5)
SODIUM SERPL-SCNC: 133 MMOL/L — LOW (ref 135–145)
SP GR SPEC: 1.01 — SIGNIFICANT CHANGE UP (ref 1.01–1.02)
SPECIMEN SOURCE: SIGNIFICANT CHANGE UP
URATE SERPL-MCNC: 1.5 MG/DL — LOW (ref 2.5–7)
UROBILINOGEN FLD QL: SIGNIFICANT CHANGE UP
WBC # BLD: 0.4 K/UL — CRITICAL LOW (ref 3.8–10.5)
WBC # FLD AUTO: 0.4 K/UL — CRITICAL LOW (ref 3.8–10.5)

## 2019-08-14 PROCEDURE — 99232 SBSQ HOSP IP/OBS MODERATE 35: CPT

## 2019-08-14 RX ORDER — HYDROXYZINE HCL 10 MG
25 TABLET ORAL EVERY 8 HOURS
Refills: 0 | Status: DISCONTINUED | OUTPATIENT
Start: 2019-08-14 | End: 2019-08-15

## 2019-08-14 RX ORDER — DIPHENHYDRAMINE HCL 50 MG
25 CAPSULE ORAL EVERY 6 HOURS
Refills: 0 | Status: DISCONTINUED | OUTPATIENT
Start: 2019-08-14 | End: 2019-08-27

## 2019-08-14 RX ORDER — DIPHENHYDRAMINE HCL 50 MG
25 CAPSULE ORAL ONCE
Refills: 0 | Status: DISCONTINUED | OUTPATIENT
Start: 2019-08-14 | End: 2019-08-14

## 2019-08-14 RX ORDER — HYDROXYZINE HCL 10 MG
25 TABLET ORAL ONCE
Refills: 0 | Status: COMPLETED | OUTPATIENT
Start: 2019-08-14 | End: 2019-08-14

## 2019-08-14 RX ORDER — INSULIN LISPRO 100/ML
10 VIAL (ML) SUBCUTANEOUS
Refills: 0 | Status: DISCONTINUED | OUTPATIENT
Start: 2019-08-14 | End: 2019-08-23

## 2019-08-14 RX ADMIN — ONDANSETRON 8 MILLIGRAM(S): 8 TABLET, FILM COATED ORAL at 05:48

## 2019-08-14 RX ADMIN — POSACONAZOLE 300 MILLIGRAM(S): 100 TABLET, DELAYED RELEASE ORAL at 11:31

## 2019-08-14 RX ADMIN — Medication 3 MILLILITER(S): at 17:22

## 2019-08-14 RX ADMIN — Medication 650 MILLIGRAM(S): at 01:19

## 2019-08-14 RX ADMIN — INSULIN GLARGINE 24 UNIT(S): 100 INJECTION, SOLUTION SUBCUTANEOUS at 21:18

## 2019-08-14 RX ADMIN — Medication 3 MILLILITER(S): at 01:19

## 2019-08-14 RX ADMIN — CHLORHEXIDINE GLUCONATE 1 APPLICATION(S): 213 SOLUTION TOPICAL at 08:22

## 2019-08-14 RX ADMIN — GABAPENTIN 600 MILLIGRAM(S): 400 CAPSULE ORAL at 05:48

## 2019-08-14 RX ADMIN — Medication 3: at 08:19

## 2019-08-14 RX ADMIN — Medication 5 MILLILITER(S): at 21:18

## 2019-08-14 RX ADMIN — CEFEPIME 100 MILLIGRAM(S): 1 INJECTION, POWDER, FOR SOLUTION INTRAMUSCULAR; INTRAVENOUS at 05:48

## 2019-08-14 RX ADMIN — Medication 3 MILLILITER(S): at 05:49

## 2019-08-14 RX ADMIN — Medication 25 MILLIGRAM(S): at 03:52

## 2019-08-14 RX ADMIN — SODIUM CHLORIDE 4 MILLILITER(S): 9 INJECTION INTRAMUSCULAR; INTRAVENOUS; SUBCUTANEOUS at 06:06

## 2019-08-14 RX ADMIN — ONDANSETRON 8 MILLIGRAM(S): 8 TABLET, FILM COATED ORAL at 21:18

## 2019-08-14 RX ADMIN — Medication 5 MILLILITER(S): at 14:35

## 2019-08-14 RX ADMIN — Medication 10 UNIT(S): at 17:24

## 2019-08-14 RX ADMIN — Medication 12 UNIT(S): at 08:19

## 2019-08-14 RX ADMIN — Medication 75 MICROGRAM(S): at 05:48

## 2019-08-14 RX ADMIN — Medication 12 UNIT(S): at 12:44

## 2019-08-14 RX ADMIN — Medication 650 MILLIGRAM(S): at 09:17

## 2019-08-14 RX ADMIN — GABAPENTIN 600 MILLIGRAM(S): 400 CAPSULE ORAL at 17:22

## 2019-08-14 RX ADMIN — Medication 25 MILLIGRAM(S): at 09:17

## 2019-08-14 RX ADMIN — SODIUM CHLORIDE 50 MILLILITER(S): 9 INJECTION INTRAMUSCULAR; INTRAVENOUS; SUBCUTANEOUS at 05:48

## 2019-08-14 RX ADMIN — CEFEPIME 100 MILLIGRAM(S): 1 INJECTION, POWDER, FOR SOLUTION INTRAMUSCULAR; INTRAVENOUS at 21:18

## 2019-08-14 RX ADMIN — Medication 25 MILLIGRAM(S): at 14:02

## 2019-08-14 RX ADMIN — LOSARTAN POTASSIUM 100 MILLIGRAM(S): 100 TABLET, FILM COATED ORAL at 06:57

## 2019-08-14 RX ADMIN — SODIUM CHLORIDE 4 MILLILITER(S): 9 INJECTION INTRAMUSCULAR; INTRAVENOUS; SUBCUTANEOUS at 17:22

## 2019-08-14 RX ADMIN — Medication 25 MILLIGRAM(S): at 21:20

## 2019-08-14 RX ADMIN — ONDANSETRON 8 MILLIGRAM(S): 8 TABLET, FILM COATED ORAL at 14:33

## 2019-08-14 RX ADMIN — Medication 5 MILLILITER(S): at 05:48

## 2019-08-14 RX ADMIN — SODIUM CHLORIDE 50 MILLILITER(S): 9 INJECTION INTRAMUSCULAR; INTRAVENOUS; SUBCUTANEOUS at 17:24

## 2019-08-14 RX ADMIN — CEFEPIME 100 MILLIGRAM(S): 1 INJECTION, POWDER, FOR SOLUTION INTRAMUSCULAR; INTRAVENOUS at 14:32

## 2019-08-14 RX ADMIN — Medication 3 MILLILITER(S): at 11:31

## 2019-08-14 NOTE — PROGRESS NOTE ADULT - SUBJECTIVE AND OBJECTIVE BOX
Diagnosis: AML FLT 3 (-)    Protocol/Chemo Regimen: Enrolled on [employer] BRIGHT trial receiving Induction chemotherapy with Dauno/Cytarabine and daily Glastegib Vs Placebo    Day: 8    Pt endorsed: fatigue, fever overnight, itching of lower extremities and diarrhea x 1 (taking Senna)    Review of Systems: Patient denies headache, dizziness, visual changes, chest pain, palpitations, abdominal pain, nausea, vomiting or dysuria.    Pain scale: denies    Diet: Consistent Carbohydrate    Allergies    No Known Allergies    Intolerances        ANTIMICROBIALS  cefepime   IVPB 2000 milliGRAM(s) IV Intermittent every 8 hours  posaconazole DR Tablet 300 milliGRAM(s) Oral daily      HEME/ONC MEDICATIONS      STANDING MEDICATIONS  ALBUTerol    90 MICROgram(s) HFA Inhaler 1 Puff(s) Inhalation every 6 hours  ALBUTerol/ipratropium for Nebulization 3 milliLiter(s) Nebulizer every 6 hours  allopurinol 300 milliGRAM(s) Oral daily  Biotene Dry Mouth Oral Rinse 5 milliLiter(s) Swish and Spit three times a day  chlorhexidine 2% Cloths 1 Application(s) Topical <User Schedule>  dextrose 5%. 1000 milliLiter(s) IV Continuous <Continuous>  dextrose 50% Injectable 12.5 Gram(s) IV Push once  dextrose 50% Injectable 25 Gram(s) IV Push once  dextrose 50% Injectable 25 Gram(s) IV Push once  gabapentin 600 milliGRAM(s) Oral two times a day  insulin glargine Injectable (LANTUS) 24 Unit(s) SubCutaneous at bedtime  insulin lispro (HumaLOG) corrective regimen sliding scale   SubCutaneous three times a day before meals  insulin lispro (HumaLOG) corrective regimen sliding scale   SubCutaneous at bedtime  insulin lispro Injectable (HumaLOG) 12 Unit(s) SubCutaneous three times a day before meals  investigational chemo - general 1 Tablet(s) Oral every 24 hours  levothyroxine 75 MICROGram(s) Oral daily  losartan 100 milliGRAM(s) Oral daily  ondansetron Injectable 8 milliGRAM(s) IV Push every 8 hours  sodium chloride 0.9%. 1000 milliLiter(s) IV Continuous <Continuous>  sodium chloride 3%  Inhalation 4 milliLiter(s) Inhalation two times a day  tiotropium 18 MICROgram(s) Capsule 1 Capsule(s) Inhalation daily      PRN MEDICATIONS  acetaminophen   Tablet .. 650 milliGRAM(s) Oral every 6 hours PRN  dextrose 40% Gel 15 Gram(s) Oral once PRN  glucagon  Injectable 1 milliGRAM(s) IntraMuscular once PRN  melatonin 3 milliGRAM(s) Oral once PRN  metoclopramide Injectable 10 milliGRAM(s) IV Push every 6 hours PRN  oxyCODONE    IR 5 milliGRAM(s) Oral every 4 hours PRN  polyethylene glycol 3350 17 Gram(s) Oral two times a day PRN        Vital Signs Last 24 Hrs  T(C): 37.1 (14 Aug 2019 05:27), Max: 39.3 (13 Aug 2019 17:13)  T(F): 98.8 (14 Aug 2019 05:27), Max: 102.8 (13 Aug 2019 17:13)  HR: 91 (14 Aug 2019 05:27) (86 - 105)  BP: 119/68 (14 Aug 2019 05:27) (112/60 - 146/70)  BP(mean): --  RR: 18 (14 Aug 2019 05:27) (18 - 18)  SpO2: 98% (14 Aug 2019 05:27) (93% - 99%)    PHYSICAL EXAM  General: NAD  HEENT: PERRLA, EOMI, clear oropharynx  Neck: supple  CV: (+) S1/S2 RRR  Lungs: (+) FBCR B/L  Abdomen: soft, non-tender, non-distended (+) BS  Ext: no edema  Skin: no rashes, dry skin  Neuro: alert and oriented X 3, no focal deficits  Central Line: C/D/I    RECENT CULTURES:  08-12 @ 01:34  .Sputum  --  --  --    Normal Respiratory Maryana present  --  08-12 @ 01:19  .Blood  --  --  --    No growth to date.  --  08-12 @ 01:10  .Urine  --  --  --    No growth  --  08-10 @ 17:52  .Sputum  --  --  --    Normal Respiratory Maryana present  --  08-10 @ 17:19  .Urine  --  --  --    No growth  --  08-09 @ 22:59  .Blood  --  --  --    No growth to date.  --        LABS:                        8.2    0.4   )-----------( 36       ( 14 Aug 2019 06:53 )             24.2         Mean Cell Volume : 85.8 fl  Mean Cell Hemoglobin : 29.0 pg  Mean Cell Hemoglobin Concentration : 33.9 gm/dL  Auto Neutrophil # : x  Auto Lymphocyte # : x  Auto Monocyte # : x  Auto Eosinophil # : x  Auto Basophil # : x  Auto Neutrophil % : x  Auto Lymphocyte % : x  Auto Monocyte % : x  Auto Eosinophil % : x  Auto Basophil % : x      08-14    133<L>  |  100  |  13  ----------------------------<  243<H>  3.5   |  21<L>  |  0.83    Ca    7.7<L>      14 Aug 2019 06:53  Phos  2.8     08-14  Mg     1.8     08-14    TPro  6.5  /  Alb  2.8<L>  /  TBili  0.4  /  DBili  x   /  AST  56<H>  /  ALT  64<H>  /  AlkPhos  168<H>  08-14      Mg 1.8  Phos 2.8            Uric Acid 1.5        RADIOLOGY & ADDITIONAL STUDIES: Diagnosis: AML FLT 3 (-)    Protocol/Chemo Regimen: Enrolled on [employer] BRIGHT trial receiving Induction chemotherapy with Dauno/Cytarabine and daily Glastegib Vs Placebo    Day: 8    Pt endorsed: very itchy all over    Review of Systems: Patient denies headache, dizziness, visual changes, chest pain, palpitations, abdominal pain, nausea, vomiting or dysuria.    Pain scale: denies    Diet: Consistent Carbohydrate    Allergies    No Known Allergies    Intolerances        ANTIMICROBIALS  cefepime   IVPB 2000 milliGRAM(s) IV Intermittent every 8 hours  posaconazole DR Tablet 300 milliGRAM(s) Oral daily      HEME/ONC MEDICATIONS      STANDING MEDICATIONS  ALBUTerol    90 MICROgram(s) HFA Inhaler 1 Puff(s) Inhalation every 6 hours  ALBUTerol/ipratropium for Nebulization 3 milliLiter(s) Nebulizer every 6 hours  allopurinol 300 milliGRAM(s) Oral daily  Biotene Dry Mouth Oral Rinse 5 milliLiter(s) Swish and Spit three times a day  chlorhexidine 2% Cloths 1 Application(s) Topical <User Schedule>  dextrose 5%. 1000 milliLiter(s) IV Continuous <Continuous>  dextrose 50% Injectable 12.5 Gram(s) IV Push once  dextrose 50% Injectable 25 Gram(s) IV Push once  dextrose 50% Injectable 25 Gram(s) IV Push once  gabapentin 600 milliGRAM(s) Oral two times a day  insulin glargine Injectable (LANTUS) 24 Unit(s) SubCutaneous at bedtime  insulin lispro (HumaLOG) corrective regimen sliding scale   SubCutaneous three times a day before meals  insulin lispro (HumaLOG) corrective regimen sliding scale   SubCutaneous at bedtime  insulin lispro Injectable (HumaLOG) 12 Unit(s) SubCutaneous three times a day before meals  investigational chemo - general 1 Tablet(s) Oral every 24 hours  levothyroxine 75 MICROGram(s) Oral daily  losartan 100 milliGRAM(s) Oral daily  ondansetron Injectable 8 milliGRAM(s) IV Push every 8 hours  sodium chloride 0.9%. 1000 milliLiter(s) IV Continuous <Continuous>  sodium chloride 3%  Inhalation 4 milliLiter(s) Inhalation two times a day  tiotropium 18 MICROgram(s) Capsule 1 Capsule(s) Inhalation daily      PRN MEDICATIONS  acetaminophen   Tablet .. 650 milliGRAM(s) Oral every 6 hours PRN  dextrose 40% Gel 15 Gram(s) Oral once PRN  glucagon  Injectable 1 milliGRAM(s) IntraMuscular once PRN  melatonin 3 milliGRAM(s) Oral once PRN  metoclopramide Injectable 10 milliGRAM(s) IV Push every 6 hours PRN  oxyCODONE    IR 5 milliGRAM(s) Oral every 4 hours PRN  polyethylene glycol 3350 17 Gram(s) Oral two times a day PRN        Vital Signs Last 24 Hrs  T(C): 37.1 (14 Aug 2019 05:27), Max: 39.3 (13 Aug 2019 17:13)  T(F): 98.8 (14 Aug 2019 05:27), Max: 102.8 (13 Aug 2019 17:13)  HR: 91 (14 Aug 2019 05:27) (86 - 105)  BP: 119/68 (14 Aug 2019 05:27) (112/60 - 146/70)  BP(mean): --  RR: 18 (14 Aug 2019 05:27) (18 - 18)  SpO2: 98% (14 Aug 2019 05:27) (93% - 99%)    PHYSICAL EXAM  General: NAD  HEENT: PERRLA, EOMI, clear oropharynx  Neck: supple  CV: (+) S1/S2 RRR  Lungs: (+) FBCR B/L  Abdomen: soft, non-tender, non-distended (+) BS  Ext: no edema  Skin: no rashes, dry skin  Neuro: alert and oriented X 3, no focal deficits  Central Line: C/D/I    RECENT CULTURES:  08-12 @ 01:34  .Sputum  --  --  --    Normal Respiratory Maryana present  --  08-12 @ 01:19  .Blood  --  --  --    No growth to date.  --  08-12 @ 01:10  .Urine  --  --  --    No growth  --  08-10 @ 17:52  .Sputum  --  --  --    Normal Respiratory Maryana present  --  08-10 @ 17:19  .Urine  --  --  --    No growth  --  08-09 @ 22:59  .Blood  --  --  --    No growth to date.  --        LABS:                        8.2    0.4   )-----------( 36       ( 14 Aug 2019 06:53 )             24.2         Mean Cell Volume : 85.8 fl  Mean Cell Hemoglobin : 29.0 pg  Mean Cell Hemoglobin Concentration : 33.9 gm/dL  Auto Neutrophil # : x  Auto Lymphocyte # : x  Auto Monocyte # : x  Auto Eosinophil # : x  Auto Basophil # : x  Auto Neutrophil % : x  Auto Lymphocyte % : x  Auto Monocyte % : x  Auto Eosinophil % : x  Auto Basophil % : x      08-14    133<L>  |  100  |  13  ----------------------------<  243<H>  3.5   |  21<L>  |  0.83    Ca    7.7<L>      14 Aug 2019 06:53  Phos  2.8     08-14  Mg     1.8     08-14    TPro  6.5  /  Alb  2.8<L>  /  TBili  0.4  /  DBili  x   /  AST  56<H>  /  ALT  64<H>  /  AlkPhos  168<H>  08-14      Mg 1.8  Phos 2.8            Uric Acid 1.5        RADIOLOGY & ADDITIONAL STUDIES:

## 2019-08-14 NOTE — PROGRESS NOTE ADULT - ASSESSMENT
This is a 63 yo F with PMHx of T2DM, RA, Depression and now newly diagnosed AML enrolled on [employer] [facility] trial admitted for Induction chemotherapy with Dauno/Cytarabine and daily Glastegib Vs Placebo. The patients hospital course has been complicated by transaminitis and RENTERIA due to iterstitial lung disease and volume overload. The patient has pancytopenia from chemo and/or disease.

## 2019-08-14 NOTE — PROGRESS NOTE ADULT - SUBJECTIVE AND OBJECTIVE BOX
Diabetes Follow up note:  Interval Hx:  64 year old female w/uncontrolled T2DM w/neuropathy, retinopathy w/AML here for induction chemotherapy. Pt w/AM glucose in mid 200s and pre-lunch glucose of 87mg/dl. Pt seen at bedside. t under blankets endorsing feeling chills currently. Pt endorses feeling hungry overnight and snacked on 6 ritz crackers at 3am. Endorses decreased appetite today but denies hypoglycemia symptoms.       Review of Systems:  General: + fever + chills  GI: Tolerating POs without any N/V/D/ABD PAIN.  CV: No CP/SOB  ENDO: No S&Sx of hypoglycemia  MEDS:    insulin glargine Injectable (LANTUS) 24 Unit(s) SubCutaneous at bedtime  insulin lispro (HumaLOG) corrective regimen sliding scale   SubCutaneous three times a day before meals  insulin lispro (HumaLOG) corrective regimen sliding scale   SubCutaneous at bedtime  insulin lispro Injectable (HumaLOG) 12 Unit(s) SubCutaneous three times a day before meals  levothyroxine 75 MICROGram(s) Oral daily    cefepime   IVPB 2000 milliGRAM(s) IV Intermittent every 8 hours  posaconazole DR Tablet 300 milliGRAM(s) Oral daily    Allergies    No Known Allergies        PE:  General: Female lying in bed. NAD.   Vital Signs Last 24 Hrs  T(C): 36.6 (14 Aug 2019 13:25), Max: 39.3 (13 Aug 2019 17:13)  T(F): 97.8 (14 Aug 2019 13:25), Max: 102.8 (13 Aug 2019 17:13)  HR: 93 (14 Aug 2019 13:25) (86 - 105)  BP: 133/73 (14 Aug 2019 13:25) (119/68 - 146/70)  BP(mean): --  RR: 18 (14 Aug 2019 13:25) (18 - 18)  SpO2: 94% (14 Aug 2019 13:25) (93% - 99%)  Abd: Soft, NT,ND,   Extremities: Warm.   Neuro: A&O X3    LABS:    POCT Blood Glucose.: 87 mg/dL (08-14-19 @ 12:34)  POCT Blood Glucose.: 257 mg/dL (08-14-19 @ 07:57)  POCT Blood Glucose.: 130 mg/dL (08-13-19 @ 22:13)  POCT Blood Glucose.: 180 mg/dL (08-13-19 @ 18:48)  POCT Blood Glucose.: 160 mg/dL (08-13-19 @ 12:59)  POCT Blood Glucose.: 148 mg/dL (08-13-19 @ 08:49)  POCT Blood Glucose.: 305 mg/dL (08-12-19 @ 21:05)  POCT Blood Glucose.: 269 mg/dL (08-12-19 @ 17:11)  POCT Blood Glucose.: 221 mg/dL (08-12-19 @ 12:57)  POCT Blood Glucose.: 130 mg/dL (08-12-19 @ 09:19)  POCT Blood Glucose.: 184 mg/dL (08-11-19 @ 22:02)  POCT Blood Glucose.: 261 mg/dL (08-11-19 @ 19:05)                            8.2    0.4   )-----------( 36       ( 14 Aug 2019 06:53 )             24.2       08-14    133<L>  |  100  |  13  ----------------------------<  243<H>  3.5   |  21<L>  |  0.83    Ca    7.7<L>      14 Aug 2019 06:53  Phos  2.8     08-14  Mg     1.8     08-14    TPro  6.5  /  Alb  2.8<L>  /  TBili  0.4  /  DBili  x   /  AST  56<H>  /  ALT  64<H>  /  AlkPhos  168<H>  08-14      Hemoglobin A1C, Whole Blood: 10.6 % <H> [4.0 - 5.6] (08-07-19 @ 08:36)            Contact number: ro[place]te [phone] or [phone]

## 2019-08-14 NOTE — PROGRESS NOTE ADULT - ASSESSMENT
63 y/o F w/ uncontrolled Type 2 DM w/ hyper and hypoglycemia A1c 10.6%, HTN, HLD, Hypothyroidism admitted for chemo for newly diagnosed AML. Tolerating POs w/glucose fluctuations due to snacking and decreased PO intake. Will slightly decrease mealtime insulin to prevent hypoglycemia. BG goal (100-180mg/dl).

## 2019-08-14 NOTE — CHART NOTE - NSCHARTNOTEFT_GEN_A_CORE
Medicine PA Note    Notified by RN patient with temperature 38.3. Seen and examined patient at bedside. Patient admits to productive cough x1 month and diarrhea x1 day but denies chest pain, SOB, wheezing, HA, N/V, abdominal pain, dysuria.    VITAL SIGNS:  T(C): 38.3 (08-14-19 @ 01:09), Max: 39.3 (08-13-19 @ 17:13)  HR: 90 (08-14-19 @ 01:09) (86 - 105)  BP: 137/67 (08-14-19 @ 01:09) (101/56 - 146/70)  RR: 18 (08-14-19 @ 01:09) (18 - 18)  SpO2: 99% (08-14-19 @ 01:09) (93% - 99%)  Wt(kg): --    LABORATORY:                        6.6    0.4   )-----------( 49       ( 13 Aug 2019 06:41 )             19.1       08-13    135  |  102  |  10  ----------------------------<  165<H>  3.7   |  23  |  0.74    Ca    7.6<L>      13 Aug 2019 06:41  Phos  2.3     08-13  Mg     1.5     08-13    TPro  6.2  /  Alb  3.0<L>  /  TBili  0.3  /  DBili  x   /  AST  54<H>  /  ALT  56<H>  /  AlkPhos  138<H>  08-13    MICROBIOLOGY:   Culture - Sputum . (08.12.19 @ 01:34)    Gram Stain:   Rare polymorphonuclear leukocytes per low power field  Few Squamous epithelial cells per low power field  Moderate Gram positive cocci in pairs seen per oil power field  Moderate Gram Variable Rods seen per oil power field    Specimen Source: .Sputum    Culture Results:   Normal Respiratory Maryana present    Culture - Blood (08.12.19 @ 01:19)    Specimen Source: .Blood    Culture Results:   No growth to date.    Culture - Urine (08.12.19 @ 01:10)    Specimen Source: .Urine    Culture Results:   No growth    MEDICATIONS  (STANDING):  cefepime   IVPB 2000 milliGRAM(s) IV Intermittent every 8 hours    RADIOLOGY:  < from: CT Chest No Cont (08.09.19 @ 14:24) >    Pulmonary fibrosis and traction bronchiectasis without honeycombing.   Overall these findings appear progressed compared to CT abdomen 7/4/2017.    Associated groundglass opacity may be related to active interstitial lung   disease, fluid overload, less likely infection.    < end of copied text >    < from: Xray Chest 1 View- PORTABLE-Urgent (08.07.19 @ 10:44) >    Interstitial lung disease. Hazy opacities are slightly increased.    < end of copied text >    PHYSICAL EXAM:  Constitutional: AOx3. NAD.  Respiratory: Clear lungs bilaterally. No wheezing, rhonchi, or crackles.  Cardiovascular: S1 S2. No murmurs.  Gastrointestinal: BS X4 active. soft. nontender.  Extremities/Vascular: +2 pulses bilaterally. No BLE edema.    ASSESSMENT/PLAN:   HPI:  63 yo F with history of DM, RA, chronic depression now with newly diagnosed AML admitted for induction chemotherapy on the [employer] [facility] study with dauno/cytarabine and glasdegib/placebo, now presents with fever 38.3C.   Impression: Neutropenic Fever  -Tylenol prn for pyrexia  -Duonebs  -Urine/Blood cx (8/13) pending  -CT Chest (8/09) results as noted above  -C/w abx   -Will continue to closely monitor patient/vitals  -Will endorse to primary team in AM    Alexandrea Rosa Parry PA-C

## 2019-08-14 NOTE — PROGRESS NOTE ADULT - PROBLEM SELECTOR PLAN 1
-test BG AC/HS  -c/w Lantus 24 units QHS  -Decrease Humalog 10 units AC meals  Discharge on basal/bolus  -discussed w/pt and team  pager: [phone]

## 2019-08-14 NOTE — PROGRESS NOTE ADULT - PROBLEM SELECTOR PLAN 1
Enrolled on [employer] BRIGHT trial receiving Induction chemotherapy with Dauno/Cytarabine and daily Glastegib Vs Placebo  8/20 Day 14 BM bx due  Monitor CBC/Lytes and transfuse/replete PRN  Anemia: 1 unit PRBCs  Hypophosphatemia: K Phos 15 mmol x 1  Hypomag: MgSO4 2gm IV x 1  Strict Is and Os/ Daily weights/ Mouth Care  Lasix 40mg IV x 1  Allopurinol 300mg PO daily  IVF  Antiemetics  ECOG 1 Enrolled on [employer] BRIGHT trial receiving Induction chemotherapy with Dauno/Cytarabine and daily Glastegib Vs Placebo  8/20 Day 14 BM bx due  Monitor CBC/Lytes and transfuse/replete PRN  Strict Is and Os/ Daily weights/ Mouth Care  Allopurinol 300mg PO daily-stopped 2/2 rash  IVF hydration  Antiemetics  ECOG 1  Atarax for itching and oral benadryl.

## 2019-08-15 LAB
ALBUMIN SERPL ELPH-MCNC: 3.1 G/DL — LOW (ref 3.3–5)
ALP SERPL-CCNC: 202 U/L — HIGH (ref 40–120)
ALT FLD-CCNC: 80 U/L — HIGH (ref 10–45)
ANION GAP SERPL CALC-SCNC: 11 MMOL/L — SIGNIFICANT CHANGE UP (ref 5–17)
APTT BLD: 34 SEC — SIGNIFICANT CHANGE UP (ref 27.5–36.3)
AST SERPL-CCNC: 71 U/L — HIGH (ref 10–40)
BILIRUB SERPL-MCNC: 0.5 MG/DL — SIGNIFICANT CHANGE UP (ref 0.2–1.2)
BUN SERPL-MCNC: 11 MG/DL — SIGNIFICANT CHANGE UP (ref 7–23)
CALCIUM SERPL-MCNC: 8.2 MG/DL — LOW (ref 8.4–10.5)
CHLORIDE SERPL-SCNC: 106 MMOL/L — SIGNIFICANT CHANGE UP (ref 96–108)
CO2 SERPL-SCNC: 21 MMOL/L — LOW (ref 22–31)
CREAT SERPL-MCNC: 0.83 MG/DL — SIGNIFICANT CHANGE UP (ref 0.5–1.3)
CULTURE RESULTS: SIGNIFICANT CHANGE UP
CULTURE RESULTS: SIGNIFICANT CHANGE UP
GLUCOSE BLDC GLUCOMTR-MCNC: 156 MG/DL — HIGH (ref 70–99)
GLUCOSE BLDC GLUCOMTR-MCNC: 157 MG/DL — HIGH (ref 70–99)
GLUCOSE BLDC GLUCOMTR-MCNC: 196 MG/DL — HIGH (ref 70–99)
GLUCOSE BLDC GLUCOMTR-MCNC: 212 MG/DL — HIGH (ref 70–99)
GLUCOSE SERPL-MCNC: 80 MG/DL — SIGNIFICANT CHANGE UP (ref 70–99)
HCT VFR BLD CALC: 25.3 % — LOW (ref 34.5–45)
HGB BLD-MCNC: 8.4 G/DL — LOW (ref 11.5–15.5)
INR BLD: 1.1 RATIO — SIGNIFICANT CHANGE UP (ref 0.88–1.16)
LDH SERPL L TO P-CCNC: 336 U/L — HIGH (ref 50–242)
MAGNESIUM SERPL-MCNC: 1.8 MG/DL — SIGNIFICANT CHANGE UP (ref 1.6–2.6)
MCHC RBC-ENTMCNC: 29.1 PG — SIGNIFICANT CHANGE UP (ref 27–34)
MCHC RBC-ENTMCNC: 33.3 GM/DL — SIGNIFICANT CHANGE UP (ref 32–36)
MCV RBC AUTO: 87.1 FL — SIGNIFICANT CHANGE UP (ref 80–100)
PHOSPHATE SERPL-MCNC: 2.9 MG/DL — SIGNIFICANT CHANGE UP (ref 2.5–4.5)
PLATELET # BLD AUTO: 25 K/UL — LOW (ref 150–400)
POTASSIUM SERPL-MCNC: 3.3 MMOL/L — LOW (ref 3.5–5.3)
POTASSIUM SERPL-SCNC: 3.3 MMOL/L — LOW (ref 3.5–5.3)
PROT SERPL-MCNC: 6.9 G/DL — SIGNIFICANT CHANGE UP (ref 6–8.3)
PROTHROM AB SERPL-ACNC: 12.7 SEC — SIGNIFICANT CHANGE UP (ref 10–12.9)
RBC # BLD: 2.91 M/UL — LOW (ref 3.8–5.2)
RBC # FLD: 14 % — SIGNIFICANT CHANGE UP (ref 10.3–14.5)
SODIUM SERPL-SCNC: 138 MMOL/L — SIGNIFICANT CHANGE UP (ref 135–145)
SPECIMEN SOURCE: SIGNIFICANT CHANGE UP
SPECIMEN SOURCE: SIGNIFICANT CHANGE UP
URATE SERPL-MCNC: 1.8 MG/DL — LOW (ref 2.5–7)
WBC # BLD: 0.4 K/UL — CRITICAL LOW (ref 3.8–10.5)
WBC # FLD AUTO: 0.4 K/UL — CRITICAL LOW (ref 3.8–10.5)

## 2019-08-15 PROCEDURE — 71045 X-RAY EXAM CHEST 1 VIEW: CPT | Mod: 26

## 2019-08-15 PROCEDURE — 99232 SBSQ HOSP IP/OBS MODERATE 35: CPT

## 2019-08-15 PROCEDURE — 93010 ELECTROCARDIOGRAM REPORT: CPT

## 2019-08-15 RX ORDER — HYDROXYZINE HCL 10 MG
25 TABLET ORAL EVERY 6 HOURS
Refills: 0 | Status: DISCONTINUED | OUTPATIENT
Start: 2019-08-15 | End: 2019-08-27

## 2019-08-15 RX ORDER — INSULIN GLARGINE 100 [IU]/ML
20 INJECTION, SOLUTION SUBCUTANEOUS AT BEDTIME
Refills: 0 | Status: DISCONTINUED | OUTPATIENT
Start: 2019-08-15 | End: 2019-08-16

## 2019-08-15 RX ORDER — FUROSEMIDE 40 MG
20 TABLET ORAL ONCE
Refills: 0 | Status: COMPLETED | OUTPATIENT
Start: 2019-08-15 | End: 2019-08-15

## 2019-08-15 RX ORDER — POTASSIUM CHLORIDE 20 MEQ
20 PACKET (EA) ORAL
Refills: 0 | Status: COMPLETED | OUTPATIENT
Start: 2019-08-15 | End: 2019-08-15

## 2019-08-15 RX ADMIN — Medication 1: at 12:21

## 2019-08-15 RX ADMIN — Medication 3 MILLILITER(S): at 12:13

## 2019-08-15 RX ADMIN — Medication 50 MILLIEQUIVALENT(S): at 09:41

## 2019-08-15 RX ADMIN — ONDANSETRON 8 MILLIGRAM(S): 8 TABLET, FILM COATED ORAL at 05:55

## 2019-08-15 RX ADMIN — Medication 650 MILLIGRAM(S): at 00:55

## 2019-08-15 RX ADMIN — Medication 25 MILLIGRAM(S): at 23:39

## 2019-08-15 RX ADMIN — Medication 25 MILLIGRAM(S): at 05:47

## 2019-08-15 RX ADMIN — CEFEPIME 100 MILLIGRAM(S): 1 INJECTION, POWDER, FOR SOLUTION INTRAMUSCULAR; INTRAVENOUS at 21:52

## 2019-08-15 RX ADMIN — Medication 10 UNIT(S): at 18:01

## 2019-08-15 RX ADMIN — Medication 50 MILLIEQUIVALENT(S): at 12:12

## 2019-08-15 RX ADMIN — Medication 5 MILLILITER(S): at 05:44

## 2019-08-15 RX ADMIN — SODIUM CHLORIDE 4 MILLILITER(S): 9 INJECTION INTRAMUSCULAR; INTRAVENOUS; SUBCUTANEOUS at 18:02

## 2019-08-15 RX ADMIN — CHLORHEXIDINE GLUCONATE 1 APPLICATION(S): 213 SOLUTION TOPICAL at 09:41

## 2019-08-15 RX ADMIN — Medication 3 MILLILITER(S): at 00:01

## 2019-08-15 RX ADMIN — Medication 10 UNIT(S): at 09:40

## 2019-08-15 RX ADMIN — CEFEPIME 100 MILLIGRAM(S): 1 INJECTION, POWDER, FOR SOLUTION INTRAMUSCULAR; INTRAVENOUS at 05:41

## 2019-08-15 RX ADMIN — Medication 650 MILLIGRAM(S): at 02:42

## 2019-08-15 RX ADMIN — Medication 1: at 09:40

## 2019-08-15 RX ADMIN — LOSARTAN POTASSIUM 100 MILLIGRAM(S): 100 TABLET, FILM COATED ORAL at 05:41

## 2019-08-15 RX ADMIN — Medication 5 MILLILITER(S): at 21:49

## 2019-08-15 RX ADMIN — Medication 10 UNIT(S): at 12:21

## 2019-08-15 RX ADMIN — Medication 25 MILLIGRAM(S): at 12:12

## 2019-08-15 RX ADMIN — Medication 25 MILLIGRAM(S): at 18:01

## 2019-08-15 RX ADMIN — Medication 3 MILLILITER(S): at 18:02

## 2019-08-15 RX ADMIN — Medication 1 APPLICATION(S): at 18:41

## 2019-08-15 RX ADMIN — SODIUM CHLORIDE 4 MILLILITER(S): 9 INJECTION INTRAMUSCULAR; INTRAVENOUS; SUBCUTANEOUS at 05:44

## 2019-08-15 RX ADMIN — Medication 3 MILLILITER(S): at 05:40

## 2019-08-15 RX ADMIN — INSULIN GLARGINE 20 UNIT(S): 100 INJECTION, SOLUTION SUBCUTANEOUS at 21:50

## 2019-08-15 RX ADMIN — Medication 20 MILLIGRAM(S): at 12:17

## 2019-08-15 RX ADMIN — Medication 2: at 18:01

## 2019-08-15 RX ADMIN — CEFEPIME 100 MILLIGRAM(S): 1 INJECTION, POWDER, FOR SOLUTION INTRAMUSCULAR; INTRAVENOUS at 15:22

## 2019-08-15 RX ADMIN — Medication 3 MILLILITER(S): at 23:40

## 2019-08-15 RX ADMIN — GABAPENTIN 600 MILLIGRAM(S): 400 CAPSULE ORAL at 18:02

## 2019-08-15 RX ADMIN — GABAPENTIN 600 MILLIGRAM(S): 400 CAPSULE ORAL at 05:41

## 2019-08-15 RX ADMIN — POSACONAZOLE 300 MILLIGRAM(S): 100 TABLET, DELAYED RELEASE ORAL at 12:12

## 2019-08-15 RX ADMIN — Medication 5 MILLILITER(S): at 15:22

## 2019-08-15 RX ADMIN — Medication 75 MICROGRAM(S): at 05:41

## 2019-08-15 NOTE — PROGRESS NOTE ADULT - PROBLEM SELECTOR PLAN 2
The patient is neutropenic, febrile  If febrile Pan Cx and CXR  Continue Cefepime and Posaconazole (Monitor QTc BIW M/Th with Posaconazole and Study drug)  8/9 CT chest: Pulmonary fibrosis and traction bronchiectasis without honeycombing.   Overall these findings appear progressed compared to CT abdomen 7/4/2017  Associated ground-glass opacity may be related to active interstitial lung disease, fluid overload, less likely infection The patient is neutropenic, Tmax 100.8  Cultures from 8/13 no growth  Continue Cefepime and Posaconazole (Monitor QTc BIW M/Th with Posaconazole and Study drug)  8/9 CT chest: Pulmonary fibrosis and traction bronchiectasis without honeycombing.   Overall these findings appear progressed compared to CT abdomen 7/4/2017  Associated ground-glass opacity may be related to active interstitial lung disease, fluid overload, less likely infection

## 2019-08-15 NOTE — PROGRESS NOTE ADULT - PROBLEM SELECTOR PLAN 1
Enrolled on [employer] BRIGHT trial receiving Induction chemotherapy with Dauno/Cytarabine and daily Glastegib Vs Placebo  8/20 Day 14 BM bx due  Monitor CBC/Lytes and transfuse/replete PRN  Strict Is and Os/ Daily weights/ Mouth Care  Allopurinol 300mg PO daily-stopped 2/2 rash  IVF hydration  Antiemetics  ECOG 1  Atarax for itching and oral benadryl. Enrolled on [employer] BRIGHT trial receiving Induction chemotherapy with Dauno/Cytarabine and daily Glasdegib vs Placebo  8/20 Day 14 BM bx due  Monitor CBC/Lytes and transfuse/replete PRN  Strict Is and Os/ Daily weights/ Mouth Care  Allopurinol 300mg PO daily-stopped 2/2 rash  IVF hydration  Antiemetics  ECOG performance status 2  Atarax ATC for itching and oral benadryl prn. added triamcinolone ointment

## 2019-08-15 NOTE — PROGRESS NOTE ADULT - PROBLEM SELECTOR PLAN 4
HgA1C 10.6  FS AC & HS with HISS  Continue Lantus and Humalog per endocrine   Consistent carbohydrate diet HgA1C 10.6  FS AC & HS with HISS  Continue Lantus and Humalog per endocrine - reduced Lantus to 20 units today  Consistent carbohydrate diet

## 2019-08-15 NOTE — PROGRESS NOTE ADULT - SUBJECTIVE AND OBJECTIVE BOX
Diagnosis: AML FLT 3 (-)    Protocol/Chemo Regimen: Enrolled on Pfizer BRIGHT trial receiving Induction chemotherapy with Dauno/Cytarabine and daily Glastegib Vs Placebo    Day: 9    Pt endorsed: very itchy all over    Review of Systems: Patient denies headache, dizziness, visual changes, chest pain, palpitations, abdominal pain, nausea, vomiting or dysuria.    Pain scale: denies    Diet: Consistent Carbohydrate    Allergies  No Known Allergies      ANTIMICROBIALS  cefepime   IVPB 2000 milliGRAM(s) IV Intermittent every 8 hours  posaconazole DR Tablet 300 milliGRAM(s) Oral daily        STANDING MEDICATIONS  ALBUTerol    90 MICROgram(s) HFA Inhaler 1 Puff(s) Inhalation every 6 hours  ALBUTerol/ipratropium for Nebulization 3 milliLiter(s) Nebulizer every 6 hours  Biotene Dry Mouth Oral Rinse 5 milliLiter(s) Swish and Spit three times a day  chlorhexidine 2% Cloths 1 Application(s) Topical <User Schedule>  dextrose 5%. 1000 milliLiter(s) IV Continuous <Continuous>  dextrose 50% Injectable 12.5 Gram(s) IV Push once  dextrose 50% Injectable 25 Gram(s) IV Push once  dextrose 50% Injectable 25 Gram(s) IV Push once  gabapentin 600 milliGRAM(s) Oral two times a day  insulin glargine Injectable (LANTUS) 24 Unit(s) SubCutaneous at bedtime  insulin lispro (HumaLOG) corrective regimen sliding scale   SubCutaneous three times a day before meals  insulin lispro (HumaLOG) corrective regimen sliding scale   SubCutaneous at bedtime  insulin lispro Injectable (HumaLOG) 10 Unit(s) SubCutaneous three times a day before meals  investigational chemo - general 1 Tablet(s) Oral every 24 hours  levothyroxine 75 MICROGram(s) Oral daily  losartan 100 milliGRAM(s) Oral daily  sodium chloride 0.9%. 1000 milliLiter(s) IV Continuous <Continuous>  sodium chloride 3%  Inhalation 4 milliLiter(s) Inhalation two times a day  tiotropium 18 MICROgram(s) Capsule 1 Capsule(s) Inhalation daily      PRN MEDICATIONS  acetaminophen   Tablet .. 650 milliGRAM(s) Oral every 6 hours PRN  benzonatate 100 milliGRAM(s) Oral three times a day PRN  dextrose 40% Gel 15 Gram(s) Oral once PRN  diphenhydrAMINE 25 milliGRAM(s) Oral every 6 hours PRN  glucagon  Injectable 1 milliGRAM(s) IntraMuscular once PRN  hydrOXYzine hydrochloride 25 milliGRAM(s) Oral every 8 hours PRN  melatonin 3 milliGRAM(s) Oral once PRN  metoclopramide Injectable 10 milliGRAM(s) IV Push every 6 hours PRN  oxyCODONE    IR 5 milliGRAM(s) Oral every 4 hours PRN  polyethylene glycol 3350 17 Gram(s) Oral two times a day PRN        Vital Signs Last 24 Hrs  T(C): 36.2 (15 Aug 2019 05:05), Max: 39 (14 Aug 2019 09:05)  T(F): 97.2 (15 Aug 2019 05:05), Max: 102.2 (14 Aug 2019 09:05)  HR: 83 (15 Aug 2019 05:05) (77 - 102)  BP: 103/64 (15 Aug 2019 05:05) (103/64 - 145/77)  BP(mean): --  RR: 18 (15 Aug 2019 05:05) (18 - 18)  SpO2: 99% (15 Aug 2019 05:05) (93% - 99%)    PHYSICAL EXAM  General: adult in NAD  HEENT: clear oropharynx, anicteric sclera, pink conjunctiva  Neck: supple  CV: normal S1/S2 RRR  Lungs: positive air movement b/l ant lungs,clear to auscultation, no wheezes, no rales  Abdomen: soft non-tender non-distended, no hepatosplenomegaly  Ext: no clubbing cyanosis or edema  Skin: no rashes and no petechiae  Neuro: alert and oriented X 4, no focal deficits  Central Line: normal    LABS:    Blood Cultures:                           8.2    0.4   )-----------( 36       ( 14 Aug 2019 06:53 )             24.2         Mean Cell Volume : 85.8 fl  Mean Cell Hemoglobin : 29.0 pg  Mean Cell Hemoglobin Concentration : 33.9 gm/dL  Auto Neutrophil # : x  Auto Lymphocyte # : x  Auto Monocyte # : x  Auto Eosinophil # : x  Auto Basophil # : x  Auto Neutrophil % : x  Auto Lymphocyte % : x  Auto Monocyte % : x  Auto Eosinophil % : x  Auto Basophil % : x      08-14    133<L>  |  100  |  13  ----------------------------<  243<H>  3.5   |  21<L>  |  0.83    Ca    7.7<L>      14 Aug 2019 06:53  Phos  2.8     08-14  Mg     1.8     08-14    TPro  6.5  /  Alb  2.8<L>  /  TBili  0.4  /  DBili  x   /  AST  56<H>  /  ALT  64<H>  /  AlkPhos  168<H>  08-14                  RADIOLOGY & ADDITIONAL STUDIES: Diagnosis: AML FLT 3 (-)    Protocol/Chemo Regimen: Enrolled on Pfizer [facility] 1019-  Induction chemotherapy with Dauno/Cytarabine and daily Glasdegib vs Placebo    Day: 9    Pt endorsed: very itchy all over    Review of Systems: Patient denies headache, dizziness, visual changes, chest pain, palpitations, abdominal pain, nausea, vomiting or dysuria.    Pain scale: denies    Diet: Consistent Carbohydrate    Allergies  No Known Allergies      ANTIMICROBIALS  cefepime   IVPB 2000 milliGRAM(s) IV Intermittent every 8 hours  posaconazole DR Tablet 300 milliGRAM(s) Oral daily      STANDING MEDICATIONS  ALBUTerol    90 MICROgram(s) HFA Inhaler 1 Puff(s) Inhalation every 6 hours  ALBUTerol/ipratropium for Nebulization 3 milliLiter(s) Nebulizer every 6 hours  Biotene Dry Mouth Oral Rinse 5 milliLiter(s) Swish and Spit three times a day  chlorhexidine 2% Cloths 1 Application(s) Topical <User Schedule>  dextrose 5%. 1000 milliLiter(s) IV Continuous <Continuous>  dextrose 50% Injectable 12.5 Gram(s) IV Push once  dextrose 50% Injectable 25 Gram(s) IV Push once  dextrose 50% Injectable 25 Gram(s) IV Push once  gabapentin 600 milliGRAM(s) Oral two times a day  insulin glargine Injectable (LANTUS) 24 Unit(s) SubCutaneous at bedtime  insulin lispro (HumaLOG) corrective regimen sliding scale   SubCutaneous three times a day before meals  insulin lispro (HumaLOG) corrective regimen sliding scale   SubCutaneous at bedtime  insulin lispro Injectable (HumaLOG) 10 Unit(s) SubCutaneous three times a day before meals  investigational chemo - general 1 Tablet(s) Oral every 24 hours  levothyroxine 75 MICROGram(s) Oral daily  losartan 100 milliGRAM(s) Oral daily  sodium chloride 0.9%. 1000 milliLiter(s) IV Continuous <Continuous>  sodium chloride 3%  Inhalation 4 milliLiter(s) Inhalation two times a day  tiotropium 18 MICROgram(s) Capsule 1 Capsule(s) Inhalation daily      PRN MEDICATIONS  acetaminophen   Tablet .. 650 milliGRAM(s) Oral every 6 hours PRN  benzonatate 100 milliGRAM(s) Oral three times a day PRN  dextrose 40% Gel 15 Gram(s) Oral once PRN  diphenhydrAMINE 25 milliGRAM(s) Oral every 6 hours PRN  glucagon  Injectable 1 milliGRAM(s) IntraMuscular once PRN  hydrOXYzine hydrochloride 25 milliGRAM(s) Oral every 8 hours PRN  melatonin 3 milliGRAM(s) Oral once PRN  metoclopramide Injectable 10 milliGRAM(s) IV Push every 6 hours PRN  oxyCODONE    IR 5 milliGRAM(s) Oral every 4 hours PRN  polyethylene glycol 3350 17 Gram(s) Oral two times a day PRN      Vital Signs Last 24 Hrs  T(C): 36.2 (15 Aug 2019 05:05), Max: 39 (14 Aug 2019 09:05)  T(F): 97.2 (15 Aug 2019 05:05), Max: 102.2 (14 Aug 2019 09:05)  HR: 83 (15 Aug 2019 05:05) (77 - 102)  BP: 103/64 (15 Aug 2019 05:05) (103/64 - 145/77)  RR: 18 (15 Aug 2019 05:05) (18 - 18)  SpO2: 99% (15 Aug 2019 05:05) (93% - 99%)    PHYSICAL EXAM  General: adult in NAD  HEENT: clear oropharynx, anicteric sclera, pink conjunctiva  Neck: supple  CV: normal S1/S2 RRR  Lungs: positive air movement b/l ant lungs,clear to auscultation, no wheezes, no rales  Abdomen: soft non-tender non-distended,   Ext: no clubbing cyanosis or edema  Skin: erythematous rash b/l breasts   Neuro: alert and oriented X 4, no focal deficits  Central Line: Right IJ TLC c/d/i    LABS:    Cultures:     Culture - Urine (19 @ 23:17)    Specimen Source: .Urine    Culture Results:   No growth    Culture - Blood (19 @ 22:12)    Specimen Source: .Blood    Culture Results:   No growth to date.    Culture - Blood (19 @ 22:07)    Specimen Source: .Blood    Culture Results:   No growth to date.                          8.4    0.4   )-----------( 25       ( 15 Aug 2019 07:02 )             25.3     15 Aug 2019 07:01    138    |  106    |  11     ----------------------------<  80     3.3     |  21     |  0.83     Ca    8.2        15 Aug 2019 07:01  Phos  2.9       15 Aug 2019 07:01  Mg     1.8       15 Aug 2019 07:01    TPro  6.9    /  Alb  3.1    /  TBili  0.5    /  DBili  x      /  AST  71     /  ALT  80     /  AlkPhos  202    15 Aug 2019 07:01    PT/INR - ( 15 Aug 2019 07:02 )   PT: 12.7 sec;   INR: 1.10 ratio    PTT - ( 15 Aug 2019 07:02 )  PTT:34.0 sec    CAPILLARY BLOOD GLUCOSE  POCT Blood Glucose.: 212 mg/dL (15 Aug 2019 17:19)  POCT Blood Glucose.: 196 mg/dL (15 Aug 2019 12:20)  POCT Blood Glucose.: 156 mg/dL (15 Aug 2019 09:16)  POCT Blood Glucose.: 106 mg/dL (14 Aug 2019 21:09)    LIVER FUNCTIONS - ( 15 Aug 2019 07:01 )  Alb: 3.1 g/dL / Pro: 6.9 g/dL / ALK PHOS: 202 U/L / ALT: 80 U/L / AST: 71 U/L / GGT: x           Urinalysis Basic - ( 13 Aug 2019 22:43 )    Color: Light Yellow / Appearance: Clear / S.015 / pH: x  Gluc: x / Ketone: Negative  / Bili: Negative / Urobili: <2 mg/dL   Blood: x / Protein: 30 mg/dL / Nitrite: Negative   Leuk Esterase: Negative / RBC: 2 /HPF / WBC 1 /HPF   Sq Epi: x / Non Sq Epi: 1 /HPF / Bacteria: Negative      RADIOLOGY & ADDITIONAL STUDIES:    from: Xray Chest 1 View- PORTABLE-Urgent (08.15.19 @ 01:26)     IMPRESSION:   Unchanged bilateral reticular opacities.  No pneumonia.

## 2019-08-15 NOTE — PROGRESS NOTE ADULT - ATTENDING COMMENTS
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 Pt on Pfizer trial 7+3+ Glasdegib/placebo.  Patient here for her induction chemotherapy on trial.  Day 8.  Completing thong-c infusion.  Fever, chills, no localizing sxs.  Tmax 39., No N/V/D, rash.  c/o pruritus. All cults (-)  Diarrhea resolved  c/o pruritus, no rash.  On benadryl prn.  d/c allopurinol  Pain on cjhewing on left side, mild mucositis, able to eat     Supportive care, anti-emetics, IVFs, allopurinol for TLS ppx.   less dyspnea but I > O, interstitial edema on CT chest.  Got furosemide post PRBC yest.  Transaminitis- monitor LFT's - stable  Cont. Cefepime (d/c Levaquin); and Posaconazole  Monitor CBC, receives transfusions PRN.   Lantus QHS, following fingersticks closely, endocrine consult appreciated. h/o of linnea poorly controlled diabetes.   ECOG performance status 2. 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 Pt on Pfizer trial 7+3+ Glasdegib/placebo.  Patient here for her induction chemotherapy on trial.  Day 9.  Completing thong-c infusion.  Fever to 100.8, no chills,  no localizing sxs.  No N/V/D.  c/o pruritus.  All cults (-)    Mild AST./ALT elevation  d/c allopurino  Pain on chewing on left side, mild mucositis, able to eat     Supportive care, anti-emetics, IVFs,  I > O, to cont to diurese  Cont. Cefepime (d/c Levaquin); and Posaconazole  Monitor CBC, receives transfusions PRN.   Lantus QHS, following fingersticks closely, endocrine consult appreciated. h/o of linnea poorly controlled diabetes.   ECOG performance status 2. 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 Pt on Pfizer trial 7+3+ Glasdegib/placebo.  Patient here for her induction chemotherapy on trial.  Day 9.  Completing thong-c infusion.  Fever to 100.8, no chills,  no localizing sxs.  No N/V/D.  c/o pruritus.  All cults (-)    Mild AST./ALT elevation  d/c allopurinol  Pain on chewing on left side, mild mucositis, able to eat     Supportive care, anti-emetics, IVFs,  I > O, to cont to diurese  Cont. Cefepime (d/c Levaquin); and Posaconazole  Monitor CBC, receives transfusions PRN.   Lantus QHS, following fingersticks closely, endocrine consult appreciated. h/o of linnea poorly controlled diabetes.   ECOG performance status 2.

## 2019-08-15 NOTE — ADVANCED PRACTICE NURSE CONSULT - ASSESSMENT
Induction Day 9    Patient has been admitted to [address] [place] [address] to begin the Induction phase of Glasdegib/ Placebo Trial.  Today is the completion of chemotherapy.All the required lab tests have been ordered- CBC, CMP, CPK ,UA ,Mg, and Phosphorus drawn and result on charted on flow sheet. During rounds by Dr Latham patient was examine and during interview verbalized no concern of diarrhea. Patient having neutropenia fever which is grade 3 with temp 101.4 at 08:55 and WBC 0.4, receiving Tylenol PO by primary nurse. Patient is anemic Hgb 8.4, HCT 25.3 no transfusion at this time. Patient develop rash to upper body around breast as per Dr Navas possible reaction from the Cytarabine.  On 8/14/2019 patient complain of mouth pain on chewing on left side, mild mucositis as per Dr Latham, able to eat Supportive care, anti-emetics and mouth care order by rounding team and will continue to monitor. Left pt in bed sleeping.

## 2019-08-15 NOTE — PROGRESS NOTE ADULT - SUBJECTIVE AND OBJECTIVE BOX
Diabetes Follow up note:  Interval Hx:  64 year old female w/uncontrolled T2DM w/neuropathy, retinopathy w/AML here for induction chemotherapy. Pt w/serum glucose of 80mg/dl this AM. Pt seen at bedside. Reports feeling her sugar dropping overnight last night and had a few crackers around 1am. Reports fair appetite today.     Review of Systems:  General: + itchiness   GI: Tolerating POs without any N/V/D/ABD PAIN.  CV: No CP/SOB  ENDO: No S&Sx of hypoglycemia at time of visit.   MEDS:    insulin glargine Injectable (LANTUS) 24 Unit(s) SubCutaneous at bedtime  insulin lispro (HumaLOG) corrective regimen sliding scale   SubCutaneous three times a day before meals  insulin lispro (HumaLOG) corrective regimen sliding scale   SubCutaneous at bedtime  insulin lispro Injectable (HumaLOG) 10 Unit(s) SubCutaneous three times a day before meals  levothyroxine 75 MICROGram(s) Oral daily    cefepime   IVPB 2000 milliGRAM(s) IV Intermittent every 8 hours  posaconazole DR Tablet 300 milliGRAM(s) Oral daily    Allergies    No Known Allergies      PE:  General: Female lying in bed. NAD.   Vital Signs Last 24 Hrs  T(C): 37.1 (15 Aug 2019 12:52), Max: 38.2 (15 Aug 2019 01:00)  T(F): 98.7 (15 Aug 2019 12:52), Max: 100.8 (15 Aug 2019 01:00)  HR: 91 (15 Aug 2019 12:52) (77 - 100)  BP: 109/60 (15 Aug 2019 12:52) (103/64 - 145/77)  BP(mean): --  RR: 18 (15 Aug 2019 12:52) (18 - 18)  SpO2: 98% (15 Aug 2019 12:52) (95% - 99%)  Abd: Soft, NT,ND,   Extremities: Warm. no edema x 4 ext.   Neuro: A&O X3    LABS:    POCT Blood Glucose.: 196 mg/dL (08-15-19 @ 12:20)  POCT Blood Glucose.: 156 mg/dL (08-15-19 @ 09:16)  POCT Blood Glucose.: 106 mg/dL (08-14-19 @ 21:09)  POCT Blood Glucose.: 105 mg/dL (08-14-19 @ 17:08)  POCT Blood Glucose.: 87 mg/dL (08-14-19 @ 12:34)  POCT Blood Glucose.: 257 mg/dL (08-14-19 @ 07:57)  POCT Blood Glucose.: 130 mg/dL (08-13-19 @ 22:13)  POCT Blood Glucose.: 180 mg/dL (08-13-19 @ 18:48)  POCT Blood Glucose.: 160 mg/dL (08-13-19 @ 12:59)  POCT Blood Glucose.: 148 mg/dL (08-13-19 @ 08:49)  POCT Blood Glucose.: 305 mg/dL (08-12-19 @ 21:05)  POCT Blood Glucose.: 269 mg/dL (08-12-19 @ 17:11)                            8.4    0.4   )-----------( 25       ( 15 Aug 2019 07:02 )             25.3       08-15    138  |  106  |  11  ----------------------------<  80  3.3<L>   |  21<L>  |  0.83    Ca    8.2<L>      15 Aug 2019 07:01  Phos  2.9     08-15  Mg     1.8     08-15    TPro  6.9  /  Alb  3.1<L>  /  TBili  0.5  /  DBili  x   /  AST  71<H>  /  ALT  80<H>  /  AlkPhos  202<H>  08-15        Hemoglobin A1C, Whole Blood: 10.6 % <H> [4.0 - 5.6] (08-07-19 @ 08:36)            Contact number: ro[place]te [phone] or [phone]

## 2019-08-15 NOTE — PROGRESS NOTE ADULT - ASSESSMENT
65 y/o F w/ uncontrolled Type 2 DM w/ hyper and hypoglycemia A1c 10.6%, HTN, HLD, Hypothyroidism admitted for chemo for newly diagnosed AML. Pt having overnight drops in glucose values on present basal insulin dose. Will slightly decrease. BG values w/out pattern to make further changes at this time. Tolerating POs. BG Goal (100-180mg/dl).

## 2019-08-15 NOTE — PROGRESS NOTE ADULT - ASSESSMENT
This is a 65 yo F with PMHx of T2DM, RA, Depression and now newly diagnosed AML enrolled on Pfizer [facility] trial admitted for Induction chemotherapy with Dauno/Cytarabine and daily Glastegib Vs Placebo. The patients hospital course has been complicated by transaminitis and RENTERIA due to iterstitial lung disease and volume overload. The patient has pancytopenia from chemo and/or disease. This is a 63 yo F with PMHx of T2DM, RA, Depression and now newly diagnosed AML enrolled on Pfizer [facility] trial admitted for Induction chemotherapy with Dauno/Cytarabine and daily Glasdegib vs Placebo. The patients hospital course has been complicated by transaminitis,  RENTERIA due to interstitial lung disease and volume overload, and rash. The patient has pancytopenia from chemotherapy and/or disease.

## 2019-08-15 NOTE — PROGRESS NOTE ADULT - PROBLEM SELECTOR PLAN 1
-test BG AC/HS  -Decrease Lantus 20 units QHS  -c/w Humalog 10 units AC meals   -c/w Humalog low correction scale AC and Low HS scale  -DISPO: basal/bolus, doses to be determined  discussed w/pt and team  pager: [phone]

## 2019-08-16 ENCOUNTER — OTHER (OUTPATIENT)
Age: 64
End: 2019-08-16

## 2019-08-16 LAB
ALBUMIN SERPL ELPH-MCNC: 3 G/DL — LOW (ref 3.3–5)
ALP SERPL-CCNC: 182 U/L — HIGH (ref 40–120)
ALT FLD-CCNC: 58 U/L — HIGH (ref 10–45)
ANION GAP SERPL CALC-SCNC: 13 MMOL/L — SIGNIFICANT CHANGE UP (ref 5–17)
AST SERPL-CCNC: 32 U/L — SIGNIFICANT CHANGE UP (ref 10–40)
BILIRUB SERPL-MCNC: 0.4 MG/DL — SIGNIFICANT CHANGE UP (ref 0.2–1.2)
BLD GP AB SCN SERPL QL: NEGATIVE — SIGNIFICANT CHANGE UP
BUN SERPL-MCNC: 10 MG/DL — SIGNIFICANT CHANGE UP (ref 7–23)
CALCIUM SERPL-MCNC: 8.1 MG/DL — LOW (ref 8.4–10.5)
CHLORIDE SERPL-SCNC: 105 MMOL/L — SIGNIFICANT CHANGE UP (ref 96–108)
CHROM ANALY OVERALL INTERP SPEC-IMP: SIGNIFICANT CHANGE UP
CO2 SERPL-SCNC: 21 MMOL/L — LOW (ref 22–31)
CREAT SERPL-MCNC: 0.73 MG/DL — SIGNIFICANT CHANGE UP (ref 0.5–1.3)
GLUCOSE BLDC GLUCOMTR-MCNC: 101 MG/DL — HIGH (ref 70–99)
GLUCOSE BLDC GLUCOMTR-MCNC: 103 MG/DL — HIGH (ref 70–99)
GLUCOSE BLDC GLUCOMTR-MCNC: 165 MG/DL — HIGH (ref 70–99)
GLUCOSE BLDC GLUCOMTR-MCNC: 200 MG/DL — HIGH (ref 70–99)
GLUCOSE SERPL-MCNC: 88 MG/DL — SIGNIFICANT CHANGE UP (ref 70–99)
HCT VFR BLD CALC: 21.9 % — LOW (ref 34.5–45)
HGB BLD-MCNC: 7.8 G/DL — LOW (ref 11.5–15.5)
LDH SERPL L TO P-CCNC: 274 U/L — HIGH (ref 50–242)
MAGNESIUM SERPL-MCNC: 1.7 MG/DL — SIGNIFICANT CHANGE UP (ref 1.6–2.6)
MCHC RBC-ENTMCNC: 30.1 PG — SIGNIFICANT CHANGE UP (ref 27–34)
MCHC RBC-ENTMCNC: 35.5 GM/DL — SIGNIFICANT CHANGE UP (ref 32–36)
MCV RBC AUTO: 84.9 FL — SIGNIFICANT CHANGE UP (ref 80–100)
PHOSPHATE SERPL-MCNC: 2.7 MG/DL — SIGNIFICANT CHANGE UP (ref 2.5–4.5)
PLATELET # BLD AUTO: 11 K/UL — CRITICAL LOW (ref 150–400)
POTASSIUM SERPL-MCNC: 3.8 MMOL/L — SIGNIFICANT CHANGE UP (ref 3.5–5.3)
POTASSIUM SERPL-SCNC: 3.8 MMOL/L — SIGNIFICANT CHANGE UP (ref 3.5–5.3)
PROT SERPL-MCNC: 6.5 G/DL — SIGNIFICANT CHANGE UP (ref 6–8.3)
RBC # BLD: 2.58 M/UL — LOW (ref 3.8–5.2)
RBC # FLD: 13.7 % — SIGNIFICANT CHANGE UP (ref 10.3–14.5)
RH IG SCN BLD-IMP: POSITIVE — SIGNIFICANT CHANGE UP
SODIUM SERPL-SCNC: 139 MMOL/L — SIGNIFICANT CHANGE UP (ref 135–145)
URATE SERPL-MCNC: 2 MG/DL — LOW (ref 2.5–7)
WBC # BLD: 0.4 K/UL — CRITICAL LOW (ref 3.8–10.5)
WBC # FLD AUTO: 0.4 K/UL — CRITICAL LOW (ref 3.8–10.5)

## 2019-08-16 PROCEDURE — 99232 SBSQ HOSP IP/OBS MODERATE 35: CPT

## 2019-08-16 PROCEDURE — 93010 ELECTROCARDIOGRAM REPORT: CPT

## 2019-08-16 RX ORDER — OXYCODONE HYDROCHLORIDE 5 MG/1
5 TABLET ORAL EVERY 4 HOURS
Refills: 0 | Status: DISCONTINUED | OUTPATIENT
Start: 2019-08-16 | End: 2019-08-20

## 2019-08-16 RX ORDER — SIMETHICONE 80 MG/1
80 TABLET, CHEWABLE ORAL
Refills: 0 | Status: DISCONTINUED | OUTPATIENT
Start: 2019-08-16 | End: 2019-08-27

## 2019-08-16 RX ORDER — LIDOCAINE 4 G/100G
2 CREAM TOPICAL
Refills: 0 | Status: DISCONTINUED | OUTPATIENT
Start: 2019-08-16 | End: 2019-08-27

## 2019-08-16 RX ORDER — INSULIN GLARGINE 100 [IU]/ML
16 INJECTION, SOLUTION SUBCUTANEOUS AT BEDTIME
Refills: 0 | Status: DISCONTINUED | OUTPATIENT
Start: 2019-08-16 | End: 2019-08-21

## 2019-08-16 RX ADMIN — Medication 5 MILLILITER(S): at 06:24

## 2019-08-16 RX ADMIN — Medication 3 MILLILITER(S): at 06:22

## 2019-08-16 RX ADMIN — Medication 1 APPLICATION(S): at 17:29

## 2019-08-16 RX ADMIN — LIDOCAINE 2 MILLILITER(S): 4 CREAM TOPICAL at 13:26

## 2019-08-16 RX ADMIN — CEFEPIME 100 MILLIGRAM(S): 1 INJECTION, POWDER, FOR SOLUTION INTRAMUSCULAR; INTRAVENOUS at 13:00

## 2019-08-16 RX ADMIN — Medication 3 MILLILITER(S): at 12:54

## 2019-08-16 RX ADMIN — SODIUM CHLORIDE 4 MILLILITER(S): 9 INJECTION INTRAMUSCULAR; INTRAVENOUS; SUBCUTANEOUS at 17:51

## 2019-08-16 RX ADMIN — Medication 1 APPLICATION(S): at 06:23

## 2019-08-16 RX ADMIN — Medication 10 UNIT(S): at 12:54

## 2019-08-16 RX ADMIN — Medication 3 MILLILITER(S): at 17:28

## 2019-08-16 RX ADMIN — Medication 25 MILLIGRAM(S): at 06:22

## 2019-08-16 RX ADMIN — Medication 75 MICROGRAM(S): at 06:22

## 2019-08-16 RX ADMIN — Medication 25 MILLIGRAM(S): at 12:55

## 2019-08-16 RX ADMIN — Medication 1: at 17:51

## 2019-08-16 RX ADMIN — GABAPENTIN 600 MILLIGRAM(S): 400 CAPSULE ORAL at 17:29

## 2019-08-16 RX ADMIN — GABAPENTIN 600 MILLIGRAM(S): 400 CAPSULE ORAL at 06:22

## 2019-08-16 RX ADMIN — SIMETHICONE 80 MILLIGRAM(S): 80 TABLET, CHEWABLE ORAL at 17:29

## 2019-08-16 RX ADMIN — Medication 10 UNIT(S): at 08:53

## 2019-08-16 RX ADMIN — SODIUM CHLORIDE 4 MILLILITER(S): 9 INJECTION INTRAMUSCULAR; INTRAVENOUS; SUBCUTANEOUS at 06:22

## 2019-08-16 RX ADMIN — Medication 5 MILLILITER(S): at 13:00

## 2019-08-16 RX ADMIN — CEFEPIME 100 MILLIGRAM(S): 1 INJECTION, POWDER, FOR SOLUTION INTRAMUSCULAR; INTRAVENOUS at 21:50

## 2019-08-16 RX ADMIN — Medication 5 MILLILITER(S): at 21:50

## 2019-08-16 RX ADMIN — Medication 25 MILLIGRAM(S): at 17:28

## 2019-08-16 RX ADMIN — SODIUM CHLORIDE 50 MILLILITER(S): 9 INJECTION INTRAMUSCULAR; INTRAVENOUS; SUBCUTANEOUS at 17:29

## 2019-08-16 RX ADMIN — CHLORHEXIDINE GLUCONATE 1 APPLICATION(S): 213 SOLUTION TOPICAL at 08:56

## 2019-08-16 RX ADMIN — Medication 10 UNIT(S): at 17:51

## 2019-08-16 RX ADMIN — POSACONAZOLE 300 MILLIGRAM(S): 100 TABLET, DELAYED RELEASE ORAL at 12:55

## 2019-08-16 RX ADMIN — Medication 3 MILLILITER(S): at 23:36

## 2019-08-16 RX ADMIN — INSULIN GLARGINE 16 UNIT(S): 100 INJECTION, SOLUTION SUBCUTANEOUS at 21:51

## 2019-08-16 RX ADMIN — Medication 25 MILLIGRAM(S): at 23:37

## 2019-08-16 RX ADMIN — LOSARTAN POTASSIUM 100 MILLIGRAM(S): 100 TABLET, FILM COATED ORAL at 06:22

## 2019-08-16 RX ADMIN — CEFEPIME 100 MILLIGRAM(S): 1 INJECTION, POWDER, FOR SOLUTION INTRAMUSCULAR; INTRAVENOUS at 06:19

## 2019-08-16 NOTE — PROGRESS NOTE ADULT - ASSESSMENT
65 y/o F w/ uncontrolled Type 2 DM w/ hyper and hypoglycemia A1c 10.6%, HTN, HLD, Hypothyroidism admitted for chemo for newly diagnosed AML. BG values tightly controlled now on present basal/bolus regimen. Tolerating POs.  No hypoglycemia. Will decrease basal insulin as glucose dropping significantly overnight. BG goal (100-180mg/dl).

## 2019-08-16 NOTE — PROGRESS NOTE ADULT - ASSESSMENT
This is a 63 yo F with PMHx of T2DM, RA, Depression and now newly diagnosed AML enrolled on Pfizer [facility] trial admitted for Induction chemotherapy with Dauno/Cytarabine and daily Glasdegib vs Placebo. The patients hospital course has been complicated by transaminitis,  RENTERIA due to interstitial lung disease and volume overload, and rash. The patient has pancytopenia from chemotherapy and/or disease.

## 2019-08-16 NOTE — PROGRESS NOTE ADULT - PROBLEM SELECTOR PLAN 1
-test BG AC/HS  -Decrease Lantus 16 units QHS  -c/w Humalog 10 units AC meals. If glucose drops below 100mg/dl, would reduce doses to 8 units w/meals  DISPO: basal/bolus, doses to be determined  discussed w/pt and team  pager: [phone].

## 2019-08-16 NOTE — PROGRESS NOTE ADULT - SUBJECTIVE AND OBJECTIVE BOX
Diagnosis: AML FLT 3 (-)    Protocol/Chemo Regimen: Enrolled on Pf[employer] [facility] 1019-  Induction chemotherapy with Dauno/Cytarabine and daily Glasdegib vs Placebo    Day: 10    Pt endorsed:     Review of Systems: Patient denies headache, dizziness, visual changes, chest pain, palpitations, abdominal pain, nausea, vomiting or dysuria.    Pain scale: denies    Diet: Consistent Carbohydrate    Allergies  No Known Allergies    ANTIMICROBIALS  cefepime   IVPB 2000 milliGRAM(s) IV Intermittent every 8 hours  posaconazole DR Tablet 300 milliGRAM(s) Oral daily      STANDING MEDICATIONS  ALBUTerol    90 MICROgram(s) HFA Inhaler 1 Puff(s) Inhalation every 6 hours  ALBUTerol/ipratropium for Nebulization 3 milliLiter(s) Nebulizer every 6 hours  Biotene Dry Mouth Oral Rinse 5 milliLiter(s) Swish and Spit three times a day  chlorhexidine 2% Cloths 1 Application(s) Topical <User Schedule>  dextrose 5%. 1000 milliLiter(s) IV Continuous <Continuous>  dextrose 50% Injectable 12.5 Gram(s) IV Push once  dextrose 50% Injectable 25 Gram(s) IV Push once  dextrose 50% Injectable 25 Gram(s) IV Push once  gabapentin 600 milliGRAM(s) Oral two times a day  hydrOXYzine hydrochloride 25 milliGRAM(s) Oral every 6 hours  insulin glargine Injectable (LANTUS) 20 Unit(s) SubCutaneous at bedtime  insulin lispro (HumaLOG) corrective regimen sliding scale   SubCutaneous three times a day before meals  insulin lispro (HumaLOG) corrective regimen sliding scale   SubCutaneous at bedtime  insulin lispro Injectable (HumaLOG) 10 Unit(s) SubCutaneous three times a day before meals  investigational chemo - general 1 Tablet(s) Oral every 24 hours  levothyroxine 75 MICROGram(s) Oral daily  losartan 100 milliGRAM(s) Oral daily  sodium chloride 0.9%. 1000 milliLiter(s) IV Continuous <Continuous>  sodium chloride 3%  Inhalation 4 milliLiter(s) Inhalation two times a day  tiotropium 18 MICROgram(s) Capsule 1 Capsule(s) Inhalation daily  triamcinolone 0.1% Ointment 1 Application(s) Topical every 12 hours      PRN MEDICATIONS  acetaminophen   Tablet .. 650 milliGRAM(s) Oral every 6 hours PRN  benzonatate 100 milliGRAM(s) Oral three times a day PRN  dextrose 40% Gel 15 Gram(s) Oral once PRN  diphenhydrAMINE 25 milliGRAM(s) Oral every 6 hours PRN  glucagon  Injectable 1 milliGRAM(s) IntraMuscular once PRN  melatonin 3 milliGRAM(s) Oral once PRN  metoclopramide Injectable 10 milliGRAM(s) IV Push every 6 hours PRN  oxyCODONE    IR 5 milliGRAM(s) Oral every 4 hours PRN  polyethylene glycol 3350 17 Gram(s) Oral two times a day PRN        Vital Signs Last 24 Hrs  T(C): 36.6 (16 Aug 2019 05:52), Max: 37.8 (16 Aug 2019 01:26)  T(F): 97.9 (16 Aug 2019 05:52), Max: 100 (16 Aug 2019 01:26)  HR: 87 (16 Aug 2019 05:52) (86 - 99)  BP: 112/64 (16 Aug 2019 05:52) (109/60 - 120/61)  RR: 18 (16 Aug 2019 05:52) (18 - 18)  SpO2: 97% (16 Aug 2019 05:52) (97% - 100%)    PHYSICAL EXAM  General: adult in NAD  HEENT: clear oropharynx, anicteric sclera, pink conjunctiva  Neck: supple  CV: normal S1/S2 RRR  Lungs: positive air movement b/l ant lungs,clear to auscultation, no wheezes, no rales  Abdomen: soft non-tender non-distended,   Ext: no clubbing cyanosis or edema  Skin: erythematous rash b/l breasts   Neuro: alert and oriented X 4, no focal deficits  Central Line: Right IJ TLC c/d/i    LABS:    Cultures:     Culture - Urine (08.13.19 @ 23:17)    Specimen Source: .Urine    Culture Results:   No growth    Culture - Blood (08.13.19 @ 22:12)    Specimen Source: .Blood    Culture Results:   No growth to date.    Culture - Blood (08.13.19 @ 22:07)    Specimen Source: .Blood    Culture Results:   No growth to date.                            7.8    0.4   )-----------( x        ( 16 Aug 2019 06:54 )             21.9         Mean Cell Volume : 84.9 fl  Mean Cell Hemoglobin : 30.1 pg  Mean Cell Hemoglobin Concentration : 35.5 gm/dL  Auto Neutrophil # : x  Auto Lymphocyte # : x  Auto Monocyte # : x  Auto Eosinophil # : x  Auto Basophil # : x  Auto Neutrophil % : x  Auto Lymphocyte % : x  Auto Monocyte % : x  Auto Eosinophil % : x  Auto Basophil % : x        RADIOLOGY & ADDITIONAL STUDIES:     from: Xray Chest 1 View- PORTABLE-Urgent (08.15.19 @ 01:26)     IMPRESSION:   Unchanged bilateral reticular opacities.  No pneumonia. Diagnosis: AML FLT 3 (-)    Protocol/Chemo Regimen: Enrolled on Pf[employer] [facility] 1019-  Induction chemotherapy with Dauno/Cytarabine and daily Glasdegib vs Placebo    Day: 10    Pt endorsed: less itchy; soft stool; mouth ulcer mild discomfort    Review of Systems: Patient denies headache, dizziness, visual changes, chest pain, palpitations, abdominal pain, nausea, vomiting or dysuria.    Pain scale: denies    Diet: Consistent Carbohydrate    Allergies  No Known Allergies    ANTIMICROBIALS  cefepime   IVPB 2000 milliGRAM(s) IV Intermittent every 8 hours  posaconazole DR Tablet 300 milliGRAM(s) Oral daily      STANDING MEDICATIONS  ALBUTerol    90 MICROgram(s) HFA Inhaler 1 Puff(s) Inhalation every 6 hours  ALBUTerol/ipratropium for Nebulization 3 milliLiter(s) Nebulizer every 6 hours  Biotene Dry Mouth Oral Rinse 5 milliLiter(s) Swish and Spit three times a day  chlorhexidine 2% Cloths 1 Application(s) Topical <User Schedule>  dextrose 5%. 1000 milliLiter(s) IV Continuous <Continuous>  dextrose 50% Injectable 12.5 Gram(s) IV Push once  dextrose 50% Injectable 25 Gram(s) IV Push once  dextrose 50% Injectable 25 Gram(s) IV Push once  gabapentin 600 milliGRAM(s) Oral two times a day  hydrOXYzine hydrochloride 25 milliGRAM(s) Oral every 6 hours  insulin glargine Injectable (LANTUS) 20 Unit(s) SubCutaneous at bedtime  insulin lispro (HumaLOG) corrective regimen sliding scale   SubCutaneous three times a day before meals  insulin lispro (HumaLOG) corrective regimen sliding scale   SubCutaneous at bedtime  insulin lispro Injectable (HumaLOG) 10 Unit(s) SubCutaneous three times a day before meals  investigational chemo - general 1 Tablet(s) Oral every 24 hours  levothyroxine 75 MICROGram(s) Oral daily  losartan 100 milliGRAM(s) Oral daily  sodium chloride 0.9%. 1000 milliLiter(s) IV Continuous <Continuous>  sodium chloride 3%  Inhalation 4 milliLiter(s) Inhalation two times a day  tiotropium 18 MICROgram(s) Capsule 1 Capsule(s) Inhalation daily  triamcinolone 0.1% Ointment 1 Application(s) Topical every 12 hours      PRN MEDICATIONS  acetaminophen   Tablet .. 650 milliGRAM(s) Oral every 6 hours PRN  benzonatate 100 milliGRAM(s) Oral three times a day PRN  dextrose 40% Gel 15 Gram(s) Oral once PRN  diphenhydrAMINE 25 milliGRAM(s) Oral every 6 hours PRN  glucagon  Injectable 1 milliGRAM(s) IntraMuscular once PRN  melatonin 3 milliGRAM(s) Oral once PRN  metoclopramide Injectable 10 milliGRAM(s) IV Push every 6 hours PRN  oxyCODONE    IR 5 milliGRAM(s) Oral every 4 hours PRN  polyethylene glycol 3350 17 Gram(s) Oral two times a day PRN      Vital Signs Last 24 Hrs  T(C): 36.6 (16 Aug 2019 05:52), Max: 37.8 (16 Aug 2019 01:26)  T(F): 97.9 (16 Aug 2019 05:52), Max: 100 (16 Aug 2019 01:26)  HR: 87 (16 Aug 2019 05:52) (86 - 99)  BP: 112/64 (16 Aug 2019 05:52) (109/60 - 120/61)  RR: 18 (16 Aug 2019 05:52) (18 - 18)  SpO2: 97% (16 Aug 2019 05:52) (97% - 100%)    PHYSICAL EXAM  General: adult in NAD  HEENT: clear oropharynx, anicteric sclera, pink conjunctiva  Neck: supple  CV: normal S1/S2 RRR  Lungs: positive air movement b/l ant lungs,clear to auscultation, no wheezes, no rales  Abdomen: soft non-tender non-distended,   Ext: no clubbing cyanosis or edema  Skin: erythematous rash b/l breasts   Neuro: alert and oriented X 4, no focal deficits  Central Line: Right IJ TLC c/d/i    LABS:    Cultures:     Culture - Urine (08.13.19 @ 23:17)    Specimen Source: .Urine    Culture Results:   No growth    Culture - Blood (08.13.19 @ 22:12)    Specimen Source: .Blood    Culture Results:   No growth to date.    Culture - Blood (08.13.19 @ 22:07)    Specimen Source: .Blood    Culture Results:   No growth to date.                          7.8    0.4   )-----------( 11       ( 16 Aug 2019 06:54 )             21.9     16 Aug 2019 06:54    139    |  105    |  10     ----------------------------<  88     3.8     |  21     |  0.73     Ca    8.1        16 Aug 2019 06:54  Phos  2.7       16 Aug 2019 06:54  Mg     1.7       16 Aug 2019 06:54    TPro  6.5    /  Alb  3.0    /  TBili  0.4    /  DBili  x      /  AST  32     /  ALT  58     /  AlkPhos  182    16 Aug 2019 06:54    PT/INR - ( 15 Aug 2019 07:02 )   PT: 12.7 sec;   INR: 1.10 ratio    PTT - ( 15 Aug 2019 07:02 )  PTT:34.0 sec    CAPILLARY BLOOD GLUCOSE  POCT Blood Glucose.: 101 mg/dL (16 Aug 2019 08:29)  POCT Blood Glucose.: 157 mg/dL (15 Aug 2019 21:29)  POCT Blood Glucose.: 212 mg/dL (15 Aug 2019 17:19)  POCT Blood Glucose.: 196 mg/dL (15 Aug 2019 12:20)  POCT Blood Glucose.: 156 mg/dL (15 Aug 2019 09:16)    LIVER FUNCTIONS - ( 16 Aug 2019 06:54 )  Alb: 3.0 g/dL / Pro: 6.5 g/dL / ALK PHOS: 182 U/L / ALT: 58 U/L / AST: 32 U/L / GGT: x           RADIOLOGY & ADDITIONAL STUDIES:     from: Xray Chest 1 View- PORTABLE-Urgent (08.15.19 @ 01:26)     IMPRESSION:   Unchanged bilateral reticular opacities.  No pneumonia. Diagnosis: AML FLT 3 (-)    Protocol/Chemo Regimen: Enrolled on Pf[employer] [facility] 1019-  Induction chemotherapy with Dauno/Cytarabine and daily Glasdegib vs Placebo    Day: 10    Pt endorsed: less itchy; soft stool; mouth ulcer mild discomfort    Review of Systems: Patient denies headache, dizziness, visual changes, chest pain, palpitations, abdominal pain, nausea, vomiting or dysuria.    Pain scale: denies    Diet: Consistent Carbohydrate    Allergies  No Known Allergies    ANTIMICROBIALS  cefepime   IVPB 2000 milliGRAM(s) IV Intermittent every 8 hours  posaconazole DR Tablet 300 milliGRAM(s) Oral daily      STANDING MEDICATIONS  ALBUTerol    90 MICROgram(s) HFA Inhaler 1 Puff(s) Inhalation every 6 hours  ALBUTerol/ipratropium for Nebulization 3 milliLiter(s) Nebulizer every 6 hours  Biotene Dry Mouth Oral Rinse 5 milliLiter(s) Swish and Spit three times a day  chlorhexidine 2% Cloths 1 Application(s) Topical <User Schedule>  dextrose 5%. 1000 milliLiter(s) IV Continuous <Continuous>  dextrose 50% Injectable 12.5 Gram(s) IV Push once  dextrose 50% Injectable 25 Gram(s) IV Push once  dextrose 50% Injectable 25 Gram(s) IV Push once  gabapentin 600 milliGRAM(s) Oral two times a day  hydrOXYzine hydrochloride 25 milliGRAM(s) Oral every 6 hours  insulin glargine Injectable (LANTUS) 20 Unit(s) SubCutaneous at bedtime  insulin lispro (HumaLOG) corrective regimen sliding scale   SubCutaneous three times a day before meals  insulin lispro (HumaLOG) corrective regimen sliding scale   SubCutaneous at bedtime  insulin lispro Injectable (HumaLOG) 10 Unit(s) SubCutaneous three times a day before meals  investigational chemo - general 1 Tablet(s) Oral every 24 hours  levothyroxine 75 MICROGram(s) Oral daily  losartan 100 milliGRAM(s) Oral daily  sodium chloride 0.9%. 1000 milliLiter(s) IV Continuous <Continuous>  sodium chloride 3%  Inhalation 4 milliLiter(s) Inhalation two times a day  tiotropium 18 MICROgram(s) Capsule 1 Capsule(s) Inhalation daily  triamcinolone 0.1% Ointment 1 Application(s) Topical every 12 hours      PRN MEDICATIONS  acetaminophen   Tablet .. 650 milliGRAM(s) Oral every 6 hours PRN  benzonatate 100 milliGRAM(s) Oral three times a day PRN  dextrose 40% Gel 15 Gram(s) Oral once PRN  diphenhydrAMINE 25 milliGRAM(s) Oral every 6 hours PRN  glucagon  Injectable 1 milliGRAM(s) IntraMuscular once PRN  melatonin 3 milliGRAM(s) Oral once PRN  metoclopramide Injectable 10 milliGRAM(s) IV Push every 6 hours PRN  oxyCODONE    IR 5 milliGRAM(s) Oral every 4 hours PRN  polyethylene glycol 3350 17 Gram(s) Oral two times a day PRN      Vital Signs Last 24 Hrs  T(C): 36.6 (16 Aug 2019 05:52), Max: 37.8 (16 Aug 2019 01:26)  T(F): 97.9 (16 Aug 2019 05:52), Max: 100 (16 Aug 2019 01:26)  HR: 87 (16 Aug 2019 05:52) (86 - 99)  BP: 112/64 (16 Aug 2019 05:52) (109/60 - 120/61)  RR: 18 (16 Aug 2019 05:52) (18 - 18)  SpO2: 97% (16 Aug 2019 05:52) (97% - 100%)    PHYSICAL EXAM  General: adult in NAD  HEENT: +ulcer left lower palate  Neck: supple  CV: normal S1/S2 RRR  Lungs: positive air movement b/l ant lungs,clear to auscultation, no wheezes, no rales  Abdomen: soft non-tender non-distended,   Ext: no clubbing cyanosis or edema  Skin: erythematous rash b/l breasts improved  Neuro: alert and oriented X 4, no focal deficits  Central Line: Right IJ TLC c/d/i    LABS:    Cultures:     Culture - Urine (08.13.19 @ 23:17)    Specimen Source: .Urine    Culture Results:   No growth    Culture - Blood (08.13.19 @ 22:12)    Specimen Source: .Blood    Culture Results:   No growth to date.    Culture - Blood (08.13.19 @ 22:07)    Specimen Source: .Blood    Culture Results:   No growth to date.                          7.8    0.4   )-----------( 11       ( 16 Aug 2019 06:54 )             21.9     16 Aug 2019 06:54    139    |  105    |  10     ----------------------------<  88     3.8     |  21     |  0.73     Ca    8.1        16 Aug 2019 06:54  Phos  2.7       16 Aug 2019 06:54  Mg     1.7       16 Aug 2019 06:54    TPro  6.5    /  Alb  3.0    /  TBili  0.4    /  DBili  x      /  AST  32     /  ALT  58     /  AlkPhos  182    16 Aug 2019 06:54    PT/INR - ( 15 Aug 2019 07:02 )   PT: 12.7 sec;   INR: 1.10 ratio    PTT - ( 15 Aug 2019 07:02 )  PTT:34.0 sec    CAPILLARY BLOOD GLUCOSE  POCT Blood Glucose.: 101 mg/dL (16 Aug 2019 08:29)  POCT Blood Glucose.: 157 mg/dL (15 Aug 2019 21:29)  POCT Blood Glucose.: 212 mg/dL (15 Aug 2019 17:19)  POCT Blood Glucose.: 196 mg/dL (15 Aug 2019 12:20)  POCT Blood Glucose.: 156 mg/dL (15 Aug 2019 09:16)    LIVER FUNCTIONS - ( 16 Aug 2019 06:54 )  Alb: 3.0 g/dL / Pro: 6.5 g/dL / ALK PHOS: 182 U/L / ALT: 58 U/L / AST: 32 U/L / GGT: x           RADIOLOGY & ADDITIONAL STUDIES:     from: Xray Chest 1 View- PORTABLE-Urgent (08.15.19 @ 01:26)     IMPRESSION:   Unchanged bilateral reticular opacities.  No pneumonia.

## 2019-08-16 NOTE — PROGRESS NOTE ADULT - PROBLEM SELECTOR PLAN 4
HgA1C 10.6  FS AC & HS with HISS  Continue Lantus and Humalog per endocrine - reduced Lantus to 20 units today  Consistent carbohydrate diet

## 2019-08-16 NOTE — ADVANCED PRACTICE NURSE CONSULT - ASSESSMENT
Induction Day 10    Patient has been admitted to [address] [place] [address] to begin the Induction phase of Glasdegib/ Placebo Trial.  Today is the completion of chemotherapy.All the required lab tests have been ordered- CBC, CMP, CPK ,UA ,Mg, and Phosphorus drawn and result on charted on flow sheet. During rounds by Dr Latham patient was examine and during interview verbalized no concern of diarrhea. Patient no fever within the past 48 hours and WBC remain at 0.4. Patient is anemic Hgb 7.8, HCT 21.9 no transfusion at this time. Patient develop rash to upper body around breast as per Dr Navas possible reaction from the Cytarabine and graded rash toady from the [facility] as GR2.  On 8/14/2019 patient complain of mouth pain on chewing on left side, mild mucositis as per Dr Latham, able to eat Supportive care, anti-emetics and mouth care order by rounding team and will continue to monitor. Patient had itching which stated on 8/14 stated it much better with the help of medicated given by the primary nurse. Left pt in bed sleeping. Induction Day 10    Patient has been admitted to [address] [place] [address] to begin the Induction phase of Glasdegib/ Placebo Trial.  Today is the completion of chemotherapy.All the required lab tests have been ordered- CBC, CMP, CPK ,UA ,Mg, and Phosphorus drawn and result on charted on flow sheet. During rounds by Dr Latham patient was examine and during interview verbalized no concern of diarrhea. Patient no fever within the past 48 hours and WBC remain at 0.4. Patient is anemic Hgb 7.8, HCT 21.9 no transfusion at this time. Patient develop rash to upper body around breast as per Dr Navas possible reaction from the Cytarabine and graded rash toady from the [facility] as GR2.  On 8/14/2019 patient complain of mouth pain on chewing on left side, mild mucositis as per Dr Latham, able to eat Supportive care, anti-emetics and mouth care order by rounding team and will continue to monitor. Patient had itching which started on 8/14/2019 stated it much better with the help of medication given by the primary nurse. Patient due for PK and triple EKG's prior to Glasdegib/placebo administration by primary nurse. At 1015 first pre dose triple of EKG's wad done and signed by Dr Latham, PK drawn at 1024 am specimen inverted over 10 times placed on ice as instructed. At 11:30 repeated triplicated EKG was done, and using similar technique PK drawn at 11: 33 by research Nurse.  Left pt in bed sleeping. Induction Day 10    Patient has been admitted to [address] [place] [address] to begin the Induction phase of Glasdegib/ Placebo Trial.  Today is the completion of chemotherapy.All the required lab tests have been ordered- CBC, CMP, CPK ,UA ,Mg, and Phosphorus drawn and result on charted on flow sheet. During rounds by Dr Latham patient was examine and during interview verbalized no concern of diarrhea. Patient no fever within the past 48 hours and WBC remain at 0.4. Patient is anemic Hgb 7.8, HCT 21.9 no transfusion at this time. Patient develop rash to upper body around breast as per Dr Navas possible reaction from the Cytarabine and graded rash toady from the [facility] as GR2.  On 8/14/2019 patient complain of mouth pain on chewing on left side, mild mucositis as per Dr Latham, able to eat Supportive care, anti-emetics and mouth care order by rounding team and will continue to monitor. Patient had itching which started on 8/14/2019 stated it much better with the help of medication given by the primary nurse. Patient due for PK and triple EKG's prior to Glasdegib/placebo administration by primary nurse. At 1015 first pre dose triple of EKG's wad done and signed by Dr Latham, PK drawn at 1024 am specimen inverted over 10 times placed on ice as instructed. At 11:30 repeated triplicated EKG was done, and using similar technique PK drawn at 11:33 by research Nurse.  The 4 ours post triple EKG was done and scan to research data base, and four hours post blood was drawn at 14:32 place on ice and will be transported to research lab by research nurse. Left pt in bed sleeping.

## 2019-08-16 NOTE — PROGRESS NOTE ADULT - PROBLEM SELECTOR PLAN 2
The patient is neutropenic, Tmax 100.8  Cultures from 8/13 no growth  Continue Cefepime and Posaconazole (Monitor QTc BIW M/Th with Posaconazole and Study drug)  8/9 CT chest: Pulmonary fibrosis and traction bronchiectasis without honeycombing.   Overall these findings appear progressed compared to CT abdomen 7/4/2017  Associated ground-glass opacity may be related to active interstitial lung disease, fluid overload, less likely infection The patient is neutropenic, afebrile > 48 hrs  Cultures from 8/13 no growth  Continue Cefepime and Posaconazole (Monitor QTc BIW M/Th with Posaconazole and Study drug)  8/9 CT chest: Pulmonary fibrosis and traction bronchiectasis without honeycombing.   Overall these findings appear progressed compared to CT abdomen 7/4/2017  Associated ground-glass opacity may be related to active interstitial lung disease, fluid overload, less likely infection

## 2019-08-16 NOTE — PROGRESS NOTE ADULT - SUBJECTIVE AND OBJECTIVE BOX
Diabetes Follow up note:  Interval Hx:  64 year old female w/uncontrolled T2DM w/neuropathy, retinopathy w/AML here for induction chemotherapy. Serum glucose 88mg/dl this AM. Pt seen at bedside. Endorses increased fatigue today. Eating meals. Denies any hypoglycemic symptoms.     Review of Systems:  General: as above  GI: Tolerating POs without any N/V/D/ABD PAIN.  CV: No CP/SOB  ENDO: No S&Sx of hypoglycemia  MEDS:    insulin glargine Injectable (LANTUS) 20 Unit(s) SubCutaneous at bedtime  insulin lispro (HumaLOG) corrective regimen sliding scale   SubCutaneous three times a day before meals  insulin lispro (HumaLOG) corrective regimen sliding scale   SubCutaneous at bedtime  insulin lispro Injectable (HumaLOG) 10 Unit(s) SubCutaneous three times a day before meals  levothyroxine 75 MICROGram(s) Oral daily    cefepime   IVPB 2000 milliGRAM(s) IV Intermittent every 8 hours  posaconazole DR Tablet 300 milliGRAM(s) Oral daily    Allergies    No Known Allergies      PE:  General: Female lying in bed. NAD.   Vital Signs Last 24 Hrs  T(C): 36.7 (16 Aug 2019 13:12), Max: 37.8 (16 Aug 2019 01:26)  T(F): 98.1 (16 Aug 2019 13:12), Max: 100 (16 Aug 2019 01:26)  HR: 84 (16 Aug 2019 13:12) (84 - 99)  BP: 110/56 (16 Aug 2019 13:12) (110/56 - 135/69)  BP(mean): --  RR: 18 (16 Aug 2019 13:12) (18 - 18)  SpO2: 96% (16 Aug 2019 13:12) (96% - 100%)  Abd: Soft, NT,ND,   Extremities: Warm. no edema.   Neuro: A&O X3    LABS:    POCT Blood Glucose.: 103 mg/dL (08-16-19 @ 12:48)  POCT Blood Glucose.: 101 mg/dL (08-16-19 @ 08:29)  POCT Blood Glucose.: 157 mg/dL (08-15-19 @ 21:29)  POCT Blood Glucose.: 212 mg/dL (08-15-19 @ 17:19)  POCT Blood Glucose.: 196 mg/dL (08-15-19 @ 12:20)  POCT Blood Glucose.: 156 mg/dL (08-15-19 @ 09:16)  POCT Blood Glucose.: 106 mg/dL (08-14-19 @ 21:09)  POCT Blood Glucose.: 105 mg/dL (08-14-19 @ 17:08)  POCT Blood Glucose.: 87 mg/dL (08-14-19 @ 12:34)  POCT Blood Glucose.: 257 mg/dL (08-14-19 @ 07:57)  POCT Blood Glucose.: 130 mg/dL (08-13-19 @ 22:13)  POCT Blood Glucose.: 180 mg/dL (08-13-19 @ 18:48)                            7.8    0.4   )-----------( 11       ( 16 Aug 2019 06:54 )             21.9       08-16    139  |  105  |  10  ----------------------------<  88  3.8   |  21<L>  |  0.73    Ca    8.1<L>      16 Aug 2019 06:54  Phos  2.7     08-16  Mg     1.7     08-16    TPro  6.5  /  Alb  3.0<L>  /  TBili  0.4  /  DBili  x   /  AST  32  /  ALT  58<H>  /  AlkPhos  182<H>  08-16      Hemoglobin A1C, Whole Blood: 10.6 % <H> [4.0 - 5.6] (08-07-19 @ 08:36)            Contact number: [place] [phone] or [phone]

## 2019-08-16 NOTE — PROGRESS NOTE ADULT - ATTENDING COMMENTS
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 Pt on [employer] trial 7+3+ Glasdegib/placebo.  Patient here for her induction chemotherapy on trial.  Day 9.  Completing thong-c infusion.  Fever to 100.8, no chills,  no localizing sxs.  No N/V/D.  c/o pruritus.  All cults (-)    Mild AST./ALT elevation  d/c allopurinol  Pain on chewing on left side, mild mucositis, able to eat     Supportive care, anti-emetics, IVFs,  I > O, to cont to diurese  Cont. Cefepime (d/c Levaquin); and Posaconazole  Monitor CBC, receives transfusions PRN.   Lantus QHS, following fingersticks closely, endocrine consult appreciated. h/o of linnea poorly controlled diabetes.   ECOG performance status 2. 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 Pt on [employer] trial 7+3+ Glasdegib/placebo.  Patient here for her induction chemotherapy on trial.  Day 10.  Completing thong-c infusion.  Afeb xc 48 hrs, no chills, No N/V/D.ruritus.  All cults (-)  Pruritus better, on atc atarax, topical triamcinolone.  Rash better, fading.  ? allopurinol ? study drug  Rash CTCAE allergic reaction grade 2    Mild AST/ALT elevation  d/c allopurinol  Pain on chewing on left side, mild mucositis, able to eat - jacklyn     Supportive care, anti-emetics, IVFs,  I > O, to cont to diurese as needed.  No edema.  Cont. Cefepime (d/c Levaquin); and Posaconazole  Monitor CBC, receives transfusions PRN.   Lantus QHS, following fingersticks closely, endocrine consult appreciated. h/o of linnea poorly controlled diabetes.   ECOG performance status 2. 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 Pt on [employer] trial 7+3+ Glasdegib/placebo.  Patient here for her induction chemotherapy on trial.  Day 10.  Completing thong-c infusion.  Afeb xc 48 hrs, no chills, No N/V/D.ruritus.  All cults (-)  Pruritus better, on atc atarax, topical triamcinolone.  Rash better, fading.  ? allopurinol ? study drug  Rash CTCAE allergic reaction grade 2    Mild AST/ALT elevation  d/c allopurinol  Pain on chewing on left side, mild mucositis, able to eat - stable     Supportive care, anti-emetics, IVFs,  I > O, to cont to diurese as needed.  No edema.  Cont. Cefepime (d/c Levaquin); and Posaconazole  Monitor CBC, receives transfusions PRN.   Lantus QHS, following fingersticks closely, endocrine consult appreciated. h/o of linnea poorly controlled diabetes.   ECOG performance status 2.

## 2019-08-16 NOTE — PROGRESS NOTE ADULT - PROBLEM SELECTOR PLAN 1
Enrolled on [employer] BRIGHT trial receiving Induction chemotherapy with Dauno/Cytarabine and daily Glasdegib vs Placebo  8/20 Day 14 BM bx due  Monitor CBC/Lytes and transfuse/replete PRN  Strict Is and Os/ Daily weights/ Mouth Care  Allopurinol 300mg PO daily-stopped 2/2 rash  IVF hydration  Antiemetics  ECOG performance status 2  Atarax ATC for itching and oral benadryl prn. added triamcinolone ointment Enrolled on [employer] BRIGHT trial receiving Induction chemotherapy with Dauno/Cytarabine and daily Glasdegib vs Placebo  Molecular studies pending  8/20 Day 14 BM bx due  Monitor CBC/Lytes and transfuse/replete PRN  Strict Is and Os/ Daily weights/ Mouth Care  Allopurinol 300mg PO daily-stopped 2/2 rash  IVF hydration  Antiemetics  ECOG performance status 2  Atarax ATC for itching and oral benadryl prn. added triamcinolone ointment - rash improving

## 2019-08-17 DIAGNOSIS — R19.7 DIARRHEA, UNSPECIFIED: ICD-10-CM

## 2019-08-17 LAB
ALBUMIN SERPL ELPH-MCNC: 3 G/DL — LOW (ref 3.3–5)
ALP SERPL-CCNC: 181 U/L — HIGH (ref 40–120)
ALT FLD-CCNC: 48 U/L — HIGH (ref 10–45)
ANION GAP SERPL CALC-SCNC: 10 MMOL/L — SIGNIFICANT CHANGE UP (ref 5–17)
AST SERPL-CCNC: 21 U/L — SIGNIFICANT CHANGE UP (ref 10–40)
BILIRUB SERPL-MCNC: 0.4 MG/DL — SIGNIFICANT CHANGE UP (ref 0.2–1.2)
BUN SERPL-MCNC: 9 MG/DL — SIGNIFICANT CHANGE UP (ref 7–23)
CALCIUM SERPL-MCNC: 8.2 MG/DL — LOW (ref 8.4–10.5)
CHLORIDE SERPL-SCNC: 106 MMOL/L — SIGNIFICANT CHANGE UP (ref 96–108)
CO2 SERPL-SCNC: 21 MMOL/L — LOW (ref 22–31)
CREAT SERPL-MCNC: 0.66 MG/DL — SIGNIFICANT CHANGE UP (ref 0.5–1.3)
CULTURE RESULTS: SIGNIFICANT CHANGE UP
CULTURE RESULTS: SIGNIFICANT CHANGE UP
GLUCOSE BLDC GLUCOMTR-MCNC: 161 MG/DL — HIGH (ref 70–99)
GLUCOSE BLDC GLUCOMTR-MCNC: 165 MG/DL — HIGH (ref 70–99)
GLUCOSE BLDC GLUCOMTR-MCNC: 189 MG/DL — HIGH (ref 70–99)
GLUCOSE BLDC GLUCOMTR-MCNC: 297 MG/DL — HIGH (ref 70–99)
GLUCOSE SERPL-MCNC: 138 MG/DL — HIGH (ref 70–99)
HCT VFR BLD CALC: 21.1 % — LOW (ref 34.5–45)
HGB BLD-MCNC: 7.1 G/DL — LOW (ref 11.5–15.5)
LDH SERPL L TO P-CCNC: 265 U/L — HIGH (ref 50–242)
MAGNESIUM SERPL-MCNC: 1.6 MG/DL — SIGNIFICANT CHANGE UP (ref 1.6–2.6)
MCHC RBC-ENTMCNC: 29.3 PG — SIGNIFICANT CHANGE UP (ref 27–34)
MCHC RBC-ENTMCNC: 33.9 GM/DL — SIGNIFICANT CHANGE UP (ref 32–36)
MCV RBC AUTO: 86.4 FL — SIGNIFICANT CHANGE UP (ref 80–100)
PHOSPHATE SERPL-MCNC: 3.1 MG/DL — SIGNIFICANT CHANGE UP (ref 2.5–4.5)
PLATELET # BLD AUTO: 5 K/UL — CRITICAL LOW (ref 150–400)
POTASSIUM SERPL-MCNC: 3.6 MMOL/L — SIGNIFICANT CHANGE UP (ref 3.5–5.3)
POTASSIUM SERPL-SCNC: 3.6 MMOL/L — SIGNIFICANT CHANGE UP (ref 3.5–5.3)
PROT SERPL-MCNC: 6.5 G/DL — SIGNIFICANT CHANGE UP (ref 6–8.3)
RBC # BLD: 2.44 M/UL — LOW (ref 3.8–5.2)
RBC # FLD: 13 % — SIGNIFICANT CHANGE UP (ref 10.3–14.5)
SODIUM SERPL-SCNC: 137 MMOL/L — SIGNIFICANT CHANGE UP (ref 135–145)
SPECIMEN SOURCE: SIGNIFICANT CHANGE UP
SPECIMEN SOURCE: SIGNIFICANT CHANGE UP
URATE SERPL-MCNC: 1.8 MG/DL — LOW (ref 2.5–7)
WBC # BLD: 0.3 K/UL — CRITICAL LOW (ref 3.8–10.5)
WBC # FLD AUTO: 0.3 K/UL — CRITICAL LOW (ref 3.8–10.5)

## 2019-08-17 PROCEDURE — 99232 SBSQ HOSP IP/OBS MODERATE 35: CPT | Mod: GC

## 2019-08-17 RX ORDER — AZTREONAM 2 G
2000 VIAL (EA) INJECTION EVERY 8 HOURS
Refills: 0 | Status: DISCONTINUED | OUTPATIENT
Start: 2019-08-17 | End: 2019-08-26

## 2019-08-17 RX ORDER — AZTREONAM 2 G
2000 VIAL (EA) INJECTION ONCE
Refills: 0 | Status: COMPLETED | OUTPATIENT
Start: 2019-08-17 | End: 2019-08-17

## 2019-08-17 RX ORDER — AZTREONAM 2 G
VIAL (EA) INJECTION
Refills: 0 | Status: DISCONTINUED | OUTPATIENT
Start: 2019-08-17 | End: 2019-08-26

## 2019-08-17 RX ADMIN — SIMETHICONE 80 MILLIGRAM(S): 80 TABLET, CHEWABLE ORAL at 06:08

## 2019-08-17 RX ADMIN — POSACONAZOLE 300 MILLIGRAM(S): 100 TABLET, DELAYED RELEASE ORAL at 11:36

## 2019-08-17 RX ADMIN — Medication 5 MILLILITER(S): at 06:07

## 2019-08-17 RX ADMIN — CEFEPIME 100 MILLIGRAM(S): 1 INJECTION, POWDER, FOR SOLUTION INTRAMUSCULAR; INTRAVENOUS at 06:07

## 2019-08-17 RX ADMIN — Medication 100 MILLIGRAM(S): at 21:38

## 2019-08-17 RX ADMIN — GABAPENTIN 600 MILLIGRAM(S): 400 CAPSULE ORAL at 06:07

## 2019-08-17 RX ADMIN — SIMETHICONE 80 MILLIGRAM(S): 80 TABLET, CHEWABLE ORAL at 11:37

## 2019-08-17 RX ADMIN — GABAPENTIN 600 MILLIGRAM(S): 400 CAPSULE ORAL at 17:10

## 2019-08-17 RX ADMIN — Medication 25 MILLIGRAM(S): at 23:41

## 2019-08-17 RX ADMIN — Medication 3 MILLILITER(S): at 23:41

## 2019-08-17 RX ADMIN — Medication 5 MILLILITER(S): at 13:22

## 2019-08-17 RX ADMIN — Medication 3 MILLILITER(S): at 11:36

## 2019-08-17 RX ADMIN — Medication 10 UNIT(S): at 18:11

## 2019-08-17 RX ADMIN — Medication 100 MILLIGRAM(S): at 13:25

## 2019-08-17 RX ADMIN — Medication 3: at 18:11

## 2019-08-17 RX ADMIN — Medication 25 MILLIGRAM(S): at 11:37

## 2019-08-17 RX ADMIN — Medication 100 MILLIGRAM(S): at 12:15

## 2019-08-17 RX ADMIN — Medication 1: at 12:15

## 2019-08-17 RX ADMIN — Medication 10 UNIT(S): at 09:15

## 2019-08-17 RX ADMIN — Medication 1: at 09:15

## 2019-08-17 RX ADMIN — SODIUM CHLORIDE 4 MILLILITER(S): 9 INJECTION INTRAMUSCULAR; INTRAVENOUS; SUBCUTANEOUS at 17:09

## 2019-08-17 RX ADMIN — SODIUM CHLORIDE 50 MILLILITER(S): 9 INJECTION INTRAMUSCULAR; INTRAVENOUS; SUBCUTANEOUS at 06:09

## 2019-08-17 RX ADMIN — Medication 25 MILLIGRAM(S): at 06:07

## 2019-08-17 RX ADMIN — INSULIN GLARGINE 16 UNIT(S): 100 INJECTION, SOLUTION SUBCUTANEOUS at 21:38

## 2019-08-17 RX ADMIN — Medication 100 MILLIGRAM(S): at 21:37

## 2019-08-17 RX ADMIN — Medication 3 MILLILITER(S): at 06:07

## 2019-08-17 RX ADMIN — SIMETHICONE 80 MILLIGRAM(S): 80 TABLET, CHEWABLE ORAL at 23:41

## 2019-08-17 RX ADMIN — CHLORHEXIDINE GLUCONATE 1 APPLICATION(S): 213 SOLUTION TOPICAL at 09:15

## 2019-08-17 RX ADMIN — Medication 1 APPLICATION(S): at 06:09

## 2019-08-17 RX ADMIN — Medication 3 MILLILITER(S): at 17:09

## 2019-08-17 RX ADMIN — SIMETHICONE 80 MILLIGRAM(S): 80 TABLET, CHEWABLE ORAL at 17:09

## 2019-08-17 RX ADMIN — Medication 1 APPLICATION(S): at 17:09

## 2019-08-17 RX ADMIN — LOSARTAN POTASSIUM 100 MILLIGRAM(S): 100 TABLET, FILM COATED ORAL at 06:08

## 2019-08-17 RX ADMIN — SODIUM CHLORIDE 4 MILLILITER(S): 9 INJECTION INTRAMUSCULAR; INTRAVENOUS; SUBCUTANEOUS at 06:08

## 2019-08-17 RX ADMIN — Medication 75 MICROGRAM(S): at 06:08

## 2019-08-17 RX ADMIN — SODIUM CHLORIDE 50 MILLILITER(S): 9 INJECTION INTRAMUSCULAR; INTRAVENOUS; SUBCUTANEOUS at 17:26

## 2019-08-17 RX ADMIN — Medication 25 MILLIGRAM(S): at 17:10

## 2019-08-17 RX ADMIN — Medication 5 MILLILITER(S): at 21:38

## 2019-08-17 RX ADMIN — Medication 10 UNIT(S): at 12:15

## 2019-08-17 NOTE — PROGRESS NOTE ADULT - ASSESSMENT
This is a 63 yo F with PMHx of T2DM, RA, Depression and now newly diagnosed AML enrolled on [employer] [facility] trial admitted for Induction chemotherapy with Dauno/Cytarabine and daily Glasdegib vs Placebo. The patients hospital course has been complicated by transaminitis,  RENTERIA due to interstitial lung disease and volume overload, and rash. The patient has pancytopenia from chemotherapy and/or disease. This is a 65 yo F with PMHx of T2DM, RA, Depression and now newly diagnosed AML enrolled on Pf[facility] trial admitted for Induction chemotherapy with Dauno/Cytarabine and daily Glasdegib vs Placebo. The patients hospital course has been complicated by transaminitis,  RENTERIA due to interstitial lung disease and volume overload, and rash. The patient has pancytopenia from chemotherapy and/or disease.

## 2019-08-17 NOTE — PROGRESS NOTE ADULT - SUBJECTIVE AND OBJECTIVE BOX
Diagnosis:    Protocol/Chemo Regimen:    Day:     Pt endorsed:    Review of Systems:     Pain scale:     Diet:     Allergies    No Known Allergies    Intolerances        ANTIMICROBIALS  cefepime   IVPB 2000 milliGRAM(s) IV Intermittent every 8 hours  posaconazole DR Tablet 300 milliGRAM(s) Oral daily      HEME/ONC MEDICATIONS      STANDING MEDICATIONS  ALBUTerol    90 MICROgram(s) HFA Inhaler 1 Puff(s) Inhalation every 6 hours  ALBUTerol/ipratropium for Nebulization 3 milliLiter(s) Nebulizer every 6 hours  Biotene Dry Mouth Oral Rinse 5 milliLiter(s) Swish and Spit three times a day  chlorhexidine 2% Cloths 1 Application(s) Topical <User Schedule>  dextrose 5%. 1000 milliLiter(s) IV Continuous <Continuous>  dextrose 50% Injectable 12.5 Gram(s) IV Push once  dextrose 50% Injectable 25 Gram(s) IV Push once  dextrose 50% Injectable 25 Gram(s) IV Push once  gabapentin 600 milliGRAM(s) Oral two times a day  hydrOXYzine hydrochloride 25 milliGRAM(s) Oral every 6 hours  insulin glargine Injectable (LANTUS) 16 Unit(s) SubCutaneous at bedtime  insulin lispro (HumaLOG) corrective regimen sliding scale   SubCutaneous three times a day before meals  insulin lispro (HumaLOG) corrective regimen sliding scale   SubCutaneous at bedtime  insulin lispro Injectable (HumaLOG) 10 Unit(s) SubCutaneous three times a day before meals  investigational chemo - general 1 Tablet(s) Oral every 24 hours  levothyroxine 75 MICROGram(s) Oral daily  losartan 100 milliGRAM(s) Oral daily  simethicone 80 milliGRAM(s) Chew four times a day  sodium chloride 0.9%. 1000 milliLiter(s) IV Continuous <Continuous>  sodium chloride 3%  Inhalation 4 milliLiter(s) Inhalation two times a day  tiotropium 18 MICROgram(s) Capsule 1 Capsule(s) Inhalation daily  triamcinolone 0.1% Ointment 1 Application(s) Topical every 12 hours      PRN MEDICATIONS  acetaminophen   Tablet .. 650 milliGRAM(s) Oral every 6 hours PRN  benzonatate 100 milliGRAM(s) Oral three times a day PRN  dextrose 40% Gel 15 Gram(s) Oral once PRN  diphenhydrAMINE 25 milliGRAM(s) Oral every 6 hours PRN  glucagon  Injectable 1 milliGRAM(s) IntraMuscular once PRN  lidocaine 2% Viscous 2 milliLiter(s) Oral every 2 hours PRN  melatonin 3 milliGRAM(s) Oral once PRN  metoclopramide Injectable 10 milliGRAM(s) IV Push every 6 hours PRN  oxyCODONE    IR 5 milliGRAM(s) Oral every 4 hours PRN  polyethylene glycol 3350 17 Gram(s) Oral two times a day PRN        Vital Signs Last 24 Hrs  T(C): 37.1 (17 Aug 2019 05:28), Max: 37.3 (17 Aug 2019 00:59)  T(F): 98.7 (17 Aug 2019 05:28), Max: 99.2 (17 Aug 2019 00:59)  HR: 87 (17 Aug 2019 05:28) (84 - 94)  BP: 133/58 (17 Aug 2019 05:28) (110/56 - 138/62)  BP(mean): --  RR: 18 (17 Aug 2019 05:28) (18 - 20)  SpO2: 97% (17 Aug 2019 05:28) (96% - 100%)    PHYSICAL EXAM  General: NAD  HEENT: PERRLA, EOMOI, clear oropharynx, anicteric sclera, pink conjunctiva  Neck: supple  CV: (+) S1/S2 RRR  Lungs: clear to auscultation, no wheezes or rales  Abdomen: soft, non-tender, non-distended (+) BS  Ext: no clubbing, cyanosis or edema  Skin: no rashes and no petechiae  Neuro: alert and oriented X 3, no focal deficits  Central Line:     RECENT CULTURES:  08-13 @ 23:17  .Urine  --  --  --    No growth  --  08-13 @ 22:12  .Blood  --  --  --    No growth to date.  --  08-13 @ 22:07  .Blood  --  --  --    No growth to date.  --  08-12 @ 01:34  .Sputum  --  --  --    Normal Respiratory Maryana present  --  08-12 @ 01:19  .Blood  --  --  --    No growth at 5 days.  --  08-12 @ 01:10  .Urine  --  --  --    No growth  --  08-10 @ 17:52  .Sputum  --  --  --    Normal Respiratory Maryana present  --  08-10 @ 17:19  .Urine  --  --  --    No growth  --        LABS:                        7.1    0.3   )-----------( x        ( 17 Aug 2019 07:07 )             21.1         Mean Cell Volume : 86.4 fl  Mean Cell Hemoglobin : 29.3 pg  Mean Cell Hemoglobin Concentration : 33.9 gm/dL  Auto Neutrophil # : x  Auto Lymphocyte # : x  Auto Monocyte # : x  Auto Eosinophil # : x  Auto Basophil # : x  Auto Neutrophil % : x  Auto Lymphocyte % : x  Auto Monocyte % : x  Auto Eosinophil % : x  Auto Basophil % : x      08-17    137  |  106  |  9   ----------------------------<  138<H>  3.6   |  21<L>  |  0.66    Ca    8.2<L>      17 Aug 2019 06:59  Phos  3.1     08-17  Mg     1.6     08-17    TPro  6.5  /  Alb  3.0<L>  /  TBili  0.4  /  DBili  x   /  AST  21  /  ALT  48<H>  /  AlkPhos  181<H>  08-17      Mg 1.6  Phos 3.1            Uric Acid 1.8        RADIOLOGY & ADDITIONAL STUDIES: Diagnosis: AML FLT 3 (-)    Protocol/Chemo Regimen: Enrolled on Pf[employer] [facility] 1019-  Induction chemotherapy with Dauno/Cytarabine and daily Glasdegib vs Placebo    Day: 11    Pt endorsed: + diarrhea  +mouth discomfort  +pruriris    Review of Systems: Patient denies headache, dizziness, visual changes, chest pain, palpitations, abdominal pain, nausea, vomiting or dysuria.    Pain scale: denies    Diet: Consistent Carbohydrate    Allergies  No Known Allergies  ANTIMICROBIALS  cefepime   IVPB 2000 milliGRAM(s) IV Intermittent every 8 hours  posaconazole DR Tablet 300 milliGRAM(s) Oral daily    STANDING MEDICATIONS  ALBUTerol    90 MICROgram(s) HFA Inhaler 1 Puff(s) Inhalation every 6 hours  ALBUTerol/ipratropium for Nebulization 3 milliLiter(s) Nebulizer every 6 hours  Biotene Dry Mouth Oral Rinse 5 milliLiter(s) Swish and Spit three times a day  chlorhexidine 2% Cloths 1 Application(s) Topical <User Schedule>  dextrose 5%. 1000 milliLiter(s) IV Continuous <Continuous>  dextrose 50% Injectable 12.5 Gram(s) IV Push once  dextrose 50% Injectable 25 Gram(s) IV Push once  dextrose 50% Injectable 25 Gram(s) IV Push once  gabapentin 600 milliGRAM(s) Oral two times a day  hydrOXYzine hydrochloride 25 milliGRAM(s) Oral every 6 hours  insulin glargine Injectable (LANTUS) 16 Unit(s) SubCutaneous at bedtime  insulin lispro (HumaLOG) corrective regimen sliding scale   SubCutaneous three times a day before meals  insulin lispro (HumaLOG) corrective regimen sliding scale   SubCutaneous at bedtime  insulin lispro Injectable (HumaLOG) 10 Unit(s) SubCutaneous three times a day before meals  investigational chemo - general 1 Tablet(s) Oral every 24 hours  levothyroxine 75 MICROGram(s) Oral daily  losartan 100 milliGRAM(s) Oral daily  simethicone 80 milliGRAM(s) Chew four times a day  sodium chloride 0.9%. 1000 milliLiter(s) IV Continuous <Continuous>  sodium chloride 3%  Inhalation 4 milliLiter(s) Inhalation two times a day  tiotropium 18 MICROgram(s) Capsule 1 Capsule(s) Inhalation daily  triamcinolone 0.1% Ointment 1 Application(s) Topical every 12 hours    PRN MEDICATIONS  acetaminophen   Tablet .. 650 milliGRAM(s) Oral every 6 hours PRN  benzonatate 100 milliGRAM(s) Oral three times a day PRN  dextrose 40% Gel 15 Gram(s) Oral once PRN  diphenhydrAMINE 25 milliGRAM(s) Oral every 6 hours PRN  glucagon  Injectable 1 milliGRAM(s) IntraMuscular once PRN  lidocaine 2% Viscous 2 milliLiter(s) Oral every 2 hours PRN  melatonin 3 milliGRAM(s) Oral once PRN  metoclopramide Injectable 10 milliGRAM(s) IV Push every 6 hours PRN  oxyCODONE    IR 5 milliGRAM(s) Oral every 4 hours PRN  polyethylene glycol 3350 17 Gram(s) Oral two times a day PRN    Vital Signs Last 24 Hrs  T(C): 37.1 (17 Aug 2019 05:28), Max: 37.3 (17 Aug 2019 00:59)  T(F): 98.7 (17 Aug 2019 05:28), Max: 99.2 (17 Aug 2019 00:59)  HR: 87 (17 Aug 2019 05:28) (84 - 94)  BP: 133/58 (17 Aug 2019 05:28) (110/56 - 138/62)  BP(mean): --  RR: 18 (17 Aug 2019 05:28) (18 - 20)  SpO2: 97% (17 Aug 2019 05:28) (96% - 100%)    PHYSICAL EXAM  General: adult in NAD  HEENT: +ulcer left lower palate, posterior buccal mucosa  Neck: supple  CV: normal S1/S2 RRR  Lungs: positive air movement b/l ant lungs,clear to auscultation, no wheezes, no rales  Abdomen: soft non-tender non-distended,   Ext: no clubbing cyanosis or edema  Skin: erythematous rash b/l breasts improved  Neuro: alert and oriented X 4, no focal deficits  Central Line: Right IJ TLC c/d/i    RECENT CULTURES:  08-13 @ 23:17  .Urine  No growth    08-13 @ 22:12  .Blood  No growth to date.    08-13 @ 22:07  .Blood  No growth to date.    08-12 @ 01:34  .Sputum  Normal Respiratory Maryana present    08-12 @ 01:19  .Blood  --  --  --    No growth at 5 days.  --  08-12 @ 01:10  .Urine  --  --  --    No growth  --  08-10 @ 17:52  .Sputum  --  --  --    Normal Respiratory Maryana present  --  08-10 @ 17:19  .Urine  --  --  --    No growth  --        LABS:                        7.1    0.3   )-----------( x        ( 17 Aug 2019 07:07 )             21.1         Mean Cell Volume : 86.4 fl  Mean Cell Hemoglobin : 29.3 pg  Mean Cell Hemoglobin Concentration : 33.9 gm/dL  Auto Neutrophil # : x  Auto Lymphocyte # : x  Auto Monocyte # : x  Auto Eosinophil # : x  Auto Basophil # : x  Auto Neutrophil % : x  Auto Lymphocyte % : x  Auto Monocyte % : x  Auto Eosinophil % : x  Auto Basophil % : x      08-17    137  |  106  |  9   ----------------------------<  138<H>  3.6   |  21<L>  |  0.66    Ca    8.2<L>      17 Aug 2019 06:59  Phos  3.1     08-17  Mg     1.6     08-17    TPro  6.5  /  Alb  3.0<L>  /  TBili  0.4  /  DBili  x   /  AST  21  /  ALT  48<H>  /  AlkPhos  181<H>  08-17      Mg 1.6  Phos 3.1    Uric Acid 1.8      RADIOLOGY & ADDITIONAL STUDIES:   Xray Chest 1 View- PORTABLE-Urgent (08.15.19 @ 01:26) >  Unchanged bilateral reticular opacities.  No pneumonia.

## 2019-08-17 NOTE — PROGRESS NOTE ADULT - ATTENDING COMMENTS
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 Pt on [employer] trial 7+3+ Glasdegib/placebo.  Patient here for her induction chemotherapy on trial.  Day 10.  Completing thong-c infusion.  Afeb xc 48 hrs, no chills, No N/V/D.ruritus.  All cults (-)  Pruritus better, on atc atarax, topical triamcinolone.  Rash better, fading.  ? allopurinol ? study drug  Rash CTCAE allergic reaction grade 2    Mild AST/ALT elevation  d/c allopurinol  Pain on chewing on left side, mild mucositis, able to eat - stable     Supportive care, anti-emetics, IVFs,  I > O, to cont to diurese as needed.  No edema.  Cont. Cefepime (d/c Levaquin); and Posaconazole  Monitor CBC, receives transfusions PRN.   Lantus QHS, following fingersticks closely, endocrine consult appreciated. h/o of linnea poorly controlled diabetes.   ECOG performance status 2. 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 Pt on [employer] trial 7+3+ Glasdegib/placebo.  Patient here for her induction chemotherapy on trial.  Day 11.  Completing thong-c infusion.  Afeb xc 48 hrs, no chills, No N/V/D.ruritus.  All cults (-)  Pruritus better, on atc atarax, topical triamcinolone.  Rash all over, possibly 2/2 cefepime?  Switched to aztreonam.   Also possibility due to study drug.   Rash CTCAE allergic reaction grade 2    Mild AST/ALT elevation  d/c allopurinol  Pain on chewing on left side, mild mucositis, able to eat - stable     Supportive care, anti-emetics, IVFs,  I > O, to cont to diurese as needed.  No edema.  Cont. Cefepime (d/c Levaquin); and Posaconazole  Monitor CBC, receives transfusions PRN.   Lantus QHS, following fingersticks closely, endocrine consult appreciated. h/o of linnea poorly controlled diabetes.   ECOG performance status 2.

## 2019-08-17 NOTE — PROGRESS NOTE ADULT - PROBLEM SELECTOR PLAN 1
Enrolled on [employer] BRIGHT trial receiving Induction chemotherapy with Dauno/Cytarabine and daily Glasdegib vs Placebo  Molecular studies pending  8/20 Day 14 BM bx due  Monitor CBC/Lytes and transfuse/replete PRN  Strict Is and Os/ Daily weights/ Mouth Care  Allopurinol 300mg PO daily-stopped 2/2 rash  IVF hydration  Antiemetics  ECOG performance status 2  Atarax ATC for itching and oral benadryl prn. added triamcinolone ointment - rash improving

## 2019-08-17 NOTE — PROGRESS NOTE ADULT - PROBLEM SELECTOR PLAN 2
The patient is neutropenic, afebrile > 48 hrs  Cultures from 8/13 no growth  Continue Cefepime and Posaconazole (Monitor QTc BIW M/Th with Posaconazole and Study drug)  8/9 CT chest: Pulmonary fibrosis and traction bronchiectasis without honeycombing.   Overall these findings appear progressed compared to CT abdomen 7/4/2017  Associated ground-glass opacity may be related to active interstitial lung disease, fluid overload, less likely infection The patient is neutropenic, afebrile > 48 hrs  Cultures from 8/13 no growth  Continue Posaconazole, change Cefepime to Aztreonam as patient has persistent pruritis.  (Monitor QTc BIW M/Th with Posaconazole and Study drug)  8/9 CT chest: Pulmonary fibrosis and traction bronchiectasis without honeycombing.   Overall these findings appear progressed compared to CT abdomen 7/4/2017  Associated ground-glass opacity may be related to active interstitial lung disease, fluid overload, less likely infection

## 2019-08-18 LAB
ALBUMIN SERPL ELPH-MCNC: 2.8 G/DL — LOW (ref 3.3–5)
ALP SERPL-CCNC: 200 U/L — HIGH (ref 40–120)
ALT FLD-CCNC: 45 U/L — SIGNIFICANT CHANGE UP (ref 10–45)
ANION GAP SERPL CALC-SCNC: 9 MMOL/L — SIGNIFICANT CHANGE UP (ref 5–17)
AST SERPL-CCNC: 27 U/L — SIGNIFICANT CHANGE UP (ref 10–40)
BILIRUB SERPL-MCNC: 0.4 MG/DL — SIGNIFICANT CHANGE UP (ref 0.2–1.2)
BUN SERPL-MCNC: 8 MG/DL — SIGNIFICANT CHANGE UP (ref 7–23)
CALCIUM SERPL-MCNC: 8.1 MG/DL — LOW (ref 8.4–10.5)
CHLORIDE SERPL-SCNC: 106 MMOL/L — SIGNIFICANT CHANGE UP (ref 96–108)
CO2 SERPL-SCNC: 22 MMOL/L — SIGNIFICANT CHANGE UP (ref 22–31)
CREAT SERPL-MCNC: 0.61 MG/DL — SIGNIFICANT CHANGE UP (ref 0.5–1.3)
CULTURE RESULTS: SIGNIFICANT CHANGE UP
CULTURE RESULTS: SIGNIFICANT CHANGE UP
GLUCOSE BLDC GLUCOMTR-MCNC: 118 MG/DL — HIGH (ref 70–99)
GLUCOSE BLDC GLUCOMTR-MCNC: 164 MG/DL — HIGH (ref 70–99)
GLUCOSE BLDC GLUCOMTR-MCNC: 184 MG/DL — HIGH (ref 70–99)
GLUCOSE BLDC GLUCOMTR-MCNC: 242 MG/DL — HIGH (ref 70–99)
GLUCOSE SERPL-MCNC: 239 MG/DL — HIGH (ref 70–99)
HCT VFR BLD CALC: 18 % — CRITICAL LOW (ref 34.5–45)
HGB BLD-MCNC: 6.1 G/DL — CRITICAL LOW (ref 11.5–15.5)
LDH SERPL L TO P-CCNC: 272 U/L — HIGH (ref 50–242)
MAGNESIUM SERPL-MCNC: 1.5 MG/DL — LOW (ref 1.6–2.6)
MCHC RBC-ENTMCNC: 28.4 PG — SIGNIFICANT CHANGE UP (ref 27–34)
MCHC RBC-ENTMCNC: 33.9 GM/DL — SIGNIFICANT CHANGE UP (ref 32–36)
MCV RBC AUTO: 83.9 FL — SIGNIFICANT CHANGE UP (ref 80–100)
PHOSPHATE SERPL-MCNC: 3 MG/DL — SIGNIFICANT CHANGE UP (ref 2.5–4.5)
PLATELET # BLD AUTO: 47 K/UL — LOW (ref 150–400)
POTASSIUM SERPL-MCNC: 3.4 MMOL/L — LOW (ref 3.5–5.3)
POTASSIUM SERPL-SCNC: 3.4 MMOL/L — LOW (ref 3.5–5.3)
PROT SERPL-MCNC: 6.2 G/DL — SIGNIFICANT CHANGE UP (ref 6–8.3)
RBC # BLD: 2.15 M/UL — LOW (ref 3.8–5.2)
RBC # FLD: 12.8 % — SIGNIFICANT CHANGE UP (ref 10.3–14.5)
SODIUM SERPL-SCNC: 137 MMOL/L — SIGNIFICANT CHANGE UP (ref 135–145)
SPECIMEN SOURCE: SIGNIFICANT CHANGE UP
SPECIMEN SOURCE: SIGNIFICANT CHANGE UP
URATE SERPL-MCNC: 1.5 MG/DL — LOW (ref 2.5–7)
WBC # BLD: 0.4 K/UL — CRITICAL LOW (ref 3.8–10.5)
WBC # FLD AUTO: 0.4 K/UL — CRITICAL LOW (ref 3.8–10.5)

## 2019-08-18 PROCEDURE — 99232 SBSQ HOSP IP/OBS MODERATE 35: CPT

## 2019-08-18 RX ORDER — POTASSIUM CHLORIDE 20 MEQ
20 PACKET (EA) ORAL ONCE
Refills: 0 | Status: COMPLETED | OUTPATIENT
Start: 2019-08-18 | End: 2019-08-18

## 2019-08-18 RX ORDER — MAGNESIUM SULFATE 500 MG/ML
2 VIAL (ML) INJECTION ONCE
Refills: 0 | Status: COMPLETED | OUTPATIENT
Start: 2019-08-18 | End: 2019-08-18

## 2019-08-18 RX ADMIN — GABAPENTIN 600 MILLIGRAM(S): 400 CAPSULE ORAL at 17:58

## 2019-08-18 RX ADMIN — Medication 5 MILLILITER(S): at 13:12

## 2019-08-18 RX ADMIN — Medication 10 UNIT(S): at 13:12

## 2019-08-18 RX ADMIN — INSULIN GLARGINE 16 UNIT(S): 100 INJECTION, SOLUTION SUBCUTANEOUS at 21:28

## 2019-08-18 RX ADMIN — Medication 100 MILLIGRAM(S): at 13:11

## 2019-08-18 RX ADMIN — Medication 1 APPLICATION(S): at 05:55

## 2019-08-18 RX ADMIN — Medication 2: at 09:36

## 2019-08-18 RX ADMIN — POSACONAZOLE 300 MILLIGRAM(S): 100 TABLET, DELAYED RELEASE ORAL at 11:29

## 2019-08-18 RX ADMIN — Medication 100 MILLIGRAM(S): at 05:54

## 2019-08-18 RX ADMIN — SIMETHICONE 80 MILLIGRAM(S): 80 TABLET, CHEWABLE ORAL at 17:58

## 2019-08-18 RX ADMIN — Medication 3 MILLILITER(S): at 11:28

## 2019-08-18 RX ADMIN — SODIUM CHLORIDE 4 MILLILITER(S): 9 INJECTION INTRAMUSCULAR; INTRAVENOUS; SUBCUTANEOUS at 17:57

## 2019-08-18 RX ADMIN — Medication 5 MILLILITER(S): at 21:31

## 2019-08-18 RX ADMIN — Medication 1 APPLICATION(S): at 17:57

## 2019-08-18 RX ADMIN — SIMETHICONE 80 MILLIGRAM(S): 80 TABLET, CHEWABLE ORAL at 05:55

## 2019-08-18 RX ADMIN — Medication 100 MILLIGRAM(S): at 21:31

## 2019-08-18 RX ADMIN — Medication 25 MILLIGRAM(S): at 05:55

## 2019-08-18 RX ADMIN — Medication 5 MILLILITER(S): at 05:55

## 2019-08-18 RX ADMIN — SIMETHICONE 80 MILLIGRAM(S): 80 TABLET, CHEWABLE ORAL at 23:57

## 2019-08-18 RX ADMIN — Medication 75 MICROGRAM(S): at 05:55

## 2019-08-18 RX ADMIN — Medication 3 MILLILITER(S): at 17:57

## 2019-08-18 RX ADMIN — SODIUM CHLORIDE 50 MILLILITER(S): 9 INJECTION INTRAMUSCULAR; INTRAVENOUS; SUBCUTANEOUS at 18:01

## 2019-08-18 RX ADMIN — Medication 10 UNIT(S): at 09:36

## 2019-08-18 RX ADMIN — SODIUM CHLORIDE 50 MILLILITER(S): 9 INJECTION INTRAMUSCULAR; INTRAVENOUS; SUBCUTANEOUS at 05:54

## 2019-08-18 RX ADMIN — Medication 50 MILLIEQUIVALENT(S): at 16:00

## 2019-08-18 RX ADMIN — SODIUM CHLORIDE 4 MILLILITER(S): 9 INJECTION INTRAMUSCULAR; INTRAVENOUS; SUBCUTANEOUS at 05:55

## 2019-08-18 RX ADMIN — Medication 1: at 13:12

## 2019-08-18 RX ADMIN — Medication 100 MILLIGRAM(S): at 21:29

## 2019-08-18 RX ADMIN — Medication 25 MILLIGRAM(S): at 17:58

## 2019-08-18 RX ADMIN — Medication 50 GRAM(S): at 14:16

## 2019-08-18 RX ADMIN — Medication 3 MILLILITER(S): at 05:54

## 2019-08-18 RX ADMIN — SIMETHICONE 80 MILLIGRAM(S): 80 TABLET, CHEWABLE ORAL at 11:29

## 2019-08-18 RX ADMIN — LOSARTAN POTASSIUM 100 MILLIGRAM(S): 100 TABLET, FILM COATED ORAL at 05:55

## 2019-08-18 RX ADMIN — Medication 3 MILLILITER(S): at 23:54

## 2019-08-18 RX ADMIN — Medication 100 MILLIGRAM(S): at 13:12

## 2019-08-18 RX ADMIN — CHLORHEXIDINE GLUCONATE 1 APPLICATION(S): 213 SOLUTION TOPICAL at 08:39

## 2019-08-18 RX ADMIN — GABAPENTIN 600 MILLIGRAM(S): 400 CAPSULE ORAL at 05:55

## 2019-08-18 RX ADMIN — Medication 25 MILLIGRAM(S): at 11:29

## 2019-08-18 RX ADMIN — Medication 10 UNIT(S): at 18:30

## 2019-08-18 NOTE — PROGRESS NOTE ADULT - ATTENDING COMMENTS
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 Pt on [employer] trial 7+3+ Glasdegib/placebo.  Patient here for her induction chemotherapy on trial.  Day 11.  Completing thong-c infusion.  Afeb xc 48 hrs, no chills, No N/V/D.ruritus.  All cults (-)  Pruritus better, on atc atarax, topical triamcinolone.  Rash all over, possibly 2/2 cefepime?  Switched to aztreonam.   Also possibility due to study drug.   Rash CTCAE allergic reaction grade 2    Mild AST/ALT elevation  d/c allopurinol  Pain on chewing on left side, mild mucositis, able to eat - stable     Supportive care, anti-emetics, IVFs,  I > O, to cont to diurese as needed.  No edema.  Cont. Cefepime (d/c Levaquin); and Posaconazole  Monitor CBC, receives transfusions PRN.   Lantus QHS, following fingersticks closely, endocrine consult appreciated. h/o of linnea poorly controlled diabetes.   ECOG performance status 2. 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 Pt on [employer] trial 7+3+ Glasdegib/placebo.  Patient here for her induction chemotherapy on trial.  Day 12.  Completing thong-c infusion.  Afeb xc 48 hrs, no chills, No N/V/D.ruritus.  All cults (-)  Pruritus better, on atc atarax, topical triamcinolone.  Rash all over, possibly 2/2 cefepime?  Switched to aztreonam 8/17.   On 8/18 slightly improved. Also possibility due to study drug.   Rash CTCAE allergic reaction grade 2    Mild AST/ALT elevation  d/c allopurinol  Pain on chewing on left side, mild mucositis, able to eat - stable     Supportive care, anti-emetics, IVFs,  I > O, to cont to diurese as needed.  No edema.  Cont. Cefepime (d/c Levaquin); and Posaconazole  Monitor CBC, receives transfusions PRN.   Lantus QHS, following fingersticks closely, endocrine consult appreciated. h/o of linnea poorly controlled diabetes.   ECOG performance status 2. 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 Pt on [employer] trial 7+3+ Glasdegib/placebo.  Patient here for her induction chemotherapy on trial.  Day 12.  Completing thong-c infusion.  Afeb xc 48 hrs, no chills, No N/V/D.ruritus.  All cults (-)  Pruritus better, on atc atarax, topical triamcinolone.  Rash all over, possibly 2/2 cefepime?  Switched to aztreonam 8/17.   On 8/18 slightly improved. Also possibility due to study drug.   Rash CTCAE allergic reaction grade 2    Mild AST/ALT elevation  d/c allopurinol  Pain on chewing on left side, mild mucositis, able to eat - stable    Mild diarrhea, had sent for C. diff but not watery enough to test and now improved.   Unlikely C. Diff colitis.   Low grade temperature elevation morning of 8/18/19. Continue to monitor, culture if spikes.     Supportive care, anti-emetics, IVFs,  I > O, to cont to diurese as needed.  No edema.  Cont. Cefepime (d/c Levaquin); and Posaconazole  Monitor CBC, receives transfusions PRN.   Lantus QHS, following fingersticks closely, endocrine consult appreciated. h/o of linnea poorly controlled diabetes.   ECOG performance status 2.

## 2019-08-18 NOTE — PROGRESS NOTE ADULT - SUBJECTIVE AND OBJECTIVE BOX
Diagnosis:    Protocol/Chemo Regimen:    Day:     Pt endorsed:    Review of Systems:     Pain scale:     Diet:     Allergies    cefepime (Rash)    Intolerances        ANTIMICROBIALS  aztreonam  IVPB 2000 milliGRAM(s) IV Intermittent every 8 hours  aztreonam  IVPB      posaconazole DR Tablet 300 milliGRAM(s) Oral daily      HEME/ONC MEDICATIONS      STANDING MEDICATIONS  ALBUTerol    90 MICROgram(s) HFA Inhaler 1 Puff(s) Inhalation every 6 hours  ALBUTerol/ipratropium for Nebulization 3 milliLiter(s) Nebulizer every 6 hours  benzonatate 100 milliGRAM(s) Oral three times a day  Biotene Dry Mouth Oral Rinse 5 milliLiter(s) Swish and Spit three times a day  chlorhexidine 2% Cloths 1 Application(s) Topical <User Schedule>  dextrose 5%. 1000 milliLiter(s) IV Continuous <Continuous>  dextrose 50% Injectable 12.5 Gram(s) IV Push once  dextrose 50% Injectable 25 Gram(s) IV Push once  dextrose 50% Injectable 25 Gram(s) IV Push once  gabapentin 600 milliGRAM(s) Oral two times a day  hydrOXYzine hydrochloride 25 milliGRAM(s) Oral every 6 hours  insulin glargine Injectable (LANTUS) 16 Unit(s) SubCutaneous at bedtime  insulin lispro (HumaLOG) corrective regimen sliding scale   SubCutaneous three times a day before meals  insulin lispro (HumaLOG) corrective regimen sliding scale   SubCutaneous at bedtime  insulin lispro Injectable (HumaLOG) 10 Unit(s) SubCutaneous three times a day before meals  investigational chemo - general 1 Tablet(s) Oral every 24 hours  levothyroxine 75 MICROGram(s) Oral daily  losartan 100 milliGRAM(s) Oral daily  simethicone 80 milliGRAM(s) Chew four times a day  sodium chloride 0.9%. 1000 milliLiter(s) IV Continuous <Continuous>  sodium chloride 3%  Inhalation 4 milliLiter(s) Inhalation two times a day  tiotropium 18 MICROgram(s) Capsule 1 Capsule(s) Inhalation daily  triamcinolone 0.1% Ointment 1 Application(s) Topical every 12 hours      PRN MEDICATIONS  acetaminophen   Tablet .. 650 milliGRAM(s) Oral every 6 hours PRN  dextrose 40% Gel 15 Gram(s) Oral once PRN  diphenhydrAMINE 25 milliGRAM(s) Oral every 6 hours PRN  glucagon  Injectable 1 milliGRAM(s) IntraMuscular once PRN  lidocaine 2% Viscous 2 milliLiter(s) Oral every 2 hours PRN  melatonin 3 milliGRAM(s) Oral once PRN  metoclopramide Injectable 10 milliGRAM(s) IV Push every 6 hours PRN  oxyCODONE    IR 5 milliGRAM(s) Oral every 4 hours PRN  polyethylene glycol 3350 17 Gram(s) Oral two times a day PRN        Vital Signs Last 24 Hrs  T(C): 36.8 (18 Aug 2019 05:45), Max: 37.9 (17 Aug 2019 17:06)  T(F): 98.2 (18 Aug 2019 05:45), Max: 100.3 (17 Aug 2019 21:34)  HR: 91 (18 Aug 2019 05:45) (89 - 101)  BP: 118/57 (18 Aug 2019 05:45) (118/57 - 141/69)  BP(mean): --  RR: 18 (18 Aug 2019 05:45) (18 - 20)  SpO2: 97% (18 Aug 2019 05:45) (95% - 100%)    PHYSICAL EXAM  General: NAD  HEENT: PERRLA, EOMOI, clear oropharynx, anicteric sclera, pink conjunctiva  Neck: supple  CV: (+) S1/S2 RRR  Lungs: clear to auscultation, no wheezes or rales  Abdomen: soft, non-tender, non-distended (+) BS  Ext: no clubbing, cyanosis or edema  Skin: no rashes and no petechiae  Neuro: alert and oriented X 3, no focal deficits  Central Line:     RECENT CULTURES:  08-13 @ 23:17  .Urine  --  --  --    No growth  --  08-13 @ 22:12  .Blood  --  --  --    No growth to date.  --  08-13 @ 22:07  .Blood  --  --  --    No growth to date.  --  08-12 @ 01:34  .Sputum  --  --  --    Normal Respiratory Maryana present  --  08-12 @ 01:19  .Blood  --  --  --    No growth at 5 days.  --  08-12 @ 01:10  .Urine  --  --  --    No growth  --        LABS:                        7.1    0.3   )-----------( 5        ( 17 Aug 2019 07:07 )             21.1         Mean Cell Volume : 86.4 fl  Mean Cell Hemoglobin : 29.3 pg  Mean Cell Hemoglobin Concentration : 33.9 gm/dL  Auto Neutrophil # : x  Auto Lymphocyte # : x  Auto Monocyte # : x  Auto Eosinophil # : x  Auto Basophil # : x  Auto Neutrophil % : x  Auto Lymphocyte % : x  Auto Monocyte % : x  Auto Eosinophil % : x  Auto Basophil % : x      08-18    137  |  106  |  8   ----------------------------<  239<H>  3.4<L>   |  22  |  0.61    Ca    8.1<L>      18 Aug 2019 06:55  Phos  3.0     08-18  Mg     1.5     08-18    TPro  6.2  /  Alb  2.8<L>  /  TBili  0.4  /  DBili  x   /  AST  27  /  ALT  45  /  AlkPhos  200<H>  08-18      Mg 1.5  Phos 3.0            Uric Acid 1.5        RADIOLOGY & ADDITIONAL STUDIES: Diagnosis: AML FLT 3 (-)    Protocol/Chemo Regimen: Enrolled on [employer] [facility] 1019-  Induction chemotherapy with Dauno/Cytarabine and daily Glasdegib vs Placebo    Day: 12    Pt endorsed:  diarrhea improving  Pruritis improving  +mouth discomfort    Review of Systems: Patient denies headache, dizziness, visual changes, chest pain, palpitations, abdominal pain, nausea, vomiting or dysuria.    Pain scale: denies    Diet: Consistent Carbohydrate    ANTIMICROBIALS  aztreonam  IVPB 2000 milliGRAM(s) IV Intermittent every 8 hours  aztreonam  IVPB      posaconazole DR Tablet 300 milliGRAM(s) Oral daily    STANDING MEDICATIONS  ALBUTerol    90 MICROgram(s) HFA Inhaler 1 Puff(s) Inhalation every 6 hours  ALBUTerol/ipratropium for Nebulization 3 milliLiter(s) Nebulizer every 6 hours  benzonatate 100 milliGRAM(s) Oral three times a day  Biotene Dry Mouth Oral Rinse 5 milliLiter(s) Swish and Spit three times a day  chlorhexidine 2% Cloths 1 Application(s) Topical <User Schedule>  dextrose 5%. 1000 milliLiter(s) IV Continuous <Continuous>  dextrose 50% Injectable 12.5 Gram(s) IV Push once  dextrose 50% Injectable 25 Gram(s) IV Push once  dextrose 50% Injectable 25 Gram(s) IV Push once  gabapentin 600 milliGRAM(s) Oral two times a day  hydrOXYzine hydrochloride 25 milliGRAM(s) Oral every 6 hours  insulin glargine Injectable (LANTUS) 16 Unit(s) SubCutaneous at bedtime  insulin lispro (HumaLOG) corrective regimen sliding scale   SubCutaneous three times a day before meals  insulin lispro (HumaLOG) corrective regimen sliding scale   SubCutaneous at bedtime  insulin lispro Injectable (HumaLOG) 10 Unit(s) SubCutaneous three times a day before meals  investigational chemo - general 1 Tablet(s) Oral every 24 hours  levothyroxine 75 MICROGram(s) Oral daily  losartan 100 milliGRAM(s) Oral daily  simethicone 80 milliGRAM(s) Chew four times a day  sodium chloride 0.9%. 1000 milliLiter(s) IV Continuous <Continuous>  sodium chloride 3%  Inhalation 4 milliLiter(s) Inhalation two times a day  tiotropium 18 MICROgram(s) Capsule 1 Capsule(s) Inhalation daily  triamcinolone 0.1% Ointment 1 Application(s) Topical every 12 hours    PRN MEDICATIONS  acetaminophen   Tablet .. 650 milliGRAM(s) Oral every 6 hours PRN  dextrose 40% Gel 15 Gram(s) Oral once PRN  diphenhydrAMINE 25 milliGRAM(s) Oral every 6 hours PRN  glucagon  Injectable 1 milliGRAM(s) IntraMuscular once PRN  lidocaine 2% Viscous 2 milliLiter(s) Oral every 2 hours PRN  melatonin 3 milliGRAM(s) Oral once PRN  metoclopramide Injectable 10 milliGRAM(s) IV Push every 6 hours PRN  oxyCODONE    IR 5 milliGRAM(s) Oral every 4 hours PRN  polyethylene glycol 3350 17 Gram(s) Oral two times a day PRN    Vital Signs Last 24 Hrs  T(C): 36.8 (18 Aug 2019 05:45), Max: 37.9 (17 Aug 2019 17:06)  T(F): 98.2 (18 Aug 2019 05:45), Max: 100.3 (17 Aug 2019 21:34)  HR: 91 (18 Aug 2019 05:45) (89 - 101)  BP: 118/57 (18 Aug 2019 05:45) (118/57 - 141/69)  BP(mean): --  RR: 18 (18 Aug 2019 05:45) (18 - 20)  SpO2: 97% (18 Aug 2019 05:45) (95% - 100%)    PHYSICAL EXAM  General: adult in NAD  HEENT: +ulcer left lower palate, posterior buccal mucosa  Neck: supple  CV: normal S1/S2 RRR  Lungs: positive air movement b/l ant lungs,clear to auscultation, no wheezes, no rales  Abdomen: soft non-tender non-distended,   Ext: no clubbing cyanosis or edema  Skin: erythematous rash b/l breasts improved  Neuro: alert and oriented X 4, no focal deficits  Central Line: Right IJ TLC c/d/i    RECENT CULTURES:  08-13 @ 23:17  .Urine  No growth    08-13 @ 22:12  .Blood  No growth to date.    08-13 @ 22:07  .Blood  No growth to date.    08-12 @ 01:34  .Sputum  Normal Respiratory Maryana present  --  08-12 @ 01:19  .Blood  --  --  --    No growth at 5 days.  --  08-12 @ 01:10  .Urine  --  --  --    No growth  --        LABS:                            6.1    0.4   )-----------( 47       ( 18 Aug 2019 06:55 )             18.0         Mean Cell Volume : 83.9 fl  Mean Cell Hemoglobin : 28.4 pg  Mean Cell Hemoglobin Concentration : 33.9 gm/dL  Auto Neutrophil # : x  Auto Lymphocyte # : x  Auto Monocyte # : x  Auto Eosinophil # : x  Auto Basophil # : x  Auto Neutrophil % : x  Auto Lymphocyte % : x  Auto Monocyte % : x  Auto Eosinophil % : x  Auto Basophil % : x      08-18    137  |  106  |  8   ----------------------------<  239<H>  3.4<L>   |  22  |  0.61    Ca    8.1<L>      18 Aug 2019 06:55  Phos  3.0     08-18  Mg     1.5     08-18    TPro  6.2  /  Alb  2.8<L>  /  TBili  0.4  /  DBili  x   /  AST  27  /  ALT  45  /  AlkPhos  200<H>  08-18  Mg 1.5  Phos 3.0    Uric Acid 1.5    RADIOLOGY & ADDITIONAL STUDIES:  Xray Chest 1 View- PORTABLE-Urgent (08.15.19 @ 01:26) >  Unchanged bilateral reticular opacities.  No pneumonia.

## 2019-08-18 NOTE — PROGRESS NOTE ADULT - PROBLEM SELECTOR PLAN 3
Grade 1 likely due to chemotherapy  Monitor daily CMP  Avoid further hepatotoxic medications Resolved  Monitor daily CMP  Avoid further hepatotoxic medications

## 2019-08-18 NOTE — PROGRESS NOTE ADULT - ASSESSMENT
This is a 65 yo F with PMHx of T2DM, RA, Depression and now newly diagnosed AML enrolled on [employer] [facility] trial admitted for Induction chemotherapy with Dauno/Cytarabine and daily Glasdegib vs Placebo. The patients hospital course has been complicated by transaminitis,  RENTERIA due to interstitial lung disease and volume overload, and rash. The patient has pancytopenia from chemotherapy and/or disease.

## 2019-08-18 NOTE — PROGRESS NOTE ADULT - PROBLEM SELECTOR PLAN 2
The patient is neutropenic, afebrile > 48 hrs  Cultures from 8/13 no growth  Continue Posaconazole, change Cefepime to Aztreonam as patient has persistent pruritis.  (Monitor QTc BIW M/Th with Posaconazole and Study drug)  8/9 CT chest: Pulmonary fibrosis and traction bronchiectasis without honeycombing.   Overall these findings appear progressed compared to CT abdomen 7/4/2017  Associated ground-glass opacity may be related to active interstitial lung disease, fluid overload, less likely infection

## 2019-08-19 LAB
ALBUMIN SERPL ELPH-MCNC: 3.2 G/DL — LOW (ref 3.3–5)
ALP SERPL-CCNC: 221 U/L — HIGH (ref 40–120)
ALT FLD-CCNC: 47 U/L — HIGH (ref 10–45)
ANION GAP SERPL CALC-SCNC: 9 MMOL/L — SIGNIFICANT CHANGE UP (ref 5–17)
APPEARANCE UR: CLEAR — SIGNIFICANT CHANGE UP
AST SERPL-CCNC: 28 U/L — SIGNIFICANT CHANGE UP (ref 10–40)
BACTERIA # UR AUTO: NEGATIVE — SIGNIFICANT CHANGE UP
BASOPHILS # BLD AUTO: 0 K/UL — SIGNIFICANT CHANGE UP (ref 0–0.2)
BASOPHILS NFR BLD AUTO: 0 % — SIGNIFICANT CHANGE UP (ref 0–2)
BILIRUB SERPL-MCNC: 0.6 MG/DL — SIGNIFICANT CHANGE UP (ref 0.2–1.2)
BILIRUB UR-MCNC: NEGATIVE — SIGNIFICANT CHANGE UP
BLD GP AB SCN SERPL QL: NEGATIVE — SIGNIFICANT CHANGE UP
BUN SERPL-MCNC: 8 MG/DL — SIGNIFICANT CHANGE UP (ref 7–23)
CALCIUM SERPL-MCNC: 8.3 MG/DL — LOW (ref 8.4–10.5)
CHLORIDE SERPL-SCNC: 106 MMOL/L — SIGNIFICANT CHANGE UP (ref 96–108)
CO2 SERPL-SCNC: 22 MMOL/L — SIGNIFICANT CHANGE UP (ref 22–31)
COLOR SPEC: SIGNIFICANT CHANGE UP
CREAT SERPL-MCNC: 0.58 MG/DL — SIGNIFICANT CHANGE UP (ref 0.5–1.3)
DIFF PNL FLD: NEGATIVE — SIGNIFICANT CHANGE UP
EOSINOPHIL # BLD AUTO: 0 K/UL — SIGNIFICANT CHANGE UP (ref 0–0.5)
EOSINOPHIL NFR BLD AUTO: 0.9 % — SIGNIFICANT CHANGE UP (ref 0–6)
EPI CELLS # UR: 1 /HPF — SIGNIFICANT CHANGE UP (ref 0–5)
GLUCOSE BLDC GLUCOMTR-MCNC: 119 MG/DL — HIGH (ref 70–99)
GLUCOSE BLDC GLUCOMTR-MCNC: 137 MG/DL — HIGH (ref 70–99)
GLUCOSE BLDC GLUCOMTR-MCNC: 171 MG/DL — HIGH (ref 70–99)
GLUCOSE BLDC GLUCOMTR-MCNC: 182 MG/DL — HIGH (ref 70–99)
GLUCOSE SERPL-MCNC: 136 MG/DL — HIGH (ref 70–99)
GLUCOSE UR QL: NEGATIVE — SIGNIFICANT CHANGE UP
HCT VFR BLD CALC: 22.6 % — LOW (ref 34.5–45)
HGB BLD-MCNC: 7.8 G/DL — LOW (ref 11.5–15.5)
HYALINE CASTS # UR AUTO: 0 /LPF — SIGNIFICANT CHANGE UP (ref 0–7)
KETONES UR-MCNC: NEGATIVE — SIGNIFICANT CHANGE UP
LDH SERPL L TO P-CCNC: 280 U/L — HIGH (ref 50–242)
LEUKOCYTE ESTERASE UR-ACNC: NEGATIVE — SIGNIFICANT CHANGE UP
LYMPHOCYTES # BLD AUTO: 0.6 K/UL — LOW (ref 1–3.3)
LYMPHOCYTES # BLD AUTO: 97.7 % — HIGH (ref 13–44)
MAGNESIUM SERPL-MCNC: 1.8 MG/DL — SIGNIFICANT CHANGE UP (ref 1.6–2.6)
MCHC RBC-ENTMCNC: 29.3 PG — SIGNIFICANT CHANGE UP (ref 27–34)
MCHC RBC-ENTMCNC: 34.3 GM/DL — SIGNIFICANT CHANGE UP (ref 32–36)
MCV RBC AUTO: 85.3 FL — SIGNIFICANT CHANGE UP (ref 80–100)
MONOCYTES # BLD AUTO: 0 K/UL — SIGNIFICANT CHANGE UP (ref 0–0.9)
MONOCYTES NFR BLD AUTO: 0.6 % — LOW (ref 2–14)
NEUTROPHILS # BLD AUTO: 0 K/UL — LOW (ref 1.8–7.4)
NEUTROPHILS NFR BLD AUTO: 0.9 % — LOW (ref 43–77)
NITRITE UR-MCNC: NEGATIVE — SIGNIFICANT CHANGE UP
PH UR: 6.5 — SIGNIFICANT CHANGE UP (ref 5–8)
PHOSPHATE SERPL-MCNC: 2.8 MG/DL — SIGNIFICANT CHANGE UP (ref 2.5–4.5)
PLAT MORPH BLD: NORMAL — SIGNIFICANT CHANGE UP
PLATELET # BLD AUTO: 37 K/UL — LOW (ref 150–400)
POTASSIUM SERPL-MCNC: 3.8 MMOL/L — SIGNIFICANT CHANGE UP (ref 3.5–5.3)
POTASSIUM SERPL-SCNC: 3.8 MMOL/L — SIGNIFICANT CHANGE UP (ref 3.5–5.3)
PROT SERPL-MCNC: 6.6 G/DL — SIGNIFICANT CHANGE UP (ref 6–8.3)
PROT UR-MCNC: ABNORMAL
RBC # BLD: 2.65 M/UL — LOW (ref 3.8–5.2)
RBC # FLD: 12.5 % — SIGNIFICANT CHANGE UP (ref 10.3–14.5)
RBC BLD AUTO: SIGNIFICANT CHANGE UP
RBC CASTS # UR COMP ASSIST: 1 /HPF — SIGNIFICANT CHANGE UP (ref 0–4)
RH IG SCN BLD-IMP: POSITIVE — SIGNIFICANT CHANGE UP
SODIUM SERPL-SCNC: 137 MMOL/L — SIGNIFICANT CHANGE UP (ref 135–145)
SP GR SPEC: 1.01 — SIGNIFICANT CHANGE UP (ref 1.01–1.02)
URATE SERPL-MCNC: 1.4 MG/DL — LOW (ref 2.5–7)
UROBILINOGEN FLD QL: SIGNIFICANT CHANGE UP
WBC # BLD: 0.6 K/UL — CRITICAL LOW (ref 3.8–10.5)
WBC # FLD AUTO: 0.6 K/UL — CRITICAL LOW (ref 3.8–10.5)
WBC UR QL: 1 /HPF — SIGNIFICANT CHANGE UP (ref 0–5)

## 2019-08-19 PROCEDURE — 93010 ELECTROCARDIOGRAM REPORT: CPT

## 2019-08-19 PROCEDURE — 99232 SBSQ HOSP IP/OBS MODERATE 35: CPT

## 2019-08-19 RX ORDER — VANCOMYCIN HCL 1 G
1000 VIAL (EA) INTRAVENOUS EVERY 12 HOURS
Refills: 0 | Status: DISCONTINUED | OUTPATIENT
Start: 2019-08-19 | End: 2019-08-27

## 2019-08-19 RX ORDER — VANCOMYCIN HCL 1 G
1000 VIAL (EA) INTRAVENOUS ONCE
Refills: 0 | Status: COMPLETED | OUTPATIENT
Start: 2019-08-19 | End: 2019-08-19

## 2019-08-19 RX ORDER — POTASSIUM CHLORIDE 20 MEQ
20 PACKET (EA) ORAL ONCE
Refills: 0 | Status: COMPLETED | OUTPATIENT
Start: 2019-08-19 | End: 2019-08-19

## 2019-08-19 RX ORDER — VANCOMYCIN HCL 1 G
VIAL (EA) INTRAVENOUS
Refills: 0 | Status: DISCONTINUED | OUTPATIENT
Start: 2019-08-19 | End: 2019-08-27

## 2019-08-19 RX ORDER — ACYCLOVIR SODIUM 500 MG
400 VIAL (EA) INTRAVENOUS
Refills: 0 | Status: DISCONTINUED | OUTPATIENT
Start: 2019-08-19 | End: 2019-08-28

## 2019-08-19 RX ORDER — FUROSEMIDE 40 MG
40 TABLET ORAL ONCE
Refills: 0 | Status: COMPLETED | OUTPATIENT
Start: 2019-08-19 | End: 2019-08-19

## 2019-08-19 RX ADMIN — Medication 40 MILLIGRAM(S): at 10:25

## 2019-08-19 RX ADMIN — Medication 25 MILLIGRAM(S): at 00:26

## 2019-08-19 RX ADMIN — SIMETHICONE 80 MILLIGRAM(S): 80 TABLET, CHEWABLE ORAL at 11:44

## 2019-08-19 RX ADMIN — Medication 25 MILLIGRAM(S): at 17:04

## 2019-08-19 RX ADMIN — Medication 100 MILLIGRAM(S): at 13:05

## 2019-08-19 RX ADMIN — SODIUM CHLORIDE 4 MILLILITER(S): 9 INJECTION INTRAMUSCULAR; INTRAVENOUS; SUBCUTANEOUS at 17:05

## 2019-08-19 RX ADMIN — Medication 100 MILLIGRAM(S): at 05:59

## 2019-08-19 RX ADMIN — Medication 25 MILLIGRAM(S): at 06:00

## 2019-08-19 RX ADMIN — Medication 50 MILLIEQUIVALENT(S): at 11:42

## 2019-08-19 RX ADMIN — SIMETHICONE 80 MILLIGRAM(S): 80 TABLET, CHEWABLE ORAL at 17:04

## 2019-08-19 RX ADMIN — Medication 25 MILLIGRAM(S): at 11:43

## 2019-08-19 RX ADMIN — Medication 250 MILLIGRAM(S): at 10:25

## 2019-08-19 RX ADMIN — SODIUM CHLORIDE 50 MILLILITER(S): 9 INJECTION INTRAMUSCULAR; INTRAVENOUS; SUBCUTANEOUS at 06:02

## 2019-08-19 RX ADMIN — Medication 400 MILLIGRAM(S): at 17:03

## 2019-08-19 RX ADMIN — INSULIN GLARGINE 16 UNIT(S): 100 INJECTION, SOLUTION SUBCUTANEOUS at 21:31

## 2019-08-19 RX ADMIN — Medication 1: at 09:53

## 2019-08-19 RX ADMIN — Medication 10 UNIT(S): at 18:50

## 2019-08-19 RX ADMIN — Medication 650 MILLIGRAM(S): at 18:19

## 2019-08-19 RX ADMIN — Medication 100 MILLIGRAM(S): at 06:02

## 2019-08-19 RX ADMIN — Medication 5 MILLILITER(S): at 06:01

## 2019-08-19 RX ADMIN — Medication 100 MILLIGRAM(S): at 15:12

## 2019-08-19 RX ADMIN — GABAPENTIN 600 MILLIGRAM(S): 400 CAPSULE ORAL at 06:00

## 2019-08-19 RX ADMIN — Medication 100 MILLIGRAM(S): at 21:33

## 2019-08-19 RX ADMIN — Medication 3 MILLILITER(S): at 17:04

## 2019-08-19 RX ADMIN — Medication 3 MILLILITER(S): at 23:04

## 2019-08-19 RX ADMIN — CHLORHEXIDINE GLUCONATE 1 APPLICATION(S): 213 SOLUTION TOPICAL at 07:48

## 2019-08-19 RX ADMIN — Medication 10 UNIT(S): at 13:04

## 2019-08-19 RX ADMIN — Medication 5 MILLILITER(S): at 13:04

## 2019-08-19 RX ADMIN — Medication 10 UNIT(S): at 09:53

## 2019-08-19 RX ADMIN — GABAPENTIN 600 MILLIGRAM(S): 400 CAPSULE ORAL at 17:04

## 2019-08-19 RX ADMIN — SIMETHICONE 80 MILLIGRAM(S): 80 TABLET, CHEWABLE ORAL at 23:22

## 2019-08-19 RX ADMIN — SODIUM CHLORIDE 50 MILLILITER(S): 9 INJECTION INTRAMUSCULAR; INTRAVENOUS; SUBCUTANEOUS at 23:22

## 2019-08-19 RX ADMIN — LOSARTAN POTASSIUM 100 MILLIGRAM(S): 100 TABLET, FILM COATED ORAL at 06:00

## 2019-08-19 RX ADMIN — Medication 75 MICROGRAM(S): at 06:00

## 2019-08-19 RX ADMIN — Medication 250 MILLIGRAM(S): at 23:03

## 2019-08-19 RX ADMIN — Medication 25 MILLIGRAM(S): at 23:22

## 2019-08-19 RX ADMIN — Medication 1: at 13:04

## 2019-08-19 RX ADMIN — Medication 3 MILLILITER(S): at 11:43

## 2019-08-19 RX ADMIN — Medication 100 MILLIGRAM(S): at 21:30

## 2019-08-19 RX ADMIN — POSACONAZOLE 300 MILLIGRAM(S): 100 TABLET, DELAYED RELEASE ORAL at 11:44

## 2019-08-19 RX ADMIN — Medication 400 MILLIGRAM(S): at 10:37

## 2019-08-19 RX ADMIN — Medication 5 MILLILITER(S): at 21:33

## 2019-08-19 RX ADMIN — Medication 1 APPLICATION(S): at 17:05

## 2019-08-19 RX ADMIN — Medication 3 MILLILITER(S): at 05:59

## 2019-08-19 RX ADMIN — Medication 1 APPLICATION(S): at 06:01

## 2019-08-19 RX ADMIN — SIMETHICONE 80 MILLIGRAM(S): 80 TABLET, CHEWABLE ORAL at 06:01

## 2019-08-19 RX ADMIN — SODIUM CHLORIDE 4 MILLILITER(S): 9 INJECTION INTRAMUSCULAR; INTRAVENOUS; SUBCUTANEOUS at 06:00

## 2019-08-19 RX ADMIN — Medication 650 MILLIGRAM(S): at 17:02

## 2019-08-19 NOTE — PHARMACOTHERAPY INTERVENTION NOTE - COMMENTS
Recommend starting vancomycin to add gram positive coverage since patient is currently on aztreonam and neutropenic.

## 2019-08-19 NOTE — PROGRESS NOTE ADULT - ASSESSMENT
This is a 63 yo F with PMHx of T2DM, RA, Depression and now newly diagnosed AML enrolled on [employer] [facility] trial admitted for Induction chemotherapy with Dauno/Cytarabine and daily Glasdegib vs Placebo. The patients hospital course has been complicated by transaminitis,  RENTERIA due to interstitial lung disease and volume overload, and rash. The patient has pancytopenia from chemotherapy and/or disease.

## 2019-08-19 NOTE — PROGRESS NOTE ADULT - SUBJECTIVE AND OBJECTIVE BOX
Diabetes Follow up note:  Interval Hx:  64 year old female w/uncontrolled T2DM w/neuropathy, retinopathy w/AML here for induction chemotherapy. BG values mostly at goal this weekend on present insulin regimen. No further hypoglycemia. Pt seen at bedside. Reports feeling cold. Able to eat about half of her meals. Reports some mouth pain.       Review of Systems:  General: + fatigue   GI:  + occasional nausea. + mouth pain  CV: No CP/SOB  ENDO: No S&Sx of hypoglycemia  MEDS:    insulin glargine Injectable (LANTUS) 16 Unit(s) SubCutaneous at bedtime  insulin lispro (HumaLOG) corrective regimen sliding scale   SubCutaneous three times a day before meals  insulin lispro (HumaLOG) corrective regimen sliding scale   SubCutaneous at bedtime  insulin lispro Injectable (HumaLOG) 10 Unit(s) SubCutaneous three times a day before meals  levothyroxine 75 MICROGram(s) Oral daily    acyclovir   Oral Tab/Cap 400 milliGRAM(s) Oral two times a day  aztreonam  IVPB 2000 milliGRAM(s) IV Intermittent every 8 hours  aztreonam  IVPB      posaconazole DR Tablet 300 milliGRAM(s) Oral daily  vancomycin  IVPB 1000 milliGRAM(s) IV Intermittent every 12 hours  vancomycin  IVPB        Allergies    cefepime (Rash)        PE:  General: Female lying in bed. NAD.   Vital Signs Last 24 Hrs  T(C): 37.1 (19 Aug 2019 13:40), Max: 37.8 (18 Aug 2019 21:11)  T(F): 98.8 (19 Aug 2019 13:40), Max: 100 (18 Aug 2019 21:11)  HR: 89 (19 Aug 2019 13:40) (87 - 96)  BP: 132/72 (19 Aug 2019 13:40) (132/72 - 149/78)  BP(mean): --  RR: 18 (19 Aug 2019 13:40) (18 - 18)  SpO2: 97% (19 Aug 2019 13:40) (95% - 98%)  Abd: Soft, NT,ND,   Extremities: Warm. Trace LE edema b/l  Neuro: A&O X3    LABS:    POCT Blood Glucose.: 182 mg/dL (08-19-19 @ 13:01)  POCT Blood Glucose.: 171 mg/dL (08-19-19 @ 09:49)  POCT Blood Glucose.: 184 mg/dL (08-18-19 @ 21:02)  POCT Blood Glucose.: 118 mg/dL (08-18-19 @ 18:27)  POCT Blood Glucose.: 164 mg/dL (08-18-19 @ 12:37)  POCT Blood Glucose.: 242 mg/dL (08-18-19 @ 09:05)  POCT Blood Glucose.: 165 mg/dL (08-17-19 @ 21:19)  POCT Blood Glucose.: 297 mg/dL (08-17-19 @ 17:42)  POCT Blood Glucose.: 189 mg/dL (08-17-19 @ 12:03)  POCT Blood Glucose.: 161 mg/dL (08-17-19 @ 08:51)  POCT Blood Glucose.: 200 mg/dL (08-16-19 @ 21:42)  POCT Blood Glucose.: 165 mg/dL (08-16-19 @ 17:29)                            7.8    0.6   )-----------( 37       ( 19 Aug 2019 06:39 )             22.6       08-19    137  |  106  |  8   ----------------------------<  136<H>  3.8   |  22  |  0.58    Ca    8.3<L>      19 Aug 2019 06:39  Phos  2.8     08-19  Mg     1.8     08-19    TPro  6.6  /  Alb  3.2<L>  /  TBili  0.6  /  DBili  x   /  AST  28  /  ALT  47<H>  /  AlkPhos  221<H>  08-19        Hemoglobin A1C, Whole Blood: 10.6 % <H> [4.0 - 5.6] (08-07-19 @ 08:36)            Contact number: rosette [phone] or [phone]

## 2019-08-19 NOTE — PROGRESS NOTE ADULT - PROBLEM SELECTOR PLAN 1
Enrolled on [employer] [facility] trial receiving Induction chemotherapy with Dauno/Cytarabine and daily Glasdegib vs Placebo  Molecular studies pending  8/20 Day 14 BM bx due  Monitor CBC/Lytes and transfuse/replete PRN  Strict Is and Os/ Daily weights/ Mouth Care  Allopurinol 300mg PO daily-stopped 2/2 rash  IVF hydration  Antiemetics  ECOG performance status 2  Atarax ATC for itching and oral benadryl prn. added triamcinolone ointment - rash improving Enrolled on [employer] [facility] trial receiving Induction chemotherapy with Dauno/Cytarabine and daily Glasdegib vs Placebo  Molecular studies pending  8/20 Day 14 BM bx due  Monitor CBC/Lytes and transfuse/replete PRN  Strict Is and Os/ Daily weights/ Mouth Care  8/19 Lasix 40 mg iv x1 with KCL20 meq IVPB x1  Allopurinol 300mg PO daily-stopped 2/2 rash  IVF hydration  Antiemetics  ECOG performance status 2  Atarax ATC for itching and oral benadryl prn. added triamcinolone ointment - rash improving

## 2019-08-19 NOTE — PROGRESS NOTE ADULT - SUBJECTIVE AND OBJECTIVE BOX
Diagnosis: AML FLT 3 (-)    Protocol/Chemo Regimen: Enrolled on Pfizer [facility] 1019-  Induction chemotherapy with Dauno/Cytarabine and daily Glasdegib vs Placebo    Day: 13    Pt endorsed:  diarrhea improving  Pruritis improving  +mouth discomfort    Review of Systems: Patient denies headache, dizziness, visual changes, chest pain, palpitations, abdominal pain, nausea, vomiting or dysuria.    Pain scale: denies    Diet: Consistent Carbohydrate    All:       ANTIMICROBIALS  aztreonam  IVPB 2000 milliGRAM(s) IV Intermittent every 8 hours  aztreonam  IVPB      posaconazole DR Tablet 300 milliGRAM(s) Oral daily      HEME/ONC MEDICATIONS      STANDING MEDICATIONS  ALBUTerol    90 MICROgram(s) HFA Inhaler 1 Puff(s) Inhalation every 6 hours  ALBUTerol/ipratropium for Nebulization 3 milliLiter(s) Nebulizer every 6 hours  benzonatate 100 milliGRAM(s) Oral three times a day  Biotene Dry Mouth Oral Rinse 5 milliLiter(s) Swish and Spit three times a day  chlorhexidine 2% Cloths 1 Application(s) Topical <User Schedule>  dextrose 5%. 1000 milliLiter(s) IV Continuous <Continuous>  dextrose 50% Injectable 12.5 Gram(s) IV Push once  dextrose 50% Injectable 25 Gram(s) IV Push once  dextrose 50% Injectable 25 Gram(s) IV Push once  gabapentin 600 milliGRAM(s) Oral two times a day  hydrOXYzine hydrochloride 25 milliGRAM(s) Oral every 6 hours  insulin glargine Injectable (LANTUS) 16 Unit(s) SubCutaneous at bedtime  insulin lispro (HumaLOG) corrective regimen sliding scale   SubCutaneous three times a day before meals  insulin lispro (HumaLOG) corrective regimen sliding scale   SubCutaneous at bedtime  insulin lispro Injectable (HumaLOG) 10 Unit(s) SubCutaneous three times a day before meals  investigational chemo - general 1 Tablet(s) Oral every 24 hours  levothyroxine 75 MICROGram(s) Oral daily  losartan 100 milliGRAM(s) Oral daily  simethicone 80 milliGRAM(s) Chew four times a day  sodium chloride 0.9%. 1000 milliLiter(s) IV Continuous <Continuous>  sodium chloride 3%  Inhalation 4 milliLiter(s) Inhalation two times a day  tiotropium 18 MICROgram(s) Capsule 1 Capsule(s) Inhalation daily  triamcinolone 0.1% Ointment 1 Application(s) Topical every 12 hours      PRN MEDICATIONS  acetaminophen   Tablet .. 650 milliGRAM(s) Oral every 6 hours PRN  dextrose 40% Gel 15 Gram(s) Oral once PRN  diphenhydrAMINE 25 milliGRAM(s) Oral every 6 hours PRN  glucagon  Injectable 1 milliGRAM(s) IntraMuscular once PRN  lidocaine 2% Viscous 2 milliLiter(s) Oral every 2 hours PRN  melatonin 3 milliGRAM(s) Oral once PRN  metoclopramide Injectable 10 milliGRAM(s) IV Push every 6 hours PRN  oxyCODONE    IR 5 milliGRAM(s) Oral every 4 hours PRN  polyethylene glycol 3350 17 Gram(s) Oral two times a day PRN        Vital Signs Last 24 Hrs  T(C): 37.7 (19 Aug 2019 05:00), Max: 37.8 (18 Aug 2019 21:11)  T(F): 99.9 (19 Aug 2019 05:00), Max: 100 (18 Aug 2019 21:11)  HR: 93 (19 Aug 2019 05:00) (83 - 96)  BP: 149/78 (19 Aug 2019 05:00) (133/62 - 149/78)  BP(mean): --  RR: 18 (19 Aug 2019 05:00) (18 - 18)  SpO2: 96% (19 Aug 2019 05:00) (95% - 100%)        PHYSICAL EXAM  General: adult in NAD  HEENT: +ulcer left lower palate, posterior buccal mucosa  Neck: supple  CV: normal S1/S2 RRR  Lungs: positive air movement b/l ant lungs,clear to auscultation, no wheezes, no rales  Abdomen: soft non-tender non-distended,   Ext: no clubbing cyanosis or edema  Skin: erythematous rash b/l breasts improved  Neuro: alert and oriented X 4, no focal deficits  Central Line: Right IJ TLC c/d/i        LABS:                        7.8    0.6   )-----------( 37       ( 19 Aug 2019 06:39 )             22.6         Mean Cell Volume : 85.3 fl  Mean Cell Hemoglobin : 29.3 pg  Mean Cell Hemoglobin Concentration : 34.3 gm/dL  Auto Neutrophil # : x  Auto Lymphocyte # : x  Auto Monocyte # : x  Auto Eosinophil # : x  Auto Basophil # : x  Auto Neutrophil % : x  Auto Lymphocyte % : x  Auto Monocyte % : x  Auto Eosinophil % : x  Auto Basophil % : x      08-19    137  |  106  |  8   ----------------------------<  136<H>  3.8   |  22  |  0.58    Ca    8.3<L>      19 Aug 2019 06:39  Phos  2.8     08-19  Mg     1.8     08-19    TPro  6.6  /  Alb  3.2<L>  /  TBili  0.6  /  DBili  x   /  AST  28  /  ALT  47<H>  /  AlkPhos  221<H>  08-19      Mg 1.8  Phos 2.8            Uric Acid 1.4        RECENT CULTURES:  08-13 @ 23:17  .Urine  --  --  --    No growth  --  08-13 @ 22:12  .Blood  --  --  --    No growth at 5 days.  --  08-13 @ 22:07  .Blood  --  --  --    No growth at 5 days.  --      RADIOLOGY & ADDITIONAL STUDIES:    Xray Chest 1 View- PORTABLE-Urgent (08.15.19 @ 01:26) >  Unchanged bilateral reticular opacities.  No pneumonia. Diagnosis: AML FLT 3 (-)    Protocol/Chemo Regimen: Enrolled on Pfizer [facility] 1019-  Induction chemotherapy with Dauno/Cytarabine and daily Glasdegib vs Placebo    Day: 13    Pt endorsed: Pruritis improving; +mouth blister and discomfort; intermittent left HA(above ear)x 1 day    Review of Systems: Patient denies dizziness, visual changes, chest pain, palpitations, abdominal pain, nausea, vomiting or dysuria.    Pain scale: 6/10 mouth; HA 8/10 ealier, 0/10 now     Diet: Consistent Carbohydrate    Allergies:  Cefepime-rash      ANTIMICROBIALS  aztreonam  IVPB 2000 milliGRAM(s) IV Intermittent every 8 hours  aztreonam  IVPB      posaconazole DR Tablet 300 milliGRAM(s) Oral daily      STANDING MEDICATIONS  ALBUTerol    90 MICROgram(s) HFA Inhaler 1 Puff(s) Inhalation every 6 hours  ALBUTerol/ipratropium for Nebulization 3 milliLiter(s) Nebulizer every 6 hours  benzonatate 100 milliGRAM(s) Oral three times a day  Biotene Dry Mouth Oral Rinse 5 milliLiter(s) Swish and Spit three times a day  chlorhexidine 2% Cloths 1 Application(s) Topical <User Schedule>  dextrose 5%. 1000 milliLiter(s) IV Continuous <Continuous>  dextrose 50% Injectable 12.5 Gram(s) IV Push once  dextrose 50% Injectable 25 Gram(s) IV Push once  dextrose 50% Injectable 25 Gram(s) IV Push once  gabapentin 600 milliGRAM(s) Oral two times a day  hydrOXYzine hydrochloride 25 milliGRAM(s) Oral every 6 hours  insulin glargine Injectable (LANTUS) 16 Unit(s) SubCutaneous at bedtime  insulin lispro (HumaLOG) corrective regimen sliding scale   SubCutaneous three times a day before meals  insulin lispro (HumaLOG) corrective regimen sliding scale   SubCutaneous at bedtime  insulin lispro Injectable (HumaLOG) 10 Unit(s) SubCutaneous three times a day before meals  investigational chemo - general 1 Tablet(s) Oral every 24 hours  levothyroxine 75 MICROGram(s) Oral daily  losartan 100 milliGRAM(s) Oral daily  simethicone 80 milliGRAM(s) Chew four times a day  sodium chloride 0.9%. 1000 milliLiter(s) IV Continuous <Continuous>  sodium chloride 3%  Inhalation 4 milliLiter(s) Inhalation two times a day  tiotropium 18 MICROgram(s) Capsule 1 Capsule(s) Inhalation daily  triamcinolone 0.1% Ointment 1 Application(s) Topical every 12 hours      PRN MEDICATIONS  acetaminophen   Tablet .. 650 milliGRAM(s) Oral every 6 hours PRN  dextrose 40% Gel 15 Gram(s) Oral once PRN  diphenhydrAMINE 25 milliGRAM(s) Oral every 6 hours PRN  glucagon  Injectable 1 milliGRAM(s) IntraMuscular once PRN  lidocaine 2% Viscous 2 milliLiter(s) Oral every 2 hours PRN  melatonin 3 milliGRAM(s) Oral once PRN  metoclopramide Injectable 10 milliGRAM(s) IV Push every 6 hours PRN  oxyCODONE    IR 5 milliGRAM(s) Oral every 4 hours PRN  polyethylene glycol 3350 17 Gram(s) Oral two times a day PRN      Vital Signs Last 24 Hrs  T(C): 37.7 (19 Aug 2019 05:00), Max: 37.8 (18 Aug 2019 21:11)  T(F): 99.9 (19 Aug 2019 05:00), Max: 100 (18 Aug 2019 21:11)  HR: 93 (19 Aug 2019 05:00) (83 - 96)  BP: 149/78 (19 Aug 2019 05:00) (133/62 - 149/78)  BP(mean): --  RR: 18 (19 Aug 2019 05:00) (18 - 18)  SpO2: 96% (19 Aug 2019 05:00) (95% - 100%)      PHYSICAL EXAM  General: adult in NAD  HEENT: +ulcer left lower palate, posterior buccal mucosa  Neck: supple  CV: normal S1/S2 RRR  Lungs: bibasilar rales   Abdomen: soft non-tender non-distended,   Ext:  edema on feet  Skin: erythematous rash b/l breasts improved  Neuro: alert and oriented X 4, no focal deficits  Central Line: Right IJ TLC c/d/i      LABS:                        7.8    0.6   )-----------( 37       ( 19 Aug 2019 06:39 )             22.6         Mean Cell Volume : 85.3 fl  Mean Cell Hemoglobin : 29.3 pg  Mean Cell Hemoglobin Concentration : 34.3 gm/dL  Auto Neutrophil # : 0.0 K/uL  Auto Lymphocyte # : 0.6 K/uL  Auto Monocyte # : 0.0 K/uL  Auto Eosinophil # : 0.0 K/uL  Auto Basophil # : 0.0 K/uL  Auto Neutrophil % : 0.9 %  Auto Lymphocyte % : 97.7 %  Auto Monocyte % : 0.6 %  Auto Eosinophil % : 0.9 %  Auto Basophil % : 0.0 %      08-19    137  |  106  |  8   ----------------------------<  136<H>  3.8   |  22  |  0.58    Ca    8.3<L>      19 Aug 2019 06:39  Phos  2.8     08-19  Mg     1.8     08-19    TPro  6.6  /  Alb  3.2<L>  /  TBili  0.6  /  DBili  x   /  AST  28  /  ALT  47<H>  /  AlkPhos  221<H>  08-19        Uric Acid 1.4        RECENT CULTURES:    08-13 @ 23:17  .Urine  --  --  --    No growth  --  08-13 @ 22:12  .Blood  --  --  --    No growth at 5 days.  --  08-13 @ 22:07  .Blood  --  --  --    No growth at 5 days.  --      RADIOLOGY & ADDITIONAL STUDIES:    Xray Chest 1 View- PORTABLE-Urgent (08.15.19 @ 01:26) >  Unchanged bilateral reticular opacities.  No pneumonia.

## 2019-08-19 NOTE — PROGRESS NOTE ADULT - ASSESSMENT
63 y/o F w/ uncontrolled Type 2 DM w/ hyper and hypoglycemia A1c 10.6%, HTN, HLD, Hypothyroidism admitted for chemo for newly diagnosed AML. BG values mostly at goal on present insulin regimen w/out pattern to make further adjustments at this time. BG goal (100-180mg/dl).

## 2019-08-19 NOTE — PROGRESS NOTE ADULT - PROBLEM SELECTOR PLAN 2
The patient is neutropenic, afebrile > 48 hrs  Cultures from 8/13 no growth  Continue Posaconazole, change Cefepime to Aztreonam as patient has persistent pruritis.  (Monitor QTc BIW M/Th with Posaconazole and Study drug)  8/9 CT chest: Pulmonary fibrosis and traction bronchiectasis without honeycombing.   Overall these findings appear progressed compared to CT abdomen 7/4/2017  Associated ground-glass opacity may be related to active interstitial lung disease, fluid overload, less likely infection The patient is neutropenic, afebrile   Cultures from 8/13 no growth to date   8/19 added vanco 1 g q12h empirically with Vanco trough with 4th dose   Continue Posaconazole, change Cefepime to Aztreonam as patient has persistent pruritis.  (Monitor QTc BIW mon/Thurs  with Posaconazole and Study drug)  8/9 CT chest: Pulmonary fibrosis and traction bronchiectasis without honeycombing.   Overall these findings appear progressed compared to CT abdomen 7/4/2017  Associated ground-glass opacity may be related to active interstitial lung disease, fluid overload, less likely infection

## 2019-08-19 NOTE — ADVANCED PRACTICE NURSE CONSULT - ASSESSMENT
Induction Cycle 1   Day 13    Patient states that she is feeling well today did have complaint of fatigue- patient able to ambulate around the unit without assistance. Patient reminded of the importance of remaining active as possible as well as using Universal precautions while ambulating- to wear a mask and wash hand. Patient states that the itching of her lower extremities is getting better since the antibiotic has been changed.    Patients denies N/V/D or abdominal pain- patient states appetite is good and she has been able to eat without issue. Patient also denies any abnormal bruising or bleeding.  Patient states that she has no chest pains. Patient has been able to swallow the Glasdegib/Placebo without issue or incident - no adverse reaction reported.

## 2019-08-19 NOTE — PROGRESS NOTE ADULT - ATTENDING COMMENTS
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 Pt on Pfizer trial 7+3+ Glasdegib/placebo.  Patient here for her induction chemotherapy on trial.  Day 12.  Completing thong-c infusion.  Afeb xc 48 hrs, no chills, No N/V/D.ruritus.  All cults (-)  Pruritus better, on atc atarax, topical triamcinolone.  Rash all over, possibly 2/2 cefepime?  Switched to aztreonam 8/17.   On 8/18 slightly improved. Also possibility due to study drug.   Rash CTCAE allergic reaction grade 2    Mild AST/ALT elevation  d/c allopurinol  Pain on chewing on left side, mild mucositis, able to eat - stable    Mild diarrhea, had sent for C. diff but not watery enough to test and now improved.   Unlikely C. Diff colitis.   Low grade temperature elevation morning of 8/18/19. Continue to monitor, culture if spikes.     Supportive care, anti-emetics, IVFs,  I > O, to cont to diurese as needed.  No edema.  Cont. Cefepime (d/c Levaquin); and Posaconazole  Monitor CBC, receives transfusions PRN.   Lantus QHS, following fingersticks closely, endocrine consult appreciated. h/o of linnea poorly controlled diabetes.   ECOG performance status 2. 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 Pt on Pfizer trial 7+3+ Glasdegib/placebo.  Patient here for her induction chemotherapy on trial.  Day 12.  Completing thong-c infusion.  Afeb xc 48 hrs, no chills, No N/V/D.has had pruritus.  All cults (-)  Pruritus better, on ATC atarax, topical triamcinolone.  Rash all over, possibly 2/2 cefepime?  Switched to aztreonam 8/17.   On 8/18 slightly improved. Also possibly due to study drug.  Then incr rash over w/e.    Rash CTCAE allergic reaction grade 2.  Cefepime d/c'd now on aztreonam, posa.  Mild-mpd  mucositis.   Mild AST/ALT elevation, alk phos sl incr; stable  d/c allopurinol       Mild diarrhea, had sent for C. diff but not watery enough to test and now improved.   Unlikely C. Diff colitis.   Low grade temperature elevation morning of 8/18/19. Continue to monitor, culture if spikes.     Supportive care, anti-emetics, IVFs,  I > O, to cont to diurese as needed.  No edema.  Cont. Cefepime (d/c Levaquin); and Posaconazole.  Afebrile.  Will add vanco for gm+ coverage.  Monitor CBC, receives transfusions PRN.   Lantus QHS, following fingersticks closely, endocrine consult appreciated. h/o of linnea poorly controlled diabetes.   ECOG performance status 2.  BMA/Bx 8/20/19

## 2019-08-20 ENCOUNTER — RESULT REVIEW (OUTPATIENT)
Age: 64
End: 2019-08-20

## 2019-08-20 LAB
ALBUMIN SERPL ELPH-MCNC: 3.1 G/DL — LOW (ref 3.3–5)
ALP SERPL-CCNC: 246 U/L — HIGH (ref 40–120)
ALT FLD-CCNC: 54 U/L — HIGH (ref 10–45)
ANION GAP SERPL CALC-SCNC: 11 MMOL/L — SIGNIFICANT CHANGE UP (ref 5–17)
AST SERPL-CCNC: 30 U/L — SIGNIFICANT CHANGE UP (ref 10–40)
BASOPHILS # BLD AUTO: 0 K/UL — SIGNIFICANT CHANGE UP (ref 0–0.2)
BASOPHILS NFR BLD AUTO: 0 % — SIGNIFICANT CHANGE UP (ref 0–2)
BILIRUB SERPL-MCNC: 0.5 MG/DL — SIGNIFICANT CHANGE UP (ref 0.2–1.2)
BUN SERPL-MCNC: 10 MG/DL — SIGNIFICANT CHANGE UP (ref 7–23)
C DIFF GDH STL QL: NEGATIVE — SIGNIFICANT CHANGE UP
C DIFF GDH STL QL: SIGNIFICANT CHANGE UP
CALCIUM SERPL-MCNC: 8.2 MG/DL — LOW (ref 8.4–10.5)
CHLORIDE SERPL-SCNC: 105 MMOL/L — SIGNIFICANT CHANGE UP (ref 96–108)
CK SERPL-CCNC: 39 U/L — SIGNIFICANT CHANGE UP (ref 25–170)
CO2 SERPL-SCNC: 23 MMOL/L — SIGNIFICANT CHANGE UP (ref 22–31)
CREAT SERPL-MCNC: 0.61 MG/DL — SIGNIFICANT CHANGE UP (ref 0.5–1.3)
CULTURE RESULTS: SIGNIFICANT CHANGE UP
EOSINOPHIL # BLD AUTO: 0 K/UL — SIGNIFICANT CHANGE UP (ref 0–0.5)
EOSINOPHIL NFR BLD AUTO: 1.5 % — SIGNIFICANT CHANGE UP (ref 0–6)
GLUCOSE BLDC GLUCOMTR-MCNC: 142 MG/DL — HIGH (ref 70–99)
GLUCOSE BLDC GLUCOMTR-MCNC: 150 MG/DL — HIGH (ref 70–99)
GLUCOSE BLDC GLUCOMTR-MCNC: 157 MG/DL — HIGH (ref 70–99)
GLUCOSE BLDC GLUCOMTR-MCNC: 159 MG/DL — HIGH (ref 70–99)
GLUCOSE SERPL-MCNC: 69 MG/DL — LOW (ref 70–99)
HCT VFR BLD CALC: 22.7 % — LOW (ref 34.5–45)
HGB BLD-MCNC: 8 G/DL — LOW (ref 11.5–15.5)
HYPOCHROMIA BLD QL: SLIGHT — SIGNIFICANT CHANGE UP
LDH SERPL L TO P-CCNC: 279 U/L — HIGH (ref 50–242)
LYMPHOCYTES # BLD AUTO: 0.8 K/UL — LOW (ref 1–3.3)
LYMPHOCYTES # BLD AUTO: 95.6 % — HIGH (ref 13–44)
MAGNESIUM SERPL-MCNC: 1.7 MG/DL — SIGNIFICANT CHANGE UP (ref 1.6–2.6)
MCHC RBC-ENTMCNC: 30.2 PG — SIGNIFICANT CHANGE UP (ref 27–34)
MCHC RBC-ENTMCNC: 35 GM/DL — SIGNIFICANT CHANGE UP (ref 32–36)
MCV RBC AUTO: 86.3 FL — SIGNIFICANT CHANGE UP (ref 80–100)
MONOCYTES # BLD AUTO: 0 K/UL — SIGNIFICANT CHANGE UP (ref 0–0.9)
MONOCYTES NFR BLD AUTO: 2.1 % — SIGNIFICANT CHANGE UP (ref 2–14)
NEUTROPHILS # BLD AUTO: 0 K/UL — LOW (ref 1.8–7.4)
NEUTROPHILS NFR BLD AUTO: 0.8 % — LOW (ref 43–77)
PHOSPHATE SERPL-MCNC: 3.4 MG/DL — SIGNIFICANT CHANGE UP (ref 2.5–4.5)
PLAT MORPH BLD: NORMAL — SIGNIFICANT CHANGE UP
PLATELET # BLD AUTO: 26 K/UL — LOW (ref 150–400)
POTASSIUM SERPL-MCNC: 3.4 MMOL/L — LOW (ref 3.5–5.3)
POTASSIUM SERPL-SCNC: 3.4 MMOL/L — LOW (ref 3.5–5.3)
PROT SERPL-MCNC: 6.8 G/DL — SIGNIFICANT CHANGE UP (ref 6–8.3)
RBC # BLD: 2.64 M/UL — LOW (ref 3.8–5.2)
RBC # FLD: 12.4 % — SIGNIFICANT CHANGE UP (ref 10.3–14.5)
RBC BLD AUTO: SIGNIFICANT CHANGE UP
SODIUM SERPL-SCNC: 139 MMOL/L — SIGNIFICANT CHANGE UP (ref 135–145)
SPECIMEN SOURCE: SIGNIFICANT CHANGE UP
URATE SERPL-MCNC: 1.7 MG/DL — LOW (ref 2.5–7)
VANCOMYCIN TROUGH SERPL-MCNC: 12.2 UG/ML — SIGNIFICANT CHANGE UP (ref 10–20)
WBC # BLD: 0.8 K/UL — CRITICAL LOW (ref 3.8–10.5)
WBC # FLD AUTO: 0.8 K/UL — CRITICAL LOW (ref 3.8–10.5)

## 2019-08-20 PROCEDURE — 88305 TISSUE EXAM BY PATHOLOGIST: CPT | Mod: 26

## 2019-08-20 PROCEDURE — 99232 SBSQ HOSP IP/OBS MODERATE 35: CPT

## 2019-08-20 PROCEDURE — 88189 FLOWCYTOMETRY/READ 16 & >: CPT

## 2019-08-20 PROCEDURE — 88313 SPECIAL STAINS GROUP 2: CPT | Mod: 26

## 2019-08-20 PROCEDURE — 85097 BONE MARROW INTERPRETATION: CPT

## 2019-08-20 RX ORDER — FUROSEMIDE 40 MG
20 TABLET ORAL ONCE
Refills: 0 | Status: COMPLETED | OUTPATIENT
Start: 2019-08-20 | End: 2019-08-20

## 2019-08-20 RX ORDER — PANTOPRAZOLE SODIUM 20 MG/1
40 TABLET, DELAYED RELEASE ORAL ONCE
Refills: 0 | Status: COMPLETED | OUTPATIENT
Start: 2019-08-20 | End: 2019-08-20

## 2019-08-20 RX ORDER — OXYCODONE HYDROCHLORIDE 5 MG/1
5 TABLET ORAL EVERY 4 HOURS
Refills: 0 | Status: DISCONTINUED | OUTPATIENT
Start: 2019-08-20 | End: 2019-08-26

## 2019-08-20 RX ORDER — MAGNESIUM SULFATE 500 MG/ML
1 VIAL (ML) INJECTION ONCE
Refills: 0 | Status: COMPLETED | OUTPATIENT
Start: 2019-08-20 | End: 2019-08-20

## 2019-08-20 RX ORDER — DIPHENHYDRAMINE HYDROCHLORIDE AND LIDOCAINE HYDROCHLORIDE AND ALUMINUM HYDROXIDE AND MAGNESIUM HYDRO
10 KIT
Refills: 0 | Status: DISCONTINUED | OUTPATIENT
Start: 2019-08-20 | End: 2019-08-27

## 2019-08-20 RX ORDER — PANTOPRAZOLE SODIUM 20 MG/1
40 TABLET, DELAYED RELEASE ORAL
Refills: 0 | Status: DISCONTINUED | OUTPATIENT
Start: 2019-08-21 | End: 2019-08-27

## 2019-08-20 RX ORDER — POTASSIUM CHLORIDE 20 MEQ
20 PACKET (EA) ORAL
Refills: 0 | Status: COMPLETED | OUTPATIENT
Start: 2019-08-20 | End: 2019-08-20

## 2019-08-20 RX ORDER — LIDOCAINE HCL 20 MG/ML
20 VIAL (ML) INJECTION ONCE
Refills: 0 | Status: COMPLETED | OUTPATIENT
Start: 2019-08-20 | End: 2019-08-20

## 2019-08-20 RX ADMIN — Medication 25 MILLIGRAM(S): at 12:14

## 2019-08-20 RX ADMIN — Medication 400 MILLIGRAM(S): at 17:44

## 2019-08-20 RX ADMIN — Medication 20 MILLIGRAM(S): at 08:58

## 2019-08-20 RX ADMIN — SIMETHICONE 80 MILLIGRAM(S): 80 TABLET, CHEWABLE ORAL at 17:42

## 2019-08-20 RX ADMIN — Medication 5 MILLILITER(S): at 14:09

## 2019-08-20 RX ADMIN — Medication 250 MILLIGRAM(S): at 10:39

## 2019-08-20 RX ADMIN — Medication 3 MILLILITER(S): at 17:44

## 2019-08-20 RX ADMIN — Medication 75 MICROGRAM(S): at 05:36

## 2019-08-20 RX ADMIN — Medication 250 MILLIGRAM(S): at 21:17

## 2019-08-20 RX ADMIN — Medication 50 MILLIEQUIVALENT(S): at 17:43

## 2019-08-20 RX ADMIN — Medication 50 MILLIEQUIVALENT(S): at 10:37

## 2019-08-20 RX ADMIN — GABAPENTIN 600 MILLIGRAM(S): 400 CAPSULE ORAL at 05:36

## 2019-08-20 RX ADMIN — Medication 100 GRAM(S): at 08:58

## 2019-08-20 RX ADMIN — Medication 100 MILLIGRAM(S): at 21:41

## 2019-08-20 RX ADMIN — PANTOPRAZOLE SODIUM 40 MILLIGRAM(S): 20 TABLET, DELAYED RELEASE ORAL at 15:18

## 2019-08-20 RX ADMIN — LOSARTAN POTASSIUM 100 MILLIGRAM(S): 100 TABLET, FILM COATED ORAL at 05:36

## 2019-08-20 RX ADMIN — CHLORHEXIDINE GLUCONATE 1 APPLICATION(S): 213 SOLUTION TOPICAL at 08:05

## 2019-08-20 RX ADMIN — Medication 400 MILLIGRAM(S): at 05:36

## 2019-08-20 RX ADMIN — Medication 3 MILLILITER(S): at 23:28

## 2019-08-20 RX ADMIN — DIPHENHYDRAMINE HYDROCHLORIDE AND LIDOCAINE HYDROCHLORIDE AND ALUMINUM HYDROXIDE AND MAGNESIUM HYDRO 10 MILLILITER(S): KIT at 23:28

## 2019-08-20 RX ADMIN — Medication 100 MILLIGRAM(S): at 05:39

## 2019-08-20 RX ADMIN — Medication 100 MILLIGRAM(S): at 14:08

## 2019-08-20 RX ADMIN — SODIUM CHLORIDE 4 MILLILITER(S): 9 INJECTION INTRAMUSCULAR; INTRAVENOUS; SUBCUTANEOUS at 17:44

## 2019-08-20 RX ADMIN — Medication 10 UNIT(S): at 08:04

## 2019-08-20 RX ADMIN — Medication 25 MILLIGRAM(S): at 23:28

## 2019-08-20 RX ADMIN — SIMETHICONE 80 MILLIGRAM(S): 80 TABLET, CHEWABLE ORAL at 23:29

## 2019-08-20 RX ADMIN — Medication 10 UNIT(S): at 12:14

## 2019-08-20 RX ADMIN — SODIUM CHLORIDE 4 MILLILITER(S): 9 INJECTION INTRAMUSCULAR; INTRAVENOUS; SUBCUTANEOUS at 05:37

## 2019-08-20 RX ADMIN — DIPHENHYDRAMINE HYDROCHLORIDE AND LIDOCAINE HYDROCHLORIDE AND ALUMINUM HYDROXIDE AND MAGNESIUM HYDRO 10 MILLILITER(S): KIT at 12:13

## 2019-08-20 RX ADMIN — Medication 30 MILLILITER(S): at 17:42

## 2019-08-20 RX ADMIN — Medication 1 APPLICATION(S): at 05:37

## 2019-08-20 RX ADMIN — Medication 1 APPLICATION(S): at 17:45

## 2019-08-20 RX ADMIN — POSACONAZOLE 300 MILLIGRAM(S): 100 TABLET, DELAYED RELEASE ORAL at 12:14

## 2019-08-20 RX ADMIN — GABAPENTIN 600 MILLIGRAM(S): 400 CAPSULE ORAL at 17:45

## 2019-08-20 RX ADMIN — Medication 100 MILLIGRAM(S): at 14:56

## 2019-08-20 RX ADMIN — SIMETHICONE 80 MILLIGRAM(S): 80 TABLET, CHEWABLE ORAL at 05:36

## 2019-08-20 RX ADMIN — Medication 3 MILLILITER(S): at 12:14

## 2019-08-20 RX ADMIN — Medication 10 UNIT(S): at 17:54

## 2019-08-20 RX ADMIN — Medication 100 MILLIGRAM(S): at 21:17

## 2019-08-20 RX ADMIN — DIPHENHYDRAMINE HYDROCHLORIDE AND LIDOCAINE HYDROCHLORIDE AND ALUMINUM HYDROXIDE AND MAGNESIUM HYDRO 10 MILLILITER(S): KIT at 17:44

## 2019-08-20 RX ADMIN — SIMETHICONE 80 MILLIGRAM(S): 80 TABLET, CHEWABLE ORAL at 12:14

## 2019-08-20 RX ADMIN — Medication 5 MILLILITER(S): at 05:37

## 2019-08-20 RX ADMIN — Medication 50 MILLIEQUIVALENT(S): at 14:08

## 2019-08-20 RX ADMIN — Medication 100 MILLIGRAM(S): at 05:36

## 2019-08-20 RX ADMIN — INSULIN GLARGINE 16 UNIT(S): 100 INJECTION, SOLUTION SUBCUTANEOUS at 21:16

## 2019-08-20 RX ADMIN — Medication 1: at 08:04

## 2019-08-20 RX ADMIN — Medication 3 MILLILITER(S): at 05:37

## 2019-08-20 RX ADMIN — Medication 25 MILLIGRAM(S): at 05:36

## 2019-08-20 RX ADMIN — Medication 25 MILLIGRAM(S): at 17:44

## 2019-08-20 RX ADMIN — Medication 5 MILLILITER(S): at 21:18

## 2019-08-20 NOTE — PROGRESS NOTE ADULT - PROBLEM SELECTOR PLAN 4
HgA1C 10.6  FS AC & HS with HISS  Continue Lantus and Humalog per endocrine - reduced Lantus to 20 units today  Consistent carbohydrate diet grade 1, stable   Monitor daily CMP  Avoid further hepatotoxic medications

## 2019-08-20 NOTE — PROGRESS NOTE ADULT - PROBLEM SELECTOR PLAN 3
Resolved  Monitor daily CMP  Avoid further hepatotoxic medications Will check stool C. diff. if continue to have watery  diarrhea.

## 2019-08-20 NOTE — ADVANCED PRACTICE NURSE CONSULT - ASSESSMENT
Induction Cycle 1   Day 14  Arrive to find pt in bed during am sleeping easily awaken, alert and oriented times four. During interview patient stated she felt better in term of itching, and nausea. The rash on upper body improving. Patient stated shaking chill with fever last evening; decreased appetite and fatigue, Np Parker order nutritional supplement to help with increasing appetite. Pruritus resolving on ATC atarax, topical triamcinolone.  Patient stated now having one watery stool NP considering send stool to rule out C-Diff result pending. Pt is due for bone marrow biopsy which is her 14 day marrow. Induction Cycle 1   Day 14  Arrive to find pt in bed during am sleeping easily awaken, alert and oriented times four. During interview patient stated she felt better in term of itching, and nausea. The rash on upper body improving. Patient stated shaking chill with fever last evening; decreased appetite and fatigue, Np Parker order nutritional supplement to help with increasing appetite. Pruritus resolving on ATC atarax, topical triamcinolone.  Patient stated now having one watery stool NP considering send stool to rule out C-Diff result pending. Pt is due for bone marrow biopsy which is her 14 day marrow. Patient was presented with Epro pad and completed questions as instructed. Remission biopsy was done specimen for both kits were collected at 13:25 by the Fellow performing the procedure and given to research nurse.

## 2019-08-20 NOTE — PROGRESS NOTE ADULT - ASSESSMENT
This is a 63 yo F with PMHx of T2DM, RA, Depression and now newly diagnosed AML enrolled on Pfizer [facility] trial admitted for Induction chemotherapy with Dauno/Cytarabine and daily Glasdegib vs Placebo. The patients hospital course has been complicated by transaminitis,  RENTERIA due to interstitial lung disease and volume overload, and rash. The patient has pancytopenia from chemotherapy and/or disease. This is a 65 yo F with PMHx of T2DM, RA, Depression and now newly diagnosed AML enrolled on Pfizer [facility] trial admitted for Induction chemotherapy with Dauno/Cytarabine and daily Glasdegib vs Placebo. The patients hospital course has been complicated by  neutropenic fever transaminitis,  RENTERIA due to interstitial lung disease and volume overload, and drug rash from Cefepime and poss from Allopurinol. The patient has pancytopenia from chemotherapy and/or disease.

## 2019-08-20 NOTE — PROGRESS NOTE ADULT - PROBLEM SELECTOR PLAN 2
The patient is neutropenic, afebrile   Cultures from 8/13 no growth to date   8/19 added vanco 1 g q12h empirically with Vanco trough with 4th dose   Continue Posaconazole, change Cefepime to Aztreonam as patient has persistent pruritis.  (Monitor QTc BIW mon/Thurs  with Posaconazole and Study drug)  8/9 CT chest: Pulmonary fibrosis and traction bronchiectasis without honeycombing.   Overall these findings appear progressed compared to CT abdomen 7/4/2017  Associated ground-glass opacity may be related to active interstitial lung disease, fluid overload, less likely infection The patient is neutropenic, febrile   s/p pancx 8/19  Cultures from 8/13 no growth to date   Continue vanco 1 g q12h empirically with Vanco trough with 4th dose   Continue Posaconazole, change Cefepime to Aztreonam as patient has persistent pruritis.  (Monitor QTc BIW mon/Thurs  with Posaconazole and Study drug)  8/9 CT chest: Pulmonary fibrosis and traction bronchiectasis without honeycombing.   Overall these findings appear progressed compared to CT abdomen 7/4/2017  Associated ground-glass opacity may be related to active interstitial lung disease, fluid overload, less likely infection

## 2019-08-20 NOTE — PROGRESS NOTE ADULT - PROBLEM SELECTOR PLAN 1
Enrolled on [employer] BRIGHT trial receiving Induction chemotherapy with Dauno/Cytarabine and daily Glasdegib vs Placebo  Molecular studies pending  8/20 Day 14 BM bx due  Monitor CBC/Lytes and transfuse/replete PRN  Strict Is and Os/ Daily weights/ Mouth Care  8/19 Lasix 40 mg iv x1 with KCL20 meq IVPB x1  Allopurinol 300mg PO daily-stopped 2/2 rash  IVF hydration  Antiemetics  ECOG performance status 2  Atarax ATC for itching and oral benadryl prn. added triamcinolone ointment - rash improving Enrolled on [employer] BRIGHT trial receiving Induction chemotherapy with Dauno/Cytarabine and daily Glasdegib vs Placebo  Molecular studies pending  8/20 Day 14 BM bx due  Monitor CBC/Lytes and transfuse/replete PRN  Hypokalemia: KCL 20 meq IVPB x3  Hypomagnesemia: Mg sulfate 1 g IVPB x1  Strict Is and Os/ Daily weights/ Mouth Care  8/20 Lasix 20 mg IV x1 for I and O's 1.6 L positive and trace edema   Allopurinol 300mg PO daily-stopped 2/2 rash  IVF hydration  Antiemetics  ECOG performance status 2  Atarax ATC for itching and oral benadryl prn. added triamcinolone ointment - rash improving Enrolled on [employer] BRIGHT trial receiving Induction chemotherapy with Dauno/Cytarabine and daily Glasdegib vs Placebo  Molecular studies pending  8/20 Day 14 BM bx, fu result   Monitor CBC/Lytes and transfuse/replete PRN  Hypokalemia: KCL 20 meq IVPB x3  Hypomagnesemia: Mg sulfate 1 g IVPB x1  Strict Is and Os/ Daily weights/ Mouth Care  8/20 Lasix 20 mg IV x1 for I and O's 1.6 L positive and trace edema   Allopurinol 300mg PO daily-stopped 2/2 rash  IVF hydration  Antiemetics  ECOG performance status 2  Atarax ATC for itching and oral benadryl prn. added triamcinolone ointment - rash improving

## 2019-08-20 NOTE — PROGRESS NOTE ADULT - PROBLEM SELECTOR PLAN 9
No pharmacologic ppx 2/2 thrombocytopenia        Contact Information [phone] Simvastatin discontinued during hospitalization due to potential for liver toxicity

## 2019-08-20 NOTE — PROGRESS NOTE ADULT - ATTENDING COMMENTS
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 Pt on Pf[employer] trial 7+3+ Glasdegib/placebo.  Patient here for her induction chemotherapy on trial.  Day 12.  Completing thong-c infusion.  Afeb xc 48 hrs, no chills, No N/V/D.has had pruritus.  All cults (-)  Pruritus better, on ATC atarax, topical triamcinolone.  Rash all over, possibly 2/2 cefepime?  Switched to aztreonam 8/17.   On 8/18 slightly improved. Also possibly due to study drug.  Then incr rash over w/e.    Rash CTCAE allergic reaction grade 2.  Cefepime d/c'd now on aztreonam, posa.  Mild-mpd  mucositis.   Mild AST/ALT elevation, alk phos sl incr; stable  d/c allopurinol       Mild diarrhea, had sent for C. diff but not watery enough to test and now improved.   Unlikely C. Diff colitis.   Low grade temperature elevation morning of 8/18/19. Continue to monitor, culture if spikes.     Supportive care, anti-emetics, IVFs,  I > O, to cont to diurese as needed.  No edema.  Cont. Cefepime (d/c Levaquin); and Posaconazole.  Afebrile.  Will add vanco for gm+ coverage.  Monitor CBC, receives transfusions PRN.   Lantus QHS, following fingersticks closely, endocrine consult appreciated. h/o of linnea poorly controlled diabetes.   ECOG performance status 2.  BMA/Bx 8/20/19 AML, FLT-3 neg, FISH for PML-KRYSTAL and BCR-ABL negative. Pt on Pfizer trial 7+3+ Glasdegib/placebo.  Patient here for her induction chemotherapy on trial.  Day 12.  Completing thong-c infusion.  Afeb xc 48 hrs, no chills untikl 8/19 PM - temp to 100.9, chills.  Vanco added pre temp spike.  On aztreonam, posa, vanco, acyclovir.  No N/V, now with diarrhea x 1.  Pruritus resolving on ATC atarax, topical triamcinolone.  Rash less.    On 8/18 slightly improved. Also possibly due to study drug.  Then incr rash over w/e.  Cefepime changed to aztreonam 8/17.     Rash CTCAE allergic reaction grade 2.    Mild-mod  mucositis.   Mild AST/ALT elevation, alk phos sl incr; stable, follow for now  OOB          Mild diarrhea, had sent for C. diff but not watery enough to test and now improved.   Unlikely C. Diff colitis.   Low grade temperature elevation morning of 8/18/19. Continue to monitor, culture if spikes.     Supportive care, anti-emetics, IVFs,  I > O, to cont to diurese as needed.  No edema.  Cont. Cefepime (d/c Levaquin); and Posaconazole.  Afebrile.  Will add vanco for gm+ coverage.  Monitor CBC, receives transfusions PRN.   Lantus QHS, following fingersticks closely, endocrine consult appreciated. h/o of linnea poorly controlled diabetes.   ECOG performance status 2.  BMA/Bx 8/20/19 AML, FLT-3 neg, FISH for PML-KRYSTAL and BCR-ABL negative. Pt on Pf[employer] trial 7+3+ Glasdegib/placebo.  Patient here for her induction chemotherapy on trial.  Day 14.   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  Afeb xc 48 hrs, no chills untikl 8/19 PM - temp to 100.9, chills.  Vanco added pre temp spike.  On aztreonam, posa, vanco, acyclovir.  No N/V, now with diarrhea x 1.  Pruritus resolving on ATC atarax, topical triamcinolone.  Rash less.    On 8/18 slightly improved. Also possibly due to study drug.  Then incr rash over w/e.  Cefepime changed to aztreonam 8/17.     Rash CTCAE allergic reaction grade 2.    Mild-mod  mucositis.   Mild AST/ALT elevation, alk phos sl incr; stable, follow for now  OOB      Supportive care, anti-emetics, IVFs,  I > O, to cont to diurese as needed.  No edema.     Lantus QHS, following fingersticks closely, endocrine consult appreciated. h/o of linnea poorly controlled diabetes.   ECOG performance status 2.  BMA/Bx today - day 14

## 2019-08-20 NOTE — PROGRESS NOTE ADULT - SUBJECTIVE AND OBJECTIVE BOX
Diagnosis: AML FLT 3 (-)    Protocol/Chemo Regimen: Enrolled on Pf[employer] [facility] 1019-  Induction chemotherapy with Dauno/Cytarabine and daily Glasdegib vs Placebo    Day: 14    Pt endorsed: Pruritis improving; +mouth blister and discomfort; intermittent left HA(above ear)x 1 day    Review of Systems: Patient denies dizziness, visual changes, chest pain, palpitations, abdominal pain, nausea, vomiting or dysuria.    Pain scale: 6/10 mouth; HA 8/10 ealier, 0/10 now     Diet: Consistent Carbohydrate    Allergies:  Cefepime-rash          ANTIMICROBIALS  acyclovir   Oral Tab/Cap 400 milliGRAM(s) Oral two times a day  aztreonam  IVPB 2000 milliGRAM(s) IV Intermittent every 8 hours  aztreonam  IVPB      posaconazole DR Tablet 300 milliGRAM(s) Oral daily  vancomycin  IVPB 1000 milliGRAM(s) IV Intermittent every 12 hours  vancomycin  IVPB          HEME/ONC MEDICATIONS      STANDING MEDICATIONS  ALBUTerol    90 MICROgram(s) HFA Inhaler 1 Puff(s) Inhalation every 6 hours  ALBUTerol/ipratropium for Nebulization 3 milliLiter(s) Nebulizer every 6 hours  benzonatate 100 milliGRAM(s) Oral three times a day  Biotene Dry Mouth Oral Rinse 5 milliLiter(s) Swish and Spit three times a day  chlorhexidine 2% Cloths 1 Application(s) Topical <User Schedule>  dextrose 5%. 1000 milliLiter(s) IV Continuous <Continuous>  dextrose 50% Injectable 12.5 Gram(s) IV Push once  dextrose 50% Injectable 25 Gram(s) IV Push once  dextrose 50% Injectable 25 Gram(s) IV Push once  gabapentin 600 milliGRAM(s) Oral two times a day  hydrOXYzine hydrochloride 25 milliGRAM(s) Oral every 6 hours  insulin glargine Injectable (LANTUS) 16 Unit(s) SubCutaneous at bedtime  insulin lispro (HumaLOG) corrective regimen sliding scale   SubCutaneous three times a day before meals  insulin lispro (HumaLOG) corrective regimen sliding scale   SubCutaneous at bedtime  insulin lispro Injectable (HumaLOG) 10 Unit(s) SubCutaneous three times a day before meals  investigational chemo - general 1 Tablet(s) Oral every 24 hours  levothyroxine 75 MICROGram(s) Oral daily  losartan 100 milliGRAM(s) Oral daily  simethicone 80 milliGRAM(s) Chew four times a day  sodium chloride 0.9%. 1000 milliLiter(s) IV Continuous <Continuous>  sodium chloride 3%  Inhalation 4 milliLiter(s) Inhalation two times a day  tiotropium 18 MICROgram(s) Capsule 1 Capsule(s) Inhalation daily  triamcinolone 0.1% Ointment 1 Application(s) Topical every 12 hours      PRN MEDICATIONS  acetaminophen   Tablet .. 650 milliGRAM(s) Oral every 6 hours PRN  dextrose 40% Gel 15 Gram(s) Oral once PRN  diphenhydrAMINE 25 milliGRAM(s) Oral every 6 hours PRN  glucagon  Injectable 1 milliGRAM(s) IntraMuscular once PRN  lidocaine 2% Viscous 2 milliLiter(s) Oral every 2 hours PRN  melatonin 3 milliGRAM(s) Oral once PRN  metoclopramide Injectable 10 milliGRAM(s) IV Push every 6 hours PRN  oxyCODONE    IR 5 milliGRAM(s) Oral every 4 hours PRN  polyethylene glycol 3350 17 Gram(s) Oral two times a day PRN        Vital Signs Last 24 Hrs  T(C): 37.5 (20 Aug 2019 05:05), Max: 38.3 (19 Aug 2019 16:50)  T(F): 99.5 (20 Aug 2019 05:05), Max: 100.9 (19 Aug 2019 16:50)  HR: 88 (20 Aug 2019 05:05) (79 - 92)  BP: 138/63 (20 Aug 2019 05:05) (112/62 - 146/77)  BP(mean): --  RR: 18 (20 Aug 2019 05:05) (18 - 20)  SpO2: 96% (20 Aug 2019 05:05) (96% - 100%)      PHYSICAL EXAM  General: adult in NAD  HEENT: +ulcer left lower palate, posterior buccal mucosa  Neck: supple  CV: normal S1/S2 RRR  Lungs: bibasilar rales   Abdomen: soft non-tender non-distended,   Ext:  edema on feet  Skin: erythematous rash b/l breasts improved  Neuro: alert and oriented X 4, no focal deficits  Central Line: Right IJ TLC c/d/i        LABS:                        7.8    0.6   )-----------( 37       ( 19 Aug 2019 06:39 )             22.6         Mean Cell Volume : 85.3 fl  Mean Cell Hemoglobin : 29.3 pg  Mean Cell Hemoglobin Concentration : 34.3 gm/dL  Auto Neutrophil # : 0.0 K/uL  Auto Lymphocyte # : 0.6 K/uL  Auto Monocyte # : 0.0 K/uL  Auto Eosinophil # : 0.0 K/uL  Auto Basophil # : 0.0 K/uL  Auto Neutrophil % : 0.9 %  Auto Lymphocyte % : 97.7 %  Auto Monocyte % : 0.6 %  Auto Eosinophil % : 0.9 %  Auto Basophil % : 0.0 %      08-19    137  |  106  |  8   ----------------------------<  136<H>  3.8   |  22  |  0.58    Ca    8.3<L>      19 Aug 2019 06:39  Phos  2.8     08-19  Mg     1.8     08-19    TPro  6.6  /  Alb  3.2<L>  /  TBili  0.6  /  DBili  x   /  AST  28  /  ALT  47<H>  /  AlkPhos  221<H>  08-19                  RECENT CULTURES:  08-13 @ 23:17  .Urine  --  --  --    No growth  --  08-13 @ 22:12  .Blood  --  --  --    No growth at 5 days.  --  08-13 @ 22:07  .Blood  --  --  --    No growth at 5 days.  --      RADIOLOGY & ADDITIONAL STUDIES:    Xray Chest 1 View- PORTABLE-Urgent (08.15.19 @ 01:26) >  Unchanged bilateral reticular opacities.  No pneumonia. Diagnosis: AML FLT 3 (-)    Protocol/Chemo Regimen: Enrolled on [employer] [facility] 1019-  Induction chemotherapy with Dauno/Cytarabine and daily Glasdegib vs Placebo    Day: 14    Pt endorsed:  shaking cill with fever last evening; decreased appetite and fatigue; watery BM x 1 today;  Pruritis improving; +mouth blister and discomfort; RENTERIA with walking     Review of Systems: Patient denies dizziness, HA, visual changes, chest pain, palpitations, abdominal pain, nausea, vomiting or dysuria.    Pain scale: 5/10 mouth    Diet: soft mech, Consistent Carbohydrate, Glucerna TID     Allergies:  Cefepime-rash      ANTIMICROBIALS  acyclovir   Oral Tab/Cap 400 milliGRAM(s) Oral two times a day  aztreonam  IVPB 2000 milliGRAM(s) IV Intermittent every 8 hours  aztreonam  IVPB      posaconazole DR Tablet 300 milliGRAM(s) Oral daily  vancomycin  IVPB 1000 milliGRAM(s) IV Intermittent every 12 hours  vancomycin  IVPB          STANDING MEDICATIONS  ALBUTerol    90 MICROgram(s) HFA Inhaler 1 Puff(s) Inhalation every 6 hours  ALBUTerol/ipratropium for Nebulization 3 milliLiter(s) Nebulizer every 6 hours  benzonatate 100 milliGRAM(s) Oral three times a day  Biotene Dry Mouth Oral Rinse 5 milliLiter(s) Swish and Spit three times a day  chlorhexidine 2% Cloths 1 Application(s) Topical <User Schedule>  dextrose 5%. 1000 milliLiter(s) IV Continuous <Continuous>  dextrose 50% Injectable 12.5 Gram(s) IV Push once  dextrose 50% Injectable 25 Gram(s) IV Push once  dextrose 50% Injectable 25 Gram(s) IV Push once  gabapentin 600 milliGRAM(s) Oral two times a day  hydrOXYzine hydrochloride 25 milliGRAM(s) Oral every 6 hours  insulin glargine Injectable (LANTUS) 16 Unit(s) SubCutaneous at bedtime  insulin lispro (HumaLOG) corrective regimen sliding scale   SubCutaneous three times a day before meals  insulin lispro (HumaLOG) corrective regimen sliding scale   SubCutaneous at bedtime  insulin lispro Injectable (HumaLOG) 10 Unit(s) SubCutaneous three times a day before meals  investigational chemo - general 1 Tablet(s) Oral every 24 hours  levothyroxine 75 MICROGram(s) Oral daily  losartan 100 milliGRAM(s) Oral daily  simethicone 80 milliGRAM(s) Chew four times a day  sodium chloride 0.9%. 1000 milliLiter(s) IV Continuous <Continuous>  sodium chloride 3%  Inhalation 4 milliLiter(s) Inhalation two times a day  tiotropium 18 MICROgram(s) Capsule 1 Capsule(s) Inhalation daily  triamcinolone 0.1% Ointment 1 Application(s) Topical every 12 hours      PRN MEDICATIONS  acetaminophen   Tablet .. 650 milliGRAM(s) Oral every 6 hours PRN  dextrose 40% Gel 15 Gram(s) Oral once PRN  diphenhydrAMINE 25 milliGRAM(s) Oral every 6 hours PRN  glucagon  Injectable 1 milliGRAM(s) IntraMuscular once PRN  lidocaine 2% Viscous 2 milliLiter(s) Oral every 2 hours PRN  melatonin 3 milliGRAM(s) Oral once PRN  metoclopramide Injectable 10 milliGRAM(s) IV Push every 6 hours PRN  oxyCODONE    IR 5 milliGRAM(s) Oral every 4 hours PRN  polyethylene glycol 3350 17 Gram(s) Oral two times a day PRN      Vital Signs Last 24 Hrs  T(C): 37.5 (20 Aug 2019 05:05), Max: 38.3 (19 Aug 2019 16:50)  T(F): 99.5 (20 Aug 2019 05:05), Max: 100.9 (19 Aug 2019 16:50)  HR: 88 (20 Aug 2019 05:05) (79 - 92)  BP: 138/63 (20 Aug 2019 05:05) (112/62 - 146/77)  BP(mean): --  RR: 18 (20 Aug 2019 05:05) (18 - 20)  SpO2: 96% (20 Aug 2019 05:05) (96% - 100%)      PHYSICAL EXAM  General: adult in NAD  HEENT: + 3 mm  ulcer left lateral tongue and left lower gum under denture   Neck: supple  CV: normal S1/S2 RRR  Lungs: CTA   Abdomen: soft non-tender non-distended,   Ext:  trace edema on feet  Skin: non raised  rashes on lower legs  Neuro: alert and oriented X 4, no focal deficits  Central Line: Right IJ TLC c/d/i        LABS:                        8.0    0.8   )-----------( 26       ( 20 Aug 2019 07:51 )             22.7         Mean Cell Volume : 86.3 fl  Mean Cell Hemoglobin : 30.2 pg  Mean Cell Hemoglobin Concentration : 35.0 gm/dL  Auto Neutrophil # : 0.0 K/uL  Auto Lymphocyte # : 0.8 K/uL  Auto Monocyte # : 0.0 K/uL  Auto Eosinophil # : 0.0 K/uL  Auto Basophil # : 0.0 K/uL  Auto Neutrophil % : 0.8 %  Auto Lymphocyte % : 95.6 %  Auto Monocyte % : 2.1 %  Auto Eosinophil % : 1.5 %  Auto Basophil % : 0.0 %      08-20    139  |  105  |  10  ----------------------------<  69<L>  3.4<L>   |  23  |  0.61    Ca    8.2<L>      20 Aug 2019 07:50  Phos  3.4     08-20  Mg     1.7     08-20    TPro  6.8  /  Alb  3.1<L>  /  TBili  0.5  /  DBili  x   /  AST  30  /  ALT  54<H>  /  AlkPhos  246<H>  08-20      Uric Acid 1.7      Urinalysis (08.19.19 @ 20:21)    pH Urine: 6.5    Blood, Urine: Negative    Glucose Qualitative, Urine: Negative    Color: Light Yellow    Urine Appearance: Clear    Bilirubin: Negative    Ketone - Urine: Negative    Specific Gravity: 1.010    Protein, Urine: 30 mg/dL    Urobilinogen: <2 mg/dL    Nitrite: Negative    Leukocyte Esterase Concentration: Negative        RECENT CULTURES:  08-13 @ 23:17  .Urine  --  --  --    No growth  --  08-13 @ 22:12  .Blood  --  --  --    No growth at 5 days.  --  08-13 @ 22:07  .Blood  --  --  --    No growth at 5 days.  --      RADIOLOGY & ADDITIONAL STUDIES:    Xray Chest 1 View- PORTABLE-Urgent (08.15.19 @ 01:26) >  Unchanged bilateral reticular opacities.  No pneumonia. Diagnosis: AML FLT 3 (-)    Protocol/Chemo Regimen: Enrolled on [employer] 1019-  Induction chemotherapy with Dauno/Cytarabine and daily Glasdegib vs Placebo    Day: 14    Pt endorsed:  shaking chill with fever last evening; decreased appetite and fatigue; watery BM x 1 today;  Pruritis improving; +mouth blister and discomfort; RENTERIA with walking     Review of Systems: Patient denies dizziness, HA, visual changes, chest pain, palpitations, abdominal pain, nausea, vomiting or dysuria.    Pain scale: 5/10 mouth    Diet: soft mech, Consistent Carbohydrate, Glucerna TID     Allergies:  Cefepime-rash      ANTIMICROBIALS  acyclovir   Oral Tab/Cap 400 milliGRAM(s) Oral two times a day  aztreonam  IVPB 2000 milliGRAM(s) IV Intermittent every 8 hours  aztreonam  IVPB      posaconazole DR Tablet 300 milliGRAM(s) Oral daily  vancomycin  IVPB 1000 milliGRAM(s) IV Intermittent every 12 hours  vancomycin  IVPB          STANDING MEDICATIONS  ALBUTerol    90 MICROgram(s) HFA Inhaler 1 Puff(s) Inhalation every 6 hours  ALBUTerol/ipratropium for Nebulization 3 milliLiter(s) Nebulizer every 6 hours  benzonatate 100 milliGRAM(s) Oral three times a day  Biotene Dry Mouth Oral Rinse 5 milliLiter(s) Swish and Spit three times a day  chlorhexidine 2% Cloths 1 Application(s) Topical <User Schedule>  dextrose 5%. 1000 milliLiter(s) IV Continuous <Continuous>  dextrose 50% Injectable 12.5 Gram(s) IV Push once  dextrose 50% Injectable 25 Gram(s) IV Push once  dextrose 50% Injectable 25 Gram(s) IV Push once  gabapentin 600 milliGRAM(s) Oral two times a day  hydrOXYzine hydrochloride 25 milliGRAM(s) Oral every 6 hours  insulin glargine Injectable (LANTUS) 16 Unit(s) SubCutaneous at bedtime  insulin lispro (HumaLOG) corrective regimen sliding scale   SubCutaneous three times a day before meals  insulin lispro (HumaLOG) corrective regimen sliding scale   SubCutaneous at bedtime  insulin lispro Injectable (HumaLOG) 10 Unit(s) SubCutaneous three times a day before meals  investigational chemo - general 1 Tablet(s) Oral every 24 hours  levothyroxine 75 MICROGram(s) Oral daily  losartan 100 milliGRAM(s) Oral daily  simethicone 80 milliGRAM(s) Chew four times a day  sodium chloride 0.9%. 1000 milliLiter(s) IV Continuous <Continuous>  sodium chloride 3%  Inhalation 4 milliLiter(s) Inhalation two times a day  tiotropium 18 MICROgram(s) Capsule 1 Capsule(s) Inhalation daily  triamcinolone 0.1% Ointment 1 Application(s) Topical every 12 hours      PRN MEDICATIONS  acetaminophen   Tablet .. 650 milliGRAM(s) Oral every 6 hours PRN  dextrose 40% Gel 15 Gram(s) Oral once PRN  diphenhydrAMINE 25 milliGRAM(s) Oral every 6 hours PRN  glucagon  Injectable 1 milliGRAM(s) IntraMuscular once PRN  lidocaine 2% Viscous 2 milliLiter(s) Oral every 2 hours PRN  melatonin 3 milliGRAM(s) Oral once PRN  metoclopramide Injectable 10 milliGRAM(s) IV Push every 6 hours PRN  oxyCODONE    IR 5 milliGRAM(s) Oral every 4 hours PRN  polyethylene glycol 3350 17 Gram(s) Oral two times a day PRN      Vital Signs Last 24 Hrs  T(C): 37.5 (20 Aug 2019 05:05), Max: 38.3 (19 Aug 2019 16:50)  T(F): 99.5 (20 Aug 2019 05:05), Max: 100.9 (19 Aug 2019 16:50)  HR: 88 (20 Aug 2019 05:05) (79 - 92)  BP: 138/63 (20 Aug 2019 05:05) (112/62 - 146/77)  BP(mean): --  RR: 18 (20 Aug 2019 05:05) (18 - 20)  SpO2: 96% (20 Aug 2019 05:05) (96% - 100%)      PHYSICAL EXAM  General: adult in NAD  HEENT: + 3 mm  ulcer left lateral tongue and left lower gum under denture   Neck: supple  CV: normal S1/S2 RRR  Lungs: CTA   Abdomen: soft non-tender non-distended,   Ext:  trace edema on feet  Skin: non raised  rashes on lower legs  Neuro: alert and oriented X 4, no focal deficits  Central Line: Right IJ TLC c/d/i        LABS:                        8.0    0.8   )-----------( 26       ( 20 Aug 2019 07:51 )             22.7         Mean Cell Volume : 86.3 fl  Mean Cell Hemoglobin : 30.2 pg  Mean Cell Hemoglobin Concentration : 35.0 gm/dL  Auto Neutrophil # : 0.0 K/uL  Auto Lymphocyte # : 0.8 K/uL  Auto Monocyte # : 0.0 K/uL  Auto Eosinophil # : 0.0 K/uL  Auto Basophil # : 0.0 K/uL  Auto Neutrophil % : 0.8 %  Auto Lymphocyte % : 95.6 %  Auto Monocyte % : 2.1 %  Auto Eosinophil % : 1.5 %  Auto Basophil % : 0.0 %      08-20    139  |  105  |  10  ----------------------------<  69<L>  3.4<L>   |  23  |  0.61    Ca    8.2<L>      20 Aug 2019 07:50  Phos  3.4     08-20  Mg     1.7     08-20    TPro  6.8  /  Alb  3.1<L>  /  TBili  0.5  /  DBili  x   /  AST  30  /  ALT  54<H>  /  AlkPhos  246<H>  08-20      Uric Acid 1.7      Urinalysis (08.19.19 @ 20:21)    pH Urine: 6.5    Blood, Urine: Negative    Glucose Qualitative, Urine: Negative    Color: Light Yellow    Urine Appearance: Clear    Bilirubin: Negative    Ketone - Urine: Negative    Specific Gravity: 1.010    Protein, Urine: 30 mg/dL    Urobilinogen: <2 mg/dL    Nitrite: Negative    Leukocyte Esterase Concentration: Negative        RECENT CULTURES:    Culture - Urine (08.19.19 @ 19:02)    Specimen Source: .Urine    Culture Results:   <10,000 CFU/mL Normal Urogenital Maryana      08-13 @ 23:17  .Urine  --  --  --    No growth  --  08-13 @ 22:12  .Blood  --  --  --    No growth at 5 days.  --  08-13 @ 22:07  .Blood  --  --  --    No growth at 5 days.  --      RADIOLOGY & ADDITIONAL STUDIES:    Xray Chest 1 View- PORTABLE-Urgent (08.15.19 @ 01:26) >  Unchanged bilateral reticular opacities.  No pneumonia.

## 2019-08-20 NOTE — CHART NOTE - NSCHARTNOTEFT_GEN_A_CORE
Pt seen for nutrition consult for assessment and education. Pt with PMH type 2 DM A1C 10.6%-8/7 now with newly diagnosed AML currently undergoing induction chemotherapy.    Source: Patient [x]    Family [ ]     other [ ]    Diet : Soft, Consistent carbohydrate diet with evening snack and ensure clear 2 daily       Patient denies N+V, however endorses loose/watery stool, pt with 2 BMs noted yesterday, 1 watery BM today per pt report. Pt also with mouth pain which had started 1 week ago, stated discomfort is about the same and has been chewing on one side of her mouth. Pt also stated she is unable to smell or taste foods at this time further limiting intake.      PO intake:  < 50% [x] 50-75% [ ]   % [ ]  other : Pt reports appetite is decreased, estimates </=25% of meal, observed breakfast today with only bites of egg and potato consumed. Pt  not receptive to Promote shakes but receptive to glucerna shakes, RD advised pt if loose stool continues supplement can be changed to promote. RD also reviewed binding foods available and encouraged pt to take nutrient dense snacks between meals with emphasis on protein containing foods.       Current Weight: 132.2 lbs (8/6), 139.3 lbs (8/10), 132.4 lbs (8/20) wt fluctuations noted, likely attributed to fluid shifts, pt reports UBW of 130 lbs.   % Weight Change    Pertinent Medications: MEDICATIONS  (STANDING):  acyclovir   Oral Tab/Cap 400 milliGRAM(s) Oral two times a day  ALBUTerol    90 MICROgram(s) HFA Inhaler 1 Puff(s) Inhalation every 6 hours  ALBUTerol/ipratropium for Nebulization 3 milliLiter(s) Nebulizer every 6 hours  aztreonam  IVPB 2000 milliGRAM(s) IV Intermittent every 8 hours  aztreonam  IVPB      benzonatate 100 milliGRAM(s) Oral three times a day  Biotene Dry Mouth Oral Rinse 5 milliLiter(s) Swish and Spit three times a day  chlorhexidine 2% Cloths 1 Application(s) Topical <User Schedule>  dextrose 5%. 1000 milliLiter(s) (50 mL/Hr) IV Continuous <Continuous>  dextrose 50% Injectable 12.5 Gram(s) IV Push once  dextrose 50% Injectable 25 Gram(s) IV Push once  dextrose 50% Injectable 25 Gram(s) IV Push once  FIRST- Mouthwash  BLM 10 milliLiter(s) Swish and Spit four times a day  gabapentin 600 milliGRAM(s) Oral two times a day  hydrOXYzine hydrochloride 25 milliGRAM(s) Oral every 6 hours  insulin glargine Injectable (LANTUS) 16 Unit(s) SubCutaneous at bedtime  insulin lispro (HumaLOG) corrective regimen sliding scale   SubCutaneous three times a day before meals  insulin lispro (HumaLOG) corrective regimen sliding scale   SubCutaneous at bedtime  insulin lispro Injectable (HumaLOG) 10 Unit(s) SubCutaneous three times a day before meals  investigational chemo - general 1 Tablet(s) Oral every 24 hours  levothyroxine 75 MICROGram(s) Oral daily  losartan 100 milliGRAM(s) Oral daily  posaconazole DR Tablet 300 milliGRAM(s) Oral daily  potassium chloride  20 mEq/100 mL IVPB 20 milliEquivalent(s) IV Intermittent every 2 hours  simethicone 80 milliGRAM(s) Chew four times a day  sodium chloride 0.9%. 1000 milliLiter(s) (50 mL/Hr) IV Continuous <Continuous>  sodium chloride 3%  Inhalation 4 milliLiter(s) Inhalation two times a day  tiotropium 18 MICROgram(s) Capsule 1 Capsule(s) Inhalation daily  triamcinolone 0.1% Ointment 1 Application(s) Topical every 12 hours  vancomycin  IVPB 1000 milliGRAM(s) IV Intermittent every 12 hours  vancomycin  IVPB        MEDICATIONS  (PRN):  acetaminophen   Tablet .. 650 milliGRAM(s) Oral every 6 hours PRN Temp greater or equal to 38C (100.4F), Mild Pain (1 - 3)  dextrose 40% Gel 15 Gram(s) Oral once PRN Blood Glucose LESS THAN 70 milliGRAM(s)/deciliter  diphenhydrAMINE 25 milliGRAM(s) Oral every 6 hours PRN Rash and/or Itching  glucagon  Injectable 1 milliGRAM(s) IntraMuscular once PRN Glucose LESS THAN 70 milligrams/deciliter  lidocaine 2% Viscous 2 milliLiter(s) Oral every 2 hours PRN mouth ulcer  melatonin 3 milliGRAM(s) Oral once PRN Insomnia  metoclopramide Injectable 10 milliGRAM(s) IV Push every 6 hours PRN nausea/vommiting  oxyCODONE    IR 5 milliGRAM(s) Oral every 4 hours PRN Moderate Pain (4 - 6)  polyethylene glycol 3350 17 Gram(s) Oral two times a day PRN Constipation    Pertinent Labs:  08-20 Na139 mmol/L Glu 69 mg/dL<L> K+ 3.4 mmol/L<L> Cr  0.61 mg/dL BUN 10 mg/dL 08-20 Phos 3.4 mg/dL 08-20 Alb 3.1 g/dL<L> 08-07 [id number] 10.6 %<H>    CAPILLARY BLOOD GLUCOSE      POCT Blood Glucose.: 159 mg/dL (20 Aug 2019 07:38)  POCT Blood Glucose.: 119 mg/dL (19 Aug 2019 21:10)  POCT Blood Glucose.: 137 mg/dL (19 Aug 2019 18:42)  POCT Blood Glucose.: 182 mg/dL (19 Aug 2019 13:01)        Skin: 1+ lucinda ankle edema noted, Skin free of pressure injury     Estimated Needs:   [x ] no change since previous assessment  [ ] recalculated:       Previous Nutrition Diagnosis:     [x ] Predicted suboptimal intake          Nutrition Diagnosis is now escalated to diagnosis below          New Nutrition Diagnosis:     [x] Inadequate oral intake related to decreased appetite in setting of dysgeusia and mouth pain as evidenced by pt taking <50% of meals        Interventions:     Recommend    1. Consider changing diet to mechanical soft, consistent carbohydrate diet with evening snack and glucerna 3 daily to further optimize po intake-pt agreeable  2. Continue to encourage po intake and obtain/honor food preferences as able        Monitoring and Evaluation:     [ x] PO intake [ x] Tolerance to diet prescription [ x] weights [x ] follow up per protocol    [ ] other:

## 2019-08-20 NOTE — PROGRESS NOTE ADULT - PROBLEM SELECTOR PLAN 10
Will check stool C. diff. if continue to have diarrhea. No pharmacologic ppx 2/2 thrombocytopenia  Encourage ambulation  change diet to soft mechanical  with Glucerna       Contact Information [phone]

## 2019-08-20 NOTE — PROGRESS NOTE ADULT - PROBLEM SELECTOR PLAN 8
Simvastatin discontinued during hospitalization due to potential for liver toxicity Continue Gabapentin 600mg PO q 12

## 2019-08-20 NOTE — CHART NOTE - NSCHARTNOTEFT_GEN_A_CORE
Hematology/Oncology Procedure Note    Bone Marrow Aspiration/Biopsy    Indication:    Bone marrow aspiration and biopsy procedure description, risks, and benefits were discussed in detail with the patient.  All questions were answered.  Informed consent was obtained and time-out performed.      The area of the left posterior iliac crest was prepped and draped using sterile technique. Local anesthetic with  2% Lidocaine.    Bone marrow aspiration and biopsy  was performed using sterile technique  by Marlene Barnhart MD under the supervision of Sarahy Arcos MD.   Specimens were obtained.    The procedure was well tolerated and no local bleeding or other complications were observed.  Pressure was applied to the procedure site and a wound dressing was placed.  The patient and nursing staff were advised that the patient is to lie flat for 30 minutes post procedure. Tylenol may be used if no contraindications for pain at the procedure site.

## 2019-08-21 DIAGNOSIS — E03.9 HYPOTHYROIDISM, UNSPECIFIED: ICD-10-CM

## 2019-08-21 LAB
ALBUMIN SERPL ELPH-MCNC: 3.1 G/DL — LOW (ref 3.3–5)
ALP SERPL-CCNC: 237 U/L — HIGH (ref 40–120)
ALT FLD-CCNC: 46 U/L — HIGH (ref 10–45)
ANION GAP SERPL CALC-SCNC: 9 MMOL/L — SIGNIFICANT CHANGE UP (ref 5–17)
AST SERPL-CCNC: 22 U/L — SIGNIFICANT CHANGE UP (ref 10–40)
BASOPHILS # BLD AUTO: 0 K/UL — SIGNIFICANT CHANGE UP (ref 0–0.2)
BASOPHILS NFR BLD AUTO: 0 % — SIGNIFICANT CHANGE UP (ref 0–2)
BILIRUB SERPL-MCNC: 0.4 MG/DL — SIGNIFICANT CHANGE UP (ref 0.2–1.2)
BUN SERPL-MCNC: 8 MG/DL — SIGNIFICANT CHANGE UP (ref 7–23)
CALCIUM SERPL-MCNC: 8 MG/DL — LOW (ref 8.4–10.5)
CHLORIDE SERPL-SCNC: 102 MMOL/L — SIGNIFICANT CHANGE UP (ref 96–108)
CO2 SERPL-SCNC: 23 MMOL/L — SIGNIFICANT CHANGE UP (ref 22–31)
CREAT SERPL-MCNC: 0.6 MG/DL — SIGNIFICANT CHANGE UP (ref 0.5–1.3)
EOSINOPHIL # BLD AUTO: 0 K/UL — SIGNIFICANT CHANGE UP (ref 0–0.5)
EOSINOPHIL NFR BLD AUTO: 0.4 % — SIGNIFICANT CHANGE UP (ref 0–6)
GLUCOSE BLDC GLUCOMTR-MCNC: 162 MG/DL — HIGH (ref 70–99)
GLUCOSE BLDC GLUCOMTR-MCNC: 181 MG/DL — HIGH (ref 70–99)
GLUCOSE BLDC GLUCOMTR-MCNC: 249 MG/DL — HIGH (ref 70–99)
GLUCOSE BLDC GLUCOMTR-MCNC: 344 MG/DL — HIGH (ref 70–99)
GLUCOSE SERPL-MCNC: 192 MG/DL — HIGH (ref 70–99)
HCT VFR BLD CALC: 21.7 % — LOW (ref 34.5–45)
HGB BLD-MCNC: 7.3 G/DL — LOW (ref 11.5–15.5)
LDH SERPL L TO P-CCNC: 260 U/L — HIGH (ref 50–242)
LYMPHOCYTES # BLD AUTO: 0.8 K/UL — LOW (ref 1–3.3)
LYMPHOCYTES # BLD AUTO: 99.3 % — HIGH (ref 13–44)
MAGNESIUM SERPL-MCNC: 1.7 MG/DL — SIGNIFICANT CHANGE UP (ref 1.6–2.6)
MCHC RBC-ENTMCNC: 29.3 PG — SIGNIFICANT CHANGE UP (ref 27–34)
MCHC RBC-ENTMCNC: 33.9 GM/DL — SIGNIFICANT CHANGE UP (ref 32–36)
MCV RBC AUTO: 86.5 FL — SIGNIFICANT CHANGE UP (ref 80–100)
NEUTROPHILS # BLD AUTO: 0 K/UL — LOW (ref 1.8–7.4)
NEUTROPHILS NFR BLD AUTO: 0.2 % — LOW (ref 43–77)
PHOSPHATE SERPL-MCNC: 2.4 MG/DL — LOW (ref 2.5–4.5)
PLAT MORPH BLD: NORMAL — SIGNIFICANT CHANGE UP
PLATELET # BLD AUTO: 16 K/UL — CRITICAL LOW (ref 150–400)
POTASSIUM SERPL-MCNC: 4 MMOL/L — SIGNIFICANT CHANGE UP (ref 3.5–5.3)
POTASSIUM SERPL-SCNC: 4 MMOL/L — SIGNIFICANT CHANGE UP (ref 3.5–5.3)
PROT SERPL-MCNC: 6.4 G/DL — SIGNIFICANT CHANGE UP (ref 6–8.3)
RBC # BLD: 2.5 M/UL — LOW (ref 3.8–5.2)
RBC # FLD: 12.2 % — SIGNIFICANT CHANGE UP (ref 10.3–14.5)
RBC BLD AUTO: SIGNIFICANT CHANGE UP
SODIUM SERPL-SCNC: 134 MMOL/L — LOW (ref 135–145)
TM INTERPRETATION: SIGNIFICANT CHANGE UP
URATE SERPL-MCNC: 1.4 MG/DL — LOW (ref 2.5–7)
WBC # BLD: 0.8 K/UL — CRITICAL LOW (ref 3.8–10.5)
WBC # FLD AUTO: 0.8 K/UL — CRITICAL LOW (ref 3.8–10.5)

## 2019-08-21 PROCEDURE — 99232 SBSQ HOSP IP/OBS MODERATE 35: CPT

## 2019-08-21 RX ORDER — FUROSEMIDE 40 MG
40 TABLET ORAL ONCE
Refills: 0 | Status: COMPLETED | OUTPATIENT
Start: 2019-08-21 | End: 2019-08-21

## 2019-08-21 RX ORDER — INSULIN GLARGINE 100 [IU]/ML
18 INJECTION, SOLUTION SUBCUTANEOUS AT BEDTIME
Refills: 0 | Status: DISCONTINUED | OUTPATIENT
Start: 2019-08-21 | End: 2019-08-27

## 2019-08-21 RX ORDER — POTASSIUM PHOSPHATE, MONOBASIC POTASSIUM PHOSPHATE, DIBASIC 236; 224 MG/ML; MG/ML
15 INJECTION, SOLUTION INTRAVENOUS ONCE
Refills: 0 | Status: COMPLETED | OUTPATIENT
Start: 2019-08-21 | End: 2019-08-21

## 2019-08-21 RX ORDER — LOPERAMIDE HCL 2 MG
2 TABLET ORAL EVERY 4 HOURS
Refills: 0 | Status: DISCONTINUED | OUTPATIENT
Start: 2019-08-21 | End: 2019-08-27

## 2019-08-21 RX ADMIN — GABAPENTIN 600 MILLIGRAM(S): 400 CAPSULE ORAL at 18:38

## 2019-08-21 RX ADMIN — SIMETHICONE 80 MILLIGRAM(S): 80 TABLET, CHEWABLE ORAL at 12:34

## 2019-08-21 RX ADMIN — Medication 10 UNIT(S): at 17:25

## 2019-08-21 RX ADMIN — Medication 25 MILLIGRAM(S): at 23:00

## 2019-08-21 RX ADMIN — Medication 40 MILLIGRAM(S): at 13:14

## 2019-08-21 RX ADMIN — Medication 400 MILLIGRAM(S): at 05:36

## 2019-08-21 RX ADMIN — SODIUM CHLORIDE 4 MILLILITER(S): 9 INJECTION INTRAMUSCULAR; INTRAVENOUS; SUBCUTANEOUS at 05:39

## 2019-08-21 RX ADMIN — Medication 4: at 21:38

## 2019-08-21 RX ADMIN — POSACONAZOLE 300 MILLIGRAM(S): 100 TABLET, DELAYED RELEASE ORAL at 12:34

## 2019-08-21 RX ADMIN — Medication 25 MILLIGRAM(S): at 18:38

## 2019-08-21 RX ADMIN — GABAPENTIN 600 MILLIGRAM(S): 400 CAPSULE ORAL at 05:36

## 2019-08-21 RX ADMIN — SODIUM CHLORIDE 4 MILLILITER(S): 9 INJECTION INTRAMUSCULAR; INTRAVENOUS; SUBCUTANEOUS at 18:45

## 2019-08-21 RX ADMIN — Medication 10 UNIT(S): at 09:04

## 2019-08-21 RX ADMIN — Medication 250 MILLIGRAM(S): at 21:40

## 2019-08-21 RX ADMIN — Medication 3 MILLILITER(S): at 23:02

## 2019-08-21 RX ADMIN — Medication 5 MILLILITER(S): at 21:42

## 2019-08-21 RX ADMIN — LOSARTAN POTASSIUM 100 MILLIGRAM(S): 100 TABLET, FILM COATED ORAL at 05:36

## 2019-08-21 RX ADMIN — DIPHENHYDRAMINE HYDROCHLORIDE AND LIDOCAINE HYDROCHLORIDE AND ALUMINUM HYDROXIDE AND MAGNESIUM HYDRO 10 MILLILITER(S): KIT at 12:34

## 2019-08-21 RX ADMIN — DIPHENHYDRAMINE HYDROCHLORIDE AND LIDOCAINE HYDROCHLORIDE AND ALUMINUM HYDROXIDE AND MAGNESIUM HYDRO 10 MILLILITER(S): KIT at 23:02

## 2019-08-21 RX ADMIN — Medication 100 MILLIGRAM(S): at 17:26

## 2019-08-21 RX ADMIN — Medication 400 MILLIGRAM(S): at 18:38

## 2019-08-21 RX ADMIN — Medication 25 MILLIGRAM(S): at 12:34

## 2019-08-21 RX ADMIN — Medication 250 MILLIGRAM(S): at 12:32

## 2019-08-21 RX ADMIN — POTASSIUM PHOSPHATE, MONOBASIC POTASSIUM PHOSPHATE, DIBASIC 62.5 MILLIMOLE(S): 236; 224 INJECTION, SOLUTION INTRAVENOUS at 07:54

## 2019-08-21 RX ADMIN — Medication 1 APPLICATION(S): at 05:37

## 2019-08-21 RX ADMIN — Medication 100 MILLIGRAM(S): at 21:37

## 2019-08-21 RX ADMIN — Medication 1 APPLICATION(S): at 17:25

## 2019-08-21 RX ADMIN — Medication 1: at 17:26

## 2019-08-21 RX ADMIN — Medication 100 MILLIGRAM(S): at 05:36

## 2019-08-21 RX ADMIN — INSULIN GLARGINE 18 UNIT(S): 100 INJECTION, SOLUTION SUBCUTANEOUS at 21:39

## 2019-08-21 RX ADMIN — Medication 75 MICROGRAM(S): at 05:37

## 2019-08-21 RX ADMIN — OXYCODONE HYDROCHLORIDE 5 MILLIGRAM(S): 5 TABLET ORAL at 09:03

## 2019-08-21 RX ADMIN — OXYCODONE HYDROCHLORIDE 5 MILLIGRAM(S): 5 TABLET ORAL at 10:00

## 2019-08-21 RX ADMIN — CHLORHEXIDINE GLUCONATE 1 APPLICATION(S): 213 SOLUTION TOPICAL at 17:27

## 2019-08-21 RX ADMIN — DIPHENHYDRAMINE HYDROCHLORIDE AND LIDOCAINE HYDROCHLORIDE AND ALUMINUM HYDROXIDE AND MAGNESIUM HYDRO 10 MILLILITER(S): KIT at 05:39

## 2019-08-21 RX ADMIN — SIMETHICONE 80 MILLIGRAM(S): 80 TABLET, CHEWABLE ORAL at 23:00

## 2019-08-21 RX ADMIN — Medication 2: at 09:05

## 2019-08-21 RX ADMIN — Medication 5 MILLILITER(S): at 05:40

## 2019-08-21 RX ADMIN — PANTOPRAZOLE SODIUM 40 MILLIGRAM(S): 20 TABLET, DELAYED RELEASE ORAL at 05:37

## 2019-08-21 RX ADMIN — SIMETHICONE 80 MILLIGRAM(S): 80 TABLET, CHEWABLE ORAL at 18:45

## 2019-08-21 RX ADMIN — SIMETHICONE 80 MILLIGRAM(S): 80 TABLET, CHEWABLE ORAL at 05:36

## 2019-08-21 RX ADMIN — Medication 25 MILLIGRAM(S): at 05:36

## 2019-08-21 RX ADMIN — Medication 3 MILLILITER(S): at 05:39

## 2019-08-21 RX ADMIN — Medication 3 MILLILITER(S): at 18:45

## 2019-08-21 RX ADMIN — Medication 1: at 13:14

## 2019-08-21 RX ADMIN — Medication 100 MILLIGRAM(S): at 10:16

## 2019-08-21 NOTE — PROGRESS NOTE ADULT - PROBLEM SELECTOR PLAN 2
The patient is neutropenic, febrile   s/p pancx 8/19  Cultures from 8/13 no growth to date   Continue vanco 1 g q12h empirically with Vanco trough with 4th dose   Continue Posaconazole, change Cefepime to Aztreonam as patient has persistent pruritis.  (Monitor QTc BIW mon/Thurs  with Posaconazole and Study drug)  8/9 CT chest: Pulmonary fibrosis and traction bronchiectasis without honeycombing.   Overall these findings appear progressed compared to CT abdomen 7/4/2017  Associated ground-glass opacity may be related to active interstitial lung disease, fluid overload, less likely infection The patient is neutropenic, afebrile ( last fever on 8/19)  s/p pancx 8/19  Cultures  no growth to date   Continue vanco 1 g q12h empirically with Vanco trough 12.2 then wkly  Continue Posaconazole, change Cefepime to Aztreonam as patient has persistent pruritis.  (Monitor QTc BIW mon/Thurs  with Posaconazole and Study drug)  8/9 CT chest: Pulmonary fibrosis and traction bronchiectasis without honeycombing.   Overall these findings appear progressed compared to CT abdomen 7/4/2017  Associated ground-glass opacity may be related to active interstitial lung disease, fluid overload, less likely infection

## 2019-08-21 NOTE — PROGRESS NOTE ADULT - ASSESSMENT
65 y/o F w/ uncontrolled Type 2 DM w/ hyper and hypoglycemia A1c 10.6%, HTN, HLD, Hypothyroidism admitted for chemo for newly diagnosed AML. BG levels mostly at goal on present insulin regimen. Hyperglycemia this AM attributable eating overnight which pt does occasionally but not nightly. No hypoglycemia. BG goal (100-180mg/dl).

## 2019-08-21 NOTE — PROGRESS NOTE ADULT - PROBLEM SELECTOR PLAN 10
No pharmacologic ppx 2/2 thrombocytopenia  Encourage ambulation  change diet to soft mechanical  with Glucerna       Contact Information [phone]

## 2019-08-21 NOTE — PROGRESS NOTE ADULT - PROBLEM SELECTOR PLAN 4
grade 1, stable   Monitor daily CMP  Avoid further hepatotoxic medications grade 1, stable   Will obtain abd sono   Monitor daily CMP  Avoid further hepatotoxic medications

## 2019-08-21 NOTE — PROGRESS NOTE ADULT - SUBJECTIVE AND OBJECTIVE BOX
Diabetes Follow up note:  Interval Hx:  64 year old female w/uncontrolled T2DM w/neuropathy, retinopathy w/AML here for induction chemotherapy. BG values at goal yesterday however pt had high fasting sugar this AM. Pt seen at bedside. Currently NPO awaiting U/S of abdomen. Reports waking up hungry at 4:30am so had snack of tapioca pudding. Did not experience any hypoglycemia symptoms. Now on mechanical soft diet as pt having increased mouth pain/sores.     Review of Systems:  General: "Do you know why I need an ultrasound.   GI: Tolerating POs without any N/V/D. + mild abdominal discomfort.   CV: No CP/SOB  ENDO: No S&Sx of hypoglycemia  MEDS:    insulin glargine Injectable (LANTUS) 16 Unit(s) SubCutaneous at bedtime  insulin lispro (HumaLOG) corrective regimen sliding scale   SubCutaneous three times a day before meals  insulin lispro (HumaLOG) corrective regimen sliding scale   SubCutaneous at bedtime  insulin lispro Injectable (HumaLOG) 10 Unit(s) SubCutaneous three times a day before meals  levothyroxine 75 MICROGram(s) Oral daily    acyclovir   Oral Tab/Cap 400 milliGRAM(s) Oral two times a day  aztreonam  IVPB 2000 milliGRAM(s) IV Intermittent every 8 hours  aztreonam  IVPB      posaconazole DR Tablet 300 milliGRAM(s) Oral daily  vancomycin  IVPB 1000 milliGRAM(s) IV Intermittent every 12 hours  vancomycin  IVPB        Allergies    cefepime (Rash)        PE:  General: Female lying in bed. NAD.   Vital Signs Last 24 Hrs  T(C): 36.8 (21 Aug 2019 13:25), Max: 37.7 (21 Aug 2019 01:07)  T(F): 98.3 (21 Aug 2019 13:25), Max: 99.9 (21 Aug 2019 01:07)  HR: 83 (21 Aug 2019 13:25) (83 - 95)  BP: 122/68 (21 Aug 2019 13:25) (122/68 - 144/79)  BP(mean): --  RR: 18 (21 Aug 2019 13:25) (18 - 18)  SpO2: 98% (21 Aug 2019 13:25) (94% - 99%)  Abd: Soft, NT,ND,   Extremities: Warm. trace LE edema  Neuro: A&O X3    LABS:    POCT Blood Glucose.: 162 mg/dL (08-21-19 @ 12:51)  POCT Blood Glucose.: 249 mg/dL (08-21-19 @ 08:59)  POCT Blood Glucose.: 157 mg/dL (08-20-19 @ 21:01)  POCT Blood Glucose.: 150 mg/dL (08-20-19 @ 17:22)  POCT Blood Glucose.: 142 mg/dL (08-20-19 @ 11:49)  POCT Blood Glucose.: 159 mg/dL (08-20-19 @ 07:38)  POCT Blood Glucose.: 119 mg/dL (08-19-19 @ 21:10)  POCT Blood Glucose.: 137 mg/dL (08-19-19 @ 18:42)  POCT Blood Glucose.: 182 mg/dL (08-19-19 @ 13:01)  POCT Blood Glucose.: 171 mg/dL (08-19-19 @ 09:49)  POCT Blood Glucose.: 184 mg/dL (08-18-19 @ 21:02)  POCT Blood Glucose.: 118 mg/dL (08-18-19 @ 18:27)                            7.3    0.8   )-----------( 16       ( 21 Aug 2019 06:24 )             21.7       08-21    134<L>  |  102  |  8   ----------------------------<  192<H>  4.0   |  23  |  0.60    Ca    8.0<L>      21 Aug 2019 06:24  Phos  2.4     08-21  Mg     1.7     08-21    TPro  6.4  /  Alb  3.1<L>  /  TBili  0.4  /  DBili  x   /  AST  22  /  ALT  46<H>  /  AlkPhos  237<H>  08-21      Hemoglobin A1C, Whole Blood: 10.6 % <H> [4.0 - 5.6] (08-07-19 @ 08:36)            Contact number: rosette [phone] or [phone]

## 2019-08-21 NOTE — PROGRESS NOTE ADULT - PROBLEM SELECTOR PLAN 1
Enrolled on Pf[employer] BRIGHT trial receiving Induction chemotherapy with Dauno/Cytarabine and daily Glasdegib vs Placebo  Molecular studies pending  8/20 Day 14 BM bx, fu result   Monitor CBC/Lytes and transfuse/replete PRN  Hypokalemia: KCL 20 meq IVPB x3  Hypomagnesemia: Mg sulfate 1 g IVPB x1  Strict Is and Os/ Daily weights/ Mouth Care  8/20 Lasix 20 mg IV x1 for I and O's 1.6 L positive and trace edema   Allopurinol 300mg PO daily-stopped 2/2 rash  IVF hydration  Antiemetics  ECOG performance status 2  Atarax ATC for itching and oral benadryl prn. added triamcinolone ointment - rash improving Enrolled on [employer] BRIGHT trial receiving Induction chemotherapy with Dauno/Cytarabine and daily Glasdegib vs Placebo  Molecular studies pending  8/20 Day 14 BM bx, fu result   Monitor CBC/Lytes and transfuse/replete PRN  Hypophosphatemia: K phos 15 Mmmol IVPB x1  Strict Is and Os/ Daily weights/ Mouth Care  8/21 Lasix 40mg IV x1 for I and O's 1.6 L positive and trace edema   Allopurinol 300mg PO daily-stopped 2/2 rash  IVF hydration  Antiemetics  ECOG performance status 2  Atarax ATC for itching and oral benadryl prn. added triamcinolone ointment - rash improving

## 2019-08-21 NOTE — PROGRESS NOTE ADULT - ATTENDING COMMENTS
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 Pt on Pfizer trial 7+3+ Glasdegib/placebo.  Patient here for her induction chemotherapy on trial.  Day 14.   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  Afeb xc 48 hrs, no chills untikl 8/19 PM - temp to 100.9, chills.  Vanco added pre temp spike.  On aztreonam, posa, vanco, acyclovir.  No N/V, now with diarrhea x 1.  Pruritus resolving on ATC atarax, topical triamcinolone.  Rash less.    On 8/18 slightly improved. Also possibly due to study drug.  Then incr rash over w/e.  Cefepime changed to aztreonam 8/17.     Rash CTCAE allergic reaction grade 2.    Mild-mod  mucositis.   Mild AST/ALT elevation, alk phos sl incr; stable, follow for now  OOB      Supportive care, anti-emetics, IVFs,  I > O, to cont to diurese as needed.  No edema.     Lantus QHS, following fingersticks closely, endocrine consult appreciated. h/o of linnea poorly controlled diabetes.   ECOG performance status 2.  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 Pt on Pfizer trial 7+3+ Glasdegib/placebo.  Patient here for her induction chemotherapy on trial.  Day 15.   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  Afeb xc 48 hrs, no chills until 8/19 PM - temp to 100.9, chills.  Vanco added pre temp spike.  Vanco trough ok. On aztreonam, posa, vanco, acyclovir.  No N/V, now with diarrhea x 1.  Less mucositis.  Continues to have diarrhea, C. diff toxin (-).  I > O, being diuresed  Pruritus resolving on ATC atarax, topical triamcinolone.  Rash almost resolved.   Cefepime changed to aztreonam 8/17.   Rash CTCAE allergic reaction grade 2.       Mild AST/ALT elevation, alk phos sl incr; stable, follow for now.  Check liver U/S.  OOB      Supportive care, anti-emetics, IVFs,        Lantus QHS, following fingersticks closely, endocrine consult appreciated. h/o of linnea poorly controlled diabetes.   ECOG performance status 2.  BMA/Bx 8/20/19 - day 14

## 2019-08-21 NOTE — PROGRESS NOTE ADULT - ASSESSMENT
This is a 65 yo F with PMHx of T2DM, RA, Depression and now newly diagnosed AML enrolled on Pfizer [facility] trial admitted for Induction chemotherapy with Dauno/Cytarabine and daily Glasdegib vs Placebo. The patients hospital course has been complicated by  neutropenic fever transaminitis,  RENTERIA due to interstitial lung disease and volume overload, and drug rash from Cefepime and poss from Allopurinol. The patient has pancytopenia from chemotherapy and/or disease. This is a 65 yo F with PMHx of T2DM, RA, Depression and now newly diagnosed AML enrolled on Pfizer [facility] trial admitted for Induction chemotherapy with Dauno/Cytarabine and daily Glasdegib vs Placebo. The patients hospital course has been complicated by  neutropenic fever, transaminitis,  RENTERIA due to interstitial lung disease ,volume overload,  diarrhea/c diff negative and drug rash from Cefepime and poss from Allopurinol. The patient has pancytopenia from chemotherapy and/or disease.

## 2019-08-21 NOTE — PROGRESS NOTE ADULT - PROBLEM SELECTOR PLAN 1
-test BG AC/HS  -Increase Lantus 18 units QHS  -c/w Humalog 10 units AC meals  -c/w Humalog low correction scale AC and Low HS scale  DISPO: basal/bolus, doses to be determined  discussed w/pt and team  pager: [phone].

## 2019-08-21 NOTE — ADVANCED PRACTICE NURSE CONSULT - ASSESSMENT
Induction Cycle 1   Day 15  Arrive to find pt up in chair eating brekfast, alert and oriented times four. During interview patient stated she felt better in term of itching, and nausea. The rash on upper body improving. Pt continue using nutritional supplement to help with increasing appetite. No complain of itching, Dr Navas plan is to decrease the frequency of the Atarax.  Patient stated now having no more watery stool NP C-Diff result negative. Pt was examine by Dr Latham during rounds. Left pt up in chair talking on telephone.

## 2019-08-22 LAB
ALBUMIN SERPL ELPH-MCNC: 2.9 G/DL — LOW (ref 3.3–5)
ALP SERPL-CCNC: 213 U/L — HIGH (ref 40–120)
ALT FLD-CCNC: 36 U/L — SIGNIFICANT CHANGE UP (ref 10–45)
ANION GAP SERPL CALC-SCNC: 11 MMOL/L — SIGNIFICANT CHANGE UP (ref 5–17)
APTT BLD: 30.7 SEC — SIGNIFICANT CHANGE UP (ref 27.5–36.3)
AST SERPL-CCNC: 15 U/L — SIGNIFICANT CHANGE UP (ref 10–40)
BASOPHILS # BLD AUTO: 0 K/UL — SIGNIFICANT CHANGE UP (ref 0–0.2)
BASOPHILS NFR BLD AUTO: 1 % — SIGNIFICANT CHANGE UP (ref 0–2)
BILIRUB SERPL-MCNC: 0.4 MG/DL — SIGNIFICANT CHANGE UP (ref 0.2–1.2)
BUN SERPL-MCNC: 10 MG/DL — SIGNIFICANT CHANGE UP (ref 7–23)
CALCIUM SERPL-MCNC: 8 MG/DL — LOW (ref 8.4–10.5)
CHLORIDE SERPL-SCNC: 101 MMOL/L — SIGNIFICANT CHANGE UP (ref 96–108)
CO2 SERPL-SCNC: 22 MMOL/L — SIGNIFICANT CHANGE UP (ref 22–31)
CREAT SERPL-MCNC: 0.59 MG/DL — SIGNIFICANT CHANGE UP (ref 0.5–1.3)
EOSINOPHIL # BLD AUTO: 0 K/UL — SIGNIFICANT CHANGE UP (ref 0–0.5)
EOSINOPHIL NFR BLD AUTO: 0 % — SIGNIFICANT CHANGE UP (ref 0–6)
GLUCOSE BLDC GLUCOMTR-MCNC: 105 MG/DL — HIGH (ref 70–99)
GLUCOSE BLDC GLUCOMTR-MCNC: 165 MG/DL — HIGH (ref 70–99)
GLUCOSE BLDC GLUCOMTR-MCNC: 204 MG/DL — HIGH (ref 70–99)
GLUCOSE BLDC GLUCOMTR-MCNC: 249 MG/DL — HIGH (ref 70–99)
GLUCOSE SERPL-MCNC: 194 MG/DL — HIGH (ref 70–99)
HCT VFR BLD CALC: 21.6 % — LOW (ref 34.5–45)
HGB BLD-MCNC: 7.1 G/DL — LOW (ref 11.5–15.5)
INR BLD: 1.17 RATIO — HIGH (ref 0.88–1.16)
LDH SERPL L TO P-CCNC: 261 U/L — HIGH (ref 50–242)
LYMPHOCYTES # BLD AUTO: 0.8 K/UL — LOW (ref 1–3.3)
LYMPHOCYTES # BLD AUTO: 97.8 % — HIGH (ref 13–44)
MAGNESIUM SERPL-MCNC: 1.7 MG/DL — SIGNIFICANT CHANGE UP (ref 1.6–2.6)
MCHC RBC-ENTMCNC: 28.1 PG — SIGNIFICANT CHANGE UP (ref 27–34)
MCHC RBC-ENTMCNC: 33 GM/DL — SIGNIFICANT CHANGE UP (ref 32–36)
MCV RBC AUTO: 85.2 FL — SIGNIFICANT CHANGE UP (ref 80–100)
MONOCYTES # BLD AUTO: 0 K/UL — SIGNIFICANT CHANGE UP (ref 0–0.9)
MONOCYTES NFR BLD AUTO: 0.2 % — LOW (ref 2–14)
NEUTROPHILS # BLD AUTO: 0 K/UL — LOW (ref 1.8–7.4)
NEUTROPHILS NFR BLD AUTO: 1 % — LOW (ref 43–77)
PHOSPHATE SERPL-MCNC: 3.1 MG/DL — SIGNIFICANT CHANGE UP (ref 2.5–4.5)
PLAT MORPH BLD: NORMAL — SIGNIFICANT CHANGE UP
PLATELET # BLD AUTO: 9 K/UL — CRITICAL LOW (ref 150–400)
POTASSIUM SERPL-MCNC: 3.9 MMOL/L — SIGNIFICANT CHANGE UP (ref 3.5–5.3)
POTASSIUM SERPL-SCNC: 3.9 MMOL/L — SIGNIFICANT CHANGE UP (ref 3.5–5.3)
PROT SERPL-MCNC: 6.6 G/DL — SIGNIFICANT CHANGE UP (ref 6–8.3)
PROTHROM AB SERPL-ACNC: 13.4 SEC — HIGH (ref 10–12.9)
RBC # BLD: 2.54 M/UL — LOW (ref 3.8–5.2)
RBC # FLD: 11.8 % — SIGNIFICANT CHANGE UP (ref 10.3–14.5)
RBC BLD AUTO: SIGNIFICANT CHANGE UP
SODIUM SERPL-SCNC: 134 MMOL/L — LOW (ref 135–145)
URATE SERPL-MCNC: 1.3 MG/DL — LOW (ref 2.5–7)
WBC # BLD: 0.9 K/UL — CRITICAL LOW (ref 3.8–10.5)
WBC # FLD AUTO: 0.9 K/UL — CRITICAL LOW (ref 3.8–10.5)

## 2019-08-22 PROCEDURE — 99232 SBSQ HOSP IP/OBS MODERATE 35: CPT

## 2019-08-22 PROCEDURE — 76700 US EXAM ABDOM COMPLETE: CPT | Mod: 26

## 2019-08-22 RX ORDER — HYDROCORTISONE 1 %
1 OINTMENT (GRAM) TOPICAL THREE TIMES A DAY
Refills: 0 | Status: DISCONTINUED | OUTPATIENT
Start: 2019-08-22 | End: 2019-08-27

## 2019-08-22 RX ADMIN — Medication 100 MILLIGRAM(S): at 01:02

## 2019-08-22 RX ADMIN — DIPHENHYDRAMINE HYDROCHLORIDE AND LIDOCAINE HYDROCHLORIDE AND ALUMINUM HYDROXIDE AND MAGNESIUM HYDRO 10 MILLILITER(S): KIT at 11:18

## 2019-08-22 RX ADMIN — Medication 3 MILLILITER(S): at 17:02

## 2019-08-22 RX ADMIN — Medication 5 MILLILITER(S): at 05:09

## 2019-08-22 RX ADMIN — DIPHENHYDRAMINE HYDROCHLORIDE AND LIDOCAINE HYDROCHLORIDE AND ALUMINUM HYDROXIDE AND MAGNESIUM HYDRO 10 MILLILITER(S): KIT at 05:09

## 2019-08-22 RX ADMIN — CHLORHEXIDINE GLUCONATE 1 APPLICATION(S): 213 SOLUTION TOPICAL at 09:06

## 2019-08-22 RX ADMIN — SIMETHICONE 80 MILLIGRAM(S): 80 TABLET, CHEWABLE ORAL at 11:21

## 2019-08-22 RX ADMIN — DIPHENHYDRAMINE HYDROCHLORIDE AND LIDOCAINE HYDROCHLORIDE AND ALUMINUM HYDROXIDE AND MAGNESIUM HYDRO 10 MILLILITER(S): KIT at 17:04

## 2019-08-22 RX ADMIN — SODIUM CHLORIDE 4 MILLILITER(S): 9 INJECTION INTRAMUSCULAR; INTRAVENOUS; SUBCUTANEOUS at 17:02

## 2019-08-22 RX ADMIN — Medication 10 UNIT(S): at 19:05

## 2019-08-22 RX ADMIN — SIMETHICONE 80 MILLIGRAM(S): 80 TABLET, CHEWABLE ORAL at 17:03

## 2019-08-22 RX ADMIN — Medication 10 UNIT(S): at 09:09

## 2019-08-22 RX ADMIN — Medication 100 MILLIGRAM(S): at 17:00

## 2019-08-22 RX ADMIN — INSULIN GLARGINE 18 UNIT(S): 100 INJECTION, SOLUTION SUBCUTANEOUS at 22:14

## 2019-08-22 RX ADMIN — Medication 100 MILLIGRAM(S): at 05:08

## 2019-08-22 RX ADMIN — Medication 5 MILLILITER(S): at 22:16

## 2019-08-22 RX ADMIN — Medication 250 MILLIGRAM(S): at 10:12

## 2019-08-22 RX ADMIN — Medication 100 MILLIGRAM(S): at 14:05

## 2019-08-22 RX ADMIN — LOSARTAN POTASSIUM 100 MILLIGRAM(S): 100 TABLET, FILM COATED ORAL at 05:08

## 2019-08-22 RX ADMIN — Medication 25 MILLIGRAM(S): at 05:08

## 2019-08-22 RX ADMIN — Medication 75 MICROGRAM(S): at 05:08

## 2019-08-22 RX ADMIN — Medication 10 UNIT(S): at 14:11

## 2019-08-22 RX ADMIN — Medication 400 MILLIGRAM(S): at 05:07

## 2019-08-22 RX ADMIN — POSACONAZOLE 300 MILLIGRAM(S): 100 TABLET, DELAYED RELEASE ORAL at 11:21

## 2019-08-22 RX ADMIN — Medication 25 MILLIGRAM(S): at 14:05

## 2019-08-22 RX ADMIN — Medication 2: at 14:09

## 2019-08-22 RX ADMIN — Medication 400 MILLIGRAM(S): at 17:01

## 2019-08-22 RX ADMIN — LIDOCAINE 2 MILLILITER(S): 4 CREAM TOPICAL at 14:07

## 2019-08-22 RX ADMIN — Medication 3 MILLILITER(S): at 05:11

## 2019-08-22 RX ADMIN — Medication 100 MILLIGRAM(S): at 22:13

## 2019-08-22 RX ADMIN — SODIUM CHLORIDE 50 MILLILITER(S): 9 INJECTION INTRAMUSCULAR; INTRAVENOUS; SUBCUTANEOUS at 05:07

## 2019-08-22 RX ADMIN — Medication 2: at 19:05

## 2019-08-22 RX ADMIN — SIMETHICONE 80 MILLIGRAM(S): 80 TABLET, CHEWABLE ORAL at 05:08

## 2019-08-22 RX ADMIN — GABAPENTIN 600 MILLIGRAM(S): 400 CAPSULE ORAL at 05:08

## 2019-08-22 RX ADMIN — Medication 5 MILLILITER(S): at 14:07

## 2019-08-22 RX ADMIN — Medication 1 APPLICATION(S): at 05:10

## 2019-08-22 RX ADMIN — SODIUM CHLORIDE 4 MILLILITER(S): 9 INJECTION INTRAMUSCULAR; INTRAVENOUS; SUBCUTANEOUS at 05:10

## 2019-08-22 RX ADMIN — Medication 250 MILLIGRAM(S): at 22:13

## 2019-08-22 RX ADMIN — Medication 100 MILLIGRAM(S): at 09:10

## 2019-08-22 RX ADMIN — GABAPENTIN 600 MILLIGRAM(S): 400 CAPSULE ORAL at 17:03

## 2019-08-22 RX ADMIN — SODIUM CHLORIDE 50 MILLILITER(S): 9 INJECTION INTRAMUSCULAR; INTRAVENOUS; SUBCUTANEOUS at 17:04

## 2019-08-22 RX ADMIN — Medication 3 MILLILITER(S): at 23:23

## 2019-08-22 RX ADMIN — Medication 25 MILLIGRAM(S): at 17:02

## 2019-08-22 RX ADMIN — Medication 1: at 09:09

## 2019-08-22 RX ADMIN — LIDOCAINE 2 MILLILITER(S): 4 CREAM TOPICAL at 11:19

## 2019-08-22 RX ADMIN — PANTOPRAZOLE SODIUM 40 MILLIGRAM(S): 20 TABLET, DELAYED RELEASE ORAL at 05:15

## 2019-08-22 NOTE — ADVANCED PRACTICE NURSE CONSULT - ASSESSMENT
Induction Cycle 1   Day 16  Arrive to find pt up in chair eating brekfast, alert and oriented times four. During interview patient stated she felt better in term of itching, and nausea. The rash on upper body improving. Pt continue using nutritional supplement to help with increasing appetite.  Patient continue to have mild mouth pain and receiving pan medication with relief.  Pt was examine by Dr Latham during rounds new rash noted to upper back .Patient was also informed by Dr Latham of that bone marrow done on day 14 is clean and disease free.  Left pt up in chair talking on telephone.

## 2019-08-22 NOTE — PROGRESS NOTE ADULT - SUBJECTIVE AND OBJECTIVE BOX
Diabetes Follow up note:  Interval Hx:  64 year old female w/uncontrolled T2DM w/neuropathy, retinopathy w/AML here for induction chemotherapy. Pt was NPO yesterday afternoon and ate dinner after procedure. Family provided pt with sweet potato and corn porridge last night. Still endorsing mouth pain when eating, "feels like I have rats running in my mouth".    Review of Systems:  General: + mouth pain   GI: Tolerating POs without any N/V/D/ABD PAIN.  CV: No CP/SOB  ENDO: No S&Sx of hypoglycemia  MEDS:    insulin glargine Injectable (LANTUS) 18 Unit(s) SubCutaneous at bedtime  insulin lispro (HumaLOG) corrective regimen sliding scale   SubCutaneous three times a day before meals  insulin lispro (HumaLOG) corrective regimen sliding scale   SubCutaneous at bedtime  insulin lispro Injectable (HumaLOG) 10 Unit(s) SubCutaneous three times a day before meals  levothyroxine 75 MICROGram(s) Oral daily    acyclovir   Oral Tab/Cap 400 milliGRAM(s) Oral two times a day  aztreonam  IVPB 2000 milliGRAM(s) IV Intermittent every 8 hours  aztreonam  IVPB      posaconazole DR Tablet 300 milliGRAM(s) Oral daily  vancomycin  IVPB 1000 milliGRAM(s) IV Intermittent every 12 hours  vancomycin  IVPB        Allergies    cefepime (Rash)        PE:  General: Female lying in bed. NAD.   Vital Signs Last 24 Hrs  T(C): 37.1 (22 Aug 2019 12:25), Max: 37.9 (22 Aug 2019 05:00)  T(F): 98.8 (22 Aug 2019 12:25), Max: 100.3 (22 Aug 2019 05:00)  HR: 90 (22 Aug 2019 12:25) (83 - 93)  BP: 146/76 (22 Aug 2019 12:25) (118/66 - 147/70)  BP(mean): --  RR: 18 (22 Aug 2019 12:25) (18 - 18)  SpO2: 97% (22 Aug 2019 12:25) (94% - 98%)  Abd: Soft, NT,ND,   Extremities: Warm. trace LE edema.   Neuro: A&O X3    LABS:    POCT Blood Glucose.: 165 mg/dL (08-22-19 @ 09:04)  POCT Blood Glucose.: 344 mg/dL (08-21-19 @ 21:30)  POCT Blood Glucose.: 181 mg/dL (08-21-19 @ 17:02)  POCT Blood Glucose.: 162 mg/dL (08-21-19 @ 12:51)  POCT Blood Glucose.: 249 mg/dL (08-21-19 @ 08:59)  POCT Blood Glucose.: 157 mg/dL (08-20-19 @ 21:01)  POCT Blood Glucose.: 150 mg/dL (08-20-19 @ 17:22)  POCT Blood Glucose.: 142 mg/dL (08-20-19 @ 11:49)  POCT Blood Glucose.: 159 mg/dL (08-20-19 @ 07:38)  POCT Blood Glucose.: 119 mg/dL (08-19-19 @ 21:10)  POCT Blood Glucose.: 137 mg/dL (08-19-19 @ 18:42)                            7.1    0.9   )-----------( 9        ( 22 Aug 2019 06:52 )             21.6       08-22    134<L>  |  101  |  10  ----------------------------<  194<H>  3.9   |  22  |  0.59    Ca    8.0<L>      22 Aug 2019 06:51  Phos  3.1     08-22  Mg     1.7     08-22    TPro  6.6  /  Alb  2.9<L>  /  TBili  0.4  /  DBili  x   /  AST  15  /  ALT  36  /  AlkPhos  213<H>  08-22        Hemoglobin A1C, Whole Blood: 10.6 % <H> [4.0 - 5.6] (08-07-19 @ 08:36)            Contact number: rosette [phone] or [phone]

## 2019-08-22 NOTE — PROGRESS NOTE ADULT - SUBJECTIVE AND OBJECTIVE BOX
Diagnosis: AML FLT 3 (-)    Protocol/Chemo Regimen: Enrolled on Pfizer [facility] 1019-  Induction chemotherapy with Dauno/Cytarabine and daily Glasdegib vs Placebo    Day: 16    Pt endorsed:  slight improved  decreased appetite and fatigue; watery BM x 5 yesterday;  Pruritis improving; +slight improved mouth discomfort; improved SOB/RENTERIA    Review of Systems: Patient denies dizziness, HA, visual changes, chest pain, palpitations, abdominal pain, nausea, vomiting or dysuria.    Pain scale: 7/10 mouth    Diet: soft mech, Consistent Carbohydrate, Glucerna TID     Allergies    cefepime (Rash)    Intolerances        ANTIMICROBIALS  acyclovir   Oral Tab/Cap 400 milliGRAM(s) Oral two times a day  aztreonam  IVPB 2000 milliGRAM(s) IV Intermittent every 8 hours  aztreonam  IVPB      posaconazole DR Tablet 300 milliGRAM(s) Oral daily  vancomycin  IVPB 1000 milliGRAM(s) IV Intermittent every 12 hours  vancomycin  IVPB          HEME/ONC MEDICATIONS      STANDING MEDICATIONS  ALBUTerol    90 MICROgram(s) HFA Inhaler 1 Puff(s) Inhalation every 6 hours  ALBUTerol/ipratropium for Nebulization 3 milliLiter(s) Nebulizer every 6 hours  benzonatate 100 milliGRAM(s) Oral three times a day  Biotene Dry Mouth Oral Rinse 5 milliLiter(s) Swish and Spit three times a day  chlorhexidine 2% Cloths 1 Application(s) Topical <User Schedule>  dextrose 5%. 1000 milliLiter(s) IV Continuous <Continuous>  dextrose 50% Injectable 12.5 Gram(s) IV Push once  dextrose 50% Injectable 25 Gram(s) IV Push once  dextrose 50% Injectable 25 Gram(s) IV Push once  FIRST- Mouthwash  BLM 10 milliLiter(s) Swish and Spit four times a day  gabapentin 600 milliGRAM(s) Oral two times a day  hydrOXYzine hydrochloride 25 milliGRAM(s) Oral every 6 hours  insulin glargine Injectable (LANTUS) 18 Unit(s) SubCutaneous at bedtime  insulin lispro (HumaLOG) corrective regimen sliding scale   SubCutaneous three times a day before meals  insulin lispro (HumaLOG) corrective regimen sliding scale   SubCutaneous at bedtime  insulin lispro Injectable (HumaLOG) 10 Unit(s) SubCutaneous three times a day before meals  investigational chemo - general 1 Tablet(s) Oral every 24 hours  levothyroxine 75 MICROGram(s) Oral daily  lidocaine 2% Injectable 20 milliLiter(s) Local Injection once  losartan 100 milliGRAM(s) Oral daily  pantoprazole    Tablet 40 milliGRAM(s) Oral before breakfast  simethicone 80 milliGRAM(s) Chew four times a day  sodium chloride 0.9%. 1000 milliLiter(s) IV Continuous <Continuous>  sodium chloride 3%  Inhalation 4 milliLiter(s) Inhalation two times a day  tiotropium 18 MICROgram(s) Capsule 1 Capsule(s) Inhalation daily      PRN MEDICATIONS  acetaminophen   Tablet .. 650 milliGRAM(s) Oral every 6 hours PRN  aluminum hydroxide/magnesium hydroxide/simethicone Suspension 30 milliLiter(s) Oral every 6 hours PRN  dextrose 40% Gel 15 Gram(s) Oral once PRN  diphenhydrAMINE 25 milliGRAM(s) Oral every 6 hours PRN  glucagon  Injectable 1 milliGRAM(s) IntraMuscular once PRN  lidocaine 2% Viscous 2 milliLiter(s) Oral every 2 hours PRN  loperamide 2 milliGRAM(s) Oral every 4 hours PRN  melatonin 3 milliGRAM(s) Oral once PRN  metoclopramide Injectable 10 milliGRAM(s) IV Push every 6 hours PRN  oxyCODONE    IR 5 milliGRAM(s) Oral every 4 hours PRN  polyethylene glycol 3350 17 Gram(s) Oral two times a day PRN        Vital Signs Last 24 Hrs  T(C): 37.9 (22 Aug 2019 05:00), Max: 37.9 (22 Aug 2019 05:00)  T(F): 100.3 (22 Aug 2019 05:00), Max: 100.3 (22 Aug 2019 05:00)  HR: 93 (22 Aug 2019 05:00) (83 - 93)  BP: 141/62 (22 Aug 2019 05:00) (118/66 - 147/70)  BP(mean): --  RR: 18 (22 Aug 2019 05:00) (18 - 18)  SpO2: 96% (22 Aug 2019 05:00) (94% - 98%)    PHYSICAL EXAM  General: NAD  HEENT:  clear oropharynx, anicteric sclera  Neck: supple  CV: (+) S1/S2 RRR  Lungs: clear to auscultation, no wheezes or rales  Abdomen: soft, non-tender, non-distended (+) BS x 4Q  Ext: no edema  Skin: no rashes  Neuro: alert and oriented X 3  Central Line:           LABS:                        7.1    0.9   )-----------( 9        ( 22 Aug 2019 06:52 )             21.6         Mean Cell Volume : 85.2 fl  Mean Cell Hemoglobin : 28.1 pg  Mean Cell Hemoglobin Concentration : 33.0 gm/dL  Auto Neutrophil # : 0.0 K/uL  Auto Lymphocyte # : 0.8 K/uL  Auto Monocyte # : 0.0 K/uL  Auto Eosinophil # : 0.0 K/uL  Auto Basophil # : 0.0 K/uL  Auto Neutrophil % : 1.0 %  Auto Lymphocyte % : 97.8 %  Auto Monocyte % : 0.2 %  Auto Eosinophil % : 0.0 %  Auto Basophil % : 1.0 %      08-22    134<L>  |  101  |  10  ----------------------------<  194<H>  3.9   |  22  |  0.59    Ca    8.0<L>      22 Aug 2019 06:51  Phos  3.1     08-22  Mg     1.7     08-22    TPro  6.6  /  Alb  2.9<L>  /  TBili  0.4  /  DBili  x   /  AST  15  /  ALT  36  /  AlkPhos  213<H>  08-22      Mg 1.7  Phos 3.1      PT/INR - ( 22 Aug 2019 06:52 )   PT: 13.4 sec;   INR: 1.17 ratio         PTT - ( 22 Aug 2019 06:52 )  PTT:30.7 sec      Uric Acid 1.3        RECENT CULTURES:  08-19 @ 22:08  .Blood  --  --  --    No growth to date.  --  08-19 @ 19:02  .Urine  --  --  --    <10,000 CFU/mL Normal Urogenital Maryana  --      RADIOLOGY & ADDITIONAL STUDIES: Diagnosis: AML FLT 3 (-)    Protocol/Chemo Regimen: Enrolled on Pf[employer] BRIGHT 1019-  s/p Induction chemotherapy with Dauno/Cytarabine and daily Glasdegib vs Placebo    Day: 16    Pt endorsed:  + Pruritis , +slight improved mouth discomfort; improved SOB/RENTERIA    Review of Systems: Patient denies dizziness, HA, visual changes, chest pain, palpitations, abdominal pain, nausea, vomiting or dysuria.    Pain scale: 7/10 mouth/throat     Diet: soft mech, Consistent Carbohydrate, Glucerna TID     Allergies    cefepime (Rash)    Intolerances        ANTIMICROBIALS  acyclovir   Oral Tab/Cap 400 milliGRAM(s) Oral two times a day  aztreonam  IVPB 2000 milliGRAM(s) IV Intermittent every 8 hours  aztreonam  IVPB      posaconazole DR Tablet 300 milliGRAM(s) Oral daily  vancomycin  IVPB 1000 milliGRAM(s) IV Intermittent every 12 hours  vancomycin  IVPB          HEME/ONC MEDICATIONS      STANDING MEDICATIONS  ALBUTerol    90 MICROgram(s) HFA Inhaler 1 Puff(s) Inhalation every 6 hours  ALBUTerol/ipratropium for Nebulization 3 milliLiter(s) Nebulizer every 6 hours  benzonatate 100 milliGRAM(s) Oral three times a day  Biotene Dry Mouth Oral Rinse 5 milliLiter(s) Swish and Spit three times a day  chlorhexidine 2% Cloths 1 Application(s) Topical <User Schedule>  dextrose 5%. 1000 milliLiter(s) IV Continuous <Continuous>  dextrose 50% Injectable 12.5 Gram(s) IV Push once  dextrose 50% Injectable 25 Gram(s) IV Push once  dextrose 50% Injectable 25 Gram(s) IV Push once  FIRST- Mouthwash  BLM 10 milliLiter(s) Swish and Spit four times a day  gabapentin 600 milliGRAM(s) Oral two times a day  hydrOXYzine hydrochloride 25 milliGRAM(s) Oral every 6 hours  insulin glargine Injectable (LANTUS) 18 Unit(s) SubCutaneous at bedtime  insulin lispro (HumaLOG) corrective regimen sliding scale   SubCutaneous three times a day before meals  insulin lispro (HumaLOG) corrective regimen sliding scale   SubCutaneous at bedtime  insulin lispro Injectable (HumaLOG) 10 Unit(s) SubCutaneous three times a day before meals  investigational chemo - general 1 Tablet(s) Oral every 24 hours  levothyroxine 75 MICROGram(s) Oral daily  lidocaine 2% Injectable 20 milliLiter(s) Local Injection once  losartan 100 milliGRAM(s) Oral daily  pantoprazole    Tablet 40 milliGRAM(s) Oral before breakfast  simethicone 80 milliGRAM(s) Chew four times a day  sodium chloride 0.9%. 1000 milliLiter(s) IV Continuous <Continuous>  sodium chloride 3%  Inhalation 4 milliLiter(s) Inhalation two times a day  tiotropium 18 MICROgram(s) Capsule 1 Capsule(s) Inhalation daily      PRN MEDICATIONS  acetaminophen   Tablet .. 650 milliGRAM(s) Oral every 6 hours PRN  aluminum hydroxide/magnesium hydroxide/simethicone Suspension 30 milliLiter(s) Oral every 6 hours PRN  dextrose 40% Gel 15 Gram(s) Oral once PRN  diphenhydrAMINE 25 milliGRAM(s) Oral every 6 hours PRN  glucagon  Injectable 1 milliGRAM(s) IntraMuscular once PRN  lidocaine 2% Viscous 2 milliLiter(s) Oral every 2 hours PRN  loperamide 2 milliGRAM(s) Oral every 4 hours PRN  melatonin 3 milliGRAM(s) Oral once PRN  metoclopramide Injectable 10 milliGRAM(s) IV Push every 6 hours PRN  oxyCODONE    IR 5 milliGRAM(s) Oral every 4 hours PRN  polyethylene glycol 3350 17 Gram(s) Oral two times a day PRN        Vital Signs Last 24 Hrs  T(C): 37.9 (22 Aug 2019 05:00), Max: 37.9 (22 Aug 2019 05:00)  T(F): 100.3 (22 Aug 2019 05:00), Max: 100.3 (22 Aug 2019 05:00)  HR: 93 (22 Aug 2019 05:00) (83 - 93)  BP: 141/62 (22 Aug 2019 05:00) (118/66 - 147/70)  BP(mean): --  RR: 18 (22 Aug 2019 05:00) (18 - 18)  SpO2: 96% (22 Aug 2019 05:00) (94% - 98%)    PHYSICAL EXAM  General: NAD  HEENT: ulcer  left lower gum under denture per pt,  anicteric sclera  CV: (+) S1/S2 RRR  Lungs: clear to auscultation, no wheezes or rales  Abdomen: soft, non-tender, non-distended (+) BS x 4Q  Ext: no edema  Skin: macular papular erythematous rash on back   Neuro: alert and oriented X 3  Central Line: TLC CDI           LABS:                        7.1    0.9   )-----------( 9        ( 22 Aug 2019 06:52 )             21.6         Mean Cell Volume : 85.2 fl  Mean Cell Hemoglobin : 28.1 pg  Mean Cell Hemoglobin Concentration : 33.0 gm/dL  Auto Neutrophil # : 0.0 K/uL  Auto Lymphocyte # : 0.8 K/uL  Auto Monocyte # : 0.0 K/uL  Auto Eosinophil # : 0.0 K/uL  Auto Basophil # : 0.0 K/uL  Auto Neutrophil % : 1.0 %  Auto Lymphocyte % : 97.8 %  Auto Monocyte % : 0.2 %  Auto Eosinophil % : 0.0 %  Auto Basophil % : 1.0 %      08-22    134<L>  |  101  |  10  ----------------------------<  194<H>  3.9   |  22  |  0.59    Ca    8.0<L>      22 Aug 2019 06:51  Phos  3.1     08-22  Mg     1.7     08-22    TPro  6.6  /  Alb  2.9<L>  /  TBili  0.4  /  DBili  x   /  AST  15  /  ALT  36  /  AlkPhos  213<H>  08-22      Mg 1.7  Phos 3.1      PT/INR - ( 22 Aug 2019 06:52 )   PT: 13.4 sec;   INR: 1.17 ratio         PTT - ( 22 Aug 2019 06:52 )  PTT:30.7 sec      Uric Acid 1.3        RECENT CULTURES:  08-19 @ 22:08  .Blood      No growth to date.  --  08-19 @ 19:02  .Urine    <10,000 CFU/mL Normal Urogenital Maryana  --  C. difficile GDH &amp; toxins A/B by EIA . (08.20.19 @ 21:30)    Clostridium difficile GDH Toxins A&amp;B, EIA:   Negative    Clostridium difficile GDH Interpretation: Negative for toxigenic C. Difficile.  This specimen is negative for C.  Difficile glutamate dehydrogenase (GDH) antigen and negative for C.  Difficile Toxins A & B, by EIA.  GDH is a highly sensitive screening  marker for C. Difficile that is produced in large amounts by all C.  Difficile strains, both toxigenic and nontoxigenic.  This assay has not  been validated as a test of cure.  Repeat testing during the same episode  of diarrhea is of limited value and is discouraged.  The results of this  assay should always be interpreted in conjunction with pateint's clinical  history.        RADIOLOGY & ADDITIONAL STUDIES:    US Abdomen Complete (08.22.19 @ 08:19)   IMPRESSION:     Unremarkable sonographic evaluation of the liver.    Xray Chest 1 View- PORTABLE-Urgent (08.15.19 @ 01:26) >  Unchanged bilateral reticular opacities.  No pneumonia.

## 2019-08-22 NOTE — PROGRESS NOTE ADULT - ASSESSMENT
63 y/o F w/ uncontrolled Type 2 DM w/ hyper and hypoglycemia A1c 10.6%, HTN, HLD, Hypothyroidism admitted for chemo for newly diagnosed AML. BG values variable but without a pattern to make further changes at this time. Pt instructed to drink Glucerna if not able to consume meal while having mouth discomfort. BG goal (100-180mg/dl). No hypoglycemia.

## 2019-08-22 NOTE — PROGRESS NOTE ADULT - PROBLEM SELECTOR PLAN 10
No pharmacologic ppx 2/2 thrombocytopenia  Encourage ambulation  change diet to soft mechanical  with Glucerna       Contact Information [phone]

## 2019-08-22 NOTE — PROGRESS NOTE ADULT - ATTENDING COMMENTS
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 Pt on Pfizer trial 7+3+ Glasdegib/placebo.  Patient here for her induction chemotherapy on trial.  Day 15.   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  Afeb xc 48 hrs, no chills until 8/19 PM - temp to 100.9, chills.  Vanco added pre temp spike.  Vanco trough ok. On aztreonam, posa, vanco, acyclovir.  No N/V, now with diarrhea x 1.  Less mucositis.  Continues to have diarrhea, C. diff toxin (-).  I > O, being diuresed  Pruritus resolving on ATC atarax, topical triamcinolone.  Rash almost resolved.   Cefepime changed to aztreonam 8/17.   Rash CTCAE allergic reaction grade 2.       Mild AST/ALT elevation, alk phos sl incr; stable, follow for now.  Check liver U/S.  OOB      Supportive care, anti-emetics, IVFs,        Lantus QHS, following fingersticks closely, endocrine consult appreciated. h/o of linnea poorly controlled diabetes.   ECOG performance status 2.  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 Pt on Pfizer trial 7+3+ Glasdegib/placebo.  Patient here for her induction chemotherapy on trial.  Day 16.   Day 14 marrow chemotherapeutic  Pre-rx [facility] results: NPM1, IDH2, DNMT3A mutations.  Afeb xc 48 hrs, no chills until 8/19 PM - temp to 100.9, chills 8/2.  Vanco added pre temp spike.  Vanco trough ok. On aztreonam, posa, vanco, acyclovir.  Tmax 100.3.  No N/V, now with diarrhea.  Less mucositis.  No diarrhea today. C. diff toxin (-).  I similar to  O, no lasix today  Pruritus resolving on ATC atarax, topical triamcinolone.  Rash almost resolved.  Cefepime changed to aztreonam 8/17.   Rash CTCAE allergic reaction grade 2.       Mild AST/ALT elevation, alk phos sl incr; stable, follow for now.  Check liver U/S.  OOB     Supportive care, anti-emetics, IVFs,      Lantus QHS, following fingersticks closely, endocrine consult appreciated. h/o of linnea poorly controlled diabetes.   ECOG performance status 2. 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 Pt on Pfizer trial 7+3+ Glasdegib/placebo.  Patient here for her induction chemotherapy on trial.  Day 16.   Day 14 marrow chemotherapeutic  Pre-rx [facility] results: NPM1, IDH2, DNMT3A mutations.  Afeb xc 48 hrs, no chills until 8/19 PM - temp to 100.9, chills 8/2.  Vanco added pre temp spike.  Vanco trough ok. On aztreonam, posa, vanco, acyclovir.  Tmax 100.3.  No N/V, now with diarrhea.  Less mucositis.  No diarrhea today. C. diff toxin (-).  I similar to  O, no lasix today  Pruritus now increased - fine, macular, erythematous rash on back.   Rash CTCAE allergic reaction grade 2.    Cefepime changed to aztreonam 8/17.      Mild AST/ALT elevation, alk phos sl incr; stable, follow for now.    liver U/S (-).  OOB     Supportive care, anti-emetics, IVFs,      Lantus QHS, following fingersticks closely, endocrine consult appreciated. h/o of linnea poorly controlled diabetes.   ECOG performance status 2.

## 2019-08-22 NOTE — PROGRESS NOTE ADULT - PROBLEM SELECTOR PLAN 5
HgA1C 10.6  FS AC & HS with HISS  Continue Lantus and Humalog per endocrine   Consistent carbohydrate diet HgA1C 10.6  FS AC & HS with HISS  Continue Lantus and Humalog per endocrine   Consistent carbohydrate diet  Endocrine following

## 2019-08-22 NOTE — PROGRESS NOTE ADULT - PROBLEM SELECTOR PLAN 4
grade 1, stable   Will obtain abd sono   Monitor daily CMP  Avoid further hepatotoxic medications grade 1, stable   8/21  abd sono - unremarkable   Monitor daily CMP  Avoid further hepatotoxic medications

## 2019-08-22 NOTE — PROGRESS NOTE ADULT - PROBLEM SELECTOR PLAN 1
-test BG AC/HS  -c/w Lantus 18 units QHS  -c/w Humalog 10 units AC meals  -c/w Humalog low correction scale AC and Low HS scale  DISPO: basal/bolus, doses to be determined  discussed w/pt and team  pager: [phone].

## 2019-08-22 NOTE — PROGRESS NOTE ADULT - ASSESSMENT
This is a 63 yo F with PMHx of T2DM, RA, Depression and now newly diagnosed AML enrolled on Pfizer [facility] trial admitted for Induction chemotherapy with Dauno/Cytarabine and daily Glasdegib vs Placebo. The patients hospital course has been complicated by  neutropenic fever, transaminitis,  RENTERIA due to interstitial lung disease ,volume overload,  diarrhea/c diff negative and drug rash from Cefepime and poss from Allopurinol. The patient has pancytopenia from chemotherapy and/or disease. This is a 65 yo F with PMHx of T2DM, RA, Depression and now newly diagnosed AML enrolled on Pfizer BRIGHT trial, now s/p  Induction chemotherapy with Dauno/Cytarabine and daily Glasdegib vs Placebo, day 14 BM bx chemotherapeutic, awaiting for count recovery The patients hospital course has been complicated by  neutropenic fever, transaminitis,  RENTERIA due to interstitial lung disease ,volume overload,  diarrhea/c diff negative and drug rash from Cefepime and poss from Allopurinol. The patient has pancytopenia from chemotherapy and/or disease.

## 2019-08-22 NOTE — PROGRESS NOTE ADULT - PROBLEM SELECTOR PLAN 1
Enrolled on Pfizer BRIGHT trial receiving Induction chemotherapy with Dauno/Cytarabine and daily Glasdegib vs Placebo  Molecular studies pending  8/20 Day 14 BM bx, fu result   Monitor CBC/Lytes and transfuse/replete PRN  Hypophosphatemia: K phos 15 Mmmol IVPB x1  Strict Is and Os/ Daily weights/ Mouth Care  8/21 Lasix 40mg IV x1 for I and O's 1.6 L positive and trace edema   Allopurinol 300mg PO daily-stopped 2/2 rash  IVF hydration  Antiemetics  ECOG performance status 2  Atarax ATC for itching and oral benadryl prn. added triamcinolone ointment - rash improving Enrolled on Pfizer BRIGHT trial s/p Induction chemotherapy with Dauno/Cytarabine and daily Glasdegib vs Placebo  Molecular studies pending  8/20 Day 14 BM bx, chemotherapeutic   Monitor CBC/Lytes and transfuse/replete PRN  Thrombocytopenia ; platelet x 1 unit   Strict Is and Os/ Daily weights/ Mouth Care  Allopurinol 300mg PO daily-stopped 2/2 rash  IVF hydration  Antiemetics  ECOG performance status 2  Atarax ATC for itching and oral benadryl prn. added triamcinolone ointment, 8/22 added hydrocortisone cream  - rash improving  awaiting count recovery

## 2019-08-23 LAB
ALBUMIN SERPL ELPH-MCNC: 3 G/DL — LOW (ref 3.3–5)
ALP SERPL-CCNC: 219 U/L — HIGH (ref 40–120)
ALT FLD-CCNC: 44 U/L — SIGNIFICANT CHANGE UP (ref 10–45)
ANION GAP SERPL CALC-SCNC: 11 MMOL/L — SIGNIFICANT CHANGE UP (ref 5–17)
AST SERPL-CCNC: 22 U/L — SIGNIFICANT CHANGE UP (ref 10–40)
BASOPHILS # BLD AUTO: SIGNIFICANT CHANGE UP (ref 0–0.2)
BILIRUB SERPL-MCNC: 0.4 MG/DL — SIGNIFICANT CHANGE UP (ref 0.2–1.2)
BLD GP AB SCN SERPL QL: NEGATIVE — SIGNIFICANT CHANGE UP
BUN SERPL-MCNC: 10 MG/DL — SIGNIFICANT CHANGE UP (ref 7–23)
CALCIUM SERPL-MCNC: 8.6 MG/DL — SIGNIFICANT CHANGE UP (ref 8.4–10.5)
CHLORIDE SERPL-SCNC: 103 MMOL/L — SIGNIFICANT CHANGE UP (ref 96–108)
CO2 SERPL-SCNC: 22 MMOL/L — SIGNIFICANT CHANGE UP (ref 22–31)
CREAT SERPL-MCNC: 0.64 MG/DL — SIGNIFICANT CHANGE UP (ref 0.5–1.3)
GLUCOSE BLDC GLUCOMTR-MCNC: 164 MG/DL — HIGH (ref 70–99)
GLUCOSE BLDC GLUCOMTR-MCNC: 193 MG/DL — HIGH (ref 70–99)
GLUCOSE BLDC GLUCOMTR-MCNC: 209 MG/DL — HIGH (ref 70–99)
GLUCOSE SERPL-MCNC: 138 MG/DL — HIGH (ref 70–99)
HCT VFR BLD CALC: 20.5 % — CRITICAL LOW (ref 34.5–45)
HGB BLD-MCNC: 6.6 G/DL — CRITICAL LOW (ref 11.5–15.5)
LDH SERPL L TO P-CCNC: 267 U/L — HIGH (ref 50–242)
LYMPHOCYTES # BLD AUTO: 100 % — HIGH (ref 13–44)
MAGNESIUM SERPL-MCNC: 1.5 MG/DL — LOW (ref 1.6–2.6)
MCHC RBC-ENTMCNC: 27.7 PG — SIGNIFICANT CHANGE UP (ref 27–34)
MCHC RBC-ENTMCNC: 32.3 GM/DL — SIGNIFICANT CHANGE UP (ref 32–36)
MCV RBC AUTO: 85.7 FL — SIGNIFICANT CHANGE UP (ref 80–100)
NEUTROPHILS NFR BLD AUTO: 0 % — LOW (ref 43–77)
PHOSPHATE SERPL-MCNC: 3.4 MG/DL — SIGNIFICANT CHANGE UP (ref 2.5–4.5)
PLAT MORPH BLD: NORMAL — SIGNIFICANT CHANGE UP
PLATELET # BLD AUTO: 37 K/UL — LOW (ref 150–400)
POTASSIUM SERPL-MCNC: 3.6 MMOL/L — SIGNIFICANT CHANGE UP (ref 3.5–5.3)
POTASSIUM SERPL-SCNC: 3.6 MMOL/L — SIGNIFICANT CHANGE UP (ref 3.5–5.3)
PROT SERPL-MCNC: 6.8 G/DL — SIGNIFICANT CHANGE UP (ref 6–8.3)
RBC # BLD: 2.39 M/UL — LOW (ref 3.8–5.2)
RBC # FLD: 11.8 % — SIGNIFICANT CHANGE UP (ref 10.3–14.5)
RBC BLD AUTO: SIGNIFICANT CHANGE UP
RH IG SCN BLD-IMP: POSITIVE — SIGNIFICANT CHANGE UP
SODIUM SERPL-SCNC: 136 MMOL/L — SIGNIFICANT CHANGE UP (ref 135–145)
URATE SERPL-MCNC: 1.3 MG/DL — LOW (ref 2.5–7)
WBC # BLD: 0.9 K/UL — CRITICAL LOW (ref 3.8–10.5)
WBC # FLD AUTO: 0.9 K/UL — CRITICAL LOW (ref 3.8–10.5)

## 2019-08-23 PROCEDURE — 99232 SBSQ HOSP IP/OBS MODERATE 35: CPT

## 2019-08-23 RX ORDER — POTASSIUM CHLORIDE 20 MEQ
20 PACKET (EA) ORAL ONCE
Refills: 0 | Status: COMPLETED | OUTPATIENT
Start: 2019-08-23 | End: 2019-08-23

## 2019-08-23 RX ORDER — MAGNESIUM SULFATE 500 MG/ML
2 VIAL (ML) INJECTION ONCE
Refills: 0 | Status: COMPLETED | OUTPATIENT
Start: 2019-08-23 | End: 2019-08-23

## 2019-08-23 RX ORDER — INSULIN LISPRO 100/ML
11 VIAL (ML) SUBCUTANEOUS
Refills: 0 | Status: DISCONTINUED | OUTPATIENT
Start: 2019-08-23 | End: 2019-08-24

## 2019-08-23 RX ORDER — SUCRALFATE 1 G
1 TABLET ORAL
Refills: 0 | Status: DISCONTINUED | OUTPATIENT
Start: 2019-08-23 | End: 2019-08-27

## 2019-08-23 RX ORDER — FUROSEMIDE 40 MG
40 TABLET ORAL ONCE
Refills: 0 | Status: COMPLETED | OUTPATIENT
Start: 2019-08-23 | End: 2019-08-23

## 2019-08-23 RX ADMIN — Medication 3 MILLILITER(S): at 11:35

## 2019-08-23 RX ADMIN — Medication 100 MILLIGRAM(S): at 05:16

## 2019-08-23 RX ADMIN — Medication 11 UNIT(S): at 16:38

## 2019-08-23 RX ADMIN — Medication 25 MILLIGRAM(S): at 05:16

## 2019-08-23 RX ADMIN — DIPHENHYDRAMINE HYDROCHLORIDE AND LIDOCAINE HYDROCHLORIDE AND ALUMINUM HYDROXIDE AND MAGNESIUM HYDRO 10 MILLILITER(S): KIT at 05:15

## 2019-08-23 RX ADMIN — Medication 100 MILLIGRAM(S): at 13:26

## 2019-08-23 RX ADMIN — POSACONAZOLE 300 MILLIGRAM(S): 100 TABLET, DELAYED RELEASE ORAL at 11:35

## 2019-08-23 RX ADMIN — SODIUM CHLORIDE 4 MILLILITER(S): 9 INJECTION INTRAMUSCULAR; INTRAVENOUS; SUBCUTANEOUS at 05:16

## 2019-08-23 RX ADMIN — SIMETHICONE 80 MILLIGRAM(S): 80 TABLET, CHEWABLE ORAL at 23:07

## 2019-08-23 RX ADMIN — SODIUM CHLORIDE 50 MILLILITER(S): 9 INJECTION INTRAMUSCULAR; INTRAVENOUS; SUBCUTANEOUS at 05:19

## 2019-08-23 RX ADMIN — Medication 25 MILLIGRAM(S): at 17:46

## 2019-08-23 RX ADMIN — GABAPENTIN 600 MILLIGRAM(S): 400 CAPSULE ORAL at 17:17

## 2019-08-23 RX ADMIN — Medication 100 MILLIGRAM(S): at 17:46

## 2019-08-23 RX ADMIN — Medication 5 MILLILITER(S): at 13:26

## 2019-08-23 RX ADMIN — Medication 25 MILLIGRAM(S): at 23:06

## 2019-08-23 RX ADMIN — SIMETHICONE 80 MILLIGRAM(S): 80 TABLET, CHEWABLE ORAL at 05:19

## 2019-08-23 RX ADMIN — Medication 100 MILLIGRAM(S): at 10:10

## 2019-08-23 RX ADMIN — Medication 1 GRAM(S): at 17:49

## 2019-08-23 RX ADMIN — Medication 3 MILLILITER(S): at 23:06

## 2019-08-23 RX ADMIN — Medication 400 MILLIGRAM(S): at 17:16

## 2019-08-23 RX ADMIN — Medication 650 MILLIGRAM(S): at 22:40

## 2019-08-23 RX ADMIN — PANTOPRAZOLE SODIUM 40 MILLIGRAM(S): 20 TABLET, DELAYED RELEASE ORAL at 05:18

## 2019-08-23 RX ADMIN — Medication 25 MILLIGRAM(S): at 11:38

## 2019-08-23 RX ADMIN — Medication 1: at 11:38

## 2019-08-23 RX ADMIN — Medication 5 MILLILITER(S): at 05:15

## 2019-08-23 RX ADMIN — SIMETHICONE 80 MILLIGRAM(S): 80 TABLET, CHEWABLE ORAL at 17:17

## 2019-08-23 RX ADMIN — Medication 1 GRAM(S): at 13:24

## 2019-08-23 RX ADMIN — Medication 50 MILLIEQUIVALENT(S): at 23:02

## 2019-08-23 RX ADMIN — Medication 10 UNIT(S): at 11:38

## 2019-08-23 RX ADMIN — DIPHENHYDRAMINE HYDROCHLORIDE AND LIDOCAINE HYDROCHLORIDE AND ALUMINUM HYDROXIDE AND MAGNESIUM HYDRO 10 MILLILITER(S): KIT at 23:07

## 2019-08-23 RX ADMIN — DIPHENHYDRAMINE HYDROCHLORIDE AND LIDOCAINE HYDROCHLORIDE AND ALUMINUM HYDROXIDE AND MAGNESIUM HYDRO 10 MILLILITER(S): KIT at 13:24

## 2019-08-23 RX ADMIN — CHLORHEXIDINE GLUCONATE 1 APPLICATION(S): 213 SOLUTION TOPICAL at 10:10

## 2019-08-23 RX ADMIN — Medication 650 MILLIGRAM(S): at 22:06

## 2019-08-23 RX ADMIN — Medication 400 MILLIGRAM(S): at 05:16

## 2019-08-23 RX ADMIN — Medication 3 MILLILITER(S): at 17:16

## 2019-08-23 RX ADMIN — Medication 100 MILLIGRAM(S): at 21:36

## 2019-08-23 RX ADMIN — LOSARTAN POTASSIUM 100 MILLIGRAM(S): 100 TABLET, FILM COATED ORAL at 05:18

## 2019-08-23 RX ADMIN — SODIUM CHLORIDE 4 MILLILITER(S): 9 INJECTION INTRAMUSCULAR; INTRAVENOUS; SUBCUTANEOUS at 17:17

## 2019-08-23 RX ADMIN — Medication 250 MILLIGRAM(S): at 13:25

## 2019-08-23 RX ADMIN — Medication 5 MILLILITER(S): at 21:35

## 2019-08-23 RX ADMIN — Medication 3 MILLILITER(S): at 05:16

## 2019-08-23 RX ADMIN — GABAPENTIN 600 MILLIGRAM(S): 400 CAPSULE ORAL at 05:16

## 2019-08-23 RX ADMIN — Medication 1 GRAM(S): at 23:06

## 2019-08-23 RX ADMIN — Medication 40 MILLIGRAM(S): at 13:24

## 2019-08-23 RX ADMIN — DIPHENHYDRAMINE HYDROCHLORIDE AND LIDOCAINE HYDROCHLORIDE AND ALUMINUM HYDROXIDE AND MAGNESIUM HYDRO 10 MILLILITER(S): KIT at 17:17

## 2019-08-23 RX ADMIN — Medication 2: at 16:31

## 2019-08-23 RX ADMIN — SIMETHICONE 80 MILLIGRAM(S): 80 TABLET, CHEWABLE ORAL at 11:35

## 2019-08-23 RX ADMIN — Medication 75 MICROGRAM(S): at 05:16

## 2019-08-23 RX ADMIN — INSULIN GLARGINE 18 UNIT(S): 100 INJECTION, SOLUTION SUBCUTANEOUS at 21:35

## 2019-08-23 RX ADMIN — Medication 50 GRAM(S): at 16:31

## 2019-08-23 RX ADMIN — Medication 2: at 16:38

## 2019-08-23 RX ADMIN — Medication 100 MILLIGRAM(S): at 03:00

## 2019-08-23 RX ADMIN — Medication 250 MILLIGRAM(S): at 21:39

## 2019-08-23 NOTE — PROGRESS NOTE ADULT - PROBLEM SELECTOR PLAN 10
No pharmacologic ppx 2/2 thrombocytopenia  Encourage ambulation  change diet to soft mechanical  with Glucerna       Contact Information [phone]

## 2019-08-23 NOTE — PROGRESS NOTE ADULT - PROBLEM SELECTOR PLAN 1
Enrolled on Pfizer BRIGHT trial s/p Induction chemotherapy with Dauno/Cytarabine and daily Glasdegib vs Placebo  Molecular studies pending  8/20 Day 14 BM bx, chemotherapeutic   Monitor CBC/Lytes and transfuse/replete PRN  Anemia: PRBC x 1 unit   Hypokalemia: KCL 20 meq IVPB x1  Hypomagnesemia: Mg Sulfate 2 g IVPBx1  Strict Is and Os/ Daily weights/ Mouth Care  Add Carafate for sore throat, mucositis   Allopurinol 300mg PO daily-stopped 2/2 rash  IVF hydration  Antiemetics  ECOG performance status 2  Atarax ATC for itching and oral benadryl prn. added triamcinolone ointment, 8/22 added hydrocortisone cream  - rash improving  awaiting count recovery Enrolled on Pfizer BRIGHT trial s/p Induction chemotherapy with Dauno/Cytarabine and daily Glasdegib vs Placebo  Molecular studies pending  8/20 Day 14 BM bx, chemotherapeutic   Monitor CBC/Lytes and transfuse/replete PRN  Anemia: PRBC x 1 unit   Hypokalemia: KCL 20 meq IVPB x1  Hypomagnesemia: Mg Sulfate 2 g IVPBx1  Hypomagnesemia: Mg Sulfate 2 g iVPB x1  Strict Is and Os/ Daily weights/ Mouth Care  Add Carafate for sore throat, mucositis   Allopurinol 300mg PO daily-stopped 2/2 rash  IVF hydration  Antiemetics  ECOG performance status 2  Atarax ATC for itching and oral benadryl prn. added triamcinolone ointment, 8/22 added hydrocortisone cream  - rash improving  awaiting count recovery

## 2019-08-23 NOTE — PROGRESS NOTE ADULT - ASSESSMENT
63 y/o F w/ uncontrolled Type 2 DM w/ hyper and hypoglycemia A1c 10.6%, HTN, HLD, Hypothyroidism admitted for chemo for newly diagnosed AML. BG values variable but without a pattern to make further changes at this time. Pt instructed to drink Glucerna if not able to consume meal while having mouth discomfort. BG goal (100-180mg/dl). No hypoglycemia. 63 y/o F w/ uncontrolled Type 2 DM w/ hyper and hypoglycemia A1c 10.6%, HTN, HLD, Hypothyroidism admitted for chemo for newly diagnosed AML. She has been having premeal hyperglycemia 200s.  BG goal (100-180mg/dl). No hypoglycemia.

## 2019-08-23 NOTE — PROGRESS NOTE ADULT - PROBLEM SELECTOR PLAN 2
The patient is neutropenic, afebrile   Cultures  no growth to date   Continue vanco 1 g q12h empirically with Vanco trough 12.2 then wkly  Continue Posaconazole, change Cefepime to Aztreonam as patient has persistent pruritis.  (Monitor QTc BIW mon/Thurs  with Posaconazole and Study drug)  8/9 CT chest: Pulmonary fibrosis and traction bronchiectasis without honeycombing.   Overall these findings appear progressed compared to CT abdomen 7/4/2017  Associated ground-glass opacity may be related to active interstitial lung disease, fluid overload, less likely infection

## 2019-08-23 NOTE — PROGRESS NOTE ADULT - SUBJECTIVE AND OBJECTIVE BOX
Chief Complaint: T2DM    History:    MEDICATIONS  (STANDING):  acyclovir   Oral Tab/Cap 400 milliGRAM(s) Oral two times a day  ALBUTerol    90 MICROgram(s) HFA Inhaler 1 Puff(s) Inhalation every 6 hours  ALBUTerol/ipratropium for Nebulization 3 milliLiter(s) Nebulizer every 6 hours  aztreonam  IVPB 2000 milliGRAM(s) IV Intermittent every 8 hours  aztreonam  IVPB      benzonatate 100 milliGRAM(s) Oral three times a day  Biotene Dry Mouth Oral Rinse 5 milliLiter(s) Swish and Spit three times a day  chlorhexidine 2% Cloths 1 Application(s) Topical <User Schedule>  dextrose 5%. 1000 milliLiter(s) (50 mL/Hr) IV Continuous <Continuous>  dextrose 50% Injectable 12.5 Gram(s) IV Push once  dextrose 50% Injectable 25 Gram(s) IV Push once  dextrose 50% Injectable 25 Gram(s) IV Push once  FIRST- Mouthwash  BLM 10 milliLiter(s) Swish and Spit four times a day  furosemide   Injectable 40 milliGRAM(s) IV Push once  gabapentin 600 milliGRAM(s) Oral two times a day  hydrOXYzine hydrochloride 25 milliGRAM(s) Oral every 6 hours  insulin glargine Injectable (LANTUS) 18 Unit(s) SubCutaneous at bedtime  insulin lispro (HumaLOG) corrective regimen sliding scale   SubCutaneous three times a day before meals  insulin lispro (HumaLOG) corrective regimen sliding scale   SubCutaneous at bedtime  insulin lispro Injectable (HumaLOG) 10 Unit(s) SubCutaneous three times a day before meals  investigational chemo - general 1 Tablet(s) Oral every 24 hours  levothyroxine 75 MICROGram(s) Oral daily  lidocaine 2% Injectable 20 milliLiter(s) Local Injection once  losartan 100 milliGRAM(s) Oral daily  magnesium sulfate  IVPB 2 Gram(s) IV Intermittent once  pantoprazole    Tablet 40 milliGRAM(s) Oral before breakfast  posaconazole DR Tablet 300 milliGRAM(s) Oral daily  potassium chloride  20 mEq/100 mL IVPB 20 milliEquivalent(s) IV Intermittent once  simethicone 80 milliGRAM(s) Chew four times a day  sodium chloride 0.9%. 1000 milliLiter(s) (50 mL/Hr) IV Continuous <Continuous>  sodium chloride 3%  Inhalation 4 milliLiter(s) Inhalation two times a day  sucralfate 1 Gram(s) Oral four times a day  tiotropium 18 MICROgram(s) Capsule 1 Capsule(s) Inhalation daily  vancomycin  IVPB 1000 milliGRAM(s) IV Intermittent every 12 hours  vancomycin  IVPB        MEDICATIONS  (PRN):  acetaminophen   Tablet .. 650 milliGRAM(s) Oral every 6 hours PRN Temp greater or equal to 38C (100.4F), Mild Pain (1 - 3)  aluminum hydroxide/magnesium hydroxide/simethicone Suspension 30 milliLiter(s) Oral every 6 hours PRN Dyspepsia  dextrose 40% Gel 15 Gram(s) Oral once PRN Blood Glucose LESS THAN 70 milliGRAM(s)/deciliter  diphenhydrAMINE 25 milliGRAM(s) Oral every 6 hours PRN Rash and/or Itching  glucagon  Injectable 1 milliGRAM(s) IntraMuscular once PRN Glucose LESS THAN 70 milligrams/deciliter  hydrocortisone 2.5% Lotion 1 Application(s) Topical three times a day PRN Itching  lidocaine 2% Viscous 2 milliLiter(s) Oral every 2 hours PRN mouth ulcer  loperamide 2 milliGRAM(s) Oral every 4 hours PRN Diarrhea  melatonin 3 milliGRAM(s) Oral once PRN Insomnia  metoclopramide Injectable 10 milliGRAM(s) IV Push every 6 hours PRN nausea/vommiting  oxyCODONE    IR 5 milliGRAM(s) Oral every 4 hours PRN Moderate Pain (4 - 6)  polyethylene glycol 3350 17 Gram(s) Oral two times a day PRN Constipation      Allergies    cefepime (Rash)    Intolerances      Review of Systems:  Constitutional: No fever  Eyes: No blurry vision  Neuro: No tremors  HEENT: No pain  Cardiovascular: No chest pain, palpitations  Respiratory: No SOB, no cough  GI: No nausea, vomiting, abdominal pain  : No dysuria  Skin: no rash  Psych: no depression  Endocrine: no polyuria, polydipsia  Hem/lymph: no swelling  Osteoporosis: no fractures    ALL OTHER SYSTEMS REVIEWED AND NEGATIVE    UNABLE TO OBTAIN    PHYSICAL EXAM:  VITALS: T(C): 36.8 (08-23-19 @ 11:40)  T(F): 98.3 (08-23-19 @ 11:40), Max: 100.3 (08-22-19 @ 21:34)  HR: 86 (08-23-19 @ 11:40) (83 - 94)  BP: 141/67 (08-23-19 @ 11:40) (120/71 - 146/76)  RR:  (17 - 18)  SpO2:  (95% - 100%)  Wt(kg): --  GENERAL: NAD, well-groomed, well-developed  EYES: No proptosis, no lid lag, anicteric  HEENT:  Atraumatic, Normocephalic, moist mucous membranes  THYROID: Normal size, no palpable nodules  RESPIRATORY: Clear to auscultation bilaterally; No rales, rhonchi, wheezing, or rubs  CARDIOVASCULAR: Regular rate and rhythm; No murmurs; no peripheral edema  GI: Soft, nontender, non distended, normal bowel sounds  SKIN: Dry, intact, No rashes or lesions  MUSCULOSKELETAL: Full range of motion, normal strength  NEURO: sensation intact, extraocular movements intact, no tremor, normal reflexes  PSYCH: Alert and oriented x 3, normal affect, normal mood  CUSHING'S SIGNS: no striae    POCT Blood Glucose.: 193 mg/dL (08-23-19 @ 11:31)  POCT Blood Glucose.: 105 mg/dL (08-22-19 @ 22:04)  POCT Blood Glucose.: 204 mg/dL (08-22-19 @ 18:55)  POCT Blood Glucose.: 249 mg/dL (08-22-19 @ 14:07)  POCT Blood Glucose.: 165 mg/dL (08-22-19 @ 09:04)  POCT Blood Glucose.: 344 mg/dL (08-21-19 @ 21:30)  POCT Blood Glucose.: 181 mg/dL (08-21-19 @ 17:02)  POCT Blood Glucose.: 162 mg/dL (08-21-19 @ 12:51)  POCT Blood Glucose.: 249 mg/dL (08-21-19 @ 08:59)  POCT Blood Glucose.: 157 mg/dL (08-20-19 @ 21:01)  POCT Blood Glucose.: 150 mg/dL (08-20-19 @ 17:22)      08-23    136  |  103  |  10  ----------------------------<  138<H>  3.6   |  22  |  0.64    EGFR if : 110  EGFR if non : 95    Ca    8.6      08-23  Mg     1.5     08-23  Phos  3.4     08-23    TPro  6.8  /  Alb  3.0<L>  /  TBili  0.4  /  DBili  x   /  AST  22  /  ALT  44  /  AlkPhos  219<H>  08-23          Thyroid Function Tests:      Hemoglobin A1C, Whole Blood: 10.6 % <H> [4.0 - 5.6] (08-07-19 @ 08:36) Chief Complaint: T2DM    History: Patient states she does not like the hospital food. Had premeal hyperglycemia yesterday to 200s.     MEDICATIONS  (STANDING):  acyclovir   Oral Tab/Cap 400 milliGRAM(s) Oral two times a day  ALBUTerol    90 MICROgram(s) HFA Inhaler 1 Puff(s) Inhalation every 6 hours  ALBUTerol/ipratropium for Nebulization 3 milliLiter(s) Nebulizer every 6 hours  aztreonam  IVPB 2000 milliGRAM(s) IV Intermittent every 8 hours  aztreonam  IVPB      benzonatate 100 milliGRAM(s) Oral three times a day  Biotene Dry Mouth Oral Rinse 5 milliLiter(s) Swish and Spit three times a day  chlorhexidine 2% Cloths 1 Application(s) Topical <User Schedule>  dextrose 5%. 1000 milliLiter(s) (50 mL/Hr) IV Continuous <Continuous>  dextrose 50% Injectable 12.5 Gram(s) IV Push once  dextrose 50% Injectable 25 Gram(s) IV Push once  dextrose 50% Injectable 25 Gram(s) IV Push once  FIRST- Mouthwash  BLM 10 milliLiter(s) Swish and Spit four times a day  furosemide   Injectable 40 milliGRAM(s) IV Push once  gabapentin 600 milliGRAM(s) Oral two times a day  hydrOXYzine hydrochloride 25 milliGRAM(s) Oral every 6 hours  insulin glargine Injectable (LANTUS) 18 Unit(s) SubCutaneous at bedtime  insulin lispro (HumaLOG) corrective regimen sliding scale   SubCutaneous three times a day before meals  insulin lispro (HumaLOG) corrective regimen sliding scale   SubCutaneous at bedtime  insulin lispro Injectable (HumaLOG) 10 Unit(s) SubCutaneous three times a day before meals  investigational chemo - general 1 Tablet(s) Oral every 24 hours  levothyroxine 75 MICROGram(s) Oral daily  lidocaine 2% Injectable 20 milliLiter(s) Local Injection once  losartan 100 milliGRAM(s) Oral daily  magnesium sulfate  IVPB 2 Gram(s) IV Intermittent once  pantoprazole    Tablet 40 milliGRAM(s) Oral before breakfast  posaconazole DR Tablet 300 milliGRAM(s) Oral daily  potassium chloride  20 mEq/100 mL IVPB 20 milliEquivalent(s) IV Intermittent once  simethicone 80 milliGRAM(s) Chew four times a day  sodium chloride 0.9%. 1000 milliLiter(s) (50 mL/Hr) IV Continuous <Continuous>  sodium chloride 3%  Inhalation 4 milliLiter(s) Inhalation two times a day  sucralfate 1 Gram(s) Oral four times a day  tiotropium 18 MICROgram(s) Capsule 1 Capsule(s) Inhalation daily  vancomycin  IVPB 1000 milliGRAM(s) IV Intermittent every 12 hours  vancomycin  IVPB        MEDICATIONS  (PRN):  acetaminophen   Tablet .. 650 milliGRAM(s) Oral every 6 hours PRN Temp greater or equal to 38C (100.4F), Mild Pain (1 - 3)  aluminum hydroxide/magnesium hydroxide/simethicone Suspension 30 milliLiter(s) Oral every 6 hours PRN Dyspepsia  dextrose 40% Gel 15 Gram(s) Oral once PRN Blood Glucose LESS THAN 70 milliGRAM(s)/deciliter  diphenhydrAMINE 25 milliGRAM(s) Oral every 6 hours PRN Rash and/or Itching  glucagon  Injectable 1 milliGRAM(s) IntraMuscular once PRN Glucose LESS THAN 70 milligrams/deciliter  hydrocortisone 2.5% Lotion 1 Application(s) Topical three times a day PRN Itching  lidocaine 2% Viscous 2 milliLiter(s) Oral every 2 hours PRN mouth ulcer  loperamide 2 milliGRAM(s) Oral every 4 hours PRN Diarrhea  melatonin 3 milliGRAM(s) Oral once PRN Insomnia  metoclopramide Injectable 10 milliGRAM(s) IV Push every 6 hours PRN nausea/vommiting  oxyCODONE    IR 5 milliGRAM(s) Oral every 4 hours PRN Moderate Pain (4 - 6)  polyethylene glycol 3350 17 Gram(s) Oral two times a day PRN Constipation      Allergies    cefepime (Rash)    Intolerances      Review of Systems:  Constitutional: No fever  Eyes: No blurry vision  Neuro: No tremors  HEENT: No pain  Cardiovascular: No chest pain, palpitations  Respiratory: No SOB, no cough  GI: No nausea, vomiting, abdominal pain  : No dysuria  Skin: no rash  Psych: no depression  Endocrine: no polyuria, polydipsia  Hem/lymph: no swelling      ALL OTHER SYSTEMS REVIEWED AND NEGATIVE        PHYSICAL EXAM:  VITALS: T(C): 36.8 (08-23-19 @ 11:40)  T(F): 98.3 (08-23-19 @ 11:40), Max: 100.3 (08-22-19 @ 21:34)  HR: 86 (08-23-19 @ 11:40) (83 - 94)  BP: 141/67 (08-23-19 @ 11:40) (120/71 - 146/76)  RR:  (17 - 18)  SpO2:  (95% - 100%)  Wt(kg): --  GENERAL: NAD, well-groomed, well-developed  EYES: No proptosis, no lid lag, anicteric  HEENT:  Atraumatic, Normocephalic, moist mucous membranes  RESPIRATORY: Clear to auscultation bilaterally; No rales, rhonchi, wheezing, or rubs  GI: Soft, nontender, non distended, normal bowel sounds  SKIN: Dry, intact, No rashes or lesions  PSYCH: Alert and oriented x 3, normal affect, normal mood      POCT Blood Glucose.: 193 mg/dL (08-23-19 @ 11:31)  POCT Blood Glucose.: 105 mg/dL (08-22-19 @ 22:04)  POCT Blood Glucose.: 204 mg/dL (08-22-19 @ 18:55)  POCT Blood Glucose.: 249 mg/dL (08-22-19 @ 14:07)  POCT Blood Glucose.: 165 mg/dL (08-22-19 @ 09:04)  POCT Blood Glucose.: 344 mg/dL (08-21-19 @ 21:30)  POCT Blood Glucose.: 181 mg/dL (08-21-19 @ 17:02)  POCT Blood Glucose.: 162 mg/dL (08-21-19 @ 12:51)  POCT Blood Glucose.: 249 mg/dL (08-21-19 @ 08:59)  POCT Blood Glucose.: 157 mg/dL (08-20-19 @ 21:01)  POCT Blood Glucose.: 150 mg/dL (08-20-19 @ 17:22)      08-23    136  |  103  |  10  ----------------------------<  138<H>  3.6   |  22  |  0.64    EGFR if : 110  EGFR if non : 95    Ca    8.6      08-23  Mg     1.5     08-23  Phos  3.4     08-23    TPro  6.8  /  Alb  3.0<L>  /  TBili  0.4  /  DBili  x   /  AST  22  /  ALT  44  /  AlkPhos  219<H>  08-23            Hemoglobin A1C, Whole Blood: 10.6 % <H> [4.0 - 5.6] (08-07-19 @ 08:36)

## 2019-08-23 NOTE — PROGRESS NOTE ADULT - ASSESSMENT
This is a 63 yo F with PMHx of T2DM, RA, Depression and now newly diagnosed AML enrolled on [employer] BRIGHT trial, now s/p  Induction chemotherapy with Dauno/Cytarabine and daily Glasdegib vs Placebo, day 14 BM bx chemotherapeutic, awaiting for count recovery The patients hospital course has been complicated by  neutropenic fever, transaminitis,  RENTERIA due to interstitial lung disease ,volume overload,  diarrhea/c diff negative and drug rash from Cefepime and poss from Allopurinol. The patient has pancytopenia from chemotherapy and/or disease. This is a 63 yo F with PMHx of T2DM, RA, Depression and now newly diagnosed AML enrolled on [employer] BRIGHT trial, now s/p  Induction chemotherapy with Dauno/Cytarabine and daily Glasdegib vs Placebo, day 14 BM bx chemotherapeutic, awaiting for count recovery The patients hospital course has been complicated by  neutropenic fever, transaminitis/abd sono unremarkable,  RENTERIA due to interstitial lung disease ,volume overload,  diarrhea/c diff negative and drug rash from Cefepime and poss from Allopurinol. The patient has pancytopenia from chemotherapy and/or disease.

## 2019-08-23 NOTE — PROGRESS NOTE ADULT - PROBLEM SELECTOR PLAN 5
HgA1C 10.6  FS AC & HS with HISS  Continue Lantus and Humalog per endocrine   Consistent carbohydrate diet  Endocrine following

## 2019-08-23 NOTE — ADVANCED PRACTICE NURSE CONSULT - ASSESSMENT
Induction Cycle 1   Day 17  Arrive to find patient in bed sleeping easily awaken alert and alert and oriented times four. During interview patient stated she felt better in term of itching, and nausea. The rash on upper body improving. Pt continue using nutritional supplement to help with increasing appetite.  Patient continue to have mild mouth pain and receiving pian medication with relief.  Pt was examine by Dr Latham during rounds new rash noted to upper back .Patient was also informed by Dr Latham of that bone marrow done on day 14 is clean and disease free.  Left pt up in chair talking on telephone.

## 2019-08-23 NOTE — PROGRESS NOTE ADULT - PROBLEM SELECTOR PLAN 4
grade 1, stable   8/21  abd sono - unremarkable   Monitor daily CMP  Avoid further hepatotoxic medications

## 2019-08-23 NOTE — PROGRESS NOTE ADULT - PROBLEM SELECTOR PLAN 1
-test BG AC/HS  -c/w Lantus 18 units QHS  -c/w Humalog 10 units AC meals  -c/w Humalog low correction scale AC and Low HS scale  DISPO: basal/bolus, doses to be determined  discussed w/pt and team  pager: [phone]. -test BG AC/HS  -c/w Lantus 18 units QHS  -Increase Humalog to 11 units AC meals  -c/w Humalog low correction scale AC and Low HS scale  DISPO: basal/bolus, doses to be determined  discussed w/pt and team  Lindsay Ch (pager [phone])  On evenings and weekends, please call [phone] or page endocrine fellow on call.   Please note that this patient may be followed by different provider tomorrow. If no answer, contact endocrine fellow on call.

## 2019-08-23 NOTE — PROGRESS NOTE ADULT - SUBJECTIVE AND OBJECTIVE BOX
ANTIMICROBIALS  acyclovir   Oral Tab/Cap 400 milliGRAM(s) Oral two times a day  aztreonam  IVPB 2000 milliGRAM(s) IV Intermittent every 8 hours  aztreonam  IVPB      posaconazole DR Tablet 300 milliGRAM(s) Oral daily  vancomycin  IVPB 1000 milliGRAM(s) IV Intermittent every 12 hours  vancomycin  IVPB          HEME/ONC MEDICATIONS      STANDING MEDICATIONS  ALBUTerol    90 MICROgram(s) HFA Inhaler 1 Puff(s) Inhalation every 6 hours  ALBUTerol/ipratropium for Nebulization 3 milliLiter(s) Nebulizer every 6 hours  benzonatate 100 milliGRAM(s) Oral three times a day  Biotene Dry Mouth Oral Rinse 5 milliLiter(s) Swish and Spit three times a day  chlorhexidine 2% Cloths 1 Application(s) Topical <User Schedule>  dextrose 5%. 1000 milliLiter(s) IV Continuous <Continuous>  dextrose 50% Injectable 12.5 Gram(s) IV Push once  dextrose 50% Injectable 25 Gram(s) IV Push once  dextrose 50% Injectable 25 Gram(s) IV Push once  FIRST- Mouthwash  BLM 10 milliLiter(s) Swish and Spit four times a day  gabapentin 600 milliGRAM(s) Oral two times a day  hydrOXYzine hydrochloride 25 milliGRAM(s) Oral every 6 hours  insulin glargine Injectable (LANTUS) 18 Unit(s) SubCutaneous at bedtime  insulin lispro (HumaLOG) corrective regimen sliding scale   SubCutaneous three times a day before meals  insulin lispro (HumaLOG) corrective regimen sliding scale   SubCutaneous at bedtime  insulin lispro Injectable (HumaLOG) 10 Unit(s) SubCutaneous three times a day before meals  investigational chemo - general 1 Tablet(s) Oral every 24 hours  levothyroxine 75 MICROGram(s) Oral daily  lidocaine 2% Injectable 20 milliLiter(s) Local Injection once  losartan 100 milliGRAM(s) Oral daily  pantoprazole    Tablet 40 milliGRAM(s) Oral before breakfast  simethicone 80 milliGRAM(s) Chew four times a day  sodium chloride 0.9%. 1000 milliLiter(s) IV Continuous <Continuous>  sodium chloride 3%  Inhalation 4 milliLiter(s) Inhalation two times a day  tiotropium 18 MICROgram(s) Capsule 1 Capsule(s) Inhalation daily      PRN MEDICATIONS  acetaminophen   Tablet .. 650 milliGRAM(s) Oral every 6 hours PRN  aluminum hydroxide/magnesium hydroxide/simethicone Suspension 30 milliLiter(s) Oral every 6 hours PRN  dextrose 40% Gel 15 Gram(s) Oral once PRN  diphenhydrAMINE 25 milliGRAM(s) Oral every 6 hours PRN  glucagon  Injectable 1 milliGRAM(s) IntraMuscular once PRN  hydrocortisone 2.5% Lotion 1 Application(s) Topical three times a day PRN  lidocaine 2% Viscous 2 milliLiter(s) Oral every 2 hours PRN  loperamide 2 milliGRAM(s) Oral every 4 hours PRN  melatonin 3 milliGRAM(s) Oral once PRN  metoclopramide Injectable 10 milliGRAM(s) IV Push every 6 hours PRN  oxyCODONE    IR 5 milliGRAM(s) Oral every 4 hours PRN  polyethylene glycol 3350 17 Gram(s) Oral two times a day PRN        Vital Signs Last 24 Hrs  T(C): 37.8 (23 Aug 2019 05:08), Max: 37.9 (22 Aug 2019 21:34)  T(F): 100.1 (23 Aug 2019 05:08), Max: 100.3 (22 Aug 2019 21:34)  HR: 88 (23 Aug 2019 05:08) (83 - 94)  BP: 133/75 (23 Aug 2019 05:08) (120/71 - 146/76)  BP(mean): --  RR: 17 (23 Aug 2019 05:08) (17 - 18)  SpO2: 96% (23 Aug 2019 05:08) (95% - 100%)      PHYSICAL EXAM  General: NAD  HEENT: ulcer  left lower gum under denture per pt,  anicteric sclera  CV: (+) S1/S2 RRR  Lungs: clear to auscultation, no wheezes or rales  Abdomen: soft, non-tender, non-distended (+) BS x 4Q  Ext: no edema  Skin: macular papular erythematous rash on back   Neuro: alert and oriented X 3  Central Line: TLC CDI           LABS:                        7.1    0.9   )-----------( 9        ( 22 Aug 2019 06:52 )             21.6         Mean Cell Volume : 85.2 fl  Mean Cell Hemoglobin : 28.1 pg  Mean Cell Hemoglobin Concentration : 33.0 gm/dL  Auto Neutrophil # : 0.0 K/uL  Auto Lymphocyte # : 0.8 K/uL  Auto Monocyte # : 0.0 K/uL  Auto Eosinophil # : 0.0 K/uL  Auto Basophil # : 0.0 K/uL  Auto Neutrophil % : 1.0 %  Auto Lymphocyte % : 97.8 %  Auto Monocyte % : 0.2 %  Auto Eosinophil % : 0.0 %  Auto Basophil % : 1.0 %      08-22    134<L>  |  101  |  10  ----------------------------<  194<H>  3.9   |  22  |  0.59    Ca    8.0<L>      22 Aug 2019 06:51  Phos  3.1     08-22  Mg     1.7     08-22    TPro  6.6  /  Alb  2.9<L>  /  TBili  0.4  /  DBili  x   /  AST  15  /  ALT  36  /  AlkPhos  213<H>  08-22  Diagnosis: AML FLT 3 (-)    Protocol/Chemo Regimen: Enrolled on Pfizer BRIGHT 1019-  s/p Induction chemotherapy with Dauno/Cytarabine and daily Glasdegib vs Placebo    Day: 17    Pt endorsed:  + Pruritis , +slight improved mouth discomfort; improved SOB/RENTERIA    Review of Systems: Patient denies dizziness, HA, visual changes, chest pain, palpitations, abdominal pain, nausea, vomiting or dysuria.    Pain scale: 7/10 mouth/throat     Diet: soft mech, Consistent Carbohydrate, Glucerna TID     Allergies    cefepime (Rash)          PT/INR - ( 22 Aug 2019 06:52 )   PT: 13.4 sec;   INR: 1.17 ratio         PTT - ( 22 Aug 2019 06:52 )  PTT:30.7 sec        RECENT CULTURES:  08-19 @ 22:08  .Blood  --  --  --    No growth to date.  --  08-19 @ 19:02  .Urine  --  --  --    <10,000 CFU/mL Normal Urogenital Maryana  --      RADIOLOGY & ADDITIONAL STUDIES:    US Abdomen Complete (08.22.19 @ 08:19)   IMPRESSION: Unremarkable sonographic evaluation of the liver.    Xray Chest 1 View- PORTABLE-Urgent (08.15.19 @ 01:26) >  Unchanged bilateral reticular opacities.  No pneumonia. Diagnosis: AML FLT 3 (-)    Protocol/Chemo Regimen: Enrolled on Pfizer BRIGHT 1019-  s/p Induction chemotherapy with Dauno/Cytarabine and daily Glasdegib vs Placebo    Day: 17    Pt endorsed:  + Pruritis; sore throat; improved SOB/RENTERIA    Review of Systems: Patient denies dizziness, HA, visual changes, chest pain, palpitations, abdominal pain, nausea, vomiting or dysuria.    Pain scale: 7/10 throat     Diet: soft mech, Consistent Carbohydrate, Glucerna TID     Allergies: cefepime (Rash)      ANTIMICROBIALS  acyclovir   Oral Tab/Cap 400 milliGRAM(s) Oral two times a day  aztreonam  IVPB 2000 milliGRAM(s) IV Intermittent every 8 hours  aztreonam  IVPB      posaconazole DR Tablet 300 milliGRAM(s) Oral daily  vancomycin  IVPB 1000 milliGRAM(s) IV Intermittent every 12 hours  vancomycin  IVPB          STANDING MEDICATIONS  ALBUTerol    90 MICROgram(s) HFA Inhaler 1 Puff(s) Inhalation every 6 hours  ALBUTerol/ipratropium for Nebulization 3 milliLiter(s) Nebulizer every 6 hours  benzonatate 100 milliGRAM(s) Oral three times a day  Biotene Dry Mouth Oral Rinse 5 milliLiter(s) Swish and Spit three times a day  chlorhexidine 2% Cloths 1 Application(s) Topical <User Schedule>  dextrose 5%. 1000 milliLiter(s) IV Continuous <Continuous>  dextrose 50% Injectable 12.5 Gram(s) IV Push once  dextrose 50% Injectable 25 Gram(s) IV Push once  dextrose 50% Injectable 25 Gram(s) IV Push once  FIRST- Mouthwash  BLM 10 milliLiter(s) Swish and Spit four times a day  gabapentin 600 milliGRAM(s) Oral two times a day  hydrOXYzine hydrochloride 25 milliGRAM(s) Oral every 6 hours  insulin glargine Injectable (LANTUS) 18 Unit(s) SubCutaneous at bedtime  insulin lispro (HumaLOG) corrective regimen sliding scale   SubCutaneous three times a day before meals  insulin lispro (HumaLOG) corrective regimen sliding scale   SubCutaneous at bedtime  insulin lispro Injectable (HumaLOG) 10 Unit(s) SubCutaneous three times a day before meals  investigational chemo - general 1 Tablet(s) Oral every 24 hours  levothyroxine 75 MICROGram(s) Oral daily  lidocaine 2% Injectable 20 milliLiter(s) Local Injection once  losartan 100 milliGRAM(s) Oral daily  pantoprazole    Tablet 40 milliGRAM(s) Oral before breakfast  simethicone 80 milliGRAM(s) Chew four times a day  sodium chloride 0.9%. 1000 milliLiter(s) IV Continuous <Continuous>  sodium chloride 3%  Inhalation 4 milliLiter(s) Inhalation two times a day  tiotropium 18 MICROgram(s) Capsule 1 Capsule(s) Inhalation daily      PRN MEDICATIONS  acetaminophen   Tablet .. 650 milliGRAM(s) Oral every 6 hours PRN  aluminum hydroxide/magnesium hydroxide/simethicone Suspension 30 milliLiter(s) Oral every 6 hours PRN  dextrose 40% Gel 15 Gram(s) Oral once PRN  diphenhydrAMINE 25 milliGRAM(s) Oral every 6 hours PRN  glucagon  Injectable 1 milliGRAM(s) IntraMuscular once PRN  hydrocortisone 2.5% Lotion 1 Application(s) Topical three times a day PRN  lidocaine 2% Viscous 2 milliLiter(s) Oral every 2 hours PRN  loperamide 2 milliGRAM(s) Oral every 4 hours PRN  melatonin 3 milliGRAM(s) Oral once PRN  metoclopramide Injectable 10 milliGRAM(s) IV Push every 6 hours PRN  oxyCODONE    IR 5 milliGRAM(s) Oral every 4 hours PRN  polyethylene glycol 3350 17 Gram(s) Oral two times a day PRN      Vital Signs Last 24 Hrs  T(C): 37.8 (23 Aug 2019 05:08), Max: 37.9 (22 Aug 2019 21:34)  T(F): 100.1 (23 Aug 2019 05:08), Max: 100.3 (22 Aug 2019 21:34)  HR: 88 (23 Aug 2019 05:08) (83 - 94)  BP: 133/75 (23 Aug 2019 05:08) (120/71 - 146/76)  BP(mean): --  RR: 17 (23 Aug 2019 05:08) (17 - 18)  SpO2: 96% (23 Aug 2019 05:08) (95% - 100%)      PHYSICAL EXAM  General: NAD  HEENT: ulcer  left lower gum under denture per pt,  anicteric sclera  CV: (+) S1/S2 RRR  Lungs: clear to auscultation. Fine left base rale  Abdomen: soft, non-tender, non-distended (+) BS x 4Q  Ext: trace edema right foot   Skin: macular papular erythematous rash on back   Neuro: alert and oriented X 3  Central Line: TLC CDI       LABS:                        6.6    0.9   )-----------( 37       ( 23 Aug 2019 06:56 )             20.5         Mean Cell Volume : 85.7 fl  Mean Cell Hemoglobin : 27.7 pg  Mean Cell Hemoglobin Concentration : 32.3 gm/dL  Auto Neutrophil # : x  Auto Lymphocyte # : x  Auto Monocyte # : x  Auto Eosinophil # : x  Auto Basophil # : See Note  Auto Neutrophil % : 0.0 %  Auto Lymphocyte % : 100.0 %  Auto Monocyte % : x  Auto Eosinophil % : x  Auto Basophil % : x      08-23    136  |  103  |  10  ----------------------------<  138<H>  3.6   |  22  |  0.64    Ca    8.6      23 Aug 2019 06:53  Phos  3.4     08-23  Mg     1.5     08-23    TPro  6.8  /  Alb  3.0<L>  /  TBili  0.4  /  DBili  x   /  AST  22  /  ALT  44  /  AlkPhos  219<H>  08-23    PT/INR - ( 22 Aug 2019 06:52 )   PT: 13.4 sec;   INR: 1.17 ratio         PTT - ( 22 Aug 2019 06:52 )  PTT:30.7 sec      Uric Acid 1.3      RECENT CULTURES:  08-19 @ 22:08  .Blood  --  --  --    No growth to date.  --  08-19 @ 19:02  .Urine  --  --  --    <10,000 CFU/mL Normal Urogenital Maryana  --      RADIOLOGY & ADDITIONAL STUDIES:    US Abdomen Complete (08.22.19 @ 08:19)   IMPRESSION: Unremarkable sonographic evaluation of the liver.    Xray Chest 1 View- PORTABLE-Urgent (08.15.19 @ 01:26) >  Unchanged bilateral reticular opacities.  No pneumonia. Diagnosis: AML FLT 3 (-)    Protocol/Chemo Regimen: Enrolled on Pfizer BRIGHT 1019-  s/p Induction chemotherapy with Dauno/Cytarabine and daily Glasdegib vs Placebo    Day: 17    Pt endorsed:  + Pruritis on back, new x 2 days, better, still itchy; sore throat; improved SOB/RENTERIA    Review of Systems: Patient denies dizziness, HA, visual changes, chest pain, palpitations, abdominal pain, nausea, vomiting or dysuria.    Pain scale: 7/10 throat     Diet: soft mech, Consistent Carbohydrate, Glucerna TID     Allergies: cefepime (Rash)      ANTIMICROBIALS  acyclovir   Oral Tab/Cap 400 milliGRAM(s) Oral two times a day  aztreonam  IVPB 2000 milliGRAM(s) IV Intermittent every 8 hours  aztreonam  IVPB      posaconazole DR Tablet 300 milliGRAM(s) Oral daily  vancomycin  IVPB 1000 milliGRAM(s) IV Intermittent every 12 hours  vancomycin  IVPB          STANDING MEDICATIONS  ALBUTerol    90 MICROgram(s) HFA Inhaler 1 Puff(s) Inhalation every 6 hours  ALBUTerol/ipratropium for Nebulization 3 milliLiter(s) Nebulizer every 6 hours  benzonatate 100 milliGRAM(s) Oral three times a day  Biotene Dry Mouth Oral Rinse 5 milliLiter(s) Swish and Spit three times a day  chlorhexidine 2% Cloths 1 Application(s) Topical <User Schedule>  dextrose 5%. 1000 milliLiter(s) IV Continuous <Continuous>  dextrose 50% Injectable 12.5 Gram(s) IV Push once  dextrose 50% Injectable 25 Gram(s) IV Push once  dextrose 50% Injectable 25 Gram(s) IV Push once  FIRST- Mouthwash  BLM 10 milliLiter(s) Swish and Spit four times a day  gabapentin 600 milliGRAM(s) Oral two times a day  hydrOXYzine hydrochloride 25 milliGRAM(s) Oral every 6 hours  insulin glargine Injectable (LANTUS) 18 Unit(s) SubCutaneous at bedtime  insulin lispro (HumaLOG) corrective regimen sliding scale   SubCutaneous three times a day before meals  insulin lispro (HumaLOG) corrective regimen sliding scale   SubCutaneous at bedtime  insulin lispro Injectable (HumaLOG) 10 Unit(s) SubCutaneous three times a day before meals  investigational chemo - general 1 Tablet(s) Oral every 24 hours  levothyroxine 75 MICROGram(s) Oral daily  lidocaine 2% Injectable 20 milliLiter(s) Local Injection once  losartan 100 milliGRAM(s) Oral daily  pantoprazole    Tablet 40 milliGRAM(s) Oral before breakfast  simethicone 80 milliGRAM(s) Chew four times a day  sodium chloride 0.9%. 1000 milliLiter(s) IV Continuous <Continuous>  sodium chloride 3%  Inhalation 4 milliLiter(s) Inhalation two times a day  tiotropium 18 MICROgram(s) Capsule 1 Capsule(s) Inhalation daily      PRN MEDICATIONS  acetaminophen   Tablet .. 650 milliGRAM(s) Oral every 6 hours PRN  aluminum hydroxide/magnesium hydroxide/simethicone Suspension 30 milliLiter(s) Oral every 6 hours PRN  dextrose 40% Gel 15 Gram(s) Oral once PRN  diphenhydrAMINE 25 milliGRAM(s) Oral every 6 hours PRN  glucagon  Injectable 1 milliGRAM(s) IntraMuscular once PRN  hydrocortisone 2.5% Lotion 1 Application(s) Topical three times a day PRN  lidocaine 2% Viscous 2 milliLiter(s) Oral every 2 hours PRN  loperamide 2 milliGRAM(s) Oral every 4 hours PRN  melatonin 3 milliGRAM(s) Oral once PRN  metoclopramide Injectable 10 milliGRAM(s) IV Push every 6 hours PRN  oxyCODONE    IR 5 milliGRAM(s) Oral every 4 hours PRN  polyethylene glycol 3350 17 Gram(s) Oral two times a day PRN      Vital Signs Last 24 Hrs  T(C): 37.8 (23 Aug 2019 05:08), Max: 37.9 (22 Aug 2019 21:34)  T(F): 100.1 (23 Aug 2019 05:08), Max: 100.3 (22 Aug 2019 21:34)  HR: 88 (23 Aug 2019 05:08) (83 - 94)  BP: 133/75 (23 Aug 2019 05:08) (120/71 - 146/76)  BP(mean): --  RR: 17 (23 Aug 2019 05:08) (17 - 18)  SpO2: 96% (23 Aug 2019 05:08) (95% - 100%)      PHYSICAL EXAM  General: NAD  HEENT: ulcer  left lower gum under denture per pt,  anicteric sclera  CV: (+) S1/S2 RRR  Lungs: clear to auscultation. Fine left base rale  Abdomen: soft, non-tender, non-distended (+) BS x 4Q  Ext: trace edema right foot   Skin: macular papular erythematous rash on back   Neuro: alert and oriented X 3  Central Line: TLC CDI       LABS:                        6.6    0.9   )-----------( 37       ( 23 Aug 2019 06:56 )             20.5         Mean Cell Volume : 85.7 fl  Mean Cell Hemoglobin : 27.7 pg  Mean Cell Hemoglobin Concentration : 32.3 gm/dL  Auto Neutrophil # : x  Auto Lymphocyte # : x  Auto Monocyte # : x  Auto Eosinophil # : x  Auto Basophil # : See Note  Auto Neutrophil % : 0.0 %  Auto Lymphocyte % : 100.0 %  Auto Monocyte % : x  Auto Eosinophil % : x  Auto Basophil % : x      08-23    136  |  103  |  10  ----------------------------<  138<H>  3.6   |  22  |  0.64    Ca    8.6      23 Aug 2019 06:53  Phos  3.4     08-23  Mg     1.5     08-23    TPro  6.8  /  Alb  3.0<L>  /  TBili  0.4  /  DBili  x   /  AST  22  /  ALT  44  /  AlkPhos  219<H>  08-23    PT/INR - ( 22 Aug 2019 06:52 )   PT: 13.4 sec;   INR: 1.17 ratio         PTT - ( 22 Aug 2019 06:52 )  PTT:30.7 sec      Uric Acid 1.3      RECENT CULTURES:  08-19 @ 22:08  .Blood  --  --  --    No growth to date.  --  08-19 @ 19:02  .Urine  --  --  --    <10,000 CFU/mL Normal Urogenital Maryana  --      RADIOLOGY & ADDITIONAL STUDIES:    US Abdomen Complete (08.22.19 @ 08:19)   IMPRESSION: Unremarkable sonographic evaluation of the liver.    Xray Chest 1 View- PORTABLE-Urgent (08.15.19 @ 01:26) >  Unchanged bilateral reticular opacities.  No pneumonia.

## 2019-08-24 LAB
ALBUMIN SERPL ELPH-MCNC: 3.2 G/DL — LOW (ref 3.3–5)
ALP SERPL-CCNC: 234 U/L — HIGH (ref 40–120)
ALT FLD-CCNC: 40 U/L — SIGNIFICANT CHANGE UP (ref 10–45)
ANION GAP SERPL CALC-SCNC: 12 MMOL/L — SIGNIFICANT CHANGE UP (ref 5–17)
AST SERPL-CCNC: 21 U/L — SIGNIFICANT CHANGE UP (ref 10–40)
BASOPHILS # BLD AUTO: 0 K/UL — SIGNIFICANT CHANGE UP (ref 0–0.2)
BASOPHILS NFR BLD AUTO: 0 % — SIGNIFICANT CHANGE UP (ref 0–2)
BILIRUB SERPL-MCNC: 0.5 MG/DL — SIGNIFICANT CHANGE UP (ref 0.2–1.2)
BUN SERPL-MCNC: 10 MG/DL — SIGNIFICANT CHANGE UP (ref 7–23)
CALCIUM SERPL-MCNC: 8 MG/DL — LOW (ref 8.4–10.5)
CHLORIDE SERPL-SCNC: 104 MMOL/L — SIGNIFICANT CHANGE UP (ref 96–108)
CO2 SERPL-SCNC: 22 MMOL/L — SIGNIFICANT CHANGE UP (ref 22–31)
CREAT SERPL-MCNC: 0.61 MG/DL — SIGNIFICANT CHANGE UP (ref 0.5–1.3)
CULTURE RESULTS: SIGNIFICANT CHANGE UP
CULTURE RESULTS: SIGNIFICANT CHANGE UP
EOSINOPHIL # BLD AUTO: 0 K/UL — SIGNIFICANT CHANGE UP (ref 0–0.5)
EOSINOPHIL NFR BLD AUTO: 0.7 % — SIGNIFICANT CHANGE UP (ref 0–6)
GLUCOSE BLDC GLUCOMTR-MCNC: 116 MG/DL — HIGH (ref 70–99)
GLUCOSE BLDC GLUCOMTR-MCNC: 159 MG/DL — HIGH (ref 70–99)
GLUCOSE BLDC GLUCOMTR-MCNC: 187 MG/DL — HIGH (ref 70–99)
GLUCOSE BLDC GLUCOMTR-MCNC: 196 MG/DL — HIGH (ref 70–99)
GLUCOSE BLDC GLUCOMTR-MCNC: 257 MG/DL — HIGH (ref 70–99)
GLUCOSE BLDC GLUCOMTR-MCNC: 67 MG/DL — LOW (ref 70–99)
GLUCOSE BLDC GLUCOMTR-MCNC: 68 MG/DL — LOW (ref 70–99)
GLUCOSE BLDC GLUCOMTR-MCNC: 94 MG/DL — SIGNIFICANT CHANGE UP (ref 70–99)
GLUCOSE SERPL-MCNC: 161 MG/DL — HIGH (ref 70–99)
HCT VFR BLD CALC: 24.6 % — LOW (ref 34.5–45)
HGB BLD-MCNC: 8.5 G/DL — LOW (ref 11.5–15.5)
LDH SERPL L TO P-CCNC: 273 U/L — HIGH (ref 50–242)
LYMPHOCYTES # BLD AUTO: 0.7 K/UL — LOW (ref 1–3.3)
LYMPHOCYTES # BLD AUTO: 97.2 % — HIGH (ref 13–44)
MAGNESIUM SERPL-MCNC: 2 MG/DL — SIGNIFICANT CHANGE UP (ref 1.6–2.6)
MCHC RBC-ENTMCNC: 29 PG — SIGNIFICANT CHANGE UP (ref 27–34)
MCHC RBC-ENTMCNC: 34.4 GM/DL — SIGNIFICANT CHANGE UP (ref 32–36)
MCV RBC AUTO: 84.1 FL — SIGNIFICANT CHANGE UP (ref 80–100)
MONOCYTES # BLD AUTO: 0 K/UL — SIGNIFICANT CHANGE UP (ref 0–0.9)
MONOCYTES NFR BLD AUTO: 0.2 % — LOW (ref 2–14)
NEUTROPHILS # BLD AUTO: 0 K/UL — LOW (ref 1.8–7.4)
NEUTROPHILS NFR BLD AUTO: 1.8 % — LOW (ref 43–77)
PHOSPHATE SERPL-MCNC: 3.4 MG/DL — SIGNIFICANT CHANGE UP (ref 2.5–4.5)
PLAT MORPH BLD: NORMAL — SIGNIFICANT CHANGE UP
PLATELET # BLD AUTO: 20 K/UL — CRITICAL LOW (ref 150–400)
POTASSIUM SERPL-MCNC: 3.7 MMOL/L — SIGNIFICANT CHANGE UP (ref 3.5–5.3)
POTASSIUM SERPL-SCNC: 3.7 MMOL/L — SIGNIFICANT CHANGE UP (ref 3.5–5.3)
PROT SERPL-MCNC: 7 G/DL — SIGNIFICANT CHANGE UP (ref 6–8.3)
RBC # BLD: 2.92 M/UL — LOW (ref 3.8–5.2)
RBC # FLD: 12.3 % — SIGNIFICANT CHANGE UP (ref 10.3–14.5)
RBC BLD AUTO: SIGNIFICANT CHANGE UP
SODIUM SERPL-SCNC: 138 MMOL/L — SIGNIFICANT CHANGE UP (ref 135–145)
SPECIMEN SOURCE: SIGNIFICANT CHANGE UP
SPECIMEN SOURCE: SIGNIFICANT CHANGE UP
URATE SERPL-MCNC: 1.4 MG/DL — LOW (ref 2.5–7)
WBC # BLD: 0.7 K/UL — CRITICAL LOW (ref 3.8–10.5)
WBC # FLD AUTO: 0.7 K/UL — CRITICAL LOW (ref 3.8–10.5)

## 2019-08-24 PROCEDURE — 99232 SBSQ HOSP IP/OBS MODERATE 35: CPT

## 2019-08-24 RX ORDER — INSULIN LISPRO 100/ML
VIAL (ML) SUBCUTANEOUS
Refills: 0 | Status: DISCONTINUED | OUTPATIENT
Start: 2019-08-24 | End: 2019-08-27

## 2019-08-24 RX ORDER — INSULIN LISPRO 100/ML
12 VIAL (ML) SUBCUTANEOUS
Refills: 0 | Status: DISCONTINUED | OUTPATIENT
Start: 2019-08-24 | End: 2019-08-25

## 2019-08-24 RX ADMIN — SODIUM CHLORIDE 50 MILLILITER(S): 9 INJECTION INTRAMUSCULAR; INTRAVENOUS; SUBCUTANEOUS at 05:41

## 2019-08-24 RX ADMIN — Medication 650 MILLIGRAM(S): at 19:39

## 2019-08-24 RX ADMIN — Medication 11 UNIT(S): at 13:36

## 2019-08-24 RX ADMIN — Medication 3 MILLILITER(S): at 13:39

## 2019-08-24 RX ADMIN — Medication 1 GRAM(S): at 13:37

## 2019-08-24 RX ADMIN — Medication 12 UNIT(S): at 19:03

## 2019-08-24 RX ADMIN — Medication 100 MILLIGRAM(S): at 01:28

## 2019-08-24 RX ADMIN — Medication 3 MILLILITER(S): at 23:58

## 2019-08-24 RX ADMIN — Medication 25 MILLIGRAM(S): at 23:58

## 2019-08-24 RX ADMIN — DIPHENHYDRAMINE HYDROCHLORIDE AND LIDOCAINE HYDROCHLORIDE AND ALUMINUM HYDROXIDE AND MAGNESIUM HYDRO 10 MILLILITER(S): KIT at 18:30

## 2019-08-24 RX ADMIN — Medication 100 MILLIGRAM(S): at 13:38

## 2019-08-24 RX ADMIN — Medication 250 MILLIGRAM(S): at 10:48

## 2019-08-24 RX ADMIN — Medication 250 MILLIGRAM(S): at 21:24

## 2019-08-24 RX ADMIN — Medication 1: at 08:58

## 2019-08-24 RX ADMIN — Medication 75 MICROGRAM(S): at 05:38

## 2019-08-24 RX ADMIN — SODIUM CHLORIDE 4 MILLILITER(S): 9 INJECTION INTRAMUSCULAR; INTRAVENOUS; SUBCUTANEOUS at 05:38

## 2019-08-24 RX ADMIN — DIPHENHYDRAMINE HYDROCHLORIDE AND LIDOCAINE HYDROCHLORIDE AND ALUMINUM HYDROXIDE AND MAGNESIUM HYDRO 10 MILLILITER(S): KIT at 13:39

## 2019-08-24 RX ADMIN — Medication 3 MILLILITER(S): at 18:29

## 2019-08-24 RX ADMIN — SIMETHICONE 80 MILLIGRAM(S): 80 TABLET, CHEWABLE ORAL at 18:29

## 2019-08-24 RX ADMIN — CHLORHEXIDINE GLUCONATE 1 APPLICATION(S): 213 SOLUTION TOPICAL at 08:57

## 2019-08-24 RX ADMIN — DIPHENHYDRAMINE HYDROCHLORIDE AND LIDOCAINE HYDROCHLORIDE AND ALUMINUM HYDROXIDE AND MAGNESIUM HYDRO 10 MILLILITER(S): KIT at 05:39

## 2019-08-24 RX ADMIN — Medication 5 MILLILITER(S): at 13:39

## 2019-08-24 RX ADMIN — Medication 5 MILLILITER(S): at 21:24

## 2019-08-24 RX ADMIN — INSULIN GLARGINE 18 UNIT(S): 100 INJECTION, SOLUTION SUBCUTANEOUS at 22:27

## 2019-08-24 RX ADMIN — Medication 3 MILLILITER(S): at 05:38

## 2019-08-24 RX ADMIN — GABAPENTIN 600 MILLIGRAM(S): 400 CAPSULE ORAL at 05:38

## 2019-08-24 RX ADMIN — SIMETHICONE 80 MILLIGRAM(S): 80 TABLET, CHEWABLE ORAL at 05:39

## 2019-08-24 RX ADMIN — Medication 3: at 13:37

## 2019-08-24 RX ADMIN — Medication 25 MILLIGRAM(S): at 13:38

## 2019-08-24 RX ADMIN — Medication 25 MILLIGRAM(S): at 05:38

## 2019-08-24 RX ADMIN — Medication 100 MILLIGRAM(S): at 09:44

## 2019-08-24 RX ADMIN — Medication 100 MILLIGRAM(S): at 18:29

## 2019-08-24 RX ADMIN — Medication 1 GRAM(S): at 23:58

## 2019-08-24 RX ADMIN — GABAPENTIN 600 MILLIGRAM(S): 400 CAPSULE ORAL at 18:29

## 2019-08-24 RX ADMIN — Medication 100 MILLIGRAM(S): at 05:38

## 2019-08-24 RX ADMIN — SODIUM CHLORIDE 4 MILLILITER(S): 9 INJECTION INTRAMUSCULAR; INTRAVENOUS; SUBCUTANEOUS at 18:29

## 2019-08-24 RX ADMIN — POSACONAZOLE 300 MILLIGRAM(S): 100 TABLET, DELAYED RELEASE ORAL at 13:39

## 2019-08-24 RX ADMIN — Medication 400 MILLIGRAM(S): at 05:38

## 2019-08-24 RX ADMIN — Medication 400 MILLIGRAM(S): at 18:29

## 2019-08-24 RX ADMIN — Medication 100 MILLIGRAM(S): at 21:24

## 2019-08-24 RX ADMIN — Medication 1 GRAM(S): at 18:29

## 2019-08-24 RX ADMIN — Medication 5 MILLILITER(S): at 05:39

## 2019-08-24 RX ADMIN — DIPHENHYDRAMINE HYDROCHLORIDE AND LIDOCAINE HYDROCHLORIDE AND ALUMINUM HYDROXIDE AND MAGNESIUM HYDRO 10 MILLILITER(S): KIT at 23:58

## 2019-08-24 RX ADMIN — LOSARTAN POTASSIUM 100 MILLIGRAM(S): 100 TABLET, FILM COATED ORAL at 05:38

## 2019-08-24 RX ADMIN — Medication 650 MILLIGRAM(S): at 19:28

## 2019-08-24 RX ADMIN — Medication 1 GRAM(S): at 05:38

## 2019-08-24 RX ADMIN — PANTOPRAZOLE SODIUM 40 MILLIGRAM(S): 20 TABLET, DELAYED RELEASE ORAL at 05:41

## 2019-08-24 RX ADMIN — Medication 25 MILLIGRAM(S): at 18:29

## 2019-08-24 RX ADMIN — SIMETHICONE 80 MILLIGRAM(S): 80 TABLET, CHEWABLE ORAL at 13:38

## 2019-08-24 RX ADMIN — Medication 11 UNIT(S): at 08:57

## 2019-08-24 NOTE — PROGRESS NOTE ADULT - SUBJECTIVE AND OBJECTIVE BOX
Diabetes Follow up note:  Interval Hx:  64 year old female w/uncontrolled T2DM w/neuropathy, retinopathy w/AML here for induction chemotherapy. BG values mid 100-mid 200s over past 24 hours. Pt seen at bedside. Endorses eating cheerios and glucerna this morning. Eating food brought from home this afternoon from family. Endorses fatigue and weakness.     Review of Systems:  General: "I'm trying to eat"  GI: Tolerating POs without any N/V/D/ABD PAIN.  CV: No CP/SOB  ENDO: No S&Sx of hypoglycemia  MEDS:  insulin glargine Injectable (LANTUS) 18 Unit(s) SubCutaneous at bedtime  insulin lispro (HumaLOG) corrective regimen sliding scale   SubCutaneous three times a day before meals  insulin lispro (HumaLOG) corrective regimen sliding scale   SubCutaneous at bedtime  insulin lispro Injectable (HumaLOG) 11 Unit(s) SubCutaneous three times a day before meals  levothyroxine 75 MICROGram(s) Oral daily    acyclovir   Oral Tab/Cap 400 milliGRAM(s) Oral two times a day  aztreonam  IVPB 2000 milliGRAM(s) IV Intermittent every 8 hours  aztreonam  IVPB      posaconazole DR Tablet 300 milliGRAM(s) Oral daily  vancomycin  IVPB 1000 milliGRAM(s) IV Intermittent every 12 hours  vancomycin  IVPB        Allergies    cefepime (Rash)        PE:  General: Female lying in bed. NAD.   Vital Signs Last 24 Hrs  T(C): 37.6 (24 Aug 2019 14:07), Max: 37.7 (24 Aug 2019 08:12)  T(F): 99.7 (24 Aug 2019 14:07), Max: 99.9 (24 Aug 2019 08:12)  HR: 87 (24 Aug 2019 14:07) (83 - 92)  BP: 148/87 (24 Aug 2019 14:07) (120/69 - 148/87)  BP(mean): --  RR: 18 (24 Aug 2019 14:07) (18 - 18)  SpO2: 96% (24 Aug 2019 14:07) (95% - 99%)  Abd: Soft, NT,ND,   Extremities: Warm. Trace LE edema.   Neuro: A&O X3    LABS:    POCT Blood Glucose.: 257 mg/dL (08-24-19 @ 13:32)  POCT Blood Glucose.: 187 mg/dL (08-24-19 @ 08:06)  POCT Blood Glucose.: 164 mg/dL (08-23-19 @ 21:24)  POCT Blood Glucose.: 209 mg/dL (08-23-19 @ 15:50)  POCT Blood Glucose.: 193 mg/dL (08-23-19 @ 11:31)  POCT Blood Glucose.: 105 mg/dL (08-22-19 @ 22:04)  POCT Blood Glucose.: 204 mg/dL (08-22-19 @ 18:55)  POCT Blood Glucose.: 249 mg/dL (08-22-19 @ 14:07)  POCT Blood Glucose.: 165 mg/dL (08-22-19 @ 09:04)  POCT Blood Glucose.: 344 mg/dL (08-21-19 @ 21:30)  POCT Blood Glucose.: 181 mg/dL (08-21-19 @ 17:02)                            8.5    0.7   )-----------( 20       ( 24 Aug 2019 06:51 )             24.6       08-24    138  |  104  |  10  ----------------------------<  161<H>  3.7   |  22  |  0.61    Ca    8.0<L>      24 Aug 2019 06:51  Phos  3.4     08-24  Mg     2.0     08-24    TPro  7.0  /  Alb  3.2<L>  /  TBili  0.5  /  DBili  x   /  AST  21  /  ALT  40  /  AlkPhos  234<H>  08-24        Hemoglobin A1C, Whole Blood: 10.6 % <H> [4.0 - 5.6] (08-07-19 @ 08:36)            Contact number: [facility]te [phone] or [phone]

## 2019-08-24 NOTE — PROGRESS NOTE ADULT - ATTENDING COMMENTS
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 Pt on Pfizer trial 7+3+ Glasdegib/placebo.  Patient here for her induction chemotherapy on trial.  Day 17.   Day 14 marrow chemotherapeutic  Pre-rx [facility] results: NPM1, IDH2, DNMT3A mutations.  Afeb xc 48 hrs, no chills until 8/19 PM - temp to 100.9, chills 8/2.  Vanco added pre temp spike.  Vanco trough ok. On aztreonam, posa, vanco, acyclovir.  Tmax 100.3.  No N/V, now with diarrhea.  Less mucositis.  No diarrhea today. C. diff toxin (-).  I > O, lasix today  Pruritus now increased - fine, macular, erythematous rash on back.   Rash CTCAE allergic reaction grade 2.    Cefepime changed to aztreonam 8/17.      Mild AST/ALT elevation, alk phos sl incr; stable, follow for now.    liver U/S (-).  Alk phos stable at 219.  OOB     Supportive care, anti-emetics, IVFs,      Lantus QHS, following fingersticks closely, endocrine consult appreciated. h/o of linnea poorly controlled diabetes.   ECOG performance status 2. 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 Pt on Pfizer trial 7+3+ Glasdegib/placebo.  Patient here for her induction chemotherapy on trial.  Day 18.   Day 14 marrow chemotherapeutic  Pre-rx [facility] results: NPM1, IDH2, DNMT3A mutations.  Afeb x 72 hrs, no chills until 8/19 PM - temp to 100.9, chills 8/2.  Vanco added pre temp spike.  Vanco trough ok. On aztreonam, posa, vanco, acyclovir.  No N/V, now with diarrhea.  Less mucositis.  No diarrhea today.  C. diff toxin (-).  no need for lasix today  Pruritus sl better - fine, macular, erythematous rash on back fading.   Rash CTCAE allergic reaction grade 2.    Cefepime changed to aztreonam 8/17.     Mild AST/ALT elevation-  now wnl , alk phos 234, still incr    liver U/S (-).       Supportive care, anti-emetics, IVFs,    Lantus QHS and pre meal humalog, following fingersticks closely, endocrine input  h/o of very poorly controlled diabetes.   ECOG performance status 2.

## 2019-08-24 NOTE — PROGRESS NOTE ADULT - PROBLEM SELECTOR PLAN 10
No pharmacologic ppx 2/2 thrombocytopenia  Encourage ambulation  change diet to soft mechanical  with Glucerna       Contact Information [phone]

## 2019-08-24 NOTE — PROGRESS NOTE ADULT - ASSESSMENT
This is a 63 yo F with PMHx of T2DM, RA, Depression and now newly diagnosed AML enrolled on Pf[employer] BRIGHT trial, now s/p  Induction chemotherapy with Dauno/Cytarabine and daily Glasdegib vs Placebo, day 14 BM bx chemotherapeutic, awaiting for count recovery The patients hospital course has been complicated by  neutropenic fever, transaminitis/abd sono unremarkable,  RENTERIA due to interstitial lung disease ,volume overload,  diarrhea/c diff negative and drug rash from Cefepime and poss from Allopurinol. The patient has pancytopenia from chemotherapy and/or disease. This is a 65 yo F with PMHx of T2DM, RA, Depression and now newly diagnosed AML enrolled on Pfizer BRIGHT trial, now s/p  Induction chemotherapy with Dauno/Cytarabine and daily Glasdegib vs Placebo, day 14 BM bx chemotherapeutic, awaiting for count recovery The patients hospital course has been complicated by  neutropenic fever, transaminitis/abd sono unremarkable,  RENTERIA due to interstitial lung disease ,volume overload,  and drug exanthem.  The patient has pancytopenia from chemotherapy and/or disease.

## 2019-08-24 NOTE — PROGRESS NOTE ADULT - SUBJECTIVE AND OBJECTIVE BOX
Diagnosis: AML FLT 3 (-)    Protocol/Chemo Regimen: Enrolled on Pfizer BRIGHT 1019-  s/p Induction chemotherapy with Dauno/Cytarabine and daily Glasdegib vs Placebo    Day: 18    Pt endorsed:  + Pruritis on back, new x 2 days, better, still itchy; sore throat; improved SOB/RENTERIA    Review of Systems: Patient denies dizziness, HA, visual changes, chest pain, palpitations, abdominal pain, nausea, vomiting or dysuria.    Pain scale: 7/10 throat     Diet: soft mech, Consistent Carbohydrate, Glucerna TID     Allergies: cefepime (Rash)      ANTIMICROBIALS  acyclovir   Oral Tab/Cap 400 milliGRAM(s) Oral two times a day  aztreonam  IVPB 2000 milliGRAM(s) IV Intermittent every 8 hours  aztreonam  IVPB      posaconazole DR Tablet 300 milliGRAM(s) Oral daily  vancomycin  IVPB 1000 milliGRAM(s) IV Intermittent every 12 hours  vancomycin  IVPB          HEME/ONC MEDICATIONS      STANDING MEDICATIONS  ALBUTerol    90 MICROgram(s) HFA Inhaler 1 Puff(s) Inhalation every 6 hours  ALBUTerol/ipratropium for Nebulization 3 milliLiter(s) Nebulizer every 6 hours  benzonatate 100 milliGRAM(s) Oral three times a day  Biotene Dry Mouth Oral Rinse 5 milliLiter(s) Swish and Spit three times a day  chlorhexidine 2% Cloths 1 Application(s) Topical <User Schedule>  dextrose 5%. 1000 milliLiter(s) IV Continuous <Continuous>  dextrose 50% Injectable 12.5 Gram(s) IV Push once  dextrose 50% Injectable 25 Gram(s) IV Push once  dextrose 50% Injectable 25 Gram(s) IV Push once  FIRST- Mouthwash  BLM 10 milliLiter(s) Swish and Spit four times a day  gabapentin 600 milliGRAM(s) Oral two times a day  hydrOXYzine hydrochloride 25 milliGRAM(s) Oral every 6 hours  insulin glargine Injectable (LANTUS) 18 Unit(s) SubCutaneous at bedtime  insulin lispro (HumaLOG) corrective regimen sliding scale   SubCutaneous three times a day before meals  insulin lispro (HumaLOG) corrective regimen sliding scale   SubCutaneous at bedtime  insulin lispro Injectable (HumaLOG) 12 Unit(s) SubCutaneous three times a day before meals  investigational chemo - general 1 Tablet(s) Oral every 24 hours  levothyroxine 75 MICROGram(s) Oral daily  lidocaine 2% Injectable 20 milliLiter(s) Local Injection once  losartan 100 milliGRAM(s) Oral daily  pantoprazole    Tablet 40 milliGRAM(s) Oral before breakfast  simethicone 80 milliGRAM(s) Chew four times a day  sodium chloride 0.9%. 1000 milliLiter(s) IV Continuous <Continuous>  sodium chloride 3%  Inhalation 4 milliLiter(s) Inhalation two times a day  sucralfate 1 Gram(s) Oral four times a day  tiotropium 18 MICROgram(s) Capsule 1 Capsule(s) Inhalation daily      PRN MEDICATIONS  acetaminophen   Tablet .. 650 milliGRAM(s) Oral every 6 hours PRN  aluminum hydroxide/magnesium hydroxide/simethicone Suspension 30 milliLiter(s) Oral every 6 hours PRN  dextrose 40% Gel 15 Gram(s) Oral once PRN  diphenhydrAMINE 25 milliGRAM(s) Oral every 6 hours PRN  glucagon  Injectable 1 milliGRAM(s) IntraMuscular once PRN  hydrocortisone 2.5% Lotion 1 Application(s) Topical three times a day PRN  lidocaine 2% Viscous 2 milliLiter(s) Oral every 2 hours PRN  loperamide 2 milliGRAM(s) Oral every 4 hours PRN  melatonin 3 milliGRAM(s) Oral once PRN  metoclopramide Injectable 10 milliGRAM(s) IV Push every 6 hours PRN  oxyCODONE    IR 5 milliGRAM(s) Oral every 4 hours PRN  polyethylene glycol 3350 17 Gram(s) Oral two times a day PRN        Vital Signs Last 24 Hrs  T(C): 37.6 (24 Aug 2019 14:07), Max: 37.7 (24 Aug 2019 08:12)  T(F): 99.7 (24 Aug 2019 14:07), Max: 99.9 (24 Aug 2019 08:12)  HR: 87 (24 Aug 2019 14:07) (83 - 92)  BP: 148/87 (24 Aug 2019 14:07) (120/69 - 148/87)  BP(mean): --  RR: 18 (24 Aug 2019 14:07) (18 - 18)  SpO2: 96% (24 Aug 2019 14:07) (95% - 99%)    PHYSICAL EXAM  General: NAD  HEENT: ulcer  left lower gum under denture per pt,  anicteric sclera  CV: (+) S1/S2 RRR  Lungs: clear to auscultation. Fine left base rale  Abdomen: soft, non-tender, non-distended (+) BS x 4Q  Ext: trace edema right foot   Skin: macular papular erythematous rash on back   Neuro: alert and oriented X 3  Central Line: TLC CDI     RECENT CULTURES:  08-19 @ 22:08  .Blood  --  --  --    No growth to date.  --  08-19 @ 19:02  .Urine  --  --  --    <10,000 CFU/mL Normal Urogenital Maryana  --        LABS:                        8.5    0.7   )-----------( 20       ( 24 Aug 2019 06:51 )             24.6         Mean Cell Volume : 84.1 fl  Mean Cell Hemoglobin : 29.0 pg  Mean Cell Hemoglobin Concentration : 34.4 gm/dL  Auto Neutrophil # : 0.0 K/uL  Auto Lymphocyte # : 0.7 K/uL  Auto Monocyte # : 0.0 K/uL  Auto Eosinophil # : 0.0 K/uL  Auto Basophil # : 0.0 K/uL  Auto Neutrophil % : 1.8 %  Auto Lymphocyte % : 97.2 %  Auto Monocyte % : 0.2 %  Auto Eosinophil % : 0.7 %  Auto Basophil % : 0.0 %      08-24    138  |  104  |  10  ----------------------------<  161<H>  3.7   |  22  |  0.61    Ca    8.0<L>      24 Aug 2019 06:51  Phos  3.4     08-24  Mg     2.0     08-24    TPro  7.0  /  Alb  3.2<L>  /  TBili  0.5  /  DBili  x   /  AST  21  /  ALT  40  /  AlkPhos  234<H>  08-24      Mg 2.0  Phos 3.4            Uric Acid 1.4        RADIOLOGY & ADDITIONAL STUDIES:  US Abdomen Complete (08.22.19 @ 08:19)   Unremarkable sonographic evaluation of the liver.

## 2019-08-24 NOTE — PROGRESS NOTE ADULT - PROBLEM SELECTOR PLAN 9
Simvastatin discontinued during hospitalization due to potential for liver toxicity No pharmacologic ppx 2/2 thrombocytopenia  Encourage ambulation  change diet to soft mechanical  with Glucerna       Contact Information [phone]

## 2019-08-24 NOTE — PROGRESS NOTE ADULT - PROBLEM SELECTOR PLAN 1
Enrolled on Pfizer BRIGHT trial s/p Induction chemotherapy with Dauno/Cytarabine and daily Glasdegib vs Placebo  Molecular studies pending  8/20 Day 14 BM bx, chemotherapeutic   Monitor CBC/Lytes and transfuse/replete PRN  Anemia: PRBC x 1 unit   Hypokalemia: KCL 20 meq IVPB x1  Hypomagnesemia: Mg Sulfate 2 g IVPBx1  Hypomagnesemia: Mg Sulfate 2 g iVPB x1  Strict Is and Os/ Daily weights/ Mouth Care  Add Carafate for sore throat, mucositis   Allopurinol 300mg PO daily-stopped 2/2 rash  IVF hydration  Antiemetics  ECOG performance status 2  Atarax ATC for itching and oral benadryl prn. added triamcinolone ointment, 8/22 added hydrocortisone cream  - rash improving  awaiting count recovery Enrolled on Pfizer BRIGHT trial s/p Induction chemotherapy with Dauno/Cytarabine and daily Glasdegib vs Placebo  Molecular studies pending  8/20 Day 14 BM bx, chemotherapeutic   Monitor CBC/Lytes and transfuse/replete PRN  Strict Is and Os/ Daily weights/ Mouth Care  Add Carafate for sore throat, mucositis   Allopurinol 300mg PO daily-stopped 2/2 rash  IVF hydration  Antiemetics  ECOG performance status 2  Atarax ATC for itching and oral benadryl prn. added triamcinolone ointment, 8/22 added hydrocortisone cream  - rash improving  awaiting count recovery

## 2019-08-24 NOTE — PROGRESS NOTE ADULT - PROBLEM SELECTOR PLAN 2
The patient is neutropenic, afebrile   Cultures  no growth to date   Continue vanco 1 g q12h empirically with Vanco trough 12.2 then wkly  Continue Posaconazole, change Cefepime to Aztreonam as patient has persistent pruritis.  (Monitor QTc BIW mon/Thurs  with Posaconazole and Study drug)  8/9 CT chest: Pulmonary fibrosis and traction bronchiectasis without honeycombing.   Overall these findings appear progressed compared to CT abdomen 7/4/2017  Associated ground-glass opacity may be related to active interstitial lung disease, fluid overload, less likely infection The patient is neutropenic, afebrile   Continue vancomycin 1 g q12h empirically with Vanco trough 12.2 then wkly  Continue Posaconazole, change Cefepime to Aztreonam as patient has persistent pruritis.  (Monitor QTc BIW mon/Thurs  with Posaconazole and Study drug)  8/9 CT chest: Pulmonary fibrosis and traction bronchiectasis without honeycombing.   Overall these findings appear progressed compared to CT abdomen 7/4/2017  Associated ground-glass opacity may be related to active interstitial lung disease, fluid overload, less likely infection

## 2019-08-24 NOTE — PROGRESS NOTE ADULT - PROBLEM SELECTOR PLAN 3
resolved   8/20 c diff negative   Imodium PRN grade 1, stable   8/21  abd sono - unremarkable   Monitor daily CMP  Avoid further hepatotoxic medications

## 2019-08-24 NOTE — PROGRESS NOTE ADULT - PROBLEM SELECTOR PLAN 4
grade 1, stable   8/21  abd sono - unremarkable   Monitor daily CMP  Avoid further hepatotoxic medications HgA1C 10.6  FS AC & HS with HISS  Continue Lantus and Humalog per endocrine   Consistent carbohydrate diet  Endocrine following

## 2019-08-24 NOTE — PROGRESS NOTE ADULT - PROBLEM SELECTOR PLAN 8
Continue Gabapentin 600mg PO q 12 Simvastatin discontinued during hospitalization due to potential for liver toxicity

## 2019-08-24 NOTE — PROGRESS NOTE ADULT - PROBLEM SELECTOR PLAN 5
HgA1C 10.6  FS AC & HS with HISS  Continue Lantus and Humalog per endocrine   Consistent carbohydrate diet  Endocrine following Continue Losartan 100mg PO daily  Monitor VS

## 2019-08-24 NOTE — PROGRESS NOTE ADULT - PROBLEM SELECTOR PLAN 1
-test BG AC/HS  -c/w Lantus 18 units QHS  -Increase Humalog 12 units AC meals (please hold if not eating)  -c/w Humalog low correction scale AC and Low HS scale  DISPO: basal/bolus, doses to be determined  discussed w/pt and team  pager: [phone].

## 2019-08-24 NOTE — PROGRESS NOTE ADULT - ASSESSMENT
65 y/o F w/ uncontrolled Type 2 DM w/ hyper and hypoglycemia A1c 10.6%, HTN, HLD, Hypothyroidism admitted for chemo for newly diagnosed AML. PO intake slightly improved as family bringing pt in food from home to help her eat more consistently. Hyperglycemic today so will continue to slowly uptitrate insulin to goal. No hypoglycemia. BG goal (100-180mg/dl).

## 2019-08-25 LAB
ALBUMIN SERPL ELPH-MCNC: 3.1 G/DL — LOW (ref 3.3–5)
ALP SERPL-CCNC: 214 U/L — HIGH (ref 40–120)
ALT FLD-CCNC: 45 U/L — SIGNIFICANT CHANGE UP (ref 10–45)
ANION GAP SERPL CALC-SCNC: 13 MMOL/L — SIGNIFICANT CHANGE UP (ref 5–17)
APPEARANCE UR: CLEAR — SIGNIFICANT CHANGE UP
AST SERPL-CCNC: 26 U/L — SIGNIFICANT CHANGE UP (ref 10–40)
BACTERIA # UR AUTO: NEGATIVE — SIGNIFICANT CHANGE UP
BASOPHILS # BLD AUTO: 0 K/UL — SIGNIFICANT CHANGE UP (ref 0–0.2)
BASOPHILS NFR BLD AUTO: 0 % — SIGNIFICANT CHANGE UP (ref 0–2)
BILIRUB SERPL-MCNC: 0.4 MG/DL — SIGNIFICANT CHANGE UP (ref 0.2–1.2)
BILIRUB UR-MCNC: NEGATIVE — SIGNIFICANT CHANGE UP
BUN SERPL-MCNC: 8 MG/DL — SIGNIFICANT CHANGE UP (ref 7–23)
CALCIUM SERPL-MCNC: 8.4 MG/DL — SIGNIFICANT CHANGE UP (ref 8.4–10.5)
CHLORIDE SERPL-SCNC: 104 MMOL/L — SIGNIFICANT CHANGE UP (ref 96–108)
CO2 SERPL-SCNC: 21 MMOL/L — LOW (ref 22–31)
COLOR SPEC: SIGNIFICANT CHANGE UP
CREAT SERPL-MCNC: 0.62 MG/DL — SIGNIFICANT CHANGE UP (ref 0.5–1.3)
DIFF PNL FLD: ABNORMAL
EOSINOPHIL # BLD AUTO: 0 K/UL — SIGNIFICANT CHANGE UP (ref 0–0.5)
EOSINOPHIL NFR BLD AUTO: 3.3 % — SIGNIFICANT CHANGE UP (ref 0–6)
EPI CELLS # UR: 1 /HPF — SIGNIFICANT CHANGE UP
GLUCOSE BLDC GLUCOMTR-MCNC: 143 MG/DL — HIGH (ref 70–99)
GLUCOSE BLDC GLUCOMTR-MCNC: 210 MG/DL — HIGH (ref 70–99)
GLUCOSE BLDC GLUCOMTR-MCNC: 210 MG/DL — HIGH (ref 70–99)
GLUCOSE BLDC GLUCOMTR-MCNC: 229 MG/DL — HIGH (ref 70–99)
GLUCOSE SERPL-MCNC: 198 MG/DL — HIGH (ref 70–99)
GLUCOSE UR QL: ABNORMAL
HCT VFR BLD CALC: 23.1 % — LOW (ref 34.5–45)
HGB BLD-MCNC: 8.1 G/DL — LOW (ref 11.5–15.5)
HYALINE CASTS # UR AUTO: 0 /LPF — SIGNIFICANT CHANGE UP (ref 0–2)
KETONES UR-MCNC: NEGATIVE — SIGNIFICANT CHANGE UP
LDH SERPL L TO P-CCNC: 268 U/L — HIGH (ref 50–242)
LEUKOCYTE ESTERASE UR-ACNC: NEGATIVE — SIGNIFICANT CHANGE UP
LYMPHOCYTES # BLD AUTO: 0.5 K/UL — LOW (ref 1–3.3)
LYMPHOCYTES # BLD AUTO: 93.3 % — HIGH (ref 13–44)
MAGNESIUM SERPL-MCNC: 1.6 MG/DL — SIGNIFICANT CHANGE UP (ref 1.6–2.6)
MCHC RBC-ENTMCNC: 29.5 PG — SIGNIFICANT CHANGE UP (ref 27–34)
MCHC RBC-ENTMCNC: 35 GM/DL — SIGNIFICANT CHANGE UP (ref 32–36)
MCV RBC AUTO: 84.3 FL — SIGNIFICANT CHANGE UP (ref 80–100)
MONOCYTES # BLD AUTO: 0 K/UL — SIGNIFICANT CHANGE UP (ref 0–0.9)
MONOCYTES NFR BLD AUTO: 2.4 % — SIGNIFICANT CHANGE UP (ref 2–14)
NEUTROPHILS # BLD AUTO: 0 K/UL — LOW (ref 1.8–7.4)
NEUTROPHILS NFR BLD AUTO: 0.9 % — LOW (ref 43–77)
NITRITE UR-MCNC: NEGATIVE — SIGNIFICANT CHANGE UP
PH UR: 6.5 — SIGNIFICANT CHANGE UP (ref 5–8)
PHOSPHATE SERPL-MCNC: 2.7 MG/DL — SIGNIFICANT CHANGE UP (ref 2.5–4.5)
PLAT MORPH BLD: NORMAL — SIGNIFICANT CHANGE UP
PLATELET # BLD AUTO: 13 K/UL — CRITICAL LOW (ref 150–400)
POTASSIUM SERPL-MCNC: 3.9 MMOL/L — SIGNIFICANT CHANGE UP (ref 3.5–5.3)
POTASSIUM SERPL-SCNC: 3.9 MMOL/L — SIGNIFICANT CHANGE UP (ref 3.5–5.3)
PROT SERPL-MCNC: 6.9 G/DL — SIGNIFICANT CHANGE UP (ref 6–8.3)
PROT UR-MCNC: 100 — SIGNIFICANT CHANGE UP
RBC # BLD: 2.74 M/UL — LOW (ref 3.8–5.2)
RBC # FLD: 12.1 % — SIGNIFICANT CHANGE UP (ref 10.3–14.5)
RBC BLD AUTO: SIGNIFICANT CHANGE UP
RBC CASTS # UR COMP ASSIST: 6 /HPF — HIGH (ref 0–4)
SODIUM SERPL-SCNC: 138 MMOL/L — SIGNIFICANT CHANGE UP (ref 135–145)
SP GR SPEC: 1.01 — SIGNIFICANT CHANGE UP (ref 1.01–1.02)
URATE SERPL-MCNC: 1.6 MG/DL — LOW (ref 2.5–7)
UROBILINOGEN FLD QL: NEGATIVE — SIGNIFICANT CHANGE UP
WBC # BLD: 0.6 K/UL — CRITICAL LOW (ref 3.8–10.5)
WBC # FLD AUTO: 0.6 K/UL — CRITICAL LOW (ref 3.8–10.5)
WBC UR QL: 1 /HPF — SIGNIFICANT CHANGE UP (ref 0–5)

## 2019-08-25 PROCEDURE — 74177 CT ABD & PELVIS W/CONTRAST: CPT | Mod: 26

## 2019-08-25 PROCEDURE — 71260 CT THORAX DX C+: CPT | Mod: 26

## 2019-08-25 PROCEDURE — 99232 SBSQ HOSP IP/OBS MODERATE 35: CPT

## 2019-08-25 PROCEDURE — 71045 X-RAY EXAM CHEST 1 VIEW: CPT | Mod: 26

## 2019-08-25 PROCEDURE — 99223 1ST HOSP IP/OBS HIGH 75: CPT | Mod: GC

## 2019-08-25 RX ORDER — INSULIN LISPRO 100/ML
10 VIAL (ML) SUBCUTANEOUS
Refills: 0 | Status: DISCONTINUED | OUTPATIENT
Start: 2019-08-25 | End: 2019-08-27

## 2019-08-25 RX ORDER — ACETAMINOPHEN 500 MG
1000 TABLET ORAL ONCE
Refills: 0 | Status: COMPLETED | OUTPATIENT
Start: 2019-08-25 | End: 2019-08-25

## 2019-08-25 RX ADMIN — Medication 650 MILLIGRAM(S): at 05:50

## 2019-08-25 RX ADMIN — Medication 100 MILLIGRAM(S): at 01:15

## 2019-08-25 RX ADMIN — Medication 5 MILLILITER(S): at 22:22

## 2019-08-25 RX ADMIN — GABAPENTIN 600 MILLIGRAM(S): 400 CAPSULE ORAL at 17:43

## 2019-08-25 RX ADMIN — Medication 1 GRAM(S): at 05:22

## 2019-08-25 RX ADMIN — Medication 100 MILLIGRAM(S): at 05:22

## 2019-08-25 RX ADMIN — Medication 5 MILLILITER(S): at 13:16

## 2019-08-25 RX ADMIN — Medication 250 MILLIGRAM(S): at 09:28

## 2019-08-25 RX ADMIN — SODIUM CHLORIDE 50 MILLILITER(S): 9 INJECTION INTRAMUSCULAR; INTRAVENOUS; SUBCUTANEOUS at 05:25

## 2019-08-25 RX ADMIN — Medication 3 MILLILITER(S): at 17:43

## 2019-08-25 RX ADMIN — Medication 10 UNIT(S): at 10:34

## 2019-08-25 RX ADMIN — Medication 400 MILLIGRAM(S): at 05:22

## 2019-08-25 RX ADMIN — SODIUM CHLORIDE 4 MILLILITER(S): 9 INJECTION INTRAMUSCULAR; INTRAVENOUS; SUBCUTANEOUS at 05:22

## 2019-08-25 RX ADMIN — Medication 3 MILLILITER(S): at 05:22

## 2019-08-25 RX ADMIN — POSACONAZOLE 300 MILLIGRAM(S): 100 TABLET, DELAYED RELEASE ORAL at 11:26

## 2019-08-25 RX ADMIN — Medication 100 MILLIGRAM(S): at 22:10

## 2019-08-25 RX ADMIN — Medication 100 MILLIGRAM(S): at 09:28

## 2019-08-25 RX ADMIN — Medication 75 MICROGRAM(S): at 05:23

## 2019-08-25 RX ADMIN — Medication 650 MILLIGRAM(S): at 11:26

## 2019-08-25 RX ADMIN — Medication 10 UNIT(S): at 13:12

## 2019-08-25 RX ADMIN — Medication 250 MILLIGRAM(S): at 22:11

## 2019-08-25 RX ADMIN — Medication 25 MILLIGRAM(S): at 11:26

## 2019-08-25 RX ADMIN — Medication 650 MILLIGRAM(S): at 12:35

## 2019-08-25 RX ADMIN — Medication 2: at 10:33

## 2019-08-25 RX ADMIN — Medication 100 MILLIGRAM(S): at 17:43

## 2019-08-25 RX ADMIN — DIPHENHYDRAMINE HYDROCHLORIDE AND LIDOCAINE HYDROCHLORIDE AND ALUMINUM HYDROXIDE AND MAGNESIUM HYDRO 10 MILLILITER(S): KIT at 05:24

## 2019-08-25 RX ADMIN — GABAPENTIN 600 MILLIGRAM(S): 400 CAPSULE ORAL at 05:22

## 2019-08-25 RX ADMIN — Medication 100 MILLIGRAM(S): at 13:12

## 2019-08-25 RX ADMIN — Medication 400 MILLIGRAM(S): at 22:09

## 2019-08-25 RX ADMIN — Medication 25 MILLIGRAM(S): at 17:43

## 2019-08-25 RX ADMIN — Medication 400 MILLIGRAM(S): at 17:43

## 2019-08-25 RX ADMIN — DIPHENHYDRAMINE HYDROCHLORIDE AND LIDOCAINE HYDROCHLORIDE AND ALUMINUM HYDROXIDE AND MAGNESIUM HYDRO 10 MILLILITER(S): KIT at 17:43

## 2019-08-25 RX ADMIN — Medication 1 GRAM(S): at 17:43

## 2019-08-25 RX ADMIN — SODIUM CHLORIDE 4 MILLILITER(S): 9 INJECTION INTRAMUSCULAR; INTRAVENOUS; SUBCUTANEOUS at 17:44

## 2019-08-25 RX ADMIN — PANTOPRAZOLE SODIUM 40 MILLIGRAM(S): 20 TABLET, DELAYED RELEASE ORAL at 05:24

## 2019-08-25 RX ADMIN — INSULIN GLARGINE 18 UNIT(S): 100 INJECTION, SOLUTION SUBCUTANEOUS at 22:22

## 2019-08-25 RX ADMIN — Medication 25 MILLIGRAM(S): at 05:23

## 2019-08-25 RX ADMIN — Medication 2: at 13:12

## 2019-08-25 RX ADMIN — LOSARTAN POTASSIUM 100 MILLIGRAM(S): 100 TABLET, FILM COATED ORAL at 05:23

## 2019-08-25 RX ADMIN — Medication 5 MILLILITER(S): at 05:24

## 2019-08-25 RX ADMIN — Medication 1 GRAM(S): at 11:25

## 2019-08-25 NOTE — PROGRESS NOTE ADULT - ASSESSMENT
63 y/o F w/ uncontrolled Type 2 DM w/ hyper and hypoglycemia A1c 10.6%, HTN, HLD, Hypothyroidism admitted for chemo for newly diagnosed AML. Pt w/erratic PO intake making glycemic control difficult. Now w/hypoglycemic incident yesterday so will reduce premeal insulin at this time to prevent further such events. BG goal (100-200mg/dl).

## 2019-08-25 NOTE — CONSULT NOTE ADULT - ASSESSMENT
65 yo F with history of DM,  RA , diagnosed in 2016, chronic depression now with newly diagnosed AML  admitted for induction chemotherapy,  finished 7 days of induction daunorubicin and cytarabine on 8/ 15 via TLC placed on 8/6,  and now on daily PO glasdegib. Patient has had  rigors, fever (101 yesterday). Absolute neutrophil count is 0.   Currently on vancomycin , Aztreonam 2g q8, posaconazole. Reported rash and itching with Cefepime.   Blood cx and urine cx have been negative. Patient was pancultured again yesterday.  Chest x ray shows ILD which is c/w patient's h/o PF.     Neutropenic fever   AML s/p induction     Suggest:  *c/w Vancomycin 1 q12, Aztreonam 2 q8 for neutropenic fever  *c/w Posaconazole 300 ED and Acyclovir 400 BID for ppx   *f/u repeat cultures  *check CT chest/ abdomen and pelvis with contrast.      D/W Team

## 2019-08-25 NOTE — CONSULT NOTE ADULT - SUBJECTIVE AND OBJECTIVE BOX
Patient is a 64y old  Female who presents with a chief complaint of For induction chemotherapy (25 Aug 2019 10:19)      HPI:  63 yo F with history of DM,  RA , diagnosed in 2016, chronic depression now with newly diagnosed AML  admitted for induction chemotherapy on the Recovr study with dauno/cytarabine and glasdegib/placebo.  Patient was seen by her rheumatologist Dr. Croft and was referred to Dr Bravo for a drop in hgb and immature  cells (physician was called by a hematopathologist for finding blasts in peripheral smear). Patient had been                                                                                                                               feeling more tired and more RENTERIA for the last 2-3 months. Recently completed a course of ciprofloxacin for UTI (-8/3)                                                                                                                                                                                                                                 BM asp.bx performed on  and patient signed consent for Pfizer/Bright  study if meets eligibility    Above reviewed. Patient denied cough, abdominal pain, diarrhea, dysuria.   Patient reported    prior hospital charts reviewed [  x]  primary team notes reviewed [ x ]  other consultant notes reviewed [x]    PAST MEDICAL & SURGICAL HISTORY:  DVT (deep venous thrombosis): &#x27; 87   post-op cholecystectomy  Gallstones: &#x27; 87  Rheumatoid arthritis: dx: 2017  Type 2 diabetes mellitus: dx;  age 38  Carpal tunnel syndrome: Left  Hypothyroid  High cholesterol  Status post colonoscopy: &#x27; 2009    Negative  Normal spontaneous vaginal delivery: &#x27; 83 and &#x27;86  Status post cholecystectomy: &#x27; 87    Allergies  cefepime (Rash)    ANTIMICROBIALS:    acyclovir   Oral Tab/Cap 400 two times a day  aztreonam  IVPB 2000 every 8 hours  aztreonam  IVPB    posaconazole DR Tablet 300 daily  vancomycin  IVPB 1000 every 12 hours  vancomycin  IVPB      OTHER MEDS: MEDICATIONS  (STANDING):  acetaminophen   Tablet .. 650 every 6 hours PRN  ALBUTerol    90 MICROgram(s) HFA Inhaler 1 every 6 hours  ALBUTerol/ipratropium for Nebulization 3 every 6 hours  aluminum hydroxide/magnesium hydroxide/simethicone Suspension 30 every 6 hours PRN  benzonatate 100 three times a day  dextrose 40% Gel 15 once PRN  dextrose 50% Injectable 12.5 once  dextrose 50% Injectable 25 once  dextrose 50% Injectable 25 once  diphenhydrAMINE 25 every 6 hours PRN  gabapentin 600 two times a day  glucagon  Injectable 1 once PRN  hydrOXYzine hydrochloride 25 every 6 hours  insulin glargine Injectable (LANTUS) 18 at bedtime  insulin lispro (HumaLOG) corrective regimen sliding scale  three times a day before meals  insulin lispro (HumaLOG) corrective regimen sliding scale  at bedtime  insulin lispro Injectable (HumaLOG) 10 three times a day before meals  levothyroxine 75 daily  loperamide 2 every 4 hours PRN  losartan 100 daily  melatonin 3 once PRN  metoclopramide Injectable 10 every 6 hours PRN  oxyCODONE    IR 5 every 4 hours PRN  pantoprazole    Tablet 40 before breakfast  polyethylene glycol 3350 17 two times a day PRN  simethicone 80 four times a day  sodium chloride 3%  Inhalation 4 two times a day  sucralfate 1 four times a day  tiotropium 18 MICROgram(s) Capsule 1 daily      SOCIAL HISTORY:   hx smoking  non-smoker    FAMILY HISTORY:  Family history of coronary artery disease (Father)  Family history of diabetes mellitus (Father, Mother)      REVIEW OF SYSTEMS  [  ] ROS unobtainable because:    [x ] All other systems negative except as noted below:	    Constitutional:  [x ] fever [x ] chills  [ ] weight loss  [ ] weakness  Skin:  [ ] rash [ ] phlebitis	  Eyes: [ ] icterus [ ] pain  [ ] discharge	  ENMT: [ ] sore throat  [ ] thrush [ ] ulcers [ ] exudates  Respiratory: [ ] dyspnea [ ] hemoptysis [ ] cough [ ] sputum	  Cardiovascular:  [ ] chest pain [ ] palpitations [ ] edema	  Gastrointestinal:  [ ] nausea [ ] vomiting [ ] diarrhea [ ] constipation [ ] pain	  Genitourinary:  [ ] dysuria [ ] frequency [ ] hematuria [ ] discharge [ ] flank pain  [ ] incontinence  Musculoskeletal:  [ ] myalgias [ ] arthralgias [ ] arthritis  [ ] back pain  Neurological:  [ ] headache [ ] seizures  [ ] confusion/altered mental status  Psychiatric:  [ ] anxiety [ ] depression	  Hematology/Lymphatics:  [ ] lymphadenopathy  Endocrine:  [ ] adrenal [ ] thyroid  Allergic/Immunologic:	 [ ] transplant [ ] seasonal    Vital Signs Last 24 Hrs  T(F): 99.4 (19 @ 13:08), Max: 101 (19 @ 12:00)    Vital Signs Last 24 Hrs  HR: 95 (19 @ 13:08) (77 - 95)  BP: 143/74 (19 @ 13:08) (121/68 - 152/78)  RR: 18 (19 @ 13:08)  SpO2: 95% (19 @ 13:08) (95% - 99%)  Wt(kg): --    PHYSICAL EXAM:  General: non-toxic  HEAD/EYES: anicteric, PERRL  ENT:  supple, no mucositis   Cardiovascular:   S1, S2  Respiratory:  clear bilaterally  GI:  soft, non-tender, normal bowel sounds  :  no CVA tenderness   Musculoskeletal:  no synovitis  Neurologic:  grossly non-focal  Skin:  no rash  Lymph: no lymphadenopathy  Psychiatric:  appropriate affect  Vascular:  no phlebitis                            8.1    0.6   )-----------( 13       ( 25 Aug 2019 06:54 )             23.1         138  |  104  |  8   ----------------------------<  198<H>  3.9   |  21<L>  |  0.62    Ca    8.4      25 Aug 2019 06:52  Phos  2.7       Mg     1.6         TPro  6.9  /  Alb  3.1<L>  /  TBili  0.4  /  DBili  x   /  AST  26  /  ALT  45  /  AlkPhos  214<H>        Urinalysis Basic - ( 25 Aug 2019 06:49 )    Color: Light Yellow / Appearance: Clear / S.012 / pH: x  Gluc: x / Ketone: Negative  / Bili: Negative / Urobili: Negative   Blood: x / Protein: 100 / Nitrite: Negative   Leuk Esterase: Negative / RBC: 6 /hpf / WBC 1 /HPF   Sq Epi: x / Non Sq Epi: 1 /hpf / Bacteria: Negative      MICROBIOLOGY:  Vancomycin Level, Trough: 12.2 ( @ 20:26)    Clostridium difficile GDH Toxins A&amp;B, EIA:   Negative (19 @ 21:30)  Clostridium difficile GDH Interpretation: Negative for toxigenic C. Difficile.  This specimen is negative for C.  Difficile glutamate dehydrogenase (GDH) antigen and negative for C.  Difficile Toxins A & B, by EIA.  GDH is a highly sensitive screening  marker for C. Difficile that is produced in large amounts by all C.  Difficile strains, both toxigenic and nontoxigenic.  This assay has not  been validated as a test of cure.  Repeat testing during the same episode  of diarrhea is of limited value and is discouraged.  The results of this  assay should always be interpreted in conjunction with pateint's clinical  history. (19 @ 21:30)    RADIOLOGY:  < from: Xray Chest 1 View- PORTABLE-Urgent (19 @ 06:06) >  indings: The cardiac silhouette is normal in size. There are no focal   consolidations or pleural effusions. There are unchanged bilateral   reticular opacities. There is a right-sided central venous catheter with   its tip overlying the superior vena cava.    Impression: Unchanged diffuse bilateral reticular interstitial lung   markings consistent with pulmonary fibrosis. No focal consolidation is   seen.

## 2019-08-25 NOTE — PROGRESS NOTE ADULT - PROBLEM SELECTOR PLAN 1
Enrolled on Pfizer BRIGHT trial s/p Induction chemotherapy with Dauno/Cytarabine and daily Glasdegib vs Placebo  Molecular studies pending  8/20 Day 14 BM bx, chemotherapeutic   Monitor CBC/Lytes and transfuse/replete PRN  Strict Is and Os/ Daily weights/ Mouth Care  Add Carafate for sore throat, mucositis   Allopurinol 300mg PO daily-stopped 2/2 rash  IVF hydration  Antiemetics  ECOG performance status 2  Atarax ATC for itching and oral benadryl prn. added triamcinolone ointment, 8/22 added hydrocortisone cream  - rash improving  awaiting count recovery

## 2019-08-25 NOTE — PROGRESS NOTE ADULT - SUBJECTIVE AND OBJECTIVE BOX
Diagnosis:    Protocol/Chemo Regimen:    Day:     Pt endorsed:    Review of Systems:     Pain scale:     Diet:     Allergies    cefepime (Rash)    Intolerances        ANTIMICROBIALS  acyclovir   Oral Tab/Cap 400 milliGRAM(s) Oral two times a day  aztreonam  IVPB 2000 milliGRAM(s) IV Intermittent every 8 hours  aztreonam  IVPB      posaconazole DR Tablet 300 milliGRAM(s) Oral daily  vancomycin  IVPB 1000 milliGRAM(s) IV Intermittent every 12 hours  vancomycin  IVPB          HEME/ONC MEDICATIONS      STANDING MEDICATIONS  ALBUTerol    90 MICROgram(s) HFA Inhaler 1 Puff(s) Inhalation every 6 hours  ALBUTerol/ipratropium for Nebulization 3 milliLiter(s) Nebulizer every 6 hours  benzonatate 100 milliGRAM(s) Oral three times a day  Biotene Dry Mouth Oral Rinse 5 milliLiter(s) Swish and Spit three times a day  chlorhexidine 2% Cloths 1 Application(s) Topical <User Schedule>  dextrose 5%. 1000 milliLiter(s) IV Continuous <Continuous>  dextrose 50% Injectable 12.5 Gram(s) IV Push once  dextrose 50% Injectable 25 Gram(s) IV Push once  dextrose 50% Injectable 25 Gram(s) IV Push once  FIRST- Mouthwash  BLM 10 milliLiter(s) Swish and Spit four times a day  gabapentin 600 milliGRAM(s) Oral two times a day  hydrOXYzine hydrochloride 25 milliGRAM(s) Oral every 6 hours  insulin glargine Injectable (LANTUS) 18 Unit(s) SubCutaneous at bedtime  insulin lispro (HumaLOG) corrective regimen sliding scale   SubCutaneous three times a day before meals  insulin lispro (HumaLOG) corrective regimen sliding scale   SubCutaneous at bedtime  insulin lispro Injectable (HumaLOG) 10 Unit(s) SubCutaneous three times a day before meals  investigational chemo - general 1 Tablet(s) Oral every 24 hours  levothyroxine 75 MICROGram(s) Oral daily  lidocaine 2% Injectable 20 milliLiter(s) Local Injection once  losartan 100 milliGRAM(s) Oral daily  pantoprazole    Tablet 40 milliGRAM(s) Oral before breakfast  simethicone 80 milliGRAM(s) Chew four times a day  sodium chloride 0.9%. 1000 milliLiter(s) IV Continuous <Continuous>  sodium chloride 3%  Inhalation 4 milliLiter(s) Inhalation two times a day  sucralfate 1 Gram(s) Oral four times a day  tiotropium 18 MICROgram(s) Capsule 1 Capsule(s) Inhalation daily      PRN MEDICATIONS  acetaminophen   Tablet .. 650 milliGRAM(s) Oral every 6 hours PRN  aluminum hydroxide/magnesium hydroxide/simethicone Suspension 30 milliLiter(s) Oral every 6 hours PRN  dextrose 40% Gel 15 Gram(s) Oral once PRN  diphenhydrAMINE 25 milliGRAM(s) Oral every 6 hours PRN  glucagon  Injectable 1 milliGRAM(s) IntraMuscular once PRN  hydrocortisone 2.5% Lotion 1 Application(s) Topical three times a day PRN  lidocaine 2% Viscous 2 milliLiter(s) Oral every 2 hours PRN  loperamide 2 milliGRAM(s) Oral every 4 hours PRN  melatonin 3 milliGRAM(s) Oral once PRN  metoclopramide Injectable 10 milliGRAM(s) IV Push every 6 hours PRN  oxyCODONE    IR 5 milliGRAM(s) Oral every 4 hours PRN  polyethylene glycol 3350 17 Gram(s) Oral two times a day PRN        Vital Signs Last 24 Hrs  T(C): 36.8 (25 Aug 2019 09:30), Max: 38.3 (25 Aug 2019 05:42)  T(F): 98.3 (25 Aug 2019 09:30), Max: 100.9 (25 Aug 2019 05:42)  HR: 86 (25 Aug 2019 09:30) (77 - 94)  BP: 129/68 (25 Aug 2019 09:30) (121/68 - 149/68)  BP(mean): --  RR: 18 (25 Aug 2019 09:30) (18 - 18)  SpO2: 95% (25 Aug 2019 09:30) (95% - 99%)    PHYSICAL EXAM  General: NAD  Oral: no oral ulcers   CV: (+) S1/S2 RRR  Lungs: Clear to auscultation, no wheezes or rales  Abdomen: soft, non-tender, non-distended (+) BS  Ext: No edema   Skin: no rashes and no petechiae  Neuro: alert and oriented X 3, no focal deficits  Central Line:     RECENT CULTURES:  08-19 @ 22:08  .Blood  --  --  --    No growth at 5 days.  --  08-19 @ 19:02  .Urine  --  --  --    <10,000 CFU/mL Normal Urogenital Maryana  --        LABS:                        8.1    0.6   )-----------( 13       ( 25 Aug 2019 06:54 )             23.1         Mean Cell Volume : 84.3 fl  Mean Cell Hemoglobin : 29.5 pg  Mean Cell Hemoglobin Concentration : 35.0 gm/dL  Auto Neutrophil # : 0.0 K/uL  Auto Lymphocyte # : 0.5 K/uL  Auto Monocyte # : 0.0 K/uL  Auto Eosinophil # : 0.0 K/uL  Auto Basophil # : 0.0 K/uL  Auto Neutrophil % : 0.9 %  Auto Lymphocyte % : 93.3 %  Auto Monocyte % : 2.4 %  Auto Eosinophil % : 3.3 %  Auto Basophil % : 0.0 %      08-25    138  |  104  |  8   ----------------------------<  198<H>  3.9   |  21<L>  |  0.62    Ca    8.4      25 Aug 2019 06:52  Phos  2.7     08-25  Mg     1.6     08-25    TPro  6.9  /  Alb  3.1<L>  /  TBili  0.4  /  DBili  x   /  AST  26  /  ALT  45  /  AlkPhos  214<H>  08-25      Mg 1.6  Phos 2.7            Uric Acid 1.6        RADIOLOGY & ADDITIONAL STUDIES: Diagnosis: AML FLT 3 (-)    Protocol/Chemo Regimen: Enrolled on Pfizer BRIGHT 1019-  s/p Induction chemotherapy with Dauno/Cytarabine and daily Glasdegib vs Placebo      Day: 19    Pt endorsed: + rigors, admits to feeling weak, throat pain much improved with mouth rinses    Review of Systems: Patient denies dizziness, HA, visual changes, chest pain, palpitations, abdominal pain, nausea, vomiting or dysuria.    Pain scale: 0    Diet: Mechanical soft, Consistent Carbohydrate diet, Glucerna TID     Allergies    cefepime (Rash)    Intolerances        ANTIMICROBIALS  acyclovir   Oral Tab/Cap 400 milliGRAM(s) Oral two times a day  aztreonam  IVPB 2000 milliGRAM(s) IV Intermittent every 8 hours  aztreonam  IVPB      posaconazole DR Tablet 300 milliGRAM(s) Oral daily  vancomycin  IVPB 1000 milliGRAM(s) IV Intermittent every 12 hours  vancomycin  IVPB          HEME/ONC MEDICATIONS      STANDING MEDICATIONS  ALBUTerol    90 MICROgram(s) HFA Inhaler 1 Puff(s) Inhalation every 6 hours  ALBUTerol/ipratropium for Nebulization 3 milliLiter(s) Nebulizer every 6 hours  benzonatate 100 milliGRAM(s) Oral three times a day  Biotene Dry Mouth Oral Rinse 5 milliLiter(s) Swish and Spit three times a day  chlorhexidine 2% Cloths 1 Application(s) Topical <User Schedule>  dextrose 5%. 1000 milliLiter(s) IV Continuous <Continuous>  dextrose 50% Injectable 12.5 Gram(s) IV Push once  dextrose 50% Injectable 25 Gram(s) IV Push once  dextrose 50% Injectable 25 Gram(s) IV Push once  FIRST- Mouthwash  BLM 10 milliLiter(s) Swish and Spit four times a day  gabapentin 600 milliGRAM(s) Oral two times a day  hydrOXYzine hydrochloride 25 milliGRAM(s) Oral every 6 hours  insulin glargine Injectable (LANTUS) 18 Unit(s) SubCutaneous at bedtime  insulin lispro (HumaLOG) corrective regimen sliding scale   SubCutaneous three times a day before meals  insulin lispro (HumaLOG) corrective regimen sliding scale   SubCutaneous at bedtime  insulin lispro Injectable (HumaLOG) 10 Unit(s) SubCutaneous three times a day before meals  investigational chemo - general 1 Tablet(s) Oral every 24 hours  levothyroxine 75 MICROGram(s) Oral daily  lidocaine 2% Injectable 20 milliLiter(s) Local Injection once  losartan 100 milliGRAM(s) Oral daily  pantoprazole    Tablet 40 milliGRAM(s) Oral before breakfast  simethicone 80 milliGRAM(s) Chew four times a day  sodium chloride 0.9%. 1000 milliLiter(s) IV Continuous <Continuous>  sodium chloride 3%  Inhalation 4 milliLiter(s) Inhalation two times a day  sucralfate 1 Gram(s) Oral four times a day  tiotropium 18 MICROgram(s) Capsule 1 Capsule(s) Inhalation daily      PRN MEDICATIONS  acetaminophen   Tablet .. 650 milliGRAM(s) Oral every 6 hours PRN  aluminum hydroxide/magnesium hydroxide/simethicone Suspension 30 milliLiter(s) Oral every 6 hours PRN  dextrose 40% Gel 15 Gram(s) Oral once PRN  diphenhydrAMINE 25 milliGRAM(s) Oral every 6 hours PRN  glucagon  Injectable 1 milliGRAM(s) IntraMuscular once PRN  hydrocortisone 2.5% Lotion 1 Application(s) Topical three times a day PRN  lidocaine 2% Viscous 2 milliLiter(s) Oral every 2 hours PRN  loperamide 2 milliGRAM(s) Oral every 4 hours PRN  melatonin 3 milliGRAM(s) Oral once PRN  metoclopramide Injectable 10 milliGRAM(s) IV Push every 6 hours PRN  oxyCODONE    IR 5 milliGRAM(s) Oral every 4 hours PRN  polyethylene glycol 3350 17 Gram(s) Oral two times a day PRN        Vital Signs Last 24 Hrs  T(C): 36.8 (25 Aug 2019 09:30), Max: 38.3 (25 Aug 2019 05:42)  T(F): 98.3 (25 Aug 2019 09:30), Max: 100.9 (25 Aug 2019 05:42)  HR: 86 (25 Aug 2019 09:30) (77 - 94)  BP: 129/68 (25 Aug 2019 09:30) (121/68 - 149/68)  BP(mean): --  RR: 18 (25 Aug 2019 09:30) (18 - 18)  SpO2: 95% (25 Aug 2019 09:30) (95% - 99%)    PHYSICAL EXAM  General: NAD  HEENT: ulcer  left lower gum under denture per pt,  anicteric sclera  CV: (+) S1/S2 RRR  Lungs: clear to auscultation. Fine left base rale  Abdomen: soft, non-tender, non-distended (+) BS x 4Q  Ext: trace edema right foot   Skin: macular papular erythematous rash on back   Neuro: alert and oriented X 3  Central Line: TLC CDI     RECENT CULTURES:  08-19 @ 22:08  .Blood  --  --  --    No growth at 5 days.  --  08-19 @ 19:02  .Urine  --  --  --    <10,000 CFU/mL Normal Urogenital Maryana  --        LABS:                        8.1    0.6   )-----------( 13       ( 25 Aug 2019 06:54 )             23.1         Mean Cell Volume : 84.3 fl  Mean Cell Hemoglobin : 29.5 pg  Mean Cell Hemoglobin Concentration : 35.0 gm/dL  Auto Neutrophil # : 0.0 K/uL  Auto Lymphocyte # : 0.5 K/uL  Auto Monocyte # : 0.0 K/uL  Auto Eosinophil # : 0.0 K/uL  Auto Basophil # : 0.0 K/uL  Auto Neutrophil % : 0.9 %  Auto Lymphocyte % : 93.3 %  Auto Monocyte % : 2.4 %  Auto Eosinophil % : 3.3 %  Auto Basophil % : 0.0 %      08-25    138  |  104  |  8   ----------------------------<  198<H>  3.9   |  21<L>  |  0.62    Ca    8.4      25 Aug 2019 06:52  Phos  2.7     08-25  Mg     1.6     08-25    TPro  6.9  /  Alb  3.1<L>  /  TBili  0.4  /  DBili  x   /  AST  26  /  ALT  45  /  AlkPhos  214<H>  08-25      Mg 1.6  Phos 2.7            Uric Acid 1.6        RADIOLOGY & ADDITIONAL STUDIES:  Xray Chest 1 View- PORTABLE-Urgent (08.25.19 @ 06:06) >   Unchanged diffuse bilateral reticular interstitial lung   markings consistent with pulmonary fibrosis. No focal consolidation is   seen.

## 2019-08-25 NOTE — PROGRESS NOTE ADULT - ATTENDING COMMENTS
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 Pt on Pfizer trial 7+3+ Glasdegib/placebo.  Patient here for her induction chemotherapy on trial.  Day 18.   Day 14 marrow chemotherapeutic  Pre-rx [facility] results: NPM1, IDH2, DNMT3A mutations.  Afeb x 72 hrs, no chills until 8/19 PM - temp to 100.9, chills 8/2.  Vanco added pre temp spike.  Vanco trough ok. On aztreonam, posa, vanco, acyclovir.  No N/V, now with diarrhea.  Less mucositis.  No diarrhea today.  C. diff toxin (-).  no need for lasix today  Pruritus sl better - fine, macular, erythematous rash on back fading.   Rash CTCAE allergic reaction grade 2.    Cefepime changed to aztreonam 8/17.     Mild AST/ALT elevation-  now wnl , alk phos 234, still incr    liver U/S (-).       Supportive care, anti-emetics, IVFs,    Lantus QHS and pre meal humalog, following fingersticks closely, endocrine input  h/o of very poorly controlled diabetes.   ECOG performance status 2. 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 Pt on Pfizer trial 7+3+ Glasdegib/placebo.  Patient here for her induction chemotherapy on trial.  Day 18.   Day 14 marrow chemotherapeutic  Pre-rx [facility] results: NPM1, IDH2, DNMT3A mutations.  Afeb x 72 hrs, no chills until 8/19 PM - temp to 100.9, chills 8/2.  Vanco added pre temp spike.  Spiked again 8/25/19 with rigors, temp 101. Vanco trough ok. On aztreonam (changed from cefepime b/o rash), posa, vanco, acyclovir.      Less mucositis.  No diarrhea.  C. diff toxin (-).  no need for lasix today  Pruritus sl better - fine, macular, erythematous rash on back resolved.  Rash CTCAE allergic reaction grade 2.       Mild AST/ALT elevation-  now wnl , alk phos still incr liver U/S (-).       Supportive care, anti-emetics, IVFs,  ID input apprerciated: check CT C/A/P with contrast  Lantus QHS and pre meal humalog, following fingersticks closely, appreciate endocrine input  h/o of very poorly controlled diabetes.   ECOG performance status 2.

## 2019-08-25 NOTE — PROGRESS NOTE ADULT - SUBJECTIVE AND OBJECTIVE BOX
Diabetes Follow up note:  Interval Hx:  64 year old female w/uncontrolled T2DM w/neuropathy, retinopathy w/AML here for induction chemotherapy. Pt w/hypoglycemic episode last evening at bedtime. Pt endorses eating "small amount of potato" for dinner. Awaiting breakfast this morning. Had fevers overnight and restless night. Endorses drinking a Glucerna during the night.       Review of Systems:  General: as above.   GI: Tolerating POs without any N/V/D/ABD PAIN.  CV: No CP/SOB  ENDO: No S&Sx of hypoglycemia  MEDS:    insulin glargine Injectable (LANTUS) 18 Unit(s) SubCutaneous at bedtime  insulin lispro (HumaLOG) corrective regimen sliding scale   SubCutaneous three times a day before meals  insulin lispro (HumaLOG) corrective regimen sliding scale   SubCutaneous at bedtime  insulin lispro Injectable (HumaLOG) 10 Unit(s) SubCutaneous three times a day before meals  levothyroxine 75 MICROGram(s) Oral daily    acyclovir   Oral Tab/Cap 400 milliGRAM(s) Oral two times a day  aztreonam  IVPB 2000 milliGRAM(s) IV Intermittent every 8 hours  aztreonam  IVPB      posaconazole DR Tablet 300 milliGRAM(s) Oral daily  vancomycin  IVPB 1000 milliGRAM(s) IV Intermittent every 12 hours  vancomycin  IVPB        Allergies    cefepime (Rash)      PE:  General: Female lying in bed. Appears weak.   Vital Signs Last 24 Hrs  T(C): 36.8 (25 Aug 2019 09:30), Max: 38.3 (25 Aug 2019 05:42)  T(F): 98.3 (25 Aug 2019 09:30), Max: 100.9 (25 Aug 2019 05:42)  HR: 86 (25 Aug 2019 09:30) (77 - 94)  BP: 129/68 (25 Aug 2019 09:30) (121/68 - 149/68)  BP(mean): --  RR: 18 (25 Aug 2019 09:30) (18 - 18)  SpO2: 95% (25 Aug 2019 09:30) (95% - 99%)  Abd: Soft, NT,ND,   Extremities: Warm. trace LE edema  Neuro: A&O X3    LABS:    POCT Blood Glucose.: 159 mg/dL (08-24-19 @ 22:10)  POCT Blood Glucose.: 196 mg/dL (08-24-19 @ 22:09)  POCT Blood Glucose.: 94 mg/dL (08-24-19 @ 21:49)  POCT Blood Glucose.: 67 mg/dL (08-24-19 @ 21:28)  POCT Blood Glucose.: 68 mg/dL (08-24-19 @ 21:27)  POCT Blood Glucose.: 116 mg/dL (08-24-19 @ 18:17)  POCT Blood Glucose.: 257 mg/dL (08-24-19 @ 13:32)  POCT Blood Glucose.: 187 mg/dL (08-24-19 @ 08:06)  POCT Blood Glucose.: 164 mg/dL (08-23-19 @ 21:24)  POCT Blood Glucose.: 209 mg/dL (08-23-19 @ 15:50)  POCT Blood Glucose.: 193 mg/dL (08-23-19 @ 11:31)  POCT Blood Glucose.: 105 mg/dL (08-22-19 @ 22:04)  POCT Blood Glucose.: 204 mg/dL (08-22-19 @ 18:55)  POCT Blood Glucose.: 249 mg/dL (08-22-19 @ 14:07)                            8.1    0.6   )-----------( 13       ( 25 Aug 2019 06:54 )             23.1       08-25    138  |  104  |  8   ----------------------------<  198<H>  3.9   |  21<L>  |  0.62    Ca    8.4      25 Aug 2019 06:52  Phos  2.7     08-25  Mg     1.6     08-25    TPro  6.9  /  Alb  3.1<L>  /  TBili  0.4  /  DBili  x   /  AST  26  /  ALT  45  /  AlkPhos  214<H>  08-25        Hemoglobin A1C, Whole Blood: 10.6 % <H> [4.0 - 5.6] (08-07-19 @ 08:36)            Contact number: [place] [phone] or [phone]

## 2019-08-25 NOTE — PROGRESS NOTE ADULT - PROBLEM SELECTOR PLAN 8
Simvastatin discontinued during hospitalization due to potential for liver toxicity No pharmacologic ppx 2/2 thrombocytopenia  Encourage ambulation  change diet to soft mechanical  with Glucerna       Contact Information [phone]

## 2019-08-25 NOTE — PROGRESS NOTE ADULT - PROBLEM SELECTOR PLAN 9
No pharmacologic ppx 2/2 thrombocytopenia  Encourage ambulation  change diet to soft mechanical  with Glucerna       Contact Information [phone]

## 2019-08-25 NOTE — CONSULT NOTE PEDS - ASSESSMENT
65 yo F with history of DM,  RA , diagnosed in 2016, chronic depression now with newly diagnosed AML  admitted for induction chemotherapy,  finished 7 days of induction daunorubicin and cytarabine on 8/ 15 via TLC placed on 8/6,  and now on daily PO glasdegib. Patient has had  rigors, fever (101 yesterday). Absolute neutrophil count is 0.   Currently on vancomycin , Aztreonam 2g q8, posaconazole. Reported rash and itching with Cefepime.   Blood cx and urine cx have been negative. Patient was pancultured again yesterday.  Chest x ray shows ILD which is c/w patient's h/o PF.     Neutropenic fever   AML s/p induction     Suggest:

## 2019-08-25 NOTE — PROGRESS NOTE ADULT - PROBLEM SELECTOR PLAN 1
-test BG AC/HS  -c/w Lantus 18 units QHS  -Reduce Humalog back to 10 units AC meals (Please ensure pt eats at least 50% of meal)  -c/w Humalog low correction scale AC and Low HS scale  DISPO: basal/bolus, doses to be determined  discussed w/pt and team  pager: [phone].

## 2019-08-25 NOTE — PROGRESS NOTE ADULT - PROBLEM SELECTOR PLAN 2
The patient is neutropenic, afebrile   Continue vancomycin 1 g q12h empirically with Vanco trough 12.2 then wkly  Continue Posaconazole, change Cefepime to Aztreonam as patient has persistent pruritis.  (Monitor QTc BIW mon/Thurs  with Posaconazole and Study drug)  8/9 CT chest: Pulmonary fibrosis and traction bronchiectasis without honeycombing.   Overall these findings appear progressed compared to CT abdomen 7/4/2017  Associated ground-glass opacity may be related to active interstitial lung disease, fluid overload, less likely infection Neutropenic fevers   Continue vancomycin follow up troughs q Tues  Continue Posaconazole,   Cefepime changed to Aztreonam sec to suspected allergy (pruritus)  (Monitor QTc BIW mon/Thurs  with Posaconazole and Study drug)  8/9 CT chest: Pulmonary fibrosis and traction bronchiectasis without honeycombing.   8/25: + Pulmonary fibrosis.  8/25: follow up cultures Neutropenic fevers   Continue vancomycin follow up troughs q Tues  Continue Posaconazole,   Cefepime changed to Aztreonam sec to suspected allergy (pruritus)  (Monitor QTc BIW mon/Thurs  with Posaconazole and Study drug)  8/9 CT chest: Pulmonary fibrosis and traction bronchiectasis without honeycombing.   8/25: + Pulmonary fibrosis.  8/25: follow up cultures, follow up ID input.

## 2019-08-25 NOTE — CONSULT NOTE PEDS - SUBJECTIVE AND OBJECTIVE BOX
Patient is a 64y old  Female who presents with a chief complaint of For induction chemotherapy (25 Aug 2019 10:19)      HPI:  65 yo F with history of DM,  RA , diagnosed in 2016, chronic depression now with newly diagnosed AML  admitted for induction chemotherapy on the FTRANS study with dauno/cytarabine and glasdegib/placebo.  Patient was seen by her rheumatologist Dr. Croft and was referred to Dr Bravo for a drop in hgb and immature  cells (physician was called by a hematopathologist for finding blasts in peripheral smear). Patient had been                                                                                                                               feeling more tired and more RENTERIA for the last 2-3 months. Recently completed a course of ciprofloxacin for UTI (-8/3)                                                                                                                                                                                                                                 BM asp.bx performed on  and patient signed consent for Pf[employer]/Bright  study if meets eligibility    prior hospital charts reviewed [  x]  primary team notes reviewed [ x ]  other consultant notes reviewed [x]    PAST MEDICAL & SURGICAL HISTORY:  DVT (deep venous thrombosis): &#x27; 87   post-op cholycystectomy.  Gallstones: &#x27; 87  Rheumatoid arthritis: dx: 2017  Type 2 diabetes mellitus: dx;  age 38  Carpal tunnel syndrome: Left  Hypothyroid  High cholesterol  Status post colonoscopy: &#x27; 2009    Negative  Normal spontaneous vaginal delivery: &#x27; 83 and &#x27;86  Status post cholecystectomy: &#x27; 87    Allergies  cefepime (Rash)    ANTIMICROBIALS:    acyclovir   Oral Tab/Cap 400 two times a day  aztreonam  IVPB 2000 every 8 hours  aztreonam  IVPB    posaconazole DR Tablet 300 daily  vancomycin  IVPB 1000 every 12 hours  vancomycin  IVPB      OTHER MEDS: MEDICATIONS  (STANDING):  acetaminophen   Tablet .. 650 every 6 hours PRN  ALBUTerol    90 MICROgram(s) HFA Inhaler 1 every 6 hours  ALBUTerol/ipratropium for Nebulization 3 every 6 hours  aluminum hydroxide/magnesium hydroxide/simethicone Suspension 30 every 6 hours PRN  benzonatate 100 three times a day  dextrose 40% Gel 15 once PRN  dextrose 50% Injectable 12.5 once  dextrose 50% Injectable 25 once  dextrose 50% Injectable 25 once  diphenhydrAMINE 25 every 6 hours PRN  gabapentin 600 two times a day  glucagon  Injectable 1 once PRN  hydrOXYzine hydrochloride 25 every 6 hours  insulin glargine Injectable (LANTUS) 18 at bedtime  insulin lispro (HumaLOG) corrective regimen sliding scale  three times a day before meals  insulin lispro (HumaLOG) corrective regimen sliding scale  at bedtime  insulin lispro Injectable (HumaLOG) 10 three times a day before meals  levothyroxine 75 daily  loperamide 2 every 4 hours PRN  losartan 100 daily  melatonin 3 once PRN  metoclopramide Injectable 10 every 6 hours PRN  oxyCODONE    IR 5 every 4 hours PRN  pantoprazole    Tablet 40 before breakfast  polyethylene glycol 3350 17 two times a day PRN  simethicone 80 four times a day  sodium chloride 3%  Inhalation 4 two times a day  sucralfate 1 four times a day  tiotropium 18 MICROgram(s) Capsule 1 daily      SOCIAL HISTORY:   hx smoking  non-smoker    FAMILY HISTORY:  Family history of coronary artery disease (Father)  Family history of diabetes mellitus (Father, Mother)      REVIEW OF SYSTEMS  [  ] ROS unobtainable because:    [x ] All other systems negative except as noted below:	    Constitutional:  [x ] fever [x ] chills  [ ] weight loss  [ ] weakness  Skin:  [ ] rash [ ] phlebitis	  Eyes: [ ] icterus [ ] pain  [ ] discharge	  ENMT: [ ] sore throat  [ ] thrush [ ] ulcers [ ] exudates  Respiratory: [ ] dyspnea [ ] hemoptysis [ ] cough [ ] sputum	  Cardiovascular:  [ ] chest pain [ ] palpitations [ ] edema	  Gastrointestinal:  [ ] nausea [ ] vomiting [ ] diarrhea [ ] constipation [ ] pain	  Genitourinary:  [ ] dysuria [ ] frequency [ ] hematuria [ ] discharge [ ] flank pain  [ ] incontinence  Musculoskeletal:  [ ] myalgias [ ] arthralgias [ ] arthritis  [ ] back pain  Neurological:  [ ] headache [ ] seizures  [ ] confusion/altered mental status  Psychiatric:  [ ] anxiety [ ] depression	  Hematology/Lymphatics:  [ ] lymphadenopathy  Endocrine:  [ ] adrenal [ ] thyroid  Allergic/Immunologic:	 [ ] transplant [ ] seasonal    Vital Signs Last 24 Hrs  T(F): 99.4 (19 @ 13:08), Max: 101 (19 @ 12:00)    Vital Signs Last 24 Hrs  HR: 95 (19 @ 13:08) (77 - 95)  BP: 143/74 (19 @ 13:08) (121/68 - 152/78)  RR: 18 (19 @ 13:08)  SpO2: 95% (19 @ 13:08) (95% - 99%)  Wt(kg): --    PHYSICAL EXAM:  General: non-toxic  HEAD/EYES: anicteric, PERRL  ENT:  supple  Cardiovascular:   S1, S2  Respiratory:  clear bilaterally  GI:  soft, non-tender, normal bowel sounds  :  no CVA tenderness   Musculoskeletal:  no synovitis  Neurologic:  grossly non-focal  Skin:  no rash  Lymph: no lymphadenopathy  Psychiatric:  appropriate affect  Vascular:  no phlebitis                            8.1    0.6   )-----------( 13       ( 25 Aug 2019 06:54 )             23.1         138  |  104  |  8   ----------------------------<  198<H>  3.9   |  21<L>  |  0.62    Ca    8.4      25 Aug 2019 06:52  Phos  2.7       Mg     1.6         TPro  6.9  /  Alb  3.1<L>  /  TBili  0.4  /  DBili  x   /  AST  26  /  ALT  45  /  AlkPhos  214<H>        Urinalysis Basic - ( 25 Aug 2019 06:49 )    Color: Light Yellow / Appearance: Clear / S.012 / pH: x  Gluc: x / Ketone: Negative  / Bili: Negative / Urobili: Negative   Blood: x / Protein: 100 / Nitrite: Negative   Leuk Esterase: Negative / RBC: 6 /hpf / WBC 1 /HPF   Sq Epi: x / Non Sq Epi: 1 /hpf / Bacteria: Negative        MICROBIOLOGY:  Vancomycin Level, Trough: 12.2 ( @ 20:26)      Clostridium difficile GDH Toxins A&amp;B, EIA:   Negative (19 @ 21:30)  Clostridium difficile GDH Interpretation: Negative for toxigenic C. Difficile.  This specimen is negative for C.  Difficile glutamate dehydrogenase (GDH) antigen and negative for C.  Difficile Toxins A & B, by EIA.  GDH is a highly sensitive screening  marker for C. Difficile that is produced in large amounts by all C.  Difficile strains, both toxigenic and nontoxigenic.  This assay has not  been validated as a test of cure.  Repeat testing during the same episode  of diarrhea is of limited value and is discouraged.  The results of this  assay should always be interpreted in conjunction with pateint's clinical  history. (19 @ 21:30)      RADIOLOGY:  < from: Xray Chest 1 View- PORTABLE-Urgent (19 @ 06:06) >  indings: The cardiac silhouette is normal in size. There are no focal   consolidations or pleural effusions. There are unchanged bilateral   reticular opacities. There is a right-sided central venous catheter with   its tip overlying the superior vena cava.    Impression: Unchanged diffuse bilateral reticular interstitial lung   markings consistent with pulmonary fibrosis. No focal consolidation is   seen.

## 2019-08-25 NOTE — PROGRESS NOTE ADULT - ASSESSMENT
This is a 63 yo F with PMHx of T2DM, RA, Depression and now newly diagnosed AML enrolled on Pfizer BRIGHT trial, now s/p  Induction chemotherapy with Dauno/Cytarabine and daily Glasdegib vs Placebo, day 14 BM bx chemotherapeutic, awaiting for count recovery The patients hospital course has been complicated by  neutropenic fever, transaminitis/abd sono unremarkable,  RENTERIA due to interstitial lung disease ,volume overload,  and drug exanthem.  The patient has pancytopenia from chemotherapy and/or disease.

## 2019-08-25 NOTE — CHART NOTE - NSCHARTNOTEFT_GEN_A_CORE
[facility]    CHIEF COMPLAINT   Patient is a 64y old  Female who presents with a chief complaint of For induction chemotherapy (24 Aug 2019 14:44)      EVENT SUMMARY  Notified by RN for fever of 100.9. Pt seen and examined at bedside. Pt c/o being cold otherwise denies any chest pain, palpitations, SOB, abdominal pain, N/V/C/D, headache, urinary symptoms, cough.     REVIEW OF SYSTEMS:    CONSTITUTIONAL: No weakness  EYES/ENT: No visual changes;  No vertigo or throat pain   NECK: No pain or stiffness  RESPIRATORY: No cough, wheezing, hemoptysis; No shortness of breath  CARDIOVASCULAR: No chest pain or palpitations  GASTROINTESTINAL: No abdominal or epigastric pain. No nausea, vomiting, or hematemesis; No diarrhea or constipation. No melena or hematochezia.  GENITOURINARY: No dysuria, frequency or hematuria  NEUROLOGICAL: No numbness or weakness  SKIN: No itching, rashes      Vital Signs Last 24 Hrs  T(C): 38.3 (25 Aug 2019 05:42), Max: 38.3 (25 Aug 2019 05:42)  T(F): 100.9 (25 Aug 2019 05:42), Max: 100.9 (25 Aug 2019 05:42)  HR: 94 (25 Aug 2019 05:42) (77 - 94)  BP: 149/68 (25 Aug 2019 05:42) (120/69 - 149/68)  BP(mean): --  RR: 18 (25 Aug 2019 05:42) (18 - 18)  SpO2: 97% (25 Aug 2019 05:42) (95% - 99%)    PHYSICAL EXAM:   General: NAD, non-toxic appearance  Neuro: NC, AT, PERRLA, no focal deficits  CV: S1 S2 RRR  Resp: B/L Lungs CTA, nonlabored  Abd: soft, NT, ND, + BS X4 quadrants  Ext: no edema, +PP b/l LE, warm to touch                           8.5    0.7   )-----------( 20       ( 24 Aug 2019 06:51 )             24.6     08-24    138  |  104  |  10  ----------------------------<  161<H>  3.7   |  22  |  0.61    Ca    8.0<L>      24 Aug 2019 06:51  Phos  3.4     08-24  Mg     2.0     08-24    TPro  7.0  /  Alb  3.2<L>  /  TBili  0.5  /  DBili  x   /  AST  21  /  ALT  40  /  AlkPhos  234<H>  08-24    .Blood  08-19-19   No growth at 5 days.  --  --      .Urine  08-19-19   <10,000 CFU/mL Normal Urogenital Maryana  --  --      .Urine  08-13-19   No growth  --  --      .Blood  08-13-19   No growth at 5 days.  --  --      .Blood  08-13-19   No growth at 5 days.  --  --      .Sputum  08-12-19   Normal Respiratory Maryana present  --    Rare polymorphonuclear leukocytes per low power field  Few Squamous epithelial cells per low power field  Moderate Gram positive cocci in pairs seen per oil power field  Moderate Gram Variable Rods seen per oil power field      .Blood  08-12-19   No growth at 5 days.  --  --      .Urine  08-12-19   No growth  --  --      .Sputum  08-10-19   Normal Respiratory Maryana present  --    Few polymorphonuclear leukocytes per low power field  Rare Squamous epithelial cells per low power field  No organisms seen per oil power field      .Urine  08-10-19   No growth  --  --      .Blood  08-09-19   No growth at 5 days.  --  --    Rapid Respiratory Viral Panel (07.05.17 @ 12:19)    Rapid RVP Result: NotDete: The Film[employer] RVP Rapid uses polymerase chain reaction (PCR) and melt  curve analysis to screen for adenovirus; coronavirus HKU1, NL63, 229E,  OC43; human metapneumovirus (hMPV); human enterovirus/rhinovirus  (Entero/RV); influenza A; influenza A/H1;influenza A/H3; influenza  A/H1-2009; influenza B; parainfluenza viruses 1, 2, 3, 4; respiratory  syncytial virus; Bordetella pertussis; Mycoplasma pneumoniae; and  Chlamydophila pneumoniae.    IMAGING  < from: Xray Chest 1 View- PORTABLE-Urgent (08.15.19 @ 01:26) >    IMPRESSION:   Unchanged bilateral reticular opacities.  No pneumonia.    < end of copied text >      < from: CT Chest No Cont (08.09.19 @ 14:24) >    IMPRESSION:     Pulmonary fibrosis and traction bronchiectasis without honeycombing.   Overall these findings appear progressed compared to CT abdomen 7/4/2017.    Associated groundglass opacity may be related to active interstitial lung   disease, fluid overload, less likely infection.    < end of copied text >    < from: US Abdomen Complete (08.22.19 @ 08:19) >      IMPRESSION:     Unremarkable sonographic evaluation of the liver.    < end of copied text >      Assessment & Plan   This is a 63 yo F with PMHx of T2DM, RA, Depression and now newly diagnosed AML enrolled on Pfizer BRIGHT trial, now s/p  Induction chemotherapy with Dauno/Cytarabine and daily Glasdegib vs Placebo, day 14 BM bx chemotherapeutic, awaiting for count recovery The patients hospital course has been complicated by  neutropenic fever, transaminitis/abd sono unremarkable,  RENTERIA due to interstitial lung disease ,volume overload,  and drug exanthem.  The patient has pancytopenia from chemotherapy and/or disease.     Neutropenic fever  - Continue current antimicrobials  - Continue IVF for hydration   - Continue antipyretic   - Cooling measures  - Blood cx x2, UA and urine cx  - CXR   - Will continue to monitor  F/U with primary team in [place]   Department of Medicine   #[id number] MEDICINE [facility]    CHIEF COMPLAINT   Patient is a 64y old  Female who presents with a chief complaint of For induction chemotherapy (24 Aug 2019 14:44)      EVENT SUMMARY  Notified by RN for fever of 100.9. Pt seen and examined at bedside. Pt is tearful this AM. Pt denies any chest pain, palpitations, SOB, abdominal pain, N/V/C/D, headache, urinary symptoms, cough.     REVIEW OF SYSTEMS:    CONSTITUTIONAL: No weakness  EYES/ENT: No visual changes;  No vertigo or throat pain   NECK: No pain or stiffness  RESPIRATORY: No cough, wheezing, hemoptysis; No shortness of breath  CARDIOVASCULAR: No chest pain or palpitations  GASTROINTESTINAL: No abdominal or epigastric pain. No nausea, vomiting, or hematemesis; No diarrhea or constipation. No melena or hematochezia.  GENITOURINARY: No dysuria, frequency or hematuria  NEUROLOGICAL: No numbness or weakness  SKIN: No itching, rashes      Vital Signs Last 24 Hrs  T(C): 38.3 (25 Aug 2019 05:42), Max: 38.3 (25 Aug 2019 05:42)  T(F): 100.9 (25 Aug 2019 05:42), Max: 100.9 (25 Aug 2019 05:42)  HR: 94 (25 Aug 2019 05:42) (77 - 94)  BP: 149/68 (25 Aug 2019 05:42) (120/69 - 149/68)  BP(mean): --  RR: 18 (25 Aug 2019 05:42) (18 - 18)  SpO2: 97% (25 Aug 2019 05:42) (95% - 99%)    PHYSICAL EXAM:   General: NAD, non-toxic appearance  Neuro: NC, AT, PERRLA, no focal deficits  CV: S1 S2 RRR  Resp: B/L Lungs CTA, nonlabored  Abd: soft, NT, ND, + BS X4 quadrants  Ext: no edema, +PP b/l LE, warm to touch                           8.5    0.7   )-----------( 20       ( 24 Aug 2019 06:51 )             24.6     08-24    138  |  104  |  10  ----------------------------<  161<H>  3.7   |  22  |  0.61    Ca    8.0<L>      24 Aug 2019 06:51  Phos  3.4     08-24  Mg     2.0     08-24    TPro  7.0  /  Alb  3.2<L>  /  TBili  0.5  /  DBili  x   /  AST  21  /  ALT  40  /  AlkPhos  234<H>  08-24    .Blood  08-19-19   No growth at 5 days.  --  --      .Urine  08-19-19   <10,000 CFU/mL Normal Urogenital Maryana  --  --      .Urine  08-13-19   No growth  --  --      .Blood  08-13-19   No growth at 5 days.  --  --      .Blood  08-13-19   No growth at 5 days.  --  --      .Sputum  08-12-19   Normal Respiratory Maryana present  --    Rare polymorphonuclear leukocytes per low power field  Few Squamous epithelial cells per low power field  Moderate Gram positive cocci in pairs seen per oil power field  Moderate Gram Variable Rods seen per oil power field      .Blood  08-12-19   No growth at 5 days.  --  --      .Urine  08-12-19   No growth  --  --      .Sputum  08-10-19   Normal Respiratory Maryana present  --    Few polymorphonuclear leukocytes per low power field  Rare Squamous epithelial cells per low power field  No organisms seen per oil power field      .Urine  08-10-19   No growth  --  --      .Blood  08-09-19   No growth at 5 days.  --  --    Rapid Respiratory Viral Panel (07.05.17 @ 12:19)    Rapid RVP Result: NotDete: The Film[employer] RVP Rapid uses polymerase chain reaction (PCR) and melt  curve analysis to screen for adenovirus; coronavirus HKU1, NL63, 229E,  OC43; human metapneumovirus (hMPV); human enterovirus/rhinovirus  (Entero/RV); influenza A; influenza A/H1;influenza A/H3; influenza  A/H1-2009; influenza B; parainfluenza viruses 1, 2, 3, 4; respiratory  syncytial virus; Bordetella pertussis; Mycoplasma pneumoniae; and  Chlamydophila pneumoniae.    IMAGING  < from: Xray Chest 1 View- PORTABLE-Urgent (08.15.19 @ 01:26) >    IMPRESSION:   Unchanged bilateral reticular opacities.  No pneumonia.    < end of copied text >      < from: CT Chest No Cont (08.09.19 @ 14:24) >    IMPRESSION:     Pulmonary fibrosis and traction bronchiectasis without honeycombing.   Overall these findings appear progressed compared to CT abdomen 7/4/2017.    Associated groundglass opacity may be related to active interstitial lung   disease, fluid overload, less likely infection.    < end of copied text >    < from: US Abdomen Complete (08.22.19 @ 08:19) >      IMPRESSION:     Unremarkable sonographic evaluation of the liver.    < end of copied text >      Assessment & Plan   This is a 65 yo F with PMHx of T2DM, RA, Depression and now newly diagnosed AML enrolled on Pfizer BRIGHT trial, now s/p  Induction chemotherapy with Dauno/Cytarabine and daily Glasdegib vs Placebo, day 14 BM bx chemotherapeutic, awaiting for count recovery The patients hospital course has been complicated by  neutropenic fever, transaminitis/abd sono unremarkable,  RENTERIA due to interstitial lung disease ,volume overload,  and drug exanthem.  The patient has pancytopenia from chemotherapy and/or disease.     Neutropenic fever  - Continue current antimicrobials  - Continue IVF for hydration   - Continue antipyretic   - Cooling measures  - Blood cx x2, UA and urine cx  - CXR   - Will continue to monitor  F/U with primary team in [place]   Department of Medicine   #[id number]

## 2019-08-26 DIAGNOSIS — I26.99 OTHER PULMONARY EMBOLISM WITHOUT ACUTE COR PULMONALE: ICD-10-CM

## 2019-08-26 DIAGNOSIS — D70.9 NEUTROPENIA, UNSPECIFIED: ICD-10-CM

## 2019-08-26 LAB
ALBUMIN SERPL ELPH-MCNC: 3.2 G/DL — LOW (ref 3.3–5)
ALP SERPL-CCNC: 224 U/L — HIGH (ref 40–120)
ALT FLD-CCNC: 49 U/L — HIGH (ref 10–45)
ANION GAP SERPL CALC-SCNC: 14 MMOL/L — SIGNIFICANT CHANGE UP (ref 5–17)
APTT BLD: 33.2 SEC — SIGNIFICANT CHANGE UP (ref 27.5–36.3)
AST SERPL-CCNC: 38 U/L — SIGNIFICANT CHANGE UP (ref 10–40)
BILIRUB SERPL-MCNC: 0.5 MG/DL — SIGNIFICANT CHANGE UP (ref 0.2–1.2)
BLD GP AB SCN SERPL QL: NEGATIVE — SIGNIFICANT CHANGE UP
BUN SERPL-MCNC: 11 MG/DL — SIGNIFICANT CHANGE UP (ref 7–23)
CALCIUM SERPL-MCNC: 8.5 MG/DL — SIGNIFICANT CHANGE UP (ref 8.4–10.5)
CHLORIDE SERPL-SCNC: 102 MMOL/L — SIGNIFICANT CHANGE UP (ref 96–108)
CO2 SERPL-SCNC: 20 MMOL/L — LOW (ref 22–31)
CREAT SERPL-MCNC: 0.74 MG/DL — SIGNIFICANT CHANGE UP (ref 0.5–1.3)
CULTURE RESULTS: NO GROWTH — SIGNIFICANT CHANGE UP
GLUCOSE BLDC GLUCOMTR-MCNC: 110 MG/DL — HIGH (ref 70–99)
GLUCOSE BLDC GLUCOMTR-MCNC: 121 MG/DL — HIGH (ref 70–99)
GLUCOSE BLDC GLUCOMTR-MCNC: 153 MG/DL — HIGH (ref 70–99)
GLUCOSE BLDC GLUCOMTR-MCNC: 277 MG/DL — HIGH (ref 70–99)
GLUCOSE SERPL-MCNC: 129 MG/DL — HIGH (ref 70–99)
HCT VFR BLD CALC: 23.2 % — LOW (ref 34.5–45)
HGB BLD-MCNC: 8 G/DL — LOW (ref 11.5–15.5)
INR BLD: 1.37 RATIO — HIGH (ref 0.88–1.16)
LDH SERPL L TO P-CCNC: 315 U/L — HIGH (ref 50–242)
MAGNESIUM SERPL-MCNC: 1.4 MG/DL — LOW (ref 1.6–2.6)
MCHC RBC-ENTMCNC: 28.9 PG — SIGNIFICANT CHANGE UP (ref 27–34)
MCHC RBC-ENTMCNC: 34.5 GM/DL — SIGNIFICANT CHANGE UP (ref 32–36)
MCV RBC AUTO: 83.9 FL — SIGNIFICANT CHANGE UP (ref 80–100)
PHOSPHATE SERPL-MCNC: 3.1 MG/DL — SIGNIFICANT CHANGE UP (ref 2.5–4.5)
PLATELET # BLD AUTO: 60 K/UL — LOW (ref 150–400)
POTASSIUM SERPL-MCNC: 3.4 MMOL/L — LOW (ref 3.5–5.3)
POTASSIUM SERPL-SCNC: 3.4 MMOL/L — LOW (ref 3.5–5.3)
PROT SERPL-MCNC: 6.9 G/DL — SIGNIFICANT CHANGE UP (ref 6–8.3)
PROTHROM AB SERPL-ACNC: 15.8 SEC — HIGH (ref 10–12.9)
RAPID RVP RESULT: SIGNIFICANT CHANGE UP
RBC # BLD: 2.76 M/UL — LOW (ref 3.8–5.2)
RBC # FLD: 12 % — SIGNIFICANT CHANGE UP (ref 10.3–14.5)
RH IG SCN BLD-IMP: POSITIVE — SIGNIFICANT CHANGE UP
SODIUM SERPL-SCNC: 136 MMOL/L — SIGNIFICANT CHANGE UP (ref 135–145)
SPECIMEN SOURCE: SIGNIFICANT CHANGE UP
URATE SERPL-MCNC: 1.5 MG/DL — LOW (ref 2.5–7)
WBC # BLD: 0.5 K/UL — CRITICAL LOW (ref 3.8–10.5)
WBC # FLD AUTO: 0.5 K/UL — CRITICAL LOW (ref 3.8–10.5)

## 2019-08-26 PROCEDURE — 99233 SBSQ HOSP IP/OBS HIGH 50: CPT

## 2019-08-26 PROCEDURE — 93970 EXTREMITY STUDY: CPT | Mod: 26

## 2019-08-26 PROCEDURE — 93010 ELECTROCARDIOGRAM REPORT: CPT

## 2019-08-26 PROCEDURE — 99232 SBSQ HOSP IP/OBS MODERATE 35: CPT

## 2019-08-26 RX ORDER — ACETAMINOPHEN 500 MG
650 TABLET ORAL ONCE
Refills: 0 | Status: COMPLETED | OUTPATIENT
Start: 2019-08-26 | End: 2019-08-26

## 2019-08-26 RX ORDER — OXYCODONE HYDROCHLORIDE 5 MG/1
5 TABLET ORAL EVERY 4 HOURS
Refills: 0 | Status: DISCONTINUED | OUTPATIENT
Start: 2019-08-26 | End: 2019-08-27

## 2019-08-26 RX ORDER — MAGNESIUM SULFATE 500 MG/ML
2 VIAL (ML) INJECTION ONCE
Refills: 0 | Status: COMPLETED | OUTPATIENT
Start: 2019-08-26 | End: 2019-08-26

## 2019-08-26 RX ORDER — ACETAMINOPHEN 500 MG
1000 TABLET ORAL ONCE
Refills: 0 | Status: COMPLETED | OUTPATIENT
Start: 2019-08-26 | End: 2019-08-26

## 2019-08-26 RX ORDER — PHYTONADIONE (VIT K1) 5 MG
10 TABLET ORAL DAILY
Refills: 0 | Status: DISCONTINUED | OUTPATIENT
Start: 2019-08-26 | End: 2019-08-27

## 2019-08-26 RX ORDER — MEROPENEM 1 G/30ML
1000 INJECTION INTRAVENOUS EVERY 8 HOURS
Refills: 0 | Status: DISCONTINUED | OUTPATIENT
Start: 2019-08-26 | End: 2019-08-29

## 2019-08-26 RX ORDER — POTASSIUM CHLORIDE 20 MEQ
20 PACKET (EA) ORAL ONCE
Refills: 0 | Status: COMPLETED | OUTPATIENT
Start: 2019-08-26 | End: 2019-08-26

## 2019-08-26 RX ORDER — ACETAMINOPHEN 500 MG
500 TABLET ORAL ONCE
Refills: 0 | Status: COMPLETED | OUTPATIENT
Start: 2019-08-26 | End: 2019-08-26

## 2019-08-26 RX ADMIN — Medication 3: at 18:24

## 2019-08-26 RX ADMIN — SIMETHICONE 80 MILLIGRAM(S): 80 TABLET, CHEWABLE ORAL at 17:25

## 2019-08-26 RX ADMIN — Medication 250 MILLIGRAM(S): at 23:11

## 2019-08-26 RX ADMIN — Medication 1 GRAM(S): at 23:16

## 2019-08-26 RX ADMIN — SIMETHICONE 80 MILLIGRAM(S): 80 TABLET, CHEWABLE ORAL at 00:36

## 2019-08-26 RX ADMIN — SIMETHICONE 80 MILLIGRAM(S): 80 TABLET, CHEWABLE ORAL at 05:43

## 2019-08-26 RX ADMIN — MEROPENEM 100 MILLIGRAM(S): 1 INJECTION INTRAVENOUS at 14:34

## 2019-08-26 RX ADMIN — Medication 50 GRAM(S): at 08:57

## 2019-08-26 RX ADMIN — PANTOPRAZOLE SODIUM 40 MILLIGRAM(S): 20 TABLET, DELAYED RELEASE ORAL at 06:00

## 2019-08-26 RX ADMIN — Medication 75 MICROGRAM(S): at 05:45

## 2019-08-26 RX ADMIN — Medication 260 MILLIGRAM(S): at 22:11

## 2019-08-26 RX ADMIN — Medication 3 MILLILITER(S): at 12:40

## 2019-08-26 RX ADMIN — SODIUM CHLORIDE 4 MILLILITER(S): 9 INJECTION INTRAMUSCULAR; INTRAVENOUS; SUBCUTANEOUS at 17:25

## 2019-08-26 RX ADMIN — Medication 1 GRAM(S): at 06:00

## 2019-08-26 RX ADMIN — Medication 25 MILLIGRAM(S): at 23:16

## 2019-08-26 RX ADMIN — CHLORHEXIDINE GLUCONATE 1 APPLICATION(S): 213 SOLUTION TOPICAL at 08:57

## 2019-08-26 RX ADMIN — LOSARTAN POTASSIUM 100 MILLIGRAM(S): 100 TABLET, FILM COATED ORAL at 05:43

## 2019-08-26 RX ADMIN — Medication 1 GRAM(S): at 17:25

## 2019-08-26 RX ADMIN — DIPHENHYDRAMINE HYDROCHLORIDE AND LIDOCAINE HYDROCHLORIDE AND ALUMINUM HYDROXIDE AND MAGNESIUM HYDRO 10 MILLILITER(S): KIT at 17:29

## 2019-08-26 RX ADMIN — Medication 10 MILLIGRAM(S): at 12:37

## 2019-08-26 RX ADMIN — SIMETHICONE 80 MILLIGRAM(S): 80 TABLET, CHEWABLE ORAL at 12:39

## 2019-08-26 RX ADMIN — Medication 10 UNIT(S): at 08:58

## 2019-08-26 RX ADMIN — Medication 100 MILLIEQUIVALENT(S): at 17:24

## 2019-08-26 RX ADMIN — Medication 3 MILLILITER(S): at 23:14

## 2019-08-26 RX ADMIN — Medication 400 MILLIGRAM(S): at 05:43

## 2019-08-26 RX ADMIN — Medication 10 UNIT(S): at 12:40

## 2019-08-26 RX ADMIN — Medication 3 MILLIGRAM(S): at 23:15

## 2019-08-26 RX ADMIN — Medication 400 MILLIGRAM(S): at 17:26

## 2019-08-26 RX ADMIN — Medication 25 MILLIGRAM(S): at 05:44

## 2019-08-26 RX ADMIN — DIPHENHYDRAMINE HYDROCHLORIDE AND LIDOCAINE HYDROCHLORIDE AND ALUMINUM HYDROXIDE AND MAGNESIUM HYDRO 10 MILLILITER(S): KIT at 05:43

## 2019-08-26 RX ADMIN — SODIUM CHLORIDE 50 MILLILITER(S): 9 INJECTION INTRAMUSCULAR; INTRAVENOUS; SUBCUTANEOUS at 17:32

## 2019-08-26 RX ADMIN — Medication 1: at 12:39

## 2019-08-26 RX ADMIN — Medication 25 MILLIGRAM(S): at 12:37

## 2019-08-26 RX ADMIN — Medication 400 MILLIGRAM(S): at 05:42

## 2019-08-26 RX ADMIN — GABAPENTIN 600 MILLIGRAM(S): 400 CAPSULE ORAL at 05:43

## 2019-08-26 RX ADMIN — Medication 650 MILLIGRAM(S): at 13:25

## 2019-08-26 RX ADMIN — Medication 25 MILLIGRAM(S): at 00:37

## 2019-08-26 RX ADMIN — Medication 250 MILLIGRAM(S): at 10:06

## 2019-08-26 RX ADMIN — Medication 500 MILLIGRAM(S): at 15:00

## 2019-08-26 RX ADMIN — Medication 650 MILLIGRAM(S): at 18:20

## 2019-08-26 RX ADMIN — POSACONAZOLE 300 MILLIGRAM(S): 100 TABLET, DELAYED RELEASE ORAL at 12:37

## 2019-08-26 RX ADMIN — Medication 1 GRAM(S): at 12:39

## 2019-08-26 RX ADMIN — Medication 10 UNIT(S): at 18:24

## 2019-08-26 RX ADMIN — Medication 100 MILLIGRAM(S): at 02:00

## 2019-08-26 RX ADMIN — Medication 100 MILLIGRAM(S): at 12:37

## 2019-08-26 RX ADMIN — Medication 3 MILLILITER(S): at 05:42

## 2019-08-26 RX ADMIN — DIPHENHYDRAMINE HYDROCHLORIDE AND LIDOCAINE HYDROCHLORIDE AND ALUMINUM HYDROXIDE AND MAGNESIUM HYDRO 10 MILLILITER(S): KIT at 23:14

## 2019-08-26 RX ADMIN — Medication 100 MILLIGRAM(S): at 22:14

## 2019-08-26 RX ADMIN — Medication 100 MILLIGRAM(S): at 05:43

## 2019-08-26 RX ADMIN — Medication 3 MILLILITER(S): at 17:24

## 2019-08-26 RX ADMIN — SODIUM CHLORIDE 4 MILLILITER(S): 9 INJECTION INTRAMUSCULAR; INTRAVENOUS; SUBCUTANEOUS at 06:00

## 2019-08-26 RX ADMIN — Medication 200 MILLIGRAM(S): at 14:06

## 2019-08-26 RX ADMIN — Medication 1 GRAM(S): at 00:36

## 2019-08-26 RX ADMIN — DIPHENHYDRAMINE HYDROCHLORIDE AND LIDOCAINE HYDROCHLORIDE AND ALUMINUM HYDROXIDE AND MAGNESIUM HYDRO 10 MILLILITER(S): KIT at 00:37

## 2019-08-26 RX ADMIN — SIMETHICONE 80 MILLIGRAM(S): 80 TABLET, CHEWABLE ORAL at 23:18

## 2019-08-26 RX ADMIN — Medication 3 MILLILITER(S): at 00:36

## 2019-08-26 RX ADMIN — Medication 25 MILLIGRAM(S): at 17:25

## 2019-08-26 RX ADMIN — Medication 5 MILLILITER(S): at 05:44

## 2019-08-26 RX ADMIN — GABAPENTIN 600 MILLIGRAM(S): 400 CAPSULE ORAL at 17:25

## 2019-08-26 RX ADMIN — MEROPENEM 100 MILLIGRAM(S): 1 INJECTION INTRAVENOUS at 22:07

## 2019-08-26 RX ADMIN — Medication 650 MILLIGRAM(S): at 12:36

## 2019-08-26 RX ADMIN — Medication 650 MILLIGRAM(S): at 17:32

## 2019-08-26 RX ADMIN — Medication 5 MILLILITER(S): at 22:14

## 2019-08-26 NOTE — PROGRESS NOTE ADULT - ASSESSMENT
65 yo F with history of DM,  RA , diagnosed in 2016, chronic depression now with newly diagnosed AML  admitted for induction chemotherapy,  finished 7 days of induction daunorubicin and cytarabine on 8/ 15 via TLC placed on 8/6,  and now on daily PO glasdegib. Patient has had  rigors, fever (101 yesterday). Absolute neutrophil count is 0.   Currently on vancomycin , Aztreonam 2g q8, posaconazole. Reported rash and itching with Cefepime.   Blood cx and urine cx have been negative. Patient was pancultured again yesterday.  Chest x ray shows ILD which is c/w patient's h/o PF.     Neutropenic fever   AML s/p induction   On glasdegib

## 2019-08-26 NOTE — PROGRESS NOTE ADULT - ATTENDING COMMENTS
Ilya Juares  Attending Physician   Division of Infectious Disease  Pager #[phone]  After 5pm/weekend or no response, call #[phone]

## 2019-08-26 NOTE — CHART NOTE - NSCHARTNOTEFT_GEN_A_CORE
Pt seen for nutrition cfollow-up. Pt with PMH type 2 DM A1C 10.6%-8/7 now with newly diagnosed AML. Enrolled on [employer] BRIGHT 1019-  s/p Induction chemotherapy with Dauno/Cytarabine and daily Glasdegib vs Placebo - Day 20.    Source: Patient [x ]    Family [ ]     other [x ] EMR    Diet : Consistent Carbohydrate with Evening Snack + Ensure Clear BID    Patient reports [ ] nausea  [ ] vomiting [ ] diarrhea [ ] constipation  [ ]chewing problems [ ] swallowing issues  [ ] other: Pt denies N+V and states she is no longer experiencing loose, watery stool - last BM was yesterday (8/25).      PO intake:  < 50% [x ] 50-75% [ ]   % [ ]  other : Pt with decreased appetite and PO intake 2/2 ongoing mouth pain and still experiencing decreased taste and smell. Pt acknowledged she ate 1/2 banana with a few bites of cereal for breakfast. Glucerna can was open and 1/3 full at time of follow-up, pt states she would like to continue receiving Glecerna. Pt notes she has been eating ~25% at meals.     Pt at risk for possible malnutrition; unable to obtain more information - Pt felt very ill and was shaking - nutrition focused physical exam inappropriate at this time, recommend one to be performed upon follow-up.    Current Weight:   132.2 Ibs. (8/6)  139.3 Ibs. (8/10)  132.4 Ibs. (8/20)  132.4 Ibs. (8/26) - weight fluctuations since admission noted, likely due to fluid shifts. Will continue to monitor.  % Weight Change    Pertinent Medications: MEDICATIONS  (STANDING):  acyclovir   Oral Tab/Cap 400 milliGRAM(s) Oral two times a day  ALBUTerol    90 MICROgram(s) HFA Inhaler 1 Puff(s) Inhalation every 6 hours  ALBUTerol/ipratropium for Nebulization 3 milliLiter(s) Nebulizer every 6 hours  aztreonam  IVPB 2000 milliGRAM(s) IV Intermittent every 8 hours  aztreonam  IVPB      benzonatate 100 milliGRAM(s) Oral three times a day  Biotene Dry Mouth Oral Rinse 5 milliLiter(s) Swish and Spit three times a day  chlorhexidine 2% Cloths 1 Application(s) Topical <User Schedule>  dextrose 5%. 1000 milliLiter(s) (50 mL/Hr) IV Continuous <Continuous>  dextrose 50% Injectable 12.5 Gram(s) IV Push once  dextrose 50% Injectable 25 Gram(s) IV Push once  dextrose 50% Injectable 25 Gram(s) IV Push once  FIRST- Mouthwash  BLM 10 milliLiter(s) Swish and Spit four times a day  gabapentin 600 milliGRAM(s) Oral two times a day  hydrOXYzine hydrochloride 25 milliGRAM(s) Oral every 6 hours  insulin glargine Injectable (LANTUS) 18 Unit(s) SubCutaneous at bedtime  insulin lispro (HumaLOG) corrective regimen sliding scale   SubCutaneous three times a day before meals  insulin lispro (HumaLOG) corrective regimen sliding scale   SubCutaneous at bedtime  insulin lispro Injectable (HumaLOG) 10 Unit(s) SubCutaneous three times a day before meals  investigational chemo - general 1 Tablet(s) Oral every 24 hours  levothyroxine 75 MICROGram(s) Oral daily  lidocaine 2% Injectable 20 milliLiter(s) Local Injection once  losartan 100 milliGRAM(s) Oral daily  pantoprazole    Tablet 40 milliGRAM(s) Oral before breakfast  phytonadione   Solution 10 milliGRAM(s) Oral daily  posaconazole DR Tablet 300 milliGRAM(s) Oral daily  potassium chloride  20 mEq/100 mL IVPB 20 milliEquivalent(s) IV Intermittent once  simethicone 80 milliGRAM(s) Chew four times a day  sodium chloride 0.9%. 1000 milliLiter(s) (50 mL/Hr) IV Continuous <Continuous>  sodium chloride 3%  Inhalation 4 milliLiter(s) Inhalation two times a day  sucralfate 1 Gram(s) Oral four times a day  tiotropium 18 MICROgram(s) Capsule 1 Capsule(s) Inhalation daily  vancomycin  IVPB 1000 milliGRAM(s) IV Intermittent every 12 hours  vancomycin  IVPB        MEDICATIONS  (PRN):  acetaminophen   Tablet .. 650 milliGRAM(s) Oral every 6 hours PRN Temp greater or equal to 38C (100.4F), Mild Pain (1 - 3)  aluminum hydroxide/magnesium hydroxide/simethicone Suspension 30 milliLiter(s) Oral every 6 hours PRN Dyspepsia  dextrose 40% Gel 15 Gram(s) Oral once PRN Blood Glucose LESS THAN 70 milliGRAM(s)/deciliter  diphenhydrAMINE 25 milliGRAM(s) Oral every 6 hours PRN Rash and/or Itching  glucagon  Injectable 1 milliGRAM(s) IntraMuscular once PRN Glucose LESS THAN 70 milligrams/deciliter  hydrocortisone 2.5% Lotion 1 Application(s) Topical three times a day PRN Itching  lidocaine 2% Viscous 2 milliLiter(s) Oral every 2 hours PRN mouth ulcer  loperamide 2 milliGRAM(s) Oral every 4 hours PRN Diarrhea  melatonin 3 milliGRAM(s) Oral once PRN Insomnia  metoclopramide Injectable 10 milliGRAM(s) IV Push every 6 hours PRN nausea/vommiting  oxyCODONE    IR 5 milliGRAM(s) Oral every 4 hours PRN Moderate Pain (4 - 6)  polyethylene glycol 3350 17 Gram(s) Oral two times a day PRN Constipation    Pertinent Labs:  08-26 Na136 mmol/L Glu 129 mg/dL<H> K+ 3.4 mmol/L<L> Cr  0.74 mg/dL BUN 11 mg/dL 08-26 Phos 3.1 mg/dL 08-26 Alb 3.2 g/dL<L> 08-07 [id number] 10.6 %<H>      Skin: no pressure injuries noted as per nursing flowsheets  Edema: none noted as per nursing flowsheets    Estimated Needs:   [x ] no change since previous assessment  [ ] recalculated:       Previous Nutrition Diagnosis:  [x ]Inadequate Oral Intake      Nutrition Diagnosis is [x ] ongoing, addressed with nutrition supplement       New Nutrition Diagnosis: [x ] not applicable    Interventions:   1. Continue Consistent Carbohydrate diet with evening snacks  2. D/C Ensure Clear BID  3. Provide Glucerna Shake BID to provide additional 440 kcals, 20 grams protein daily  4. Continue to encourage po intake and obtain/honor food preferences as able   5. If appropriate, upon follow-up perform nutrition focused physical exam       Monitoring and Evaluation:   [x ] PO intake [x ] Tolerance to diet prescription [x ] weights [x ] follow up per protocol    RD to remain available. Pt seen for nutrition cfollow-up. Pt with PMH type 2 DM A1C 10.6%-8/7 now with newly diagnosed AML. Enrolled on [employer]IGHT 1019-  s/p Induction chemotherapy with Dauno/Cytarabine and daily Glasdegib vs Placebo - Day 20.    Source: Patient [x ]    Family [ ]     other [x ] EMR    Diet : Consistent Carbohydrate with Evening Snack + Ensure Clear BID    Patient reports [ ] nausea  [ ] vomiting [ ] diarrhea [ ] constipation  [ ]chewing problems [ ] swallowing issues  [ ] other: Pt denies N+V and states she is no longer experiencing loose, watery stool - last BM was yesterday (8/25).      PO intake:  < 50% [x ] 50-75% [ ]   % [ ]  other : Pt with decreased appetite and PO intake 2/2 ongoing mouth pain and still experiencing decreased taste and smell. Pt acknowledged she ate 1/2 banana with a few bites of cereal for breakfast. Glucerna can was open and 1/3 full at time of follow-up, pt states she would like to continue receiving Glecerna. Pt notes she has been eating ~25% at meals.     Pt at risk for possible malnutrition; unable to obtain more information - Pt felt very ill and was shaking - nutrition focused physical exam inappropriate at this time, recommend one to be performed upon follow-up.    Current Weight:   132.2 Ibs. (8/6)  139.3 Ibs. (8/10)  132.4 Ibs. (8/20)  132.4 Ibs. (8/26) - weight fluctuations since admission noted, likely due to fluid shifts. Will continue to monitor.  % Weight Change    Pertinent Medications: MEDICATIONS  (STANDING):  acyclovir   Oral Tab/Cap 400 milliGRAM(s) Oral two times a day  ALBUTerol    90 MICROgram(s) HFA Inhaler 1 Puff(s) Inhalation every 6 hours  ALBUTerol/ipratropium for Nebulization 3 milliLiter(s) Nebulizer every 6 hours  aztreonam  IVPB 2000 milliGRAM(s) IV Intermittent every 8 hours  aztreonam  IVPB      benzonatate 100 milliGRAM(s) Oral three times a day  Biotene Dry Mouth Oral Rinse 5 milliLiter(s) Swish and Spit three times a day  chlorhexidine 2% Cloths 1 Application(s) Topical <User Schedule>  dextrose 5%. 1000 milliLiter(s) (50 mL/Hr) IV Continuous <Continuous>  dextrose 50% Injectable 12.5 Gram(s) IV Push once  dextrose 50% Injectable 25 Gram(s) IV Push once  dextrose 50% Injectable 25 Gram(s) IV Push once  FIRST- Mouthwash  BLM 10 milliLiter(s) Swish and Spit four times a day  gabapentin 600 milliGRAM(s) Oral two times a day  hydrOXYzine hydrochloride 25 milliGRAM(s) Oral every 6 hours  insulin glargine Injectable (LANTUS) 18 Unit(s) SubCutaneous at bedtime  insulin lispro (HumaLOG) corrective regimen sliding scale   SubCutaneous three times a day before meals  insulin lispro (HumaLOG) corrective regimen sliding scale   SubCutaneous at bedtime  insulin lispro Injectable (HumaLOG) 10 Unit(s) SubCutaneous three times a day before meals  investigational chemo - general 1 Tablet(s) Oral every 24 hours  levothyroxine 75 MICROGram(s) Oral daily  lidocaine 2% Injectable 20 milliLiter(s) Local Injection once  losartan 100 milliGRAM(s) Oral daily  pantoprazole    Tablet 40 milliGRAM(s) Oral before breakfast  phytonadione   Solution 10 milliGRAM(s) Oral daily  posaconazole DR Tablet 300 milliGRAM(s) Oral daily  potassium chloride  20 mEq/100 mL IVPB 20 milliEquivalent(s) IV Intermittent once  simethicone 80 milliGRAM(s) Chew four times a day  sodium chloride 0.9%. 1000 milliLiter(s) (50 mL/Hr) IV Continuous <Continuous>  sodium chloride 3%  Inhalation 4 milliLiter(s) Inhalation two times a day  sucralfate 1 Gram(s) Oral four times a day  tiotropium 18 MICROgram(s) Capsule 1 Capsule(s) Inhalation daily  vancomycin  IVPB 1000 milliGRAM(s) IV Intermittent every 12 hours  vancomycin  IVPB        MEDICATIONS  (PRN):  acetaminophen   Tablet .. 650 milliGRAM(s) Oral every 6 hours PRN Temp greater or equal to 38C (100.4F), Mild Pain (1 - 3)  aluminum hydroxide/magnesium hydroxide/simethicone Suspension 30 milliLiter(s) Oral every 6 hours PRN Dyspepsia  dextrose 40% Gel 15 Gram(s) Oral once PRN Blood Glucose LESS THAN 70 milliGRAM(s)/deciliter  diphenhydrAMINE 25 milliGRAM(s) Oral every 6 hours PRN Rash and/or Itching  glucagon  Injectable 1 milliGRAM(s) IntraMuscular once PRN Glucose LESS THAN 70 milligrams/deciliter  hydrocortisone 2.5% Lotion 1 Application(s) Topical three times a day PRN Itching  lidocaine 2% Viscous 2 milliLiter(s) Oral every 2 hours PRN mouth ulcer  loperamide 2 milliGRAM(s) Oral every 4 hours PRN Diarrhea  melatonin 3 milliGRAM(s) Oral once PRN Insomnia  metoclopramide Injectable 10 milliGRAM(s) IV Push every 6 hours PRN nausea/vommiting  oxyCODONE    IR 5 milliGRAM(s) Oral every 4 hours PRN Moderate Pain (4 - 6)  polyethylene glycol 3350 17 Gram(s) Oral two times a day PRN Constipation    Pertinent Labs:  08-26 Na136 mmol/L Glu 129 mg/dL<H> K+ 3.4 mmol/L<L> Cr  0.74 mg/dL BUN 11 mg/dL 08-26 Phos 3.1 mg/dL 08-26 Alb 3.2 g/dL<L> 08-07 [id number] 10.6 %<H>      Skin: no pressure injuries noted as per nursing flowsheets  Edema: none noted as per nursing flowsheets    Estimated Needs:   [x ] no change since previous assessment  [ ] recalculated:       Previous Nutrition Diagnosis:  [x ]Inadequate Oral Intake      Nutrition Diagnosis is [x ] ongoing, addressed with nutrition supplement       New Nutrition Diagnosis: [x ] not applicable    Interventions:   1. Continue Consistent Carbohydrate diet with evening snacks  2. d/c Ensure Clear BID  3. Provide Glucerna Shake BID to provide additional 440 kcals, 20 grams protein daily  4. Continue to encourage po intake and obtain/honor food preferences as able   5. If appropriate, upon follow-up perform nutrition focused physical exam       Monitoring and Evaluation:   [x ] PO intake [x ] Tolerance to diet prescription [x ] weights [x ] follow up per protocol    RD to remain available. Pt seen for nutrition follow-up. Pt with PMH type 2 DM A1C 10.6%-8/7 now with newly diagnosed AML. Enrolled on [employer] 1019-  s/p Induction chemotherapy with Dauno/Cytarabine and daily Glasdegib vs Placebo - Day 20.    Source: Patient [x ]    Family [ ]     other [x ] EMR    Diet : Consistent Carbohydrate with Evening Snack + Ensure Clear BID    Patient reports [ ] nausea  [ ] vomiting [ ] diarrhea [ ] constipation  [ ]chewing problems [ ] swallowing issues  [ ] other: Pt denies N+V and states she is no longer experiencing loose, watery stool - last BM was yesterday (8/25).      PO intake:  < 50% [x ] 50-75% [ ]   % [ ]  other : Pt with decreased appetite and PO intake 2/2 ongoing mouth pain and still experiencing decreased taste and smell. Pt acknowledged she ate 1/2 banana with a few bites of cereal for breakfast. Glucerna can was open and 1/3 full at time of follow-up, pt states she would like to continue receiving Glucerna. Pt notes she has been eating ~25% at meals.     Pt at risk for possible malnutrition; unable to obtain more information - Pt felt very ill and was shaking - nutrition focused physical exam inappropriate at this time, recommend one to be performed upon follow-up.    Current Weight:   132.2 Ibs. (8/6)  139.3 Ibs. (8/10)  132.4 Ibs. (8/20)  132.4 Ibs. (8/26) - weight fluctuations since admission noted, likely due to fluid shifts. Will continue to monitor.  % Weight Change    Pertinent Medications: MEDICATIONS  (STANDING):  acyclovir   Oral Tab/Cap 400 milliGRAM(s) Oral two times a day  ALBUTerol    90 MICROgram(s) HFA Inhaler 1 Puff(s) Inhalation every 6 hours  ALBUTerol/ipratropium for Nebulization 3 milliLiter(s) Nebulizer every 6 hours  aztreonam  IVPB 2000 milliGRAM(s) IV Intermittent every 8 hours  aztreonam  IVPB      benzonatate 100 milliGRAM(s) Oral three times a day  Biotene Dry Mouth Oral Rinse 5 milliLiter(s) Swish and Spit three times a day  chlorhexidine 2% Cloths 1 Application(s) Topical <User Schedule>  dextrose 5%. 1000 milliLiter(s) (50 mL/Hr) IV Continuous <Continuous>  dextrose 50% Injectable 12.5 Gram(s) IV Push once  dextrose 50% Injectable 25 Gram(s) IV Push once  dextrose 50% Injectable 25 Gram(s) IV Push once  FIRST- Mouthwash  BLM 10 milliLiter(s) Swish and Spit four times a day  gabapentin 600 milliGRAM(s) Oral two times a day  hydrOXYzine hydrochloride 25 milliGRAM(s) Oral every 6 hours  insulin glargine Injectable (LANTUS) 18 Unit(s) SubCutaneous at bedtime  insulin lispro (HumaLOG) corrective regimen sliding scale   SubCutaneous three times a day before meals  insulin lispro (HumaLOG) corrective regimen sliding scale   SubCutaneous at bedtime  insulin lispro Injectable (HumaLOG) 10 Unit(s) SubCutaneous three times a day before meals  investigational chemo - general 1 Tablet(s) Oral every 24 hours  levothyroxine 75 MICROGram(s) Oral daily  lidocaine 2% Injectable 20 milliLiter(s) Local Injection once  losartan 100 milliGRAM(s) Oral daily  pantoprazole    Tablet 40 milliGRAM(s) Oral before breakfast  phytonadione   Solution 10 milliGRAM(s) Oral daily  posaconazole DR Tablet 300 milliGRAM(s) Oral daily  potassium chloride  20 mEq/100 mL IVPB 20 milliEquivalent(s) IV Intermittent once  simethicone 80 milliGRAM(s) Chew four times a day  sodium chloride 0.9%. 1000 milliLiter(s) (50 mL/Hr) IV Continuous <Continuous>  sodium chloride 3%  Inhalation 4 milliLiter(s) Inhalation two times a day  sucralfate 1 Gram(s) Oral four times a day  tiotropium 18 MICROgram(s) Capsule 1 Capsule(s) Inhalation daily  vancomycin  IVPB 1000 milliGRAM(s) IV Intermittent every 12 hours  vancomycin  IVPB        MEDICATIONS  (PRN):  acetaminophen   Tablet .. 650 milliGRAM(s) Oral every 6 hours PRN Temp greater or equal to 38C (100.4F), Mild Pain (1 - 3)  aluminum hydroxide/magnesium hydroxide/simethicone Suspension 30 milliLiter(s) Oral every 6 hours PRN Dyspepsia  dextrose 40% Gel 15 Gram(s) Oral once PRN Blood Glucose LESS THAN 70 milliGRAM(s)/deciliter  diphenhydrAMINE 25 milliGRAM(s) Oral every 6 hours PRN Rash and/or Itching  glucagon  Injectable 1 milliGRAM(s) IntraMuscular once PRN Glucose LESS THAN 70 milligrams/deciliter  hydrocortisone 2.5% Lotion 1 Application(s) Topical three times a day PRN Itching  lidocaine 2% Viscous 2 milliLiter(s) Oral every 2 hours PRN mouth ulcer  loperamide 2 milliGRAM(s) Oral every 4 hours PRN Diarrhea  melatonin 3 milliGRAM(s) Oral once PRN Insomnia  metoclopramide Injectable 10 milliGRAM(s) IV Push every 6 hours PRN nausea/vommiting  oxyCODONE    IR 5 milliGRAM(s) Oral every 4 hours PRN Moderate Pain (4 - 6)  polyethylene glycol 3350 17 Gram(s) Oral two times a day PRN Constipation    Pertinent Labs:  08-26 Na136 mmol/L Glu 129 mg/dL<H> K+ 3.4 mmol/L<L> Cr  0.74 mg/dL BUN 11 mg/dL 08-26 Phos 3.1 mg/dL 08-26 Alb 3.2 g/dL<L> 08-07 [id number] 10.6 %<H>    CAPILLARY BLOOD GLUCOSE      POCT Blood Glucose.: 153 mg/dL (26 Aug 2019 12:36)  POCT Blood Glucose.: 121 mg/dL (26 Aug 2019 08:31)  POCT Blood Glucose.: 229 mg/dL (25 Aug 2019 22:19)  POCT Blood Glucose.: 143 mg/dL (25 Aug 2019 19:13)      Skin: no pressure injuries noted as per nursing flowsheets  Edema: none noted as per nursing flowsheets    Estimated Needs:   [x ] no change since previous assessment  [ ] recalculated:       Previous Nutrition Diagnosis:  [x ]Inadequate Oral Intake      Nutrition Diagnosis is [x ] ongoing, addressed with nutrition supplement       New Nutrition Diagnosis: [x ] not applicable    Interventions:   1. Continue Consistent Carbohydrate diet with evening snacks  2. d/c Ensure Clear BID  3. Provide Glucerna Shake BID to provide additional 440 kcals, 20 grams protein daily  4. Continue to encourage po intake and obtain/honor food preferences as able   5. If appropriate, upon follow-up perform nutrition focused physical exam       Monitoring and Evaluation:   [x ] PO intake [x ] Tolerance to diet prescription [x ] weights [x ] follow up per protocol    RD to remain available.

## 2019-08-26 NOTE — CHART NOTE - NSCHARTNOTEFT_GEN_A_CORE
Called by RN for fever of 101.6.   Pt seen and examined at bedside.   Pt stating she feels cold and shaking.  Denies any chest pain, palpitations, SOB, abdominal pain, headache or urinary symptoms.     Vital Signs Last 24 Hrs  T(C): 38.7 (26 Aug 2019 21:34), Max: 40.6 (26 Aug 2019 14:07)  T(F): 101.6 (26 Aug 2019 21:34), Max: 105 (26 Aug 2019 14:07)  HR: 112 (26 Aug 2019 21:34) (83 - 118)  BP: 145/60 (26 Aug 2019 21:34) (119/67 - 175/72)  BP(mean): --  RR: 20 (26 Aug 2019 21:34) (18 - 20)  SpO2: 95% (26 Aug 2019 21:34) (91% - 96%)                        8.0    0.5   )-----------( 60       ( 26 Aug 2019 07:13 )             23.2     08-26    136  |  102  |  11  ----------------------------<  129<H>  3.4<L>   |  20<L>  |  0.74    Ca    8.5      26 Aug 2019 07:11  Phos  3.1     08-26  Mg     1.4     08-26    TPro  6.9  /  Alb  3.2<L>  /  TBili  0.5  /  DBili  x   /  AST  38  /  ALT  49<H>  /  AlkPhos  224<H>  08-26    .Blood  08-25-19   No growth to date.  --  --      .Urine  08-25-19   No growth  --  --      .Blood  08-25-19   No growth to date.  --  --      .Blood  08-19-19   No growth at 5 days.  --  --      .Urine  08-19-19   <10,000 CFU/mL Normal Urogenital Maryana  --  --      .Urine  08-13-19   No growth  --  --      .Blood  08-13-19   No growth at 5 days.  --  --      .Blood  08-13-19   No growth at 5 days.  --  --      .Sputum  08-12-19   Normal Respiratory Maryana present  --    Rare polymorphonuclear leukocytes per low power field  Few Squamous epithelial cells per low power field  Moderate Gram positive cocci in pairs seen per oil power field  Moderate Gram Variable Rods seen per oil power field      .Blood  08-12-19   No growth at 5 days.  --  --      .Urine  08-12-19   No growth  --  --      .Sputum  08-10-19   Normal Respiratory Maryana present  --    Few polymorphonuclear leukocytes per low power field  Rare Squamous epithelial cells per low power field  No organisms seen per oil power field      .Urine  08-10-19   No growth  --  --      .Blood  08-09-19   No growth at 5 days.  --  --          REVIEW OF SYSTEMS:    as per HPI      PHYSICAL EXAM:     General: NAD, non-toxic appearance  Neuro: NC, AT, PERRLA, no focal deficits  CV: S1 S2 RRR  Resp: B/L Lungs CTA, nonlabored  Abd: soft, NT, ND, + BS X4 quadrants  Ext: no edema, +PP b/l LE, warm to touch     Assessment & Plan     65 yo F with PMHx of T2DM, RA, Depression and now newly diagnosed AML enrolled on [employer] BRIGHT trial, now s/p Induction chemotherapy with Dauno/Cytarabine and daily Glasdegib vs Placebo. Day 14 bone marrow biopsy was chemotherapeutic - awaiting count recovery. The patient's hospital course has been complicated by neutropenic fevers, transaminitis, RENTERIA due to pulmonary fibrosis, PE, volume overload, and drug exanthem. The patient has pancytopenia from chemotherapy and/or disease.     Pt now with neutropenic fever.     Continue current antimicrobials  Continue IVF for hydration   Continue antipyretic   Cooling measures  Follow up blood cx x2 and urine cx from 8/25 - no growth to date  CT chest 8/25 - Acute right-sided pulmonary emboli. Pulmonary fibrosis. Increased groundglass opacities throughout both lungs compared with prior imaging 8/9/2019 are                             nonspecific. Heterogeneous striated nephrograms bilaterally consistent with pyelonephritis.  F/U with primary team in am    Hortencia Youngblood, ANP-BC  [id number]

## 2019-08-26 NOTE — PROGRESS NOTE ADULT - PROBLEM SELECTOR PLAN 1
-still with fever  -CT noted - new PE  -cx negative  -no severe mucositis   -TLC look ok  -dc aztreonam  -meropenem 1 gm iv q8 - monitor for rash  -suspect noninfectious cause of fevers - drug/PE  -check LE dopplers  -AC per primary team

## 2019-08-26 NOTE — PROGRESS NOTE ADULT - SUBJECTIVE AND OBJECTIVE BOX
MORIAH BRANDON 64y [id number]    Patient is a 64y old  Female who presents with a chief complaint of For induction chemotherapy (26 Aug 2019 07:58)      Follow Up/CC:  ID following for fever    Interval History/ROS: fever+, PE noted on CT    Allergies    cefepime (Rash)    Intolerances        ANTIMICROBIALS:  acyclovir   Oral Tab/Cap 400 two times a day  meropenem  IVPB 1000 every 8 hours  posaconazole DR Tablet 300 daily  vancomycin  IVPB 1000 every 12 hours  vancomycin  IVPB        MEDICATIONS  (STANDING):  acyclovir   Oral Tab/Cap 400 milliGRAM(s) Oral two times a day  ALBUTerol    90 MICROgram(s) HFA Inhaler 1 Puff(s) Inhalation every 6 hours  ALBUTerol/ipratropium for Nebulization 3 milliLiter(s) Nebulizer every 6 hours  benzonatate 100 milliGRAM(s) Oral three times a day  Biotene Dry Mouth Oral Rinse 5 milliLiter(s) Swish and Spit three times a day  chlorhexidine 2% Cloths 1 Application(s) Topical <User Schedule>  dextrose 5%. 1000 milliLiter(s) (50 mL/Hr) IV Continuous <Continuous>  dextrose 50% Injectable 12.5 Gram(s) IV Push once  dextrose 50% Injectable 25 Gram(s) IV Push once  dextrose 50% Injectable 25 Gram(s) IV Push once  FIRST- Mouthwash  BLM 10 milliLiter(s) Swish and Spit four times a day  gabapentin 600 milliGRAM(s) Oral two times a day  hydrOXYzine hydrochloride 25 milliGRAM(s) Oral every 6 hours  insulin glargine Injectable (LANTUS) 18 Unit(s) SubCutaneous at bedtime  insulin lispro (HumaLOG) corrective regimen sliding scale   SubCutaneous three times a day before meals  insulin lispro (HumaLOG) corrective regimen sliding scale   SubCutaneous at bedtime  insulin lispro Injectable (HumaLOG) 10 Unit(s) SubCutaneous three times a day before meals  investigational chemo - general 1 Tablet(s) Oral every 24 hours  levothyroxine 75 MICROGram(s) Oral daily  lidocaine 2% Injectable 20 milliLiter(s) Local Injection once  losartan 100 milliGRAM(s) Oral daily  meropenem  IVPB 1000 milliGRAM(s) IV Intermittent every 8 hours  pantoprazole    Tablet 40 milliGRAM(s) Oral before breakfast  phytonadione   Solution 10 milliGRAM(s) Oral daily  posaconazole DR Tablet 300 milliGRAM(s) Oral daily  potassium chloride  20 mEq/100 mL IVPB 20 milliEquivalent(s) IV Intermittent once  simethicone 80 milliGRAM(s) Chew four times a day  sodium chloride 0.9%. 1000 milliLiter(s) (50 mL/Hr) IV Continuous <Continuous>  sodium chloride 3%  Inhalation 4 milliLiter(s) Inhalation two times a day  sucralfate 1 Gram(s) Oral four times a day  tiotropium 18 MICROgram(s) Capsule 1 Capsule(s) Inhalation daily  vancomycin  IVPB 1000 milliGRAM(s) IV Intermittent every 12 hours  vancomycin  IVPB        MEDICATIONS  (PRN):  acetaminophen   Tablet .. 650 milliGRAM(s) Oral every 6 hours PRN Temp greater or equal to 38C (100.4F), Mild Pain (1 - 3)  aluminum hydroxide/magnesium hydroxide/simethicone Suspension 30 milliLiter(s) Oral every 6 hours PRN Dyspepsia  dextrose 40% Gel 15 Gram(s) Oral once PRN Blood Glucose LESS THAN 70 milliGRAM(s)/deciliter  diphenhydrAMINE 25 milliGRAM(s) Oral every 6 hours PRN Rash and/or Itching  glucagon  Injectable 1 milliGRAM(s) IntraMuscular once PRN Glucose LESS THAN 70 milligrams/deciliter  hydrocortisone 2.5% Lotion 1 Application(s) Topical three times a day PRN Itching  lidocaine 2% Viscous 2 milliLiter(s) Oral every 2 hours PRN mouth ulcer  loperamide 2 milliGRAM(s) Oral every 4 hours PRN Diarrhea  melatonin 3 milliGRAM(s) Oral once PRN Insomnia  metoclopramide Injectable 10 milliGRAM(s) IV Push every 6 hours PRN nausea/vommiting  oxyCODONE    IR 5 milliGRAM(s) Oral every 4 hours PRN Moderate Pain (4 - 6)  polyethylene glycol 3350 17 Gram(s) Oral two times a day PRN Constipation        Vital Signs Last 24 Hrs  T(C): 39.7 (26 Aug 2019 14:36), Max: 40.6 (26 Aug 2019 14:07)  T(F): 103.5 (26 Aug 2019 14:36), Max: 105 (26 Aug 2019 14:07)  HR: 118 (26 Aug 2019 13:19) (83 - 118)  BP: 175/72 (26 Aug 2019 13:19) (119/67 - 175/72)  BP(mean): --  RR: 18 (26 Aug 2019 13:19) (18 - 20)  SpO2: 96% (26 Aug 2019 13:19) (90% - 96%)    CBC Full  -  ( 26 Aug 2019 07:13 )  WBC Count : 0.5 K/uL  RBC Count : 2.76 M/uL  Hemoglobin : 8.0 g/dL  Hematocrit : 23.2 %  Platelet Count - Automated : 60 K/uL  Mean Cell Volume : 83.9 fl  Mean Cell Hemoglobin : 28.9 pg  Mean Cell Hemoglobin Concentration : 34.5 gm/dL  Auto Neutrophil # : x  Auto Lymphocyte # : x  Auto Monocyte # : x  Auto Eosinophil # : x  Auto Basophil # : x  Auto Neutrophil % : x  Auto Lymphocyte % : x  Auto Monocyte % : x  Auto Eosinophil % : x  Auto Basophil % : x        136  |  102  |  11  ----------------------------<  129<H>  3.4<L>   |  20<L>  |  0.74    Ca    8.5      26 Aug 2019 07:11  Phos  3.1       Mg     1.4         TPro  6.9  /  Alb  3.2<L>  /  TBili  0.5  /  DBili  x   /  AST  38  /  ALT  49<H>  /  AlkPhos  224<H>      LIVER FUNCTIONS - ( 26 Aug 2019 07:11 )  Alb: 3.2 g/dL / Pro: 6.9 g/dL / ALK PHOS: 224 U/L / ALT: 49 U/L / AST: 38 U/L / GGT: x           Urinalysis Basic - ( 25 Aug 2019 06:49 )    Color: Light Yellow / Appearance: Clear / S.012 / pH: x  Gluc: x / Ketone: Negative  / Bili: Negative / Urobili: Negative   Blood: x / Protein: 100 / Nitrite: Negative   Leuk Esterase: Negative / RBC: 6 /hpf / WBC 1 /HPF   Sq Epi: x / Non Sq Epi: 1 /hpf / Bacteria: Negative        MICROBIOLOGY:  .Blood  19   No growth to date.  --  --      .Urine  19   No growth  --  --      .Blood  19   No growth to date.  --  --      .Blood  19   No growth at 5 days.  --  --      .Urine  19   <10,000 CFU/mL Normal Urogenital Maryana  --  --      .Urine  19   No growth  --  --      .Blood  19   No growth at 5 days.  --  --      .Blood  19   No growth at 5 days.  --  --      .Sputum  19   Normal Respiratory Maryana present  --    Rare polymorphonuclear leukocytes per low power field  Few Squamous epithelial cells per low power field  Moderate Gram positive cocci in pairs seen per oil power field  Moderate Gram Variable Rods seen per oil power field      .Blood  19   No growth at 5 days.  --  --      .Urine  19   No growth  --  --      .Sputum  08-10-19   Normal Respiratory Maryana present  --    Few polymorphonuclear leukocytes per low power field  Rare Squamous epithelial cells per low power field  No organisms seen per oil power field      .Urine  08-10-19   No growth  --  --      .Blood  19   No growth at 5 days.  --  --      Clostridium difficile GDH Toxins A&amp;B, EIA:   Negative (19 @ 21:30)  Clostridium difficile GDH Interpretation: Negative for toxigenic C. Difficile.  This specimen is negative for C.  Difficile glutamate dehydrogenase (GDH) antigen and negative for C.  Difficile Toxins A & B, by EIA.  GDH is a highly sensitive screening  marker for C. Difficile that is produced in large amounts by all C.  Difficile strains, both toxigenic and nontoxigenic.  This assay has not  been validated as a test of cure.  Repeat testing during the same episode  of diarrhea is of limited value and is discouraged.  The results of this  assay should always be interpreted in conjunction with pateint's clinical  history. (19 @ 21:30)      RADIOLOGY    < from: CT Chest w/ IV Cont (19 @ 18:48) >  Acute right-sided pulmonary emboli.    Pulmonary fibrosis. Increased groundglass opacities throughout both lungs   compared with prior imaging 2019 are nonspecific.    Heterogeneous striated nephrograms bilaterally consistent with   pyelonephritis.    < end of copied text >

## 2019-08-26 NOTE — PROGRESS NOTE ADULT - ASSESSMENT
This is a 63 yo F with PMHx of T2DM, RA, Depression and now newly diagnosed AML enrolled on [employer] BRIGHT trial, now s/p  Induction chemotherapy with Dauno/Cytarabine and daily Glasdegib vs Placebo, day 14 BM bx chemotherapeutic, awaiting for count recovery The patients hospital course has been complicated by neutropenic fever, transaminitis/abd sono unremarkable,  RENTERIA due to interstitial lung disease ,volume overload,  and drug exanthem.  The patient has pancytopenia from chemotherapy and/or disease. This is a 65 yo F with PMHx of T2DM, RA, Depression and now newly diagnosed AML enrolled on [employer] BRIGHT trial, now s/p  Induction chemotherapy with Dauno/Cytarabine and daily Glasdegib vs Placebo, day 14 BM bx chemotherapeutic, awaiting for count recovery The patients hospital course has been complicated by neutropenic fever, transaminitis/abd sono unremarkable, RENTERIA due to pulmonary fibrosis, PE, volume overload, and drug exanthem. The patient has pancytopenia from chemotherapy and/or disease. 65 yo F with PMHx of T2DM, RA, Depression and now newly diagnosed AML enrolled on [employer] BRIGHT trial, now s/p Induction chemotherapy with Dauno/Cytarabine and daily Glasdegib vs Placebo. Day 14 bone marrow biopsy was chemotherapeutic - awaiting count recovery. The patient's hospital course has been complicated by neutropenic fevers, transaminitis, RENTERIA due to pulmonary fibrosis, PE, volume overload, and drug exanthem. The patient has pancytopenia from chemotherapy and/or disease.

## 2019-08-26 NOTE — PROGRESS NOTE ADULT - PROBLEM SELECTOR PLAN 1
Enrolled on [employer] BRIGHT trial s/p Induction chemotherapy with Dauno/Cytarabine and daily Glasdegib vs Placebo  Molecular studies pending  8/20 Day 14 BM bx, chemotherapeutic   Monitor CBC/Lytes and transfuse/replete PRN  Strict Is and Os/ Daily weights/ Mouth Care  Add Carafate for sore throat, mucositis   Allopurinol 300mg PO daily-stopped 2/2 rash  IVF hydration  Antiemetics  ECOG performance status 2  Atarax ATC for itching and oral benadryl prn. added triamcinolone ointment, 8/22 added hydrocortisone cream  - rash improving  Awaiting count recovery Enrolled on [employer] BRIGHT trial s/p Induction chemotherapy with Dauno/Cytarabine and daily Glasdegib vs Placebo  Molecular studies pending  8/20 Day 14 BM bx, chemotherapeutic   Monitor CBC/Lytes and transfuse/replete PRN  Strict Is and Os/ Daily weights/ Mouth Care  Carafate for sore throat, mucositis   Allopurinol 300mg PO daily-stopped 2/2 rash  IVF hydration  Antiemetics  ECOG performance status 2  Atarax ATC, hydrocortisone cream, triamcinolone cream, oral benadryl prn - rash improved   Awaiting count recovery

## 2019-08-26 NOTE — CHART NOTE - NSCHARTNOTEFT_GEN_A_CORE
MEDICINE PA    Notified by RN patient with temperature 38.9 C (102.1) .  Pt seen and examined at bedside. Patient is alert, NAD. Denies HA, CP, SOB, cough, N/V, abd pain, dysuria/urinary symptoms.  She feels diaphoretic at this time  She was given Tylenol 1 Gram IVPB earlier at the onset of the fever     VITAL SIGNS:  T(C): 38.9 (08-25-19 @ 20:52), Max: 39.1 (08-25-19 @ 17:37)  HR: 99 (08-25-19 @ 20:52) (77 - 99)  BP: 157/73 (08-25-19 @ 20:52) (124/64 - 157/73)  RR: 20 (08-25-19 @ 20:52) (18 - 20)  SpO2: 94% (08-25-19 @ 20:55) (90% - 99%)  Wt(kg): --    PHYSICAL EXAM:    Constitutional: AOx3. NAD.  Respiratory: clear lungs bilaterally. No wheezing, rhonchi, or crackles.  Cardiovascular: S1 S2. No murmurs.  Gastrointestinal: BS X4 active. soft. nontender.  Extremities/Vascular: +2 pulses bilaterally. No BLE edema.    LABORATORY:                          8.1    0.6   )-----------( 13       ( 25 Aug 2019 06:54 )             23.1       08-25    138  |  104  |  8   ----------------------------<  198<H>  3.9   |  21<L>  |  0.62    Ca    8.4      25 Aug 2019 06:52  Phos  2.7     08-25  Mg     1.6     08-25    TPro  6.9  /  Alb  3.1<L>  /  TBili  0.4  /  DBili  x   /  AST  26  /  ALT  45  /  AlkPhos  214<H>  08-25        MICROBIOLOGY:       RADIOLOGY:  < from: Xray Chest 1 View- PORTABLE-Urgent (08.25.19 @ 06:06) >    Impression: Unchanged diffuse bilateral reticular interstitial lung   markings consistent with pulmonary fibrosis. No focal consolidation is   seen.      Medications (Antimicrobials/Anti-Infectives):  acyclovir   Oral Tab/Cap 400 milliGRAM(s) Oral two times a day  aztreonam  IVPB 2000 milliGRAM(s) IV Intermittent every 8 hours  aztreonam  IVPB      posaconazole DR Tablet 300 milliGRAM(s) Oral daily  vancomycin  IVPB 1000 milliGRAM(s) IV Intermittent every 12 hours  vancomycin  IVPB          ASSESSMENT/PLAN:   HPI:  This is a 65 yo F with PMHx of T2DM, RA, Depression and now newly diagnosed AML enrolled on [employer] BRIGHT trial, now s/p  Induction chemotherapy with Dauno/Cytarabine and daily Glasdegib vs Placebo, day 14 BM bx chemotherapeutic, awaiting for count recovery The patients hospital course has been complicated by  neutropenic fever, transaminitis/abd sono unremarkable,  RENTERIA due to interstitial lung disease ,volume overload,  and drug exanthem.  The patient has pancytopenia from chemotherapy and/or disease.     Informed by RN of noted fever of 38.9 C (Tmax 39.1 C)  Pt does not appear toxic; VSS  Currently on appropriate antibiotic regimen per primary team & ID    PLAN:  1) Neutropenic Fever  -tylenol and cooling measures prn for pyrexia  -Continue IVF  -F/U results of BC & Urine culture         -Sent 8/25  -Continue with present IV antibiotic regimen  -Monitor vitals signs per protocol   -F/U with primary team in AM

## 2019-08-26 NOTE — PROGRESS NOTE ADULT - SUBJECTIVE AND OBJECTIVE BOX
Diagnosis: AML FLT 3 (-)    Protocol/Chemo Regimen: Enrolled on [employer] BRIGHT 1019-  s/p Induction chemotherapy with Dauno/Cytarabine and daily Glasdegib vs Placebo    Day: 20     Pt endorsed: + rigors, admits to feeling weak, throat pain much improved with mouth rinses    Review of Systems: Denies nausea, vomiting, diarrhea, chest pain, SOB     Pain scale: 0    Diet: Mechanical soft, Consistent Carbohydrate diet, Glucerna TID     Allergies: cefepime (Rash)    ------------------ Diagnosis: AML FLT 3 (-)    Protocol/Chemo Regimen: Enrolled on [employer] BRIGHT 1019-  s/p Induction chemotherapy with Dauno/Cytarabine and daily Glasdegib vs Placebo    Day: 20     Pt endorsed: rigors and chills     Review of Systems: Denies nausea, vomiting, diarrhea, chest pain, SOB     Pain scale: 0    Diet: Mechanical soft, Consistent Carbohydrate diet, Glucerna TID     Allergies: cefepime (Rash)     ANTIMICROBIALS  acyclovir   Oral Tab/Cap 400 milliGRAM(s) Oral two times a day  aztreonam  IVPB 2000 milliGRAM(s) IV Intermittent every 8 hours  posaconazole DR Tablet 300 milliGRAM(s) Oral daily  vancomycin  IVPB 1000 milliGRAM(s) IV Intermittent every 12 hours      STANDING MEDICATIONS  ALBUTerol    90 MICROgram(s) HFA Inhaler 1 Puff(s) Inhalation every 6 hours  ALBUTerol/ipratropium for Nebulization 3 milliLiter(s) Nebulizer every 6 hours  benzonatate 100 milliGRAM(s) Oral three times a day  Biotene Dry Mouth Oral Rinse 5 milliLiter(s) Swish and Spit three times a day  chlorhexidine 2% Cloths 1 Application(s) Topical <User Schedule>  dextrose 5%. 1000 milliLiter(s) IV Continuous <Continuous>  dextrose 50% Injectable 12.5 Gram(s) IV Push once  dextrose 50% Injectable 25 Gram(s) IV Push once  dextrose 50% Injectable 25 Gram(s) IV Push once  FIRST- Mouthwash  BLM 10 milliLiter(s) Swish and Spit four times a day  gabapentin 600 milliGRAM(s) Oral two times a day  hydrOXYzine hydrochloride 25 milliGRAM(s) Oral every 6 hours  insulin glargine Injectable (LANTUS) 18 Unit(s) SubCutaneous at bedtime  insulin lispro (HumaLOG) corrective regimen sliding scale   SubCutaneous three times a day before meals  insulin lispro (HumaLOG) corrective regimen sliding scale   SubCutaneous at bedtime  insulin lispro Injectable (HumaLOG) 10 Unit(s) SubCutaneous three times a day before meals  investigational chemo - general 1 Tablet(s) Oral every 24 hours  levothyroxine 75 MICROGram(s) Oral daily  lidocaine 2% Injectable 20 milliLiter(s) Local Injection once  losartan 100 milliGRAM(s) Oral daily  pantoprazole    Tablet 40 milliGRAM(s) Oral before breakfast  phytonadione   Solution 10 milliGRAM(s) Oral daily  potassium chloride  20 mEq/100 mL IVPB 20 milliEquivalent(s) IV Intermittent once  simethicone 80 milliGRAM(s) Chew four times a day  sodium chloride 0.9%. 1000 milliLiter(s) IV Continuous <Continuous>  sodium chloride 3%  Inhalation 4 milliLiter(s) Inhalation two times a day  sucralfate 1 Gram(s) Oral four times a day  tiotropium 18 MICROgram(s) Capsule 1 Capsule(s) Inhalation daily    PRN MEDICATIONS  acetaminophen   Tablet .. 650 milliGRAM(s) Oral every 6 hours PRN  aluminum hydroxide/magnesium hydroxide/simethicone Suspension 30 milliLiter(s) Oral every 6 hours PRN  dextrose 40% Gel 15 Gram(s) Oral once PRN  diphenhydrAMINE 25 milliGRAM(s) Oral every 6 hours PRN  glucagon  Injectable 1 milliGRAM(s) IntraMuscular once PRN  hydrocortisone 2.5% Lotion 1 Application(s) Topical three times a day PRN  lidocaine 2% Viscous 2 milliLiter(s) Oral every 2 hours PRN  loperamide 2 milliGRAM(s) Oral every 4 hours PRN  melatonin 3 milliGRAM(s) Oral once PRN  metoclopramide Injectable 10 milliGRAM(s) IV Push every 6 hours PRN  oxyCODONE    IR 5 milliGRAM(s) Oral every 4 hours PRN  polyethylene glycol 3350 17 Gram(s) Oral two times a day PRN    Vital Signs Last 24 Hrs  T(C): 37.1 (26 Aug 2019 08:52), Max: 39.9 (26 Aug 2019 05:25)  T(F): 98.8 (26 Aug 2019 08:52), Max: 103.8 (26 Aug 2019 05:25)  HR: 88 (26 Aug 2019 08:52) (83 - 113)  BP: 119/67 (26 Aug 2019 08:52) (119/67 - 170/77)  BP(mean): --  RR: 18 (26 Aug 2019 08:52) (18 - 20)  SpO2: 94% (26 Aug 2019 08:52) (90% - 95%)    PHYSICAL EXAM  General: adult in NAD  HEENT: clear oropharynx, no erythema, no ulcers  Neck: supple  CV: normal S1, S2, RRR  Lungs: clear to auscultation, no wheezes, no rales  Abdomen: soft, nontender, nondistended, normal BS  Ext: no edema  Skin: no rashes  Neuro: alert and oriented x 3  Central line: normal     LABS:                        8.0    0.5   )-----------( 60       ( 26 Aug 2019 07:13 )             23.2     Mean Cell Volume : 83.9 fl  Mean Cell Hemoglobin : 28.9 pg  Mean Cell Hemoglobin Concentration : 34.5 gm/dL  Auto Neutrophil # : x  Auto Lymphocyte # : x  Auto Monocyte # : x  Auto Eosinophil # : x  Auto Basophil # : x  Auto Neutrophil % : x  Auto Lymphocyte % : x  Auto Monocyte % : x  Auto Eosinophil % : x  Auto Basophil % : x    08-26  136  |  102  |  11  ----------------------------<  129<H>  3.4<L>   |  20<L>  |  0.74    Ca    8.5      26 Aug 2019 07:11  Phos  3.1     08-26  Mg     1.4     08-26    TPro  6.9  /  Alb  3.2<L>  /  TBili  0.5  /  DBili  x   /  AST  38  /  ALT  49<H>  /  AlkPhos  224<H>  08-26    Mg 1.4  Phos 3.1    PT/INR - ( 26 Aug 2019 07:13 )   PT: 15.8 sec;   INR: 1.37 ratio       PTT - ( 26 Aug 2019 07:13 )  PTT:33.2 sec    Uric Acid 1.5    RADIOLOGY & ADDITIONAL STUDIES:  < from: CT Chest w/ IV Cont (08.25.19 @ 18:48) >  IMPRESSION:     Acute right-sided pulmonary emboli.    Pulmonary fibrosis. Increased groundglass opacities throughout both lungs   compared with prior imaging 8/9/2019 are nonspecific.    Heterogeneous striated nephrograms bilaterally consistent with   pyelonephritis. Diagnosis: AML FLT 3 (-)    Protocol/Chemo Regimen: Enrolled on [employer] BRIGHT 1019- s/p Induction chemotherapy with Dauno/Cytarabine and daily Glasdegib vs Placebo    Day: 20     Pt endorsed: rigors and chills     Review of Systems: Denies nausea, vomiting, diarrhea, chest pain, SOB     Pain scale: 0    Diet: Mechanical soft, Consistent Carbohydrate diet, Glucerna TID     Allergies: cefepime (Rash)     ANTIMICROBIALS  acyclovir   Oral Tab/Cap 400 milliGRAM(s) Oral two times a day  aztreonam  IVPB 2000 milliGRAM(s) IV Intermittent every 8 hours  posaconazole DR Tablet 300 milliGRAM(s) Oral daily  vancomycin  IVPB 1000 milliGRAM(s) IV Intermittent every 12 hours      STANDING MEDICATIONS  ALBUTerol    90 MICROgram(s) HFA Inhaler 1 Puff(s) Inhalation every 6 hours  ALBUTerol/ipratropium for Nebulization 3 milliLiter(s) Nebulizer every 6 hours  benzonatate 100 milliGRAM(s) Oral three times a day  Biotene Dry Mouth Oral Rinse 5 milliLiter(s) Swish and Spit three times a day  chlorhexidine 2% Cloths 1 Application(s) Topical <User Schedule>  dextrose 5%. 1000 milliLiter(s) IV Continuous <Continuous>  dextrose 50% Injectable 12.5 Gram(s) IV Push once  dextrose 50% Injectable 25 Gram(s) IV Push once  dextrose 50% Injectable 25 Gram(s) IV Push once  FIRST- Mouthwash  BLM 10 milliLiter(s) Swish and Spit four times a day  gabapentin 600 milliGRAM(s) Oral two times a day  hydrOXYzine hydrochloride 25 milliGRAM(s) Oral every 6 hours  insulin glargine Injectable (LANTUS) 18 Unit(s) SubCutaneous at bedtime  insulin lispro (HumaLOG) corrective regimen sliding scale   SubCutaneous three times a day before meals  insulin lispro (HumaLOG) corrective regimen sliding scale   SubCutaneous at bedtime  insulin lispro Injectable (HumaLOG) 10 Unit(s) SubCutaneous three times a day before meals  investigational chemo - general 1 Tablet(s) Oral every 24 hours  levothyroxine 75 MICROGram(s) Oral daily  lidocaine 2% Injectable 20 milliLiter(s) Local Injection once  losartan 100 milliGRAM(s) Oral daily  pantoprazole    Tablet 40 milliGRAM(s) Oral before breakfast  phytonadione   Solution 10 milliGRAM(s) Oral daily  potassium chloride  20 mEq/100 mL IVPB 20 milliEquivalent(s) IV Intermittent once  simethicone 80 milliGRAM(s) Chew four times a day  sodium chloride 0.9%. 1000 milliLiter(s) IV Continuous <Continuous>  sodium chloride 3%  Inhalation 4 milliLiter(s) Inhalation two times a day  sucralfate 1 Gram(s) Oral four times a day  tiotropium 18 MICROgram(s) Capsule 1 Capsule(s) Inhalation daily    PRN MEDICATIONS  acetaminophen   Tablet .. 650 milliGRAM(s) Oral every 6 hours PRN  aluminum hydroxide/magnesium hydroxide/simethicone Suspension 30 milliLiter(s) Oral every 6 hours PRN  dextrose 40% Gel 15 Gram(s) Oral once PRN  diphenhydrAMINE 25 milliGRAM(s) Oral every 6 hours PRN  glucagon  Injectable 1 milliGRAM(s) IntraMuscular once PRN  hydrocortisone 2.5% Lotion 1 Application(s) Topical three times a day PRN  lidocaine 2% Viscous 2 milliLiter(s) Oral every 2 hours PRN  loperamide 2 milliGRAM(s) Oral every 4 hours PRN  melatonin 3 milliGRAM(s) Oral once PRN  metoclopramide Injectable 10 milliGRAM(s) IV Push every 6 hours PRN  oxyCODONE    IR 5 milliGRAM(s) Oral every 4 hours PRN  polyethylene glycol 3350 17 Gram(s) Oral two times a day PRN    Vital Signs Last 24 Hrs  T(C): 37.1 (26 Aug 2019 08:52), Max: 39.9 (26 Aug 2019 05:25)  T(F): 98.8 (26 Aug 2019 08:52), Max: 103.8 (26 Aug 2019 05:25)  HR: 88 (26 Aug 2019 08:52) (83 - 113)  BP: 119/67 (26 Aug 2019 08:52) (119/67 - 170/77)  BP(mean): --  RR: 18 (26 Aug 2019 08:52) (18 - 20)  SpO2: 94% (26 Aug 2019 08:52) (90% - 95%)    PHYSICAL EXAM  General: adult in NAD  HEENT: clear oropharynx, no erythema, no ulcers  Neck: supple  CV: normal S1, S2, RRR  Lungs: clear to auscultation, no wheezes, no rales  Abdomen: soft, nontender, nondistended, normal BS  Ext: no edema  Skin: no rashes  Neuro: alert and oriented x 3  Central line: normal     LABS:                        8.0    0.5   )-----------( 60       ( 26 Aug 2019 07:13 )             23.2     Mean Cell Volume : 83.9 fl  Mean Cell Hemoglobin : 28.9 pg  Mean Cell Hemoglobin Concentration : 34.5 gm/dL  Auto Neutrophil # : x  Auto Lymphocyte # : x  Auto Monocyte # : x  Auto Eosinophil # : x  Auto Basophil # : x  Auto Neutrophil % : x  Auto Lymphocyte % : x  Auto Monocyte % : x  Auto Eosinophil % : x  Auto Basophil % : x    08-26  136  |  102  |  11  ----------------------------<  129<H>  3.4<L>   |  20<L>  |  0.74    Ca    8.5      26 Aug 2019 07:11  Phos  3.1     08-26  Mg     1.4     08-26    TPro  6.9  /  Alb  3.2<L>  /  TBili  0.5  /  DBili  x   /  AST  38  /  ALT  49<H>  /  AlkPhos  224<H>  08-26    Mg 1.4  Phos 3.1    PT/INR - ( 26 Aug 2019 07:13 )   PT: 15.8 sec;   INR: 1.37 ratio       PTT - ( 26 Aug 2019 07:13 )  PTT:33.2 sec    Uric Acid 1.5    RADIOLOGY & ADDITIONAL STUDIES:  < from: CT Chest w/ IV Cont (08.25.19 @ 18:48) >  IMPRESSION:     Acute right-sided pulmonary emboli.    Pulmonary fibrosis. Increased groundglass opacities throughout both lungs   compared with prior imaging 8/9/2019 are nonspecific.    Heterogeneous striated nephrograms bilaterally consistent with   pyelonephritis.

## 2019-08-26 NOTE — PROGRESS NOTE ADULT - PROBLEM SELECTOR PLAN 2
Neutropenic fevers   Continue vancomycin follow up troughs q Tues  Continue Posaconazole,   Cefepime changed to Aztreonam sec to suspected allergy (pruritus)  (Monitor QTc BIW mon/Thurs  with Posaconazole and Study drug)  8/9 CT chest: Pulmonary fibrosis and traction bronchiectasis without honeycombing.   8/25: + Pulmonary fibrosis.  8/25: follow up cultures, follow up ID input. Patient is neutropenic, febrile   Continue vancomycin follow up troughs q Tues  Continue Posaconazole, Aztreonam, Acyclovir, Vancomycin   Cefepime changed to Aztreonam sec to suspected allergy (pruritus)  (Monitor QTc BIW mon/Thurs with Posaconazole and Study drug)  8/9 CT chest: Pulmonary fibrosis and traction bronchiectasis without honeycombing  CT chest 8/25 c/w acute R PE, pulm fibrosis, and pyelonephritis. Urine cultures negative Patient is neutropenic, febrile   Continue vancomycin follow up troughs q Tues  Continue Posaconazole, Acyclovir, Vancomycin, Meropenem (Aztreonam changed to Meropenem 8/26; monitor for rash. patient had rash/suspected allergy to Cefepime)  (Monitor QTc BIW mon/Thurs with Posaconazole and Study drug)  CT chest 8/25 c/w acute R PE, pulm fibrosis, and pyelonephritis. Urine cultures negative

## 2019-08-26 NOTE — PROGRESS NOTE ADULT - PROBLEM SELECTOR PLAN 4
Continue Losartan 100mg PO daily  Monitor VS Seen on CT for fever workup on 8/25   No anticoagulation for now as patient is thrombocytopenic   Consider anticoagulation once platelet count consistently > 50,000

## 2019-08-26 NOTE — PROGRESS NOTE ADULT - ATTENDING COMMENTS
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 Pt on Pfizer trial 7+3+ Glasdegib/placebo.  Patient here for her induction chemotherapy on trial.  Day 18.   Day 14 marrow chemotherapeutic  Pre-rx Foundation results: NPM1, IDH2, DNMT3A mutations.  Afeb x 72 hrs, no chills until 8/19 PM - temp to 100.9, chills 8/2.  Vanco added pre temp spike.  Spiked again 8/25/19 with rigors, temp 101. Vanco trough ok. On aztreonam (changed from cefepime b/o rash), posa, vanco, acyclovir.      Less mucositis.  No diarrhea.  C. diff toxin (-).  no need for lasix today  Pruritus sl better - fine, macular, erythematous rash on back resolved.  Rash CTCAE allergic reaction grade 2.       Mild AST/ALT elevation-  now wnl , alk phos still incr liver U/S (-).       Supportive care, anti-emetics, IVFs,  ID input apprerciated: check CT C/A/P with contrast  Lantus QHS and pre meal humalog, following fingersticks closely, appreciate endocrine input  h/o of very poorly controlled diabetes.   ECOG performance status 2. 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 Pt on Pfizer trial 7+3+ Glasdegib/placebo. Day 20.   Day 14 marrow chemotherapeutic  Molecular [facility] One results: NPM1, IDH2, DNMT3A mutations present.  On vanc and aztreonam for NF, remains febrile, temp spike o/n  await heme ct recovery  Monitor cbc, cmp daily  hx of Pruritus improved, rash on back resolved.  Rash CTCAE allergic reaction grade 2.    Mild transaminitis-  resolved, alk phos still incr liver U/S (-).    Supportive care, anti-emetics, IVFs,  Hx pulm fibrosis, monitor O2 status, encourage incentive spirometry  ID input apprerciated: CT C/A/P with contrast, c/w acute PE, ?pyleonephritis  NO AC given heme cta chely - high bleeding risk  ID f/u pt remains febrile  Lantus QHS and pre meal humalog, monito FSG, endo apprec  ECOG performance status 2.

## 2019-08-26 NOTE — PROGRESS NOTE ADULT - PROBLEM SELECTOR PLAN 9
No pharmacologic ppx 2/2 thrombocytopenia  Encourage ambulation      Contact Information [phone]

## 2019-08-26 NOTE — ADVANCED PRACTICE NURSE CONSULT - ASSESSMENT
Induction Cycle 1   Day 20  Arrive to find patient in bed sleeping easily awaken alert and alert and oriented times four. During interview patient stated she felt better in term of itching, and nausea. The rash on upper body improving. Pt continue using nutritional supplement to help with increasing appetite.  Patient continue to have mild mouth pain and receiving pian medication with relief.  Pt was examine by Induction Cycle 1   Day 20  Arrive to find patient in bed sleeping easily awaken alert and alert and oriented times four. During interview patient stated she felt better in term of itching, and nausea. The rash on upper body improving. Pt continue using nutritional supplement to help with increasing appetite.  Patient continue to have mild mouth pain and receiving pian medication with relief.  Pt was examine by Dr Christina during rounds. According to assessment done as noted by the team patient awaiting for count recovery have been complicated by neutropenic fever during the pass few days. Patients is been monitored closely by the team for the elevated temp and Tylenol with minimal relief. Left pt in bed sleeping.

## 2019-08-26 NOTE — PROGRESS NOTE ADULT - PROBLEM SELECTOR PLAN 8
No pharmacologic ppx 2/2 thrombocytopenia  Encourage ambulation  change diet to soft mechanical  with Glucerna       Contact Information [phone] Simvastatin discontinued during hospitalization due to potential for liver toxicity

## 2019-08-27 DIAGNOSIS — R94.5 ABNORMAL RESULTS OF LIVER FUNCTION STUDIES: ICD-10-CM

## 2019-08-27 LAB
ALBUMIN SERPL ELPH-MCNC: 1.9 G/DL — LOW (ref 3.3–5)
ALBUMIN SERPL ELPH-MCNC: 2.3 G/DL — LOW (ref 3.3–5)
ALBUMIN SERPL ELPH-MCNC: 2.6 G/DL — LOW (ref 3.3–5)
ALBUMIN SERPL ELPH-MCNC: 2.9 G/DL — LOW (ref 3.3–5)
ALP SERPL-CCNC: 223 U/L — HIGH (ref 40–120)
ALP SERPL-CCNC: 253 U/L — HIGH (ref 40–120)
ALP SERPL-CCNC: 259 U/L — HIGH (ref 40–120)
ALP SERPL-CCNC: 304 U/L — HIGH (ref 40–120)
ALT FLD-CCNC: 596 U/L — HIGH (ref 10–45)
ALT FLD-CCNC: 60 U/L — HIGH (ref 10–45)
ALT FLD-CCNC: 698 U/L — HIGH (ref 10–45)
ALT FLD-CCNC: 847 U/L — HIGH (ref 10–45)
ANION GAP SERPL CALC-SCNC: 14 MMOL/L — SIGNIFICANT CHANGE UP (ref 5–17)
ANION GAP SERPL CALC-SCNC: 17 MMOL/L — SIGNIFICANT CHANGE UP (ref 5–17)
ANION GAP SERPL CALC-SCNC: 21 MMOL/L — HIGH (ref 5–17)
ANION GAP SERPL CALC-SCNC: 29 MMOL/L — HIGH (ref 5–17)
APPEARANCE UR: ABNORMAL
APTT BLD: >200 SEC — CRITICAL HIGH (ref 27.5–36.3)
AST SERPL-CCNC: 1528 U/L — HIGH (ref 10–40)
AST SERPL-CCNC: 1792 U/L — HIGH (ref 10–40)
AST SERPL-CCNC: 46 U/L — HIGH (ref 10–40)
AST SERPL-CCNC: 694 U/L — HIGH (ref 10–40)
BASE EXCESS BLDA CALC-SCNC: -2.9 MMOL/L — LOW (ref -2–2)
BASOPHILS # BLD AUTO: 0 K/UL — SIGNIFICANT CHANGE UP (ref 0–0.2)
BILIRUB SERPL-MCNC: 0.4 MG/DL — SIGNIFICANT CHANGE UP (ref 0.2–1.2)
BILIRUB SERPL-MCNC: 0.5 MG/DL — SIGNIFICANT CHANGE UP (ref 0.2–1.2)
BILIRUB SERPL-MCNC: 0.6 MG/DL — SIGNIFICANT CHANGE UP (ref 0.2–1.2)
BILIRUB SERPL-MCNC: 0.9 MG/DL — SIGNIFICANT CHANGE UP (ref 0.2–1.2)
BILIRUB UR-MCNC: NEGATIVE — SIGNIFICANT CHANGE UP
BLASTS # FLD: 8 % — HIGH (ref 0–0)
BUN SERPL-MCNC: 14 MG/DL — SIGNIFICANT CHANGE UP (ref 7–23)
BUN SERPL-MCNC: 18 MG/DL — SIGNIFICANT CHANGE UP (ref 7–23)
BUN SERPL-MCNC: 8 MG/DL — SIGNIFICANT CHANGE UP (ref 7–23)
BUN SERPL-MCNC: 9 MG/DL — SIGNIFICANT CHANGE UP (ref 7–23)
CALCIUM SERPL-MCNC: 7.1 MG/DL — LOW (ref 8.4–10.5)
CALCIUM SERPL-MCNC: 7.8 MG/DL — LOW (ref 8.4–10.5)
CALCIUM SERPL-MCNC: 8.3 MG/DL — LOW (ref 8.4–10.5)
CALCIUM SERPL-MCNC: 9.7 MG/DL — SIGNIFICANT CHANGE UP (ref 8.4–10.5)
CHLORIDE SERPL-SCNC: 100 MMOL/L — SIGNIFICANT CHANGE UP (ref 96–108)
CHLORIDE SERPL-SCNC: 98 MMOL/L — SIGNIFICANT CHANGE UP (ref 96–108)
CK SERPL-CCNC: 116 U/L — SIGNIFICANT CHANGE UP (ref 25–170)
CO2 BLDA-SCNC: 25 MMOL/L — SIGNIFICANT CHANGE UP (ref 22–30)
CO2 SERPL-SCNC: 15 MMOL/L — LOW (ref 22–31)
CO2 SERPL-SCNC: 18 MMOL/L — LOW (ref 22–31)
CO2 SERPL-SCNC: 21 MMOL/L — LOW (ref 22–31)
CO2 SERPL-SCNC: 6 MMOL/L — CRITICAL LOW (ref 22–31)
COLOR SPEC: YELLOW — SIGNIFICANT CHANGE UP
CREAT SERPL-MCNC: 0.67 MG/DL — SIGNIFICANT CHANGE UP (ref 0.5–1.3)
CREAT SERPL-MCNC: 0.94 MG/DL — SIGNIFICANT CHANGE UP (ref 0.5–1.3)
CREAT SERPL-MCNC: 1.58 MG/DL — HIGH (ref 0.5–1.3)
CREAT SERPL-MCNC: 2.13 MG/DL — HIGH (ref 0.5–1.3)
DIFF PNL FLD: ABNORMAL
EOSINOPHIL # BLD AUTO: 0 K/UL — SIGNIFICANT CHANGE UP (ref 0–0.5)
GAS PNL BLDA: SIGNIFICANT CHANGE UP
GLUCOSE BLDC GLUCOMTR-MCNC: 423 MG/DL — HIGH (ref 70–99)
GLUCOSE BLDC GLUCOMTR-MCNC: 435 MG/DL — HIGH (ref 70–99)
GLUCOSE BLDC GLUCOMTR-MCNC: 460 MG/DL — CRITICAL HIGH (ref 70–99)
GLUCOSE SERPL-MCNC: 260 MG/DL — HIGH (ref 70–99)
GLUCOSE SERPL-MCNC: 291 MG/DL — HIGH (ref 70–99)
GLUCOSE SERPL-MCNC: 326 MG/DL — HIGH (ref 70–99)
GLUCOSE SERPL-MCNC: 475 MG/DL — CRITICAL HIGH (ref 70–99)
GLUCOSE UR QL: ABNORMAL
HCO3 BLDA-SCNC: 23 MMOL/L — SIGNIFICANT CHANGE UP (ref 21–29)
HCT VFR BLD CALC: 19.4 % — CRITICAL LOW (ref 34.5–45)
HCT VFR BLD CALC: 21.4 % — LOW (ref 34.5–45)
HCT VFR BLD CALC: 22.9 % — LOW (ref 34.5–45)
HCT VFR BLD CALC: 24.8 % — LOW (ref 34.5–45)
HGB BLD-MCNC: 6.6 G/DL — CRITICAL LOW (ref 11.5–15.5)
HGB BLD-MCNC: 7.2 G/DL — LOW (ref 11.5–15.5)
HGB BLD-MCNC: 7.3 G/DL — LOW (ref 11.5–15.5)
HGB BLD-MCNC: 8.4 G/DL — LOW (ref 11.5–15.5)
HOROWITZ INDEX BLDA+IHG-RTO: 60 — SIGNIFICANT CHANGE UP
INR BLD: 1.58 RATIO — HIGH (ref 0.88–1.16)
KETONES UR-MCNC: NEGATIVE — SIGNIFICANT CHANGE UP
LDH SERPL L TO P-CCNC: 501 U/L — HIGH (ref 50–242)
LEUKOCYTE ESTERASE UR-ACNC: NEGATIVE — SIGNIFICANT CHANGE UP
LYMPHOCYTES # BLD AUTO: 68 % — HIGH (ref 13–44)
LYMPHOCYTES # BLD AUTO: SIGNIFICANT CHANGE UP (ref 1–3.3)
MAGNESIUM SERPL-MCNC: 1.7 MG/DL — SIGNIFICANT CHANGE UP (ref 1.6–2.6)
MAGNESIUM SERPL-MCNC: 2.3 MG/DL — SIGNIFICANT CHANGE UP (ref 1.6–2.6)
MCHC RBC-ENTMCNC: 28 PG — SIGNIFICANT CHANGE UP (ref 27–34)
MCHC RBC-ENTMCNC: 28.6 PG — SIGNIFICANT CHANGE UP (ref 27–34)
MCHC RBC-ENTMCNC: 29.5 PG — SIGNIFICANT CHANGE UP (ref 27–34)
MCHC RBC-ENTMCNC: 29.7 PG — SIGNIFICANT CHANGE UP (ref 27–34)
MCHC RBC-ENTMCNC: 31.8 GM/DL — LOW (ref 32–36)
MCHC RBC-ENTMCNC: 33.6 GM/DL — SIGNIFICANT CHANGE UP (ref 32–36)
MCHC RBC-ENTMCNC: 33.7 GM/DL — SIGNIFICANT CHANGE UP (ref 32–36)
MCHC RBC-ENTMCNC: 33.9 GM/DL — SIGNIFICANT CHANGE UP (ref 32–36)
MCV RBC AUTO: 84.8 FL — SIGNIFICANT CHANGE UP (ref 80–100)
MCV RBC AUTO: 87.1 FL — SIGNIFICANT CHANGE UP (ref 80–100)
MCV RBC AUTO: 87.8 FL — SIGNIFICANT CHANGE UP (ref 80–100)
MCV RBC AUTO: 88.5 FL — SIGNIFICANT CHANGE UP (ref 80–100)
MONOCYTES # BLD AUTO: SIGNIFICANT CHANGE UP (ref 0–0.9)
MONOCYTES NFR BLD AUTO: 22 % — HIGH (ref 2–14)
NEUTROPHILS # BLD AUTO: SIGNIFICANT CHANGE UP (ref 1.8–7.4)
NEUTROPHILS NFR BLD AUTO: 1 % — LOW (ref 43–77)
NITRITE UR-MCNC: NEGATIVE — SIGNIFICANT CHANGE UP
NRBC # BLD: 2 /100 — HIGH (ref 0–0)
PCO2 BLDA: 51 MMHG — HIGH (ref 32–46)
PH BLDA: 7.28 — LOW (ref 7.35–7.45)
PH UR: 6 — SIGNIFICANT CHANGE UP (ref 5–8)
PHOSPHATE SERPL-MCNC: 2.5 MG/DL — SIGNIFICANT CHANGE UP (ref 2.5–4.5)
PHOSPHATE SERPL-MCNC: 8.3 MG/DL — HIGH (ref 2.5–4.5)
PLAT MORPH BLD: NORMAL — SIGNIFICANT CHANGE UP
PLATELET # BLD AUTO: 101 K/UL — LOW (ref 150–400)
PLATELET # BLD AUTO: 116 K/UL — LOW (ref 150–400)
PLATELET # BLD AUTO: 56 K/UL — LOW (ref 150–400)
PLATELET # BLD AUTO: 94 K/UL — LOW (ref 150–400)
PO2 BLDA: 110 MMHG — HIGH (ref 74–108)
POTASSIUM SERPL-MCNC: 3.1 MMOL/L — LOW (ref 3.5–5.3)
POTASSIUM SERPL-MCNC: 3.8 MMOL/L — SIGNIFICANT CHANGE UP (ref 3.5–5.3)
POTASSIUM SERPL-MCNC: 4.5 MMOL/L — SIGNIFICANT CHANGE UP (ref 3.5–5.3)
POTASSIUM SERPL-MCNC: 5.1 MMOL/L — SIGNIFICANT CHANGE UP (ref 3.5–5.3)
POTASSIUM SERPL-SCNC: 3.1 MMOL/L — LOW (ref 3.5–5.3)
POTASSIUM SERPL-SCNC: 3.8 MMOL/L — SIGNIFICANT CHANGE UP (ref 3.5–5.3)
POTASSIUM SERPL-SCNC: 4.5 MMOL/L — SIGNIFICANT CHANGE UP (ref 3.5–5.3)
POTASSIUM SERPL-SCNC: 5.1 MMOL/L — SIGNIFICANT CHANGE UP (ref 3.5–5.3)
PROMYELOCYTES # FLD: 1 % — HIGH (ref 0–0)
PROT SERPL-MCNC: 5 G/DL — LOW (ref 6–8.3)
PROT SERPL-MCNC: 5.7 G/DL — LOW (ref 6–8.3)
PROT SERPL-MCNC: 5.8 G/DL — LOW (ref 6–8.3)
PROT SERPL-MCNC: 7.2 G/DL — SIGNIFICANT CHANGE UP (ref 6–8.3)
PROT UR-MCNC: 100 — SIGNIFICANT CHANGE UP
PROTHROM AB SERPL-ACNC: 18.3 SEC — HIGH (ref 10–12.9)
RBC # BLD: 2.23 M/UL — LOW (ref 3.8–5.2)
RBC # BLD: 2.42 M/UL — LOW (ref 3.8–5.2)
RBC # BLD: 2.61 M/UL — LOW (ref 3.8–5.2)
RBC # BLD: 2.93 M/UL — LOW (ref 3.8–5.2)
RBC # FLD: 12.2 % — SIGNIFICANT CHANGE UP (ref 10.3–14.5)
RBC # FLD: 12.3 % — SIGNIFICANT CHANGE UP (ref 10.3–14.5)
RBC # FLD: 12.4 % — SIGNIFICANT CHANGE UP (ref 10.3–14.5)
RBC # FLD: 12.4 % — SIGNIFICANT CHANGE UP (ref 10.3–14.5)
RBC BLD AUTO: SIGNIFICANT CHANGE UP
SAO2 % BLDA: 98 % — HIGH (ref 92–96)
SODIUM SERPL-SCNC: 132 MMOL/L — LOW (ref 135–145)
SODIUM SERPL-SCNC: 135 MMOL/L — SIGNIFICANT CHANGE UP (ref 135–145)
SODIUM SERPL-SCNC: 136 MMOL/L — SIGNIFICANT CHANGE UP (ref 135–145)
SODIUM SERPL-SCNC: 136 MMOL/L — SIGNIFICANT CHANGE UP (ref 135–145)
SP GR SPEC: 1.01 — SIGNIFICANT CHANGE UP (ref 1.01–1.02)
URATE SERPL-MCNC: 1.4 MG/DL — LOW (ref 2.5–7)
UROBILINOGEN FLD QL: NEGATIVE — SIGNIFICANT CHANGE UP
WBC # BLD: 1.1 K/UL — LOW (ref 3.8–10.5)
WBC # BLD: 1.5 K/UL — LOW (ref 3.8–10.5)
WBC # BLD: 2.9 K/UL — LOW (ref 3.8–10.5)
WBC # BLD: 2.9 K/UL — LOW (ref 3.8–10.5)
WBC # FLD AUTO: 1.1 K/UL — LOW (ref 3.8–10.5)
WBC # FLD AUTO: 1.5 K/UL — LOW (ref 3.8–10.5)
WBC # FLD AUTO: 2.9 K/UL — LOW (ref 3.8–10.5)
WBC # FLD AUTO: 2.9 K/UL — LOW (ref 3.8–10.5)

## 2019-08-27 PROCEDURE — 99235 HOSP IP/OBS SAME DATE MOD 70: CPT

## 2019-08-27 PROCEDURE — 99233 SBSQ HOSP IP/OBS HIGH 50: CPT

## 2019-08-27 PROCEDURE — 71045 X-RAY EXAM CHEST 1 VIEW: CPT | Mod: 26,76

## 2019-08-27 PROCEDURE — 36620 INSERTION CATHETER ARTERY: CPT | Mod: GC

## 2019-08-27 PROCEDURE — 93306 TTE W/DOPPLER COMPLETE: CPT | Mod: 26

## 2019-08-27 PROCEDURE — 99291 CRITICAL CARE FIRST HOUR: CPT | Mod: 25

## 2019-08-27 RX ORDER — NOREPINEPHRINE BITARTRATE/D5W 8 MG/250ML
1.9 PLASTIC BAG, INJECTION (ML) INTRAVENOUS
Qty: 32 | Refills: 0 | Status: DISCONTINUED | OUTPATIENT
Start: 2019-08-27 | End: 2019-08-27

## 2019-08-27 RX ORDER — VANCOMYCIN HCL 1 G
1000 VIAL (EA) INTRAVENOUS EVERY 12 HOURS
Refills: 0 | Status: DISCONTINUED | OUTPATIENT
Start: 2019-08-27 | End: 2019-08-29

## 2019-08-27 RX ORDER — INSULIN HUMAN 100 [IU]/ML
7 INJECTION, SOLUTION SUBCUTANEOUS
Qty: 100 | Refills: 0 | Status: DISCONTINUED | OUTPATIENT
Start: 2019-08-27 | End: 2019-08-28

## 2019-08-27 RX ORDER — ACETAMINOPHEN 500 MG
1000 TABLET ORAL ONCE
Refills: 0 | Status: DISCONTINUED | OUTPATIENT
Start: 2019-08-27 | End: 2019-08-27

## 2019-08-27 RX ORDER — CHLORHEXIDINE GLUCONATE 213 G/1000ML
1 SOLUTION TOPICAL
Refills: 0 | Status: DISCONTINUED | OUTPATIENT
Start: 2019-08-27 | End: 2019-08-27

## 2019-08-27 RX ORDER — NOREPINEPHRINE BITARTRATE/D5W 8 MG/250ML
1.9 PLASTIC BAG, INJECTION (ML) INTRAVENOUS
Qty: 8 | Refills: 0 | Status: DISCONTINUED | OUTPATIENT
Start: 2019-08-27 | End: 2019-08-27

## 2019-08-27 RX ORDER — ACETAMINOPHEN 500 MG
650 TABLET ORAL ONCE
Refills: 0 | Status: COMPLETED | OUTPATIENT
Start: 2019-08-27 | End: 2019-08-27

## 2019-08-27 RX ORDER — NOREPINEPHRINE BITARTRATE/D5W 8 MG/250ML
2.5 PLASTIC BAG, INJECTION (ML) INTRAVENOUS
Qty: 32 | Refills: 0 | Status: DISCONTINUED | OUTPATIENT
Start: 2019-08-27 | End: 2019-08-29

## 2019-08-27 RX ORDER — CHLORHEXIDINE GLUCONATE 213 G/1000ML
15 SOLUTION TOPICAL EVERY 12 HOURS
Refills: 0 | Status: DISCONTINUED | OUTPATIENT
Start: 2019-08-27 | End: 2019-08-29

## 2019-08-27 RX ORDER — SODIUM BICARBONATE 1 MEQ/ML
50 SYRINGE (ML) INTRAVENOUS ONCE
Refills: 0 | Status: COMPLETED | OUTPATIENT
Start: 2019-08-27 | End: 2019-08-27

## 2019-08-27 RX ORDER — SODIUM CHLORIDE 9 MG/ML
500 INJECTION INTRAMUSCULAR; INTRAVENOUS; SUBCUTANEOUS ONCE
Refills: 0 | Status: DISCONTINUED | OUTPATIENT
Start: 2019-08-27 | End: 2019-08-27

## 2019-08-27 RX ORDER — NOREPINEPHRINE BITARTRATE/D5W 8 MG/250ML
3.56 PLASTIC BAG, INJECTION (ML) INTRAVENOUS
Qty: 16 | Refills: 0 | Status: DISCONTINUED | OUTPATIENT
Start: 2019-08-27 | End: 2019-08-27

## 2019-08-27 RX ORDER — POTASSIUM CHLORIDE 20 MEQ
10 PACKET (EA) ORAL
Refills: 0 | Status: COMPLETED | OUTPATIENT
Start: 2019-08-27 | End: 2019-08-27

## 2019-08-27 RX ORDER — INSULIN HUMAN 100 [IU]/ML
5 INJECTION, SOLUTION SUBCUTANEOUS ONCE
Refills: 0 | Status: DISCONTINUED | OUTPATIENT
Start: 2019-08-27 | End: 2019-08-27

## 2019-08-27 RX ORDER — SODIUM BICARBONATE 1 MEQ/ML
0.38 SYRINGE (ML) INTRAVENOUS
Qty: 150 | Refills: 0 | Status: DISCONTINUED | OUTPATIENT
Start: 2019-08-27 | End: 2019-08-28

## 2019-08-27 RX ORDER — ACETAMINOPHEN 500 MG
650 TABLET ORAL EVERY 6 HOURS
Refills: 0 | Status: DISCONTINUED | OUTPATIENT
Start: 2019-08-27 | End: 2019-08-29

## 2019-08-27 RX ORDER — ACETAMINOPHEN 500 MG
650 TABLET ORAL ONCE
Refills: 0 | Status: DISCONTINUED | OUTPATIENT
Start: 2019-08-27 | End: 2019-08-27

## 2019-08-27 RX ORDER — NOREPINEPHRINE BITARTRATE/D5W 8 MG/250ML
1.7 PLASTIC BAG, INJECTION (ML) INTRAVENOUS
Qty: 16 | Refills: 0 | Status: DISCONTINUED | OUTPATIENT
Start: 2019-08-27 | End: 2019-08-27

## 2019-08-27 RX ORDER — INSULIN HUMAN 100 [IU]/ML
5 INJECTION, SOLUTION SUBCUTANEOUS ONCE
Refills: 0 | Status: COMPLETED | OUTPATIENT
Start: 2019-08-27 | End: 2019-08-27

## 2019-08-27 RX ADMIN — LOSARTAN POTASSIUM 100 MILLIGRAM(S): 100 TABLET, FILM COATED ORAL at 06:26

## 2019-08-27 RX ADMIN — Medication 50 MILLIEQUIVALENT(S): at 08:45

## 2019-08-27 RX ADMIN — Medication 400 MILLIGRAM(S): at 06:25

## 2019-08-27 RX ADMIN — Medication 140.62 MICROGRAM(S)/KG/MIN: at 23:58

## 2019-08-27 RX ADMIN — SIMETHICONE 80 MILLIGRAM(S): 80 TABLET, CHEWABLE ORAL at 06:28

## 2019-08-27 RX ADMIN — CHLORHEXIDINE GLUCONATE 15 MILLILITER(S): 213 SOLUTION TOPICAL at 17:13

## 2019-08-27 RX ADMIN — Medication 260 MILLIGRAM(S): at 02:40

## 2019-08-27 RX ADMIN — Medication 150 MEQ/KG/HR: at 20:58

## 2019-08-27 RX ADMIN — Medication 5 MILLILITER(S): at 06:28

## 2019-08-27 RX ADMIN — MEROPENEM 100 MILLIGRAM(S): 1 INJECTION INTRAVENOUS at 06:29

## 2019-08-27 RX ADMIN — Medication 650 MILLIGRAM(S): at 06:25

## 2019-08-27 RX ADMIN — Medication 100 MILLIGRAM(S): at 06:25

## 2019-08-27 RX ADMIN — INSULIN HUMAN 4 UNIT(S)/HR: 100 INJECTION, SOLUTION SUBCUTANEOUS at 21:25

## 2019-08-27 RX ADMIN — Medication 25 MILLIGRAM(S): at 06:25

## 2019-08-27 RX ADMIN — DIPHENHYDRAMINE HYDROCHLORIDE AND LIDOCAINE HYDROCHLORIDE AND ALUMINUM HYDROXIDE AND MAGNESIUM HYDRO 10 MILLILITER(S): KIT at 06:28

## 2019-08-27 RX ADMIN — Medication 400 MILLIGRAM(S): at 18:17

## 2019-08-27 RX ADMIN — Medication 100 MEQ/KG/HR: at 09:00

## 2019-08-27 RX ADMIN — PANTOPRAZOLE SODIUM 40 MILLIGRAM(S): 20 TABLET, DELAYED RELEASE ORAL at 06:26

## 2019-08-27 RX ADMIN — Medication 4: at 13:16

## 2019-08-27 RX ADMIN — Medication 6: at 17:43

## 2019-08-27 RX ADMIN — INSULIN HUMAN 5 UNIT(S): 100 INJECTION, SOLUTION SUBCUTANEOUS at 21:25

## 2019-08-27 RX ADMIN — Medication 75 MICROGRAM(S): at 06:25

## 2019-08-27 RX ADMIN — Medication 250 MILLIGRAM(S): at 13:01

## 2019-08-27 RX ADMIN — Medication 5.62 MICROGRAM(S)/KG/MIN: at 09:47

## 2019-08-27 RX ADMIN — Medication 100 MILLIEQUIVALENT(S): at 22:50

## 2019-08-27 RX ADMIN — Medication 3 MILLILITER(S): at 06:25

## 2019-08-27 RX ADMIN — MEROPENEM 100 MILLIGRAM(S): 1 INJECTION INTRAVENOUS at 15:33

## 2019-08-27 RX ADMIN — GABAPENTIN 600 MILLIGRAM(S): 400 CAPSULE ORAL at 06:25

## 2019-08-27 RX ADMIN — INSULIN HUMAN 6 UNIT(S)/HR: 100 INJECTION, SOLUTION SUBCUTANEOUS at 23:59

## 2019-08-27 RX ADMIN — Medication 100 MILLIEQUIVALENT(S): at 22:03

## 2019-08-27 RX ADMIN — MEROPENEM 100 MILLIGRAM(S): 1 INJECTION INTRAVENOUS at 22:48

## 2019-08-27 RX ADMIN — Medication 1 GRAM(S): at 06:25

## 2019-08-27 RX ADMIN — Medication 100 MILLIEQUIVALENT(S): at 20:58

## 2019-08-27 RX ADMIN — SODIUM CHLORIDE 4 MILLILITER(S): 9 INJECTION INTRAMUSCULAR; INTRAVENOUS; SUBCUTANEOUS at 06:25

## 2019-08-27 RX ADMIN — Medication 150 MEQ/KG/HR: at 15:44

## 2019-08-27 RX ADMIN — Medication 5.62 MICROGRAM(S)/KG/MIN: at 08:06

## 2019-08-27 NOTE — PROGRESS NOTE ADULT - ASSESSMENT
63 yo F with PMHx of T2DM, RA, Depression and now newly diagnosed AML enrolled on Pfizer BRIGHT trial, now s/p Induction chemotherapy with Dauno/Cytarabine and daily Glasdegib vs Placebo. Day 14 bone marrow biopsy was chemotherapeutic - awaiting count recovery. The patient's hospital course has been complicated by neutropenic fevers, transaminitis, RENTERIA due to pulmonary fibrosis, PE, volume overload, drug exanthem, and now unresponsive with no pulse s/p resusitation. The patient has pancytopenia from chemotherapy and/or disease. 63 yo F with PMHx of T2DM, RA, Depression and now newly diagnosed AML enrolled on Pfizer BRIGHT trial, now s/p Induction chemotherapy with Dauno/Cytarabine and daily Glasdegib vs Placebo. Day 14 bone marrow biopsy was chemotherapeutic - awaiting count recovery. The patient's hospital course has been complicated by neutropenic fevers, transaminitis, RENTERIA due to pulmonary fibrosis, PE, volume overload, drug exanthem, and now unresponsive, PEA arrest s/p resuscitation The patient has pancytopenia from chemotherapy and/or disease.

## 2019-08-27 NOTE — PROGRESS NOTE ADULT - ATTENDING COMMENTS
Ilya Juares  Attending Physician   Division of Infectious Disease  Pager #[phone]  After 5pm/weekend or no response, call #[phone]    Prognosis poor

## 2019-08-27 NOTE — CHART NOTE - NSCHARTNOTEFT_GEN_A_CORE
Called to bedside to evaluate for possible STEMI. Briefly, 56 year old woman with DM2, PEs and newly diagnosed AML now day 20 s/p chemo therapy regimen with count chely seemingly passed on 8/20 txferred to MICU care following PEA arrest this morning requiring 15 minutes CPR, 5 rounds epi +bicarb. ROSC achieved. Post ROSC ECG sinus tachycardia with CAROLINA in aVR and V1, STD in V5-V6. Plt 100k. Given presenting rhythm during arrest was PEA as opposed to VT/VF, less likely to be 2/2 ACS.   Will continue to monitor.  Formal TTE, tele, cardiac enzymes    Discussed with Dr. Caron Sandoval  Cardiovascular Disease Fellow  [phone]    For all cardiology-related service needs, information can be found 24/7 on [email or web address] [email or web address]

## 2019-08-27 NOTE — CHART NOTE - NSCHARTNOTEFT_GEN_A_CORE
Event note     65 y/o F w/ uncontrolled Type 2 DM w/ hyper and hypoglycemia A1c 10.6%, HTN, HLD, Hypothyroidism admitted for chemo for newly diagnosed AML.   now critically ill in icu.  BG goal as per ICU (140-180)  check FSBG q4hrs, if hyperlgycemia ensues:  would recommend that given critically illness start insulin drip as needed for elevated blood glucose rather than subq insulin. Event note     63 y/o F w/ uncontrolled Type 2 DM w/ hyper and hypoglycemia A1c 10.6%, HTN, HLD, Hypothyroidism admitted for chemo for newly diagnosed AML.   now critically ill in icu.  BG goal as per ICU (140-180)  check FSBG q4hrs, if hyperlgycemia ensues:  would recommend that given critically illness start insulin drip as needed for elevated blood glucose rather than subq insulin.    d/w MICU fellow Event note     65 y/o F w/ uncontrolled Type 2 DM w/ hyper and hypoglycemia A1c 10.6%, HTN, HLD, Hypothyroidism admitted for chemo for newly diagnosed AML.   now critically ill in icu.  BG goal as per ICU (140-180)  check FSBG q4hrs, if hyperlgycemia ensues:  would recommend that given critically illness start insulin drip as needed for elevated blood glucose rather than subq insulin.  if basal bolus is needed can start with LANTUS 15 UNITS  and q4hr low dose ISS, however it is unclear what the pts trajectory will be at this time, however given hyperlgycemia on BMP in the setting of critical illness, recommend insulin drip    d/w MICU fellow

## 2019-08-27 NOTE — PROVIDER CONTACT NOTE (OTHER) - ACTION/TREATMENT ORDERED:
650mg Tylenol given, Blood cultures, UA, UC ordered
Give Tylenol 650mg
Monitoring continues, see flow sheet for vitals.
No new orders given.
TYLENOL 650 MG X1 DOSE GIVEN
Tylenol ordered and given, medication was effective.
tylenol 1 gram ivpb   bld c/s x1  ua,urine c/s
Blood cultures drawn Tylenol administered @ charted on EMAR, 500ml NS Bolus given, Vancomycin/  Meropenem continue, see EMAR, cooling measures applied, ice packs applied., PT refused cooling blanket.
Tylenol 650 mg oral; Blood Cx X2, UA/UC, chest Xray, ordered. Continue to monitor
Tylenol 650mg PO x1 dose. BCx2 ordered. UA/UC ordered.

## 2019-08-27 NOTE — CONSULT NOTE ADULT - ASSESSMENT
Assessment and plan 64 Y F now s/p ROSC from cardiac arrest unknown cause with unknown down time at least 15 minutes with CPR  Neuro: No spontaneous breaths, no brain stem reflexes, GCS 3 without any sedation used for intubation  -- CT head when more stable  -- Reverse acute acidosis  -- Family does not want patient to remain intubated with no signs of recovery, will re-evaluate soon    CVS: S/P ROSC with unknown non-CPR down time, known PE not on AC and STEMI on ekg after rosc  -- Appreciate cardiology input regarding STEMI, not a cath lab candidate at the moment  -- POCUS with normal RV size, unclear if PE was cause, unsafe to start AC without head CT and repeat labs for platelets since 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  -- Order and FU official echo  -- Patient remains on Levophed with MAP goal >60  -- Remains sinus tachycardic likely 2/2 acidosis vs volume depletion will run bicarb at 100cc hour already s/p bicarb push    Pulm: Known pulmonary fibrosis  -- Continue Spiriva and albuterol as ordered while on vent  #Intubated and ventilated with high plateau pressures while acidosic  -- Adjusted TV to 400 cc with improvement of plateau pressures  -- Titrate PEEP to lowest possible to keep saturation >95  -- Monitor plateau pressures with goal <30 however need Q1 ABGs after adjustments to re-evaluate given severe acidosis    Renal: Lactic acidosis with respiratory acidosis following cardiac arrest  -- S/P BiCarb push, continue bicarb gtt at 100cc hour  -- Q1 ABG from A-line to check ph progression and titrate vent settings    Heme: AML with PanCytopenia  -- No indication for transfusion at the moment, follow up GOC with family regarding transfusions before empiric transfusing.  -- FU CT head to stratify bleeding risk    ID: Neutropenic fever while admitted to hospital in setting of AML  -- Continue Vanc, Zosyn, Acyclovir    GOC: Had extensive discussion with family regarding poor prognosis of patient. They feel that they don't want her to live out her life as a "vegetable." Will reverse acidosis, after next ABG draw will speak again with family if they would still like more done will get CTH otherwise will likely terminally extubate patient. Assessment and plan 64 Y F now s/p ROSC from cardiac arrest unknown cause with unknown down time at least 15 minutes with CPR  Neuro: No spontaneous breaths, no brain stem reflexes, GCS 3 without any sedation used for intubation  -- CT head when more stable  -- Reverse acute acidosis  -- Family does not want patient to remain intubated with no signs of recovery, will re-evaluate soon    CVS: S/P ROSC with unknown non-CPR down time, known PE not on AC and STEMI on ekg after rosc  -- Appreciate cardiology input regarding STEMI, not a cath lab candidate at the moment  -- POCUS with normal RV size, unclear if PE was cause, unsafe to start AC without head CT and repeat labs for platelets since 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  -- Order and FU official echo  -- Patient remains on Levophed with MAP goal >60  -- Remains sinus tachycardic likely 2/2 acidosis vs volume depletion will run bicarb at 100cc hour already s/p bicarb push    Pulm: Known pulmonary fibrosis  -- Continue Spiriva and albuterol as ordered while on vent  #Intubated and ventilated with high plateau pressures while acidosic  -- Adjusted TV to 400 cc with improvement of plateau pressures  -- Titrate PEEP to lowest possible to keep saturation >95  -- Monitor plateau pressures with goal <30 however need Q1 ABGs after adjustments to re-evaluate given severe acidosis    Endo: Diabetes type 2  -- HgA1C 10.6, now NPO s/p cardiac arrest  -- FS AC and QHS  -- Lantus and Humalog    Renal: Lactic acidosis with respiratory acidosis following cardiac arrest  -- S/P BiCarb push, continue bicarb gtt at 100cc hour  -- Q1 ABG from A-line to check ph progression and titrate vent settings    Heme: AML with PanCytopenia  -- No indication for transfusion at the moment, follow up GOC with family regarding transfusions before empiric transfusing.  -- FU CT head to stratify bleeding risk    ID: Neutropenic fever while admitted to hospital in setting of AML  -- Continue Posaconazole Vanc, Pj, Acyclovir  -- Vanc Troughs every tuesday  --     GOC: Had extensive discussion with family regarding poor prognosis of patient. They feel that they don't want her to live out her life as a "vegetable." Will reverse acidosis, after next ABG draw will speak again with family if they would still like more done will get CTH otherwise will likely terminally extubate patient. Assessment and plan 64 Y F now s/p ROSC from cardiac arrest unknown cause with unknown down time at least 15 minutes with CPR  Neuro: No spontaneous breaths, no brain stem reflexes, GCS 3 without any sedation used for intubation  -- CT head when more stable  -- Reverse acute acidosis  -- Family does not want patient to remain intubated with no signs of recovery, will re-evaluate soon    CVS: S/P ROSC with unknown non-CPR down time, known PE not on AC and STEMI on ekg after rosc  -- Appreciate cardiology input regarding STEMI, not a cath lab candidate at the moment  -- POCUS with normal RV size, unclear if PE was cause, unsafe to start AC without head CT and repeat labs for platelets since 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  -- Order and FU official echo  -- Patient remains on Levophed with MAP goal >60  -- Remains sinus tachycardic likely 2/2 acidosis vs volume depletion will run bicarb at 100cc hour already s/p bicarb push    Pulm: Known pulmonary fibrosis  -- Continue Spiriva and albuterol as ordered while on vent  #Intubated and ventilated with high plateau pressures while acidosic  -- Adjusted TV to 360 cc with improvement of plateau pressures  -- Titrate PEEP to lowest possible to keep saturation >95  -- Monitor plateau pressures with goal <30 however need Q1 ABGs after adjustments to re-evaluate given severe acidosis  -- Can decrease frequency of ABGs once adequately compensated with vent adjustments    Endo: Diabetes type 2  -- HgA1C 10.6, now NPO s/p cardiac arrest  -- FS AC and QHS  -- Was on Lantus and Humalog, DCed Humalog due to NPO status    Renal: Lactic acidosis with respiratory acidosis following cardiac arrest  -- S/P BiCarb push, continue bicarb gtt at 100cc hour  -- Q1 ABG from A-line to check ph progression and titrate vent settings    Heme: AML with PanCytopenia  -- No indication for transfusion at the moment, follow up GOC with family regarding transfusions before empiric transfusing.  -- FU CT head to stratify bleeding risk    ID: Neutropenic fever while admitted to hospital in setting of AML  -- Continue Posaconazole Vanc, Pj, Acyclovir  -- Vanc Troughs every tuesday  --     GOC: Had extensive discussion with family regarding poor prognosis of patient. They feel that they don't want her to live out her life as a "vegetable." Will reverse acidosis, after next ABG draw will speak again with family if they would still like more done will get CTH otherwise will likely terminally extubate patient. Assessment and plan 64 Y F now s/p ROSC from cardiac arrest unknown cause with unknown down time at least 15 minutes with CPR  Neuro: No spontaneous breaths, no brain stem reflexes, GCS 3 without any sedation used for intubation  -- CT head when more stable  -- Reverse acute acidosis  -- Family does not want patient to remain intubated with no signs of recovery, will re-evaluate soon    CVS: S/P ROSC with unknown non-CPR down time, known PE not on AC and STEMI on ekg after rosc  -- Appreciate cardiology input regarding STEMI, not a cath lab candidate at the moment  -- POCUS with normal RV size, unclear if PE was cause, unsafe to start AC without head CT and repeat labs for platelets since 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  -- Order and FU official echo  -- Patient remains on Levophed with MAP goal >60  -- Remains sinus tachycardic likely 2/2 acidosis vs volume depletion will run bicarb at 100cc hour already s/p bicarb push    Pulm: Known pulmonary fibrosis  -- Continue Spiriva and albuterol as ordered while on vent  #Intubated and ventilated with high plateau pressures while acidosic  -- Adjusted TV to 360 cc with improvement of plateau pressures  -- Titrate PEEP to lowest possible to keep saturation >95  -- Monitor plateau pressures with goal <30 however need Q1 ABGs after adjustments to re-evaluate given severe acidosis  -- Can decrease frequency of ABGs once adequately compensated with vent adjustments    Endo: Diabetes type 2  -- HgA1C 10.6, now NPO s/p cardiac arrest  -- FS AC and QHS  -- Was on Lantus and Humalog, DCed Humalog due to NPO status    Renal: Lactic acidosis with respiratory acidosis following cardiac arrest  -- S/P BiCarb push, continue bicarb gtt at 100cc hour  -- Q1 ABG from A-line to check ph progression and titrate vent settings    Heme: AML with PanCytopenia  -- No indication for transfusion at the moment, follow up GOC with family regarding transfusions before empiric transfusing.  -- FU CT head to stratify bleeding risk    ID: Neutropenic fever while admitted to hospital in setting of AML  -- Continue Posaconazole Vanc, Pj, Acyclovir  -- Vanc Troughs every tuesday  --     GOC: Had extensive discussion with family regarding poor prognosis of patient. They feel that they don't want her to live out her life as a "vegetable." Will reverse acidosis, continue current care from floor otherwise. OG tube for meds, keep NPO. Family says that at 24 hours if patient still showing no signs of life they would like to consider terminally extubating her.

## 2019-08-27 NOTE — PROGRESS NOTE ADULT - PROBLEM SELECTOR PLAN 2
Patient is neutropenic, febrile   Continue vancomycin follow up troughs q Tues  Continue Posaconazole, Acyclovir, Vancomycin, Meropenem (Aztreonam changed to Meropenem 8/26; monitor for rash. Patient had rash/suspected allergy to Cefepime)  (Monitor QTc BIW mon/Thurs with Posaconazole and Study drug)  CT chest 8/25 c/w acute R PE, pulm fibrosis, and pyelonephritis. Urine cultures negative

## 2019-08-27 NOTE — PROGRESS NOTE ADULT - SUBJECTIVE AND OBJECTIVE BOX
MORIAH BRANDON 64y [id number]    Patient is a 64y old  Female who presents with a chief complaint of For induction chemotherapy (27 Aug 2019 08:35)      Follow Up/CC:  ID following for fever    Interval History/ROS: code blue, intubated, in ICU    Allergies    cefepime (Rash)    Intolerances        ANTIMICROBIALS:  acyclovir   Oral Tab/Cap 400 two times a day  meropenem  IVPB 1000 every 8 hours  posaconazole DR Tablet 300 daily      MEDICATIONS  (STANDING):  acyclovir   Oral Tab/Cap 400 milliGRAM(s) Oral two times a day  chlorhexidine 0.12% Liquid 15 milliLiter(s) Oral Mucosa every 12 hours  chlorhexidine 2% Cloths 1 Application(s) Topical <User Schedule>  dextrose 5%. 1000 milliLiter(s) (50 mL/Hr) IV Continuous <Continuous>  dextrose 50% Injectable 12.5 Gram(s) IV Push once  dextrose 50% Injectable 25 Gram(s) IV Push once  dextrose 50% Injectable 25 Gram(s) IV Push once  insulin glargine Injectable (LANTUS) 18 Unit(s) SubCutaneous at bedtime  insulin lispro (HumaLOG) corrective regimen sliding scale   SubCutaneous three times a day before meals  insulin lispro (HumaLOG) corrective regimen sliding scale   SubCutaneous at bedtime  insulin lispro Injectable (HumaLOG) 10 Unit(s) SubCutaneous three times a day before meals  investigational chemo - general 1 Tablet(s) Oral every 24 hours  levothyroxine 75 MICROGram(s) Oral daily  losartan 100 milliGRAM(s) Oral daily  meropenem  IVPB 1000 milliGRAM(s) IV Intermittent every 8 hours  norepinephrine Infusion 0.05 MICROgram(s)/kG/Min (5.625 mL/Hr) IV Continuous <Continuous>  posaconazole DR Tablet 300 milliGRAM(s) Oral daily  sodium bicarbonate  Infusion 0.375 mEq/kG/Hr (150 mL/Hr) IV Continuous <Continuous>  sodium chloride 0.9%. 1000 milliLiter(s) (50 mL/Hr) IV Continuous <Continuous>  tiotropium 18 MICROgram(s) Capsule 1 Capsule(s) Inhalation daily    MEDICATIONS  (PRN):  acetaminophen    Suspension .. 650 milliGRAM(s) Enteral Tube every 6 hours PRN Temp greater or equal to 38C (100.4F)  dextrose 40% Gel 15 Gram(s) Oral once PRN Blood Glucose LESS THAN 70 milliGRAM(s)/deciliter  glucagon  Injectable 1 milliGRAM(s) IntraMuscular once PRN Glucose LESS THAN 70 milligrams/deciliter        Vital Signs Last 24 Hrs  T(C): 37.1 (27 Aug 2019 12:30), Max: 40.4 (27 Aug 2019 03:10)  T(F): 98.7 (27 Aug 2019 12:30), Max: 104.7 (27 Aug 2019 03:10)  HR: 120 (27 Aug 2019 15:30) (46 - 140)  BP: 116/61 (27 Aug 2019 10:15) (68/51 - 167/82)  BP(mean): 83 (27 Aug 2019 10:15) (56 - 116)  RR: 17 (27 Aug 2019 15:30) (4 - 30)  SpO2: 100% (27 Aug 2019 15:30) (74% - 100%)    CBC Full  -  ( 27 Aug 2019 12:44 )  WBC Count : 2.9 K/uL  RBC Count : 2.61 M/uL  Hemoglobin : 7.3 g/dL  Hematocrit : 22.9 %  Platelet Count - Automated : 94 K/uL  Mean Cell Volume : 87.8 fl  Mean Cell Hemoglobin : 28.0 pg  Mean Cell Hemoglobin Concentration : 31.8 gm/dL  Auto Neutrophil # : x  Auto Lymphocyte # : x  Auto Monocyte # : x  Auto Eosinophil # : x  Auto Basophil # : x  Auto Neutrophil % : x  Auto Lymphocyte % : x  Auto Monocyte % : x  Auto Eosinophil % : x  Auto Basophil % : x        136  |  100  |  14  ----------------------------<  326<H>  5.1   |  15<L>  |  1.58<H>    Ca    8.3<L>      27 Aug 2019 12:44  Phos  8.3       Mg     2.3         TPro  5.8<L>  /  Alb  2.6<L>  /  TBili  0.6  /  DBili  x   /  AST  1792<H>  /  ALT  847<H>  /  AlkPhos  304<H>      LIVER FUNCTIONS - ( 27 Aug 2019 12:44 )  Alb: 2.6 g/dL / Pro: 5.8 g/dL / ALK PHOS: 304 U/L / ALT: 847 U/L / AST: 1792 U/L / GGT: x           Urinalysis Basic - ( 27 Aug 2019 07:19 )    Color: Yellow / Appearance: Slightly Turbid / S.015 / pH: x  Gluc: x / Ketone: Negative  / Bili: Negative / Urobili: Negative   Blood: x / Protein: 100 / Nitrite: Negative   Leuk Esterase: Negative / RBC: 1 /hpf / WBC 2 /HPF   Sq Epi: x / Non Sq Epi: 1 /hpf / Bacteria: Negative        MICROBIOLOGY:  .Blood  19   No growth to date.  --  --      .Urine  19   No growth  --  --      .Blood  19   No growth to date.  --  --      .Blood  19   No growth at 5 days.  --  --      .Urine  19   <10,000 CFU/mL Normal Urogenital Maryana  --  --      .Urine  19   No growth  --  --      .Blood  19   No growth at 5 days.  --  --      .Blood  19   No growth at 5 days.  --  --      .Sputum  19   Normal Respiratory Maryana present  --    Rare polymorphonuclear leukocytes per low power field  Few Squamous epithelial cells per low power field  Moderate Gram positive cocci in pairs seen per oil power field  Moderate Gram Variable Rods seen per oil power field      .Blood  19   No growth at 5 days.  --  --      .Urine  19   No growth  --  --      .Sputum  08-10-19   Normal Respiratory Maryana present  --    Few polymorphonuclear leukocytes per low power field  Rare Squamous epithelial cells per low power field  No organisms seen per oil power field      .Urine  08-10-19   No growth  --  --      .Blood  19   No growth at 5 days.  --  --              v    Rapid RVP Result: NotDetec ( @ 15:58)      Clostridium difficile GDH Toxins A&amp;B, EIA:   Negative (19 @ 21:30)  Clostridium difficile GDH Interpretation: Negative for toxigenic C. Difficile.  This specimen is negative for C.  Difficile glutamate dehydrogenase (GDH) antigen and negative for C.  Difficile Toxins A & B, by EIA.  GDH is a highly sensitive screening  marker for C. Difficile that is produced in large amounts by all C.  Difficile strains, both toxigenic and nontoxigenic.  This assay has not  been validated as a test of cure.  Repeat testing during the same episode  of diarrhea is of limited value and is discouraged.  The results of this  assay should always be interpreted in conjunction with pateint's clinical  history. (19 @ 21:30)      RADIOLOGY    < from: Xray Chest 1 View AP/PA (19 @ 09:27) >  Pulmonary edema. Mildright-sided pleural effusion. Lines and tubes as   described above.    < end of copied text >

## 2019-08-27 NOTE — RAPID RESPONSE TEAM SUMMARY - NSSITUATIONBACKGROUNDRRT_GEN_ALL_CORE
64F with T2DM, RA, depression, and newly-diagnosed AML admitted 8/6 for induction chemotherapy with hospital course c/b neutropenic fever 2/2 pyelonephritis and dyspnea on exertion, found to have acute R upper lobar and segmental artery PE on CT from 8/25 not on anticoagulation due to bleeding risk. Patient noted by RN to become hypoxemic to 88% prior to Code Blue this morning. ABG was performed while patient on nonrebreather with pO2 of 48.     Code Blue was called this morning at 0720 after patient found unresponsive without pulse in bathroom. Upon arrival, patient in floor of bathroom with staff members having started compressions. Patient not on monitor at this time thus no initial vitals obtained. Patient transferred to bed and CPR was initiated. Cardiac monitor demonstrated asystole. Patient given epinephrine x2 and noted to have PEA on monitor. Patient given epinephrine x1, sodium bicarb, and calcium followed by ROSC. Patient intubated by anesthesia. She became increasingly bradycardic and subsequently lost pulse. ACLS resumed with administration of epi x1 followed by ROSC. Patient tachycardic and hypotensive requiring initiation of phenylephrine. MICU consulted for PEA arrest presumed secondary to massive PE. Heparin gtt initiated upon arrival to intensive care unit. Remainder of care to be assumed by MICU team. Family notified via phone and arrived at hospital shortly thereafter. 64F with T2DM, RA, depression, and newly-diagnosed AML admitted 8/6 for induction chemotherapy with hospital course c/b neutropenic fever 2/2 pyelonephritis and dyspnea on exertion, found to have acute R upper lobar and segmental artery PE on CT from 8/25 not on anticoagulation due to bleeding risk. Patient noted by RN to become hypoxemic to 88% prior to Code Blue this morning. ABG was performed while patient on nonrebreather with pO2 of 48.     Code Blue was called this morning at 0720 after patient found unresponsive without pulse in bathroom. Upon arrival, patient in floor of bathroom with staff members having started compressions. Patient not on monitor at this time thus no initial vitals obtained. Patient transferred to bed and CPR was initiated. Cardiac monitor demonstrated asystole. Patient given epinephrine x3 and noted to have PEA on monitor. Patient given epinephrine x1, sodium bicarb, and calcium followed by ROSC. Patient intubated by anesthesia. She became increasingly bradycardic and subsequently lost pulse. ACLS resumed with administration of epi x1 followed by ROSC. Patient tachycardic and hypotensive requiring initiation of phenylephrine. MICU consulted for PEA arrest presumed secondary to massive PE. Heparin gtt initiated upon arrival to intensive care unit. Remainder of care to be assumed by MICU team. Family notified via phone and arrived at hospital shortly thereafter. 64F with T2DM, RA, depression, and newly-diagnosed AML admitted 8/6 for induction chemotherapy with hospital course c/b neutropenic fever 2/2 pyelonephritis and dyspnea on exertion, found to have acute R upper lobar and segmental artery PE on CT from 8/25 not on anticoagulation due to bleeding risk. Earlier this morning, patient noted by RN to be hypoxemic to 88%. NP from primary team overnight ordered an ABG which was performed while patient was on nonrebreather and showed pO2 of 48.     Code Blue was called this morning at 0720 after patient found unresponsive without pulse by primary team in bathroom. Upon arrival, patient in floor of bathroom with staff members performing compressions. Patient not on monitor at this time thus no initial vitals obtained. Patient transferred to bed and CPR was initiated. Cardiac monitor at this time demonstrated asystole. Patient given epinephrine x3 followed by PEA on monitor. Patient given epinephrine x1, sodium bicarb, and calcium followed by ROSC. Patient intubated by anesthesia. She became increasingly bradycardic and subsequently lost pulse. ACLS resumed with administration of epi x1 followed by ROSC. Patient tachycardic and hypotensive requiring initiation of phenylephrine. MICU consulted for PEA arrest presumed secondary to massive PE. Heparin gtt initiated upon arrival to intensive care unit. Remainder of care to be assumed by MICU team. Family notified via phone and arrived at hospital shortly thereafter.

## 2019-08-27 NOTE — PROVIDER CONTACT NOTE (CRITICAL VALUE NOTIFICATION) - ASSESSMENT
pt alert and oriented x 4.
NAD
a/ox4
pt in sinus tach, no PVC's, sat 97% on ventilator, not in respiratory distress, not diaphoretic
NAD, no signs of bleeding  febrile this morning
No bleeding noted
No s/s of bleeding
No s/s of bleeding
No s/s of bleeding, VS WDL
pt A&Ox4, VSS. pt afebrile. pt denies SOB, CP, pain. no s/s of active bleeding

## 2019-08-27 NOTE — PROGRESS NOTE ADULT - SUBJECTIVE AND OBJECTIVE BOX
Diagnosis: AML FLT 3 (-)    Protocol/Chemo Regimen: Enrolled on Pf[employer] BRIGHT 1019- s/p Induction chemotherapy with Dauno/Cytarabine and daily Glasdegib vs Placebo    Day: 21    Pt endorsed: patient found unresponsive in bathroom with no pulse, code blue called     Review of Systems: n/a    Pain scale: 0    Diet: Mechanical soft, Consistent Carbohydrate diet, Glucerna TID     Allergies: cefepime (Rash)     ANTIMICROBIALS  acyclovir   Oral Tab/Cap 400 milliGRAM(s) Oral two times a day  meropenem  IVPB 1000 milliGRAM(s) IV Intermittent every 8 hours  posaconazole DR Tablet 300 milliGRAM(s) Oral daily  vancomycin  IVPB 1000 milliGRAM(s) IV Intermittent every 12 hours    STANDING MEDICATIONS  ALBUTerol    90 MICROgram(s) HFA Inhaler 1 Puff(s) Inhalation every 6 hours  ALBUTerol/ipratropium for Nebulization 3 milliLiter(s) Nebulizer every 6 hours  benzonatate 100 milliGRAM(s) Oral three times a day  Biotene Dry Mouth Oral Rinse 5 milliLiter(s) Swish and Spit three times a day  chlorhexidine 2% Cloths 1 Application(s) Topical <User Schedule>  dextrose 5%. 1000 milliLiter(s) IV Continuous <Continuous>  dextrose 50% Injectable 12.5 Gram(s) IV Push once  dextrose 50% Injectable 25 Gram(s) IV Push once  dextrose 50% Injectable 25 Gram(s) IV Push once  FIRST- Mouthwash  BLM 10 milliLiter(s) Swish and Spit four times a day  gabapentin 600 milliGRAM(s) Oral two times a day  hydrOXYzine hydrochloride 25 milliGRAM(s) Oral every 6 hours  insulin glargine Injectable (LANTUS) 18 Unit(s) SubCutaneous at bedtime  insulin lispro (HumaLOG) corrective regimen sliding scale   SubCutaneous three times a day before meals  insulin lispro (HumaLOG) corrective regimen sliding scale   SubCutaneous at bedtime  insulin lispro Injectable (HumaLOG) 10 Unit(s) SubCutaneous three times a day before meals  investigational chemo - general 1 Tablet(s) Oral every 24 hours  levothyroxine 75 MICROGram(s) Oral daily  lidocaine 2% Injectable 20 milliLiter(s) Local Injection once  losartan 100 milliGRAM(s) Oral daily  pantoprazole    Tablet 40 milliGRAM(s) Oral before breakfast  phytonadione   Solution 10 milliGRAM(s) Oral daily  simethicone 80 milliGRAM(s) Chew four times a day  sodium chloride 0.9% Bolus 500 milliLiter(s) IV Bolus once  sodium chloride 0.9%. 1000 milliLiter(s) IV Continuous <Continuous>  sodium chloride 3%  Inhalation 4 milliLiter(s) Inhalation two times a day  sucralfate 1 Gram(s) Oral four times a day  tiotropium 18 MICROgram(s) Capsule 1 Capsule(s) Inhalation daily    PRN MEDICATIONS  acetaminophen   Tablet .. 650 milliGRAM(s) Oral every 6 hours PRN  acetaminophen   Tablet .. 650 milliGRAM(s) Oral once PRN  aluminum hydroxide/magnesium hydroxide/simethicone Suspension 30 milliLiter(s) Oral every 6 hours PRN  dextrose 40% Gel 15 Gram(s) Oral once PRN  diphenhydrAMINE 25 milliGRAM(s) Oral every 6 hours PRN  glucagon  Injectable 1 milliGRAM(s) IntraMuscular once PRN  hydrocortisone 2.5% Lotion 1 Application(s) Topical three times a day PRN  lidocaine 2% Viscous 2 milliLiter(s) Oral every 2 hours PRN  loperamide 2 milliGRAM(s) Oral every 4 hours PRN  metoclopramide Injectable 10 milliGRAM(s) IV Push every 6 hours PRN  oxyCODONE    IR 5 milliGRAM(s) Oral every 4 hours PRN  polyethylene glycol 3350 17 Gram(s) Oral two times a day PRN    Vital Signs Last 24 Hrs  T(C): 38.5 (27 Aug 2019 05:47), Max: 40.6 (26 Aug 2019 14:07)  T(F): 101.3 (27 Aug 2019 05:47), Max: 105 (26 Aug 2019 14:07)  HR: 111 (27 Aug 2019 05:47) (88 - 132)  BP: 167/73 (27 Aug 2019 05:47) (102/59 - 175/72)  BP(mean): --  RR: 20 (27 Aug 2019 05:47) (18 - 30)  SpO2: 96% (27 Aug 2019 06:10) (79% - 98%)    PHYSICAL EXAM  General: adult in NAD  HEENT: clear oropharynx, no erythema, no ulcers  Neck: supple  CV: normal S1, S2, RRR  Lungs: clear to auscultation, no wheezes, no rales  Abdomen: soft, nontender, nondistended, normal BS  Ext: no edema  Skin: no rashes  Neuro: alert and oriented x 3  Central line: normal     LABS:                        8.4    1.5   )-----------( 101      ( 27 Aug 2019 07:19 )             24.8         Mean Cell Volume : 84.8 fl  Mean Cell Hemoglobin : 28.6 pg  Mean Cell Hemoglobin Concentration : 33.7 gm/dL  Auto Neutrophil # : x  Auto Lymphocyte # : x  Auto Monocyte # : x  Auto Eosinophil # : x  Auto Basophil # : x  Auto Neutrophil % : x  Auto Lymphocyte % : x  Auto Monocyte % : x  Auto Eosinophil % : x  Auto Basophil % : x    08-26  136  |  102  |  11  ----------------------------<  129<H>  3.4<L>   |  20<L>  |  0.74    Ca    8.5      26 Aug 2019 07:11  Phos  3.1     08-26  Mg     1.4     08-26    TPro  6.9  /  Alb  3.2<L>  /  TBili  0.5  /  DBili  x   /  AST  38  /  ALT  49<H>  /  AlkPhos  224<H>  08-26    PT/INR - ( 26 Aug 2019 07:13 )   PT: 15.8 sec;   INR: 1.37 ratio      PTT - ( 26 Aug 2019 07:13 )  PTT:33.2 sec    RADIOLOGY & ADDITIONAL STUDIES:  < from: CT Chest w/ IV Cont (08.25.19 @ 18:48) >  IMPRESSION:     Acute right-sided pulmonary emboli.    Pulmonary fibrosis. Increased groundglass opacities throughout both lungs   compared with prior imaging 8/9/2019 are nonspecific.    Heterogeneous striated nephrograms bilaterally consistent with   pyelonephritis. Diagnosis: AML FLT 3 (-)    Protocol/Chemo Regimen: Enrolled on Pfizer BRIGHT 1019- s/p Induction chemotherapy with Dauno/Cytarabine and daily Glasdegib vs Placebo    Day: 21    Pt endorsed: patient found unresponsive in bathroom with no pulse, code blue called     Review of Systems: n/a    Pain scale: 0    Diet: Mechanical soft, Consistent Carbohydrate diet, Glucerna TID     Allergies: cefepime (Rash)     ANTIMICROBIALS  acyclovir   Oral Tab/Cap 400 milliGRAM(s) Oral two times a day  meropenem  IVPB 1000 milliGRAM(s) IV Intermittent every 8 hours  posaconazole DR Tablet 300 milliGRAM(s) Oral daily  vancomycin  IVPB 1000 milliGRAM(s) IV Intermittent every 12 hours    STANDING MEDICATIONS  ALBUTerol    90 MICROgram(s) HFA Inhaler 1 Puff(s) Inhalation every 6 hours  ALBUTerol/ipratropium for Nebulization 3 milliLiter(s) Nebulizer every 6 hours  benzonatate 100 milliGRAM(s) Oral three times a day  Biotene Dry Mouth Oral Rinse 5 milliLiter(s) Swish and Spit three times a day  chlorhexidine 2% Cloths 1 Application(s) Topical <User Schedule>  dextrose 5%. 1000 milliLiter(s) IV Continuous <Continuous>  dextrose 50% Injectable 12.5 Gram(s) IV Push once  dextrose 50% Injectable 25 Gram(s) IV Push once  dextrose 50% Injectable 25 Gram(s) IV Push once  FIRST- Mouthwash  BLM 10 milliLiter(s) Swish and Spit four times a day  gabapentin 600 milliGRAM(s) Oral two times a day  hydrOXYzine hydrochloride 25 milliGRAM(s) Oral every 6 hours  insulin glargine Injectable (LANTUS) 18 Unit(s) SubCutaneous at bedtime  insulin lispro (HumaLOG) corrective regimen sliding scale   SubCutaneous three times a day before meals  insulin lispro (HumaLOG) corrective regimen sliding scale   SubCutaneous at bedtime  insulin lispro Injectable (HumaLOG) 10 Unit(s) SubCutaneous three times a day before meals  investigational chemo - general 1 Tablet(s) Oral every 24 hours  levothyroxine 75 MICROGram(s) Oral daily  lidocaine 2% Injectable 20 milliLiter(s) Local Injection once  losartan 100 milliGRAM(s) Oral daily  pantoprazole    Tablet 40 milliGRAM(s) Oral before breakfast  phytonadione   Solution 10 milliGRAM(s) Oral daily  simethicone 80 milliGRAM(s) Chew four times a day  sodium chloride 0.9% Bolus 500 milliLiter(s) IV Bolus once  sodium chloride 0.9%. 1000 milliLiter(s) IV Continuous <Continuous>  sodium chloride 3%  Inhalation 4 milliLiter(s) Inhalation two times a day  sucralfate 1 Gram(s) Oral four times a day  tiotropium 18 MICROgram(s) Capsule 1 Capsule(s) Inhalation daily    PRN MEDICATIONS  acetaminophen   Tablet .. 650 milliGRAM(s) Oral every 6 hours PRN  acetaminophen   Tablet .. 650 milliGRAM(s) Oral once PRN  aluminum hydroxide/magnesium hydroxide/simethicone Suspension 30 milliLiter(s) Oral every 6 hours PRN  dextrose 40% Gel 15 Gram(s) Oral once PRN  diphenhydrAMINE 25 milliGRAM(s) Oral every 6 hours PRN  glucagon  Injectable 1 milliGRAM(s) IntraMuscular once PRN  hydrocortisone 2.5% Lotion 1 Application(s) Topical three times a day PRN  lidocaine 2% Viscous 2 milliLiter(s) Oral every 2 hours PRN  loperamide 2 milliGRAM(s) Oral every 4 hours PRN  metoclopramide Injectable 10 milliGRAM(s) IV Push every 6 hours PRN  oxyCODONE    IR 5 milliGRAM(s) Oral every 4 hours PRN  polyethylene glycol 3350 17 Gram(s) Oral two times a day PRN    Vital Signs Last 24 Hrs  T(C): 38.5 (27 Aug 2019 05:47), Max: 40.6 (26 Aug 2019 14:07)  T(F): 101.3 (27 Aug 2019 05:47), Max: 105 (26 Aug 2019 14:07)  HR: 111 (27 Aug 2019 05:47) (88 - 132)  BP: 167/73 (27 Aug 2019 05:47) (102/59 - 175/72)  BP(mean): --  RR: 20 (27 Aug 2019 05:47) (18 - 30)  SpO2: 96% (27 Aug 2019 06:10) (79% - 98%)    PHYSICAL EXAM  General: intubated unresponsive  CV: normal S1, S2, RRR  Lungs: clear to auscultation, no wheezes, no rales  Abdomen: soft, nontender, nondistended, normal BS  Ext: no edema  Skin: no rashes  Neuro: unresponsive   Central line: normal     LABS:                        8.4    1.5   )-----------( 101      ( 27 Aug 2019 07:19 )             24.8         Mean Cell Volume : 84.8 fl  Mean Cell Hemoglobin : 28.6 pg  Mean Cell Hemoglobin Concentration : 33.7 gm/dL  Auto Neutrophil # : x  Auto Lymphocyte # : x  Auto Monocyte # : x  Auto Eosinophil # : x  Auto Basophil # : x  Auto Neutrophil % : x  Auto Lymphocyte % : x  Auto Monocyte % : x  Auto Eosinophil % : x  Auto Basophil % : x    08-26  136  |  102  |  11  ----------------------------<  129<H>  3.4<L>   |  20<L>  |  0.74    Ca    8.5      26 Aug 2019 07:11  Phos  3.1     08-26  Mg     1.4     08-26    TPro  6.9  /  Alb  3.2<L>  /  TBili  0.5  /  DBili  x   /  AST  38  /  ALT  49<H>  /  AlkPhos  224<H>  08-26    PT/INR - ( 26 Aug 2019 07:13 )   PT: 15.8 sec;   INR: 1.37 ratio      PTT - ( 26 Aug 2019 07:13 )  PTT:33.2 sec    RADIOLOGY & ADDITIONAL STUDIES:  < from: CT Chest w/ IV Cont (08.25.19 @ 18:48) >  IMPRESSION:     Acute right-sided pulmonary emboli.    Pulmonary fibrosis. Increased groundglass opacities throughout both lungs   compared with prior imaging 8/9/2019 are nonspecific.    Heterogeneous striated nephrograms bilaterally consistent with   pyelonephritis. Diagnosis: AML FLT 3 (-)    Protocol/Chemo Regimen: Enrolled on Pf[employer] BRIGHT 1019- s/p Induction chemotherapy with Dauno/Cytarabine and daily Glasdegib vs Placebo    Day: 21    Pt endorsed: patient found unresponsive in bathroom with no pulse, PEA arrest     Review of Systems: n/a    Pain scale: 0    Diet: Mechanical soft, Consistent Carbohydrate diet, Glucerna TID     Allergies: cefepime (Rash)     ANTIMICROBIALS  acyclovir   Oral Tab/Cap 400 milliGRAM(s) Oral two times a day  meropenem  IVPB 1000 milliGRAM(s) IV Intermittent every 8 hours  posaconazole DR Tablet 300 milliGRAM(s) Oral daily  vancomycin  IVPB 1000 milliGRAM(s) IV Intermittent every 12 hours    STANDING MEDICATIONS  ALBUTerol    90 MICROgram(s) HFA Inhaler 1 Puff(s) Inhalation every 6 hours  ALBUTerol/ipratropium for Nebulization 3 milliLiter(s) Nebulizer every 6 hours  benzonatate 100 milliGRAM(s) Oral three times a day  Biotene Dry Mouth Oral Rinse 5 milliLiter(s) Swish and Spit three times a day  chlorhexidine 2% Cloths 1 Application(s) Topical <User Schedule>  dextrose 5%. 1000 milliLiter(s) IV Continuous <Continuous>  dextrose 50% Injectable 12.5 Gram(s) IV Push once  dextrose 50% Injectable 25 Gram(s) IV Push once  dextrose 50% Injectable 25 Gram(s) IV Push once  FIRST- Mouthwash  BLM 10 milliLiter(s) Swish and Spit four times a day  gabapentin 600 milliGRAM(s) Oral two times a day  hydrOXYzine hydrochloride 25 milliGRAM(s) Oral every 6 hours  insulin glargine Injectable (LANTUS) 18 Unit(s) SubCutaneous at bedtime  insulin lispro (HumaLOG) corrective regimen sliding scale   SubCutaneous three times a day before meals  insulin lispro (HumaLOG) corrective regimen sliding scale   SubCutaneous at bedtime  insulin lispro Injectable (HumaLOG) 10 Unit(s) SubCutaneous three times a day before meals  investigational chemo - general 1 Tablet(s) Oral every 24 hours  levothyroxine 75 MICROGram(s) Oral daily  lidocaine 2% Injectable 20 milliLiter(s) Local Injection once  losartan 100 milliGRAM(s) Oral daily  pantoprazole    Tablet 40 milliGRAM(s) Oral before breakfast  phytonadione   Solution 10 milliGRAM(s) Oral daily  simethicone 80 milliGRAM(s) Chew four times a day  sodium chloride 0.9% Bolus 500 milliLiter(s) IV Bolus once  sodium chloride 0.9%. 1000 milliLiter(s) IV Continuous <Continuous>  sodium chloride 3%  Inhalation 4 milliLiter(s) Inhalation two times a day  sucralfate 1 Gram(s) Oral four times a day  tiotropium 18 MICROgram(s) Capsule 1 Capsule(s) Inhalation daily    PRN MEDICATIONS  acetaminophen   Tablet .. 650 milliGRAM(s) Oral every 6 hours PRN  acetaminophen   Tablet .. 650 milliGRAM(s) Oral once PRN  aluminum hydroxide/magnesium hydroxide/simethicone Suspension 30 milliLiter(s) Oral every 6 hours PRN  dextrose 40% Gel 15 Gram(s) Oral once PRN  diphenhydrAMINE 25 milliGRAM(s) Oral every 6 hours PRN  glucagon  Injectable 1 milliGRAM(s) IntraMuscular once PRN  hydrocortisone 2.5% Lotion 1 Application(s) Topical three times a day PRN  lidocaine 2% Viscous 2 milliLiter(s) Oral every 2 hours PRN  loperamide 2 milliGRAM(s) Oral every 4 hours PRN  metoclopramide Injectable 10 milliGRAM(s) IV Push every 6 hours PRN  oxyCODONE    IR 5 milliGRAM(s) Oral every 4 hours PRN  polyethylene glycol 3350 17 Gram(s) Oral two times a day PRN    Vital Signs Last 24 Hrs  T(C): 38.5 (27 Aug 2019 05:47), Max: 40.6 (26 Aug 2019 14:07)  T(F): 101.3 (27 Aug 2019 05:47), Max: 105 (26 Aug 2019 14:07)  HR: 111 (27 Aug 2019 05:47) (88 - 132)  BP: 167/73 (27 Aug 2019 05:47) (102/59 - 175/72)  BP(mean): --  RR: 20 (27 Aug 2019 05:47) (18 - 30)  SpO2: 96% (27 Aug 2019 06:10) (79% - 98%)    PHYSICAL EXAM  General: intubated unresponsive  CV: normal S1, S2, RRR  Lungs: clear to auscultation, no wheezes, no rales  Abdomen: soft, nontender, nondistended, normal BS  Ext: no edema  Skin: no rashes  Neuro: unresponsive   Central line: normal     LABS:                        8.4    1.5   )-----------( 101      ( 27 Aug 2019 07:19 )             24.8         Mean Cell Volume : 84.8 fl  Mean Cell Hemoglobin : 28.6 pg  Mean Cell Hemoglobin Concentration : 33.7 gm/dL  Auto Neutrophil # : x  Auto Lymphocyte # : x  Auto Monocyte # : x  Auto Eosinophil # : x  Auto Basophil # : x  Auto Neutrophil % : x  Auto Lymphocyte % : x  Auto Monocyte % : x  Auto Eosinophil % : x  Auto Basophil % : x    08-26  136  |  102  |  11  ----------------------------<  129<H>  3.4<L>   |  20<L>  |  0.74    Ca    8.5      26 Aug 2019 07:11  Phos  3.1     08-26  Mg     1.4     08-26    TPro  6.9  /  Alb  3.2<L>  /  TBili  0.5  /  DBili  x   /  AST  38  /  ALT  49<H>  /  AlkPhos  224<H>  08-26    PT/INR - ( 26 Aug 2019 07:13 )   PT: 15.8 sec;   INR: 1.37 ratio      PTT - ( 26 Aug 2019 07:13 )  PTT:33.2 sec    RADIOLOGY & ADDITIONAL STUDIES:  < from: CT Chest w/ IV Cont (08.25.19 @ 18:48) >  IMPRESSION:     Acute right-sided pulmonary emboli.    Pulmonary fibrosis. Increased groundglass opacities throughout both lungs   compared with prior imaging 8/9/2019 are nonspecific.    Heterogeneous striated nephrograms bilaterally consistent with   pyelonephritis.

## 2019-08-27 NOTE — PROGRESS NOTE ADULT - ATTENDING COMMENTS
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 Pt on Pfizer trial 7+3+ Glasdegib/placebo. Day 20.   Day 14 marrow chemotherapeutic  [employer] [facility] results: NPM1, IDH2, DNMT3A mutations present.  On vanc and aztreonam for NF, remains febrile, temp spike o/n  await heme ct recovery  Monitor cbc, cmp daily  hx of Pruritus improved, rash on back resolved.  Rash CTCAE allergic reaction grade 2.    Mild transaminitis-  resolved, alk phos still incr liver U/S (-).    Supportive care, anti-emetics, IVFs,  Hx pulm fibrosis, monitor O2 status, encourage incentive spirometry  ID input apprerciated: CT C/A/P with contrast, c/w acute PE, ?pyleonephritis  NO AC given heme cta chely - high bleeding risk  ID f/u pt remains febrile  Lantus QHS and pre meal humalog, monito FSG, endo apprec  ECOG performance status 2. 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 Pt on Pfizer trial 7+3+ Glasdegib/placebo. Day 21.   Day 14 marrow chemotherapeutic  [employer] [facility] results: NPM1, IDH2, DNMT3A mutations present.  On vanc and aztreonam for NF, remains febrile,   Acute resp failure o/n was on venti mask,   s/p syncope in bathroom, required resusitation for cardiac arrest  pt transferred to MICU care, boarding in CCU  Neuro exam - c/w anoxic brain injury, pt on vent, unresponsive to stimuli  on levophed, tachy 120s, SBP 110s, resp rate c/w vent rate  family at bedside   Case d/w MICU team - further neuro assessment planned   Cont supportive care and transfusions as per MICU.

## 2019-08-27 NOTE — PROVIDER CONTACT NOTE (CRITICAL VALUE NOTIFICATION) - RECOMMENDATIONS
Follow up with CBC and CMP that was sent. Start pt on insulin gtt. Discuss with family goals of care and possible blood transfusion
monitor pt
1 unit of plt
1 unit of prbc
1 unit platelet
Continue to monitor
NP notified, transfuse platelets <10

## 2019-08-27 NOTE — RAPID RESPONSE TEAM SUMMARY - NSMEDICATIONSRRT_GEN_ALL_CORE
Epinephrine x4  Sodium bicarbonate x1  Calcium gluconate x1  NS bolus  Phenylephrine gtt  Heparin gtt

## 2019-08-27 NOTE — PROGRESS NOTE ADULT - PROBLEM SELECTOR PLAN 4
Seen on CT for fever workup on 8/25   Patient has not been receiving anticoagulation as patient has been thrombocytopenic - now counts recovering   Consider anticoagulation once platelet count consistently > 50,000

## 2019-08-27 NOTE — CHART NOTE - NSCHARTNOTEFT_GEN_A_CORE
Pt bedtime blood glucose 110.   Pt refusing Lantus tonight after offering half dose and explaining how Lantus works.   Pt still refusing to take Lantus tonight, fearing it will cause hypoglycemia in am.   RN aware.    Hortencia Youngblood ANP-BC  [id number]

## 2019-08-27 NOTE — PROGRESS NOTE ADULT - SUBJECTIVE AND OBJECTIVE BOX
Called to patient's bedside for intubation. Therapy initiated by primary medical team.    Patient Assessment: active code blue, patient found down outside bathroom, chest compressions and bag valve mask ventilation occurring, no signs of life    Medications Administered: none    Intubation:      [x] Direct Laryngoscopy    [ ] Video-Assisted Laryngoscopy   [ ] Fiberoptic Intubation    Blade: MAC4  Cormack-Lehane View: [x] 1     [ ] 2A     [ ] 2B     [ ] 3     [ ]  4  ETT Size: 7.5  Marking at Teeth: 22cm    Post-Intubation Vital Signs: ongoing resuscitation, no vital signs    Positive end-tidal carbon dioxide via EasyCap, positive bilateral breath sounds. CXR to be reviewed by primary team. Atraumatic intubation with no complications.      Carlos Alberto CAMARILLO  Anesthesiology Resident Called to patient's bedside for intubation. Therapy initiated by primary medical team.    Patient Assessment: active code blue, patient found unresponsive in bathroom, chest compressions and bag valve mask ventilation occurring, no signs of life    Medications Administered: none    Intubation:      [x] Direct Laryngoscopy    [ ] Video-Assisted Laryngoscopy   [ ] Fiberoptic Intubation    Blade: MAC4  Cormack-Lehane View: [x] 1     [ ] 2A     [ ] 2B     [ ] 3     [ ]  4  ETT Size: 7.5  Marking at Teeth: 22cm    Post-Intubation Vital Signs: ongoing resuscitation, no vital signs    Positive end-tidal carbon dioxide via EasyCap, positive bilateral breath sounds. CXR to be reviewed by primary team. Atraumatic intubation with no complications.      Carlos Alberto CAMARILLO  Anesthesiology Resident

## 2019-08-27 NOTE — CONSULT NOTE ADULT - SUBJECTIVE AND OBJECTIVE BOX
MICU Accept Note    CHIEF COMPLAINT: Cardiac arrest    HPI / INTERVAL HISTORY: 64 Y F has been admitted since 8/6 for induction of chemo for AML, had neutropenic fever. Has hx of pulmonary fibrosis, DM, was diagnosed with PE on 8/25 but could not be put on AC due to platelets <50K at the time. Today patient was found down in the bathroom unresponsive in the bathroom pulseless. Code blue was called after 4 rounds of epi, after 9ish minutes patient had ROSC and then lost pulses again with 1 more round of epi and ROSC again. CCU rapid response RN was present and transported pt to CCU with plan for admission under MICU service.    Upon my arrival in CCU patient was intubated by anesthesia during the rapid on the Vent with Fio2 100 PEEP 10 saturating in the high 80s, patient was tachycardic, post ROSC EKG showed stemi, cath fellow evaluated and spoke with attending and feel that patient would not benefit from cath at this moment. POCUS showed normal RV size, patient hypertensive on levophed gtt. Gas returned with pH 6.85 and lactate of 16, pushed 1 amp bicarb and started bicarb gtt.    PAST MEDICAL & SURGICAL HISTORY:  DVT (deep venous thrombosis): &#x27; 87   post-op cholecystectomy  Gallstones: &#x27; 87  Rheumatoid arthritis: dx: 9/2017  Type 2 diabetes mellitus: dx;  age 38  Carpal tunnel syndrome: Left  Hypothyroid  High cholesterol  Status post colonoscopy: &#x27; 2009    Negative  Normal spontaneous vaginal delivery: &#x27; 83 and &#x27;86  Status post cholecystectomy: &#x27; 87      FAMILY HISTORY:  Family history of coronary artery disease (Father)  Family history of diabetes mellitus (Father, Mother)      SOCIAL HISTORY:  Smoking: [  ] Never Smoked  [  ] Former Smoker (# packs x # years)  [  ] Current Smoker (# packs x # years)  Substance Use:   EtOH Use:   Marital Status: [  ] Single  [  ]   [  ]   [  ]   Sexual History:   Occupation:  Recent Travel:  Country of Birth:   Advance Directives:     HOME MEDICATIONS:      Allergies    cefepime (Rash)    Intolerances          REVIEW OF SYSTEMS:  Constitutional: No fevers, chills, weight loss, weight gain  HEENT: No vision problems, eye pain, nasal congestion, rhinorrhea, sore throat, dysphagia  CV: No chest pain, orthopnea, palpitations  Resp: No cough, dyspnea, wheezing, hemoptysis  GI: No nausea, vomiting, diarrhea, constipation, abdominal pain  : [ ] dysuria [ ] nocturia [ ] hematuria [ ] increased urinary frequency  Musculoskeletal: [ ] back pain [ ] myalgias [ ] arthralgias [ ] fracture  Skin: [ ] rash [ ] itch  Neurological: [ ] headache [ ] dizziness [ ] syncope [ ] weakness [ ] numbness  Psychiatric: [ ] anxiety [ ] depression  Endocrine: [ ] diabetes [ ] thyroid problem  Hematologic/Lymphatic: [ ] anemia [ ] bleeding problem  Allergic/Immunologic: [ ] itchy eyes [ ] nasal discharge [ ] hives [ ] angioedema  [ ] All other systems negative  [ ] Unable to assess ROS because ________    OBJECTIVE:  ICU Vital Signs Last 24 Hrs  T(C): 38.5 (27 Aug 2019 05:47), Max: 40.6 (26 Aug 2019 14:07)  T(F): 101.3 (27 Aug 2019 05:47), Max: 105 (26 Aug 2019 14:07)  HR: 134 (27 Aug 2019 08:30) (46 - 138)  BP: 97/69 (27 Aug 2019 08:30) (68/51 - 175/72)  BP(mean): 76 (27 Aug 2019 08:30) (56 - 116)  ABP: --  ABP(mean): --  RR: 19 (27 Aug 2019 08:30) (4 - 30)  SpO2: 94% (27 Aug 2019 08:30) (79% - 98%)        08-26 @ 07:01  -  08-27 @ 07:00  --------------------------------------------------------  IN: 4182 mL / OUT: 1750 mL / NET: 2432 mL      CAPILLARY BLOOD GLUCOSE      POCT Blood Glucose.: 110 mg/dL (26 Aug 2019 21:35)      PHYSICAL EXAM:  General:   HEENT:   Neck:   Chest/Lungs:  Heart:  Abdomen:   Extremities:   Skin:   Neuro:   Psych:     LINES:     HOSPITAL MEDICATIONS:  MEDICATIONS  (STANDING):  acyclovir   Oral Tab/Cap 400 milliGRAM(s) Oral two times a day  ALBUTerol    90 MICROgram(s) HFA Inhaler 1 Puff(s) Inhalation every 6 hours  ALBUTerol/ipratropium for Nebulization 3 milliLiter(s) Nebulizer every 6 hours  benzonatate 100 milliGRAM(s) Oral three times a day  Biotene Dry Mouth Oral Rinse 5 milliLiter(s) Swish and Spit three times a day  chlorhexidine 0.12% Liquid 15 milliLiter(s) Oral Mucosa every 12 hours  chlorhexidine 2% Cloths 1 Application(s) Topical <User Schedule>  chlorhexidine 4% Liquid 1 Application(s) Topical <User Schedule>  dextrose 5%. 1000 milliLiter(s) (50 mL/Hr) IV Continuous <Continuous>  dextrose 50% Injectable 12.5 Gram(s) IV Push once  dextrose 50% Injectable 25 Gram(s) IV Push once  dextrose 50% Injectable 25 Gram(s) IV Push once  FIRST- Mouthwash  BLM 10 milliLiter(s) Swish and Spit four times a day  gabapentin 600 milliGRAM(s) Oral two times a day  hydrOXYzine hydrochloride 25 milliGRAM(s) Oral every 6 hours  insulin glargine Injectable (LANTUS) 18 Unit(s) SubCutaneous at bedtime  insulin lispro (HumaLOG) corrective regimen sliding scale   SubCutaneous three times a day before meals  insulin lispro (HumaLOG) corrective regimen sliding scale   SubCutaneous at bedtime  insulin lispro Injectable (HumaLOG) 10 Unit(s) SubCutaneous three times a day before meals  investigational chemo - general 1 Tablet(s) Oral every 24 hours  levothyroxine 75 MICROGram(s) Oral daily  losartan 100 milliGRAM(s) Oral daily  meropenem  IVPB 1000 milliGRAM(s) IV Intermittent every 8 hours  norepinephrine Infusion 0.05 MICROgram(s)/kG/Min (5.625 mL/Hr) IV Continuous <Continuous>  pantoprazole    Tablet 40 milliGRAM(s) Oral before breakfast  phytonadione   Solution 10 milliGRAM(s) Oral daily  posaconazole DR Tablet 300 milliGRAM(s) Oral daily  simethicone 80 milliGRAM(s) Chew four times a day  sodium bicarbonate  Infusion 0.25 mEq/kG/Hr (100 mL/Hr) IV Continuous <Continuous>  sodium bicarbonate  Injectable 50 milliEquivalent(s) IV Push once  sodium chloride 0.9% Bolus 500 milliLiter(s) IV Bolus once  sodium chloride 0.9%. 1000 milliLiter(s) (50 mL/Hr) IV Continuous <Continuous>  sodium chloride 3%  Inhalation 4 milliLiter(s) Inhalation two times a day  sucralfate 1 Gram(s) Oral four times a day  tiotropium 18 MICROgram(s) Capsule 1 Capsule(s) Inhalation daily  vancomycin  IVPB 1000 milliGRAM(s) IV Intermittent every 12 hours  vancomycin  IVPB        MEDICATIONS  (PRN):  acetaminophen   Tablet .. 650 milliGRAM(s) Oral every 6 hours PRN Temp greater or equal to 38C (100.4F), Mild Pain (1 - 3)  acetaminophen   Tablet .. 650 milliGRAM(s) Oral once PRN Temp greater or equal to 38C (100.4F)  aluminum hydroxide/magnesium hydroxide/simethicone Suspension 30 milliLiter(s) Oral every 6 hours PRN Dyspepsia  dextrose 40% Gel 15 Gram(s) Oral once PRN Blood Glucose LESS THAN 70 milliGRAM(s)/deciliter  diphenhydrAMINE 25 milliGRAM(s) Oral every 6 hours PRN Rash and/or Itching  glucagon  Injectable 1 milliGRAM(s) IntraMuscular once PRN Glucose LESS THAN 70 milligrams/deciliter  hydrocortisone 2.5% Lotion 1 Application(s) Topical three times a day PRN Itching  lidocaine 2% Viscous 2 milliLiter(s) Oral every 2 hours PRN mouth ulcer  loperamide 2 milliGRAM(s) Oral every 4 hours PRN Diarrhea  metoclopramide Injectable 10 milliGRAM(s) IV Push every 6 hours PRN nausea/vommiting  oxyCODONE    IR 5 milliGRAM(s) Oral every 4 hours PRN Moderate Pain (4 - 6)  polyethylene glycol 3350 17 Gram(s) Oral two times a day PRN Constipation      LABS:                        8.4    1.5   )-----------( 101      ( 27 Aug 2019 07:19 )             24.8     Hgb Trend: 8.4<--, 8.0<--, 8.1<--, 8.5<--, 6.6<--  08-27    132<L>  |  100  |  8   ----------------------------<  260<H>  3.8   |  18<L>  |  0.67    Ca    7.8<L>      27 Aug 2019 07:19  Phos  2.5     08-27  Mg     1.7     08-27    TPro  7.2  /  Alb  2.9<L>  /  TBili  0.5  /  DBili  x   /  AST  46<H>  /  ALT  60<H>  /  AlkPhos  253<H>  08-27    Creatinine Trend: 0.67<--, 0.74<--, 0.62<--, 0.61<--, 0.64<--, 0.59<--  PT/INR - ( 26 Aug 2019 07:13 )   PT: 15.8 sec;   INR: 1.37 ratio         PTT - ( 26 Aug 2019 07:13 )  PTT:33.2 sec    Arterial Blood Gas:  08-27 @ 08:17  6.85/52/114/8/91/-23.4  ABG lactate: --  Arterial Blood Gas:  08-27 @ 06:48  7.39/29/48/17/84/-6.3  ABG lactate: --        MICROBIOLOGY:     RADIOLOGY & ADDITIONAL TESTS: MICU Accept Note    CHIEF COMPLAINT: Cardiac arrest    HPI / INTERVAL HISTORY: 64 Y F has been admitted since 8/6 for induction of chemo for AML, had neutropenic fever. Has hx of pulmonary fibrosis, DM, was diagnosed with PE on 8/25 but could not be put on AC due to platelets <50K at the time. Today patient was found down in the bathroom unresponsive in the bathroom pulseless. Code blue was called after 4 rounds of epi, after 9ish minutes patient had ROSC and then lost pulses again with 1 more round of epi and ROSC again. CCU rapid response RN was present and transported pt to CCU with plan for admission under MICU service.    Upon my arrival in CCU patient was intubated by anesthesia during the rapid on the Vent with Fio2 100 PEEP 10 saturating in the high 80s, patient was tachycardic, post ROSC EKG showed stemi, cath fellow evaluated and spoke with attending and feel that patient would not benefit from cath at this moment. POCUS showed normal RV size, patient hypertensive on levophed gtt. Gas returned with pH 6.85 and lactate of 16, pushed 1 amp bicarb and started bicarb gtt.    PAST MEDICAL & SURGICAL HISTORY:  DVT (deep venous thrombosis): &#x27; 87   post-op cholecystectomy  Gallstones: &#x27; 87  Rheumatoid arthritis: dx: 9/2017  Type 2 diabetes mellitus: dx;  age 38  Carpal tunnel syndrome: Left  Hypothyroid  High cholesterol  Status post colonoscopy: &#x27; 2009    Negative  Normal spontaneous vaginal delivery: &#x27; 83 and &#x27;86  Status post cholecystectomy: &#x27; 87      FAMILY HISTORY:  Family history of coronary artery disease (Father)  Family history of diabetes mellitus (Father, Mother)      SOCIAL HISTORY:  Unable to obtain 2/2 unresponsive status    Allergies    cefepime (Rash)    Intolerances          REVIEW OF SYSTEMS:  : [ ] dysuria [ ] nocturia [ ] hematuria [ ] increased urinary frequency  Musculoskeletal: [ ] back pain [ ] myalgias [ ] arthralgias [ ] fracture  Skin: [ ] rash [ ] itch  Neurological: [ ] headache [ ] dizziness [ ] syncope [ ] weakness [ ] numbness  Psychiatric: [ ] anxiety [ ] depression  Endocrine: [ ] diabetes [ ] thyroid problem  Hematologic/Lymphatic: [ ] anemia [ ] bleeding problem  Allergic/Immunologic: [ ] itchy eyes [ ] nasal discharge [ ] hives [ ] angioedema  [ ] All other systems negative  [X] Unable to assess ROS because Patient unresponsive    OBJECTIVE:  ICU Vital Signs Last 24 Hrs  T(C): 38.5 (27 Aug 2019 05:47), Max: 40.6 (26 Aug 2019 14:07)  T(F): 101.3 (27 Aug 2019 05:47), Max: 105 (26 Aug 2019 14:07)  HR: 134 (27 Aug 2019 08:30) (46 - 138)  BP: 97/69 (27 Aug 2019 08:30) (68/51 - 175/72)  BP(mean): 76 (27 Aug 2019 08:30) (56 - 116)  ABP: --  ABP(mean): --  RR: 19 (27 Aug 2019 08:30) (4 - 30)  SpO2: 94% (27 Aug 2019 08:30) (79% - 98%)        08-26 @ 07:01  -  08-27 @ 07:00  --------------------------------------------------------  IN: 4182 mL / OUT: 1750 mL / NET: 2432 mL      CAPILLARY BLOOD GLUCOSE      POCT Blood Glucose.: 110 mg/dL (26 Aug 2019 21:35)      PHYSICAL EXAM:  Gen: Intubated, not sedated, unresponsive  HEENT: Pupils fixed dilated unresponsive to light, no corneal reflex  CV: Tachycardia, +S1/S2  Resp: Diffuse rhconci, diminished breath sounds at the BL bases  GI: Abdomen soft non-distended, no masses  MSK: No open wounds, no bruising, no LE edema  Neuro: Intubated no sedation, No corneal reflex, not triggering the vent, no response to voice or pain, GCS 3        LINES: R IJ triple lumen, L radial artery A line    HOSPITAL MEDICATIONS:  MEDICATIONS  (STANDING):  acyclovir   Oral Tab/Cap 400 milliGRAM(s) Oral two times a day  ALBUTerol    90 MICROgram(s) HFA Inhaler 1 Puff(s) Inhalation every 6 hours  ALBUTerol/ipratropium for Nebulization 3 milliLiter(s) Nebulizer every 6 hours  benzonatate 100 milliGRAM(s) Oral three times a day  Biotene Dry Mouth Oral Rinse 5 milliLiter(s) Swish and Spit three times a day  chlorhexidine 0.12% Liquid 15 milliLiter(s) Oral Mucosa every 12 hours  chlorhexidine 2% Cloths 1 Application(s) Topical <User Schedule>  chlorhexidine 4% Liquid 1 Application(s) Topical <User Schedule>  dextrose 5%. 1000 milliLiter(s) (50 mL/Hr) IV Continuous <Continuous>  dextrose 50% Injectable 12.5 Gram(s) IV Push once  dextrose 50% Injectable 25 Gram(s) IV Push once  dextrose 50% Injectable 25 Gram(s) IV Push once  FIRST- Mouthwash  BLM 10 milliLiter(s) Swish and Spit four times a day  gabapentin 600 milliGRAM(s) Oral two times a day  hydrOXYzine hydrochloride 25 milliGRAM(s) Oral every 6 hours  insulin glargine Injectable (LANTUS) 18 Unit(s) SubCutaneous at bedtime  insulin lispro (HumaLOG) corrective regimen sliding scale   SubCutaneous three times a day before meals  insulin lispro (HumaLOG) corrective regimen sliding scale   SubCutaneous at bedtime  insulin lispro Injectable (HumaLOG) 10 Unit(s) SubCutaneous three times a day before meals  investigational chemo - general 1 Tablet(s) Oral every 24 hours  levothyroxine 75 MICROGram(s) Oral daily  losartan 100 milliGRAM(s) Oral daily  meropenem  IVPB 1000 milliGRAM(s) IV Intermittent every 8 hours  norepinephrine Infusion 0.05 MICROgram(s)/kG/Min (5.625 mL/Hr) IV Continuous <Continuous>  pantoprazole    Tablet 40 milliGRAM(s) Oral before breakfast  phytonadione   Solution 10 milliGRAM(s) Oral daily  posaconazole DR Tablet 300 milliGRAM(s) Oral daily  simethicone 80 milliGRAM(s) Chew four times a day  sodium bicarbonate  Infusion 0.25 mEq/kG/Hr (100 mL/Hr) IV Continuous <Continuous>  sodium bicarbonate  Injectable 50 milliEquivalent(s) IV Push once  sodium chloride 0.9% Bolus 500 milliLiter(s) IV Bolus once  sodium chloride 0.9%. 1000 milliLiter(s) (50 mL/Hr) IV Continuous <Continuous>  sodium chloride 3%  Inhalation 4 milliLiter(s) Inhalation two times a day  sucralfate 1 Gram(s) Oral four times a day  tiotropium 18 MICROgram(s) Capsule 1 Capsule(s) Inhalation daily  vancomycin  IVPB 1000 milliGRAM(s) IV Intermittent every 12 hours  vancomycin  IVPB        MEDICATIONS  (PRN):  acetaminophen   Tablet .. 650 milliGRAM(s) Oral every 6 hours PRN Temp greater or equal to 38C (100.4F), Mild Pain (1 - 3)  acetaminophen   Tablet .. 650 milliGRAM(s) Oral once PRN Temp greater or equal to 38C (100.4F)  aluminum hydroxide/magnesium hydroxide/simethicone Suspension 30 milliLiter(s) Oral every 6 hours PRN Dyspepsia  dextrose 40% Gel 15 Gram(s) Oral once PRN Blood Glucose LESS THAN 70 milliGRAM(s)/deciliter  diphenhydrAMINE 25 milliGRAM(s) Oral every 6 hours PRN Rash and/or Itching  glucagon  Injectable 1 milliGRAM(s) IntraMuscular once PRN Glucose LESS THAN 70 milligrams/deciliter  hydrocortisone 2.5% Lotion 1 Application(s) Topical three times a day PRN Itching  lidocaine 2% Viscous 2 milliLiter(s) Oral every 2 hours PRN mouth ulcer  loperamide 2 milliGRAM(s) Oral every 4 hours PRN Diarrhea  metoclopramide Injectable 10 milliGRAM(s) IV Push every 6 hours PRN nausea/vommiting  oxyCODONE    IR 5 milliGRAM(s) Oral every 4 hours PRN Moderate Pain (4 - 6)  polyethylene glycol 3350 17 Gram(s) Oral two times a day PRN Constipation      LABS:                        8.4    1.5   )-----------( 101      ( 27 Aug 2019 07:19 )             24.8     Hgb Trend: 8.4<--, 8.0<--, 8.1<--, 8.5<--, 6.6<--  08-27    132<L>  |  100  |  8   ----------------------------<  260<H>  3.8   |  18<L>  |  0.67    Ca    7.8<L>      27 Aug 2019 07:19  Phos  2.5     08-27  Mg     1.7     08-27    TPro  7.2  /  Alb  2.9<L>  /  TBili  0.5  /  DBili  x   /  AST  46<H>  /  ALT  60<H>  /  AlkPhos  253<H>  08-27    Creatinine Trend: 0.67<--, 0.74<--, 0.62<--, 0.61<--, 0.64<--, 0.59<--  PT/INR - ( 26 Aug 2019 07:13 )   PT: 15.8 sec;   INR: 1.37 ratio         PTT - ( 26 Aug 2019 07:13 )  PTT:33.2 sec    Arterial Blood Gas:  08-27 @ 08:17  6.85/52/114/8/91/-23.4  ABG lactate: --  Arterial Blood Gas:  08-27 @ 06:48  7.39/29/48/17/84/-6.3  ABG lactate: --

## 2019-08-27 NOTE — PROCEDURE NOTE - NSPROCDETAILS_GEN_ALL_CORE
sutured in place/ultrasound guidance/location identified, draped/prepped, sterile technique used, needle inserted/introduced/positive blood return obtained via catheter/all materials/supplies accounted for at end of procedure/connected to a pressurized flush line/Seldinger technique

## 2019-08-27 NOTE — PROGRESS NOTE ADULT - ASSESSMENT
63 yo F with history of DM,  RA , diagnosed in 2016, chronic depression now with newly diagnosed AML  admitted for induction chemotherapy,  finished 7 days of induction daunorubicin and cytarabine on 8/ 15 via TLC placed on 8/6,  and now on daily PO glasdegib. Patient has had  rigors, fever (101 yesterday). Absolute neutrophil count is 0.   Currently on vancomycin , Aztreonam 2g q8, posaconazole. Reported rash and itching with Cefepime.   Blood cx and urine cx have been negative. Patient was pancultured again yesterday.  Chest x ray shows ILD which is c/w patient's h/o PF.     Neutropenic fever   AML s/p induction   On glasdegib    8/27 - Code blue, resp failure, transferred to ICU

## 2019-08-27 NOTE — PROGRESS NOTE ADULT - PROBLEM SELECTOR PLAN 1
-still with fever  -CT noted - new PE  -cx negative  -no severe mucositis   -TLC look ok  -meropenem 1 gm iv q8 - monitor for rash  -suspect noninfectious cause of fevers - drug/PE  -check LE dopplers  -AC per primary team  -now coded with resp failure

## 2019-08-27 NOTE — PROGRESS NOTE ADULT - PROBLEM SELECTOR PLAN 1
Enrolled on [employer] BRIGHT trial s/p Induction chemotherapy with Dauno/Cytarabine and daily Glasdegib vs Placebo   Molecular studies pending  8/20 Day 14 BM bx, chemotherapeutic   Monitor CBC/Lytes and transfuse/replete PRN  Strict Is and Os/ Daily weights/ Mouth Care  Carafate for sore throat, mucositis   Allopurinol 300mg PO daily-stopped 2/2 rash  IVF hydration  Antiemetics  ECOG performance status 2  Atarax ATC, hydrocortisone cream, triamcinolone cream, oral benadryl prn - rash improved   Awaiting count recovery  Appreciate MICU care

## 2019-08-28 DIAGNOSIS — I46.9 CARDIAC ARREST, CAUSE UNSPECIFIED: ICD-10-CM

## 2019-08-28 LAB
ALBUMIN SERPL ELPH-MCNC: 1.6 G/DL — LOW (ref 3.3–5)
ALBUMIN SERPL ELPH-MCNC: 1.7 G/DL — LOW (ref 3.3–5)
ALBUMIN SERPL ELPH-MCNC: 1.9 G/DL — LOW (ref 3.3–5)
ALBUMIN SERPL ELPH-MCNC: 1.9 G/DL — LOW (ref 3.3–5)
ALP SERPL-CCNC: 261 U/L — HIGH (ref 40–120)
ALP SERPL-CCNC: 263 U/L — HIGH (ref 40–120)
ALP SERPL-CCNC: 293 U/L — HIGH (ref 40–120)
ALP SERPL-CCNC: 343 U/L — HIGH (ref 40–120)
ALT FLD-CCNC: 638 U/L — HIGH (ref 10–45)
ALT FLD-CCNC: 658 U/L — HIGH (ref 10–45)
ALT FLD-CCNC: 684 U/L — HIGH (ref 10–45)
ALT FLD-CCNC: 725 U/L — HIGH (ref 10–45)
ANION GAP SERPL CALC-SCNC: 15 MMOL/L — SIGNIFICANT CHANGE UP (ref 5–17)
ANION GAP SERPL CALC-SCNC: 17 MMOL/L — SIGNIFICANT CHANGE UP (ref 5–17)
APTT BLD: 44 SEC — HIGH (ref 27.5–36.3)
AST SERPL-CCNC: 1068 U/L — HIGH (ref 10–40)
AST SERPL-CCNC: 1212 U/L — HIGH (ref 10–40)
AST SERPL-CCNC: 1265 U/L — HIGH (ref 10–40)
AST SERPL-CCNC: 976 U/L — HIGH (ref 10–40)
B-OH-BUTYR SERPL-SCNC: 0 MMOL/L — SIGNIFICANT CHANGE UP
BASE EXCESS BLDA CALC-SCNC: -1.1 MMOL/L — SIGNIFICANT CHANGE UP (ref -2–2)
BASE EXCESS BLDA CALC-SCNC: 2.5 MMOL/L — HIGH (ref -2–2)
BILIRUB SERPL-MCNC: 1.1 MG/DL — SIGNIFICANT CHANGE UP (ref 0.2–1.2)
BILIRUB SERPL-MCNC: 1.5 MG/DL — HIGH (ref 0.2–1.2)
BILIRUB SERPL-MCNC: 1.8 MG/DL — HIGH (ref 0.2–1.2)
BILIRUB SERPL-MCNC: 2.2 MG/DL — HIGH (ref 0.2–1.2)
BUN SERPL-MCNC: 18 MG/DL — SIGNIFICANT CHANGE UP (ref 7–23)
BUN SERPL-MCNC: 19 MG/DL — SIGNIFICANT CHANGE UP (ref 7–23)
BUN SERPL-MCNC: 20 MG/DL — SIGNIFICANT CHANGE UP (ref 7–23)
BUN SERPL-MCNC: 22 MG/DL — SIGNIFICANT CHANGE UP (ref 7–23)
CALCIUM SERPL-MCNC: 6.2 MG/DL — CRITICAL LOW (ref 8.4–10.5)
CALCIUM SERPL-MCNC: 6.5 MG/DL — CRITICAL LOW (ref 8.4–10.5)
CALCIUM SERPL-MCNC: 6.6 MG/DL — LOW (ref 8.4–10.5)
CALCIUM SERPL-MCNC: 6.7 MG/DL — LOW (ref 8.4–10.5)
CHLORIDE SERPL-SCNC: 95 MMOL/L — LOW (ref 96–108)
CHLORIDE SERPL-SCNC: 96 MMOL/L — SIGNIFICANT CHANGE UP (ref 96–108)
CHLORIDE SERPL-SCNC: 96 MMOL/L — SIGNIFICANT CHANGE UP (ref 96–108)
CHLORIDE SERPL-SCNC: 98 MMOL/L — SIGNIFICANT CHANGE UP (ref 96–108)
CO2 BLDA-SCNC: 26 MMOL/L — SIGNIFICANT CHANGE UP (ref 22–30)
CO2 BLDA-SCNC: 29 MMOL/L — SIGNIFICANT CHANGE UP (ref 22–30)
CO2 SERPL-SCNC: 22 MMOL/L — SIGNIFICANT CHANGE UP (ref 22–31)
CO2 SERPL-SCNC: 23 MMOL/L — SIGNIFICANT CHANGE UP (ref 22–31)
CO2 SERPL-SCNC: 25 MMOL/L — SIGNIFICANT CHANGE UP (ref 22–31)
CO2 SERPL-SCNC: 27 MMOL/L — SIGNIFICANT CHANGE UP (ref 22–31)
CREAT SERPL-MCNC: 2.4 MG/DL — HIGH (ref 0.5–1.3)
CREAT SERPL-MCNC: 2.65 MG/DL — HIGH (ref 0.5–1.3)
CREAT SERPL-MCNC: 3.03 MG/DL — HIGH (ref 0.5–1.3)
CREAT SERPL-MCNC: 3.14 MG/DL — HIGH (ref 0.5–1.3)
CULTURE RESULTS: NO GROWTH — SIGNIFICANT CHANGE UP
GAS PNL BLDA: SIGNIFICANT CHANGE UP
GLUCOSE BLDC GLUCOMTR-MCNC: 111 MG/DL — HIGH (ref 70–99)
GLUCOSE BLDC GLUCOMTR-MCNC: 126 MG/DL — HIGH (ref 70–99)
GLUCOSE BLDC GLUCOMTR-MCNC: 127 MG/DL — HIGH (ref 70–99)
GLUCOSE BLDC GLUCOMTR-MCNC: 157 MG/DL — HIGH (ref 70–99)
GLUCOSE BLDC GLUCOMTR-MCNC: 168 MG/DL — HIGH (ref 70–99)
GLUCOSE BLDC GLUCOMTR-MCNC: 197 MG/DL — HIGH (ref 70–99)
GLUCOSE BLDC GLUCOMTR-MCNC: 233 MG/DL — HIGH (ref 70–99)
GLUCOSE BLDC GLUCOMTR-MCNC: 248 MG/DL — HIGH (ref 70–99)
GLUCOSE BLDC GLUCOMTR-MCNC: 263 MG/DL — HIGH (ref 70–99)
GLUCOSE BLDC GLUCOMTR-MCNC: 277 MG/DL — HIGH (ref 70–99)
GLUCOSE BLDC GLUCOMTR-MCNC: 323 MG/DL — HIGH (ref 70–99)
GLUCOSE BLDC GLUCOMTR-MCNC: 339 MG/DL — HIGH (ref 70–99)
GLUCOSE BLDC GLUCOMTR-MCNC: 370 MG/DL — HIGH (ref 70–99)
GLUCOSE BLDC GLUCOMTR-MCNC: 380 MG/DL — HIGH (ref 70–99)
GLUCOSE BLDC GLUCOMTR-MCNC: 404 MG/DL — HIGH (ref 70–99)
GLUCOSE BLDC GLUCOMTR-MCNC: 406 MG/DL — HIGH (ref 70–99)
GLUCOSE BLDC GLUCOMTR-MCNC: 72 MG/DL — SIGNIFICANT CHANGE UP (ref 70–99)
GLUCOSE SERPL-MCNC: 134 MG/DL — HIGH (ref 70–99)
GLUCOSE SERPL-MCNC: 194 MG/DL — HIGH (ref 70–99)
GLUCOSE SERPL-MCNC: 323 MG/DL — HIGH (ref 70–99)
GLUCOSE SERPL-MCNC: 409 MG/DL — HIGH (ref 70–99)
HCO3 BLDA-SCNC: 24 MMOL/L — SIGNIFICANT CHANGE UP (ref 21–29)
HCO3 BLDA-SCNC: 28 MMOL/L — SIGNIFICANT CHANGE UP (ref 21–29)
HCT VFR BLD CALC: 24.2 % — LOW (ref 34.5–45)
HCT VFR BLD CALC: 24.5 % — LOW (ref 34.5–45)
HGB BLD-MCNC: 8.3 G/DL — LOW (ref 11.5–15.5)
HGB BLD-MCNC: 8.7 G/DL — LOW (ref 11.5–15.5)
HOROWITZ INDEX BLDA+IHG-RTO: 50 — SIGNIFICANT CHANGE UP
HOROWITZ INDEX BLDA+IHG-RTO: 60 — SIGNIFICANT CHANGE UP
INR BLD: 2.24 RATIO — HIGH (ref 0.88–1.16)
MCHC RBC-ENTMCNC: 29.9 PG — SIGNIFICANT CHANGE UP (ref 27–34)
MCHC RBC-ENTMCNC: 30.2 PG — SIGNIFICANT CHANGE UP (ref 27–34)
MCHC RBC-ENTMCNC: 34.4 GM/DL — SIGNIFICANT CHANGE UP (ref 32–36)
MCHC RBC-ENTMCNC: 35.3 GM/DL — SIGNIFICANT CHANGE UP (ref 32–36)
MCV RBC AUTO: 85.7 FL — SIGNIFICANT CHANGE UP (ref 80–100)
MCV RBC AUTO: 86.9 FL — SIGNIFICANT CHANGE UP (ref 80–100)
PCO2 BLDA: 49 MMHG — HIGH (ref 32–46)
PCO2 BLDA: 50 MMHG — HIGH (ref 32–46)
PH BLDA: 7.32 — LOW (ref 7.35–7.45)
PH BLDA: 7.37 — SIGNIFICANT CHANGE UP (ref 7.35–7.45)
PLATELET # BLD AUTO: 123 K/UL — LOW (ref 150–400)
PLATELET # BLD AUTO: 133 K/UL — LOW (ref 150–400)
PO2 BLDA: 126 MMHG — HIGH (ref 74–108)
PO2 BLDA: 64 MMHG — LOW (ref 74–108)
POTASSIUM SERPL-MCNC: 3 MMOL/L — LOW (ref 3.5–5.3)
POTASSIUM SERPL-MCNC: 3.2 MMOL/L — LOW (ref 3.5–5.3)
POTASSIUM SERPL-MCNC: 3.3 MMOL/L — LOW (ref 3.5–5.3)
POTASSIUM SERPL-MCNC: 3.9 MMOL/L — SIGNIFICANT CHANGE UP (ref 3.5–5.3)
POTASSIUM SERPL-SCNC: 3 MMOL/L — LOW (ref 3.5–5.3)
POTASSIUM SERPL-SCNC: 3.2 MMOL/L — LOW (ref 3.5–5.3)
POTASSIUM SERPL-SCNC: 3.3 MMOL/L — LOW (ref 3.5–5.3)
POTASSIUM SERPL-SCNC: 3.9 MMOL/L — SIGNIFICANT CHANGE UP (ref 3.5–5.3)
PROT SERPL-MCNC: 4.5 G/DL — LOW (ref 6–8.3)
PROT SERPL-MCNC: 4.7 G/DL — LOW (ref 6–8.3)
PROT SERPL-MCNC: 4.7 G/DL — LOW (ref 6–8.3)
PROT SERPL-MCNC: 4.9 G/DL — LOW (ref 6–8.3)
PROTHROM AB SERPL-ACNC: 26.2 SEC — HIGH (ref 10–12.9)
RBC # BLD: 2.78 M/UL — LOW (ref 3.8–5.2)
RBC # BLD: 2.87 M/UL — LOW (ref 3.8–5.2)
RBC # FLD: 12.7 % — SIGNIFICANT CHANGE UP (ref 10.3–14.5)
RBC # FLD: 12.8 % — SIGNIFICANT CHANGE UP (ref 10.3–14.5)
SAO2 % BLDA: 93 % — SIGNIFICANT CHANGE UP (ref 92–96)
SAO2 % BLDA: 98 % — HIGH (ref 92–96)
SODIUM SERPL-SCNC: 134 MMOL/L — LOW (ref 135–145)
SODIUM SERPL-SCNC: 135 MMOL/L — SIGNIFICANT CHANGE UP (ref 135–145)
SODIUM SERPL-SCNC: 137 MMOL/L — SIGNIFICANT CHANGE UP (ref 135–145)
SODIUM SERPL-SCNC: 138 MMOL/L — SIGNIFICANT CHANGE UP (ref 135–145)
SPECIMEN SOURCE: SIGNIFICANT CHANGE UP
WBC # BLD: 1.3 K/UL — LOW (ref 3.8–10.5)
WBC # BLD: 1.9 K/UL — LOW (ref 3.8–10.5)
WBC # FLD AUTO: 1.3 K/UL — LOW (ref 3.8–10.5)
WBC # FLD AUTO: 1.9 K/UL — LOW (ref 3.8–10.5)

## 2019-08-28 PROCEDURE — 99235 HOSP IP/OBS SAME DATE MOD 70: CPT

## 2019-08-28 PROCEDURE — 93010 ELECTROCARDIOGRAM REPORT: CPT

## 2019-08-28 PROCEDURE — 99232 SBSQ HOSP IP/OBS MODERATE 35: CPT

## 2019-08-28 PROCEDURE — 99291 CRITICAL CARE FIRST HOUR: CPT

## 2019-08-28 PROCEDURE — 71045 X-RAY EXAM CHEST 1 VIEW: CPT | Mod: 26,76

## 2019-08-28 RX ORDER — ACYCLOVIR SODIUM 500 MG
400 VIAL (EA) INTRAVENOUS
Refills: 0 | Status: DISCONTINUED | OUTPATIENT
Start: 2019-08-28 | End: 2019-08-29

## 2019-08-28 RX ORDER — SODIUM BICARBONATE 1 MEQ/ML
0.19 SYRINGE (ML) INTRAVENOUS
Qty: 150 | Refills: 0 | Status: DISCONTINUED | OUTPATIENT
Start: 2019-08-28 | End: 2019-08-29

## 2019-08-28 RX ORDER — POSACONAZOLE 100 MG/1
300 TABLET, DELAYED RELEASE ORAL DAILY
Refills: 0 | Status: DISCONTINUED | OUTPATIENT
Start: 2019-08-28 | End: 2019-08-28

## 2019-08-28 RX ORDER — POTASSIUM CHLORIDE 20 MEQ
40 PACKET (EA) ORAL ONCE
Refills: 0 | Status: COMPLETED | OUTPATIENT
Start: 2019-08-28 | End: 2019-08-28

## 2019-08-28 RX ORDER — POSACONAZOLE 100 MG/1
200 TABLET, DELAYED RELEASE ORAL THREE TIMES A DAY
Refills: 0 | Status: DISCONTINUED | OUTPATIENT
Start: 2019-08-28 | End: 2019-08-29

## 2019-08-28 RX ORDER — ACYCLOVIR SODIUM 500 MG
400 VIAL (EA) INTRAVENOUS
Refills: 0 | Status: DISCONTINUED | OUTPATIENT
Start: 2019-08-28 | End: 2019-08-28

## 2019-08-28 RX ORDER — POTASSIUM CHLORIDE 20 MEQ
10 PACKET (EA) ORAL
Refills: 0 | Status: COMPLETED | OUTPATIENT
Start: 2019-08-28 | End: 2019-08-28

## 2019-08-28 RX ORDER — POTASSIUM CHLORIDE 20 MEQ
20 PACKET (EA) ORAL ONCE
Refills: 0 | Status: COMPLETED | OUTPATIENT
Start: 2019-08-28 | End: 2019-08-28

## 2019-08-28 RX ORDER — CALCIUM GLUCONATE 100 MG/ML
1 VIAL (ML) INTRAVENOUS ONCE
Refills: 0 | Status: COMPLETED | OUTPATIENT
Start: 2019-08-28 | End: 2019-08-28

## 2019-08-28 RX ORDER — SODIUM BICARBONATE 1 MEQ/ML
0.38 SYRINGE (ML) INTRAVENOUS
Qty: 150 | Refills: 0 | Status: DISCONTINUED | OUTPATIENT
Start: 2019-08-28 | End: 2019-08-28

## 2019-08-28 RX ORDER — LEVOTHYROXINE SODIUM 125 MCG
75 TABLET ORAL DAILY
Refills: 0 | Status: DISCONTINUED | OUTPATIENT
Start: 2019-08-28 | End: 2019-08-29

## 2019-08-28 RX ORDER — POTASSIUM CHLORIDE 20 MEQ
10 PACKET (EA) ORAL
Refills: 0 | Status: DISCONTINUED | OUTPATIENT
Start: 2019-08-28 | End: 2019-08-28

## 2019-08-28 RX ORDER — INSULIN LISPRO 100/ML
VIAL (ML) SUBCUTANEOUS EVERY 6 HOURS
Refills: 0 | Status: DISCONTINUED | OUTPATIENT
Start: 2019-08-28 | End: 2019-08-29

## 2019-08-28 RX ORDER — ACETAMINOPHEN 500 MG
650 TABLET ORAL EVERY 6 HOURS
Refills: 0 | Status: DISCONTINUED | OUTPATIENT
Start: 2019-08-28 | End: 2019-08-29

## 2019-08-28 RX ADMIN — Medication 150 MEQ/KG/HR: at 14:32

## 2019-08-28 RX ADMIN — MEROPENEM 100 MILLIGRAM(S): 1 INJECTION INTRAVENOUS at 14:31

## 2019-08-28 RX ADMIN — Medication 250 MILLIGRAM(S): at 16:38

## 2019-08-28 RX ADMIN — CHLORHEXIDINE GLUCONATE 15 MILLILITER(S): 213 SOLUTION TOPICAL at 16:39

## 2019-08-28 RX ADMIN — MEROPENEM 100 MILLIGRAM(S): 1 INJECTION INTRAVENOUS at 22:35

## 2019-08-28 RX ADMIN — Medication 140.62 MICROGRAM(S)/KG/MIN: at 14:32

## 2019-08-28 RX ADMIN — Medication 40 MILLIEQUIVALENT(S): at 02:41

## 2019-08-28 RX ADMIN — Medication 40 MILLIEQUIVALENT(S): at 09:21

## 2019-08-28 RX ADMIN — Medication 100 MILLIEQUIVALENT(S): at 15:41

## 2019-08-28 RX ADMIN — CHLORHEXIDINE GLUCONATE 1 APPLICATION(S): 213 SOLUTION TOPICAL at 05:33

## 2019-08-28 RX ADMIN — Medication 100 MILLIEQUIVALENT(S): at 14:30

## 2019-08-28 RX ADMIN — POSACONAZOLE 200 MILLIGRAM(S): 100 TABLET, DELAYED RELEASE ORAL at 05:33

## 2019-08-28 RX ADMIN — Medication 250 MILLIGRAM(S): at 01:00

## 2019-08-28 RX ADMIN — POSACONAZOLE 200 MILLIGRAM(S): 100 TABLET, DELAYED RELEASE ORAL at 23:23

## 2019-08-28 RX ADMIN — Medication 75 MICROGRAM(S): at 05:36

## 2019-08-28 RX ADMIN — Medication 200 GRAM(S): at 16:39

## 2019-08-28 RX ADMIN — Medication 20 MILLIEQUIVALENT(S): at 16:38

## 2019-08-28 RX ADMIN — Medication 400 MILLIGRAM(S): at 05:40

## 2019-08-28 RX ADMIN — POSACONAZOLE 200 MILLIGRAM(S): 100 TABLET, DELAYED RELEASE ORAL at 16:40

## 2019-08-28 RX ADMIN — CHLORHEXIDINE GLUCONATE 15 MILLILITER(S): 213 SOLUTION TOPICAL at 05:32

## 2019-08-28 RX ADMIN — MEROPENEM 100 MILLIGRAM(S): 1 INJECTION INTRAVENOUS at 05:31

## 2019-08-28 NOTE — PROGRESS NOTE ADULT - PROBLEM SELECTOR PLAN 4
Seen on CT for fever workup on 8/25   Patient has not been receiving anticoagulation as patient has been thrombocytopenic - now counts recovering   Consider anticoagulation once platelet count consistently > 50,000 HgA1C 10.6  FS AC & HS with HISS  Continue Lantus and Humalog per endocrine   Consistent carbohydrate diet  Endocrine following

## 2019-08-28 NOTE — PROVIDER CONTACT NOTE (CRITICAL VALUE NOTIFICATION) - NAME OF MD/NP/PA/DO NOTIFIED:
selvin Echavarria np
BERNY Anderson
BERNY DIAS
BERNY Echavarria
BERNY Godwin
BERNY Kelly
BERNY Paniagua
BERNY Paniagua
[facility]
Jf ARRIETA
NP
ORLANDO Palacios
BERNY Youngblood

## 2019-08-28 NOTE — PROVIDER CONTACT NOTE (CRITICAL VALUE NOTIFICATION) - ACTION/TREATMENT ORDERED:
patient monitoring is on going.
no further instructions given
Dr. Mclain in the MICU aware.
Dr. Mclain made aware.
No interventions at this time
transfusion
Follow up with CBC and CMP that was sent. Start pt on insulin gtt. Discuss with family goals of care and possible blood transfusion
prbc 1 unit
transfuse 1 bag platelets
1 unit of plt
1 unit of prbc
1 unit platelet
CTM
Continue to monitor

## 2019-08-28 NOTE — PROVIDER CONTACT NOTE (CRITICAL VALUE NOTIFICATION) - PERSON GIVING RESULT:
Archana Rg, stat lab
Desi Hadley
Desi Jett
Gerber
Kimberly
Lab / Margarita Singh
[facility]
Sangita
Sangita Alonso
Sangita Salazar
collins
fred prabhakar
kay Quesada
Omar Erickson

## 2019-08-28 NOTE — DISCHARGE NOTE FOR THE EXPIRED PATIENT - HOSPITAL COURSE
64 Y F has been admitted since 8/6 for induction of chemo for AML, had neutropenic fever. Has hx of pulmonary fibrosis, DM, was diagnosed with PE on 8/25 but could not be put on AC due to platelets <50K at the time. Today patient was found down in the bathroom unresponsive in the bathroom pulseless. On 8/27 code blue was called after 4 rounds of epi, after 9ish minutes patient had ROSC and then lost pulses again with 1 more round of epi and ROSC again. CCU rapid response RN was present and transported pt to CCU with plan for admission under MICU service.    Upon my arrival in CCU patient was intubated by anesthesia during the rapid on the Vent with Fio2 100 PEEP 10 saturating in the high 80s, patient was tachycardic, post ROSC EKG showed stemi, cath fellow evaluated and spoke with attending and feel that patient would not benefit from cath at this moment. POCUS showed normal RV size, patient hypertensive on levophed gtt. Gas returned with pH 6.85 and lactate of 16, pushed 1 amp bicarb and started bicarb gtt.     Patient remained intubated in the CCU as a MICU boarder without any impovement in mental status for 24 hours. A EEG did not meet clinical criteria for brain death but the patient remained without spontaneous respirations or corneal reflex with GCS of 3. Her pressor requirements continued to increased despite some initial improvement in her pH. Her ventilatory status continued to decline with increasing plateau pressures and a rising CO2 on blood gas despite multiple attempts at adjusting the vent. On the morning of 8/29 the family was planning on terminally extubating the patient in lines with the patients perceived wishes however before this was done the patient lost pulses. The MICU providers were notified of the patients loss of pulses at 11:48 on 8/29 and she was pronounced dead 11:58. No attempts at CPR were done in line with the families wishes.

## 2019-08-28 NOTE — PROGRESS NOTE ADULT - ASSESSMENT
Assessment and plan 64 Y F now s/p ROSC from cardiac arrest unknown cause with unknown down time at least 15 minutes with CPR    Neuro: No spontaneous breaths, no brain stem reflexes, GCS 3 without any sedation used for intubation  -- CT head when more stable  -- Reverse acute acidosis  -- Family does not want patient to remain intubated with no signs of recovery, will re-evaluate soon    CVS: S/P ROSC with unknown non-CPR down time, known PE not on AC and STEMI on ekg after rosc  -- Appreciate cardiology input regarding STEMI, not a cath lab candidate at the moment  -- POCUS with normal RV size, unclear if PE was cause, unsafe to start AC without head CT and repeat labs for platelets since 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  -- Order and FU official echo  -- Patient remains on Levophed with MAP goal >60  -- Remains sinus tachycardic likely 2/2 acidosis vs volume depletion will run bicarb at 100cc hour already s/p bicarb push    Pulm: Known pulmonary fibrosis  -- Continue Spiriva and albuterol as ordered while on vent  #Intubated and ventilated with high plateau pressures while acidosic  -- Adjusted TV to 360 cc with improvement of plateau pressures  -- Titrate PEEP to lowest possible to keep saturation >95  -- Monitor plateau pressures with goal <30 however need Q1 ABGs after adjustments to re-evaluate given severe acidosis  -- Can decrease frequency of ABGs once adequately compensated with vent adjustments    Endo: Diabetes type 2  -- HgA1C 10.6, now NPO s/p cardiac arrest  -- FS AC and QHS  -- Was on Lantus and Humalog, DCed Humalog due to NPO status    Renal: Lactic acidosis with respiratory acidosis following cardiac arrest  -- S/P BiCarb push, continue bicarb gtt at 100cc hour  -- Q1 ABG from A-line to check ph progression and titrate vent settings    Heme: AML with PanCytopenia  -- No indication for transfusion at the moment, follow up GOC with family regarding transfusions before empiric transfusing.  -- FU CT head to stratify bleeding risk    ID: Neutropenic fever while admitted to hospital in setting of AML  -- Continue Posaconazole Vanc, Pj, Acyclovir  -- Vanc Troughs every tuesday  --     GOC: Had extensive discussion with family regarding poor prognosis of patient. They feel that they don't want her to live out her life as a "vegetable." Will reverse acidosis, continue current care from floor otherwise. OG tube for meds, keep NPO. Family says that at 24 hours if patient still showing no signs of life they would like to consider terminally extubating her. Assessment and plan 64 Y F now s/p ROSC from cardiac arrest unknown cause with unknown down time at least 15 minutes with CPR    Neuro: No spontaneous breaths, no brain stem reflexes, GCS 3 without any sedation used for intubation  -- CT head when more stable  -- Reverse acute acidosis  -- Family does not want patient to remain intubated with no signs of recovery, will re-evaluate soon    CVS: S/P ROSC with unknown non-CPR down time, known PE not on AC and STEMI on ekg after rosc  -- Appreciate cardiology input regarding STEMI, not a cath lab candidate at the moment  -- POCUS with normal RV size, unclear if PE was cause, unsafe to start AC without head CT and repeat labs for platelets since 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  -- Order and FU official echo  -- Patient remains on Levophed with MAP goal >60  -- Remains sinus tachycardic likely 2/2 acidosis vs volume depletion will run bicarb at 100cc hour already s/p bicarb push    Pulm: Known pulmonary fibrosis  -- Continue Spiriva and albuterol as ordered while on vent  #Intubated and ventilated with high plateau pressures while acidotic  -- Adjusted TV to 360 cc with improvement of plateau pressures  -- Titrate PEEP to lowest possible to keep saturation >95  -- Monitor plateau pressures with goal <30 however need Q1 ABGs after adjustments to re-evaluate given severe acidosis  -- Can decrease frequency of ABGs once adequately compensated with vent adjustments    Endo: Diabetes type 2  -- HgA1C 10.6, now NPO s/p cardiac arrest  -- FS AC and QHS  -- On insulin drip     Renal: Lactic acidosis with respiratory acidosis following cardiac arrest  -- S/P BiCarb push, continue bicarb gtt at 100cc hour  -- Q1 ABG from A-line to check ph progression and titrate vent settings    Heme: AML with PanCytopenia  -- Transfused overnight, Hb 8.3 this morning.   -- FU CT head to stratify bleeding risk    ID: Neutropenic fever while admitted to hospital in setting of AML  -- Continue Posaconazole Vanc, Pj, Acyclovir    GOC: Had extensive discussion with family regarding poor prognosis of patient. They feel that they don't want her to live out her life as a "vegetable." Will reverse acidosis, continue current care from floor otherwise. OG tube for meds, keep NPO. Family says that at 24 hours if patient still showing no signs of life they would like to consider terminally extubating her.

## 2019-08-28 NOTE — PROVIDER CONTACT NOTE (CRITICAL VALUE NOTIFICATION) - SITUATION
[id number] reported
Pt DNR s/p code
low pltelets
Ca 6.5
K+ on ABG 2.9
PLt 16
Platelets 20
Plt:11
Plt:5
hb 6.6,hct19.1
hgb: 6.1, hct: 18
platelet = 13

## 2019-08-28 NOTE — PROGRESS NOTE ADULT - PROBLEM SELECTOR PLAN 9
No pharmacologic ppx 2/2 thrombocytopenia      Contact Information [phone] Simvastatin discontinued during hospitalization due to potential for liver toxicity

## 2019-08-28 NOTE — PROGRESS NOTE ADULT - PROBLEM SELECTOR PLAN 3
HgA1C 10.6  FS AC & HS with HISS  Continue Lantus and Humalog per endocrine   Consistent carbohydrate diet  Endocrine following Patient is neutropenic, febrile   Continue vancomycin follow up troughs q Tues  Continue Posaconazole, Acyclovir, Vancomycin, Meropenem (Aztreonam changed to Meropenem 8/26; monitor for rash. Patient had rash/suspected allergy to Cefepime)  (Monitor QTc BIW mon/Thurs with Posaconazole and Study drug)  CT chest 8/25 c/w acute R PE, pulm fibrosis, and pyelonephritis. Urine cultures negative

## 2019-08-28 NOTE — PROGRESS NOTE ADULT - PROBLEM SELECTOR PLAN 1
-no fever  -CT noted - new PE  -cx negative  -no severe mucositis   -TLC look ok  -meropenem 1 gm iv q8 - monitor for rash  -check LE dopplers  -AC per primary team  -now coded with resp failure - family considering comfort care

## 2019-08-28 NOTE — PROGRESS NOTE ADULT - SUBJECTIVE AND OBJECTIVE BOX
Diagnosis: AML FLT 3 (-)    Protocol/Chemo Regimen: Enrolled on [employer] BRIGHT 1019- s/p Induction chemotherapy with Dauno/Cytarabine and daily Glasdegib vs Placebo    Day: 21    Pt endorsed: patient found unresponsive in bathroom with no pulse, PEA arrest     Review of Systems: n/a    Pain scale: 0    Diet: Mechanical soft, Consistent Carbohydrate diet, Glucerna TID     Allergies: cefepime (Rash)     ANTIMICROBIALS  acyclovir   Oral Tab/Cap 400 milliGRAM(s) Oral two times a day  meropenem  IVPB 1000 milliGRAM(s) IV Intermittent every 8 hours  posaconazole DR Tablet 300 milliGRAM(s) Oral daily  vancomycin  IVPB 1000 milliGRAM(s) IV Intermittent every 12 hours    STANDING MEDICATIONS  ALBUTerol    90 MICROgram(s) HFA Inhaler 1 Puff(s) Inhalation every 6 hours  ALBUTerol/ipratropium for Nebulization 3 milliLiter(s) Nebulizer every 6 hours  benzonatate 100 milliGRAM(s) Oral three times a day  Biotene Dry Mouth Oral Rinse 5 milliLiter(s) Swish and Spit three times a day  chlorhexidine 2% Cloths 1 Application(s) Topical <User Schedule>  dextrose 5%. 1000 milliLiter(s) IV Continuous <Continuous>  dextrose 50% Injectable 12.5 Gram(s) IV Push once  dextrose 50% Injectable 25 Gram(s) IV Push once  dextrose 50% Injectable 25 Gram(s) IV Push once  FIRST- Mouthwash  BLM 10 milliLiter(s) Swish and Spit four times a day  gabapentin 600 milliGRAM(s) Oral two times a day  hydrOXYzine hydrochloride 25 milliGRAM(s) Oral every 6 hours  insulin glargine Injectable (LANTUS) 18 Unit(s) SubCutaneous at bedtime  insulin lispro (HumaLOG) corrective regimen sliding scale   SubCutaneous three times a day before meals  insulin lispro (HumaLOG) corrective regimen sliding scale   SubCutaneous at bedtime  insulin lispro Injectable (HumaLOG) 10 Unit(s) SubCutaneous three times a day before meals  investigational chemo - general 1 Tablet(s) Oral every 24 hours  levothyroxine 75 MICROGram(s) Oral daily  lidocaine 2% Injectable 20 milliLiter(s) Local Injection once  losartan 100 milliGRAM(s) Oral daily  pantoprazole    Tablet 40 milliGRAM(s) Oral before breakfast  phytonadione   Solution 10 milliGRAM(s) Oral daily  simethicone 80 milliGRAM(s) Chew four times a day  sodium chloride 0.9% Bolus 500 milliLiter(s) IV Bolus once  sodium chloride 0.9%. 1000 milliLiter(s) IV Continuous <Continuous>  sodium chloride 3%  Inhalation 4 milliLiter(s) Inhalation two times a day  sucralfate 1 Gram(s) Oral four times a day  tiotropium 18 MICROgram(s) Capsule 1 Capsule(s) Inhalation daily    PRN MEDICATIONS  acetaminophen   Tablet .. 650 milliGRAM(s) Oral every 6 hours PRN  acetaminophen   Tablet .. 650 milliGRAM(s) Oral once PRN  aluminum hydroxide/magnesium hydroxide/simethicone Suspension 30 milliLiter(s) Oral every 6 hours PRN  dextrose 40% Gel 15 Gram(s) Oral once PRN  diphenhydrAMINE 25 milliGRAM(s) Oral every 6 hours PRN  glucagon  Injectable 1 milliGRAM(s) IntraMuscular once PRN  hydrocortisone 2.5% Lotion 1 Application(s) Topical three times a day PRN  lidocaine 2% Viscous 2 milliLiter(s) Oral every 2 hours PRN  loperamide 2 milliGRAM(s) Oral every 4 hours PRN  metoclopramide Injectable 10 milliGRAM(s) IV Push every 6 hours PRN  oxyCODONE    IR 5 milliGRAM(s) Oral every 4 hours PRN  polyethylene glycol 3350 17 Gram(s) Oral two times a day PRN    Vital Signs Last 24 Hrs  T(C): 38.5 (27 Aug 2019 05:47), Max: 40.6 (26 Aug 2019 14:07)  T(F): 101.3 (27 Aug 2019 05:47), Max: 105 (26 Aug 2019 14:07)  HR: 111 (27 Aug 2019 05:47) (88 - 132)  BP: 167/73 (27 Aug 2019 05:47) (102/59 - 175/72)  BP(mean): --  RR: 20 (27 Aug 2019 05:47) (18 - 30)  SpO2: 96% (27 Aug 2019 06:10) (79% - 98%)    PHYSICAL EXAM  General: intubated unresponsive  CV: normal S1, S2, RRR  Lungs: clear to auscultation, no wheezes, no rales  Abdomen: soft, nontender, nondistended, normal BS  Ext: no edema  Skin: no rashes  Neuro: unresponsive   Central line: normal     LABS:                        8.4    1.5   )-----------( 101      ( 27 Aug 2019 07:19 )             24.8         Mean Cell Volume : 84.8 fl  Mean Cell Hemoglobin : 28.6 pg  Mean Cell Hemoglobin Concentration : 33.7 gm/dL  Auto Neutrophil # : x  Auto Lymphocyte # : x  Auto Monocyte # : x  Auto Eosinophil # : x  Auto Basophil # : x  Auto Neutrophil % : x  Auto Lymphocyte % : x  Auto Monocyte % : x  Auto Eosinophil % : x  Auto Basophil % : x    08-26  136  |  102  |  11  ----------------------------<  129<H>  3.4<L>   |  20<L>  |  0.74    Ca    8.5      26 Aug 2019 07:11  Phos  3.1     08-26  Mg     1.4     08-26    TPro  6.9  /  Alb  3.2<L>  /  TBili  0.5  /  DBili  x   /  AST  38  /  ALT  49<H>  /  AlkPhos  224<H>  08-26    PT/INR - ( 26 Aug 2019 07:13 )   PT: 15.8 sec;   INR: 1.37 ratio      PTT - ( 26 Aug 2019 07:13 )  PTT:33.2 sec    RADIOLOGY & ADDITIONAL STUDIES:  < from: CT Chest w/ IV Cont (08.25.19 @ 18:48) >  IMPRESSION:     Acute right-sided pulmonary emboli.    Pulmonary fibrosis. Increased groundglass opacities throughout both lungs   compared with prior imaging 8/9/2019 are nonspecific.    Heterogeneous striated nephrograms bilaterally consistent with   pyelonephritis. Diagnosis: AML FLT 3 (-)    Protocol/Chemo Regimen: Enrolled on [employer] BRIGHT 1019- s/p Induction chemotherapy with Dauno/Cytarabine and daily Glasdegib vs Placebo    Day: 22    Pt endorsed: Patient transferred to MICU on 8/27 after being found unresponsive in bathroom with no pulse, PEA arrest, unclear cause.  Overnight events: Patient received 1 unit prbc for hgb 6.6, no family at bedside currently    Review of Systems: unable to obtain as patient intubated     Pain scale: 0    Diet: Mechanical soft, Consistent Carbohydrate diet, Glucerna TID     Allergies: cefepime (Rash)     ANTIMICROBIALS  acyclovir   Oral Tab/Cap 400 milliGRAM(s) Oral two times a day  meropenem  IVPB 1000 milliGRAM(s) IV Intermittent every 8 hours  posaconazole DR Tablet 300 milliGRAM(s) Oral daily  vancomycin  IVPB 1000 milliGRAM(s) IV Intermittent every 12 hours    MEDICATIONS  (STANDING):  acyclovir    Suspension 400 milliGRAM(s) Oral two times a day  chlorhexidine 0.12% Liquid 15 milliLiter(s) Oral Mucosa every 12 hours  chlorhexidine 2% Cloths 1 Application(s) Topical <User Schedule>  dextrose 5%. 1000 milliLiter(s) (50 mL/Hr) IV Continuous <Continuous>  dextrose 50% Injectable 12.5 Gram(s) IV Push once  dextrose 50% Injectable 25 Gram(s) IV Push once  dextrose 50% Injectable 25 Gram(s) IV Push once  insulin regular Infusion 7 Unit(s)/Hr (7 mL/Hr) IV Continuous <Continuous>  investigational chemo - general 1 Tablet(s) Oral every 24 hours  levothyroxine 75 MICROGram(s) Oral daily  meropenem  IVPB 1000 milliGRAM(s) IV Intermittent every 8 hours  norepinephrine Infusion 2.5 MICROgram(s)/kG/Min (140.625 mL/Hr) IV Continuous <Continuous>  posaconazole Suspension 200 milliGRAM(s) Oral three times a day  potassium chloride   Solution 40 milliEquivalent(s) Oral once  potassium chloride  10 mEq/100 mL IVPB 10 milliEquivalent(s) IV Intermittent every 1 hour  sodium bicarbonate  Infusion 0.375 mEq/kG/Hr (150 mL/Hr) IV Continuous <Continuous>  sodium chloride 0.9%. 1000 milliLiter(s) (50 mL/Hr) IV Continuous <Continuous>  tiotropium 18 MICROgram(s) Capsule 1 Capsule(s) Inhalation daily  vancomycin  IVPB 1000 milliGRAM(s) IV Intermittent every 12 hours    MEDICATIONS  (PRN):  acetaminophen    Suspension .. 650 milliGRAM(s) Enteral Tube every 6 hours PRN Temp greater or equal to 38C (100.4F)  dextrose 40% Gel 15 Gram(s) Oral once PRN Blood Glucose LESS THAN 70 milliGRAM(s)/deciliter  glucagon  Injectable 1 milliGRAM(s) IntraMuscular once PRN Glucose LESS THAN 70 milligrams/deciliter      Vital Signs Last 24 Hrs  T(C): 38.5 (27 Aug 2019 05:47), Max: 40.6 (26 Aug 2019 14:07)  T(F): 101.3 (27 Aug 2019 05:47), Max: 105 (26 Aug 2019 14:07)  HR: 111 (27 Aug 2019 05:47) (88 - 132)  BP: 167/73 (27 Aug 2019 05:47) (102/59 - 175/72)  BP(mean): --  RR: 20 (27 Aug 2019 05:47) (18 - 30)  SpO2: 96% (27 Aug 2019 06:10) (79% - 98%)    PHYSICAL EXAM  General: intubated unresponsive, pupils fixed and dilated   CV: normal S1, S2, RRR  Lungs: clear to auscultation, no wheezes, no rales  Abdomen: soft, nontender, nondistended, normal BS  Ext: no edema  Skin: no rashes  Neuro: unresponsive   Central line: normal     LABS:                        8.4    1.5   )-----------( 101      ( 27 Aug 2019 07:19 )             24.8         Mean Cell Volume : 84.8 fl  Mean Cell Hemoglobin : 28.6 pg  Mean Cell Hemoglobin Concentration : 33.7 gm/dL  Auto Neutrophil # : x  Auto Lymphocyte # : x  Auto Monocyte # : x  Auto Eosinophil # : x  Auto Basophil # : x  Auto Neutrophil % : x  Auto Lymphocyte % : x  Auto Monocyte % : x  Auto Eosinophil % : x  Auto Basophil % : x    08-26  136  |  102  |  11  ----------------------------<  129<H>  3.4<L>   |  20<L>  |  0.74    Ca    8.5      26 Aug 2019 07:11  Phos  3.1     08-26  Mg     1.4     08-26    TPro  6.9  /  Alb  3.2<L>  /  TBili  0.5  /  DBili  x   /  AST  38  /  ALT  49<H>  /  AlkPhos  224<H>  08-26    PT/INR - ( 26 Aug 2019 07:13 )   PT: 15.8 sec;   INR: 1.37 ratio      PTT - ( 26 Aug 2019 07:13 )  PTT:33.2 sec    RADIOLOGY & ADDITIONAL STUDIES:  < from: CT Chest w/ IV Cont (08.25.19 @ 18:48) >  IMPRESSION:     Acute right-sided pulmonary emboli.    Pulmonary fibrosis. Increased groundglass opacities throughout both lungs   compared with prior imaging 8/9/2019 are nonspecific.    Heterogeneous striated nephrograms bilaterally consistent with   pyelonephritis. Diagnosis: AML FLT 3 (-)    Protocol/Chemo Regimen: Enrolled on [employer] BRIGHT 1019- s/p Induction chemotherapy with Dauno/Cytarabine and daily Glasdegib vs Placebo    Day: 22    Pt endorsed: Patient transferred to MICU on 8/27 after being found unresponsive in bathroom with no pulse, PEA arrest, unclear cause.  Overnight events: Patient received 1 unit prbc for hgb 6.6, no family at bedside currently    Review of Systems: unable to obtain as patient intubated     Pain scale: 0    Diet: NPO    Allergies: cefepime (Rash)     ANTIMICROBIALS  acyclovir   Oral Tab/Cap 400 milliGRAM(s) Oral two times a day  meropenem  IVPB 1000 milliGRAM(s) IV Intermittent every 8 hours  posaconazole DR Tablet 300 milliGRAM(s) Oral daily  vancomycin  IVPB 1000 milliGRAM(s) IV Intermittent every 12 hours    MEDICATIONS  (STANDING):  acyclovir    Suspension 400 milliGRAM(s) Oral two times a day  chlorhexidine 0.12% Liquid 15 milliLiter(s) Oral Mucosa every 12 hours  chlorhexidine 2% Cloths 1 Application(s) Topical <User Schedule>  dextrose 5%. 1000 milliLiter(s) (50 mL/Hr) IV Continuous <Continuous>  dextrose 50% Injectable 12.5 Gram(s) IV Push once  dextrose 50% Injectable 25 Gram(s) IV Push once  dextrose 50% Injectable 25 Gram(s) IV Push once  insulin regular Infusion 7 Unit(s)/Hr (7 mL/Hr) IV Continuous <Continuous>  investigational chemo - general 1 Tablet(s) Oral every 24 hours  levothyroxine 75 MICROGram(s) Oral daily  meropenem  IVPB 1000 milliGRAM(s) IV Intermittent every 8 hours  norepinephrine Infusion 2.5 MICROgram(s)/kG/Min (140.625 mL/Hr) IV Continuous <Continuous>  posaconazole Suspension 200 milliGRAM(s) Oral three times a day  potassium chloride   Solution 40 milliEquivalent(s) Oral once  potassium chloride  10 mEq/100 mL IVPB 10 milliEquivalent(s) IV Intermittent every 1 hour  sodium bicarbonate  Infusion 0.375 mEq/kG/Hr (150 mL/Hr) IV Continuous <Continuous>  sodium chloride 0.9%. 1000 milliLiter(s) (50 mL/Hr) IV Continuous <Continuous>  tiotropium 18 MICROgram(s) Capsule 1 Capsule(s) Inhalation daily  vancomycin  IVPB 1000 milliGRAM(s) IV Intermittent every 12 hours    MEDICATIONS  (PRN):  acetaminophen    Suspension .. 650 milliGRAM(s) Enteral Tube every 6 hours PRN Temp greater or equal to 38C (100.4F)  dextrose 40% Gel 15 Gram(s) Oral once PRN Blood Glucose LESS THAN 70 milliGRAM(s)/deciliter  glucagon  Injectable 1 milliGRAM(s) IntraMuscular once PRN Glucose LESS THAN 70 milligrams/deciliter      Vital Signs Last 24 Hrs  T(C): 38.5 (27 Aug 2019 05:47), Max: 40.6 (26 Aug 2019 14:07)  T(F): 101.3 (27 Aug 2019 05:47), Max: 105 (26 Aug 2019 14:07)  HR: 111 (27 Aug 2019 05:47) (88 - 132)  BP: 167/73 (27 Aug 2019 05:47) (102/59 - 175/72)  BP(mean): --  RR: 20 (27 Aug 2019 05:47) (18 - 30)  SpO2: 96% (27 Aug 2019 06:10) (79% - 98%)    PHYSICAL EXAM  General: intubated unresponsive, pupils fixed and dilated   CV: normal S1, S2, RRR  Lungs: clear to auscultation, no wheezes, no rales  Abdomen: soft, nontender, nondistended, normal BS  Ext: no edema  Skin: no rashes  Neuro: unresponsive   Central line: normal     LABS:                        8.4    1.5   )-----------( 101      ( 27 Aug 2019 07:19 )             24.8         Mean Cell Volume : 84.8 fl  Mean Cell Hemoglobin : 28.6 pg  Mean Cell Hemoglobin Concentration : 33.7 gm/dL  Auto Neutrophil # : x  Auto Lymphocyte # : x  Auto Monocyte # : x  Auto Eosinophil # : x  Auto Basophil # : x  Auto Neutrophil % : x  Auto Lymphocyte % : x  Auto Monocyte % : x  Auto Eosinophil % : x  Auto Basophil % : x    08-26  136  |  102  |  11  ----------------------------<  129<H>  3.4<L>   |  20<L>  |  0.74    Ca    8.5      26 Aug 2019 07:11  Phos  3.1     08-26  Mg     1.4     08-26    TPro  6.9  /  Alb  3.2<L>  /  TBili  0.5  /  DBili  x   /  AST  38  /  ALT  49<H>  /  AlkPhos  224<H>  08-26    PT/INR - ( 26 Aug 2019 07:13 )   PT: 15.8 sec;   INR: 1.37 ratio      PTT - ( 26 Aug 2019 07:13 )  PTT:33.2 sec    RADIOLOGY & ADDITIONAL STUDIES:  < from: CT Chest w/ IV Cont (08.25.19 @ 18:48) >  IMPRESSION:     Acute right-sided pulmonary emboli.    Pulmonary fibrosis. Increased groundglass opacities throughout both lungs   compared with prior imaging 8/9/2019 are nonspecific.    Heterogeneous striated nephrograms bilaterally consistent with   pyelonephritis.

## 2019-08-28 NOTE — PROGRESS NOTE ADULT - PROBLEM SELECTOR PLAN 5
Continue Losartan 100mg PO daily  Monitor VS Seen on CT for fever workup on 8/25   Patient has not been receiving anticoagulation as patient has been thrombocytopenic - now counts recovering   Consider anticoagulation once platelet count consistently > 50,000

## 2019-08-28 NOTE — PROGRESS NOTE ADULT - PROBLEM SELECTOR PLAN 2
[facility] 60  Master Treatment Plan for Samia Mcarthur    Date Treatment Plan Initiated: 07/13/17    Treatment Plan Modalities:  Type of Modality Amount  (x minutes) Frequency (x/week) Duration (x days) Name of Responsible Staff   Community & wrap-up meetings to encourage peer interactions 15 7 1 Esthela KLEIN     Group psychotherapy to assist in building coping skills and internal controls 60 7 1 Patrick Ureña LCSW   Therapeutic activity groups to build coping skills 60 7 1 Patrick Ureña LCSW   Psychoeducation in group setting to address:   Medication education   Ghislaine Fernandez 105    Coping skills         Relaxation techniques         Symptom management         Discharge planning   60 2 [address] [place]   60 1 1 Volunteer from [employer]   Recovery/AA/NA   61 2 1 Volunteer from [address] [place] [address] medication management   15 7 1 Dr. Carol Ding will be met by 07-20-17    Problem: Depressed Mood (Adult/Pediatric)  Goal: *STG: Participates in treatment plan  Outcome: Progressing Towards Goal  Mood is depressed and anxious. Affect is tearful. Patient goal: \"to talk to the doctor\". Staff goal: to encourage patient to go to groups. Patient denies SI. Patient states, \"I just miss my children and want to go home\". RN re-assured patient and she stated she wanted to stay and get the help she needs. Patient used breast pump this morning and dumped the breast milk. She stated after this her breasts were no longer engorged and she felt relieved. Treatment team at [phone]: With Dr. Deon Hummel:     Discussed  Patient reason for admission. \"I had post partum depression\". Patient said, \" lost his job unexpectedly\". Discussed plan of care. Goal: *STG: Verbalizes anger, guilt, and other feelings in a constructive manor  Outcome: Progressing Towards Goal  Patient is able to do this. Goal: *STG: Attends activities and groups  Outcome: Progressing Towards Goal  Patient is going to activities and groups. Goal: *STG: Remains safe in hospital  Outcome: Progressing Towards Goal  Q 15 min monitoring for safety. Goal: *STG: Complies with medication therapy  Outcome: Progressing Towards Goal  Patient is compliant with medications. Goal: Interventions  Outcome: Progressing Towards Goal  Q 15 min monitoring for safety. Medication management. Group therapy. Patient is neutropenic, febrile   Continue vancomycin follow up troughs q Tues  Continue Posaconazole, Acyclovir, Vancomycin, Meropenem (Aztreonam changed to Meropenem 8/26; monitor for rash. Patient had rash/suspected allergy to Cefepime)  (Monitor QTc BIW mon/Thurs with Posaconazole and Study drug)  CT chest 8/25 c/w acute R PE, pulm fibrosis, and pyelonephritis. Urine cultures negative Enrolled on [employer] BRIGHT trial s/p Induction chemotherapy with Dauno/Cytarabine and daily Glasdegib vs Placebo   Molecular studies pending  8/20 Day 14 BM bx, chemotherapeutic   Monitor CBC/Lytes and transfuse/replete PRN  Strict Is and Os/ Daily weights/ Mouth Care  Carafate for sore throat, mucositis   Allopurinol 300mg PO daily-stopped 2/2 rash  IVF hydration  Antiemetics  ECOG performance status 2  Atarax ATC, hydrocortisone cream, triamcinolone cream, oral benadryl prn - rash improved   Awaiting count recovery  Appreciate MICU care

## 2019-08-28 NOTE — PROGRESS NOTE ADULT - ASSESSMENT
63 yo F with PMHx of T2DM, RA, Depression and now newly diagnosed AML enrolled on Pfizer BRIGHT trial, now s/p Induction chemotherapy with Dauno/Cytarabine and daily Glasdegib vs Placebo. Day 14 bone marrow biopsy was chemotherapeutic - awaiting count recovery. The patient's hospital course has been complicated by neutropenic fevers, transaminitis, RENTERIA due to pulmonary fibrosis, PE, volume overload, drug exanthem, and now unresponsive, PEA arrest s/p resuscitation The patient has pancytopenia from chemotherapy and/or disease.

## 2019-08-28 NOTE — PROGRESS NOTE ADULT - ATTENDING COMMENTS
Ilya Juares  Attending Physician   Division of Infectious Disease  Pager #[phone]  After 5pm/weekend or no response, call #[phone]    Prognosis poor

## 2019-08-28 NOTE — PROGRESS NOTE ADULT - ATTENDING COMMENTS
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 Pt on Pfizer trial 7+3+ Glasdegib/placebo. Day 21.   Day 14 marrow chemotherapeutic  [facility] results: NPM1, IDH2, DNMT3A mutations present.  On vanc and aztreonam for NF, remains febrile,   Acute resp failure o/n was on venti mask,   s/p syncope in bathroom, required resusitation for cardiac arrest  pt transferred to MICU care, boarding in CCU  Neuro exam - c/w anoxic brain injury, pt on vent, unresponsive to stimuli  on levophed, tachy 120s, SBP 110s, resp rate c/w vent rate  family at bedside   Case d/w MICU team - further neuro assessment planned   Cont supportive care and transfusions as per MICU. 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 Pt on Pfizer trial 7+3+ Glasdegib/placebo. Day 22.   Day 14 marrow chemotherapeutic  [facility] results: NPM1, IDH2, DNMT3A mutations present.  On vanc and aztreonam for NF, remains febrile,   Acute resp failure o/n was on venti mask, 8/27  s/p syncope in bathroom, required resusitation for cardiac arrest 8/27  pt transferred to MICU care, boarding in CCU  Neuro exam - c/w anoxic brain injury, pt on vent, unresponsive to stimuli  remains on levophed, stable  possible terminal extubation being considered given lack of neuro response and possible RODRIGO.  ECOG 4  family at bedside   Case d/w MICU team - further neuro assessment planned   Cont supportive care and transfusions as per MICU.

## 2019-08-28 NOTE — PROGRESS NOTE ADULT - PROBLEM SELECTOR PLAN 10
No pharmacologic ppx 2/2 thrombocytopenia      Prognosis extremely guarded.   Contact Information [phone]

## 2019-08-28 NOTE — PROGRESS NOTE ADULT - ATTENDING COMMENTS
64 year old woman with AML admitted for induction chemo, pancytopenia, segmental PE s/p cardiac arrest now in multi organ failure    - no improvement in neurologic status, has no pupillary, corneal, cough or gag reflex  - discussed with family about testing for brain death   - adjust drips for now  - will cap vasopressors 64 year old woman with AML admitted for induction chemo, pancytopenia, segmental PE s/p cardiac arrest now in multi organ failure    - no improvement in neurologic status, has no pupillary, corneal, cough or gag reflex  - discussed with family about testing for brain death   - adjust drips for now  - will cap vasopressors  - d/c insulin drip

## 2019-08-28 NOTE — DISCHARGE NOTE FOR THE EXPIRED PATIENT - SECONDARY DIAGNOSIS.
Acute myeloid leukemia not having achieved remission Cardiac arrest Elevated LFTs Neutropenia, febrile Other pulmonary embolism without acute cor pulmonale, unspecified chronicity Type 2 diabetes mellitus Hypertension Hypercholesteremia

## 2019-08-28 NOTE — PROGRESS NOTE ADULT - SUBJECTIVE AND OBJECTIVE BOX
64 Y F now s/p ROSC from cardiac arrest unknown cause with unknown down time at least 15 minutes with CPR    INTERVAL HPI/OVERNIGHT EVENTS: Still no pupillary or corneal reflex. Patient on insulin drip. Consented for transfusion last night. Patient's sister flying in today.     SUBJECTIVE: Patient seen and examined at bedside.     : [ ] dysuria [ ] nocturia [ ] hematuria [ ] increased urinary frequency  Musculoskeletal: [ ] back pain [ ] myalgias [ ] arthralgias [ ] fracture  Skin: [ ] rash [ ] itch  Neurological: [ ] headache [ ] dizziness [ ] syncope [ ] weakness [ ] numbness  Psychiatric: [ ] anxiety [ ] depression  Endocrine: [ ] diabetes [ ] thyroid problem  Hematologic/Lymphatic: [ ] anemia [ ] bleeding problem  Allergic/Immunologic: [ ] itchy eyes [ ] nasal discharge [ ] hives [ ] angioedema  [ ] All other systems negative  [X] Unable to assess ROS because Patient unresponsive    OBJECTIVE:    VITAL SIGNS:  ICU Vital Signs Last 24 Hrs  T(C): 36.7 (28 Aug 2019 07:00), Max: 37.2 (27 Aug 2019 19:15)  T(F): 98.1 (28 Aug 2019 07:00), Max: 99 (27 Aug 2019 19:15)  HR: 112 (28 Aug 2019 09:00) (106 - 138)  BP: 116/61 (27 Aug 2019 10:15) (116/61 - 127/53)  BP(mean): 83 (27 Aug 2019 10:15) (73 - 83)  ABP: 84/42 (28 Aug 2019 09:00) (66/34 - 142/64)  ABP(mean): 56 (28 Aug 2019 09:00) (46 - 94)  RR: 27 (28 Aug 2019 09:00) (9 - 27)  SpO2: 94% (28 Aug 2019 09:00) (74% - 100%)    Mode: AC/ CMV (Assist Control/ Continuous Mandatory Ventilation), RR (machine): 27, TV (machine): 360, FiO2: 60, PEEP: 10, ITime: 1, MAP: 18, PIP: 41     @ 07:01  -   @ 07:00  --------------------------------------------------------  IN: 7581.5 mL / OUT: 35 mL / NET: 7546.5 mL     @ 07:01  -   @ 09:39  --------------------------------------------------------  IN: 772.5 mL / OUT: 10 mL / NET: 762.5 mL      CAPILLARY BLOOD GLUCOSE      POCT Blood Glucose.: 248 mg/dL (28 Aug 2019 09:15)      PHYSICAL EXAM:    Gen: Intubated, not sedated, unresponsive  HEENT: Pupils fixed dilated unresponsive to light, no corneal reflex  CV: Tachycardia, +S1/S2  Resp: Diffuse rhonchi, diminished breath sounds at the BL bases  GI: Abdomen soft non-distended, no masses  MSK: No open wounds, no bruising, no LE edema  Neuro: Intubated no sedation, No corneal reflex, not triggering the vent, no response to voice or pain, GCS 3    MEDICATIONS:  MEDICATIONS  (STANDING):  acyclovir    Suspension 400 milliGRAM(s) Oral two times a day  chlorhexidine 0.12% Liquid 15 milliLiter(s) Oral Mucosa every 12 hours  chlorhexidine 2% Cloths 1 Application(s) Topical <User Schedule>  dextrose 5%. 1000 milliLiter(s) (50 mL/Hr) IV Continuous <Continuous>  dextrose 50% Injectable 12.5 Gram(s) IV Push once  dextrose 50% Injectable 25 Gram(s) IV Push once  dextrose 50% Injectable 25 Gram(s) IV Push once  insulin regular Infusion 7 Unit(s)/Hr (7 mL/Hr) IV Continuous <Continuous>  investigational chemo - general 1 Tablet(s) Oral every 24 hours  levothyroxine 75 MICROGram(s) Oral daily  meropenem  IVPB 1000 milliGRAM(s) IV Intermittent every 8 hours  norepinephrine Infusion 2.5 MICROgram(s)/kG/Min (140.625 mL/Hr) IV Continuous <Continuous>  posaconazole Suspension 200 milliGRAM(s) Oral three times a day  potassium chloride  10 mEq/100 mL IVPB 10 milliEquivalent(s) IV Intermittent every 1 hour  sodium bicarbonate  Infusion 0.375 mEq/kG/Hr (150 mL/Hr) IV Continuous <Continuous>  sodium chloride 0.9%. 1000 milliLiter(s) (50 mL/Hr) IV Continuous <Continuous>  tiotropium 18 MICROgram(s) Capsule 1 Capsule(s) Inhalation daily  vancomycin  IVPB 1000 milliGRAM(s) IV Intermittent every 12 hours    MEDICATIONS  (PRN):  acetaminophen    Suspension .. 650 milliGRAM(s) Enteral Tube every 6 hours PRN Temp greater or equal to 38C (100.4F)  dextrose 40% Gel 15 Gram(s) Oral once PRN Blood Glucose LESS THAN 70 milliGRAM(s)/deciliter  glucagon  Injectable 1 milliGRAM(s) IntraMuscular once PRN Glucose LESS THAN 70 milligrams/deciliter      ALLERGIES:  Allergies    cefepime (Rash)    Intolerances        LABS:                        8.3    1.3   )-----------( 123      ( 28 Aug 2019 05:56 )             24.2     08-28    134<L>  |  96  |  19  ----------------------------<  323<H>  3.0<L>   |  23  |  2.65<H>    Ca    6.6<L>      28 Aug 2019 05:56  Phos  8.3     08-  Mg     2.3     08-    TPro  4.7<L>  /  Alb  1.7<L>  /  TBili  1.5<H>  /  DBili  x   /  AST  1212<H>  /  ALT  658<H>  /  AlkPhos  263<H>  08    PT/INR - ( 28 Aug 2019 05:56 )   PT: 26.2 sec;   INR: 2.24 ratio         PTT - ( 28 Aug 2019 05:56 )  PTT:44.0 sec  Urinalysis Basic - ( 27 Aug 2019 07:19 )    Color: Yellow / Appearance: Slightly Turbid / S.015 / pH: x  Gluc: x / Ketone: Negative  / Bili: Negative / Urobili: Negative   Blood: x / Protein: 100 / Nitrite: Negative   Leuk Esterase: Negative / RBC: 1 /hpf / WBC 2 /HPF   Sq Epi: x / Non Sq Epi: 1 /hpf / Bacteria: Negative        RADIOLOGY & ADDITIONAL TESTS: Reviewed.

## 2019-08-28 NOTE — PROVIDER CONTACT NOTE (CRITICAL VALUE NOTIFICATION) - TEST AND RESULT REPORTED:
ABG hgb 6.4, crit 20, lact 5.2, k 2.9, gluc 452
Ca 6.5
H/H=6.7/19.9
HCT 21
K+ on ABG 2.9
Platelets 20
Plt 16
Plt:11
Plt:5
hb 6.6,hct19.1
hgb: 6.1, hct: 18
platelet = 13
platelets=7
ABG P02 is 48

## 2019-08-28 NOTE — ADVANCED PRACTICE NURSE CONSULT - ASSESSMENT
Induction Cycle Day 22  Patient remain intubated and on a ventilator in the critical care unit. At this time unable to perform any form of assessment.

## 2019-08-28 NOTE — PROGRESS NOTE ADULT - SUBJECTIVE AND OBJECTIVE BOX
MORIAH BRANDON 64y MRN-[id number]    Patient is a 64y old  Female who presents with a chief complaint of For induction chemotherapy (28 Aug 2019 09:38)      Follow Up/CC:  ID following for fever    Interval History/ROS: in micu, intubated    Allergies    cefepime (Rash)    Intolerances        ANTIMICROBIALS:  acyclovir    Suspension 400 two times a day  meropenem  IVPB 1000 every 8 hours  posaconazole Suspension 200 three times a day  vancomycin  IVPB 1000 every 12 hours      MEDICATIONS  (STANDING):  acyclovir    Suspension 400 milliGRAM(s) Oral two times a day  chlorhexidine 0.12% Liquid 15 milliLiter(s) Oral Mucosa every 12 hours  chlorhexidine 2% Cloths 1 Application(s) Topical <User Schedule>  dextrose 5%. 1000 milliLiter(s) (50 mL/Hr) IV Continuous <Continuous>  dextrose 50% Injectable 12.5 Gram(s) IV Push once  dextrose 50% Injectable 25 Gram(s) IV Push once  dextrose 50% Injectable 25 Gram(s) IV Push once  insulin regular Infusion 7 Unit(s)/Hr (7 mL/Hr) IV Continuous <Continuous>  investigational chemo - general 1 Tablet(s) Oral every 24 hours  levothyroxine 75 MICROGram(s) Oral daily  meropenem  IVPB 1000 milliGRAM(s) IV Intermittent every 8 hours  norepinephrine Infusion 2.5 MICROgram(s)/kG/Min (140.625 mL/Hr) IV Continuous <Continuous>  posaconazole Suspension 200 milliGRAM(s) Oral three times a day  potassium chloride  10 mEq/100 mL IVPB 10 milliEquivalent(s) IV Intermittent every 1 hour  sodium bicarbonate  Infusion 0.375 mEq/kG/Hr (150 mL/Hr) IV Continuous <Continuous>  sodium chloride 0.9%. 1000 milliLiter(s) (50 mL/Hr) IV Continuous <Continuous>  tiotropium 18 MICROgram(s) Capsule 1 Capsule(s) Inhalation daily  vancomycin  IVPB 1000 milliGRAM(s) IV Intermittent every 12 hours    MEDICATIONS  (PRN):  acetaminophen    Suspension .. 650 milliGRAM(s) Enteral Tube every 6 hours PRN Temp greater or equal to 38C (100.4F)  dextrose 40% Gel 15 Gram(s) Oral once PRN Blood Glucose LESS THAN 70 milliGRAM(s)/deciliter  glucagon  Injectable 1 milliGRAM(s) IntraMuscular once PRN Glucose LESS THAN 70 milligrams/deciliter        Vital Signs Last 24 Hrs  T(C): 36.6 (28 Aug 2019 14:00), Max: 37.2 (27 Aug 2019 19:15)  T(F): 97.9 (28 Aug 2019 14:00), Max: 99 (27 Aug 2019 19:15)  HR: 122 (28 Aug 2019 14:15) (106 - 128)  BP: --  BP(mean): --  RR: 27 (28 Aug 2019 14:15) (9 - 27)  SpO2: 91% (28 Aug 2019 14:15) (90% - 100%)    CBC Full  -  ( 28 Aug 2019 12:08 )  WBC Count : 1.9 K/uL  RBC Count : 2.87 M/uL  Hemoglobin : 8.7 g/dL  Hematocrit : 24.5 %  Platelet Count - Automated : 133 K/uL  Mean Cell Volume : 85.7 fl  Mean Cell Hemoglobin : 30.2 pg  Mean Cell Hemoglobin Concentration : 35.3 gm/dL  Auto Neutrophil # : x  Auto Lymphocyte # : x  Auto Monocyte # : x  Auto Eosinophil # : x  Auto Basophil # : x  Auto Neutrophil % : x  Auto Lymphocyte % : x  Auto Monocyte % : x  Auto Eosinophil % : x  Auto Basophil % : x        138  |  96  |  20  ----------------------------<  194<H>  3.2<L>   |  27  |  3.03<H>    Ca    6.5<LL>      28 Aug 2019 12:08  Phos  8.3     08-  Mg     2.3     08-    TPro  4.7<L>  /  Alb  1.9<L>  /  TBili  1.8<H>  /  DBili  x   /  AST  1068<H>  /  ALT  684<H>  /  AlkPhos  293<H>      LIVER FUNCTIONS - ( 28 Aug 2019 12:08 )  Alb: 1.9 g/dL / Pro: 4.7 g/dL / ALK PHOS: 293 U/L / ALT: 684 U/L / AST: 1068 U/L / GGT: x           Urinalysis Basic - ( 27 Aug 2019 07:19 )    Color: Yellow / Appearance: Slightly Turbid / S.015 / pH: x  Gluc: x / Ketone: Negative  / Bili: Negative / Urobili: Negative   Blood: x / Protein: 100 / Nitrite: Negative   Leuk Esterase: Negative / RBC: 1 /hpf / WBC 2 /HPF   Sq Epi: x / Non Sq Epi: 1 /hpf / Bacteria: Negative        MICROBIOLOGY:  .Urine  19   No growth  --  --      .Blood  19   No growth to date.  --  --      .Blood  19   No growth to date.  --  --      .Urine  19   No growth  --  --      .Blood  19   No growth to date.  --  --      .Blood  19   No growth at 5 days.  --  --      .Urine  19   <10,000 CFU/mL Normal Urogenital Maryana  --  --      .Urine  19   No growth  --  --      .Blood  19   No growth at 5 days.  --  --      .Blood  19   No growth at 5 days.  --  --      .Sputum  19   Normal Respiratory Maryana present  --    Rare polymorphonuclear leukocytes per low power field  Few Squamous epithelial cells per low power field  Moderate Gram positive cocci in pairs seen per oil power field  Moderate Gram Variable Rods seen per oil power field      .Blood  19   No growth at 5 days.  --  --      .Urine  19   No growth  --  --      .Sputum  08-10-19   Normal Respiratory Maryana present  --    Few polymorphonuclear leukocytes per low power field  Rare Squamous epithelial cells per low power field  No organisms seen per oil power field      .Urine  08-10-19   No growth  --  --      .Blood  19   No growth at 5 days.  --  --      Rapid RVP Result: NotDete ( @ 15:58)          RADIOLOGY    < from: Xray Chest 1 View- PORTABLE-Routine (19 @ 08:42) >   Slight improvement in bilateral alveolar opacities.    Decreased size of right apical pneumothorax.    < end of copied text >

## 2019-08-29 VITALS — RESPIRATION RATE: 30 BRPM

## 2019-08-29 LAB
ALBUMIN SERPL ELPH-MCNC: 1.3 G/DL — LOW (ref 3.3–5)
ALBUMIN SERPL ELPH-MCNC: 1.5 G/DL — LOW (ref 3.3–5)
ALP SERPL-CCNC: 413 U/L — HIGH (ref 40–120)
ALP SERPL-CCNC: 421 U/L — HIGH (ref 40–120)
ALT FLD-CCNC: 647 U/L — HIGH (ref 10–45)
ALT FLD-CCNC: 714 U/L — HIGH (ref 10–45)
ANION GAP SERPL CALC-SCNC: 17 MMOL/L — SIGNIFICANT CHANGE UP (ref 5–17)
ANION GAP SERPL CALC-SCNC: 25 MMOL/L — HIGH (ref 5–17)
APTT BLD: 42.5 SEC — HIGH (ref 27.5–36.3)
APTT BLD: 43.9 SEC — HIGH (ref 27.5–36.3)
AST SERPL-CCNC: 832 U/L — HIGH (ref 10–40)
AST SERPL-CCNC: 855 U/L — HIGH (ref 10–40)
BILIRUB SERPL-MCNC: 1.8 MG/DL — HIGH (ref 0.2–1.2)
BILIRUB SERPL-MCNC: 2.3 MG/DL — HIGH (ref 0.2–1.2)
BUN SERPL-MCNC: 24 MG/DL — HIGH (ref 7–23)
BUN SERPL-MCNC: 26 MG/DL — HIGH (ref 7–23)
CALCIUM SERPL-MCNC: 6 MG/DL — CRITICAL LOW (ref 8.4–10.5)
CALCIUM SERPL-MCNC: 6.2 MG/DL — CRITICAL LOW (ref 8.4–10.5)
CHLORIDE SERPL-SCNC: 93 MMOL/L — LOW (ref 96–108)
CHLORIDE SERPL-SCNC: 95 MMOL/L — LOW (ref 96–108)
CO2 SERPL-SCNC: 16 MMOL/L — LOW (ref 22–31)
CO2 SERPL-SCNC: 23 MMOL/L — SIGNIFICANT CHANGE UP (ref 22–31)
CREAT SERPL-MCNC: 3.39 MG/DL — HIGH (ref 0.5–1.3)
CREAT SERPL-MCNC: 3.7 MG/DL — HIGH (ref 0.5–1.3)
GAS PNL BLDA: SIGNIFICANT CHANGE UP
GAS PNL BLDA: SIGNIFICANT CHANGE UP
GLUCOSE BLDC GLUCOMTR-MCNC: 114 MG/DL — HIGH (ref 70–99)
GLUCOSE BLDC GLUCOMTR-MCNC: 148 MG/DL — HIGH (ref 70–99)
GLUCOSE SERPL-MCNC: 109 MG/DL — HIGH (ref 70–99)
GLUCOSE SERPL-MCNC: 71 MG/DL — SIGNIFICANT CHANGE UP (ref 70–99)
HCT VFR BLD CALC: 24.7 % — LOW (ref 34.5–45)
HCT VFR BLD CALC: 27.4 % — LOW (ref 34.5–45)
HGB BLD-MCNC: 8.7 G/DL — LOW (ref 11.5–15.5)
HGB BLD-MCNC: 9.4 G/DL — LOW (ref 11.5–15.5)
INR BLD: 2.46 RATIO — HIGH (ref 0.88–1.16)
MAGNESIUM SERPL-MCNC: 1.2 MG/DL — LOW (ref 1.6–2.6)
MAGNESIUM SERPL-MCNC: 1.9 MG/DL — SIGNIFICANT CHANGE UP (ref 1.6–2.6)
MCHC RBC-ENTMCNC: 29.7 PG — SIGNIFICANT CHANGE UP (ref 27–34)
MCHC RBC-ENTMCNC: 31.5 PG — SIGNIFICANT CHANGE UP (ref 27–34)
MCHC RBC-ENTMCNC: 34.3 GM/DL — SIGNIFICANT CHANGE UP (ref 32–36)
MCHC RBC-ENTMCNC: 35 GM/DL — SIGNIFICANT CHANGE UP (ref 32–36)
MCV RBC AUTO: 86.7 FL — SIGNIFICANT CHANGE UP (ref 80–100)
MCV RBC AUTO: 89.9 FL — SIGNIFICANT CHANGE UP (ref 80–100)
PHOSPHATE SERPL-MCNC: 4.3 MG/DL — SIGNIFICANT CHANGE UP (ref 2.5–4.5)
PHOSPHATE SERPL-MCNC: 5.8 MG/DL — HIGH (ref 2.5–4.5)
PLATELET # BLD AUTO: 148 K/UL — LOW (ref 150–400)
PLATELET # BLD AUTO: 153 K/UL — SIGNIFICANT CHANGE UP (ref 150–400)
POTASSIUM SERPL-MCNC: 5.1 MMOL/L — SIGNIFICANT CHANGE UP (ref 3.5–5.3)
POTASSIUM SERPL-MCNC: 5.4 MMOL/L — HIGH (ref 3.5–5.3)
POTASSIUM SERPL-SCNC: 5.1 MMOL/L — SIGNIFICANT CHANGE UP (ref 3.5–5.3)
POTASSIUM SERPL-SCNC: 5.4 MMOL/L — HIGH (ref 3.5–5.3)
PROT SERPL-MCNC: 4.2 G/DL — LOW (ref 6–8.3)
PROT SERPL-MCNC: 4.5 G/DL — LOW (ref 6–8.3)
PROTHROM AB SERPL-ACNC: 29.1 SEC — HIGH (ref 10–12.9)
RBC # BLD: 2.75 M/UL — LOW (ref 3.8–5.2)
RBC # BLD: 3.16 M/UL — LOW (ref 3.8–5.2)
RBC # FLD: 13 % — SIGNIFICANT CHANGE UP (ref 10.3–14.5)
RBC # FLD: 13.5 % — SIGNIFICANT CHANGE UP (ref 10.3–14.5)
SODIUM SERPL-SCNC: 134 MMOL/L — LOW (ref 135–145)
SODIUM SERPL-SCNC: 135 MMOL/L — SIGNIFICANT CHANGE UP (ref 135–145)
WBC # BLD: 3 K/UL — LOW (ref 3.8–10.5)
WBC # BLD: 3.8 K/UL — SIGNIFICANT CHANGE UP (ref 3.8–10.5)
WBC # FLD AUTO: 3 K/UL — LOW (ref 3.8–10.5)
WBC # FLD AUTO: 3.8 K/UL — SIGNIFICANT CHANGE UP (ref 3.8–10.5)

## 2019-08-29 PROCEDURE — 87581 M.PNEUMON DNA AMP PROBE: CPT

## 2019-08-29 PROCEDURE — 99233 SBSQ HOSP IP/OBS HIGH 50: CPT | Mod: GC

## 2019-08-29 PROCEDURE — 99239 HOSP IP/OBS DSCHRG MGMT >30: CPT | Mod: GC

## 2019-08-29 PROCEDURE — 86900 BLOOD TYPING SEROLOGIC ABO: CPT

## 2019-08-29 PROCEDURE — A9560: CPT

## 2019-08-29 PROCEDURE — 82803 BLOOD GASES ANY COMBINATION: CPT

## 2019-08-29 PROCEDURE — 82550 ASSAY OF CK (CPK): CPT

## 2019-08-29 PROCEDURE — 87389 HIV-1 AG W/HIV-1&-2 AB AG IA: CPT

## 2019-08-29 PROCEDURE — 82330 ASSAY OF CALCIUM: CPT

## 2019-08-29 PROCEDURE — 80053 COMPREHEN METABOLIC PANEL: CPT

## 2019-08-29 PROCEDURE — 36556 INSERT NON-TUNNEL CV CATH: CPT

## 2019-08-29 PROCEDURE — 92950 HEART/LUNG RESUSCITATION CPR: CPT

## 2019-08-29 PROCEDURE — 85730 THROMBOPLASTIN TIME PARTIAL: CPT

## 2019-08-29 PROCEDURE — 87070 CULTURE OTHR SPECIMN AEROBIC: CPT

## 2019-08-29 PROCEDURE — 78472 GATED HEART PLANAR SINGLE: CPT

## 2019-08-29 PROCEDURE — 82565 ASSAY OF CREATININE: CPT

## 2019-08-29 PROCEDURE — 80074 ACUTE HEPATITIS PANEL: CPT

## 2019-08-29 PROCEDURE — 83036 HEMOGLOBIN GLYCOSYLATED A1C: CPT

## 2019-08-29 PROCEDURE — 84295 ASSAY OF SERUM SODIUM: CPT

## 2019-08-29 PROCEDURE — 87633 RESP VIRUS 12-25 TARGETS: CPT

## 2019-08-29 PROCEDURE — 97161 PT EVAL LOW COMPLEX 20 MIN: CPT

## 2019-08-29 PROCEDURE — 82947 ASSAY GLUCOSE BLOOD QUANT: CPT

## 2019-08-29 PROCEDURE — C1769: CPT

## 2019-08-29 PROCEDURE — 84550 ASSAY OF BLOOD/URIC ACID: CPT

## 2019-08-29 PROCEDURE — 86706 HEP B SURFACE ANTIBODY: CPT

## 2019-08-29 PROCEDURE — 71260 CT THORAX DX C+: CPT

## 2019-08-29 PROCEDURE — 82962 GLUCOSE BLOOD TEST: CPT

## 2019-08-29 PROCEDURE — 87086 URINE CULTURE/COLONY COUNT: CPT

## 2019-08-29 PROCEDURE — 87486 CHLMYD PNEUM DNA AMP PROBE: CPT

## 2019-08-29 PROCEDURE — 83605 ASSAY OF LACTIC ACID: CPT

## 2019-08-29 PROCEDURE — 81003 URINALYSIS AUTO W/O SCOPE: CPT

## 2019-08-29 PROCEDURE — 36600 WITHDRAWAL OF ARTERIAL BLOOD: CPT

## 2019-08-29 PROCEDURE — 76937 US GUIDE VASCULAR ACCESS: CPT

## 2019-08-29 PROCEDURE — 84132 ASSAY OF SERUM POTASSIUM: CPT

## 2019-08-29 PROCEDURE — 93970 EXTREMITY STUDY: CPT

## 2019-08-29 PROCEDURE — 71045 X-RAY EXAM CHEST 1 VIEW: CPT

## 2019-08-29 PROCEDURE — 85610 PROTHROMBIN TIME: CPT

## 2019-08-29 PROCEDURE — 86803 HEPATITIS C AB TEST: CPT

## 2019-08-29 PROCEDURE — 85014 HEMATOCRIT: CPT

## 2019-08-29 PROCEDURE — 86850 RBC ANTIBODY SCREEN: CPT

## 2019-08-29 PROCEDURE — 76700 US EXAM ABDOM COMPLETE: CPT

## 2019-08-29 PROCEDURE — 88313 SPECIAL STAINS GROUP 2: CPT

## 2019-08-29 PROCEDURE — 87798 DETECT AGENT NOS DNA AMP: CPT

## 2019-08-29 PROCEDURE — 93005 ELECTROCARDIOGRAM TRACING: CPT

## 2019-08-29 PROCEDURE — 87324 CLOSTRIDIUM AG IA: CPT

## 2019-08-29 PROCEDURE — C1751: CPT

## 2019-08-29 PROCEDURE — 94640 AIRWAY INHALATION TREATMENT: CPT

## 2019-08-29 PROCEDURE — 95816 EEG AWAKE AND DROWSY: CPT | Mod: 26

## 2019-08-29 PROCEDURE — P9040: CPT

## 2019-08-29 PROCEDURE — 88184 FLOWCYTOMETRY/ TC 1 MARKER: CPT

## 2019-08-29 PROCEDURE — 84100 ASSAY OF PHOSPHORUS: CPT

## 2019-08-29 PROCEDURE — 86923 COMPATIBILITY TEST ELECTRIC: CPT

## 2019-08-29 PROCEDURE — 85027 COMPLETE CBC AUTOMATED: CPT

## 2019-08-29 PROCEDURE — 94003 VENT MGMT INPAT SUBQ DAY: CPT

## 2019-08-29 PROCEDURE — 71250 CT THORAX DX C-: CPT

## 2019-08-29 PROCEDURE — 94002 VENT MGMT INPAT INIT DAY: CPT

## 2019-08-29 PROCEDURE — 87040 BLOOD CULTURE FOR BACTERIA: CPT

## 2019-08-29 PROCEDURE — 81001 URINALYSIS AUTO W/SCOPE: CPT

## 2019-08-29 PROCEDURE — 87449 NOS EACH ORGANISM AG IA: CPT

## 2019-08-29 PROCEDURE — 93306 TTE W/DOPPLER COMPLETE: CPT

## 2019-08-29 PROCEDURE — 88305 TISSUE EXAM BY PATHOLOGIST: CPT

## 2019-08-29 PROCEDURE — 77001 FLUOROGUIDE FOR VEIN DEVICE: CPT

## 2019-08-29 PROCEDURE — 81382 HLA II TYPING 1 LOC HR: CPT

## 2019-08-29 PROCEDURE — 86704 HEP B CORE ANTIBODY TOTAL: CPT

## 2019-08-29 PROCEDURE — 83735 ASSAY OF MAGNESIUM: CPT

## 2019-08-29 PROCEDURE — 36430 TRANSFUSION BLD/BLD COMPNT: CPT

## 2019-08-29 PROCEDURE — 86901 BLOOD TYPING SEROLOGIC RH(D): CPT

## 2019-08-29 PROCEDURE — C1894: CPT

## 2019-08-29 PROCEDURE — 81379 HLA I TYPING COMPLETE HR: CPT

## 2019-08-29 PROCEDURE — 82435 ASSAY OF BLOOD CHLORIDE: CPT

## 2019-08-29 PROCEDURE — 83615 LACTATE (LD) (LDH) ENZYME: CPT

## 2019-08-29 PROCEDURE — 85097 BONE MARROW INTERPRETATION: CPT

## 2019-08-29 PROCEDURE — 95816 EEG AWAKE AND DROWSY: CPT

## 2019-08-29 PROCEDURE — 82010 KETONE BODYS QUAN: CPT

## 2019-08-29 PROCEDURE — 74177 CT ABD & PELVIS W/CONTRAST: CPT

## 2019-08-29 PROCEDURE — P9037: CPT

## 2019-08-29 PROCEDURE — 80202 ASSAY OF VANCOMYCIN: CPT

## 2019-08-29 PROCEDURE — 88185 FLOWCYTOMETRY/TC ADD-ON: CPT

## 2019-08-29 PROCEDURE — 87205 SMEAR GRAM STAIN: CPT

## 2019-08-29 PROCEDURE — 81373 HLA I TYPING 1 LOCUS LR: CPT

## 2019-08-29 RX ORDER — MAGNESIUM SULFATE 500 MG/ML
2 VIAL (ML) INJECTION ONCE
Refills: 0 | Status: COMPLETED | OUTPATIENT
Start: 2019-08-29 | End: 2019-08-29

## 2019-08-29 RX ORDER — DEXTROSE 50 % IN WATER 50 %
50 SYRINGE (ML) INTRAVENOUS ONCE
Refills: 0 | Status: COMPLETED | OUTPATIENT
Start: 2019-08-29 | End: 2019-08-29

## 2019-08-29 RX ADMIN — Medication 75 MICROGRAM(S): at 05:44

## 2019-08-29 RX ADMIN — Medication 75 MEQ/KG/HR: at 05:40

## 2019-08-29 RX ADMIN — Medication 400 MILLIGRAM(S): at 06:36

## 2019-08-29 RX ADMIN — CHLORHEXIDINE GLUCONATE 1 APPLICATION(S): 213 SOLUTION TOPICAL at 07:50

## 2019-08-29 RX ADMIN — CHLORHEXIDINE GLUCONATE 1 APPLICATION(S): 213 SOLUTION TOPICAL at 05:47

## 2019-08-29 RX ADMIN — Medication 250 MILLIGRAM(S): at 05:42

## 2019-08-29 RX ADMIN — Medication 140.62 MICROGRAM(S)/KG/MIN: at 05:40

## 2019-08-29 RX ADMIN — POSACONAZOLE 200 MILLIGRAM(S): 100 TABLET, DELAYED RELEASE ORAL at 06:34

## 2019-08-29 RX ADMIN — Medication 50 GRAM(S): at 03:23

## 2019-08-29 RX ADMIN — CHLORHEXIDINE GLUCONATE 15 MILLILITER(S): 213 SOLUTION TOPICAL at 05:45

## 2019-08-29 RX ADMIN — MEROPENEM 100 MILLIGRAM(S): 1 INJECTION INTRAVENOUS at 05:40

## 2019-08-29 RX ADMIN — Medication 50 MILLILITER(S): at 06:34

## 2019-08-29 NOTE — PROGRESS NOTE ADULT - SUBJECTIVE AND OBJECTIVE BOX
64 Y F now s/p ROSC from cardiac arrest unknown cause with unknown down time at least 15 minutes with CPR    INTERVAL HPI/OVERNIGHT EVENTS: Still no pupillary or corneal reflex. EEG done. Patient acidotic and hypercapnic, vent TV adjusted.     SUBJECTIVE: Patient seen and examined at bedside, unchanged from yesterday.     : [ ] dysuria [ ] nocturia [ ] hematuria [ ] increased urinary frequency  Musculoskeletal: [ ] back pain [ ] myalgias [ ] arthralgias [ ] fracture  Skin: [ ] rash [ ] itch  Neurological: [ ] headache [ ] dizziness [ ] syncope [ ] weakness [ ] numbness  Psychiatric: [ ] anxiety [ ] depression  Endocrine: [ ] diabetes [ ] thyroid problem  Hematologic/Lymphatic: [ ] anemia [ ] bleeding problem  Allergic/Immunologic: [ ] itchy eyes [ ] nasal discharge [ ] hives [ ] angioedema  [ ] All other systems negative  [X] Unable to assess ROS because Patient unresponsive      OBJECTIVE:    VITAL SIGNS:  ICU Vital Signs Last 24 Hrs  T(C): 37.7 (29 Aug 2019 10:00), Max: 37.8 (29 Aug 2019 03:00)  T(F): 99.9 (29 Aug 2019 10:00), Max: 100 (29 Aug 2019 03:00)  HR: 0 (29 Aug 2019 11:50) (0 - 127)  BP: --  BP(mean): --  ABP: 22/18 (29 Aug 2019 11:50) (22/18 - 132/64)  ABP(mean): 18 (29 Aug 2019 11:50) (18 - 486)  RR: 30 (29 Aug 2019 11:50) (19 - 30)  SpO2: 98% (29 Aug 2019 11:08) (89% - 99%)    Mode: AC/ CMV (Assist Control/ Continuous Mandatory Ventilation), RR (machine): 30, TV (machine): 400, FiO2: 90, PEEP: 10, ITime: 1, MAP: 22, PIP: 46    08-28 @ 07:01  -  08-29 @ 07:00  --------------------------------------------------------  IN: 6094 mL / OUT: 10 mL / NET: 6084 mL      CAPILLARY BLOOD GLUCOSE      POCT Blood Glucose.: 114 mg/dL (29 Aug 2019 08:14)      PHYSICAL EXAM:    General: NAD  HEENT: NC/AT; PERRL, clear conjunctiva  Neck: supple  Respiratory: CTA b/l  Cardiovascular: +S1/S2; RRR  Abdomen: soft, NT/ND; +BS x4  Extremities: WWP, 2+ peripheral pulses b/l; no LE edema  Skin: normal color and turgor; no rash  Neurological:    MEDICATIONS:  MEDICATIONS  (STANDING):  acyclovir    Suspension 400 milliGRAM(s) Oral two times a day  chlorhexidine 0.12% Liquid 15 milliLiter(s) Oral Mucosa every 12 hours  chlorhexidine 2% Cloths 1 Application(s) Topical <User Schedule>  dextrose 5%. 1000 milliLiter(s) (50 mL/Hr) IV Continuous <Continuous>  dextrose 50% Injectable 12.5 Gram(s) IV Push once  dextrose 50% Injectable 25 Gram(s) IV Push once  dextrose 50% Injectable 25 Gram(s) IV Push once  insulin lispro (HumaLOG) corrective regimen sliding scale   SubCutaneous every 6 hours  levothyroxine 75 MICROGram(s) Oral daily  meropenem  IVPB 1000 milliGRAM(s) IV Intermittent every 8 hours  norepinephrine Infusion 2.5 MICROgram(s)/kG/Min (140.625 mL/Hr) IV Continuous <Continuous>  posaconazole Suspension 200 milliGRAM(s) Oral three times a day  sodium bicarbonate  Infusion 0.188 mEq/kG/Hr (75 mL/Hr) IV Continuous <Continuous>  tiotropium 18 MICROgram(s) Capsule 1 Capsule(s) Inhalation daily  vancomycin  IVPB 1000 milliGRAM(s) IV Intermittent every 12 hours    MEDICATIONS  (PRN):  acetaminophen    Suspension .. 650 milliGRAM(s) Enteral Tube every 6 hours PRN Temp greater or equal to 38C (100.4F)  acetaminophen    Suspension .. 650 milliGRAM(s) Oral every 6 hours PRN Temp greater or equal to 38C (100.4F)  dextrose 40% Gel 15 Gram(s) Oral once PRN Blood Glucose LESS THAN 70 milliGRAM(s)/deciliter  glucagon  Injectable 1 milliGRAM(s) IntraMuscular once PRN Glucose LESS THAN 70 milligrams/deciliter      ALLERGIES:  Allergies    cefepime (Rash)    Intolerances        LABS:                        8.7    3.8   )-----------( 148      ( 29 Aug 2019 06:21 )             24.7     08-29    134<L>  |  93<L>  |  26<H>  ----------------------------<  71  5.4<H>   |  16<L>  |  3.70<H>    Ca    6.0<LL>      29 Aug 2019 06:21  Phos  5.8     08-29  Mg     1.9     08-29    TPro  4.2<L>  /  Alb  1.3<L>  /  TBili  1.8<H>  /  DBili  x   /  AST  832<H>  /  ALT  647<H>  /  AlkPhos  421<H>  08-29    PT/INR - ( 29 Aug 2019 00:33 )   PT: 29.1 sec;   INR: 2.46 ratio         PTT - ( 29 Aug 2019 06:21 )  PTT:43.9 sec      RADIOLOGY & ADDITIONAL TESTS: Reviewed.

## 2019-08-29 NOTE — PROGRESS NOTE ADULT - PROBLEM SELECTOR PLAN 10
No pharmacologic ppx 2/2 thrombocytopenia      Prognosis extremely guarded.   Contact Information [phone]

## 2019-08-29 NOTE — EEG REPORT - NS EEG TEXT BOX
[facility]  Report of Routine EEG Study with Video      [facility]: [address], [address] [place] [address], Phone: [phone]  [facility]: [address] [place] [address], Phone: [phone]  Office: [address] [facility], [address] [address], [place] [address], Phone: [phone]    Patient Name: Tran Emery    Age: 64 year  : 1955  Patient ID: -, MRN #: MR# [id number], Location: [facility]  Referring Physician: -  EEG #: [id number]    Study Date: 2019		    Technical Information:					  On Instrument: -  Placement and Labeling of Electrodes:  The EEG was performed utilizing 20 channels referential EEG connections (coronal over temporal over parasagittal montage) using all standard 10-20 electrode placements with EKG.  Recording was at a sampling rate of 256 samples per second per channel.  Time synchronized digital video recording was done simultaneously with EEG recording.  A low light infrared camera was used for low light recording.  Fidel and seizure detection algorithms were utilized.    History:  Cerebral Death Eval performed bedside  COR: State of patient: Unresponsive  No HV/Photic due to patient condition  65 y/o female PMH DM, RA, DVT, Hypothyroid  Cerebral death Eval    Medication	  No Data.	    [Abbreviation Key:  PDR=alpha rhythm/posterior dominant rhythm. A-P=anterior posterior gradient.  Amplitude: ‘very low’:<20; ‘low’:20-50; ‘medium’:; ‘high’:>200uV.  Persistence for periodic/rhythmic patterns (% of epoch) ‘rare’:<1%; ‘occasional’:1-10%; ‘frequent’:10-50%; ‘abundant’:50-90%; ‘continuous’:>90%.  Persistence for sporadic discharges: ‘rare’:<1/hr; ‘occasional’:1/min-1/hr; ‘frequent’:>1/min; ‘abundant’:>1/10 sec.  GRDA=generalized rhythmic delta activity, LRDA=lateralized rhythmic delta activity, TIRDA=temporal intermittent rhythmic delta activity, FIRDA=frontal intermittent rhythmic activity. LPD=PLED=lateralized periodic discharges, GPD=generalized periodic discharges, BiPDs=BiPLEDs=bilateral independent periodic epileptiform discharges, SIRPID=stimulus induced rhythmic, periodic, or ictal appearing discharges.  Modifiers: +F=with fast component, +S=with spike component, +R=with rhythmic component.  S-B=burst suppression pattern. PFA: paroxysmal fast activity. Max=maximal. N1-drowsy, N2-stage II sleep, N3-slow wave sleep.  HV=hyperventilation, PS=photic stimulation]    Study Interpretation:    FINDINGS:      Background:   The background voltages are severely attenuated diffusely. However, at the highest sensitivity – 2uV/mm there is a low amplitude activity at a mix of frequencies between theta – beta range that may represent residual cerebral activity. The background shows not spontaneous changes. There are no recognizable features of sleep or awake states. There is no change in the background in response to noxious stimulation.     Focal Slowing:   -None present.      Other Non-Epileptiform Findings:  -None were present.    Interictal Epileptiform Activity:   -None were present.    Events:  -Clinical events: None recorded.  -Seizures: None recorded.    Activation Procedures:   -Hyperventilation was not performed.    -Photic stimulation was not performed.    Artifacts:  -Intermittent myogenic and movement artifacts were noted.    ECG:  -The heart rate on single channel ECG was predominantly between 100-120 BPM.    EEG Summary/Classification:  -Abnormal EEG in a comatose patient.  1. voltage suppression  2. invariant  3. disorganization    EEG Impression/Clinical Correlate:  There is evidence of severe diffuse cerebral dysfunction consistent with widespread cortical injury. However, there are residual low amplitude activities that may be of cerebral origin.  This study does not meet the criteria for electro-cerebral silence.       Brian Hill MD PhD  Director, Epilepsy Division, [facility]

## 2019-08-29 NOTE — PROGRESS NOTE ADULT - NSHPATTENDINGPLANDISCUSS_GEN_ALL_CORE
team
MICU team
MICU team
Med NP
team
BERNY Paniagua
Heme/Onc NP
team

## 2019-08-29 NOTE — PROGRESS NOTE ADULT - PROBLEM SELECTOR PLAN 1
Pt s/p PEA arrest on 8/27 due to unclear etiology. Pt was found unresponsive in the bathroom for unknown amount of time  - Evaluated by cardiology team, deemed not STEMI or cath candidate  - in MICU for further care, however pt without corneal reflex, gag reflex, pupils dilated and fixed.   - Pt capped on Levophed, with MAP in 50's   - Pt with anasarca and kidney failure   - GOC discussion is ongoing with family, pt is DNR, awaiting discussion with neurology regarding results of EEG: There is evidence of severe diffuse cerebral dysfunction consistent with widespread cortical injury. However, there are residual low amplitude activities that may be of cerebral origin.  This study does not meet the criteria for electro-cerebral silence.

## 2019-08-29 NOTE — PROGRESS NOTE ADULT - ATTENDING COMMENTS
64 year old woman with AML admitted for induction chemo, pancytopenia, segmental PE s/p cardiac arrest now in multi organ failure    - no improvement in neurologic status, has no pupillary, corneal, cough or gag reflex  - anuric renal failure  - EEG consistent with widespread cortical injury  - family opted to withdraw care but patient passed before care was withdrawan

## 2019-08-29 NOTE — PROGRESS NOTE ADULT - PROVIDER SPECIALTY LIST ADULT
Endocrinology
Heme/Onc
Infectious Disease
Intervent Radiology
Intervent Radiology
MICU
Heme/Onc
Anesthesia
MICU
Endocrinology
Heme/Onc

## 2019-08-29 NOTE — PROGRESS NOTE ADULT - REASON FOR ADMISSION
For induction chemotherapy

## 2019-08-29 NOTE — PROGRESS NOTE ADULT - PROBLEM SELECTOR PLAN 2
Enrolled on Pfizer BRIGHT trial s/p Induction chemotherapy with Dauno/Cytarabine and daily Glasdegib vs Placebo   Molecular studies pending  8/20 Day 14 BM bx, chemotherapeutic   Monitor CBC/Lytes and transfuse/replete PRN  Strict Is and Os/ Daily weights/ Mouth Care  Carafate for sore throat, mucositis   Allopurinol 300mg PO daily-stopped 2/2 rash  IVF hydration  Antiemetics  ECOG performance status 4  Atarax ATC, hydrocortisone cream, triamcinolone cream, oral benadryl prn - rash improved   Awaiting count recovery  Appreciate MICU care

## 2019-08-29 NOTE — PROGRESS NOTE ADULT - SUBJECTIVE AND OBJECTIVE BOX
Diagnosis: AML FLT 3 (-)    Protocol/Chemo Regimen: Enrolled on [employer] BRIGHT 1019- s/p Induction chemotherapy with Dauno/Cytarabine and daily Glasdegib vs Placebo    Day: 23    Pt endorsed: Patient transferred to MICU on 8/27 after being found unresponsive in bathroom with no pulse, PEA arrest, unclear cause.  Per family at bedside, pt's blood pressure was capped on pressors, with no changes in reflexes in patient. Pt has had minimal urine output, with increase in diffuse swelling. EEG was performed around 3 AM this morning. Family is awaiting discussion with neurology team regarding results.     Review of Systems: unable to obtain as patient intubated     Pain scale: 0    Diet: NPO    Allergies: cefepime (Rash)     ANTIMICROBIALS  acyclovir   Oral Tab/Cap 400 milliGRAM(s) Oral two times a day  meropenem  IVPB 1000 milliGRAM(s) IV Intermittent every 8 hours  posaconazole DR Tablet 300 milliGRAM(s) Oral daily  vancomycin  IVPB 1000 milliGRAM(s) IV Intermittent every 12 hours    MEDICATIONS  (STANDING):  acyclovir    Suspension 400 milliGRAM(s) Oral two times a day  chlorhexidine 0.12% Liquid 15 milliLiter(s) Oral Mucosa every 12 hours  chlorhexidine 2% Cloths 1 Application(s) Topical <User Schedule>  dextrose 5%. 1000 milliLiter(s) (50 mL/Hr) IV Continuous <Continuous>  dextrose 50% Injectable 12.5 Gram(s) IV Push once  dextrose 50% Injectable 25 Gram(s) IV Push once  dextrose 50% Injectable 25 Gram(s) IV Push once  insulin lispro (HumaLOG) corrective regimen sliding scale   SubCutaneous every 6 hours  levothyroxine 75 MICROGram(s) Oral daily  meropenem  IVPB 1000 milliGRAM(s) IV Intermittent every 8 hours  norepinephrine Infusion 2.5 MICROgram(s)/kG/Min (140.625 mL/Hr) IV Continuous <Continuous>  posaconazole Suspension 200 milliGRAM(s) Oral three times a day  sodium bicarbonate  Infusion 0.188 mEq/kG/Hr (75 mL/Hr) IV Continuous <Continuous>  tiotropium 18 MICROgram(s) Capsule 1 Capsule(s) Inhalation daily  vancomycin  IVPB 1000 milliGRAM(s) IV Intermittent every 12 hours    MEDICATIONS  (PRN):  acetaminophen    Suspension .. 650 milliGRAM(s) Enteral Tube every 6 hours PRN Temp greater or equal to 38C (100.4F)  acetaminophen    Suspension .. 650 milliGRAM(s) Oral every 6 hours PRN Temp greater or equal to 38C (100.4F)  dextrose 40% Gel 15 Gram(s) Oral once PRN Blood Glucose LESS THAN 70 milliGRAM(s)/deciliter  glucagon  Injectable 1 milliGRAM(s) IntraMuscular once PRN Glucose LESS THAN 70 milligrams/deciliter      Vital Signs Last 24 Hrs  T(C): 38.5 (27 Aug 2019 05:47), Max: 40.6 (26 Aug 2019 14:07)  T(F): 101.3 (27 Aug 2019 05:47), Max: 105 (26 Aug 2019 14:07)  HR: 111 (27 Aug 2019 05:47) (88 - 132)  BP: 167/73 (27 Aug 2019 05:47) (102/59 - 175/72)  BP(mean): --  RR: 20 (27 Aug 2019 05:47) (18 - 30)  SpO2: 96% (27 Aug 2019 06:10) (79% - 98%)    PHYSICAL EXAM  General: intubated unresponsive, pupils fixed and dilated, anasarca  CV: normal S1, S2, RRR  Lungs: clear to auscultation, no wheezes, no rales, breath sounds from vent machine   Abdomen: Distended   Skin: no rashes  Neuro: unresponsive, no corneal reflexes   Central line: normal     LABS:                        8.4    1.5   )-----------( 101      ( 27 Aug 2019 07:19 )             24.8         Mean Cell Volume : 84.8 fl  Mean Cell Hemoglobin : 28.6 pg  Mean Cell Hemoglobin Concentration : 33.7 gm/dL  Auto Neutrophil # : x  Auto Lymphocyte # : x  Auto Monocyte # : x  Auto Eosinophil # : x  Auto Basophil # : x  Auto Neutrophil % : x  Auto Lymphocyte % : x  Auto Monocyte % : x  Auto Eosinophil % : x  Auto Basophil % : x                              8.7    3.8   )-----------( 148      ( 29 Aug 2019 06:21 )             24.7     08-29    134<L>  |  93<L>  |  26<H>  ----------------------------<  71  5.4<H>   |  16<L>  |  3.70<H>    Ca    6.0<LL>      29 Aug 2019 06:21  Phos  5.8     08-29  Mg     1.9     08-29    TPro  4.2<L>  /  Alb  1.3<L>  /  TBili  1.8<H>  /  DBili  x   /  AST  832<H>  /  ALT  647<H>  /  AlkPhos  421<H>  08-29    CAPILLARY BLOOD GLUCOSE      POCT Blood Glucose.: 114 mg/dL (29 Aug 2019 08:14)    PT/INR - ( 29 Aug 2019 00:33 )   PT: 29.1 sec;   INR: 2.46 ratio         PTT - ( 29 Aug 2019 06:21 )  PTT:43.9 sec    RADIOLOGY & ADDITIONAL STUDIES:  < from: CT Chest w/ IV Cont (08.25.19 @ 18:48) >  IMPRESSION:     Acute right-sided pulmonary emboli.    Pulmonary fibrosis. Increased groundglass opacities throughout both lungs   compared with prior imaging 8/9/2019 are nonspecific.    Heterogeneous striated nephrograms bilaterally consistent with   pyelonephritis.    Study Interpretation:    FINDINGS:      Background:   The background voltages are severely attenuated diffusely. However, at the highest sensitivity – 2uV/mm there is a low amplitude activity at a mix of frequencies between theta – beta range that may represent residual cerebral activity. The background shows not spontaneous changes. There are no recognizable features of sleep or awake states. There is no change in the background in response to noxious stimulation.     Focal Slowing:   -None present.      Other Non-Epileptiform Findings:  -None were present.    Interictal Epileptiform Activity:   -None were present.    Events:  -Clinical events: None recorded.  -Seizures: None recorded.    Activation Procedures:   -Hyperventilation was not performed.    -Photic stimulation was not performed.    Artifacts:  -Intermittent myogenic and movement artifacts were noted.    ECG:  -The heart rate on single channel ECG was predominantly between 100-120 BPM.    EEG Summary/Classification:  -Abnormal EEG in a comatose patient.  1. voltage suppression  2. invariant  3. disorganization    EEG Impression/Clinical Correlate:  There is evidence of severe diffuse cerebral dysfunction consistent with widespread cortical injury. However, there are residual low amplitude activities that may be of cerebral origin.  This study does not meet the criteria for electro-cerebral silence. Statement Selected

## 2019-08-29 NOTE — PROGRESS NOTE ADULT - ASSESSMENT
Assessment and plan 64 Y F now s/p ROSC from cardiac arrest unknown cause with unknown down time at least 15 minutes with CPR    Neuro: No spontaneous breaths, no brain stem reflexes, GCS 3 without any sedation used for intubation  -- Reverse acute acidosis  -- Family does not want patient to remain intubated with no signs of recovery, continue to re-evaluate  -- EEG done, results: There is evidence of severe diffuse cerebral dysfunction consistent with widespread cortical injury. However, there are residual low amplitude activities that may be of cerebral origin. This study does not meet the criteria for electro-cerebral silence.     CVS: S/P ROSC with unknown non-CPR down time, known PE not on AC and STEMI on ekg after rosc  -- Appreciate cardiology input regarding STEMI, not a cath lab candidate  -- POCUS with normal RV size, unclear if PE was cause, unsafe to start AC without head CT and repeat labs for platelets since 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  -- Patient remains on Levophed with MAP goal >60    Pulm: Known pulmonary fibrosis  -- Continue Spiriva and albuterol as ordered while on vent  #Intubated and ventilated with high plateau pressures while acidotic  -- TV changed to 400, pt hypercapnic. Will assess plateau pressures.    -- Titrate PEEP to lowest possible to keep saturation >95  -- Monitor plateau pressures with goal <30 however need Q1 ABGs after adjustments to re-evaluate given severe acidosis  -- Can decrease frequency of ABGs once adequately compensated with vent adjustments  -- Per family discussion, planned for terminal extubation today.     Endo: Diabetes type 2  -- HgA1C 10.6, now NPO s/p cardiac arrest  -- FS AC and QHS  -- occasional hypoglycemic episodes, insulin held      Renal: Lactic acidosis with respiratory acidosis following cardiac arrest  -- S/P BiCarb push, continue bicarb gtt at 100cc hour  -- Q1 ABG from A-line to check ph progression and titrate vent settings    Heme: AML with PanCytopenia  -- Transfused on 8/27 overnight, Hb currently stable.      ID: Neutropenic fever while admitted to hospital in setting of AML  -- Continue Posaconazole Vanc, Pj, Acyclovir    GOC: Had extensive discussion with family regarding poor prognosis of patient. They feel that they don't want her to live out her life as a "vegetable." Will update family with EEG read for final decision.

## 2019-08-29 NOTE — PROGRESS NOTE ADULT - ATTENDING COMMENTS
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 Pt on Pfizer trial 7+3+ Glasdegib/placebo. Day 22.   Day 14 marrow chemotherapeutic  [facility] results: NPM1, IDH2, DNMT3A mutations present.  On vanc and aztreonam for NF, remains febrile,   Acute resp failure o/n was on venti mask, 8/27  s/p syncope in bathroom, required resusitation for cardiac arrest 8/27  pt transferred to MICU care, boarding in CCU  Neuro exam - c/w anoxic brain injury, pt on vent, unresponsive to stimuli  remains on levophed, stable  possible terminal extubation being considered given lack of neuro response and possible RODRIGO.  ECOG 4  family at bedside   Case d/w MICU team - further neuro assessment planned   Cont supportive care and transfusions as per MICU. 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 Pt on Pfizer trial 7+3+ Glasdegib/placebo. Day 23.   Day 14 marrow chemotherapeutic. [employer] [facility] results: NPM1, IDH2, DNMT3A mutations present.  On vanc and aztreonam for NF,   Acute resp failure placed on venti mask, s/p syncope in bathroom, required resusitation for cardiac arrest   pt transferred to MICU care, boarding in CCU  Neuro exam - c/w anoxic brain injury, pt on vent, unresponsive to stimuli  on levophed, subsequently family decided on DNR  Neuro eval, EEG s/w severe diffuse cerebral dysfunction consistent with widespread cortical injury.  pt  today, pronounced dead by MICU team, family was at bedside.  ECOG 5

## 2019-08-30 LAB
CULTURE RESULTS: SIGNIFICANT CHANGE UP
CULTURE RESULTS: SIGNIFICANT CHANGE UP
SPECIMEN SOURCE: SIGNIFICANT CHANGE UP
SPECIMEN SOURCE: SIGNIFICANT CHANGE UP

## 2019-09-12 LAB — HLA-A,B SEROLOGICPLT RESULT: SIGNIFICANT CHANGE UP

## 2019-10-19 ENCOUNTER — RX RENEWAL (OUTPATIENT)
Age: 64
End: 2019-10-19

## 2019-11-05 ENCOUNTER — APPOINTMENT (OUTPATIENT)
Dept: RHEUMATOLOGY | Facility: CLINIC | Age: 64
End: 2019-11-05

## 2020-01-03 NOTE — CONSULT NOTE ADULT - PROBLEM SELECTOR RECOMMENDATION 9
Diabetes Education and Nutrition Eval  Insulin requirements much lower in-patient compared to home. Pt. also on unbalanced regimen at home with nonadherence at times to insulin doses.  Based on current insulin requirements and fasting hypoglycemia:  Decrease Lantus to 12 units qhs  Start Humalog 4 units qac tomorrow. Pt. already received Lantus 20 units last night which should cover her meals for the rest of today,   Low correction scale qac + bedtime  Goal glucose 100-180  Outpt. endo follow-up  Outpt. optho, podiatry, micro/cr  Plan to d/c on basal bolus vs. basal + orals depending on insulin requirements.
PACU

## 2020-03-14 NOTE — ED ADULT NURSE NOTE - CAS EDP DISCH DISPOSITION ADMI
EMERGENCY DEPARTMENT ENCOUNTER    Room Number:  16/16  Date of encounter:  3/14/2020  PCP: Melquiades Martinez MD  Historian: patient      HPI:  Chief Complaint: chest pain  Context: Vicky Stewart is a 87 y.o. female who presents to the ED c/o severe L sided CP that woke the pt up from sleep around 0615 this morning. The pain will radiate to her jaw and to her LUE. No aggravating or alleviating factors. Confirms mild SOA. Denies vomiting. Non-smoker. Hx of A-fib, HTN, and GERD. She denies having a Hx of MI, but states she does have a pacemaker in place and is followed by Dr. Méndez (Cardiologist). She received nitro per EMS, but states that this did not affect her pain. Denies fever and chills. Currently on Coumadin. There are no other complaints.     PAST MEDICAL HISTORY  Active Ambulatory Problems     Diagnosis Date Noted   • Atrial fibrillation (CMS/HCC) 06/17/2016   • Third degree AV block (CMS/HCC) 06/17/2016   • Osteoarthritis of spine with radiculopathy, lumbar region 05/11/2017   • Scoliosis (and kyphoscoliosis), idiopathic 05/11/2017   • Degenerative disc disease, lumbar 05/11/2017   • Cardiac pacemaker in situ 10/27/2017   • Hypertension 10/27/2017   • Age-related osteoporosis without current pathological fracture 04/11/2018   • Age-related osteoporosis without current pathological fracture 07/25/2018   • Abnormal blood chemistry level 10/26/2018   • Abnormal weight gain 10/26/2018   • Acute sinusitis 10/26/2018   • Acute upper respiratory infection 10/26/2018   • Anemia 10/26/2018   • Eczema 10/26/2018   • Gastroesophageal reflux disease 10/26/2018   • Dyspnea on exertion 10/26/2018   • Furuncle of groin 10/26/2018   • Hyperkalemia 10/26/2018   • Hyperlipidemia 10/26/2018   • Insomnia 10/26/2018   • Osteoarthritis of knee 10/26/2018   • Raynaud's phenomenon 10/26/2018   • Shortness of breath 10/26/2018   • Sick sinus syndrome (CMS/HCC) 10/26/2018   • Chronic venous insufficiency 10/26/2018      Resolved Ambulatory Problems     Diagnosis Date Noted   • Pacemaker malfunction 2016   • Atrial flutter (CMS/HCC) 10/26/2018     Past Medical History:   Diagnosis Date   • AV block, 3rd degree (CMS/[facility])    • Carotid artery stenosis    • Dermatitis    • GERD (gastroesophageal reflux disease)    • History of EKG 2016   • Low back pain    • Osteoarthritis    • Osteoarthritis    • Osteoporosis    • Pacemaker    • Palpitations    • SOB (shortness of breath) on exertion    • SSS (sick sinus syndrome) (CMS/[facility])    • URI, acute    • Venous insufficiency          PAST SURGICAL HISTORY  Past Surgical History:   Procedure Laterality Date   • BILE DUCT STENT PLACEMENT      choledochal   • BREAST BIOPSY Left     benign   • CARDIAC ELECTROPHYSIOLOGY PROCEDURE N/A 2016    Procedure: Lead Revision PPM [place];  Surgeon: Kale Méndez MD;  Location: [facility] INVASIVE LOCATION;  Service:    • CATARACT EXTRACTION W/ INTRAOCULAR LENS  IMPLANT, BILATERAL Bilateral    • CHOLECYSTECTOMY     • COLONOSCOPY  2012   • FACIAL COSMETIC SURGERY     • OTHER SURGICAL HISTORY      interrogation of implantable loop recorder remote, up to 30 days   • PACEMAKER IMPLANTATION     • SINUS SURGERY           FAMILY HISTORY  Family History   Problem Relation Age of Onset   • Heart disease Mother    • Stroke Mother    • Heart disease Father    • Diabetes Sister    • Hodgkin's lymphoma Sister    • Heart disease Other    • Stroke Other    • Endometrial cancer Sister          SOCIAL HISTORY  Social History     Socioeconomic History   • Marital status:      Spouse name: Not on file   • Number of children: Not on file   • Years of education: Not on file   • Highest education level: Not on file   Occupational History   • Occupation: retired   Tobacco Use   • Smoking status: Former Smoker     Packs/day: 1.50     Start date:      Last attempt to quit:      Years since quittin.2   • Smokeless tobacco: Never Used   •  Tobacco comment: SMOKED 1 1/2 PACKS A DAY FOR 45YEARS  QUIT SMOKING 5 YEARS AGO   Substance and Sexual Activity   • Alcohol use: No   • Drug use: No   • Sexual activity: Defer     Comment:          ALLERGIES  Codeine; Penicillins; Sulfa antibiotics; and Tramadol        REVIEW OF SYSTEMS  Review of Systems   Constitutional: Negative for fever.   HENT: Negative for sore throat.    Eyes: Negative.    Respiratory: Positive for shortness of breath. Negative for cough.    Cardiovascular: Positive for chest pain.   Gastrointestinal: Negative for abdominal pain, diarrhea and vomiting.   Genitourinary: Negative for dysuria.   Musculoskeletal: Negative for neck pain.   Skin: Negative for rash.   Allergic/Immunologic: Negative.    Neurological: Negative for weakness, numbness and headaches.   Hematological: Negative.    Psychiatric/Behavioral: Negative.    All other systems reviewed and are negative.       All systems reviewed and negative except for those discussed in HPI.       PHYSICAL EXAM    I have reviewed the triage vital signs and nursing notes.    ED Triage Vitals   Temp Pulse Resp BP SpO2   -- -- -- -- --      Temp src Heart Rate Source Patient Position BP Location FiO2 (%)   -- -- -- -- --         Physical Exam   Constitutional: She is oriented to person, place, and time. No distress.   Appears uncomfortable.   HENT:   Head: Normocephalic and atraumatic.   Eyes: Pupils are equal, round, and reactive to light. EOM are normal.   Neck: Normal range of motion. Neck supple.   Cardiovascular: Normal rate, regular rhythm and normal heart sounds.   Pulmonary/Chest: Effort normal and breath sounds normal. No respiratory distress.   Abdominal: Soft. There is no tenderness. There is no rebound and no guarding.   Musculoskeletal: Normal range of motion. She exhibits no edema.   Neurological: She is alert and oriented to person, place, and time. She has normal sensation and normal strength.   Skin: Skin is warm and dry.  No rash noted.   Psychiatric: Mood and affect normal.   Nursing note and vitals reviewed.      LAB RESULTS  No results found for this or any previous visit (from the past 24 hour(s)).    Ordered the above labs and independently reviewed the results.        RADIOLOGY  Xr Chest 1 View    Result Date: 3/14/2020  XR CHEST 1 VW-  HISTORY: Female who is 87 years-old,  acute [facility]  TECHNIQUE: Frontal view of the chest  COMPARISON: 01/14/2016  FINDINGS: The heart appears mildly enlarged. Aorta is tortuous, calcified. Pulmonary vasculature is unremarkable. Left-sided generator device and cardiac lead noted. Minimal likely atelectasis at the bases. No pleural effusion or pneumothorax.  No acute osseous process.      Minimal likely atelectasis at the bases. Mild cardiomegaly. Tortuous aorta. Follow-up as clinically indicated.  This report was finalized on 3/14/2020 7:55 AM by Dr. Rickey Sellers M.D.        I ordered the above noted radiological studies. Reviewed by me. See dictation for official radiology interpretation.      PROCEDURES    Procedures    EKG          EKG time: 0728  Rhythm/Rate: Paced rhythm 91 with significant ST elevation in II, III, AVF, v4, v5, and v6, with reciprocal ST depression in I and AVL     Interpreted Contemporaneously by me, independently viewed  Changed compared to prior 9/14/16      MEDICATIONS GIVEN IN ER    Medications   sodium chloride 0.9 % flush 10 mL (has no administration in time range)   aspirin tablet 325 mg (325 mg Oral Not Given 3/14/20 0751)   sodium chloride 0.9 % flush 10 mL (has no administration in time range)   nitroglycerin 50 mg/250 mL (0.2 mg/mL) infusion (0 mcg/min Intravenous Stopped 3/14/20 0743)   sodium chloride 0.9 % bolus 500 mL ( Intravenous New Bag 3/14/20 0745)   sodium chloride 0.9 % flush 10 mL (has no administration in time range)   clopidogrel (PLAVIX) tablet 600 mg (600 mg Oral Given 3/14/20 0739)   ondansetron (ZOFRAN) injection 4 mg (4 mg Intravenous  Given 3/14/20 0747)   morphine injection 4 mg (4 mg Intravenous Given 3/14/20 0747)         PROGRESS, DATA ANALYSIS, CONSULTS, AND MEDICAL DECISION MAKING    All labs have been independently reviewed by me.  All radiology studies have been reviewed by me and discussed with radiologist dictating the report.   EKG's independently viewed and interpreted by me.  Discussion below represents my analysis of pertinent findings related to patient's condition, differential diagnosis, treatment plan and final disposition.    ED Course as of Mar 14 0759   Sat Mar 14, 2020   0736 07:36  Patient presents with chest pain and appears to have extensive inferior MI.  Aspirin prior to arrival.  Given plavix.  Careful with NTG.  Discussed with Dr. Root and will be going to cath lab.  Team C.    [SL]      ED Course User Index  [SL] Jules Reis MD       0728- Cath Lab activated. Team C called.     0730- 325 mg ASA ordered.     0731- Call placed to interventional cardiology.     0733- Discussed pt with Dr. Conroy (Interventional Cardiologist). He will take the pt to the cath lab.     0737- Rechecked pt. Pt is stable. Pain medication offered, which the pt is declinng at this time. I informed the pt of her EKG that shows she is actively having STEMI. Informed her of my discussion with Dr. Conroy and that we will need to take the pt to the cath lab. Pt understands and agrees with the plan, all questions answered.    0739- Ordered Plavix.    0744- Called to pt's room. BP now 99/79. Nitro infusion stopped. Pt now accepts pain medication.     0747- Dr. Conroy is now at bedside evaluating the pt.     0754- Rechecked pt. Pain unchanged. /78.    0759- Cath lab is ready. Pt being transported.   --  AS OF 07:59 VITALS:    BP - 118/87  HR - 90  TEMP - 97.3 °F (36.3 °C) (Tympanic)  O2 SATS - 99%  --    DIAGNOSIS  Final diagnoses:   Acute MI, inferior wall (CMS/HCC)         DISPOSITION  ADMISSION    Discussed treatment plan and reason  for admission with pt/family and admitting physician.  Pt/family voiced understanding of the plan for admission for further testing/treatment as needed.     --  Documentation assistance provided by mario Mendoza for Dr. Chato MD.  Information recorded by the scribe was done at my direction and has been verified and validated by me.       Wilbur Mendoza  03/14/20 0800       Jules Reis MD  03/14/20 0849     [facility]

## 2020-03-17 NOTE — H&P ADULT - NSHPSOURCEINFORD_GEN_ALL_CORE
March 17, 2020      Tiera Lopez, ANGEL  [address]  [place] [address]           [facility]  [address]  [place] [address]  Phone: [phone]  Fax: [phone]          Patient: Abdullahi Urias   MR Number: [id number]   YOB: 1954   Date of Visit: 3/17/2020       Dear Tiera Lopez:    Thank you for referring Abdullahi Urias to me for evaluation. Attached you will find relevant portions of my assessment and plan of care.    If you have questions, please do not hesitate to call me. I look forward to following Abdullahi Urias along with you.    Sincerely,    Thom De La Rosa MD    Enclosure  CC:  No Recipients    If you would like to receive this communication electronically, please contact [email or web address] or [phone] to request more information on [facility] [employer] access.    For providers and/or their staff who would like to refer a patient to [facility], please contact us through our one-stop-shop provider referral line, [facility] , at [phone].    If you feel you have received this communication in error or would no longer like to receive these types of communications, please e-mail [email or web address]          Chart(s)/Patient

## 2020-05-20 NOTE — ED PROVIDER NOTE - CPE EDP [facility] NORM
Oxygen Rounds      Patient found on    O2 L/m:  4  Oxygen device:  Nasal Cannula  Spo2: 98%      Respiratory device skin site inspection completed.     normal...

## 2020-09-13 NOTE — ASU PREOP CHECKLIST - HAND OFF
Discharge instructions provided over phone to wife as she is unable to enter building due to COVID-19. Instructions reviewed verbally and written with patient. Both verbalize understanding. yes

## 2020-10-06 NOTE — PROGRESS NOTE ADULT - ATTENDING COMMENTS
Floor RN called regarding CTA coronary exam. Pt to remain NPO 4 hours, clear liquids ok. capped IV. Goal HR less then 65. RN to given nitro patch minimum 1 hour prior to scan time. Patient to be in hospital attire prior to arrival for procedure.    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 Pt on Pfizer trial 7+3+ Glasdegib/placebo.  Patient here for her induction chemotherapy on trial.  Day 16.   Day 14 marrow chemotherapeutic  Pre-rx [facility] results: NPM1, IDH2, DNMT3A mutations.  Afeb xc 48 hrs, no chills until 8/19 PM - temp to 100.9, chills 8/2.  Vanco added pre temp spike.  Vanco trough ok. On aztreonam, posa, vanco, acyclovir.  Tmax 100.3.  No N/V, now with diarrhea.  Less mucositis.  No diarrhea today. C. diff toxin (-).  I similar to  O, no lasix today  Pruritus now increased - fine, macular, erythematous rash on back.   Rash CTCAE allergic reaction grade 2.    Cefepime changed to aztreonam 8/17.      Mild AST/ALT elevation, alk phos sl incr; stable, follow for now.    liver U/S (-).  OOB     Supportive care, anti-emetics, IVFs,      Lantus QHS, following fingersticks closely, endocrine consult appreciated. h/o of linnea poorly controlled diabetes.   ECOG performance status 2. 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 Pt on Pfizer trial 7+3+ Glasdegib/placebo.  Patient here for her induction chemotherapy on trial.  Day 17.   Day 14 marrow chemotherapeutic  Pre-rx [facility] results: NPM1, IDH2, DNMT3A mutations.  Afeb xc 48 hrs, no chills until 8/19 PM - temp to 100.9, chills 8/2.  Vanco added pre temp spike.  Vanco trough ok. On aztreonam, posa, vanco, acyclovir.  Tmax 100.3.  No N/V, now with diarrhea.  Less mucositis.  No diarrhea today. C. diff toxin (-).  I > O, lasix today  Pruritus now increased - fine, macular, erythematous rash on back.   Rash CTCAE allergic reaction grade 2.    Cefepime changed to aztreonam 8/17.      Mild AST/ALT elevation, alk phos sl incr; stable, follow for now.    liver U/S (-).  Alk phos stable at 219.  OOB     Supportive care, anti-emetics, IVFs,      Lantus QHS, following fingersticks closely, endocrine consult appreciated. h/o of linnea poorly controlled diabetes.   ECOG performance status 2.

## 2021-04-03 NOTE — DISCHARGE NOTE PROVIDER - NSDCCPCAREPLAN_GEN_ALL_CORE_FT
PRINCIPAL DISCHARGE DIAGNOSIS  Diagnosis: AML (acute myelogenous leukemia)  Assessment and Plan of Treatment: Maintain counts and remian free from infection.   Notify MD and report to the ER for any Temp greater than or equal to 100.4, intractable nausea, vomiting, diarrhea or uncontrollable bleeding Discharged

## 2021-04-15 NOTE — PROGRESS NOTE ADULT - PROBLEM SELECTOR PLAN 8
Simvastatin discontinued during hospitalization due to potential for liver toxicity Notification Instructions: Patient will be notified of biopsy results. However, patient instructed to call the office if not contacted within 2 weeks.

## 2021-12-29 NOTE — PROGRESS NOTE ADULT - ATTENDING COMMENTS
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 Pt on Pfizer trial 7+3+ Glasdegib/placebo.  Patient here for her induction chemotherapy on trial.  Day 7.  Fever, chills, no localizing sxs.  Tmax 101.9, No N/V/D, rash.  All cults (-)  Diarrhea x 1, mild abd discomfort when coughs.     Supportive care, anti-emetics, IVFs, allopurinol for TLS ppx.   less dyspnea but I > O, interstitial edema on CT chest.  Weight down.  Transaminitis- monitor LFT's - stable  Cont. Cefepime (d/c Levaquin); and Posaconazole  Monitor CBC, receives transfusions PRN.   Lantus QHS, following fingersticks closely, endocrine consult appreciated. Very poorly controlled diabetes.   ECOG performance status 2. 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 Pt on Pfizer trial 7+3+ Glasdegib/placebo.  Patient here for her induction chemotherapy on trial.  Day 8.  Completing thong-c infusion.  Fever, chills, no localizing sxs.  Tmax 39., No N/V/D, rash.  c/o pruritus. All cults (-)  Diarrhea x 1, mild abd discomfort when coughs.     Supportive care, anti-emetics, IVFs, allopurinol for TLS ppx.   less dyspnea but I > O, interstitial edema on CT chest.  Got furosemide post PRBC yest.  Transaminitis- monitor LFT's - stable  Cont. Cefepime (d/c Levaquin); and Posaconazole  Monitor CBC, receives transfusions PRN.   Lantus QHS, following fingersticks closely, endocrine consult appreciated. h/o of linnea poorly controlled diabetes.   ECOG performance status 2. 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 Pt on Pfizer trial 7+3+ Glasdegib/placebo.  Patient here for her induction chemotherapy on trial.  Day 8.  Completing thong-c infusion.  Fever, chills, no localizing sxs.  Tmax 39., No N/V/D, rash.  c/o pruritus. All cults (-)  Diarrhea resolved  c/o pruritus, no rash.  On benadryl prn.  d/c allopurinol  Pain on cjhewing on left side, mild mucositis, able to eat     Supportive care, anti-emetics, IVFs, allopurinol for TLS ppx.   less dyspnea but I > O, interstitial edema on CT chest.  Got furosemide post PRBC yest.  Transaminitis- monitor LFT's - stable  Cont. Cefepime (d/c Levaquin); and Posaconazole  Monitor CBC, receives transfusions PRN.   Lantus QHS, following fingersticks closely, endocrine consult appreciated. h/o of linnea poorly controlled diabetes.   ECOG performance status 2. Acitretin Counseling:  I discussed with the patient the risks of acitretin including but not limited to hair loss, dry lips/skin/eyes, liver damage, hyperlipidemia, depression/suicidal ideation, photosensitivity.  Serious rare side effects can include but are not limited to pancreatitis, pseudotumor cerebri, bony changes, clot formation/stroke/heart attack.  Patient understands that alcohol is contraindicated since it can result in liver toxicity and significantly prolong the elimination of the drug by many years.

## 2022-04-11 NOTE — ED PROVIDER NOTE - NSTIMEPROVIDERCAREINITIATE_GEN_ER
28-Aug-2017 16:18 Current and Past Psychiatric Diagnoses/Presenting Symptoms/Historical Factors/Treatment Related Factors

## 2022-07-07 NOTE — ED PROVIDER NOTE - NS HIV RISK FACTOR YES
Chief Complaint   Patient presents with   • Annual Exam       Patient Care Team:  Head, MARY Guzmán as PCP - General (Nurse Practitioner)    Subjective   Umm Chino is a 42 y.o. female and is here for a yearly physical exam. The patient reports no problems.    Do you take any herbs or supplements that were not prescribed by a doctor? no. If so, these will be added to active medication list.    Health Habits:  Dental Exam: Up to date  Eye Exam: Up to date  Diet: Intermittent Fasting    Exercise:    Current exercise activities include: Walking  4 miles 3-4 times per week.    Pap:  Followed by [facility]  Mammogram: :Last summer and negative.  Followed by Dr. Bhandari    The following portions of the patient's history were reviewed and updated as appropriate: allergies, current medications, past family history, past medical history, past social history, past surgical history and problem list.    Social and Family and Surgical History reviewed and updated today, see Rooming tab.    Health History, Preventive Measures and Vaccination flow sheets reviewed and updated today.    Patient's current medical chart in Epic; including previous office notes, imaging, labs, specialist's evaluation either in notes or in Media tab reviewed today.    Other pertinent medical information also reviewed thru Care Everywhere function is also reviewed today.    Review of Systems  Review of Systems   Constitutional: Negative.    HENT: Negative.    Respiratory: Negative.    Cardiovascular: Negative.    Gastrointestinal: Negative.    Endocrine: Negative.    Genitourinary: Negative.    Musculoskeletal: Negative.    Skin: Negative.    Neurological: Negative.    Hematological: Negative.    Psychiatric/Behavioral: Negative.      Vitals:    07/07/22 0923   BP: 120/80   BP Location: Left arm   Patient Position: Sitting   Cuff Size: Adult   Pulse: 82   Resp: 16   Temp: 98.2 °F (36.8 °C)   SpO2: 100%   Weight: 95.3 kg (210 lb)   Height: 157.5 cm  "(62\")       General Appearance:  Alert, cooperative, no distress, appears stated age   Head:  Normocephalic, without obvious abnormality, atraumatic   Eyes:  PERRL, conjunctiva/corneas clear, EOM's intact.   Ears:  Normal TM's and external ear canals, both ears   Nose: Nares normal, septum midline, mucosa normal, no drainage or sinus tenderness   Throat: Lips, mucosa, and tongue normal; teeth and gums normal   Neck: Supple, symmetrical, trachea midline, no adenopathy;   thyroid: No enlargement/tenderness/nodules       Lungs:  Clear to auscultation bilaterally, respirations even and unlabored   Chest wall:  No tenderness or deformity   Heart:  Regular rate and rhythm, S1 and S2 normal, no murmur, rub or gallop   Abdomen:  Soft, non-tender, bowel sounds active all four quadrants,   no masses, no organomegaly           Extremities: Extremities normal, atraumatic, no cyanosis or edema   Pulses: 2+ and symmetric all extremities   Skin: Skin color, texture, turgor normal, no rashes or lesions   Lymph nodes: Cervical, supraclavicular, and axillary nodes normal   Neurologic: CNII-XII intact. Normal strength, sensation and reflexes   throughout       Results for orders placed or performed in visit on 07/07/22   CBC (No Diff)    Specimen: Blood   Result Value Ref Range    WBC 4.7 3.4 - 10.8 x10E3/uL    RBC 4.48 3.77 - 5.28 x10E6/uL    Hemoglobin 14.7 11.1 - 15.9 g/dL    Hematocrit 42.5 34.0 - 46.6 %    MCV 95 79 - 97 fL    MCH 32.8 26.6 - 33.0 pg    MCHC 34.6 31.5 - 35.7 g/dL    RDW 12.8 11.7 - 15.4 %    Platelets 204 150 - 450 x10E3/uL   Comprehensive Metabolic Panel    Specimen: Blood   Result Value Ref Range    Glucose 90 65 - 99 mg/dL    BUN 11 6 - 24 mg/dL    Creatinine 0.88 0.57 - 1.00 mg/dL    EGFR Result 85 >59 mL/min/1.73    BUN/Creatinine Ratio 13 9 - 23    Sodium 143 134 - 144 mmol/L    Potassium 4.7 3.5 - 5.2 mmol/L    Chloride 106 96 - 106 mmol/L    Total CO2 24 20 - 29 mmol/L    Calcium 9.7 8.7 - 10.2 mg/dL    " Total Protein 7.2 6.0 - 8.5 g/dL    Albumin 4.5 3.8 - 4.8 g/dL    Globulin 2.7 1.5 - 4.5 g/dL    A/G Ratio 1.7 1.2 - 2.2    Total Bilirubin 0.4 0.0 - 1.2 mg/dL    Alkaline Phosphatase 60 44 - 121 IU/L    AST (SGOT) 27 0 - 40 IU/L    ALT (SGPT) 23 0 - 32 IU/L   Lipid Panel With / Chol / HDL Ratio    Specimen: Blood   Result Value Ref Range    Total Cholesterol 189 100 - 199 mg/dL    Triglycerides 243 (H) 0 - 149 mg/dL    HDL Cholesterol 39 (L) >39 mg/dL    VLDL Cholesterol Brayden 42 (H) 5 - 40 mg/dL    LDL Chol Calc (NIH) 108 (H) 0 - 99 mg/dL    Chol/HDL Ratio 4.8 (H) 0.0 - 4.4 ratio   TSH Rfx On Abnormal To Free T4    Specimen: Blood   Result Value Ref Range    TSH 2.150 0.450 - 4.500 uIU/mL   Vitamin D 25 Hydroxy    Specimen: Blood   Result Value Ref Range    25 Hydroxy, Vitamin D 64.1 30.0 - 100.0 ng/mL     Assessment & Plan   Healthy female exam.  Diagnoses and all orders for this visit:    1. Annual physical exam (Primary)  -     CBC (No Diff)  -     Comprehensive Metabolic Panel  -     Lipid Panel With / Chol / HDL Ratio  -     TSH Rfx On Abnormal To Free T4  -     Vitamin D 25 Hydroxy    2. Mixed hyperlipidemia  -     Lipid Panel With / Chol / HDL Ratio    3. Anxiety  -     TSH Rfx On Abnormal To Free T4  -     Vitamin D 25 Hydroxy      1. Umm Chino has been doing well since she was last seen.  She has been continuing lovastatin 20 mg daily for management of cholesterol.  She is fasting today.  Will obtain routine annual physical fasting labs and notify of results.  Results will also determine further recommendations.  No current changes in medications at this time.  2. Patient Counseling:  --Nutrition: Stressed importance of moderation in sodium/caffeine intake, saturated fat and cholesterol.  Discussed caloric balance, sufficient intake of fresh fruits, vegetables, fiber,    calcium, iron.  --Exercise: Stressed the importance of regular exercise.   --Substance Abuse: Discussed cessation/primary prevention of  tobacco, alcohol, or other drug use; driving or other dangerous activities under the influence.    --Dental health: Discussed importance of regular tooth brushing, flossing, and dental visits.  --Suggested having eyes and vision checked if needed or past due.  --Immunizations reviewed.  3. Discussed the patient's BMI with her.  The BMI is above average; BMI management plan is completed  4. Follow up next physical in 1 year    Patient was given instructions and counseling regarding condition or for health maintenance advice.  Please see specific information pulled into the AVS if appropriate.      Medications Discontinued During This Encounter   Medication Reason   • meclizine (ANTIVERT) 25 MG tablet *Therapy completed   • fluticasone (Flonase) 50 MCG/ACT nasal spray *Therapy completed          Patient was wearing facemask when I entered the room and throughout our encounter. Protective equipment was worn throughout this patient encounter including a face mask and gloves. Hand hygiene was performed before donning protective equipment and after removal when leaving the room.     MARY Olson  Family Practice  [facility] [place]       Declined

## 2022-10-07 NOTE — PATIENT PROFILE ADULT - BRADEN FRICTION AND SHEAR
Arrived to the [facility]. Macarena Sheth completed. Patient tolerated well. Any issues or concerns during appointment: none. Patient aware of next infusion appointment on 10/27/22 (date) at 6 AM (time). Patient aware of next lab and [facility] office visit on 10/25/22 (date) at 11:30 AM (time). Patient instructed to call provider with temperature of 100.4 or greater or nausea/vomiting/ diarrhea or pain not controlled by medications  Discharged ambulatory. (3) no apparent problem

## 2023-07-27 NOTE — PROGRESS NOTE ADULT - SUBJECTIVE AND OBJECTIVE BOX
Chief Complaint/Follow-up on: T2DM    Subjective: Patient found using her nebulizer treatement with family at the bedside. +tightness in the middle of her chest. Appetite is ok. Family is not bringing outside food.    MEDICATIONS  (STANDING):  ALBUTerol    90 MICROgram(s) HFA Inhaler 1 Puff(s) Inhalation every 6 hours  ALBUTerol/ipratropium for Nebulization 3 milliLiter(s) Nebulizer every 6 hours  allopurinol 300 milliGRAM(s) Oral daily  Biotene Dry Mouth Oral Rinse 5 milliLiter(s) Swish and Spit three times a day  cefepime   IVPB 2000 milliGRAM(s) IV Intermittent every 8 hours  chlorhexidine 2% Cloths 1 Application(s) Topical <User Schedule>  cytarabine IVPB (eMAR) 158 milliGRAM(s) IV Intermittent daily  dextrose 5%. 1000 milliLiter(s) (50 mL/Hr) IV Continuous <Continuous>  dextrose 50% Injectable 12.5 Gram(s) IV Push once  dextrose 50% Injectable 25 Gram(s) IV Push once  dextrose 50% Injectable 25 Gram(s) IV Push once  enoxaparin Injectable 40 milliGRAM(s) SubCutaneous daily  gabapentin 600 milliGRAM(s) Oral two times a day  insulin glargine Injectable (LANTUS) 24 Unit(s) SubCutaneous at bedtime  insulin lispro (HumaLOG) corrective regimen sliding scale   SubCutaneous three times a day before meals  insulin lispro (HumaLOG) corrective regimen sliding scale   SubCutaneous at bedtime  insulin lispro Injectable (HumaLOG) 12 Unit(s) SubCutaneous three times a day before meals  investigational chemo - general 1 Tablet(s) Oral every 24 hours  levothyroxine 75 MICROGram(s) Oral daily  losartan 100 milliGRAM(s) Oral daily  ondansetron Injectable 8 milliGRAM(s) IV Push every 8 hours  posaconazole DR Tablet 300 milliGRAM(s) Oral daily  senna 2 Tablet(s) Oral two times a day  sodium chloride 0.9%. 1000 milliLiter(s) (50 mL/Hr) IV Continuous <Continuous>  sodium chloride 3%  Inhalation 4 milliLiter(s) Inhalation two times a day  tiotropium 18 MICROgram(s) Capsule 1 Capsule(s) Inhalation daily    MEDICATIONS  (PRN):  acetaminophen   Tablet .. 650 milliGRAM(s) Oral every 6 hours PRN Temp greater or equal to 38C (100.4F), Mild Pain (1 - 3)  dextrose 40% Gel 15 Gram(s) Oral once PRN Blood Glucose LESS THAN 70 milliGRAM(s)/deciliter  glucagon  Injectable 1 milliGRAM(s) IntraMuscular once PRN Glucose LESS THAN 70 milligrams/deciliter  melatonin 3 milliGRAM(s) Oral once PRN Insomnia  metoclopramide Injectable 10 milliGRAM(s) IV Push every 6 hours PRN nausea/vommiting  oxyCODONE    IR 5 milliGRAM(s) Oral every 4 hours PRN Moderate Pain (4 - 6)  polyethylene glycol 3350 17 Gram(s) Oral two times a day PRN Constipation      PHYSICAL EXAM:  VITALS: T(C): 38.8 (08-12-19 @ 16:10)  T(F): 101.9 (08-12-19 @ 16:10), Max: 101.9 (08-12-19 @ 16:10)  HR: 106 (08-12-19 @ 16:10) (79 - 106)  BP: 120/66 (08-12-19 @ 16:10) (120/66 - 152/69)  RR:  (18 - 20)  SpO2:  (93% - 100%)  Wt(kg): --  GENERAL: NAD, well-groomed, well-developed, looks younger than stated age  HEENT:  Atraumatic, Normocephalic, moist mucous membranes  RESPIRATORY: Clear to auscultation bilaterally; No rales, rhonchi, wheezing, or rubs  CARDIOVASCULAR: Regular rate and rhythm; No murmurs  GI: Soft, nontender, non distended, normal bowel sounds      POCT Blood Glucose.: 269 mg/dL (08-12-19 @ 17:11)  POCT Blood Glucose.: 221 mg/dL (08-12-19 @ 12:57)  POCT Blood Glucose.: 130 mg/dL (08-12-19 @ 09:19)  POCT Blood Glucose.: 184 mg/dL (08-11-19 @ 22:02)  POCT Blood Glucose.: 261 mg/dL (08-11-19 @ 19:05)  POCT Blood Glucose.: 195 mg/dL (08-11-19 @ 13:10)  POCT Blood Glucose.: 168 mg/dL (08-11-19 @ 10:00)  POCT Blood Glucose.: 252 mg/dL (08-10-19 @ 21:35)  POCT Blood Glucose.: 178 mg/dL (08-10-19 @ 18:31)  POCT Blood Glucose.: 174 mg/dL (08-10-19 @ 13:20)  POCT Blood Glucose.: 224 mg/dL (08-10-19 @ 10:02)  POCT Blood Glucose.: 212 mg/dL (08-09-19 @ 21:10)    08-12    133<L>  |  100  |  11  ----------------------------<  155<H>  3.3<L>   |  23  |  0.78    EGFR if : 94  EGFR if non : 81    Ca    8.0<L>      08-12  Mg     1.7     08-12  Phos  2.5     08-12    TPro  6.7  /  Alb  3.0<L>  /  TBili  0.4  /  DBili  x   /  AST  53<H>  /  ALT  54<H>  /  AlkPhos  139<H>  08-12          Thyroid Function Tests:      Hemoglobin A1C, Whole Blood: 10.6 % <H> [4.0 - 5.6] (08-07-19 @ 08:36) N/A

## 2025-02-28 NOTE — PROGRESS NOTE ADULT - PROBLEM SELECTOR PROBLEM 10
Caller returning call to Clinical Team- Connected to RN- Philip- Connect call to Triage queue- Route message to provider's clinical support pool .        Prophylactic measure